# Patient Record
Sex: FEMALE | Race: WHITE | NOT HISPANIC OR LATINO | Employment: OTHER | URBAN - METROPOLITAN AREA
[De-identification: names, ages, dates, MRNs, and addresses within clinical notes are randomized per-mention and may not be internally consistent; named-entity substitution may affect disease eponyms.]

---

## 2019-01-01 ENCOUNTER — OFFICE VISIT (OUTPATIENT)
Dept: SURGERY | Facility: CLINIC | Age: 69
End: 2019-01-01
Payer: MEDICARE

## 2019-01-01 ENCOUNTER — HOSPITAL ENCOUNTER (INPATIENT)
Facility: HOSPITAL | Age: 69
LOS: 2 days | Discharge: NURSING, REHAB OR LONG-TERM CARE | DRG: 392 | End: 2019-12-21
Attending: EMERGENCY MEDICINE | Admitting: INTERNAL MEDICINE
Payer: MEDICARE

## 2019-01-01 ENCOUNTER — PATIENT MESSAGE (OUTPATIENT)
Dept: SURGERY | Facility: CLINIC | Age: 69
End: 2019-01-01

## 2019-01-01 VITALS
HEIGHT: 66 IN | DIASTOLIC BLOOD PRESSURE: 64 MMHG | BODY MASS INDEX: 24.94 KG/M2 | HEART RATE: 94 BPM | SYSTOLIC BLOOD PRESSURE: 94 MMHG

## 2019-01-01 VITALS
HEIGHT: 66 IN | TEMPERATURE: 98 F | OXYGEN SATURATION: 90 % | RESPIRATION RATE: 16 BRPM | DIASTOLIC BLOOD PRESSURE: 55 MMHG | HEART RATE: 81 BPM | SYSTOLIC BLOOD PRESSURE: 107 MMHG | WEIGHT: 154.56 LBS | BODY MASS INDEX: 24.84 KG/M2

## 2019-01-01 DIAGNOSIS — I50.32 CHRONIC DIASTOLIC HEART FAILURE: ICD-10-CM

## 2019-01-01 DIAGNOSIS — E11.9 TYPE 2 DIABETES MELLITUS WITHOUT COMPLICATION, WITHOUT LONG-TERM CURRENT USE OF INSULIN: ICD-10-CM

## 2019-01-01 DIAGNOSIS — R94.31 QT PROLONGATION: ICD-10-CM

## 2019-01-01 DIAGNOSIS — K57.92 DIVERTICULITIS: Primary | ICD-10-CM

## 2019-01-01 DIAGNOSIS — R79.89 ELEVATED TROPONIN: ICD-10-CM

## 2019-01-01 DIAGNOSIS — K57.32 DIVERTICULITIS OF SIGMOID COLON: ICD-10-CM

## 2019-01-01 DIAGNOSIS — E46 MALNUTRITION, UNSPECIFIED TYPE: ICD-10-CM

## 2019-01-01 DIAGNOSIS — K57.30 DIVERTICULOSIS OF SIGMOID COLON: ICD-10-CM

## 2019-01-01 DIAGNOSIS — J44.9 CHRONIC OBSTRUCTIVE PULMONARY DISEASE, UNSPECIFIED COPD TYPE: ICD-10-CM

## 2019-01-01 DIAGNOSIS — I48.0 PAROXYSMAL ATRIAL FIBRILLATION: ICD-10-CM

## 2019-01-01 DIAGNOSIS — R00.0 SINUS TACHYCARDIA: ICD-10-CM

## 2019-01-01 DIAGNOSIS — I49.9 ABNORMAL HEART RHYTHM: ICD-10-CM

## 2019-01-01 DIAGNOSIS — R00.0 TACHYCARDIA: ICD-10-CM

## 2019-01-01 LAB
ALBUMIN SERPL BCP-MCNC: 2.6 G/DL (ref 3.5–5.2)
ALBUMIN SERPL BCP-MCNC: 2.8 G/DL (ref 3.5–5.2)
ALBUMIN SERPL BCP-MCNC: 3.3 G/DL (ref 3.5–5.2)
ALP SERPL-CCNC: 103 U/L (ref 55–135)
ALP SERPL-CCNC: 124 U/L (ref 55–135)
ALP SERPL-CCNC: 96 U/L (ref 55–135)
ALT SERPL W/O P-5'-P-CCNC: 11 U/L (ref 10–44)
ALT SERPL W/O P-5'-P-CCNC: 12 U/L (ref 10–44)
ALT SERPL W/O P-5'-P-CCNC: 9 U/L (ref 10–44)
ANION GAP SERPL CALC-SCNC: 12 MMOL/L (ref 8–16)
ANION GAP SERPL CALC-SCNC: 9 MMOL/L (ref 8–16)
ANION GAP SERPL CALC-SCNC: 9 MMOL/L (ref 8–16)
AST SERPL-CCNC: 19 U/L (ref 10–40)
AST SERPL-CCNC: 20 U/L (ref 10–40)
AST SERPL-CCNC: 28 U/L (ref 10–40)
BACTERIA BLD CULT: NORMAL
BACTERIA BLD CULT: NORMAL
BASOPHILS # BLD AUTO: 0.02 K/UL (ref 0–0.2)
BASOPHILS # BLD AUTO: 0.03 K/UL (ref 0–0.2)
BASOPHILS # BLD AUTO: 0.03 K/UL (ref 0–0.2)
BASOPHILS NFR BLD: 0.4 % (ref 0–1.9)
BASOPHILS NFR BLD: 0.4 % (ref 0–1.9)
BASOPHILS NFR BLD: 0.5 % (ref 0–1.9)
BILIRUB SERPL-MCNC: 1.2 MG/DL (ref 0.1–1)
BILIRUB SERPL-MCNC: 1.4 MG/DL (ref 0.1–1)
BILIRUB SERPL-MCNC: 1.4 MG/DL (ref 0.1–1)
BUN SERPL-MCNC: 15 MG/DL (ref 8–23)
BUN SERPL-MCNC: 16 MG/DL (ref 8–23)
BUN SERPL-MCNC: 17 MG/DL (ref 8–23)
CALCIUM SERPL-MCNC: 7.2 MG/DL (ref 8.7–10.5)
CALCIUM SERPL-MCNC: 7.3 MG/DL (ref 8.7–10.5)
CALCIUM SERPL-MCNC: 7.4 MG/DL (ref 8.7–10.5)
CHLORIDE SERPL-SCNC: 96 MMOL/L (ref 95–110)
CHLORIDE SERPL-SCNC: 98 MMOL/L (ref 95–110)
CHLORIDE SERPL-SCNC: 99 MMOL/L (ref 95–110)
CO2 SERPL-SCNC: 32 MMOL/L (ref 23–29)
CO2 SERPL-SCNC: 33 MMOL/L (ref 23–29)
CO2 SERPL-SCNC: 35 MMOL/L (ref 23–29)
CREAT SERPL-MCNC: 0.7 MG/DL (ref 0.5–1.4)
CREAT SERPL-MCNC: 0.8 MG/DL (ref 0.5–1.4)
CREAT SERPL-MCNC: 0.8 MG/DL (ref 0.5–1.4)
DIFFERENTIAL METHOD: ABNORMAL
EOSINOPHIL # BLD AUTO: 0.1 K/UL (ref 0–0.5)
EOSINOPHIL # BLD AUTO: 0.2 K/UL (ref 0–0.5)
EOSINOPHIL # BLD AUTO: 0.2 K/UL (ref 0–0.5)
EOSINOPHIL NFR BLD: 1.6 % (ref 0–8)
EOSINOPHIL NFR BLD: 3.2 % (ref 0–8)
EOSINOPHIL NFR BLD: 3.9 % (ref 0–8)
ERYTHROCYTE [DISTWIDTH] IN BLOOD BY AUTOMATED COUNT: 17.4 % (ref 11.5–14.5)
ERYTHROCYTE [DISTWIDTH] IN BLOOD BY AUTOMATED COUNT: 17.5 % (ref 11.5–14.5)
ERYTHROCYTE [DISTWIDTH] IN BLOOD BY AUTOMATED COUNT: 17.7 % (ref 11.5–14.5)
EST. GFR  (AFRICAN AMERICAN): >60 ML/MIN/1.73 M^2
EST. GFR  (NON AFRICAN AMERICAN): >60 ML/MIN/1.73 M^2
ESTIMATED AVG GLUCOSE: 137 MG/DL (ref 68–131)
GLUCOSE SERPL-MCNC: 104 MG/DL (ref 70–110)
GLUCOSE SERPL-MCNC: 138 MG/DL (ref 70–110)
GLUCOSE SERPL-MCNC: 85 MG/DL (ref 70–110)
HBA1C MFR BLD HPLC: 6.4 % (ref 4–5.6)
HCT VFR BLD AUTO: 39.5 % (ref 37–48.5)
HCT VFR BLD AUTO: 42.3 % (ref 37–48.5)
HCT VFR BLD AUTO: 46.7 % (ref 37–48.5)
HGB BLD-MCNC: 12.3 G/DL (ref 12–16)
HGB BLD-MCNC: 13.4 G/DL (ref 12–16)
HGB BLD-MCNC: 14.8 G/DL (ref 12–16)
IMM GRANULOCYTES # BLD AUTO: 0.04 K/UL (ref 0–0.04)
IMM GRANULOCYTES # BLD AUTO: 0.05 K/UL (ref 0–0.04)
IMM GRANULOCYTES # BLD AUTO: 0.05 K/UL (ref 0–0.04)
IMM GRANULOCYTES NFR BLD AUTO: 0.7 % (ref 0–0.5)
IMM GRANULOCYTES NFR BLD AUTO: 0.7 % (ref 0–0.5)
IMM GRANULOCYTES NFR BLD AUTO: 0.8 % (ref 0–0.5)
INFLUENZA A, MOLECULAR: NEGATIVE
INFLUENZA B, MOLECULAR: NEGATIVE
INR PPP: 2.7 (ref 0.8–1.2)
INR PPP: 2.8 (ref 0.8–1.2)
INR PPP: 3.1 (ref 0.8–1.2)
LACTATE SERPL-SCNC: 1.9 MMOL/L (ref 0.5–2.2)
LYMPHOCYTES # BLD AUTO: 1.2 K/UL (ref 1–4.8)
LYMPHOCYTES # BLD AUTO: 1.4 K/UL (ref 1–4.8)
LYMPHOCYTES # BLD AUTO: 1.4 K/UL (ref 1–4.8)
LYMPHOCYTES NFR BLD: 20.1 % (ref 18–48)
LYMPHOCYTES NFR BLD: 22.2 % (ref 18–48)
LYMPHOCYTES NFR BLD: 22.9 % (ref 18–48)
MAGNESIUM SERPL-MCNC: 0.8 MG/DL (ref 1.6–2.6)
MAGNESIUM SERPL-MCNC: 0.8 MG/DL (ref 1.6–2.6)
MAGNESIUM SERPL-MCNC: 1.9 MG/DL (ref 1.6–2.6)
MAGNESIUM SERPL-MCNC: 2.4 MG/DL (ref 1.6–2.6)
MCH RBC QN AUTO: 28.4 PG (ref 27–31)
MCH RBC QN AUTO: 28.8 PG (ref 27–31)
MCH RBC QN AUTO: 28.8 PG (ref 27–31)
MCHC RBC AUTO-ENTMCNC: 31.1 G/DL (ref 32–36)
MCHC RBC AUTO-ENTMCNC: 31.7 G/DL (ref 32–36)
MCHC RBC AUTO-ENTMCNC: 31.7 G/DL (ref 32–36)
MCV RBC AUTO: 91 FL (ref 82–98)
MONOCYTES # BLD AUTO: 0.4 K/UL (ref 0.3–1)
MONOCYTES # BLD AUTO: 0.5 K/UL (ref 0.3–1)
MONOCYTES # BLD AUTO: 0.5 K/UL (ref 0.3–1)
MONOCYTES NFR BLD: 6.8 % (ref 4–15)
MONOCYTES NFR BLD: 7.8 % (ref 4–15)
MONOCYTES NFR BLD: 8.5 % (ref 4–15)
NEUTROPHILS # BLD AUTO: 3.5 K/UL (ref 1.8–7.7)
NEUTROPHILS # BLD AUTO: 4 K/UL (ref 1.8–7.7)
NEUTROPHILS # BLD AUTO: 4.7 K/UL (ref 1.8–7.7)
NEUTROPHILS NFR BLD: 64.3 % (ref 38–73)
NEUTROPHILS NFR BLD: 64.8 % (ref 38–73)
NEUTROPHILS NFR BLD: 70.4 % (ref 38–73)
NRBC BLD-RTO: 0 /100 WBC
PHOSPHATE SERPL-MCNC: 2.5 MG/DL (ref 2.7–4.5)
PHOSPHATE SERPL-MCNC: 3 MG/DL (ref 2.7–4.5)
PLATELET # BLD AUTO: 252 K/UL (ref 150–350)
PLATELET # BLD AUTO: 294 K/UL (ref 150–350)
PLATELET # BLD AUTO: 304 K/UL (ref 150–350)
PMV BLD AUTO: 9 FL (ref 9.2–12.9)
PMV BLD AUTO: 9 FL (ref 9.2–12.9)
PMV BLD AUTO: 9.1 FL (ref 9.2–12.9)
POCT GLUCOSE: 100 MG/DL (ref 70–110)
POCT GLUCOSE: 139 MG/DL (ref 70–110)
POCT GLUCOSE: 165 MG/DL (ref 70–110)
POTASSIUM SERPL-SCNC: 3.1 MMOL/L (ref 3.5–5.1)
POTASSIUM SERPL-SCNC: 4.2 MMOL/L (ref 3.5–5.1)
POTASSIUM SERPL-SCNC: 4.9 MMOL/L (ref 3.5–5.1)
PROT SERPL-MCNC: 5.3 G/DL (ref 6–8.4)
PROT SERPL-MCNC: 5.8 G/DL (ref 6–8.4)
PROT SERPL-MCNC: 7.1 G/DL (ref 6–8.4)
PROTHROMBIN TIME: 25.7 SEC (ref 9–12.5)
PROTHROMBIN TIME: 26.9 SEC (ref 9–12.5)
PROTHROMBIN TIME: 29.5 SEC (ref 9–12.5)
RBC # BLD AUTO: 4.33 M/UL (ref 4–5.4)
RBC # BLD AUTO: 4.65 M/UL (ref 4–5.4)
RBC # BLD AUTO: 5.13 M/UL (ref 4–5.4)
SODIUM SERPL-SCNC: 140 MMOL/L (ref 136–145)
SODIUM SERPL-SCNC: 141 MMOL/L (ref 136–145)
SODIUM SERPL-SCNC: 142 MMOL/L (ref 136–145)
SPECIMEN SOURCE: NORMAL
TROPONIN I SERPL DL<=0.01 NG/ML-MCNC: 0.05 NG/ML (ref 0–0.03)
TROPONIN I SERPL DL<=0.01 NG/ML-MCNC: 0.06 NG/ML (ref 0–0.03)
WBC # BLD AUTO: 5.37 K/UL (ref 3.9–12.7)
WBC # BLD AUTO: 6.21 K/UL (ref 3.9–12.7)
WBC # BLD AUTO: 6.73 K/UL (ref 3.9–12.7)

## 2019-01-01 PROCEDURE — 93005 ELECTROCARDIOGRAM TRACING: CPT

## 2019-01-01 PROCEDURE — 99238 HOSP IP/OBS DSCHRG MGMT 30/<: CPT | Mod: ,,, | Performed by: INTERNAL MEDICINE

## 2019-01-01 PROCEDURE — 25000242 PHARM REV CODE 250 ALT 637 W/ HCPCS: Performed by: STUDENT IN AN ORGANIZED HEALTH CARE EDUCATION/TRAINING PROGRAM

## 2019-01-01 PROCEDURE — 63600175 PHARM REV CODE 636 W HCPCS: Performed by: STUDENT IN AN ORGANIZED HEALTH CARE EDUCATION/TRAINING PROGRAM

## 2019-01-01 PROCEDURE — 93010 EKG 12-LEAD: ICD-10-PCS | Mod: ,,, | Performed by: INTERNAL MEDICINE

## 2019-01-01 PROCEDURE — 83735 ASSAY OF MAGNESIUM: CPT

## 2019-01-01 PROCEDURE — 99223 PR INITIAL HOSPITAL CARE,LEVL III: ICD-10-PCS | Mod: ,,, | Performed by: INTERNAL MEDICINE

## 2019-01-01 PROCEDURE — 83735 ASSAY OF MAGNESIUM: CPT | Mod: 91

## 2019-01-01 PROCEDURE — 94640 AIRWAY INHALATION TREATMENT: CPT

## 2019-01-01 PROCEDURE — 3288F FALL RISK ASSESSMENT DOCD: CPT | Mod: CPTII,S$GLB,, | Performed by: COLON & RECTAL SURGERY

## 2019-01-01 PROCEDURE — 87040 BLOOD CULTURE FOR BACTERIA: CPT

## 2019-01-01 PROCEDURE — 25000003 PHARM REV CODE 250: Performed by: STUDENT IN AN ORGANIZED HEALTH CARE EDUCATION/TRAINING PROGRAM

## 2019-01-01 PROCEDURE — 85025 COMPLETE CBC W/AUTO DIFF WBC: CPT

## 2019-01-01 PROCEDURE — 27000221 HC OXYGEN, UP TO 24 HOURS

## 2019-01-01 PROCEDURE — 99232 SBSQ HOSP IP/OBS MODERATE 35: CPT | Mod: ,,, | Performed by: INTERNAL MEDICINE

## 2019-01-01 PROCEDURE — 80053 COMPREHEN METABOLIC PANEL: CPT

## 2019-01-01 PROCEDURE — 93010 ELECTROCARDIOGRAM REPORT: CPT | Mod: ,,, | Performed by: INTERNAL MEDICINE

## 2019-01-01 PROCEDURE — 99900035 HC TECH TIME PER 15 MIN (STAT)

## 2019-01-01 PROCEDURE — 1125F PR PAIN SEVERITY QUANTIFIED, PAIN PRESENT: ICD-10-PCS | Mod: S$GLB,,, | Performed by: COLON & RECTAL SURGERY

## 2019-01-01 PROCEDURE — 36415 COLL VENOUS BLD VENIPUNCTURE: CPT

## 2019-01-01 PROCEDURE — 3288F PR FALLS RISK ASSESSMENT DOCUMENTED: ICD-10-PCS | Mod: CPTII,S$GLB,, | Performed by: COLON & RECTAL SURGERY

## 2019-01-01 PROCEDURE — 85610 PROTHROMBIN TIME: CPT

## 2019-01-01 PROCEDURE — 99285 EMERGENCY DEPT VISIT HI MDM: CPT | Mod: ,,, | Performed by: EMERGENCY MEDICINE

## 2019-01-01 PROCEDURE — 99238 PR HOSPITAL DISCHARGE DAY,<30 MIN: ICD-10-PCS | Mod: ,,, | Performed by: INTERNAL MEDICINE

## 2019-01-01 PROCEDURE — 84484 ASSAY OF TROPONIN QUANT: CPT

## 2019-01-01 PROCEDURE — 96365 THER/PROPH/DIAG IV INF INIT: CPT

## 2019-01-01 PROCEDURE — 97165 OT EVAL LOW COMPLEX 30 MIN: CPT

## 2019-01-01 PROCEDURE — 20600001 HC STEP DOWN PRIVATE ROOM

## 2019-01-01 PROCEDURE — 99285 PR EMERGENCY DEPT VISIT,LEVEL V: ICD-10-PCS | Mod: ,,, | Performed by: EMERGENCY MEDICINE

## 2019-01-01 PROCEDURE — 99232 PR SUBSEQUENT HOSPITAL CARE,LEVL II: ICD-10-PCS | Mod: ,,, | Performed by: INTERNAL MEDICINE

## 2019-01-01 PROCEDURE — 25500020 PHARM REV CODE 255: Performed by: EMERGENCY MEDICINE

## 2019-01-01 PROCEDURE — 84100 ASSAY OF PHOSPHORUS: CPT

## 2019-01-01 PROCEDURE — 25000003 PHARM REV CODE 250: Performed by: INTERNAL MEDICINE

## 2019-01-01 PROCEDURE — 83036 HEMOGLOBIN GLYCOSYLATED A1C: CPT

## 2019-01-01 PROCEDURE — 99999 PR PBB SHADOW E&M-EST. PATIENT-LVL III: CPT | Mod: PBBFAC,,, | Performed by: COLON & RECTAL SURGERY

## 2019-01-01 PROCEDURE — 97161 PT EVAL LOW COMPLEX 20 MIN: CPT

## 2019-01-01 PROCEDURE — 1100F PTFALLS ASSESS-DOCD GE2>/YR: CPT | Mod: CPTII,S$GLB,, | Performed by: COLON & RECTAL SURGERY

## 2019-01-01 PROCEDURE — 1159F PR MEDICATION LIST DOCUMENTED IN MEDICAL RECORD: ICD-10-PCS | Mod: S$GLB,,, | Performed by: COLON & RECTAL SURGERY

## 2019-01-01 PROCEDURE — 87502 INFLUENZA DNA AMP PROBE: CPT

## 2019-01-01 PROCEDURE — 99999 PR PBB SHADOW E&M-EST. PATIENT-LVL III: ICD-10-PCS | Mod: PBBFAC,,, | Performed by: COLON & RECTAL SURGERY

## 2019-01-01 PROCEDURE — 1100F PR PT FALLS ASSESS DOC 2+ FALLS/FALL W/INJURY/YR: ICD-10-PCS | Mod: CPTII,S$GLB,, | Performed by: COLON & RECTAL SURGERY

## 2019-01-01 PROCEDURE — 1125F AMNT PAIN NOTED PAIN PRSNT: CPT | Mod: S$GLB,,, | Performed by: COLON & RECTAL SURGERY

## 2019-01-01 PROCEDURE — 99204 PR OFFICE/OUTPT VISIT, NEW, LEVL IV, 45-59 MIN: ICD-10-PCS | Mod: S$GLB,,, | Performed by: COLON & RECTAL SURGERY

## 2019-01-01 PROCEDURE — 83605 ASSAY OF LACTIC ACID: CPT

## 2019-01-01 PROCEDURE — 94761 N-INVAS EAR/PLS OXIMETRY MLT: CPT

## 2019-01-01 PROCEDURE — 96361 HYDRATE IV INFUSION ADD-ON: CPT

## 2019-01-01 PROCEDURE — 99204 OFFICE O/P NEW MOD 45 MIN: CPT | Mod: S$GLB,,, | Performed by: COLON & RECTAL SURGERY

## 2019-01-01 PROCEDURE — 99223 1ST HOSP IP/OBS HIGH 75: CPT | Mod: ,,, | Performed by: INTERNAL MEDICINE

## 2019-01-01 PROCEDURE — 99285 EMERGENCY DEPT VISIT HI MDM: CPT | Mod: 25

## 2019-01-01 PROCEDURE — 84484 ASSAY OF TROPONIN QUANT: CPT | Mod: 91

## 2019-01-01 PROCEDURE — 1159F MED LIST DOCD IN RCRD: CPT | Mod: S$GLB,,, | Performed by: COLON & RECTAL SURGERY

## 2019-01-01 RX ORDER — PROMETHAZINE HYDROCHLORIDE 12.5 MG/1
12.5 TABLET ORAL EVERY 6 HOURS PRN
Status: DISCONTINUED | OUTPATIENT
Start: 2019-01-01 | End: 2019-01-01 | Stop reason: HOSPADM

## 2019-01-01 RX ORDER — POTASSIUM CHLORIDE 750 MG/1
30 CAPSULE, EXTENDED RELEASE ORAL ONCE
Status: DISCONTINUED | OUTPATIENT
Start: 2019-01-01 | End: 2019-01-01 | Stop reason: HOSPADM

## 2019-01-01 RX ORDER — DULOXETIN HYDROCHLORIDE 30 MG/1
30 CAPSULE, DELAYED RELEASE ORAL DAILY
Status: ON HOLD | COMMUNITY
End: 2020-01-01 | Stop reason: HOSPADM

## 2019-01-01 RX ORDER — DILTIAZEM HYDROCHLORIDE 60 MG/1
60 CAPSULE, EXTENDED RELEASE ORAL EVERY 12 HOURS
Status: ON HOLD | COMMUNITY
End: 2020-01-01 | Stop reason: HOSPADM

## 2019-01-01 RX ORDER — ROSUVASTATIN CALCIUM 20 MG/1
20 TABLET, COATED ORAL DAILY
Status: ON HOLD | COMMUNITY
End: 2019-01-01

## 2019-01-01 RX ORDER — DILTIAZEM HYDROCHLORIDE 60 MG/1
60 TABLET, FILM COATED ORAL EVERY 12 HOURS
Status: DISCONTINUED | OUTPATIENT
Start: 2019-01-01 | End: 2019-01-01 | Stop reason: HOSPADM

## 2019-01-01 RX ORDER — WARFARIN 1 MG/1
1 TABLET ORAL
Status: DISCONTINUED | OUTPATIENT
Start: 2019-01-01 | End: 2019-01-01

## 2019-01-01 RX ORDER — INSULIN ASPART 100 [IU]/ML
0-5 INJECTION, SOLUTION INTRAVENOUS; SUBCUTANEOUS
Status: DISCONTINUED | OUTPATIENT
Start: 2019-01-01 | End: 2019-01-01 | Stop reason: HOSPADM

## 2019-01-01 RX ORDER — IBUPROFEN 200 MG
16 TABLET ORAL
Status: DISCONTINUED | OUTPATIENT
Start: 2019-01-01 | End: 2019-01-01 | Stop reason: HOSPADM

## 2019-01-01 RX ORDER — MAGNESIUM SULFATE HEPTAHYDRATE 40 MG/ML
2 INJECTION, SOLUTION INTRAVENOUS
Status: DISCONTINUED | OUTPATIENT
Start: 2019-01-01 | End: 2019-01-01

## 2019-01-01 RX ORDER — FUROSEMIDE 20 MG/1
20 TABLET ORAL DAILY
Status: ON HOLD | COMMUNITY
End: 2020-01-01 | Stop reason: SDUPTHER

## 2019-01-01 RX ORDER — AMOXICILLIN AND CLAVULANATE POTASSIUM 875; 125 MG/1; MG/1
1 TABLET, FILM COATED ORAL EVERY 12 HOURS
Status: DISCONTINUED | OUTPATIENT
Start: 2019-01-01 | End: 2019-01-01 | Stop reason: HOSPADM

## 2019-01-01 RX ORDER — NYSTATIN 100000 [USP'U]/G
POWDER TOPICAL
COMMUNITY

## 2019-01-01 RX ORDER — MAGNESIUM SULFATE HEPTAHYDRATE 40 MG/ML
2 INJECTION, SOLUTION INTRAVENOUS
Status: COMPLETED | OUTPATIENT
Start: 2019-01-01 | End: 2019-01-01

## 2019-01-01 RX ORDER — METOPROLOL TARTRATE 50 MG/1
50 TABLET ORAL 2 TIMES DAILY
Status: ON HOLD | COMMUNITY
End: 2020-01-01

## 2019-01-01 RX ORDER — NAPROXEN SODIUM 220 MG/1
81 TABLET, FILM COATED ORAL DAILY
Refills: 0 | Status: ON HOLD
Start: 2019-01-01 | End: 2020-01-01 | Stop reason: HOSPADM

## 2019-01-01 RX ORDER — IBUPROFEN 200 MG
24 TABLET ORAL
Status: DISCONTINUED | OUTPATIENT
Start: 2019-01-01 | End: 2019-01-01 | Stop reason: HOSPADM

## 2019-01-01 RX ORDER — METOPROLOL TARTRATE 25 MG/1
50 TABLET, FILM COATED ORAL 2 TIMES DAILY
Status: DISCONTINUED | OUTPATIENT
Start: 2019-01-01 | End: 2019-01-01 | Stop reason: HOSPADM

## 2019-01-01 RX ORDER — BUDESONIDE 0.5 MG/2ML
0.5 INHALANT ORAL 2 TIMES DAILY
Status: DISCONTINUED | OUTPATIENT
Start: 2019-01-01 | End: 2019-01-01 | Stop reason: HOSPADM

## 2019-01-01 RX ORDER — IPRATROPIUM BROMIDE AND ALBUTEROL SULFATE 2.5; .5 MG/3ML; MG/3ML
3 SOLUTION RESPIRATORY (INHALATION) EVERY 6 HOURS PRN
Status: DISCONTINUED | OUTPATIENT
Start: 2019-01-01 | End: 2019-01-01 | Stop reason: HOSPADM

## 2019-01-01 RX ORDER — WARFARIN 1 MG/1
1 TABLET ORAL
Status: DISCONTINUED | OUTPATIENT
Start: 2019-01-01 | End: 2019-01-01 | Stop reason: HOSPADM

## 2019-01-01 RX ORDER — CHOLECALCIFEROL (VITAMIN D3) 25 MCG
1000 TABLET ORAL DAILY
COMMUNITY

## 2019-01-01 RX ORDER — ONDANSETRON 8 MG/1
8 TABLET, ORALLY DISINTEGRATING ORAL EVERY 8 HOURS PRN
Status: DISCONTINUED | OUTPATIENT
Start: 2019-01-01 | End: 2019-01-01

## 2019-01-01 RX ORDER — SODIUM CHLORIDE 0.9 % (FLUSH) 0.9 %
10 SYRINGE (ML) INJECTION
Status: DISCONTINUED | OUTPATIENT
Start: 2019-01-01 | End: 2019-01-01 | Stop reason: HOSPADM

## 2019-01-01 RX ORDER — FUROSEMIDE 20 MG/1
20 TABLET ORAL DAILY
Status: DISCONTINUED | OUTPATIENT
Start: 2019-01-01 | End: 2019-01-01 | Stop reason: HOSPADM

## 2019-01-01 RX ORDER — ROSUVASTATIN CALCIUM 20 MG/1
20 TABLET, COATED ORAL DAILY
Status: DISCONTINUED | OUTPATIENT
Start: 2019-01-01 | End: 2019-01-01 | Stop reason: HOSPADM

## 2019-01-01 RX ORDER — POTASSIUM CHLORIDE 20 MEQ/1
40 TABLET, EXTENDED RELEASE ORAL ONCE
Status: COMPLETED | OUTPATIENT
Start: 2019-01-01 | End: 2019-01-01

## 2019-01-01 RX ORDER — ONDANSETRON 8 MG/1
8 TABLET, ORALLY DISINTEGRATING ORAL
Status: COMPLETED | OUTPATIENT
Start: 2019-01-01 | End: 2019-01-01

## 2019-01-01 RX ORDER — DILTIAZEM HYDROCHLORIDE 60 MG/1
60 TABLET, FILM COATED ORAL EVERY 12 HOURS
Status: DISCONTINUED | OUTPATIENT
Start: 2019-01-01 | End: 2019-01-01

## 2019-01-01 RX ORDER — LANOLIN ALCOHOL/MO/W.PET/CERES
800 CREAM (GRAM) TOPICAL 2 TIMES DAILY
Status: DISCONTINUED | OUTPATIENT
Start: 2019-01-01 | End: 2019-01-01

## 2019-01-01 RX ORDER — METOPROLOL TARTRATE 1 MG/ML
5 INJECTION, SOLUTION INTRAVENOUS ONCE
Status: COMPLETED | OUTPATIENT
Start: 2019-01-01 | End: 2019-01-01

## 2019-01-01 RX ORDER — METOPROLOL TARTRATE 25 MG/1
50 TABLET, FILM COATED ORAL 2 TIMES DAILY
Status: DISCONTINUED | OUTPATIENT
Start: 2019-01-01 | End: 2019-01-01

## 2019-01-01 RX ORDER — ALBUTEROL SULFATE 0.83 MG/ML
0.5 SOLUTION RESPIRATORY (INHALATION) EVERY 6 HOURS PRN
COMMUNITY

## 2019-01-01 RX ORDER — NITROFURANTOIN MACROCRYSTALS 50 MG/1
50 CAPSULE ORAL EVERY 6 HOURS
Status: ON HOLD | COMMUNITY
End: 2019-01-01 | Stop reason: HOSPADM

## 2019-01-01 RX ORDER — GLUCAGON 1 MG
1 KIT INJECTION
Status: DISCONTINUED | OUTPATIENT
Start: 2019-01-01 | End: 2019-01-01 | Stop reason: HOSPADM

## 2019-01-01 RX ORDER — METFORMIN HYDROCHLORIDE 1000 MG/1
1000 TABLET ORAL 2 TIMES DAILY WITH MEALS
COMMUNITY

## 2019-01-01 RX ORDER — LANOLIN ALCOHOL/MO/W.PET/CERES
400 CREAM (GRAM) TOPICAL DAILY
Refills: 0
Start: 2019-01-01

## 2019-01-01 RX ORDER — AMOXICILLIN AND CLAVULANATE POTASSIUM 875; 125 MG/1; MG/1
1 TABLET, FILM COATED ORAL EVERY 12 HOURS
Status: DISCONTINUED | OUTPATIENT
Start: 2019-01-01 | End: 2019-01-01

## 2019-01-01 RX ORDER — AMOXICILLIN AND CLAVULANATE POTASSIUM 875; 125 MG/1; MG/1
1 TABLET, FILM COATED ORAL EVERY 12 HOURS
Qty: 24 TABLET | Refills: 0 | Status: SHIPPED | OUTPATIENT
Start: 2019-01-01 | End: 2020-01-01

## 2019-01-01 RX ORDER — NAPROXEN SODIUM 220 MG/1
81 TABLET, FILM COATED ORAL DAILY
Status: DISCONTINUED | OUTPATIENT
Start: 2019-01-01 | End: 2019-01-01 | Stop reason: HOSPADM

## 2019-01-01 RX ORDER — WARFARIN 1 MG/1
1 TABLET ORAL
Status: ON HOLD | COMMUNITY
End: 2020-01-01 | Stop reason: SDUPTHER

## 2019-01-01 RX ORDER — POTASSIUM CHLORIDE 750 MG/1
10 CAPSULE, EXTENDED RELEASE ORAL DAILY
Status: ON HOLD | COMMUNITY
End: 2020-01-01 | Stop reason: SDUPTHER

## 2019-01-01 RX ORDER — IPRATROPIUM BROMIDE AND ALBUTEROL SULFATE 2.5; .5 MG/3ML; MG/3ML
3 SOLUTION RESPIRATORY (INHALATION)
Status: DISCONTINUED | OUTPATIENT
Start: 2019-01-01 | End: 2019-01-01

## 2019-01-01 RX ORDER — ACETAMINOPHEN 325 MG/1
650 TABLET ORAL EVERY 4 HOURS PRN
Status: DISCONTINUED | OUTPATIENT
Start: 2019-01-01 | End: 2019-01-01 | Stop reason: HOSPADM

## 2019-01-01 RX ORDER — BUDESONIDE 0.25 MG/2ML
INHALANT ORAL
COMMUNITY

## 2019-01-01 RX ORDER — LANOLIN ALCOHOL/MO/W.PET/CERES
400 CREAM (GRAM) TOPICAL DAILY
Status: DISCONTINUED | OUTPATIENT
Start: 2019-01-01 | End: 2019-01-01 | Stop reason: HOSPADM

## 2019-01-01 RX ADMIN — PIPERACILLIN AND TAZOBACTAM 4.5 G: 4; .5 INJECTION, POWDER, FOR SOLUTION INTRAVENOUS at 11:12

## 2019-01-01 RX ADMIN — BUDESONIDE 0.5 MG: 0.5 INHALANT RESPIRATORY (INHALATION) at 09:12

## 2019-01-01 RX ADMIN — DILTIAZEM HYDROCHLORIDE 60 MG: 60 TABLET, FILM COATED ORAL at 08:12

## 2019-01-01 RX ADMIN — MAGNESIUM SULFATE IN WATER 2 G: 40 INJECTION, SOLUTION INTRAVENOUS at 08:12

## 2019-01-01 RX ADMIN — MAGNESIUM SULFATE IN WATER 2 G: 40 INJECTION, SOLUTION INTRAVENOUS at 12:12

## 2019-01-01 RX ADMIN — FUROSEMIDE 20 MG: 20 TABLET ORAL at 08:12

## 2019-01-01 RX ADMIN — PIPERACILLIN AND TAZOBACTAM 4.5 G: 4; .5 INJECTION, POWDER, FOR SOLUTION INTRAVENOUS at 04:12

## 2019-01-01 RX ADMIN — ONDANSETRON 8 MG: 8 TABLET, ORALLY DISINTEGRATING ORAL at 03:12

## 2019-01-01 RX ADMIN — ROSUVASTATIN CALCIUM 20 MG: 20 TABLET, FILM COATED ORAL at 08:12

## 2019-01-01 RX ADMIN — ASPIRIN 81 MG CHEWABLE TABLET 81 MG: 81 TABLET CHEWABLE at 09:12

## 2019-01-01 RX ADMIN — SODIUM CHLORIDE, POTASSIUM CHLORIDE, SODIUM LACTATE AND CALCIUM CHLORIDE 1000 ML: 600; 310; 30; 20 INJECTION, SOLUTION INTRAVENOUS at 06:12

## 2019-01-01 RX ADMIN — WARFARIN SODIUM 1 MG: 1 TABLET ORAL at 04:12

## 2019-01-01 RX ADMIN — POTASSIUM CHLORIDE 40 MEQ: 1500 TABLET, EXTENDED RELEASE ORAL at 09:12

## 2019-01-01 RX ADMIN — Medication 400 MG: at 09:12

## 2019-01-01 RX ADMIN — IOHEXOL 100 ML: 350 INJECTION, SOLUTION INTRAVENOUS at 06:12

## 2019-01-01 RX ADMIN — SODIUM CHLORIDE, POTASSIUM CHLORIDE, SODIUM LACTATE AND CALCIUM CHLORIDE 1000 ML: 600; 310; 30; 20 INJECTION, SOLUTION INTRAVENOUS at 03:12

## 2019-01-01 RX ADMIN — ROSUVASTATIN CALCIUM 20 MG: 20 TABLET, FILM COATED ORAL at 09:12

## 2019-01-01 RX ADMIN — Medication 800 MG: at 08:12

## 2019-01-01 RX ADMIN — METOPROLOL TARTRATE 50 MG: 25 TABLET ORAL at 09:12

## 2019-01-01 RX ADMIN — METOPROLOL TARTRATE 50 MG: 25 TABLET ORAL at 08:12

## 2019-01-01 RX ADMIN — FUROSEMIDE 20 MG: 20 TABLET ORAL at 09:12

## 2019-01-01 RX ADMIN — PIPERACILLIN AND TAZOBACTAM 4.5 G: 4; .5 INJECTION, POWDER, FOR SOLUTION INTRAVENOUS at 06:12

## 2019-01-01 RX ADMIN — METOPROLOL TARTRATE 5 MG: 5 INJECTION INTRAVENOUS at 11:12

## 2019-01-01 RX ADMIN — ASPIRIN 81 MG CHEWABLE TABLET 81 MG: 81 TABLET CHEWABLE at 08:12

## 2019-01-01 RX ADMIN — METOPROLOL TARTRATE 50 MG: 25 TABLET ORAL at 11:12

## 2019-01-01 RX ADMIN — DILTIAZEM HYDROCHLORIDE 60 MG: 60 TABLET, FILM COATED ORAL at 11:12

## 2019-01-01 RX ADMIN — PROMETHAZINE HYDROCHLORIDE 12.5 MG: 12.5 TABLET ORAL at 03:12

## 2019-01-01 RX ADMIN — BUDESONIDE 0.5 MG: 0.5 INHALANT RESPIRATORY (INHALATION) at 07:12

## 2019-01-01 RX ADMIN — AMOXICILLIN AND CLAVULANATE POTASSIUM 1 TABLET: 875; 125 TABLET, FILM COATED ORAL at 10:12

## 2019-12-19 PROBLEM — K57.30 DIVERTICULOSIS OF SIGMOID COLON: Status: ACTIVE | Noted: 2019-01-01

## 2019-12-19 PROBLEM — E11.9 T2DM (TYPE 2 DIABETES MELLITUS): Status: ACTIVE | Noted: 2019-01-01

## 2019-12-19 PROBLEM — J44.9 COPD (CHRONIC OBSTRUCTIVE PULMONARY DISEASE): Status: ACTIVE | Noted: 2019-01-01

## 2019-12-19 PROBLEM — R79.89 ELEVATED TROPONIN: Status: ACTIVE | Noted: 2019-01-01

## 2019-12-19 PROBLEM — I48.91 ATRIAL FIBRILLATION: Status: ACTIVE | Noted: 2019-01-01

## 2019-12-19 PROBLEM — I50.32 CHRONIC DIASTOLIC HEART FAILURE: Status: ACTIVE | Noted: 2019-01-01

## 2019-12-19 PROBLEM — R00.0 TACHYCARDIA: Status: ACTIVE | Noted: 2019-01-01

## 2019-12-19 NOTE — SUBJECTIVE & OBJECTIVE
Past Medical History:   Diagnosis Date    A-fib     GAVIN (acute kidney injury)     CHF (congestive heart failure)     COPD (chronic obstructive pulmonary disease)     Diabetes mellitus     Hypertension        Past Surgical History:   Procedure Laterality Date    CARDIAC SURGERY         Review of patient's allergies indicates:  No Known Allergies    No current facility-administered medications on file prior to encounter.      No current outpatient medications on file prior to encounter.     Family History     None        Tobacco Use    Smoking status: Former Smoker     Packs/day: 1.00     Years: 54.00     Pack years: 54.00     Types: Cigarettes     Last attempt to quit: 2019     Years since quittin.1    Smokeless tobacco: Never Used   Substance and Sexual Activity    Alcohol use: Not Currently    Drug use: Not Currently    Sexual activity: Not Currently     Partners: Male     Review of Systems   Constitution: Positive for chills.   Cardiovascular: Negative for chest pain, dyspnea on exertion, irregular heartbeat, leg swelling, palpitations and syncope.   Respiratory: Negative for shortness of breath.    Musculoskeletal: Negative for back pain.   Gastrointestinal: Positive for abdominal pain, nausea and vomiting.   Neurological: Negative for focal weakness and numbness.     Objective:     Vital Signs (Most Recent):  Temp: 98 °F (36.7 °C) (19 1451)  Pulse: (!) 112 (19 170)  Resp: (!) 21 (19 170)  BP: 139/68 (19)  SpO2: 95 % (19) Vital Signs (24h Range):  Temp:  [98 °F (36.7 °C)] 98 °F (36.7 °C)  Pulse:  [106-112] 112  Resp:  [17-22] 21  SpO2:  [95 %-98 %] 95 %  BP: (132-164)/(63-86) 139/68        There is no height or weight on file to calculate BMI.    SpO2: 95 %         Intake/Output Summary (Last 24 hours) at 2019 1710  Last data filed at 2019 1648  Gross per 24 hour   Intake 1000 ml   Output --   Net 1000 ml       Lines/Drains/Airways      Peripheral Intravenous Line                 Peripheral IV - Single Lumen 12/19/19 1520 20 G Left Antecubital less than 1 day                Physical Exam   Constitutional: She is oriented to person, place, and time. She appears well-developed and well-nourished.   HENT:   Head: Normocephalic and atraumatic.   Eyes: Pupils are equal, round, and reactive to light. EOM are normal.   Neck: Normal range of motion. Neck supple. No JVD present.   Cardiovascular: Normal rate, regular rhythm, normal heart sounds and intact distal pulses.   Pulmonary/Chest: Effort normal and breath sounds normal.   Abdominal: Soft. Bowel sounds are normal. There is tenderness (LLQ).   Musculoskeletal: Normal range of motion. She exhibits no edema.   Redness noted on her right lower extremity   Neurological: She is alert and oriented to person, place, and time.       Significant Labs:   CMP   Recent Labs   Lab 12/19/19  1524      K 4.9   CL 96   CO2 33*   *   BUN 16   CREATININE 0.8   CALCIUM 7.4*   PROT 7.1   ALBUMIN 3.3*   BILITOT 1.4*   ALKPHOS 124   AST 28   ALT 12   ANIONGAP 12   ESTGFRAFRICA >60.0   EGFRNONAA >60.0   , CBC   Recent Labs   Lab 12/19/19  1524   WBC 6.73   HGB 14.8   HCT 46.7      , Lipid Panel No results for input(s): CHOL, HDL, LDLCALC, TRIG, CHOLHDL in the last 48 hours. and All pertinent lab results from the last 24 hours have been reviewed.    Significant Imaging: CT scan: CT ABDOMEN PELVIS WITH CONTRAST: No results found for this visit on 12/19/19., Echocardiogram: Transthoracic echo (TTE) complete (Cupid Only): No results found for this or any previous visit. and X-Ray: KUB: X-Ray Abdomen AP 1 View (KUB): No results found for this visit on 12/19/19.

## 2019-12-19 NOTE — HPI
Patient is a 69 female here with past medical hx of afib (on coumadin), COPD, cellulitis of LLE, previous admit to [facility] on 11/11/2019 for cellultis with acute hypoxemic respiratory failure here with nausea, vomitting and diarrhea. On this stay at , her acute hypoxemic respiratory failure was thought to be due to at first an exacerbation of ADHF. However, she developed GAVIN after being overdiuresed. Later it was thought that she had a COPD exacerbation and started on prednisone and IV abx. She was discharged on a tapered dose of steroids. Cardiology has been consulted in the setting of a mild troponemia of 0.06. EKG shows a bi-fasicular block. She is currently complaining of Left lower quadrant pain and states that her N/V/D has been going on for a week and that she has been around sick contacts. Denies any overt fevers but reports chills.

## 2019-12-19 NOTE — ED TRIAGE NOTES
Arrived via EMS from [facility] c/o N/V/D, decreased appetite x 1 week, pt also reports weakness and dizziness, denies recent illness, denies SOB, fever, cold or flu like symptoms or recent illness.      LOC: The patient is awake, alert, and oriented to place, time, situation. Affect is appropriate.  Speech is appropriate and clear.     APPEARANCE: Patient resting comfortably in no acute distress.  Patient is clean and well groomed.    SKIN: The skin is warm and dry; color consistent with ethnicity.  Patient has normal skin turgor and moist mucus membranes.  Skin intact; noted to bilateral arms bruising noted, rash noted under left breast fold, BLE cellulitis with dry and sloughing skin.    MUSCULOSKELETAL: Patient moving upper and lower extremities without difficulty. Generalized weakness.     RESPIRATORY: Airway is open and patent. Respirations spontaneous, even, easy, and non-labored.  Patient has a normal effort and rate.  No accessory muscle use noted. Denies cough.     CARDIAC:  Tachycardia and heart block rhythm noted.  No peripheral edema noted. No complaints of chest pain.      ABDOMEN: Soft and non tender to palpation.  No distention noted.     NEUROLOGIC: Eyes open spontaneously.  Behavior appropriate to situation.  Follows commands; facial expression symmetrical.  Purposeful motor response noted; normal sensation in all extremities.

## 2019-12-19 NOTE — CONSULTS
[facility]  Cardiology  Consult Note    Patient Name: Elizabeth Cano  MRN: [id number]  Admission Date: 12/19/2019  Hospital Length of Stay: 0 days  Code Status: No Order   Attending Provider: Eleonora Wall MD   Consulting Provider: Jeff Pennington MD  Primary Care Physician: No primary care provider on file.  Principal Problem:<principal problem not specified>    Patient information was obtained from patient, relative(s), past medical records and ER records.     Inpatient consult to Cardiology  Consult performed by: Jeff Pennington MD  Consult ordered by: Yazan Casas MD  Reason for consult: elevated troponin        Subjective:     Chief Complaint:  Left lower abdominal pain.     HPI:   Patient is a 69 female here with past medical hx of afib (on coumadin), COPD, cellulitis of LLE, previous admit to [facility] on 11/11/2019 for cellultis with acute hypoxemic respiratory failure here with nausea, vomitting and diarrhea. On this stay at , her acute hypoxemic respiratory failure was thought to be due to at first an exacerbation of ADHF. However, she developed GAVIN after being overdiuresed. Later it was thought that she had a COPD exacerbation and started on prednisone and IV abx. She was discharged on a tapered dose of steroids. Cardiology has been consulted in the setting of a mild troponemia of 0.06. EKG shows a bi-fasicular block. She is currently complaining of Left lower quadrant pain and states that her N/V/D has been going on for a week and that she has been around sick contacts. Denies any overt fevers but reports chills.    Past Medical History:   Diagnosis Date    A-fib     GAVIN (acute kidney injury)     CHF (congestive heart failure)     COPD (chronic obstructive pulmonary disease)     Diabetes mellitus     Hypertension        Past Surgical History:   Procedure Laterality Date    CARDIAC SURGERY         Review of patient's allergies indicates:  No Known  Allergies    No current facility-administered medications on file prior to encounter.      No current outpatient medications on file prior to encounter.     Family History     None        Tobacco Use    Smoking status: Former Smoker     Packs/day: 1.00     Years: 54.00     Pack years: 54.00     Types: Cigarettes     Last attempt to quit: 2019     Years since quittin.1    Smokeless tobacco: Never Used   Substance and Sexual Activity    Alcohol use: Not Currently    Drug use: Not Currently    Sexual activity: Not Currently     Partners: Male     Review of Systems   Constitution: Positive for chills.   Cardiovascular: Negative for chest pain, dyspnea on exertion, irregular heartbeat, leg swelling, palpitations and syncope.   Respiratory: Negative for shortness of breath.    Musculoskeletal: Negative for back pain.   Gastrointestinal: Positive for abdominal pain, nausea and vomiting.   Neurological: Negative for focal weakness and numbness.     Objective:     Vital Signs (Most Recent):  Temp: 98 °F (36.7 °C) (19 1451)  Pulse: (!) 112 (19 170)  Resp: (!) 21 (19 170)  BP: 139/68 (19 170)  SpO2: 95 % (19 170) Vital Signs (24h Range):  Temp:  [98 °F (36.7 °C)] 98 °F (36.7 °C)  Pulse:  [106-112] 112  Resp:  [17-22] 21  SpO2:  [95 %-98 %] 95 %  BP: (132-164)/(63-86) 139/68        There is no height or weight on file to calculate BMI.    SpO2: 95 %         Intake/Output Summary (Last 24 hours) at 2019 1710  Last data filed at 2019 1648  Gross per 24 hour   Intake 1000 ml   Output --   Net 1000 ml       Lines/Drains/Airways     Peripheral Intravenous Line                 Peripheral IV - Single Lumen 19 1520 20 G Left Antecubital less than 1 day                Physical Exam   Constitutional: She is oriented to person, place, and time. She appears well-developed and well-nourished.   HENT:   Head: Normocephalic and atraumatic.   Eyes: Pupils are equal, round, and  reactive to light. EOM are normal.   Neck: Normal range of motion. Neck supple. No JVD present.   Cardiovascular: Normal rate, regular rhythm, normal heart sounds and intact distal pulses.   Pulmonary/Chest: Effort normal and breath sounds normal.   Abdominal: Soft. Bowel sounds are normal. There is tenderness (LLQ).   Musculoskeletal: Normal range of motion. She exhibits no edema.   Redness noted on her right lower extremity   Neurological: She is alert and oriented to person, place, and time.       Significant Labs:   CMP   Recent Labs   Lab 12/19/19  1524      K 4.9   CL 96   CO2 33*   *   BUN 16   CREATININE 0.8   CALCIUM 7.4*   PROT 7.1   ALBUMIN 3.3*   BILITOT 1.4*   ALKPHOS 124   AST 28   ALT 12   ANIONGAP 12   ESTGFRAFRICA >60.0   EGFRNONAA >60.0   , CBC   Recent Labs   Lab 12/19/19  1524   WBC 6.73   HGB 14.8   HCT 46.7      , Lipid Panel No results for input(s): CHOL, HDL, LDLCALC, TRIG, CHOLHDL in the last 48 hours. and All pertinent lab results from the last 24 hours have been reviewed.    Significant Imaging: CT scan: CT ABDOMEN PELVIS WITH CONTRAST: No results found for this visit on 12/19/19., Echocardiogram: Transthoracic echo (TTE) complete (Cupid Only): No results found for this or any previous visit. and X-Ray: KUB: X-Ray Abdomen AP 1 View (KUB): No results found for this visit on 12/19/19.    Assessment and Plan:     Elevated troponin  Patient is a 70 yo female here with past medical hx of afib (on coumadin), DM, COPD, cellulitis of LLE, previous admit to [facility] on 11/11/2019 for cellultis with acute hypoxemic respiratory failure here with most likely viral GE or diarrhea or diarrhea secondary to prolonged ABX usage. In NSR here and tachycardic likely from loss of fluids and is hypertensive as well. Elevated troponin is likely secondary to Demand. EKG without signs of ischemia and patient is not having symptoms of chest discomfort.     Would treat underlying etiology for her  diarrhea and LLQ pain  Would trend troponin till negative  Let us know if troponin's trending up and ischemic changes on EKG occur  Please obtain 2D echo on admit here if admitted.  Would arrange for outpatient f/u with cardiology and stress test         VTE Risk Mitigation (From admission, onward)    None          Thank you for your consult. I will sign off. Please contact us if you have any additional questions.    Jeff Pennington MD  Cardiology   [facility]

## 2019-12-19 NOTE — ED PROVIDER NOTES
Encounter Date: 2019       History     Chief Complaint   Patient presents with    Dehydration     arrived via [facility] with c/o N/V/D x1 week, sent to ED for dehydration     HPI     Elizabeth Cano is a 70 yo F with PMH of aflutter on coumadin, COPD, HTN, DM, presening with one week of chills, generalized fatigue, nausea and diarrhea. The patient reports she was treated for UTI one week ago. The patient denies hx of abdominal surgeries, black or bloody bowel movement or vomiting. The patient reports that she is passing flatus without difficulty. Patient reports that she is on 2L home O2.    Review of patient's allergies indicates:  No Known Allergies  Past Medical History:   Diagnosis Date    A-fib     GAVIN (acute kidney injury)     CHF (congestive heart failure)     COPD (chronic obstructive pulmonary disease)     Diabetes mellitus     Hypertension      Past Surgical History:   Procedure Laterality Date    CARDIAC SURGERY       History reviewed. No pertinent family history.  Social History     Tobacco Use    Smoking status: Former Smoker     Packs/day: 1.00     Years: 54.00     Pack years: 54.00     Types: Cigarettes     Last attempt to quit: 2019     Years since quittin.1    Smokeless tobacco: Never Used   Substance Use Topics    Alcohol use: Not Currently    Drug use: Not Currently     Review of Systems   Constitutional: Positive for appetite change, chills and fatigue. Negative for fever.   HENT: Negative for congestion, sore throat and trouble swallowing.    Eyes: Negative for discharge, redness and visual disturbance.   Respiratory: Negative for cough and shortness of breath.    Cardiovascular: Negative for chest pain and leg swelling.   Gastrointestinal: Positive for diarrhea and nausea. Negative for abdominal pain and vomiting.   Genitourinary: Negative for dysuria and hematuria.   Musculoskeletal: Negative for back pain and neck pain.   Skin: Negative for rash and wound.   Neurological:  Negative for dizziness and headaches.   Psychiatric/Behavioral: Negative for agitation and confusion.   All other systems reviewed and are negative.      Physical Exam     Initial Vitals [12/19/19 1451]   BP Pulse Resp Temp SpO2   (!) 164/86 107 18 98 °F (36.7 °C) 96 %      MAP       --         Physical Exam    Nursing note and vitals reviewed.  Constitutional:   Patient lying in bed supine, AOx3, pleasant, conversant, breathing heavily, speaking in complete sentences   HENT:   Head: Normocephalic and atraumatic.   Eyes: EOM are normal. Pupils are equal, round, and reactive to light.   Neck: Normal range of motion. Neck supple. No JVD present.   Cardiovascular: Regular rhythm, normal heart sounds and intact distal pulses. Exam reveals no gallop and no friction rub.    No murmur heard.  Tachycardic to 107   Pulmonary/Chest: Breath sounds normal. No respiratory distress. She has no wheezes. She has no rhonchi. She has no rales. She exhibits no tenderness.   Abdominal:   LLQ TTP, soft, no rebound, guarding or rigidity, hyperactive bowel sounds   Musculoskeletal: Normal range of motion. She exhibits no edema.   Lymphadenopathy:     She has no cervical adenopathy.   Neurological: She is alert and oriented to person, place, and time. No cranial nerve deficit.   Skin: Skin is warm and dry. Capillary refill takes less than 2 seconds.         ED Course   Procedures  Labs Reviewed   CBC W/ AUTO DIFFERENTIAL - Abnormal; Notable for the following components:       Result Value    Mean Corpuscular Hemoglobin Conc 31.7 (*)     RDW 17.4 (*)     MPV 9.0 (*)     Immature Granulocytes 0.7 (*)     Immature Grans (Abs) 0.05 (*)     All other components within normal limits   COMPREHENSIVE METABOLIC PANEL - Abnormal; Notable for the following components:    CO2 33 (*)     Glucose 138 (*)     Calcium 7.4 (*)     Albumin 3.3 (*)     Total Bilirubin 1.4 (*)     All other components within normal limits   TROPONIN I - Abnormal; Notable for  the following components:    Troponin I 0.060 (*)     All other components within normal limits   PROTIME-INR - Abnormal; Notable for the following components:    Prothrombin Time 29.5 (*)     INR 3.1 (*)     All other components within normal limits   INFLUENZA A & B BY MOLECULAR   LACTIC ACID, PLASMA     EKG Readings: (Independently Interpreted)   Initial Reading: No STEMI.   Bifascicular block     ECG Results          EKG 12-lead (Final result)  Result time 12/20/19 23:34:22    Final result by Interface, Lab In [facility] (12/20/19 23:34:22)                 Narrative:    Test Reason : R79.89,    Vent. Rate : 105 BPM     Atrial Rate : 105 BPM     P-R Int : 128 ms          QRS Dur : 148 ms      QT Int : 418 ms       P-R-T Axes : 087 -80 078 degrees     QTc Int : 552 ms    Sinus tachycardia with Premature supraventricular complexes  Right bundle branch block  Left anterior fascicular block   Bifascicular block   Abnormal ECG  When compared with ECG of 19-DEC-2019 15:25,  No significant change was found  Confirmed by Parth Bell MD ([id number]) on 12/20/2019 11:34:11 PM    Referred By: AAAREFARELY   SELF           Confirmed By:Parth Bell MD                             EKG 12-lead (Final result)  Result time 12/20/19 23:33:10    Final result by Interface, Lab In [facility] (12/20/19 23:33:10)                 Narrative:    Test Reason : R00.0,    Vent. Rate : 114 BPM     Atrial Rate : 114 BPM     P-R Int : 202 ms          QRS Dur : 148 ms      QT Int : 346 ms       P-R-T Axes : 000 -80 087 degrees     QTc Int : 476 ms    Sinus tachycardia with Premature supraventricular complexes  Right bundle branch block  Left anterior fascicular block   Bifascicular block   Abnormal ECG  No previous ECGs available  Confirmed by Parth Bell MD ([id number]) on 12/20/2019 11:32:59 PM    Referred By: AAAREFARELY   SELF           Confirmed By:Parth Bell MD                            Imaging Results           CT Abdomen Pelvis With Contrast (Final result)   Result time 12/19/19 18:33:50    Final result by Corbin Kaur MD (12/19/19 18:33:50)                 Impression:      Left lower quadrant/superior pelvic proximal sigmoid colonic acute, uncomplicated diverticulitis.  Follow-up with imaging or endoscopy after therapy is recommended to exclude underlying neoplasm.    Hepatic diffuse parenchymal hypoattenuation suggesting fatty infiltration, and subtle hepatic nodular contour which could reflect underlying cirrhosis.  Correlate with LFTs.    Cholelithiasis without acute cholecystitis.    Cardiomegaly.    Atherosclerosis.    Few additional findings as above.    This report was flagged in Epic as abnormal.  This report was flagged in Epic as containing an incidental finding.      Electronically signed by: Corbin Kaur MD  Date:    12/19/2019  Time:    18:33             Narrative:    EXAMINATION:  CT ABDOMEN PELVIS WITH CONTRAST    CLINICAL HISTORY:  LLQ pain, suspect diverticulitis;    TECHNIQUE:  Low dose axial images, sagittal and coronal reformations were obtained from the lung bases to the pubic symphysis following the IV administration of 100 mL of Omnipaque 350 .  Oral contrast was not given.    COMPARISON:  Chest radiograph same day    FINDINGS:  Imaged lung bases show minimal dependent atelectasis and scattered bandlike opacities consistent with platelike scarring versus atelectasis.  The heart is moderately enlarged without significant pericardial fluid noting coronary arterial calcifications.    Liver is normal in size with diffuse parenchymal hypoattenuation suggesting fatty infiltration.  There is also subtle nodular contour which could reflect underlying cirrhosis.  Gallbladder is normally distended containing several dependent stones.  No evidence of acute cholecystitis.  Pancreas is mildly atrophic.  Spleen, stomach, duodenum and bilateral adrenal glands are within normal limits.  No biliary ductal dilatation.    Bilateral kidneys are normal in size and  location noting mild deformity secondary to chronic appearing mass effect upon the posterior and lateral aspect of the left kidney related to the adjacent spleen.  Both kidneys concentrate and excrete contrast appropriately.  No hydronephrosis or significant perinephric stranding.  A few scattered subcentimeter hypoattenuating cortical foci at each kidney which are too small to characterize.  Ureters are nondilated.  Urinary bladder is well distended without wall thickening.  Uterus and bilateral adnexal regions are within normal limits.  Pelvic phleboliths noted.    Multiple scattered colonic diverticula.  There is abnormal circumferential wall thickening involving the proximal sigmoid colon with slight stranding of the adjacent mesocolon as well as trace likely reactive free fluid tracking along the distal left pericolic gutter to the mesenteric root concerning for acute diverticulitis.  No evidence of bowel perforation or abscess.  No discrete bowel wall enhancing mass seen.  No evidence of bowel obstruction.  Small bowel loops are within normal limits.  Appendix is not identified; however, no pericecal inflammatory change.  Terminal ileum is within normal limits.  No pneumatosis or portal venous gas.    Small fat containing left periumbilical hernia.  Small fat containing bilateral inguinal hernias, right greater than left.    No large volume abdominal ascites, free air or lymphadenopathy.  Abdominal aorta is moderate to markedly atherosclerotic and mildly ectatic without aneurysm or dissection.    Osseous structures show mild degenerative change without acute or destructive process seen.                               X-Ray Chest PA And Lateral (Final result)  Result time 12/19/19 16:01:24    Final result by Damir Costa MD (12/19/19 16:01:24)                 Impression:      See above      Electronically signed by: Damir Costa MD  Date:    12/19/2019  Time:    16:01             Narrative:    EXAMINATION:  XR  CHEST PA AND LATERAL    CLINICAL HISTORY:  Tachycardia, unspecified    TECHNIQUE:  PA and lateral views of the chest were performed.    COMPARISON:  None    FINDINGS:  Cardiomegaly.  Subsegmental atelectatic changes noted at the lung bases.  No significant airspace consolidation or pleural effusion identified                                 Medical Decision Making:   History:   Old Medical Records: I decided to obtain old medical records.  Independently Interpreted Test(s):   I have ordered and independently interpreted EKG Reading(s) - see prior notes  Clinical Tests:   Lab Tests: Ordered and Reviewed  Radiological Study: Ordered and Reviewed  Medical Tests: Ordered and Reviewed  Sepsis Perfusion Assessment: I attest, a sepsis perfusion exam was performed within 6 hours of Septic Shock presentation, following fluid resuscitation.              Attending Attestation:   Physician Attestation Statement for Resident:  As the supervising MD   Physician Attestation Statement: I have personally seen and examined this patient.   I agree with the above history. -:   As the supervising MD I agree with the above PE.   -: NAD, mildly hypotensive, mild tachypnea, abd soft but mod TTP in LLQ without rebound   As the supervising MD I agree with the above treatment, course, plan, and disposition.  I have reviewed and agree with the residents interpretation of the following: lab data and CT scans.  I have reviewed the following: old records at this facility.                            HO-IV MDM    Elizabeth Cano is a 70 yo F with PMH of aflutter on coumadin, COPD, HTN, DM, presening with one week of chills, generalized fatigue, nausea, diarrhea, tachycardia and LLQ TTP. CT abdomen pelvis ordered to evaluate for diverticulitis. IVF, abx given for tachycardia with suspicion of sepsis, BCx, UA, CXR ordered as well. Trop, EKG ordered to evaluate for ACS. EKG demonstrates bifasicular block, we have no prior EKG. Zofran for nausea. Given  patient EKG with bifascicular block and unknown CHF history, will defer 30 cc/kg fluid resuscitation at this time and reassess after first fluid bolus. Dispo pending work up.     Yazan Casas MD  Emergency Medicine, -IV  12/23/2019  3:32 PM    Diverticulitis on CT, cholelithiasis without cholecystitis. Patient has been admitted to Dr Arteaga with hospital medicine. Dispo to the hospital medicine floor.     Yazan Casas MD  Emergency Medicine, -IV  12/23/2019  6:47 PM            Clinical Impression:       ICD-10-CM ICD-9-CM   1. QT prolongation R94.31 794.31   2. Tachycardia R00.0 785.0   3. Elevated troponin R79.89 790.6   4. Diverticulosis of sigmoid colon K57.30 562.10   5. Chronic diastolic heart failure I50.32 428.32   6. Chronic obstructive pulmonary disease, unspecified COPD type J44.9 496   7. Paroxysmal atrial fibrillation I48.0 427.31   8. Sinus tachycardia R00.0 427.89   9. Type 2 diabetes mellitus without complication, without long-term current use of insulin E11.9 250.00   10. Abnormal heart rhythm I49.9 427.9   11. Diverticulitis of sigmoid colon K57.32 562.11                             Yazan Casas MD  Resident  12/19/19 9920       Eleonora Wall MD  12/23/19 [id number]

## 2019-12-19 NOTE — ASSESSMENT & PLAN NOTE
Patient is a 68 yo female here with past medical hx of afib (on coumadin), DM, COPD, cellulitis of LLE, previous admit to [facility] on 11/11/2019 for cellultis with acute hypoxemic respiratory failure here with most likely viral GE or diarrhea or diarrhea secondary to prolonged ABX usage. In NSR here and tachycardic likely from loss of fluids and is hypertensive as well. Elevated troponin is likely secondary to Demand. EKG without signs of ischemia and patient is not having symptoms of chest discomfort.     Would treat underlying etiology for her diarrhea and LLQ pain  Would trend troponin till negative  Let us know if troponin's trending up and ischemic changes on EKG occur  Please obtain 2D echo on admit here if admitted.

## 2019-12-20 PROBLEM — K57.32 DIVERTICULITIS OF SIGMOID COLON: Status: ACTIVE | Noted: 2019-01-01

## 2019-12-20 PROBLEM — E83.42 HYPOMAGNESEMIA: Status: ACTIVE | Noted: 2019-01-01

## 2019-12-20 PROBLEM — I48.0 PAROXYSMAL ATRIAL FIBRILLATION: Status: ACTIVE | Noted: 2019-01-01

## 2019-12-20 NOTE — ASSESSMENT & PLAN NOTE
Denies CP and SOB. EKG without ST elevation. Cardiology following and suspect demand ischemia. Not likely to be ACS based on presentation and underlying acute disease.     PLAN  -- Repeat Troponin   -- Will call cardiology if trending up   -- ASA 81

## 2019-12-20 NOTE — PLAN OF CARE
Goals and POC established this date   Problem: Occupational Therapy Goal  Goal: Occupational Therapy Goal  Description  Goals to be met by: 7 days 12/27/19     Patient will increase functional independence with ADLs by performing:    Pt to tolerated OOB to chair x 3 hours daily to increase endurance for functional activity  Pt to complete t/f to BSC with SBA  Pt to complete standing g/h skills with SBA  Pt to complete UE dressing with set-up  Pt to complete LE dressing with SBA    Outcome: Ongoing, Progressing

## 2019-12-20 NOTE — ASSESSMENT & PLAN NOTE
Confirmed on CT abdomen. Uncomplicated in nature. No rebound or guarding and lactate wnl and do not suspect perforation. No bloody stool reported. Afebrile on presentation. Lack of appetite and suspect volume depleted causing AF with RVR. No leukocytosis.     PLAN  -- Admit to inpatient   -- CBC and CMP daily   -- Received 2L NS in ED   -- Zosyn IV   -- NPO with ice chips overnight and then advance diet as tolerated   -- PT and OT  -- Holding Zofran with long QTc  -- Phenergan oral (1st) and IV available PRN

## 2019-12-20 NOTE — PHARMACY MED REC
"Admission Medication Reconciliation - Pharmacy Consult Note    The home medication history was taken by Jenny Bowens, Pharmacy Technician.  Based on information gathered and subsequent review by the clinical pharmacist, the items below may need attention.     You may go to "Admission" then "Reconcile Home Medications" tabs to review and/or act upon these items.     Potentially problematic discrepancies with current MAR  o Patient IS taking the following which was not ordered upon admit  o Duloxetine 30 mg QD   o Potassium chloride 10 mEq QD  o Patient IS NOT taking the following which was ordered upon admit  o Rosuvastatin 20 mg QD (not getting at nursing home)     Please address this information as you see fit.  Feel free to contact us if you have any questions or require assistance.    Mary Murphy  EXT [phone]     .    .            "

## 2019-12-20 NOTE — ASSESSMENT & PLAN NOTE
Patient presented to floor in AF. She is asymptomatic and denies palpitations. MAP > 90. No SOB or CP. Tachycardic up to 130. Concerning as troponin mildly elevated to 0.06 in the ED secondary to suspected demand ischemia. Cardiology evaluated.     PLAN  -- Continue home lopressor and diltiazem (gave doses when evaluated on floor)  -- Lopressor 5mg IV once  -- Making DuoNebs PRN rather than scheduled (not showing signs of COPD exacerbation)  -- 2L bolus NS in ED, holding further fluids with diastolic HF  -- Holding Warfarin the setting of supratherapeutic INR at 3.1

## 2019-12-20 NOTE — SUBJECTIVE & OBJECTIVE
Interval History: NAEON.    Review of Systems   Constitution: Positive for chills. Negative for diaphoresis and fever.   HENT: Negative for congestion and sore throat.    Cardiovascular: Negative for chest pain, dyspnea on exertion, irregular heartbeat, leg swelling, palpitations and syncope.   Respiratory: Negative for cough and shortness of breath.    Musculoskeletal: Negative for back pain and joint pain.   Gastrointestinal: Positive for abdominal pain, diarrhea, nausea and vomiting. Negative for constipation.   Genitourinary: Negative for dysuria and flank pain.   Neurological: Negative for focal weakness, headaches, numbness and weakness.     Objective:     Vital Signs (Most Recent):  Temp: 98.1 °F (36.7 °C) (12/20/19 1158)  Pulse: 77 (12/20/19 1158)  Resp: 17 (12/20/19 1158)  BP: (!) 99/56 (12/20/19 1158)  SpO2: (!) 91 % (12/20/19 1158) Vital Signs (24h Range):  Temp:  [98 °F (36.7 °C)-98.6 °F (37 °C)] 98.1 °F (36.7 °C)  Pulse:  [] 77  Resp:  [16-24] 17  SpO2:  [82 %-98 %] 91 %  BP: ()/(56-90) 99/56     Weight: 70.1 kg (154 lb 8.7 oz)  Body mass index is 24.94 kg/m².     SpO2: (!) 91 %  O2 Device (Oxygen Therapy): nasal cannula      Intake/Output Summary (Last 24 hours) at 12/20/2019 1203  Last data filed at 12/20/2019 0900  Gross per 24 hour   Intake 1500 ml   Output 250 ml   Net 1250 ml       Lines/Drains/Airways     Peripheral Intravenous Line                 Peripheral IV - Single Lumen 22 G Anterior;Distal;Left Forearm -- days                Physical Exam   Constitutional: She is oriented to person, place, and time. She appears well-developed and well-nourished.   HENT:   Head: Normocephalic and atraumatic.   Eyes: Pupils are equal, round, and reactive to light. EOM are normal.   Neck: Normal range of motion. Neck supple. No JVD present.   Cardiovascular: Normal rate, regular rhythm, normal heart sounds and intact distal pulses.   No murmur heard.  Pulmonary/Chest: Effort normal. No  respiratory distress.   2 liters nasal cannula. Speaking full sentences   Abdominal: Soft. Bowel sounds are normal. There is tenderness (LLQ).   Musculoskeletal: Normal range of motion. She exhibits no edema or tenderness.   Redness noted on her right lower extremity   Neurological: She is alert and oriented to person, place, and time. No cranial nerve deficit.       Significant Labs:   CMP   Recent Labs   Lab 12/19/19  1524 12/20/19  0435    142   K 4.9 4.2   CL 96 98   CO2 33* 35*   * 85   BUN 16 15   CREATININE 0.8 0.8   CALCIUM 7.4* 7.2*   PROT 7.1 5.8*   ALBUMIN 3.3* 2.8*   BILITOT 1.4* 1.4*   ALKPHOS 124 103   AST 28 20   ALT 12 11   ANIONGAP 12 9   ESTGFRAFRICA >60.0 >60.0   EGFRNONAA >60.0 >60.0    and CBC   Recent Labs   Lab 12/19/19  1524 12/20/19  0435   WBC 6.73 6.21   HGB 14.8 13.4   HCT 46.7 42.3    304       Significant Imaging:  Imaging Results           CT Abdomen Pelvis With Contrast (Final result)  Result time 12/19/19 18:33:50    Final result by Corbin Kaur MD (12/19/19 18:33:50)                 Impression:      Left lower quadrant/superior pelvic proximal sigmoid colonic acute, uncomplicated diverticulitis.  Follow-up with imaging or endoscopy after therapy is recommended to exclude underlying neoplasm.    Hepatic diffuse parenchymal hypoattenuation suggesting fatty infiltration, and subtle hepatic nodular contour which could reflect underlying cirrhosis.  Correlate with LFTs.    Cholelithiasis without acute cholecystitis.    Cardiomegaly.    Atherosclerosis.    Few additional findings as above.    This report was flagged in Epic as abnormal.  This report was flagged in Epic as containing an incidental finding.      Electronically signed by: Corbin Kaur MD  Date:    12/19/2019  Time:    18:33             Narrative:    EXAMINATION:  CT ABDOMEN PELVIS WITH CONTRAST    CLINICAL HISTORY:  LLQ pain, suspect diverticulitis;    TECHNIQUE:  Low dose axial images, sagittal and  coronal reformations were obtained from the lung bases to the pubic symphysis following the IV administration of 100 mL of Omnipaque 350 .  Oral contrast was not given.    COMPARISON:  Chest radiograph same day    FINDINGS:  Imaged lung bases show minimal dependent atelectasis and scattered bandlike opacities consistent with platelike scarring versus atelectasis.  The heart is moderately enlarged without significant pericardial fluid noting coronary arterial calcifications.    Liver is normal in size with diffuse parenchymal hypoattenuation suggesting fatty infiltration.  There is also subtle nodular contour which could reflect underlying cirrhosis.  Gallbladder is normally distended containing several dependent stones.  No evidence of acute cholecystitis.  Pancreas is mildly atrophic.  Spleen, stomach, duodenum and bilateral adrenal glands are within normal limits.  No biliary ductal dilatation.    Bilateral kidneys are normal in size and location noting mild deformity secondary to chronic appearing mass effect upon the posterior and lateral aspect of the left kidney related to the adjacent spleen.  Both kidneys concentrate and excrete contrast appropriately.  No hydronephrosis or significant perinephric stranding.  A few scattered subcentimeter hypoattenuating cortical foci at each kidney which are too small to characterize.  Ureters are nondilated.  Urinary bladder is well distended without wall thickening.  Uterus and bilateral adnexal regions are within normal limits.  Pelvic phleboliths noted.    Multiple scattered colonic diverticula.  There is abnormal circumferential wall thickening involving the proximal sigmoid colon with slight stranding of the adjacent mesocolon as well as trace likely reactive free fluid tracking along the distal left pericolic gutter to the mesenteric root concerning for acute diverticulitis.  No evidence of bowel perforation or abscess.  No discrete bowel wall enhancing mass seen.   No evidence of bowel obstruction.  Small bowel loops are within normal limits.  Appendix is not identified; however, no pericecal inflammatory change.  Terminal ileum is within normal limits.  No pneumatosis or portal venous gas.    Small fat containing left periumbilical hernia.  Small fat containing bilateral inguinal hernias, right greater than left.    No large volume abdominal ascites, free air or lymphadenopathy.  Abdominal aorta is moderate to markedly atherosclerotic and mildly ectatic without aneurysm or dissection.    Osseous structures show mild degenerative change without acute or destructive process seen.                               X-Ray Chest PA And Lateral (Final result)  Result time 12/19/19 16:01:24    Final result by Damir Costa MD (12/19/19 16:01:24)                 Impression:      See above      Electronically signed by: Damir Costa MD  Date:    12/19/2019  Time:    16:01             Narrative:    EXAMINATION:  XR CHEST PA AND LATERAL    CLINICAL HISTORY:  Tachycardia, unspecified    TECHNIQUE:  PA and lateral views of the chest were performed.    COMPARISON:  None    FINDINGS:  Cardiomegaly.  Subsegmental atelectatic changes noted at the lung bases.  No significant airspace consolidation or pleural effusion identified

## 2019-12-20 NOTE — PLAN OF CARE
Discharge Recommendation: return to NH with PT/OT.    Eval completed today. PT goals appropriate.    Leticia Norwood PT, DPT  2019  Pager: [phone]        Problem: Physical Therapy Goal  Goal: Physical Therapy Goal  Description  Goals to be met by: 2019     Patient will increase functional independence with mobility by performin. Supine to sit with Stand-by Assistance  2. Sit to supine with Stand-by Assistance  3. Sit to stand transfer with Contact Guard Assistance  4. Bed to chair transfer with Contact Guard Assistance using Rolling Walker  5. Gait  x 50 feet with Contact Guard Assistance using Rolling Walker.   6. Lower extremity exercise program x15 reps per handout, with independence     Outcome: Ongoing, Progressing

## 2019-12-20 NOTE — ASSESSMENT & PLAN NOTE
Not on home O2. Denies wheezing, SOB, CP. Uses inhaler at home.     PLAN   -- Duo nebs PRN   -- Not scheduling in the setting of RVR

## 2019-12-20 NOTE — ASSESSMENT & PLAN NOTE
Patient is a 68 yo female here with past medical hx of afib (on coumadin), DM, COPD, cellulitis of LLE, previous admit to [facility] on 11/11/2019 for cellultis with acute hypoxemic respiratory failure here with most likely viral GE or diarrhea or diarrhea secondary to prolonged ABX usage. In NSR here and tachycardic likely from loss of fluids and is hypertensive as well. Elevated troponin is likely secondary to Demand. EKG without signs of ischemia and patient is not having symptoms of chest discomfort.     -please follow up echo  -no evidence of ischemia at this time  -will sign off, please call for any questions

## 2019-12-20 NOTE — PROGRESS NOTES
"[facility] Hospital Medicine  Progress Note    Patient Name: Elizabeth Cano  MRN: [id number]  Patient Class: IP- Inpatient   Admission Date: 12/19/2019  Length of Stay: 1 days  Attending Physician: Vicki Eason MD  Primary Care Provider: Primary Doctor [facility] Medicine Team: [facility] HOSP MED 3 Rancho Arredondo MD    Subjective:     Principal Problem:Diverticulitis of sigmoid colon        HPI:  Elizabeth Cano is a 68 yo F admitted to medicine with N/D with a CT in the ED concerning for uncomplicated diverticulitis. Patient has afib (on coumadin), COPD, and Diastolic HF with a previous admit to [facility] on 11/11/2019 for cellultis with acute hypoxemic respiratory failure. Diarrhea started one week ago, NB. Going up to 3 times per day. Not waking her from sleep and she has not reported any  "accidents". Nausea (one week) with some "gagging" with expectoration of some mucus (NB) but no overt emesis. Denies cough. Denies SOB and CP. No fevers or chills at home. Abdominal pain is in the LLQ predominantly but will move to right side on occasion. Dull and achy. Comes and goes, not related to BMs. She has never had diverticulitis. Reports that this pain has been off and on for 3 weeks. Pain is random and nothing sets it off. Reports that everyone at [facility] has had a "stomach flu" and were experiencing N/V/D. On this stay at , her acute hypoxemic respiratory failure was thought to be due to at first an exacerbation of ADHF. However, she developed GAVIN after being overdiuresed. Later it was thought that she had a COPD exacerbation and started on prednisone and IV abx. She was discharged on a tapered dose of steroids.     In the ED She had a Trop 0.06, evaluated by cardiology in the ED and suspected to demand ischemia with an EKG without obvious ST elevation and suggest to treat underlying acute disorder. AF and denies heart palpitations but tacky to 130s on the floor. Not on home O2. " Denies Hx of MI or stent placement. No strokes. She reports having an echo in the distant past.     Overview/Hospital Course:  Patient admitted to Hospital Medicine Team 3 on 12/19/2019 for uncomplicated diverticulitis localized to the sigmoid colon.  Patient's blood magnesium levels on a.m. lab draw 0.8.  Magnesium she aggressively replaced and closely monitored.    Interval History:  No acute events overnight.  G patient remained afebrile and hemodynamically stable.  A.m. lab draw showed magnesium level of 0.8.  Magnesium aggressively replaced.  Liver ultrasound ordered to assess for liver cirrhosis given recent findings on CT.    Review of Systems   Constitutional: Negative for activity change, appetite change, chills, diaphoresis and fatigue.   HENT: Negative for congestion and sinus pain.    Eyes: Positive for visual disturbance (Started about 3 weeks ago, forgets to wear glasses ). Negative for photophobia.   Respiratory: Negative for apnea, cough, chest tightness, shortness of breath and wheezing.    Cardiovascular: Positive for leg swelling. Negative for chest pain and palpitations.   Gastrointestinal: Negative for abdominal distention, abdominal pain, blood in stool, constipation, diarrhea, nausea and vomiting.   Genitourinary: Negative for difficulty urinating and dysuria.   Musculoskeletal: Negative for arthralgias and back pain.   Neurological: Negative for dizziness and light-headedness.   Hematological: Negative for adenopathy. Bruises/bleeds easily.   Psychiatric/Behavioral: Negative for agitation and behavioral problems.     Objective:     Vital Signs (Most Recent):  Temp: 98.1 °F (36.7 °C) (12/20/19 1547)  Pulse: 95 (12/20/19 1547)  Resp: 18 (12/20/19 1547)  BP: 105/67 (12/20/19 1547)  SpO2: 96 % (12/20/19 1547) Vital Signs (24h Range):  Temp:  [98 °F (36.7 °C)-98.6 °F (37 °C)] 98.1 °F (36.7 °C)  Pulse:  [] 95  Resp:  [16-24] 18  SpO2:  [82 %-98 %] 96 %  BP: ()/(56-90) 105/67     Weight:  70.1 kg (154 lb 8.7 oz)  Body mass index is 24.94 kg/m².    Intake/Output Summary (Last 24 hours) at 12/20/2019 1549  Last data filed at 12/20/2019 0900  Gross per 24 hour   Intake 1500 ml   Output 250 ml   Net 1250 ml      Physical Exam   Constitutional: She is oriented to person, place, and time. She appears well-developed and well-nourished. No distress.   HENT:   Head: Normocephalic and atraumatic.   Eyes: Pupils are equal, round, and reactive to light. EOM are normal. Right eye exhibits no discharge. Left eye exhibits no discharge.   Neck: Normal range of motion. Neck supple. No JVD present.   Cardiovascular:   No murmur heard.  Tachycardic  Irregular irregular    Pulmonary/Chest: Effort normal and breath sounds normal. No respiratory distress. She has no wheezes.   Abdominal: She exhibits distension. There is tenderness (LLQ). There is no rebound and no guarding.   Musculoskeletal: Normal range of motion. She exhibits no edema or deformity.   Neurological: She is alert and oriented to person, place, and time.   Skin: Skin is warm and dry. Capillary refill takes less than 2 seconds. She is not diaphoretic. No erythema.   LE B/L with dry cracked skin, no erythema or drainage     Nursing note and vitals reviewed.    Significant Labs: All pertinent labs within the past 24 hours have been reviewed.     Recent Labs   Lab 12/19/19  1524 12/20/19  0435   WBC 6.73 6.21   HGB 14.8 13.4   HCT 46.7 42.3    304   MCV 91 91   RDW 17.4* 17.5*    142   K 4.9 4.2   CL 96 98   CO2 33* 35*   BUN 16 15   CREATININE 0.8 0.8   * 85   PROT 7.1 5.8*   ALBUMIN 3.3* 2.8*   BILITOT 1.4* 1.4*   AST 28 20   ALKPHOS 124 103   ALT 12 11       Significant Imaging: I have reviewed all pertinent imaging results/findings within the past 24 hours.         Assessment/Plan:      * Diverticulitis of sigmoid colon  Confirmed on CT abdomen. Uncomplicated in nature. No rebound or guarding and lactate wnl and do not suspect  perforation. No bloody stool reported. Afebrile on presentation. Lack of appetite and suspect volume depleted causing AF with RVR. No leukocytosis.     PLAN  -- Admit to inpatient   -- CBC and CMP daily   -- Received 2L NS in ED   -- Zosyn IV   -- NPO with ice chips overnight and then advance diet as tolerated   -- PT and OT  -- Holding Zofran with long QTc  -- Phenergan oral (1st) and IV available PRN       Hypomagnesemia  Magnesium reviewed-   Recent Labs   Lab 12/20/19  0435 12/20/19  0831   MG 0.8* 0.8*    Will replace electrolytes and continue to monitor closely.           T2DM (type 2 diabetes mellitus)  Hold home metformin, reports good control at home with glucose around 110.     PLAN  -- A1C, none on file   -- POCT glucose checks   -- LDSSI  -- PRN replacements       Paroxysmal atrial fibrillation  Patient presented to floor in AF. She is asymptomatic and denies palpitations. MAP > 90. No SOB or CP. Tachycardic up to 130. Concerning as troponin mildly elevated to 0.06 in the ED secondary to suspected demand ischemia. Cardiology evaluated.     PLAN  -- Continue home lopressor and diltiazem (gave doses when evaluated on floor)  -- Lopressor 5mg IV once  -- Making DuoNebs PRN rather than scheduled (not showing signs of COPD exacerbation)  -- 2L bolus NS in ED, holding further fluids with diastolic HF  -- Holding Warfarin the setting of supratherapeutic INR at 3.1      Chronic diastolic heart failure  No JVD, wheezing, or rales. No LE edema. No SOB. No concern for CHF exacerbation     PLAN  -- Continue home LASIX 20 PO    COPD (chronic obstructive pulmonary disease)  Not on home O2. Denies wheezing, SOB, CP. Uses inhaler at home.     PLAN   -- Duo nebs PRN   -- Not scheduling in the setting of RVR    Sinus tachycardia  See above      Elevated troponin  Denies CP and SOB. EKG without ST elevation. Cardiology following and suspect demand ischemia. Not likely to be ACS based on presentation and underlying acute  disease.     PLAN  -- Repeat Troponin trending down form 0.06 to 0.046; Will not repeat   -- Will call cardiology if trending up   -- ASA 81         VTE Risk Mitigation (From admission, onward)         Ordered     warfarin (COUMADIN) tablet 1 mg  Every Mon, Wed, Fri      12/20/19 0734     Place sequential compression device  Until discontinued      12/19/19 1849     IP VTE HIGH RISK PATIENT  Once      12/19/19 1849                      Rancho Arredondo MD  Department of Hospital Medicine   [facility]

## 2019-12-20 NOTE — HOSPITAL COURSE
69 year old female with afib, COPD presents to [facility] with lower left quadrant abdominal pain for the last week with associated nausea/vomiting and diarrhea. Cardiology consulted for elevated trop to 0.06 that eventually downtrended to 0.05 with ECG findings showing right bundle and left anterior block. Not supportive of ischemia at this time and troponemia is felt to be from demand.

## 2019-12-20 NOTE — ASSESSMENT & PLAN NOTE
Denies CP and SOB. EKG without ST elevation. Cardiology following and suspect demand ischemia. Not likely to be ACS based on presentation and underlying acute disease.     PLAN  -- Repeat Troponin trending down form 0.06 to 0.046; Will not repeat   -- Will call cardiology if trending up   -- ASA 81

## 2019-12-20 NOTE — ASSESSMENT & PLAN NOTE
Patient presented to floor in AF. She is asymptomatic and denies palpitations. MAP > 90. No SOB or CP. Tachycardic up to 130. Concerning as troponin mildly elevated to 0.06 in the ED secondary to suspected demand ischemia. Cardiology evaluated.     PLAN  -- Continue home lopressor and diltiazem (gave doses when evaluated on floor)  -- Lopressor 5mg IV once  -- Making DuoNebs PRN rather than scheduled (not showing signs of COPD exacerbation)  -- 2L bolus NS in ED, holding further fluids with diastolic HF

## 2019-12-20 NOTE — SUBJECTIVE & OBJECTIVE
Interval History:  No acute events overnight.  G patient remained afebrile and hemodynamically stable.  A.m. lab draw showed magnesium level of 0.8.  Magnesium aggressively replaced.  Liver ultrasound ordered to assess for liver cirrhosis given recent findings on CT.    Review of Systems   Constitutional: Negative for activity change, appetite change, chills, diaphoresis and fatigue.   HENT: Negative for congestion and sinus pain.    Eyes: Positive for visual disturbance (Started about 3 weeks ago, forgets to wear glasses ). Negative for photophobia.   Respiratory: Negative for apnea, cough, chest tightness, shortness of breath and wheezing.    Cardiovascular: Positive for leg swelling. Negative for chest pain and palpitations.   Gastrointestinal: Negative for abdominal distention, abdominal pain, blood in stool, constipation, diarrhea, nausea and vomiting.   Genitourinary: Negative for difficulty urinating and dysuria.   Musculoskeletal: Negative for arthralgias and back pain.   Neurological: Negative for dizziness and light-headedness.   Hematological: Negative for adenopathy. Bruises/bleeds easily.   Psychiatric/Behavioral: Negative for agitation and behavioral problems.     Objective:     Vital Signs (Most Recent):  Temp: 98.1 °F (36.7 °C) (12/20/19 1547)  Pulse: 95 (12/20/19 1547)  Resp: 18 (12/20/19 1547)  BP: 105/67 (12/20/19 1547)  SpO2: 96 % (12/20/19 1547) Vital Signs (24h Range):  Temp:  [98 °F (36.7 °C)-98.6 °F (37 °C)] 98.1 °F (36.7 °C)  Pulse:  [] 95  Resp:  [16-24] 18  SpO2:  [82 %-98 %] 96 %  BP: ()/(56-90) 105/67     Weight: 70.1 kg (154 lb 8.7 oz)  Body mass index is 24.94 kg/m².    Intake/Output Summary (Last 24 hours) at 12/20/2019 1549  Last data filed at 12/20/2019 0900  Gross per 24 hour   Intake 1500 ml   Output 250 ml   Net 1250 ml      Physical Exam   Constitutional: She is oriented to person, place, and time. She appears well-developed and well-nourished. No distress.   HENT:    Head: Normocephalic and atraumatic.   Eyes: Pupils are equal, round, and reactive to light. EOM are normal. Right eye exhibits no discharge. Left eye exhibits no discharge.   Neck: Normal range of motion. Neck supple. No JVD present.   Cardiovascular:   No murmur heard.  Tachycardic  Irregular irregular    Pulmonary/Chest: Effort normal and breath sounds normal. No respiratory distress. She has no wheezes.   Abdominal: She exhibits distension. There is tenderness (LLQ). There is no rebound and no guarding.   Musculoskeletal: Normal range of motion. She exhibits no edema or deformity.   Neurological: She is alert and oriented to person, place, and time.   Skin: Skin is warm and dry. Capillary refill takes less than 2 seconds. She is not diaphoretic. No erythema.   LE B/L with dry cracked skin, no erythema or drainage     Nursing note and vitals reviewed.    Significant Labs: All pertinent labs within the past 24 hours have been reviewed.     Recent Labs   Lab 12/19/19  1524 12/20/19  0435   WBC 6.73 6.21   HGB 14.8 13.4   HCT 46.7 42.3    304   MCV 91 91   RDW 17.4* 17.5*    142   K 4.9 4.2   CL 96 98   CO2 33* 35*   BUN 16 15   CREATININE 0.8 0.8   * 85   PROT 7.1 5.8*   ALBUMIN 3.3* 2.8*   BILITOT 1.4* 1.4*   AST 28 20   ALKPHOS 124 103   ALT 12 11       Significant Imaging: I have reviewed all pertinent imaging results/findings within the past 24 hours.

## 2019-12-20 NOTE — H&P
"[facility] Medicine  History & Physical    Patient Name: Elizabeth Cano  MRN: [id number]  Admission Date: 12/19/2019  Attending Physician: Vicki Eason MD   Primary Care Provider: No primary care provider on file.    [facility] Medicine Team: [facility]OSP MED 3 Harjeet Carrera MD     Patient information was obtained from patient and ER records.     Subjective:     Principal Problem:Diverticulosis of sigmoid colon    Chief Complaint:   Chief Complaint   Patient presents with    Dehydration     arrived via [facility] with c/o N/V/D x1 week, sent to ED for dehydration        HPI: Elizabeth Cano is a 68 yo F admitted to medicine with N/D with a CT in the ED concerning for uncomplicated diverticulitis. Patient has afib (on coumadin), COPD, and Diastolic HF with a previous admit to [facility] on 11/11/2019 for cellultis with acute hypoxemic respiratory failure. Diarrhea started one week ago, NB. Going up to 3 times per day. Not waking her from sleep and she has not reported any  "accidents". Nausea (one week) with some "gagging" with expectoration of some mucus (NB) but no overt emesis. Denies cough. Denies SOB and CP. No fevers or chills at home. Abdominal pain is in the LLQ predominantly but will move to right side on occasion. Dull and achy. Comes and goes, not related to BMs. She has never had diverticulitis. Reports that this pain has been off and on for 3 weeks. Pain is random and nothing sets it off. Reports that everyone at [facility] has had a "stomach flu" and were experiencing N/V/D. On this stay at , her acute hypoxemic respiratory failure was thought to be due to at first an exacerbation of ADHF. However, she developed GAVIN after being overdiuresed. Later it was thought that she had a COPD exacerbation and started on prednisone and IV abx. She was discharged on a tapered dose of steroids.     In the ED She had a Trop 0.06, evaluated by cardiology in the ED and suspected to " demand ischemia with an EKG without obvious ST elevation and suggest to treat underlying acute disorder. AF and denies heart palpitations but tacky to 130s on the floor. Not on home O2. Denies Hx of MI or stent placement. No strokes. She reports having an echo in the distant past.     Past Medical History:   Diagnosis Date    A-fib     GAVIN (acute kidney injury)     CHF (congestive heart failure)     COPD (chronic obstructive pulmonary disease)     Diabetes mellitus     Hypertension        Past Surgical History:   Procedure Laterality Date    CARDIAC SURGERY         Review of patient's allergies indicates:  No Known Allergies    No current facility-administered medications on file prior to encounter.      Current Outpatient Medications on File Prior to Encounter   Medication Sig    budesonide (PULMICORT) 0.5 mg/2 mL nebulizer solution Take 0.5 mg by nebulization 2 (two) times daily. Controller    diltiaZEM HCl 120 mg Cp12 Take 60 mg by mouth every 12 (twelve) hours.    DULoxetine (CYMBALTA) 30 MG capsule Take 30 mg by mouth once daily.    furosemide (LASIX) 20 MG tablet Take 20 mg by mouth once daily.    metFORMIN (GLUCOPHAGE) 1000 MG tablet Take 1,000 mg by mouth 2 (two) times daily with meals.    metoprolol tartrate (LOPRESSOR) 50 MG tablet Take 50 mg by mouth 2 (two) times daily.    potassium chloride (MICRO-K) 10 MEQ CpSR Take 10 mEq by mouth once daily.    rosuvastatin (CRESTOR) 20 MG tablet Take 20 mg by mouth once daily.    vitamin D (VITAMIN D3) 1000 units Tab Take 1,000 Units by mouth once daily.    warfarin (COUMADIN) 1 MG tablet Take 1 mg by mouth every Mon, Wed, Fri.    albuterol (ACCUNEB) 0.63 mg/3 mL Nebu Take 0.5 mg by nebulization every 6 (six) hours as needed. Rescue     Family History     None        Tobacco Use    Smoking status: Former Smoker     Packs/day: 1.00     Years: 54.00     Pack years: 54.00     Types: Cigarettes     Last attempt to quit: 11/11/2019     Years since  quittin.1    Smokeless tobacco: Never Used   Substance and Sexual Activity    Alcohol use: Not Currently    Drug use: Not Currently    Sexual activity: Not Currently     Partners: Male     Review of Systems   Constitutional: Positive for appetite change. Negative for activity change, chills, diaphoresis and fatigue.   HENT: Negative for congestion and sinus pain.    Eyes: Positive for visual disturbance (Started about 3 weeks ago, forgets to wear glasses ). Negative for photophobia.   Respiratory: Negative for apnea, cough, chest tightness, shortness of breath and wheezing.    Cardiovascular: Positive for leg swelling. Negative for chest pain and palpitations.   Gastrointestinal: Positive for abdominal pain, diarrhea and nausea. Negative for abdominal distention, blood in stool, constipation and vomiting.   Genitourinary: Negative for difficulty urinating and dysuria.   Musculoskeletal: Negative for arthralgias and back pain.   Neurological: Positive for dizziness and light-headedness.   Hematological: Negative for adenopathy. Bruises/bleeds easily.   Psychiatric/Behavioral: Negative for agitation and behavioral problems.     Objective:     Vital Signs (Most Recent):  Temp: 98.6 °F (37 °C) (19)  Pulse: (!) 130 (19)  Resp: (!) 22 (19)  BP: 120/87 (19)  SpO2: 96 % (19) Vital Signs (24h Range):  Temp:  [98 °F (36.7 °C)-98.6 °F (37 °C)] 98.6 °F (37 °C)  Pulse:  [106-145] 130  Resp:  [16-24] 22  SpO2:  [95 %-98 %] 96 %  BP: (120-164)/(63-90) 120/87     Weight: 70.1 kg (154 lb 8.7 oz)  Body mass index is 24.94 kg/m².    Physical Exam   Constitutional: She is oriented to person, place, and time. She appears well-developed and well-nourished. No distress.   HENT:   Head: Normocephalic and atraumatic.   Eyes: Pupils are equal, round, and reactive to light. EOM are normal. Right eye exhibits no discharge. Left eye exhibits no discharge.   Neck: Normal range of  motion. Neck supple. No JVD present.   Cardiovascular:   No murmur heard.  Tachycardic  Irregular irregular    Pulmonary/Chest: Effort normal and breath sounds normal. No respiratory distress. She has no wheezes.   Abdominal: She exhibits distension. There is tenderness (LLQ). There is no rebound and no guarding.   Musculoskeletal: Normal range of motion. She exhibits no edema or deformity.   Neurological: She is alert and oriented to person, place, and time.   Skin: Skin is warm and dry. Capillary refill takes less than 2 seconds. She is not diaphoretic. No erythema.   LE B/L with dry cracked skin, no erythema or drainage     Nursing note and vitals reviewed.        CRANIAL NERVES     CN III, IV, VI   Pupils are equal, round, and reactive to light.  Extraocular motions are normal.        Significant Labs:   CBC:   Recent Labs   Lab 12/19/19  1524   WBC 6.73   HGB 14.8   HCT 46.7        CMP:   Recent Labs   Lab 12/19/19  1524      K 4.9   CL 96   CO2 33*   *   BUN 16   CREATININE 0.8   CALCIUM 7.4*   PROT 7.1   ALBUMIN 3.3*   BILITOT 1.4*   ALKPHOS 124   AST 28   ALT 12   ANIONGAP 12   EGFRNONAA >60.0     Coagulation:   Recent Labs   Lab 12/19/19  1524   INR 3.1*     Lactic Acid:   Recent Labs   Lab 12/19/19  1524   LACTATE 1.9     Troponin:   Recent Labs   Lab 12/19/19  1524   TROPONINI 0.060*       Significant Imaging: CT: I have reviewed all pertinent results/findings within the past 24 hours and my personal findings are:  Uncomplicated Diverticulitis     Assessment/Plan:     * Diverticulosis of sigmoid colon  Confirmed on CT abdomen. Uncomplicated in nature. No rebound or guarding and lactate wnl and do not suspect perforation. No bloody stool reported. Afebrile on presentation. Lack of appetite and suspect volume depleted causing AF with RVR. No leukocytosis.     PLAN  -- Admit to inpatient   -- CBC and CMP daily   -- Received 2L NS in ED   -- Zosyn IV   -- NPO with ice chips overnight and  then advance diet as tolerated   -- PT and OT  -- Holding Zofran with long QTc  -- Phenergan oral (1st) and IV available PRN       Atrial fibrillation with RVR  Patient presented to floor in AF. She is asymptomatic and denies palpitations. MAP > 90. No SOB or CP. Tachycardic up to 130. Concerning as troponin mildly elevated to 0.06 in the ED secondary to suspected demand ischemia. Cardiology evaluated.     PLAN  -- Continue home lopressor and diltiazem (gave doses when evaluated on floor)  -- Lopressor 5mg IV once  -- Making DuoNebs PRN rather than scheduled (not showing signs of COPD exacerbation)  -- 2L bolus NS in ED, holding further fluids with diastolic HF  -- Holding Warfarin the setting of supratherapeutic INR at 3.1      T2DM (type 2 diabetes mellitus)  Hold home metformin, reports good control at home with glucose around 110.     PLAN  -- A1C, none on file   -- POCT glucose checks   -- LDSSI  -- PRN replacements       Chronic diastolic heart failure  No JVD, wheezing, or rales. No LE edema. No SOB. No concern for CHF exacerbation     PLAN  -- Continue home LASIX 20 PO    COPD (chronic obstructive pulmonary disease)  Not on home O2. Denies wheezing, SOB, CP. Uses inhaler at home.     PLAN   -- Duo nebs PRN   -- Not scheduling in the setting of RVR    Tachycardia  See above      Elevated troponin  Denies CP and SOB. EKG without ST elevation. Cardiology following and suspect demand ischemia. Not likely to be ACS based on presentation and underlying acute disease.     PLAN  -- Repeat Troponin trending down form 0.06 to 0.046; Will not repeat   -- Will call cardiology if trending up   -- ASA 81       VTE Risk Mitigation (From admission, onward)         Ordered     Place sequential compression device  Until discontinued      12/19/19 1849     IP VTE HIGH RISK PATIENT  Once      12/19/19 1849                   Harjeet Carrera MD  Department of Hospital Medicine   [facility]

## 2019-12-20 NOTE — PLAN OF CARE
12/20/19 1625   Post-Acute Status   Post-Acute Authorization Placement   Post-Acute Placement Status Referrals Sent     Patient expected to discharge back to [facility]. TAMIKO sent the referral via [facility] and will follow up.TAMIKO spoke to Yoselin in admissions and she reports that patient can be admitted back over the weekend when medically ready.    Kianna Reed, BRYCE  [facility]   [phone]

## 2019-12-20 NOTE — PLAN OF CARE
Met with pt. and granddaughter @ bedside and completed assessment. Pt. came from [place], [place] with c/o sigmoid diverticulitis.  Recently  d/c from  SNF to correction care. Pt. will return to NH upon d/c.     Payor: MEDICAID / Plan: MEDICAID OF [place] / Product Type: Government /      Primary Doctor No     No Pharmacies Listed        12/20/19 1325   Discharge Assessment   Assessment Type Discharge Planning Assessment   Confirmed/corrected address and phone number on facesheet? Yes   Assessment information obtained from? Patient   Expected Length of Stay (days) 3   Communicated expected length of stay with patient/caregiver yes   Prior to hospitilization cognitive status: Alert/Oriented   Prior to hospitalization functional status: Assistive Equipment;Needs Assistance   Current cognitive status: Alert/Oriented   Current Functional Status: Needs Assistance;Assistive Equipment   Lives With facility resident  (came from [facility])   Able to Return to Prior Arrangements yes   Is patient able to care for self after discharge? No  (needs assistance)   Who are your caregiver(s) and their phone number(s)? [facility]   Patient's perception of discharge disposition nursing home   Readmission Within the Last 30 Days no previous admission in last 30 days   Patient currently being followed by outpatient case management? No   Patient currently receives any other outside agency services? No   Equipment Currently Used at Home wheelchair   Do you have any problems affording any of your prescribed medications? No   Is the patient taking medications as prescribed? yes   Does the patient have transportation home? Yes   Transportation Anticipated other (see comments)   Does the patient receive services at the [facility]? Yes   Discharge Plan A Return to nursing home   Discharge Plan B Return to Nursing Home   DME Needed Upon Discharge  none

## 2019-12-20 NOTE — PROGRESS NOTES
"Consult received per RN for healing cellulitis.    Pt was admitted on 12/19/19 from [facility] with dehydration and uncomplicated diverticulitis. Pt has a PMHx of afib (on coumadin), COPD, and Diastolic HF with a previous admit to [facility] on 11/11/2019 for cellultis with acute hypoxemic respiratory failure.     Received pt lying in bed awake and alert watching tv. Waffle overlay and heel foams not in use at this time. Pt reports she can walk, but "not too good."     Upon assessment of bilateral lower legs (present upon admission): dry scaly, hairless legs without any open areas or skin breakdown noted at this time. It appears that pt has some healed areas that may have been partial thickness wounds to her left anterior lower leg with intact pink scar tissue noted.     (see assessment and photos below)    Sween moisturizing cream applied to bilateral lower legs per wound care nurse with heel foams to protect heels from shearing while pt is lying in bed. No other iman of skin breakdown noted to bony prominences at this time. Wound care nurse will order more heel foams and a wheel chair cushion for pt to use while sitting up in chair to help alleviate pressure.    Recommendations:  -Nursing to apply sween (PINK TOP) moisturizing cream to pt's bilateral lower legs once daily and PRN. Sween daily moisturizing cream contains 6% Dimethicone and helps prevent and temporarily protects chafed, chapped or cracked skin and lips.  -Nursing to continue pressure prevention interventions as directed. Please assist pt with turning or repositioning every 2 hours, apply foams as needed to protect bony prominences and apply moisture barrier cream to protect skin against irritation caused by urine, loose stool or moisture.     Wound care to sign off at this time. Please call wound care at [phone] if further assistance is needed.           12/20/19 1116        Wound 12/19/19 2300 Other (comment) lower Leg   Date First " Assessed/Time First Assessed: 12/19/19 2300   Pre-existing: Yes  Primary Wound Type: Other (comment)  Side: Left  Orientation: lower  Location: Leg   Wound Image    Wound WDL WDL   Dressing Appearance Open to air;No dressing   Drainage Amount None   Drainage Characteristics/Odor No odor   Appearance Pink;Dry;Epithelialization   Tissue loss description Not applicable   Periwound Area Intact;Dry;Pink;Scar tissue   Wound Edges Other (see comments)  (no open areas noted)   Wound Length (cm) 0 cm   Wound Width (cm) 0 cm   Wound Depth (cm) 0 cm   Wound Volume (cm^3) 0 cm^3   Wound Surface Area (cm^2) 0 cm^2   Care Applied:;Moisturizing agent   Periwound Care Moisturizer applied   Off Loading Other (see comments)  (heel foams applied to protect)   Dressing Change Due 12/21/19        Wound 12/19/19 2328 lower Leg   Date First Assessed/Time First Assessed: 12/19/19 2328   Pre-existing: Yes  Side: Right  Orientation: lower  Location: Leg   Wound Image    Wound WDL WDL   Dressing Appearance Open to air;No dressing   Drainage Amount None   Drainage Characteristics/Odor No odor   Appearance Pink;Dry;Epithelialization   Tissue loss description Not applicable   Periwound Area Intact;Dry   Wound Edges Other (see comments)  (no open areas noted)   Wound Length (cm) 0 cm   Wound Width (cm) 0 cm   Wound Depth (cm) 0 cm   Wound Volume (cm^3) 0 cm^3   Wound Surface Area (cm^2) 0 cm^2   Care Applied:;Moisturizing agent   Periwound Care Moisturizer applied   Dressing Change Due 12/21/19

## 2019-12-20 NOTE — CARE UPDATE
Rapid Response Nurse Chart Check     Chart check completed, abnormal VS noted. Charge RN Leticia contacted, no concerns verbalized at this time, instructed to call [phone] for further concerns or assistance.

## 2019-12-20 NOTE — ASSESSMENT & PLAN NOTE
Magnesium reviewed-   Recent Labs   Lab 12/20/19  0435 12/20/19  0831   MG 0.8* 0.8*    Will replace electrolytes and continue to monitor closely.

## 2019-12-20 NOTE — HOSPITAL COURSE
Patient admitted to Hospital Medicine Team 3 on 12/19/2019 for uncomplicated diverticulitis localized to the sigmoid colon.  Patient's blood magnesium levels on a.m. lab draw 0.8.  Magnesium she aggressively replaced and closely monitored.

## 2019-12-20 NOTE — PT/OT/SLP EVAL
Occupational Therapy   Evaluation    Name: Elizabeth Cano  MRN: [id number]  Admitting Diagnosis:  Diverticulitis of sigmoid colon    Pt with N/V/D. Pt was admitted from NH with these symptoms   Pt with recent admit to [facility] with cellulitis, ARF  Recommendations:     Discharge Recommendations:  Return to NH; with therapy     Assessment:     Elizabeth Cano is a 69 y.o. female with a medical diagnosis of Diverticulitis of sigmoid colon. Performance deficits affecting function: weakness, impaired self care skills, impaired balance, impaired functional mobilty, impaired endurance, gait instability.  Pt tolerated session fairly well. Pt needing constant cues for encouragement. Pt appears appropriate for return to NH with therapy services to increase functional mobility/ADL skills.     Rehab Prognosis: Good; patient would benefit from acute skilled OT services to address these deficits and reach maximum level of function.       Plan:     Patient to be seen 3 x/week to address the above listed problems via self-care/home management, therapeutic activities, therapeutic exercises  · Plan of Care Expires:    · Plan of Care Reviewed with:      Subjective     Pt agrees she is scared in standing.     Occupational Profile:  Pt has been a resident of [facility] since recent d/c from [facility]. Pt reports her children decided to have she and her spouse moved to NH due to their inability to care for themselves.  Pt reports over the last 2 weeks she has staying in bed due to not feeling well. Pt reports typically she can walk with RW with minimal assistance and can complete feeding, g/h and dressing with set-up      Pain/Comfort:  · Pain Rating 1: 0/10    Patients cultural, spiritual, Congregation conflicts given the current situation: no    Objective:     Communicated with: nsg prior to session.  Pt found supine in bed with 2 LPM oxygen in place.     General Precautions: Standard, fall       Occupational Performance:    Bed Mobility:     Supine>sit with MIN A     Functional Mobility/Transfers:  · Pt completed sit>stand with MIN A x 2 trials.  · Pt needing constant cues for safety and hand placement leading to increased fall risk.    Activities of Daily Living:  · Feeding: set-up  · G/H: seated with set-up  · LE dressing: SBA manage footwear    Cognitive/Visual Perceptual  Pt is awake, alert and following commands.     Physical Exam:  Pt is right hand dominant and demo WFL UE strength/ROM, coordination and sensation.     AMPAC 6 Click ADL:  AMPAC Total Score: 19    Treatment & Education:  Pt completed functional mobility in room with MIN A x 1 with additional person for safety. Pt is very anxious in standing needing constant cues for redirection and encouragement.  Education provided re: importance of OOB activity with assistance and safety with functional mobility/ADL skills.   Education:    Patient left up in chair with all lines intact, call button in reach and nsg notified    GOALS:   Multidisciplinary Problems     Occupational Therapy Goals        Problem: Occupational Therapy Goal    Goal Priority Disciplines Outcome Interventions   Occupational Therapy Goal     OT, PT/OT Ongoing, Progressing    Description:  Goals to be met by: 7 days 12/27/19     Patient will increase functional independence with ADLs by performing:    Pt to tolerated OOB to chair x 3 hours daily to increase endurance for functional activity  Pt to complete t/f to BSC with SBA  Pt to complete standing g/h skills with SBA  Pt to complete UE dressing with set-up  Pt to complete LE dressing with SBA                     History:     Past Medical History:   Diagnosis Date    A-fib     GAVIN (acute kidney injury)     CHF (congestive heart failure)     COPD (chronic obstructive pulmonary disease)     Diabetes mellitus     Hypertension        Past Surgical History:   Procedure Laterality Date    CARDIAC SURGERY         Time Tracking:     OT Date of Treatment: 12/20/19  OT  Start Time: 1335  OT Stop Time: 1355  OT Total Time (min): 20 min    Billable Minutes:Evaluation 20    VIKY Garcia  12/20/2019

## 2019-12-20 NOTE — SUBJECTIVE & OBJECTIVE
Past Medical History:   Diagnosis Date    A-fib     GAVIN (acute kidney injury)     CHF (congestive heart failure)     COPD (chronic obstructive pulmonary disease)     Diabetes mellitus     Hypertension        Past Surgical History:   Procedure Laterality Date    CARDIAC SURGERY         Review of patient's allergies indicates:  No Known Allergies    No current facility-administered medications on file prior to encounter.      Current Outpatient Medications on File Prior to Encounter   Medication Sig    budesonide (PULMICORT) 0.5 mg/2 mL nebulizer solution Take 0.5 mg by nebulization 2 (two) times daily. Controller    diltiaZEM HCl 120 mg Cp12 Take 60 mg by mouth every 12 (twelve) hours.    DULoxetine (CYMBALTA) 30 MG capsule Take 30 mg by mouth once daily.    furosemide (LASIX) 20 MG tablet Take 20 mg by mouth once daily.    metFORMIN (GLUCOPHAGE) 1000 MG tablet Take 1,000 mg by mouth 2 (two) times daily with meals.    metoprolol tartrate (LOPRESSOR) 50 MG tablet Take 50 mg by mouth 2 (two) times daily.    potassium chloride (MICRO-K) 10 MEQ CpSR Take 10 mEq by mouth once daily.    rosuvastatin (CRESTOR) 20 MG tablet Take 20 mg by mouth once daily.    vitamin D (VITAMIN D3) 1000 units Tab Take 1,000 Units by mouth once daily.    warfarin (COUMADIN) 1 MG tablet Take 1 mg by mouth every Mon, Wed, Fri.    albuterol (ACCUNEB) 0.63 mg/3 mL Nebu Take 0.5 mg by nebulization every 6 (six) hours as needed. Rescue     Family History     None        Tobacco Use    Smoking status: Former Smoker     Packs/day: 1.00     Years: 54.00     Pack years: 54.00     Types: Cigarettes     Last attempt to quit: 2019     Years since quittin.1    Smokeless tobacco: Never Used   Substance and Sexual Activity    Alcohol use: Not Currently    Drug use: Not Currently    Sexual activity: Not Currently     Partners: Male     Review of Systems   Constitutional: Positive for appetite change. Negative for activity  change, chills, diaphoresis and fatigue.   HENT: Negative for congestion and sinus pain.    Eyes: Positive for visual disturbance (Started about 3 weeks ago, forgets to wear glasses ). Negative for photophobia.   Respiratory: Negative for apnea, cough, chest tightness, shortness of breath and wheezing.    Cardiovascular: Positive for leg swelling. Negative for chest pain and palpitations.   Gastrointestinal: Positive for abdominal pain, diarrhea and nausea. Negative for abdominal distention, blood in stool, constipation and vomiting.   Genitourinary: Negative for difficulty urinating and dysuria.   Musculoskeletal: Negative for arthralgias and back pain.   Neurological: Positive for dizziness and light-headedness.   Hematological: Negative for adenopathy. Bruises/bleeds easily.   Psychiatric/Behavioral: Negative for agitation and behavioral problems.     Objective:     Vital Signs (Most Recent):  Temp: 98.6 °F (37 °C) (12/19/19 2108)  Pulse: (!) 130 (12/19/19 2117)  Resp: (!) 22 (12/19/19 2108)  BP: 120/87 (12/19/19 2117)  SpO2: 96 % (12/19/19 2108) Vital Signs (24h Range):  Temp:  [98 °F (36.7 °C)-98.6 °F (37 °C)] 98.6 °F (37 °C)  Pulse:  [106-145] 130  Resp:  [16-24] 22  SpO2:  [95 %-98 %] 96 %  BP: (120-164)/(63-90) 120/87     Weight: 70.1 kg (154 lb 8.7 oz)  Body mass index is 24.94 kg/m².    Physical Exam   Constitutional: She is oriented to person, place, and time. She appears well-developed and well-nourished. No distress.   HENT:   Head: Normocephalic and atraumatic.   Eyes: Pupils are equal, round, and reactive to light. EOM are normal. Right eye exhibits no discharge. Left eye exhibits no discharge.   Neck: Normal range of motion. Neck supple. No JVD present.   Cardiovascular:   No murmur heard.  Tachycardic  Irregular irregular    Pulmonary/Chest: Effort normal and breath sounds normal. No respiratory distress. She has no wheezes.   Abdominal: She exhibits distension. There is tenderness (LLQ). There is  no rebound and no guarding.   Musculoskeletal: Normal range of motion. She exhibits no edema or deformity.   Neurological: She is alert and oriented to person, place, and time.   Skin: Skin is warm and dry. Capillary refill takes less than 2 seconds. She is not diaphoretic. No erythema.   LE B/L with dry cracked skin, no erythema or drainage     Nursing note and vitals reviewed.        CRANIAL NERVES     CN III, IV, VI   Pupils are equal, round, and reactive to light.  Extraocular motions are normal.        Significant Labs:   CBC:   Recent Labs   Lab 12/19/19  1524   WBC 6.73   HGB 14.8   HCT 46.7        CMP:   Recent Labs   Lab 12/19/19  1524      K 4.9   CL 96   CO2 33*   *   BUN 16   CREATININE 0.8   CALCIUM 7.4*   PROT 7.1   ALBUMIN 3.3*   BILITOT 1.4*   ALKPHOS 124   AST 28   ALT 12   ANIONGAP 12   EGFRNONAA >60.0     Coagulation:   Recent Labs   Lab 12/19/19  1524   INR 3.1*     Lactic Acid:   Recent Labs   Lab 12/19/19  1524   LACTATE 1.9     Troponin:   Recent Labs   Lab 12/19/19  1524   TROPONINI 0.060*       Significant Imaging: CT: I have reviewed all pertinent results/findings within the past 24 hours and my personal findings are:  Uncomplicated Diverticulitis

## 2019-12-20 NOTE — ASSESSMENT & PLAN NOTE
Hold home metformin, reports good control at home with glucose around 110.     PLAN  -- A1C, none on file   -- POCT glucose checks   -- LDSSI  -- PRN replacements

## 2019-12-20 NOTE — PT/OT/SLP EVAL
"Physical Therapy Evaluation    Patient Name:  Elizabeth Cano   MRN:  [id number]  Admit Date: 12/19/2019  Admitting Diagnosis:  Diverticulitis of sigmoid colon   Length of Stay: 1 days  Recent Surgery: * No surgery found *      Recommendations:     Discharge Recommendations:  other (see comments)(PT/OT in nursing home)   Discharge Equipment Recommendations: none   Barriers to discharge: None    Assessment:     Elizabeth Cano is a 69 y.o. female admitted with a medical diagnosis of Diverticulitis of sigmoid colon.  She presents with the following impairments/functional limitations: weakness, impaired endurance, impaired functional mobilty, gait instability, impaired self care skills, decreased safety awareness, impaired cardiopulmonary response to activity. Pt tolerated evaluation well today. Pt is extremely anxious and very fearful of falling. Due to this pt was scared to ambulate or mobilize further than getting up to the chair on this date. Pt has experienced a decline in function and would benefit from PT/OT after returning to the nursing home so that she can improve her quality of life as well as regain function.    Rehab Prognosis: Fair; patient would benefit from acute skilled PT services to address these deficits and reach maximum level of function.      Plan:     During this hospitalization, patient to be seen 3 x/week to address the identified rehab impairments via gait training, therapeutic activities, therapeutic exercises and progress towards the established goals.    · Plan of Care Expires:  01/20/20    Subjective     RN notified prior to session. No family/friends present upon PT entrance into room.    Chief Complaint: "I can't, I can't, I'm going to fall" - pt while standing at EOB  Patient/Family Comments/goals: get better  Pain/Comfort:  · Pain Rating 1: 0/10  · Pain Rating Post-Intervention 1: 0/10    Living Environment:  Patient and  recently moved into [facility] " approximately 2 weeks ago.   Prior Level of Function: Patient reports being mod I with mobility & with some ADLs. Pt reports she was receiving assist with bathing. Patient uses DME as follows: wheelchair, bedside commode, walker, rolling. DME owned (not currently used): none.    Patient reports they will have assistance from nursing home staff upon discharge.    Objective:     Additional staff present: OT for co-evaluation    Patient found HOB elevated with: telemetry, oxygen, peripheral IV     General Precautions: Standard, Cardiac fall   Orthopedic Precautions:N/A   Braces: N/A   Body mass index is 24.94 kg/m².  Oxygen Device: Nasal Cannula 3L    Exams:  · Mental Status: Patient is AxOx4 and follows all multi-step verbal commands. Pt is Alert and Cooperative during session.  · Skin Integrity: Visible skin intact  · Edema: None noted   · Sensation: Impaired: bilateral feet  · Hearing: Intact  · Vision:  Intact  · Postural Assessment: slouched posture, rounded shoulders and forward head  · Range of Motion:  · RUE: WFL  · LUE: WFL  · RLE: WFL  · LLE: WFL  · Strength Exam:  · Lower Extremity Strength: grossly 4/5    Outcome Measures:  AM-PAC 6 CLICK MOBILITY  Turning over in bed (including adjusting bedclothes, sheets and blankets)?: 4  Sitting down on and standing up from a chair with arms (e.g., wheelchair, bedside commode, etc.): 3  Moving from lying on back to sitting on the side of the bed?: 3  Moving to and from a bed to a chair (including a wheelchair)?: 3  Need to walk in hospital room?: 3  Climbing 3-5 steps with a railing?: 2  Basic Mobility Total Score: 18     Functional Mobility:    Bed Mobility:   · Supine to Sit: minimum assistance; from Lt side of bed  · Scooting anteriorly to EOB to have both feet planted on floor: contact guard assistance     Sitting Balance at Edge of Bed:   Assistance Level Required: Stand-by Assistance    Transfers:   · Sit <> Stand Transfer: minimum assistance with hand-held  assist   · Stand <> Sit Transfer: minimum assistance with hand-held assist   · [id number] from EOB  · Bed <> Chair Transfer: Stand Pivot technique with minimum assistance with hand-held assist  · Chair on patient's Lt    Standing Balance:   Assistance Level Required: Contact Guard Assistance   Patient used: hand-held assist    Comments: Pt with extreme anxiety with static standing. CGA for balance but pt with increased anxiety just standing requiring touching assistance at all times. Pt with increased postural sway with static stance      Gait:   · Patient ambulated: 2 steps fwd/bwd   · Patient required: minimal assist  · Patient used:  hand-held assist   · Comments: Pt refusing further ambulation due to anxiety. HHA x 2 with Min A to assist pt with weight shifting to take step. Pt perseverating on not being able to walk and falling. However pt able to take steps backwards to bed with CGA when told she could sit down/    Stairs:   Deferred due to pt's performance with above listed functional mobility    Education:   Time provided for education, counseling and discussion of health disposition in regards to patient's current status   All questions answered within PT scope of practice and to patient's satisfaction   PT role in POC to address current functional deficits   Pt educated on proper body mechanics, safety techniques, and energy conservation with PT facilitation and cueing throughout session   Call nursing/pct to transfer to chair/use bathroom. Pt stated understanding.   RW ordered for in room use with nursing staff   Whiteboard updated with pt's current mobility status documented above   Safe to perform stand pivot transfer to/from chair/bedside commode Min A and HHA w/ nursing/PCT present. Suggest 2 people to be present for transfers due to pt's high anxiety.   Importance of OOB tolerance prn hrs/day to improve lung ventilation and expansion as well as strengthen postural musculature    Patient  left up in chair with all lines intact, call button in reach and RN notified.    GOALS:   Multidisciplinary Problems     Physical Therapy Goals        Problem: Physical Therapy Goal    Goal Priority Disciplines Outcome Goal Variances Interventions   Physical Therapy Goal     PT, PT/OT Ongoing, Progressing     Description:  Goals to be met by: 2019     Patient will increase functional independence with mobility by performin. Supine to sit with Stand-by Assistance  2. Sit to supine with Stand-by Assistance  3. Sit to stand transfer with Contact Guard Assistance  4. Bed to chair transfer with Contact Guard Assistance using Rolling Walker  5. Gait  x 50 feet with Contact Guard Assistance using Rolling Walker.   6. Lower extremity exercise program x15 reps per handout, with independence                      History:     Past Medical History:   Diagnosis Date    A-fib     GAVIN (acute kidney injury)     CHF (congestive heart failure)     COPD (chronic obstructive pulmonary disease)     Diabetes mellitus     Hypertension        Past Surgical History:   Procedure Laterality Date    CARDIAC SURGERY         Time Tracking:     PT Received On: 19  PT Start Time: 1336     PT Stop Time: 1350  PT Total Time (min): 14 min     Billable Minutes: Evaluation 14    Leticia Norwood PT, DPT  2019  Pager: [phone]

## 2019-12-20 NOTE — ASSESSMENT & PLAN NOTE
No JVD, wheezing, or rales. No LE edema. No SOB. No concern for CHF exacerbation     PLAN  -- Continue home LASIX 20 PO

## 2019-12-20 NOTE — HPI
"Elizabeth Cano is a 68 yo F admitted to medicine with N/D with a CT in the ED concerning for uncomplicated diverticulitis. Patient has afib (on coumadin), COPD, and Diastolic HF with a previous admit to [facility] on 11/11/2019 for cellultis with acute hypoxemic respiratory failure. Diarrhea started one week ago, NB. Going up to 3 times per day. Not waking her from sleep and she has not reported any  "accidents". Nausea (one week) with some "gagging" with expectoration of some mucus (NB) but no overt emesis. Denies cough. Denies SOB and CP. No fevers or chills at home. Abdominal pain is in the LLQ predominantly but will move to right side on occasion. Dull and achy. Comes and goes, not related to BMs. She has never had diverticulitis. Reports that this pain has been off and on for 3 weeks. Pain is random and nothing sets it off. Reports that everyone at [facility] has had a "stomach flu" and were experiencing N/V/D. On this stay at , her acute hypoxemic respiratory failure was thought to be due to at first an exacerbation of ADHF. However, she developed GAVIN after being overdiuresed. Later it was thought that she had a COPD exacerbation and started on prednisone and IV abx. She was discharged on a tapered dose of steroids.     In the ED She had a Trop 0.06, evaluated by cardiology in the ED and suspected to demand ischemia with an EKG without obvious ST elevation and suggest to treat underlying acute disorder. AF and denies heart palpitations but tacky to 130s on the floor. Not on home O2. Denies Hx of MI or stent placement. No strokes. She reports having an echo in the distant past.   "

## 2019-12-21 NOTE — PLAN OF CARE
Patient remains free from falls and injuries through out shift. Patient AAOx4 and VSS. Patient is on 2l NC with O2 sats greater than 90%. On tele monitor pt has been rate controlled a-flutter/a-fib rhythm. Patient denies chest pain and SOB. Patient is blood glucose checks ACHS and at bedtime, last . No insulin coverage needed. CT with contrast of  abdomen and pelvis completed 10/20. Zoysn schedule q8hr given on shift. Pt is to be transition over to PO antibiotic therapy. Last INR 2.8 and patient received coumadin 1mg PO yesterday during day shift. Will f/u with labs this morning. Electrolytes replenished and will also f/u. Pt has been NPO since MN for US if abdomen this morning and patient verbalize understanding.Wound  care completed per orders for BLE cellulitis. POC is for patient to possibly/c back to NH today if medically stable. Plan of care reviewed with patient. Patient verbalizes understanding of plan.  Will continue to monitor.

## 2019-12-21 NOTE — PROGRESS NOTES
Patient is ready for discharge. Patient stable alert and oriented. IVs removed. Telemetry removed. No complaints of pain. Discussed discharge plan. Reviewed medications and side effects, appointments, and answered questions with patient and family. AVS given to patient...   Report given to Arabella at [facility].

## 2019-12-21 NOTE — PLAN OF CARE
Pt free of falls, injury this shift. POC reviewed with pt at bedside, verbalized understanding. Pt transferred here for tachycardia overnight. SR noted on telemetry throughout the day, though QTC noted to be elevated this AM at 552. MD Jenaro aware. EKGs to be obtained daily. Mg 0.8 on admit; replaced with 800mg PO, 4G IV. Zosyn continued Q8H. VSS, NAD noted. Possible D/C tomorrow. Will continue to monitor.

## 2019-12-21 NOTE — PLAN OF CARE
Pt is to discharge back to [facility].  CM put in call to nursing home, spoke with patient's nurse, Arabella, and she stated to fax orders, they would review and let us know. CM notified team to put orders in.     0930 am  Cm faxed and sent thru [facility] orders.  Awaiting call back.     11:10 am  [facility] called back and have accepted the patient.  Nurse to call report to [phone].  CM setting up transportation thru [facility] for wheelchair van with oxygen for 12:30 pm.  Pt ready to discharge when ride arrives.

## 2019-12-21 NOTE — NURSING TRANSFER
Nursing Transfer Note      12/21/2019     Transfer Ultrasound    Transfer via stretcher    Transfer with telemetry    Transported by transporter    Medicines sent: Zosyn    Chart send with patient: Yes

## 2019-12-21 NOTE — PLAN OF CARE
[facility]     Department of Hospital Medicine     [address] [place] [address]     [phone] after hours  [phone] fax       NURSING HOME ORDERS    12/21/2019    Admit to Nursing Home:  Skilled Bed                                                  Diagnoses:  Active Hospital Problems    Diagnosis  POA    *Diverticulitis of sigmoid colon [K57.32]  Yes    Hypomagnesemia [E83.42]  Yes    Elevated troponin [R79.89]  Yes    Sinus tachycardia [R00.0]  Yes    COPD (chronic obstructive pulmonary disease) [J44.9]  Yes    Chronic diastolic heart failure [I50.32]  Yes    Paroxysmal atrial fibrillation [I48.0]  Yes    T2DM (type 2 diabetes mellitus) [E11.9]  Yes      Resolved Hospital Problems   No resolved problems to display.       Patient is homebound due to:  Diverticulitis of sigmoid colon    Allergies:Review of patient's allergies indicates:  No Known Allergies    Vitals:       Every shift (Skilled Nursing patients)    Diet: Regular diet    Acitivities:      - Up in a chair each morning as tolerated   - Ambulate with assistance to bathroom   - Scheduled walks once each shift (every 8 hours)   - May ambulate independently   - May use walker, cane, or self-propelled wheelchair   - Weight bearing: as tolerated     LABS:  Per facility protocol   CMP, CBC each month for 3 months   PT-INR each week for 1 month then monthly   Pre-albumin each month for 3 months   TSH every year    Nursing Precautions:     - Aspiration precautions:             - Total assistance with meals            -  Upright 90 degrees befor during and after meals             -  Suction at bedside          - Fall precautions per nursing home protocol   - Seizure precaution per snf protocol   - Decubitus precautions:        -  for positioning   - Pressure reducing foam mattress   - Turn patient every two hours. Use wedge pillows to anchor patient    CONSULTS:      Physical  Therapy to evaluate and treat     Occupational Therapy to evaluate and treat      MISCELLANEOUS CARE:               Colostomy Care:  Empty bag every shift and prn                                             Change and clean site every 48 hours     PEG Care:  Clean site every 24 hours     Carpio Care: Empty Carpio bag every shift.  Change Carpio every month     Routine Skin for Bedridden Patients:  Apply moisture barrier cream to all    skin folds and wet areas in perineal area daily and after baths and                           all bowel movements.      Medications: Discontinue all previous medication orders, if any. See new list below.     Elizabeth Cano   Home Medication Instructions BRIAN:[id number]    Printed on:12/21/19 8840   Medication Information                      albuterol (ACCUNEB) 0.63 mg/3 mL Nebu  Take 0.5 mg by nebulization every 6 (six) hours as needed. Rescue             amoxicillin-clavulanate 875-125mg (AUGMENTIN) 875-125 mg per tablet  Take 1 tablet by mouth every 12 (twelve) hours. for 12 days             aspirin 81 MG Chew  Take 1 tablet (81 mg total) by mouth once daily.             budesonide (PULMICORT) 0.5 mg/2 mL nebulizer solution  Take 0.5 mg by nebulization 2 (two) times daily. Controller             diltiaZEM (CARDIZEM SR) 60 MG Cp12  Take 60 mg by mouth every 12 (twelve) hours.              DULoxetine (CYMBALTA) 30 MG capsule  Take 30 mg by mouth once daily.             furosemide (LASIX) 20 MG tablet  Take 20 mg by mouth once daily.             magnesium oxide (MAG-OX) 400 mg (241.3 mg magnesium) tablet  Take 1 tablet (400 mg total) by mouth once daily.             metFORMIN (GLUCOPHAGE) 1000 MG tablet  Take 1,000 mg by mouth 2 (two) times daily with meals.             metoprolol tartrate (LOPRESSOR) 50 MG tablet  Take 50 mg by mouth 2 (two) times daily.             nystatin (MYCOSTATIN) powder  Apply twice daily under breast folds for redness and irritation until resolved              potassium chloride (MICRO-K) 10 MEQ CpSR  Take 10 mEq by mouth once daily.             vitamin D (VITAMIN D3) 1000 units Tab  Take 1,000 Units by mouth once daily.             warfarin (COUMADIN) 1 MG tablet  Take 1 mg by mouth every Mon, Wed, Fri. At 5:00pm                   _________________________________  Behram Khan, MD  12/21/2019

## 2019-12-29 NOTE — PROGRESS NOTES
[facility]  Cardiology  Progress Note    Patient Name: Elizabeth Cano  MRN: [id number]  Admission Date: 12/19/2019  Hospital Length of Stay: 1 days  Code Status: Full Code   Attending Physician: Vicki Eason MD   Primary Care Physician: Primary Doctor No  Expected Discharge Date:   Principal Problem:Diverticulitis of sigmoid colon    Subjective:     Hospital Course:   69 year old female with afib, COPD presents to [facility] with lower left quadrant abdominal pain for the last week with associated nausea/vomiting and diarrhea. Cardiology consulted for elevated trop to 0.06 that eventually downtrended to 0.05 with ECG findings showing right bundle and left anterior block. Not supportive of ischemia at this time and troponemia is felt to be from demand.    Interval History: NAEON.    Review of Systems   Constitution: Positive for chills. Negative for diaphoresis and fever.   HENT: Negative for congestion and sore throat.    Cardiovascular: Negative for chest pain, dyspnea on exertion, irregular heartbeat, leg swelling, palpitations and syncope.   Respiratory: Negative for cough and shortness of breath.    Musculoskeletal: Negative for back pain and joint pain.   Gastrointestinal: Positive for abdominal pain, diarrhea, nausea and vomiting. Negative for constipation.   Genitourinary: Negative for dysuria and flank pain.   Neurological: Negative for focal weakness, headaches, numbness and weakness.     Objective:     Vital Signs (Most Recent):  Temp: 98.1 °F (36.7 °C) (12/20/19 1158)  Pulse: 77 (12/20/19 1158)  Resp: 17 (12/20/19 1158)  BP: (!) 99/56 (12/20/19 1158)  SpO2: (!) 91 % (12/20/19 1158) Vital Signs (24h Range):  Temp:  [98 °F (36.7 °C)-98.6 °F (37 °C)] 98.1 °F (36.7 °C)  Pulse:  [] 77  Resp:  [16-24] 17  SpO2:  [82 %-98 %] 91 %  BP: ()/(56-90) 99/56     Weight: 70.1 kg (154 lb 8.7 oz)  Body mass index is 24.94 kg/m².     SpO2: (!) 91 %  O2 Device (Oxygen Therapy): nasal  cannula      Intake/Output Summary (Last 24 hours) at 12/20/2019 1203  Last data filed at 12/20/2019 0900  Gross per 24 hour   Intake 1500 ml   Output 250 ml   Net 1250 ml       Lines/Drains/Airways     Peripheral Intravenous Line                 Peripheral IV - Single Lumen 22 G Anterior;Distal;Left Forearm -- days                Physical Exam   Constitutional: She is oriented to person, place, and time. She appears well-developed and well-nourished.   HENT:   Head: Normocephalic and atraumatic.   Eyes: Pupils are equal, round, and reactive to light. EOM are normal.   Neck: Normal range of motion. Neck supple. No JVD present.   Cardiovascular: Normal rate, regular rhythm, normal heart sounds and intact distal pulses.   No murmur heard.  Pulmonary/Chest: Effort normal. No respiratory distress.   2 liters nasal cannula. Speaking full sentences   Abdominal: Soft. Bowel sounds are normal. There is tenderness (LLQ).   Musculoskeletal: Normal range of motion. She exhibits no edema or tenderness.   Redness noted on her right lower extremity   Neurological: She is alert and oriented to person, place, and time. No cranial nerve deficit.       Significant Labs:   CMP   Recent Labs   Lab 12/19/19  1524 12/20/19  0435    142   K 4.9 4.2   CL 96 98   CO2 33* 35*   * 85   BUN 16 15   CREATININE 0.8 0.8   CALCIUM 7.4* 7.2*   PROT 7.1 5.8*   ALBUMIN 3.3* 2.8*   BILITOT 1.4* 1.4*   ALKPHOS 124 103   AST 28 20   ALT 12 11   ANIONGAP 12 9   ESTGFRAFRICA >60.0 >60.0   EGFRNONAA >60.0 >60.0    and CBC   Recent Labs   Lab 12/19/19  1524 12/20/19  0435   WBC 6.73 6.21   HGB 14.8 13.4   HCT 46.7 42.3    304       Significant Imaging:  Imaging Results           CT Abdomen Pelvis With Contrast (Final result)  Result time 12/19/19 18:33:50    Final result by Corbin Kaur MD (12/19/19 18:33:50)                 Impression:      Left lower quadrant/superior pelvic proximal sigmoid colonic acute, uncomplicated  diverticulitis.  Follow-up with imaging or endoscopy after therapy is recommended to exclude underlying neoplasm.    Hepatic diffuse parenchymal hypoattenuation suggesting fatty infiltration, and subtle hepatic nodular contour which could reflect underlying cirrhosis.  Correlate with LFTs.    Cholelithiasis without acute cholecystitis.    Cardiomegaly.    Atherosclerosis.    Few additional findings as above.    This report was flagged in Epic as abnormal.  This report was flagged in Epic as containing an incidental finding.      Electronically signed by: Corbin Kaur MD  Date:    12/19/2019  Time:    18:33             Narrative:    EXAMINATION:  CT ABDOMEN PELVIS WITH CONTRAST    CLINICAL HISTORY:  LLQ pain, suspect diverticulitis;    TECHNIQUE:  Low dose axial images, sagittal and coronal reformations were obtained from the lung bases to the pubic symphysis following the IV administration of 100 mL of Omnipaque 350 .  Oral contrast was not given.    COMPARISON:  Chest radiograph same day    FINDINGS:  Imaged lung bases show minimal dependent atelectasis and scattered bandlike opacities consistent with platelike scarring versus atelectasis.  The heart is moderately enlarged without significant pericardial fluid noting coronary arterial calcifications.    Liver is normal in size with diffuse parenchymal hypoattenuation suggesting fatty infiltration.  There is also subtle nodular contour which could reflect underlying cirrhosis.  Gallbladder is normally distended containing several dependent stones.  No evidence of acute cholecystitis.  Pancreas is mildly atrophic.  Spleen, stomach, duodenum and bilateral adrenal glands are within normal limits.  No biliary ductal dilatation.    Bilateral kidneys are normal in size and location noting mild deformity secondary to chronic appearing mass effect upon the posterior and lateral aspect of the left kidney related to the adjacent spleen.  Both kidneys concentrate and excrete  contrast appropriately.  No hydronephrosis or significant perinephric stranding.  A few scattered subcentimeter hypoattenuating cortical foci at each kidney which are too small to characterize.  Ureters are nondilated.  Urinary bladder is well distended without wall thickening.  Uterus and bilateral adnexal regions are within normal limits.  Pelvic phleboliths noted.    Multiple scattered colonic diverticula.  There is abnormal circumferential wall thickening involving the proximal sigmoid colon with slight stranding of the adjacent mesocolon as well as trace likely reactive free fluid tracking along the distal left pericolic gutter to the mesenteric root concerning for acute diverticulitis.  No evidence of bowel perforation or abscess.  No discrete bowel wall enhancing mass seen.  No evidence of bowel obstruction.  Small bowel loops are within normal limits.  Appendix is not identified; however, no pericecal inflammatory change.  Terminal ileum is within normal limits.  No pneumatosis or portal venous gas.    Small fat containing left periumbilical hernia.  Small fat containing bilateral inguinal hernias, right greater than left.    No large volume abdominal ascites, free air or lymphadenopathy.  Abdominal aorta is moderate to markedly atherosclerotic and mildly ectatic without aneurysm or dissection.    Osseous structures show mild degenerative change without acute or destructive process seen.                               X-Ray Chest PA And Lateral (Final result)  Result time 12/19/19 16:01:24    Final result by Damir Costa MD (12/19/19 16:01:24)                 Impression:      See above      Electronically signed by: Damir Costa MD  Date:    12/19/2019  Time:    16:01             Narrative:    EXAMINATION:  XR CHEST PA AND LATERAL    CLINICAL HISTORY:  Tachycardia, unspecified    TECHNIQUE:  PA and lateral views of the chest were performed.    COMPARISON:  None    FINDINGS:  Cardiomegaly.  Subsegmental  atelectatic changes noted at the lung bases.  No significant airspace consolidation or pleural effusion identified                                  Assessment and Plan:         Elevated troponin  Patient is a 70 yo female here with past medical hx of afib (on coumadin), DM, COPD, cellulitis of LLE, previous admit to [facility] on 11/11/2019 for cellultis with acute hypoxemic respiratory failure here with most likely viral GE or diarrhea or diarrhea secondary to prolonged ABX usage. In NSR here and tachycardic likely from loss of fluids and is hypertensive as well. Elevated troponin is likely secondary to Demand. EKG without signs of ischemia and patient is not having symptoms of chest discomfort.     -please follow up echo  -no evidence of ischemia at this time  -will sign off, please call for any questions        VTE Risk Mitigation (From admission, onward)         Ordered     warfarin (COUMADIN) tablet 1 mg  Every Mon, Wed, Fri      12/20/19 0734     Place sequential compression device  Until discontinued      12/19/19 1849     IP VTE HIGH RISK PATIENT  Once      12/19/19 1849                Elkin Fernandez MD  Cardiology  [facility]   no fever and no chills.

## 2019-12-30 NOTE — HOSPITAL COURSE
Patient admitted to Hospital Medicine Team 3 on 12/19/2019 for uncomplicated diverticulitis localized to the sigmoid colon. On admission, she was initiated on zosyn. Patient's blood magnesium levels on a.m. lab draw 0.8.  Magnesium she aggressively replaced and closely monitored over the next 48 hours. On 12/21, patient remained afebrile, hemodynamically stable, and subjectively was feeling much better; zosyn was transitioned to PO augmentin. She was subsequrntly discharged to SNF with PT/OT consultation, PO magnesium supplementation, and 12 day course of augmentin.     On 12/21/2019, Patient medically stable for discharge. Discharge recommendations and any changes to patient's medication regimen discussed with the patient prior to discharge and included in the patient's discharge packet.      Physical Exam on Day of Discharge:  Vital Signs Reviewed  Gen: NAD  Pulm: CTA-B  Cardiac: RRR, no murmurs  MSK: no edema present  Neuro: AAOx3  Psych: normal behavior

## 2019-12-30 NOTE — DISCHARGE SUMMARY
"[facility]  Critical Care Medicine  Discharge Summary      Patient Name: Elizabeth Cano  MRN: [id number]  Admission Date: 12/19/2019  Hospital Length of Stay: 2 days  Discharge Date and Time:  12/21/2019 12:16 PM  Attending Physician: No att. providers found   Discharging Provider: Rancho Arredondo MD  Primary Care Provider: Primary Doctor No  Reason for Admission:  Uncomplicated diverticulitis localized to the sigmoid colon    HPI:   Elizabeth Cano is a 70 yo F admitted to medicine with N/D with a CT in the ED concerning for uncomplicated diverticulitis. Patient has afib (on coumadin), COPD, and Diastolic HF with a previous admit to [facility] on 11/11/2019 for cellultis with acute hypoxemic respiratory failure. Diarrhea started one week ago, NB. Going up to 3 times per day. Not waking her from sleep and she has not reported any  "accidents". Nausea (one week) with some "gagging" with expectoration of some mucus (NB) but no overt emesis. Denies cough. Denies SOB and CP. No fevers or chills at home. Abdominal pain is in the LLQ predominantly but will move to right side on occasion. Dull and achy. Comes and goes, not related to BMs. She has never had diverticulitis. Reports that this pain has been off and on for 3 weeks. Pain is random and nothing sets it off. Reports that everyone at [facility] has had a "stomach flu" and were experiencing N/V/D. On this stay at , her acute hypoxemic respiratory failure was thought to be due to at first an exacerbation of ADHF. However, she developed GAVIN after being overdiuresed. Later it was thought that she had a COPD exacerbation and started on prednisone and IV abx. She was discharged on a tapered dose of steroids.      In the ED She had a Trop 0.06, evaluated by cardiology in the ED and suspected to demand ischemia with an EKG without obvious ST elevation and suggest to treat underlying acute disorder. AF and denies heart palpitations but tacky to " 130s on the floor. Not on home O2. Denies Hx of MI or stent placement. No strokes. She reports having an echo in the distant past.      * No surgery found *    Indwelling Lines/Drains at Time of Discharge:   Lines/Drains/Airways     None               Hospital Course:   Patient admitted to Hospital Medicine Team 3 on 12/19/2019 for uncomplicated diverticulitis localized to the sigmoid colon. On admission, she was initiated on zosyn. Patient's blood magnesium levels on a.m. lab draw 0.8.  Magnesium she aggressively replaced and closely monitored over the next 48 hours. On 12/21, patient remained afebrile, hemodynamically stable, and subjectively was feeling much better; zosyn was transitioned to PO augmentin. She was subsequrntly discharged to SNF with PT/OT consultation, PO magnesium supplementation, and 12 day course of augmentin.     On 12/21/2019, Patient medically stable for discharge. Discharge recommendations and any changes to patient's medication regimen discussed with the patient prior to discharge and included in the patient's discharge packet.      Physical Exam on Day of Discharge:  Vital Signs Reviewed  Gen: NAD  Pulm: CTA-B  Cardiac: RRR, no murmurs  MSK: no edema present  Neuro: AAOx3  Psych: normal behavior      Consults (From admission, onward)        Status Ordering Provider     Inpatient consult to Cardiology  Once     Provider:  (Not yet assigned)    Completed CARLENE HAMMER        Significant Labs:  All pertinent labs within the past 24 hours have been reviewed.    Significant Imaging:  X-ray Chest Pa And Lateral    Result Date: 12/19/2019  EXAMINATION: XR CHEST PA AND LATERAL CLINICAL HISTORY: Tachycardia, unspecified TECHNIQUE: PA and lateral views of the chest were performed. COMPARISON: None FINDINGS: Cardiomegaly.  Subsegmental atelectatic changes noted at the lung bases.  No significant airspace consolidation or pleural effusion identified     See above Electronically signed  by: Damir Costa MD Date:    12/19/2019 Time:    16:01    Us Abdomen Complete    Result Date: 12/21/2019  EXAMINATION: US ABDOMEN COMPLETE CLINICAL HISTORY: suspected cirrhosis; TECHNIQUE: Complete abdominal ultrasound (including pancreas, aorta, liver, gallbladder, common bile duct, IVC, kidneys, and spleen) was performed. COMPARISON: CT 12/19/2019 FINDINGS: Pancreas: The visualized portions of pancreas appear normal. Aorta: No aneurysm. Liver: 14.0 cm, normal in size. Nodular contour suggesting cirrhosis. No focal lesions. Gallbladder: Distended.  Intraluminal sludge and multiple stones measuring up to 0.4 cm.  No wall thickening or pericholecystic fluid.  Negative sonographic De Paz's sign. Biliary system: 6 mm common bile duct.  No intrahepatic ductal dilatation. Inferior vena cava: Normal in appearance. Right kidney: 9.8 cm. No hydronephrosis. Left kidney: 9.7 cm. No hydronephrosis. Spleen: 12.6 x 3.3 cm.  Normal in size with homogeneous echotexture. Miscellaneous: No ascites.     1. Nodular hepatic contour suggesting cirrhosis. 2. Cholelithiasis and gallbladder sludge.  No sonographic evidence of acute cholecystitis. Electronically signed by resident: Sharif Serrano Date:    12/21/2019 Time:    08:53 Electronically signed by: Bob Denis MD Date:    12/21/2019 Time:    09:51    Ct Abdomen Pelvis With Contrast    Result Date: 12/19/2019  EXAMINATION: CT ABDOMEN PELVIS WITH CONTRAST CLINICAL HISTORY: LLQ pain, suspect diverticulitis; TECHNIQUE: Low dose axial images, sagittal and coronal reformations were obtained from the lung bases to the pubic symphysis following the IV administration of 100 mL of Omnipaque 350 .  Oral contrast was not given. COMPARISON: Chest radiograph same day FINDINGS: Imaged lung bases show minimal dependent atelectasis and scattered bandlike opacities consistent with platelike scarring versus atelectasis.  The heart is moderately enlarged without significant pericardial fluid noting  coronary arterial calcifications. Liver is normal in size with diffuse parenchymal hypoattenuation suggesting fatty infiltration.  There is also subtle nodular contour which could reflect underlying cirrhosis.  Gallbladder is normally distended containing several dependent stones.  No evidence of acute cholecystitis.  Pancreas is mildly atrophic.  Spleen, stomach, duodenum and bilateral adrenal glands are within normal limits.  No biliary ductal dilatation. Bilateral kidneys are normal in size and location noting mild deformity secondary to chronic appearing mass effect upon the posterior and lateral aspect of the left kidney related to the adjacent spleen.  Both kidneys concentrate and excrete contrast appropriately.  No hydronephrosis or significant perinephric stranding.  A few scattered subcentimeter hypoattenuating cortical foci at each kidney which are too small to characterize.  Ureters are nondilated.  Urinary bladder is well distended without wall thickening.  Uterus and bilateral adnexal regions are within normal limits.  Pelvic phleboliths noted. Multiple scattered colonic diverticula.  There is abnormal circumferential wall thickening involving the proximal sigmoid colon with slight stranding of the adjacent mesocolon as well as trace likely reactive free fluid tracking along the distal left pericolic gutter to the mesenteric root concerning for acute diverticulitis.  No evidence of bowel perforation or abscess.  No discrete bowel wall enhancing mass seen.  No evidence of bowel obstruction.  Small bowel loops are within normal limits.  Appendix is not identified; however, no pericecal inflammatory change.  Terminal ileum is within normal limits.  No pneumatosis or portal venous gas. Small fat containing left periumbilical hernia.  Small fat containing bilateral inguinal hernias, right greater than left. No large volume abdominal ascites, free air or lymphadenopathy.  Abdominal aorta is moderate to  markedly atherosclerotic and mildly ectatic without aneurysm or dissection. Osseous structures show mild degenerative change without acute or destructive process seen.     Left lower quadrant/superior pelvic proximal sigmoid colonic acute, uncomplicated diverticulitis.  Follow-up with imaging or endoscopy after therapy is recommended to exclude underlying neoplasm. Hepatic diffuse parenchymal hypoattenuation suggesting fatty infiltration, and subtle hepatic nodular contour which could reflect underlying cirrhosis.  Correlate with LFTs. Cholelithiasis without acute cholecystitis. Cardiomegaly. Atherosclerosis. Few additional findings as above. This report was flagged in Epic as abnormal.  This report was flagged in Epic as containing an incidental finding. Electronically signed by: Corbin Kaur MD Date:    12/19/2019 Time:    18:33    Pending Diagnostic Studies:     None        Final Active Diagnoses:    Diagnosis Date Noted POA    PRINCIPAL PROBLEM:  Diverticulitis of sigmoid colon [K57.32] 12/19/2019 Yes    Hypomagnesemia [E83.42] 12/20/2019 Yes    Elevated troponin [R79.89] 12/19/2019 Yes    Sinus tachycardia [R00.0] 12/19/2019 Yes    COPD (chronic obstructive pulmonary disease) [J44.9] 12/19/2019 Yes    Chronic diastolic heart failure [I50.32] 12/19/2019 Yes    Paroxysmal atrial fibrillation [I48.0] 12/19/2019 Yes    T2DM (type 2 diabetes mellitus) [E11.9] 12/19/2019 Yes      Problems Resolved During this Admission:     No new Assessment & Plan notes have been filed under this hospital service since the last note was generated.  Service: Critical Care Medicine    Discharged Condition: stable    Disposition: Long Term Care    Follow-up Information     [facility] - Cardiology.    Specialty:  Cardiology  Why:  For evaluation of long QT. They will call for appointment.   Contact information:  [id number]  [facility] [address] [phone]  Additional information:  3rd floor                Patient Instructions:      Ambulatory Referral to Cardiology   Referral Priority: Routine Referral Type: Consultation   Referral Reason: Specialty Services Required   Requested Specialty: Cardiology   Number of Visits Requested: 1     Diet Adult Regular     Vital signs per facility protocol     Intake and output per facility protocol     Skin assessment every shift      Notify Physician     Order Specific Question Answer Comments   Temperature (F) greater than 101    Systolic Blood Pressure SBP greater than or equal to 160    Systolic Blood Pressure SBP less than or equal to 90    Diastolic Blood Pressure DBP greater than or equal to 110    Diastolic Blood Pressure DBP less than or equal to 60    Pulse greater than or equal to 120    Pulse less than or equal to 50    Respirations Rate RR greater than or equal to 30    Respirations Rate RR less than or equal to 6    Urine output less than 60 over 2 hours   SPO2% less than 90      Full code     Pulse Oximetry     Medications:  Reconciled Home Medications:      Medication List      START taking these medications    amoxicillin-clavulanate 875-125mg 875-125 mg per tablet  Commonly known as:  AUGMENTIN  Take 1 tablet by mouth every 12 (twelve) hours. for 12 days     aspirin 81 MG Chew  Take 1 tablet (81 mg total) by mouth once daily.     magnesium oxide 400 mg (241.3 mg magnesium) tablet  Commonly known as:  MAG-OX  Take 1 tablet (400 mg total) by mouth once daily.        CONTINUE taking these medications    albuterol 0.63 mg/3 mL Nebu  Commonly known as:  ACCUNEB  Take 0.5 mg by nebulization every 6 (six) hours as needed. Rescue     budesonide 0.5 mg/2 mL nebulizer solution  Commonly known as:  PULMICORT  Take 0.5 mg by nebulization 2 (two) times daily. Controller     diltiaZEM 60 MG Cp12  Commonly known as:  CARDIZEM SR  Take 60 mg by mouth every 12 (twelve) hours.     DULoxetine 30 MG capsule  Commonly known as:  CYMBALTA  Take 30 mg by mouth once daily.      furosemide 20 MG tablet  Commonly known as:  LASIX  Take 20 mg by mouth once daily.     metFORMIN 1000 MG tablet  Commonly known as:  GLUCOPHAGE  Take 1,000 mg by mouth 2 (two) times daily with meals.     metoprolol tartrate 50 MG tablet  Commonly known as:  LOPRESSOR  Take 50 mg by mouth 2 (two) times daily.     nystatin powder  Commonly known as:  MYCOSTATIN  Apply twice daily under breast folds for redness and irritation until resolved     potassium chloride 10 MEQ Cpsr  Commonly known as:  MICRO-K  Take 10 mEq by mouth once daily.     vitamin D 1000 units Tab  Commonly known as:  VITAMIN D3  Take 1,000 Units by mouth once daily.     warfarin 1 MG tablet  Commonly known as:  COUMADIN  Take 1 mg by mouth every Mon, Wed, Fri. At 5:00pm        STOP taking these medications    nitrofurantoin 50 MG capsule  Commonly known as:  MACRODANTIN             Rancho Arredondo MD  Critical Care Medicine  [facility]

## 2019-12-31 NOTE — PROGRESS NOTES
CRS Office Visit History and Physical    Referring Md:   Aaareferral Self  No address on file    SUBJECTIVE:     Chief Complaint:  Diverticulitis    History of Present Illness:  The patient is new patient to this practice.   Course is as follows:  Patient is a 69 y.o. female presents with diverticulitis. This was diagnosed after recent admission here on 12/20/19. At that time she c/o LLQ pain with associated nausea and vomiting. This subsequently lead to dehydration as well. CT during her admission demonstrated left lower quadrant/superior pelvic proximal sigmoid colonic acute, uncomplicated diverticulitis. She is currently on a 12 day course of Augmentin. She continues to have mild nausea and occasional epigastric and LLQ pain. She believes that years ago she may have had another episode of diverticulitis, but did not receive a specific diagnosis.   Prior to the admission she has been a resident of an assisted living facility. She has limited functional ability and ambulation currently. She was also admitted for about 4 weeks in November at [facility] for management of her CHF. Over this timeframe she has lost about 19 lbs.   Since her admission to [facility] November, she has been functionally limited to her wheelchair.  She has significant right lower extremity pain which limits her mobility.  She is accompanied by her daughters today.    Last Colonoscopy: never    Review of patient's allergies indicates:  No Known Allergies    Past Medical History:   Diagnosis Date    A-fib     GAVIN (acute kidney injury)     CHF (congestive heart failure)     COPD (chronic obstructive pulmonary disease)     Diabetes mellitus     Hypertension      Past Surgical History:   Procedure Laterality Date    CARDIAC SURGERY       History reviewed. No pertinent family history.  Social History     Tobacco Use    Smoking status: Former Smoker     Packs/day: 1.00     Years: 54.00     Pack years: 54.00     Types: Cigarettes      "Last attempt to quit: 2019     Years since quittin.1    Smokeless tobacco: Never Used   Substance Use Topics    Alcohol use: Not Currently    Drug use: Not Currently        Review of Systems:  Review of Systems   Constitutional: Positive for malaise/fatigue and weight loss. Negative for chills, diaphoresis and fever.   HENT: Negative for congestion.    Respiratory: Negative for shortness of breath.    Cardiovascular: Negative for chest pain and leg swelling.   Gastrointestinal: Positive for abdominal pain and nausea. Negative for blood in stool, constipation and vomiting.   Genitourinary: Negative for dysuria.   Musculoskeletal: Negative for back pain and myalgias.   Skin: Negative for rash.   Neurological: Negative for dizziness and weakness.   Endo/Heme/Allergies: Does not bruise/bleed easily.   Psychiatric/Behavioral: Negative for depression.       OBJECTIVE:     Vital Signs (Most Recent)  BP 94/64 (BP Location: Right arm, Patient Position: Sitting, BP Method: Large (Automatic))   Pulse 94   Ht 5' 6" (1.676 m)   BMI 24.94 kg/m²     Physical Exam:  General: White female in no distress   Neuro: alert and oriented x 4.  Moves all extremities.     HEENT: no icterus.  Trachea midline  Respiratory: respirations are even and unlabored  Cardiac: regular rate  Abdomen: soft, non-distended, non-tender  Extremities: Warm dry and intact  Skin: no rashes  Anorectal: deferred    Labs: reviewed    Imaging:  CT of the abdomen pelvis from 2019 personally reviewed and demonstrates cardiomegaly as well as inflammation in the mid sigmoid colon with no obvious abscess or free perforation.      ASSESSMENT/PLAN:     Elizabeth was seen today for diverticulitis.    Diagnoses and all orders for this visit:    Diverticulitis  -     C-REACTIVE PROTEIN; Future  -     CBC auto differential; Future  -     Comprehensive metabolic panel; Future  -     Prealbumin; Future  -     Case request GI: COLONOSCOPY with CURTIS in 6 " weeks    Malnutrition, unspecified type  -     Prealbumin; Future        68 yo female with one recent episode of uncomplicated diverticulitis. She is completing a 12 day course of Augmentin. We discussed the location of her diverticulitis within the sigmoid colon as well as the etiology of this disease process, including the rare occurrence of malignancy as the cause for her presentation.   She has never had a colonoscopy. Will schedule one in coming weeks.  Will obtain CBC and BMP today  Encouraged the use of nutrition supplementation (Boost, protein shakes, smoothies).   If her inflammatory markers are normal, we will plan to stop her antibiotics as that may be contributing to her nausea.    SCOTTIE Laguna MD, FACS  Staff Surgeon  Colon & Rectal Surgery

## 2020-01-01 ENCOUNTER — PATIENT MESSAGE (OUTPATIENT)
Dept: SURGERY | Facility: CLINIC | Age: 70
End: 2020-01-01

## 2020-01-01 ENCOUNTER — DOCUMENTATION ONLY (OUTPATIENT)
Dept: CARDIOLOGY | Facility: CLINIC | Age: 70
End: 2020-01-01

## 2020-01-01 ENCOUNTER — OFFICE VISIT (OUTPATIENT)
Dept: CARDIOLOGY | Facility: CLINIC | Age: 70
End: 2020-01-01
Payer: MEDICARE

## 2020-01-01 ENCOUNTER — PATIENT MESSAGE (OUTPATIENT)
Dept: NEUROLOGY | Facility: CLINIC | Age: 70
End: 2020-01-01

## 2020-01-01 ENCOUNTER — HOSPITAL ENCOUNTER (INPATIENT)
Facility: HOSPITAL | Age: 70
LOS: 33 days | Discharge: HOME | DRG: 871 | End: 2020-03-02
Attending: EMERGENCY MEDICINE | Admitting: EMERGENCY MEDICINE
Payer: MEDICARE

## 2020-01-01 ENCOUNTER — ANESTHESIA (OUTPATIENT)
Dept: ENDOSCOPY | Facility: HOSPITAL | Age: 70
DRG: 871 | End: 2020-01-01
Payer: MEDICARE

## 2020-01-01 ENCOUNTER — CLINICAL SUPPORT (OUTPATIENT)
Dept: CARDIOLOGY | Facility: HOSPITAL | Age: 70
End: 2020-01-01
Attending: INTERNAL MEDICINE
Payer: MEDICARE

## 2020-01-01 ENCOUNTER — TELEPHONE (OUTPATIENT)
Dept: ENDOSCOPY | Facility: HOSPITAL | Age: 70
End: 2020-01-01

## 2020-01-01 ENCOUNTER — PATIENT MESSAGE (OUTPATIENT)
Dept: CARDIOLOGY | Facility: CLINIC | Age: 70
End: 2020-01-01

## 2020-01-01 ENCOUNTER — PATIENT MESSAGE (OUTPATIENT)
Dept: GASTROENTEROLOGY | Facility: CLINIC | Age: 70
End: 2020-01-01

## 2020-01-01 ENCOUNTER — TELEPHONE (OUTPATIENT)
Dept: GASTROENTEROLOGY | Facility: CLINIC | Age: 70
End: 2020-01-01

## 2020-01-01 ENCOUNTER — HOSPITAL ENCOUNTER (INPATIENT)
Facility: HOSPITAL | Age: 70
LOS: 7 days | Discharge: HOME | DRG: 392 | End: 2020-03-18
Attending: EMERGENCY MEDICINE | Admitting: INTERNAL MEDICINE
Payer: MEDICARE

## 2020-01-01 ENCOUNTER — TELEPHONE (OUTPATIENT)
Dept: CARDIOLOGY | Facility: HOSPITAL | Age: 70
End: 2020-01-01

## 2020-01-01 ENCOUNTER — ANESTHESIA EVENT (OUTPATIENT)
Dept: ENDOSCOPY | Facility: HOSPITAL | Age: 70
DRG: 871 | End: 2020-01-01
Payer: MEDICARE

## 2020-01-01 ENCOUNTER — TELEPHONE (OUTPATIENT)
Dept: CARDIOLOGY | Facility: CLINIC | Age: 70
End: 2020-01-01

## 2020-01-01 ENCOUNTER — HOSPITAL ENCOUNTER (INPATIENT)
Facility: HOSPITAL | Age: 70
LOS: 6 days | DRG: 871 | End: 2020-04-14
Attending: EMERGENCY MEDICINE | Admitting: INTERNAL MEDICINE
Payer: MEDICARE

## 2020-01-01 ENCOUNTER — HOSPITAL ENCOUNTER (OUTPATIENT)
Dept: CARDIOLOGY | Facility: CLINIC | Age: 70
Discharge: HOME | End: 2020-01-29
Attending: INTERNAL MEDICINE
Payer: MEDICARE

## 2020-01-01 VITALS
BODY MASS INDEX: 24.94 KG/M2 | WEIGHT: 154 LBS | HEIGHT: 66 IN | HEART RATE: 95 BPM | DIASTOLIC BLOOD PRESSURE: 60 MMHG | SYSTOLIC BLOOD PRESSURE: 88 MMHG | HEART RATE: 96 BPM | SYSTOLIC BLOOD PRESSURE: 102 MMHG | HEIGHT: 66 IN | BODY MASS INDEX: 24.75 KG/M2 | OXYGEN SATURATION: 87 % | DIASTOLIC BLOOD PRESSURE: 68 MMHG

## 2020-01-01 VITALS
DIASTOLIC BLOOD PRESSURE: 52 MMHG | BODY MASS INDEX: 17.89 KG/M2 | SYSTOLIC BLOOD PRESSURE: 105 MMHG | WEIGHT: 111.31 LBS | HEART RATE: 79 BPM | TEMPERATURE: 99 F | HEIGHT: 66 IN | OXYGEN SATURATION: 93 % | RESPIRATION RATE: 18 BRPM

## 2020-01-01 VITALS
TEMPERATURE: 98 F | RESPIRATION RATE: 7 BRPM | BODY MASS INDEX: 21.44 KG/M2 | HEIGHT: 66 IN | SYSTOLIC BLOOD PRESSURE: 100 MMHG | OXYGEN SATURATION: 94 % | DIASTOLIC BLOOD PRESSURE: 50 MMHG | WEIGHT: 133.38 LBS | HEART RATE: 63 BPM

## 2020-01-01 VITALS
SYSTOLIC BLOOD PRESSURE: 112 MMHG | HEIGHT: 66 IN | RESPIRATION RATE: 16 BRPM | TEMPERATURE: 98 F | HEART RATE: 98 BPM | WEIGHT: 133.38 LBS | BODY MASS INDEX: 21.44 KG/M2 | DIASTOLIC BLOOD PRESSURE: 59 MMHG | OXYGEN SATURATION: 96 %

## 2020-01-01 DIAGNOSIS — N39.0 URINARY TRACT INFECTION WITHOUT HEMATURIA, SITE UNSPECIFIED: ICD-10-CM

## 2020-01-01 DIAGNOSIS — K57.32 DIVERTICULITIS OF SIGMOID COLON: Primary | ICD-10-CM

## 2020-01-01 DIAGNOSIS — I50.32 CHRONIC HEART FAILURE WITH PRESERVED EJECTION FRACTION: Primary | ICD-10-CM

## 2020-01-01 DIAGNOSIS — I48.0 PAROXYSMAL ATRIAL FIBRILLATION: ICD-10-CM

## 2020-01-01 DIAGNOSIS — J18.9 MULTIFOCAL PNEUMONIA: ICD-10-CM

## 2020-01-01 DIAGNOSIS — I48.91 ATRIAL FIBRILLATION WITH RVR: Primary | ICD-10-CM

## 2020-01-01 DIAGNOSIS — I45.2 BIFASCICULAR BLOCK: ICD-10-CM

## 2020-01-01 DIAGNOSIS — I24.89 DEMAND ISCHEMIA OF MYOCARDIUM: ICD-10-CM

## 2020-01-01 DIAGNOSIS — I50.32 CHRONIC HEART FAILURE WITH PRESERVED EJECTION FRACTION: ICD-10-CM

## 2020-01-01 DIAGNOSIS — K57.92 DIVERTICULITIS: ICD-10-CM

## 2020-01-01 DIAGNOSIS — K57.32 DIVERTICULITIS OF SIGMOID COLON: ICD-10-CM

## 2020-01-01 DIAGNOSIS — J44.9 CHRONIC OBSTRUCTIVE PULMONARY DISEASE, UNSPECIFIED COPD TYPE: ICD-10-CM

## 2020-01-01 DIAGNOSIS — Z71.89 ADVANCE CARE PLANNING: ICD-10-CM

## 2020-01-01 DIAGNOSIS — I48.91 ATRIAL FIBRILLATION: ICD-10-CM

## 2020-01-01 DIAGNOSIS — R65.21 SEPTIC SHOCK: ICD-10-CM

## 2020-01-01 DIAGNOSIS — Q20.3 TRANSPOSITION OF THE GREAT ARTERIES: ICD-10-CM

## 2020-01-01 DIAGNOSIS — R00.2 PALPITATIONS: Primary | ICD-10-CM

## 2020-01-01 DIAGNOSIS — R09.02 HYPOXIA: ICD-10-CM

## 2020-01-01 DIAGNOSIS — R11.14 BILIOUS VOMITING WITH NAUSEA: ICD-10-CM

## 2020-01-01 DIAGNOSIS — R00.2 PALPITATIONS: ICD-10-CM

## 2020-01-01 DIAGNOSIS — I48.91 A-FIB: ICD-10-CM

## 2020-01-01 DIAGNOSIS — R41.0 DELIRIUM: ICD-10-CM

## 2020-01-01 DIAGNOSIS — Z91.89 AT RISK FOR PROLONGED QT INTERVAL SYNDROME: ICD-10-CM

## 2020-01-01 DIAGNOSIS — R00.0 TACHYCARDIA: ICD-10-CM

## 2020-01-01 DIAGNOSIS — H61.20 IMPACTED CERUMEN, UNSPECIFIED LATERALITY: ICD-10-CM

## 2020-01-01 DIAGNOSIS — I95.9 HYPOTENSION: ICD-10-CM

## 2020-01-01 DIAGNOSIS — Z51.5 PALLIATIVE CARE ENCOUNTER: ICD-10-CM

## 2020-01-01 DIAGNOSIS — E11.9 TYPE 2 DIABETES MELLITUS WITHOUT COMPLICATION, WITHOUT LONG-TERM CURRENT USE OF INSULIN: ICD-10-CM

## 2020-01-01 DIAGNOSIS — E86.1 HYPOVOLEMIA: Primary | ICD-10-CM

## 2020-01-01 DIAGNOSIS — Z71.89 GOALS OF CARE, COUNSELING/DISCUSSION: ICD-10-CM

## 2020-01-01 DIAGNOSIS — R06.00 DYSPNEA, UNSPECIFIED TYPE: ICD-10-CM

## 2020-01-01 DIAGNOSIS — A41.9 SEPTIC SHOCK: ICD-10-CM

## 2020-01-01 DIAGNOSIS — E87.20 LACTIC ACIDOSIS: ICD-10-CM

## 2020-01-01 DIAGNOSIS — F32.A DEPRESSION, UNSPECIFIED DEPRESSION TYPE: ICD-10-CM

## 2020-01-01 DIAGNOSIS — I10 HYPERTENSION, UNSPECIFIED TYPE: ICD-10-CM

## 2020-01-01 DIAGNOSIS — Q24.9 HEART ABNORMALITY: ICD-10-CM

## 2020-01-01 DIAGNOSIS — R10.33 PERIUMBILICAL ABDOMINAL PAIN: ICD-10-CM

## 2020-01-01 DIAGNOSIS — E83.42 HYPOMAGNESEMIA: ICD-10-CM

## 2020-01-01 DIAGNOSIS — I48.0 PAROXYSMAL A-FIB: ICD-10-CM

## 2020-01-01 DIAGNOSIS — K43.9 VENTRAL HERNIA WITHOUT OBSTRUCTION OR GANGRENE: ICD-10-CM

## 2020-01-01 DIAGNOSIS — U07.1 COVID-19 VIRUS INFECTION: ICD-10-CM

## 2020-01-01 DIAGNOSIS — E11.9 TYPE 2 DIABETES MELLITUS WITHOUT COMPLICATION, WITHOUT LONG-TERM CURRENT USE OF INSULIN: Primary | ICD-10-CM

## 2020-01-01 DIAGNOSIS — R63.0 ANOREXIA: ICD-10-CM

## 2020-01-01 LAB
25(OH)D3+25(OH)D2 SERPL-MCNC: 14 NG/ML (ref 30–96)
ABO + RH BLD: NORMAL
ABO + RH BLD: NORMAL
ALBUMIN SERPL BCP-MCNC: 1.2 G/DL (ref 3.5–5.2)
ALBUMIN SERPL BCP-MCNC: 1.2 G/DL (ref 3.5–5.2)
ALBUMIN SERPL BCP-MCNC: 1.3 G/DL (ref 3.5–5.2)
ALBUMIN SERPL BCP-MCNC: 1.4 G/DL (ref 3.5–5.2)
ALBUMIN SERPL BCP-MCNC: 1.5 G/DL (ref 3.5–5.2)
ALBUMIN SERPL BCP-MCNC: 1.6 G/DL (ref 3.5–5.2)
ALBUMIN SERPL BCP-MCNC: 1.7 G/DL (ref 3.5–5.2)
ALBUMIN SERPL BCP-MCNC: 1.8 G/DL (ref 3.5–5.2)
ALBUMIN SERPL BCP-MCNC: 2 G/DL (ref 3.5–5.2)
ALBUMIN SERPL BCP-MCNC: 2.4 G/DL (ref 3.5–5.2)
ALBUMIN SERPL BCP-MCNC: NORMAL G/DL (ref 3.5–5.2)
ALBUMIN SERPL ELPH-MCNC: 1.62 G/DL (ref 3.35–5.55)
ALLENS TEST: ABNORMAL
ALP SERPL-CCNC: 136 U/L (ref 55–135)
ALP SERPL-CCNC: 148 U/L (ref 55–135)
ALP SERPL-CCNC: 149 U/L (ref 55–135)
ALP SERPL-CCNC: 157 U/L (ref 55–135)
ALP SERPL-CCNC: 177 U/L (ref 55–135)
ALP SERPL-CCNC: 179 U/L (ref 55–135)
ALP SERPL-CCNC: 192 U/L (ref 55–135)
ALP SERPL-CCNC: 199 U/L (ref 55–135)
ALP SERPL-CCNC: 212 U/L (ref 55–135)
ALP SERPL-CCNC: 216 U/L (ref 55–135)
ALP SERPL-CCNC: 220 U/L (ref 55–135)
ALP SERPL-CCNC: 226 U/L (ref 55–135)
ALP SERPL-CCNC: 226 U/L (ref 55–135)
ALP SERPL-CCNC: 230 U/L (ref 55–135)
ALP SERPL-CCNC: 231 U/L (ref 55–135)
ALP SERPL-CCNC: 231 U/L (ref 55–135)
ALP SERPL-CCNC: 233 U/L (ref 55–135)
ALP SERPL-CCNC: 235 U/L (ref 55–135)
ALP SERPL-CCNC: 239 U/L (ref 55–135)
ALP SERPL-CCNC: 240 U/L (ref 55–135)
ALP SERPL-CCNC: 241 U/L (ref 55–135)
ALP SERPL-CCNC: 245 U/L (ref 55–135)
ALP SERPL-CCNC: 246 U/L (ref 55–135)
ALP SERPL-CCNC: 251 U/L (ref 55–135)
ALP SERPL-CCNC: 254 U/L (ref 55–135)
ALP SERPL-CCNC: 257 U/L (ref 55–135)
ALP SERPL-CCNC: 257 U/L (ref 55–135)
ALP SERPL-CCNC: 258 U/L (ref 55–135)
ALP SERPL-CCNC: 259 U/L (ref 55–135)
ALP SERPL-CCNC: 260 U/L (ref 55–135)
ALP SERPL-CCNC: 260 U/L (ref 55–135)
ALP SERPL-CCNC: 280 U/L (ref 55–135)
ALP SERPL-CCNC: 314 U/L (ref 55–135)
ALP SERPL-CCNC: 463 U/L (ref 55–135)
ALP SERPL-CCNC: NORMAL U/L (ref 55–135)
ALPHA1 GLOB SERPL ELPH-MCNC: 0.42 G/DL (ref 0.17–0.41)
ALPHA2 GLOB SERPL ELPH-MCNC: 0.54 G/DL (ref 0.43–0.99)
ALT SERPL W/O P-5'-P-CCNC: 14 U/L (ref 10–44)
ALT SERPL W/O P-5'-P-CCNC: 15 U/L (ref 10–44)
ALT SERPL W/O P-5'-P-CCNC: 15 U/L (ref 10–44)
ALT SERPL W/O P-5'-P-CCNC: 16 U/L (ref 10–44)
ALT SERPL W/O P-5'-P-CCNC: 17 U/L (ref 10–44)
ALT SERPL W/O P-5'-P-CCNC: 18 U/L (ref 10–44)
ALT SERPL W/O P-5'-P-CCNC: 19 U/L (ref 10–44)
ALT SERPL W/O P-5'-P-CCNC: 20 U/L (ref 10–44)
ALT SERPL W/O P-5'-P-CCNC: 21 U/L (ref 10–44)
ALT SERPL W/O P-5'-P-CCNC: 25 U/L (ref 10–44)
ALT SERPL W/O P-5'-P-CCNC: 27 U/L (ref 10–44)
ALT SERPL W/O P-5'-P-CCNC: 27 U/L (ref 10–44)
ALT SERPL W/O P-5'-P-CCNC: 29 U/L (ref 10–44)
ALT SERPL W/O P-5'-P-CCNC: 57 U/L (ref 10–44)
ALT SERPL W/O P-5'-P-CCNC: NORMAL U/L (ref 10–44)
AMYLASE SERPL-CCNC: 47 U/L (ref 20–110)
ANION GAP SERPL CALC-SCNC: 10 MMOL/L (ref 8–16)
ANION GAP SERPL CALC-SCNC: 11 MMOL/L (ref 8–16)
ANION GAP SERPL CALC-SCNC: 13 MMOL/L (ref 8–16)
ANION GAP SERPL CALC-SCNC: 14 MMOL/L (ref 8–16)
ANION GAP SERPL CALC-SCNC: 14 MMOL/L (ref 8–16)
ANION GAP SERPL CALC-SCNC: 15 MMOL/L (ref 8–16)
ANION GAP SERPL CALC-SCNC: 15 MMOL/L (ref 8–16)
ANION GAP SERPL CALC-SCNC: 4 MMOL/L (ref 8–16)
ANION GAP SERPL CALC-SCNC: 5 MMOL/L (ref 8–16)
ANION GAP SERPL CALC-SCNC: 6 MMOL/L (ref 8–16)
ANION GAP SERPL CALC-SCNC: 7 MMOL/L (ref 8–16)
ANION GAP SERPL CALC-SCNC: 8 MMOL/L (ref 8–16)
ANION GAP SERPL CALC-SCNC: 9 MMOL/L (ref 8–16)
ANION GAP SERPL CALC-SCNC: NORMAL MMOL/L (ref 8–16)
APTT BLDCRRT: 24.9 SEC (ref 21–32)
APTT BLDCRRT: 25.8 SEC (ref 21–32)
APTT BLDCRRT: 27 SEC (ref 21–32)
APTT BLDCRRT: 27.1 SEC (ref 21–32)
APTT BLDCRRT: 27.3 SEC (ref 21–32)
APTT BLDCRRT: 28.6 SEC (ref 21–32)
APTT BLDCRRT: 37.4 SEC (ref 21–32)
APTT BLDCRRT: 39.1 SEC (ref 21–32)
APTT BLDCRRT: 49.3 SEC (ref 21–32)
APTT BLDCRRT: 50.6 SEC (ref 21–32)
APTT BLDCRRT: 54 SEC (ref 21–32)
APTT BLDCRRT: 55.8 SEC (ref 21–32)
APTT BLDCRRT: 61.3 SEC (ref 21–32)
APTT BLDCRRT: 63.8 SEC (ref 21–32)
APTT BLDCRRT: 65.5 SEC (ref 21–32)
APTT BLDCRRT: 66.8 SEC (ref 21–32)
APTT BLDCRRT: 67.8 SEC (ref 21–32)
APTT BLDCRRT: 67.8 SEC (ref 21–32)
APTT BLDCRRT: >150 SEC (ref 21–32)
ASCENDING AORTA: 2.86 CM
AST SERPL-CCNC: 195 U/L (ref 10–40)
AST SERPL-CCNC: 23 U/L (ref 10–40)
AST SERPL-CCNC: 27 U/L (ref 10–40)
AST SERPL-CCNC: 28 U/L (ref 10–40)
AST SERPL-CCNC: 29 U/L (ref 10–40)
AST SERPL-CCNC: 30 U/L (ref 10–40)
AST SERPL-CCNC: 32 U/L (ref 10–40)
AST SERPL-CCNC: 37 U/L (ref 10–40)
AST SERPL-CCNC: 38 U/L (ref 10–40)
AST SERPL-CCNC: 39 U/L (ref 10–40)
AST SERPL-CCNC: 45 U/L (ref 10–40)
AST SERPL-CCNC: 46 U/L (ref 10–40)
AST SERPL-CCNC: 47 U/L (ref 10–40)
AST SERPL-CCNC: 48 U/L (ref 10–40)
AST SERPL-CCNC: 49 U/L (ref 10–40)
AST SERPL-CCNC: 51 U/L (ref 10–40)
AST SERPL-CCNC: 53 U/L (ref 10–40)
AST SERPL-CCNC: 54 U/L (ref 10–40)
AST SERPL-CCNC: 58 U/L (ref 10–40)
AST SERPL-CCNC: 74 U/L (ref 10–40)
AST SERPL-CCNC: 87 U/L (ref 10–40)
AST SERPL-CCNC: 91 U/L (ref 10–40)
AST SERPL-CCNC: NORMAL U/L (ref 10–40)
AV INDEX (PROSTH): 0.8
AV MEAN GRADIENT: 2 MMHG
AV PEAK GRADIENT: 3 MMHG
AV VALVE AREA: 2.58 CM2
AV VELOCITY RATIO: 0.75
B-GLOBULIN SERPL ELPH-MCNC: 0.38 G/DL (ref 0.5–1.1)
BACTERIA #/AREA URNS AUTO: ABNORMAL /HPF
BACTERIA BLD CULT: ABNORMAL
BACTERIA BLD CULT: NORMAL
BACTERIA UR CULT: ABNORMAL
BACTERIA UR CULT: NO GROWTH
BASOPHILS # BLD AUTO: 0 K/UL (ref 0–0.2)
BASOPHILS # BLD AUTO: 0 K/UL (ref 0–0.2)
BASOPHILS # BLD AUTO: 0.01 K/UL (ref 0–0.2)
BASOPHILS # BLD AUTO: 0.02 K/UL (ref 0–0.2)
BASOPHILS # BLD AUTO: 0.03 K/UL (ref 0–0.2)
BASOPHILS # BLD AUTO: 0.04 K/UL (ref 0–0.2)
BASOPHILS # BLD AUTO: 0.05 K/UL (ref 0–0.2)
BASOPHILS # BLD AUTO: 0.09 K/UL (ref 0–0.2)
BASOPHILS NFR BLD: 0 % (ref 0–1.9)
BASOPHILS NFR BLD: 0 % (ref 0–1.9)
BASOPHILS NFR BLD: 0.1 % (ref 0–1.9)
BASOPHILS NFR BLD: 0.2 % (ref 0–1.9)
BASOPHILS NFR BLD: 0.3 % (ref 0–1.9)
BASOPHILS NFR BLD: 0.4 % (ref 0–1.9)
BASOPHILS NFR BLD: 0.5 % (ref 0–1.9)
BASOPHILS NFR BLD: 0.6 % (ref 0–1.9)
BASOPHILS NFR BLD: 0.7 % (ref 0–1.9)
BASOPHILS NFR BLD: 0.8 % (ref 0–1.9)
BASOPHILS NFR BLD: 0.8 % (ref 0–1.9)
BASOPHILS NFR BLD: 0.9 % (ref 0–1.9)
BASOPHILS NFR BLD: 0.9 % (ref 0–1.9)
BASOPHILS NFR BLD: 1 % (ref 0–1.9)
BASOPHILS NFR BLD: 1.1 % (ref 0–1.9)
BASOPHILS NFR BLD: 1.2 % (ref 0–1.9)
BILIRUB DIRECT SERPL-MCNC: 0.4 MG/DL (ref 0.1–0.3)
BILIRUB SERPL-MCNC: 0.3 MG/DL (ref 0.1–1)
BILIRUB SERPL-MCNC: 0.4 MG/DL (ref 0.1–1)
BILIRUB SERPL-MCNC: 0.5 MG/DL (ref 0.1–1)
BILIRUB SERPL-MCNC: 0.6 MG/DL (ref 0.1–1)
BILIRUB SERPL-MCNC: 0.7 MG/DL (ref 0.1–1)
BILIRUB SERPL-MCNC: 0.8 MG/DL (ref 0.1–1)
BILIRUB SERPL-MCNC: 0.9 MG/DL (ref 0.1–1)
BILIRUB SERPL-MCNC: 1 MG/DL (ref 0.1–1)
BILIRUB SERPL-MCNC: 1.3 MG/DL (ref 0.1–1)
BILIRUB SERPL-MCNC: 1.3 MG/DL (ref 0.1–1)
BILIRUB SERPL-MCNC: 1.7 MG/DL (ref 0.1–1)
BILIRUB SERPL-MCNC: NORMAL MG/DL (ref 0.1–1)
BILIRUB UR QL STRIP: NEGATIVE
BLD GP AB SCN CELLS X3 SERPL QL: NORMAL
BLD GP AB SCN CELLS X3 SERPL QL: NORMAL
BLOOD GROUP ANTIBODIES SERPL: NORMAL
BNP SERPL-MCNC: 1618 PG/ML (ref 0–99)
BNP SERPL-MCNC: 396 PG/ML (ref 0–99)
BNP SERPL-MCNC: 472 PG/ML (ref 0–99)
BSA FOR ECHO PROCEDURE: 1.8 M2
BUN SERPL-MCNC: 10 MG/DL (ref 8–23)
BUN SERPL-MCNC: 11 MG/DL (ref 8–23)
BUN SERPL-MCNC: 12 MG/DL (ref 8–23)
BUN SERPL-MCNC: 13 MG/DL (ref 8–23)
BUN SERPL-MCNC: 14 MG/DL (ref 8–23)
BUN SERPL-MCNC: 18 MG/DL (ref 8–23)
BUN SERPL-MCNC: 21 MG/DL (ref 8–23)
BUN SERPL-MCNC: 6 MG/DL (ref 8–23)
BUN SERPL-MCNC: 7 MG/DL (ref 8–23)
BUN SERPL-MCNC: 8 MG/DL (ref 8–23)
BUN SERPL-MCNC: 9 MG/DL (ref 8–23)
BUN SERPL-MCNC: NORMAL MG/DL (ref 8–23)
C DIFF GDH STL QL: NEGATIVE
C DIFF TOX A+B STL QL IA: NEGATIVE
CA-I BLDV-SCNC: 0.64 MMOL/L (ref 1.06–1.42)
CA-I BLDV-SCNC: 0.81 MMOL/L (ref 1.06–1.42)
CA-I BLDV-SCNC: 0.82 MMOL/L (ref 1.06–1.42)
CA-I BLDV-SCNC: 0.86 MMOL/L (ref 1.06–1.42)
CA-I BLDV-SCNC: 0.87 MMOL/L (ref 1.06–1.42)
CA-I BLDV-SCNC: 0.97 MMOL/L (ref 1.06–1.42)
CALCIUM SERPL-MCNC: 5.5 MG/DL (ref 8.7–10.5)
CALCIUM SERPL-MCNC: 6 MG/DL (ref 8.7–10.5)
CALCIUM SERPL-MCNC: 6.1 MG/DL (ref 8.7–10.5)
CALCIUM SERPL-MCNC: 6.3 MG/DL (ref 8.7–10.5)
CALCIUM SERPL-MCNC: 6.3 MG/DL (ref 8.7–10.5)
CALCIUM SERPL-MCNC: 6.4 MG/DL (ref 8.7–10.5)
CALCIUM SERPL-MCNC: 6.4 MG/DL (ref 8.7–10.5)
CALCIUM SERPL-MCNC: 6.5 MG/DL (ref 8.7–10.5)
CALCIUM SERPL-MCNC: 6.5 MG/DL (ref 8.7–10.5)
CALCIUM SERPL-MCNC: 6.7 MG/DL (ref 8.7–10.5)
CALCIUM SERPL-MCNC: 6.7 MG/DL (ref 8.7–10.5)
CALCIUM SERPL-MCNC: 6.8 MG/DL (ref 8.7–10.5)
CALCIUM SERPL-MCNC: 6.9 MG/DL (ref 8.7–10.5)
CALCIUM SERPL-MCNC: 7 MG/DL (ref 8.7–10.5)
CALCIUM SERPL-MCNC: 7.1 MG/DL (ref 8.7–10.5)
CALCIUM SERPL-MCNC: 7.1 MG/DL (ref 8.7–10.5)
CALCIUM SERPL-MCNC: 7.3 MG/DL (ref 8.7–10.5)
CALCIUM SERPL-MCNC: 7.4 MG/DL (ref 8.7–10.5)
CALCIUM SERPL-MCNC: 7.4 MG/DL (ref 8.7–10.5)
CALCIUM SERPL-MCNC: 7.5 MG/DL (ref 8.7–10.5)
CALCIUM SERPL-MCNC: 7.6 MG/DL (ref 8.7–10.5)
CALCIUM SERPL-MCNC: 7.7 MG/DL (ref 8.7–10.5)
CALCIUM SERPL-MCNC: 7.8 MG/DL (ref 8.7–10.5)
CALCIUM SERPL-MCNC: 7.9 MG/DL (ref 8.7–10.5)
CALCIUM SERPL-MCNC: 8 MG/DL (ref 8.7–10.5)
CALCIUM SERPL-MCNC: 8.1 MG/DL (ref 8.7–10.5)
CALCIUM SERPL-MCNC: 8.2 MG/DL (ref 8.7–10.5)
CALCIUM SERPL-MCNC: NORMAL MG/DL (ref 8.7–10.5)
CHLORIDE SERPL-SCNC: 100 MMOL/L (ref 95–110)
CHLORIDE SERPL-SCNC: 102 MMOL/L (ref 95–110)
CHLORIDE SERPL-SCNC: 102 MMOL/L (ref 95–110)
CHLORIDE SERPL-SCNC: 103 MMOL/L (ref 95–110)
CHLORIDE SERPL-SCNC: 103 MMOL/L (ref 95–110)
CHLORIDE SERPL-SCNC: 105 MMOL/L (ref 95–110)
CHLORIDE SERPL-SCNC: 105 MMOL/L (ref 95–110)
CHLORIDE SERPL-SCNC: 106 MMOL/L (ref 95–110)
CHLORIDE SERPL-SCNC: 107 MMOL/L (ref 95–110)
CHLORIDE SERPL-SCNC: 108 MMOL/L (ref 95–110)
CHLORIDE SERPL-SCNC: 110 MMOL/L (ref 95–110)
CHLORIDE SERPL-SCNC: 110 MMOL/L (ref 95–110)
CHLORIDE SERPL-SCNC: 111 MMOL/L (ref 95–110)
CHLORIDE SERPL-SCNC: 113 MMOL/L (ref 95–110)
CHLORIDE SERPL-SCNC: 87 MMOL/L (ref 95–110)
CHLORIDE SERPL-SCNC: 91 MMOL/L (ref 95–110)
CHLORIDE SERPL-SCNC: 92 MMOL/L (ref 95–110)
CHLORIDE SERPL-SCNC: 92 MMOL/L (ref 95–110)
CHLORIDE SERPL-SCNC: 93 MMOL/L (ref 95–110)
CHLORIDE SERPL-SCNC: 94 MMOL/L (ref 95–110)
CHLORIDE SERPL-SCNC: 95 MMOL/L (ref 95–110)
CHLORIDE SERPL-SCNC: 95 MMOL/L (ref 95–110)
CHLORIDE SERPL-SCNC: 96 MMOL/L (ref 95–110)
CHLORIDE SERPL-SCNC: 97 MMOL/L (ref 95–110)
CHLORIDE SERPL-SCNC: 98 MMOL/L (ref 95–110)
CHLORIDE SERPL-SCNC: 99 MMOL/L (ref 95–110)
CHLORIDE SERPL-SCNC: NORMAL MMOL/L (ref 95–110)
CK MB SERPL-MCNC: 2.1 NG/ML (ref 0.1–6.5)
CK MB SERPL-RTO: 4.8 % (ref 0–5)
CK SERPL-CCNC: 26 U/L (ref 20–180)
CK SERPL-CCNC: 44 U/L (ref 20–180)
CLARITY UR REFRACT.AUTO: ABNORMAL
CLARITY UR REFRACT.AUTO: CLEAR
CO2 SERPL-SCNC: 16 MMOL/L (ref 23–29)
CO2 SERPL-SCNC: 17 MMOL/L (ref 23–29)
CO2 SERPL-SCNC: 21 MMOL/L (ref 23–29)
CO2 SERPL-SCNC: 22 MMOL/L (ref 23–29)
CO2 SERPL-SCNC: 23 MMOL/L (ref 23–29)
CO2 SERPL-SCNC: 24 MMOL/L (ref 23–29)
CO2 SERPL-SCNC: 25 MMOL/L (ref 23–29)
CO2 SERPL-SCNC: 26 MMOL/L (ref 23–29)
CO2 SERPL-SCNC: 26 MMOL/L (ref 23–29)
CO2 SERPL-SCNC: 27 MMOL/L (ref 23–29)
CO2 SERPL-SCNC: 28 MMOL/L (ref 23–29)
CO2 SERPL-SCNC: 29 MMOL/L (ref 23–29)
CO2 SERPL-SCNC: 30 MMOL/L (ref 23–29)
CO2 SERPL-SCNC: 30 MMOL/L (ref 23–29)
CO2 SERPL-SCNC: 31 MMOL/L (ref 23–29)
CO2 SERPL-SCNC: 32 MMOL/L (ref 23–29)
CO2 SERPL-SCNC: 32 MMOL/L (ref 23–29)
CO2 SERPL-SCNC: 33 MMOL/L (ref 23–29)
CO2 SERPL-SCNC: 34 MMOL/L (ref 23–29)
CO2 SERPL-SCNC: 35 MMOL/L (ref 23–29)
CO2 SERPL-SCNC: 38 MMOL/L (ref 23–29)
CO2 SERPL-SCNC: 39 MMOL/L (ref 23–29)
CO2 SERPL-SCNC: 40 MMOL/L (ref 23–29)
CO2 SERPL-SCNC: 42 MMOL/L (ref 23–29)
CO2 SERPL-SCNC: 43 MMOL/L (ref 23–29)
CO2 SERPL-SCNC: NORMAL MMOL/L (ref 23–29)
COLOR UR AUTO: ABNORMAL
COLOR UR AUTO: ABNORMAL
COLOR UR AUTO: YELLOW
COLOR UR AUTO: YELLOW
CORTIS SERPL-MCNC: 12.9 UG/DL
CORTIS SERPL-MCNC: 14.1 UG/DL
CORTIS SERPL-MCNC: 22.5 UG/DL
CORTIS SERPL-MCNC: 6.4 UG/DL
CORTIS SERPL-MCNC: 9.3 UG/DL
CREAT SERPL-MCNC: 0.6 MG/DL (ref 0.5–1.4)
CREAT SERPL-MCNC: 0.7 MG/DL (ref 0.5–1.4)
CREAT SERPL-MCNC: 0.8 MG/DL (ref 0.5–1.4)
CREAT SERPL-MCNC: 0.9 MG/DL (ref 0.5–1.4)
CREAT SERPL-MCNC: 1 MG/DL (ref 0.5–1.4)
CREAT SERPL-MCNC: 1.1 MG/DL (ref 0.5–1.4)
CREAT SERPL-MCNC: 1.3 MG/DL (ref 0.5–1.4)
CREAT SERPL-MCNC: NORMAL MG/DL (ref 0.5–1.4)
CRP SERPL-MCNC: 13.4 MG/L (ref 0–8.2)
CRP SERPL-MCNC: 15.3 MG/L (ref 0–8.2)
CRP SERPL-MCNC: 15.6 MG/L (ref 0–8.2)
CRP SERPL-MCNC: 16.8 MG/L (ref 0–8.2)
CRP SERPL-MCNC: 17.4 MG/L (ref 0–8.2)
CRP SERPL-MCNC: 46.8 MG/L (ref 0–8.2)
CRP SERPL-MCNC: 54.2 MG/L (ref 0–8.2)
CRP SERPL-MCNC: 96 MG/L (ref 0–8.2)
CV ECHO LV RWT: 0.4 CM
D DIMER PPP IA.FEU-MCNC: 0.9 MG/L FEU
DELSYS: ABNORMAL
DIFFERENTIAL METHOD: ABNORMAL
DIGOXIN SERPL-MCNC: <0.1 NG/ML (ref 0.8–2)
DOP CALC AO PEAK VEL: 0.92 M/S
DOP CALC AO VTI: 13.27 CM
DOP CALC LVOT AREA: 3.2 CM2
DOP CALC LVOT DIAMETER: 2.03 CM
DOP CALC LVOT PEAK VEL: 0.69 M/S
DOP CALC LVOT STROKE VOLUME: 34.19 CM3
DOP CALCLVOT PEAK VEL VTI: 10.57 CM
E/E' RATIO: 8.88 M/S
ECHO LV POSTERIOR WALL: 0.8 CM (ref 0.6–1.1)
EOSINOPHIL # BLD AUTO: 0 K/UL (ref 0–0.5)
EOSINOPHIL # BLD AUTO: 0.1 K/UL (ref 0–0.5)
EOSINOPHIL # BLD AUTO: 0.2 K/UL (ref 0–0.5)
EOSINOPHIL # BLD AUTO: 0.3 K/UL (ref 0–0.5)
EOSINOPHIL # BLD AUTO: 0.4 K/UL (ref 0–0.5)
EOSINOPHIL # BLD AUTO: 0.5 K/UL (ref 0–0.5)
EOSINOPHIL # BLD AUTO: 0.7 K/UL (ref 0–0.5)
EOSINOPHIL NFR BLD: 0 % (ref 0–8)
EOSINOPHIL NFR BLD: 0.1 % (ref 0–8)
EOSINOPHIL NFR BLD: 0.2 % (ref 0–8)
EOSINOPHIL NFR BLD: 0.2 % (ref 0–8)
EOSINOPHIL NFR BLD: 0.4 % (ref 0–8)
EOSINOPHIL NFR BLD: 0.5 % (ref 0–8)
EOSINOPHIL NFR BLD: 0.9 % (ref 0–8)
EOSINOPHIL NFR BLD: 1 % (ref 0–8)
EOSINOPHIL NFR BLD: 1.2 % (ref 0–8)
EOSINOPHIL NFR BLD: 1.3 % (ref 0–8)
EOSINOPHIL NFR BLD: 1.4 % (ref 0–8)
EOSINOPHIL NFR BLD: 1.4 % (ref 0–8)
EOSINOPHIL NFR BLD: 1.7 % (ref 0–8)
EOSINOPHIL NFR BLD: 1.9 % (ref 0–8)
EOSINOPHIL NFR BLD: 12.6 % (ref 0–8)
EOSINOPHIL NFR BLD: 13.7 % (ref 0–8)
EOSINOPHIL NFR BLD: 2.4 % (ref 0–8)
EOSINOPHIL NFR BLD: 2.5 % (ref 0–8)
EOSINOPHIL NFR BLD: 2.6 % (ref 0–8)
EOSINOPHIL NFR BLD: 2.6 % (ref 0–8)
EOSINOPHIL NFR BLD: 2.8 % (ref 0–8)
EOSINOPHIL NFR BLD: 3.3 % (ref 0–8)
EOSINOPHIL NFR BLD: 4.1 % (ref 0–8)
EOSINOPHIL NFR BLD: 4.3 % (ref 0–8)
EOSINOPHIL NFR BLD: 4.4 % (ref 0–8)
EOSINOPHIL NFR BLD: 4.5 % (ref 0–8)
EOSINOPHIL NFR BLD: 4.9 % (ref 0–8)
EOSINOPHIL NFR BLD: 5.2 % (ref 0–8)
EOSINOPHIL NFR BLD: 5.5 % (ref 0–8)
EOSINOPHIL NFR BLD: 6 % (ref 0–8)
EOSINOPHIL NFR BLD: 6.4 % (ref 0–8)
EOSINOPHIL NFR BLD: 6.5 % (ref 0–8)
EOSINOPHIL NFR BLD: 6.8 % (ref 0–8)
EOSINOPHIL NFR BLD: 7 % (ref 0–8)
EOSINOPHIL NFR BLD: 7.1 % (ref 0–8)
EOSINOPHIL NFR BLD: 7.6 % (ref 0–8)
EOSINOPHIL NFR BLD: 7.8 % (ref 0–8)
EOSINOPHIL NFR BLD: 7.9 % (ref 0–8)
EOSINOPHIL NFR BLD: 8 % (ref 0–8)
EOSINOPHIL NFR BLD: 8 % (ref 0–8)
EOSINOPHIL NFR BLD: 8.1 % (ref 0–8)
EOSINOPHIL NFR BLD: 8.9 % (ref 0–8)
EOSINOPHIL NFR BLD: 9 % (ref 0–8)
ERYTHROCYTE [DISTWIDTH] IN BLOOD BY AUTOMATED COUNT: 13.4 % (ref 11.5–14.5)
ERYTHROCYTE [DISTWIDTH] IN BLOOD BY AUTOMATED COUNT: 13.6 % (ref 11.5–14.5)
ERYTHROCYTE [DISTWIDTH] IN BLOOD BY AUTOMATED COUNT: 13.7 % (ref 11.5–14.5)
ERYTHROCYTE [DISTWIDTH] IN BLOOD BY AUTOMATED COUNT: 13.7 % (ref 11.5–14.5)
ERYTHROCYTE [DISTWIDTH] IN BLOOD BY AUTOMATED COUNT: 14 % (ref 11.5–14.5)
ERYTHROCYTE [DISTWIDTH] IN BLOOD BY AUTOMATED COUNT: 14.2 % (ref 11.5–14.5)
ERYTHROCYTE [DISTWIDTH] IN BLOOD BY AUTOMATED COUNT: 14.3 % (ref 11.5–14.5)
ERYTHROCYTE [DISTWIDTH] IN BLOOD BY AUTOMATED COUNT: 14.4 % (ref 11.5–14.5)
ERYTHROCYTE [DISTWIDTH] IN BLOOD BY AUTOMATED COUNT: 14.6 % (ref 11.5–14.5)
ERYTHROCYTE [DISTWIDTH] IN BLOOD BY AUTOMATED COUNT: 14.9 % (ref 11.5–14.5)
ERYTHROCYTE [DISTWIDTH] IN BLOOD BY AUTOMATED COUNT: 15.4 % (ref 11.5–14.5)
ERYTHROCYTE [DISTWIDTH] IN BLOOD BY AUTOMATED COUNT: 15.5 % (ref 11.5–14.5)
ERYTHROCYTE [DISTWIDTH] IN BLOOD BY AUTOMATED COUNT: 15.6 % (ref 11.5–14.5)
ERYTHROCYTE [DISTWIDTH] IN BLOOD BY AUTOMATED COUNT: 15.7 % (ref 11.5–14.5)
ERYTHROCYTE [DISTWIDTH] IN BLOOD BY AUTOMATED COUNT: 15.8 % (ref 11.5–14.5)
ERYTHROCYTE [DISTWIDTH] IN BLOOD BY AUTOMATED COUNT: 15.9 % (ref 11.5–14.5)
ERYTHROCYTE [DISTWIDTH] IN BLOOD BY AUTOMATED COUNT: 16 % (ref 11.5–14.5)
ERYTHROCYTE [DISTWIDTH] IN BLOOD BY AUTOMATED COUNT: 16.2 % (ref 11.5–14.5)
ERYTHROCYTE [DISTWIDTH] IN BLOOD BY AUTOMATED COUNT: 16.4 % (ref 11.5–14.5)
ERYTHROCYTE [DISTWIDTH] IN BLOOD BY AUTOMATED COUNT: 16.6 % (ref 11.5–14.5)
EST. GFR  (AFRICAN AMERICAN): 48 ML/MIN/1.73 M^2
EST. GFR  (AFRICAN AMERICAN): 58.8 ML/MIN/1.73 M^2
EST. GFR  (AFRICAN AMERICAN): >60 ML/MIN/1.73 M^2
EST. GFR  (AFRICAN AMERICAN): NORMAL ML/MIN/1.73 M^2
EST. GFR  (NON AFRICAN AMERICAN): 41.7 ML/MIN/1.73 M^2
EST. GFR  (NON AFRICAN AMERICAN): 51 ML/MIN/1.73 M^2
EST. GFR  (NON AFRICAN AMERICAN): 57.2 ML/MIN/1.73 M^2
EST. GFR  (NON AFRICAN AMERICAN): >60 ML/MIN/1.73 M^2
EST. GFR  (NON AFRICAN AMERICAN): NORMAL ML/MIN/1.73 M^2
ESTIMATED AVG GLUCOSE: 108 MG/DL (ref 68–131)
ESTIMATED AVG GLUCOSE: 91 MG/DL (ref 68–131)
FERRITIN SERPL-MCNC: 1052 NG/ML (ref 20–300)
FERRITIN SERPL-MCNC: 892 NG/ML (ref 20–300)
FERRITIN SERPL-MCNC: 998 NG/ML (ref 20–300)
FINAL PATHOLOGIC DIAGNOSIS: NORMAL
FIO2: 100
FLOW: 30
FOLATE SERPL-MCNC: 3.6 NG/ML (ref 4–24)
FRACTIONAL SHORTENING: 31 % (ref 28–44)
GAMMA GLOB SERPL ELPH-MCNC: 1.04 G/DL (ref 0.67–1.58)
GLUCOSE SERPL-MCNC: 100 MG/DL (ref 70–110)
GLUCOSE SERPL-MCNC: 102 MG/DL (ref 70–110)
GLUCOSE SERPL-MCNC: 102 MG/DL (ref 70–110)
GLUCOSE SERPL-MCNC: 104 MG/DL (ref 70–110)
GLUCOSE SERPL-MCNC: 104 MG/DL (ref 70–110)
GLUCOSE SERPL-MCNC: 106 MG/DL (ref 70–110)
GLUCOSE SERPL-MCNC: 112 MG/DL (ref 70–110)
GLUCOSE SERPL-MCNC: 114 MG/DL (ref 70–110)
GLUCOSE SERPL-MCNC: 115 MG/DL (ref 70–110)
GLUCOSE SERPL-MCNC: 115 MG/DL (ref 70–110)
GLUCOSE SERPL-MCNC: 116 MG/DL (ref 70–110)
GLUCOSE SERPL-MCNC: 130 MG/DL (ref 70–110)
GLUCOSE SERPL-MCNC: 135 MG/DL (ref 70–110)
GLUCOSE SERPL-MCNC: 147 MG/DL (ref 70–110)
GLUCOSE SERPL-MCNC: 161 MG/DL (ref 70–110)
GLUCOSE SERPL-MCNC: 162 MG/DL (ref 70–110)
GLUCOSE SERPL-MCNC: 187 MG/DL (ref 70–110)
GLUCOSE SERPL-MCNC: 242 MG/DL (ref 70–110)
GLUCOSE SERPL-MCNC: 64 MG/DL (ref 70–110)
GLUCOSE SERPL-MCNC: 64 MG/DL (ref 70–110)
GLUCOSE SERPL-MCNC: 68 MG/DL (ref 70–110)
GLUCOSE SERPL-MCNC: 69 MG/DL (ref 70–110)
GLUCOSE SERPL-MCNC: 70 MG/DL (ref 70–110)
GLUCOSE SERPL-MCNC: 71 MG/DL (ref 70–110)
GLUCOSE SERPL-MCNC: 72 MG/DL (ref 70–110)
GLUCOSE SERPL-MCNC: 72 MG/DL (ref 70–110)
GLUCOSE SERPL-MCNC: 73 MG/DL (ref 70–110)
GLUCOSE SERPL-MCNC: 74 MG/DL (ref 70–110)
GLUCOSE SERPL-MCNC: 74 MG/DL (ref 70–110)
GLUCOSE SERPL-MCNC: 75 MG/DL (ref 70–110)
GLUCOSE SERPL-MCNC: 75 MG/DL (ref 70–110)
GLUCOSE SERPL-MCNC: 79 MG/DL (ref 70–110)
GLUCOSE SERPL-MCNC: 80 MG/DL (ref 70–110)
GLUCOSE SERPL-MCNC: 81 MG/DL (ref 70–110)
GLUCOSE SERPL-MCNC: 82 MG/DL (ref 70–110)
GLUCOSE SERPL-MCNC: 84 MG/DL (ref 70–110)
GLUCOSE SERPL-MCNC: 87 MG/DL (ref 70–110)
GLUCOSE SERPL-MCNC: 89 MG/DL (ref 70–110)
GLUCOSE SERPL-MCNC: 91 MG/DL (ref 70–110)
GLUCOSE SERPL-MCNC: 94 MG/DL (ref 70–110)
GLUCOSE SERPL-MCNC: 95 MG/DL (ref 70–110)
GLUCOSE SERPL-MCNC: 96 MG/DL (ref 70–110)
GLUCOSE SERPL-MCNC: 96 MG/DL (ref 70–110)
GLUCOSE SERPL-MCNC: 97 MG/DL (ref 70–110)
GLUCOSE SERPL-MCNC: 98 MG/DL (ref 70–110)
GLUCOSE SERPL-MCNC: NORMAL MG/DL (ref 70–110)
GLUCOSE UR QL STRIP: NEGATIVE
GLUCOSE-6-PHOSPHATE DEHYDROGENASE QUAL: NORMAL
GROSS: NORMAL
HAPTOGLOB SERPL-MCNC: 168 MG/DL (ref 30–250)
HBA1C MFR BLD HPLC: 4.8 % (ref 4–5.6)
HBA1C MFR BLD HPLC: 5.4 % (ref 4–5.6)
HCO3 UR-SCNC: 25.5 MMOL/L (ref 24–28)
HCO3 UR-SCNC: 25.9 MMOL/L (ref 24–28)
HCO3 UR-SCNC: 26.9 MMOL/L (ref 24–28)
HCO3 UR-SCNC: 29.7 MMOL/L (ref 24–28)
HCT VFR BLD AUTO: 26 % (ref 37–48.5)
HCT VFR BLD AUTO: 26.7 % (ref 37–48.5)
HCT VFR BLD AUTO: 27.2 % (ref 37–48.5)
HCT VFR BLD AUTO: 27.4 % (ref 37–48.5)
HCT VFR BLD AUTO: 28 % (ref 37–48.5)
HCT VFR BLD AUTO: 29.6 % (ref 37–48.5)
HCT VFR BLD AUTO: 29.7 % (ref 37–48.5)
HCT VFR BLD AUTO: 30 % (ref 37–48.5)
HCT VFR BLD AUTO: 30.4 % (ref 37–48.5)
HCT VFR BLD AUTO: 30.5 % (ref 37–48.5)
HCT VFR BLD AUTO: 30.7 % (ref 37–48.5)
HCT VFR BLD AUTO: 30.9 % (ref 37–48.5)
HCT VFR BLD AUTO: 31.3 % (ref 37–48.5)
HCT VFR BLD AUTO: 31.6 % (ref 37–48.5)
HCT VFR BLD AUTO: 31.6 % (ref 37–48.5)
HCT VFR BLD AUTO: 31.9 % (ref 37–48.5)
HCT VFR BLD AUTO: 31.9 % (ref 37–48.5)
HCT VFR BLD AUTO: 32 % (ref 37–48.5)
HCT VFR BLD AUTO: 32.1 % (ref 37–48.5)
HCT VFR BLD AUTO: 32.2 % (ref 37–48.5)
HCT VFR BLD AUTO: 32.3 % (ref 37–48.5)
HCT VFR BLD AUTO: 32.5 % (ref 37–48.5)
HCT VFR BLD AUTO: 32.5 % (ref 37–48.5)
HCT VFR BLD AUTO: 32.7 % (ref 37–48.5)
HCT VFR BLD AUTO: 32.8 % (ref 37–48.5)
HCT VFR BLD AUTO: 32.9 % (ref 37–48.5)
HCT VFR BLD AUTO: 33.6 % (ref 37–48.5)
HCT VFR BLD AUTO: 33.8 % (ref 37–48.5)
HCT VFR BLD AUTO: 33.8 % (ref 37–48.5)
HCT VFR BLD AUTO: 34 % (ref 37–48.5)
HCT VFR BLD AUTO: 34 % (ref 37–48.5)
HCT VFR BLD AUTO: 34.7 % (ref 37–48.5)
HCT VFR BLD AUTO: 34.8 % (ref 37–48.5)
HCT VFR BLD AUTO: 34.8 % (ref 37–48.5)
HCT VFR BLD AUTO: 35 % (ref 37–48.5)
HCT VFR BLD AUTO: 35.3 % (ref 37–48.5)
HCT VFR BLD AUTO: 35.4 % (ref 37–48.5)
HCT VFR BLD AUTO: 35.6 % (ref 37–48.5)
HCT VFR BLD AUTO: 35.7 % (ref 37–48.5)
HCT VFR BLD AUTO: 36.3 % (ref 37–48.5)
HCT VFR BLD AUTO: 36.8 % (ref 37–48.5)
HCT VFR BLD AUTO: 36.9 % (ref 37–48.5)
HCT VFR BLD AUTO: 37.1 % (ref 37–48.5)
HCT VFR BLD AUTO: 38.5 % (ref 37–48.5)
HCT VFR BLD AUTO: 39 % (ref 37–48.5)
HCT VFR BLD AUTO: 47.2 % (ref 37–48.5)
HCYS SERPL-SCNC: 7.9 UMOL/L (ref 4–15.5)
HEPARIN INDUCED THROMBOCYTOPENIA: 0.22 OD (ref 0–0.4)
HGB BLD-MCNC: 10 G/DL (ref 12–16)
HGB BLD-MCNC: 10.1 G/DL (ref 12–16)
HGB BLD-MCNC: 10.2 G/DL (ref 12–16)
HGB BLD-MCNC: 10.2 G/DL (ref 12–16)
HGB BLD-MCNC: 10.3 G/DL (ref 12–16)
HGB BLD-MCNC: 10.3 G/DL (ref 12–16)
HGB BLD-MCNC: 10.4 G/DL (ref 12–16)
HGB BLD-MCNC: 10.5 G/DL (ref 12–16)
HGB BLD-MCNC: 10.5 G/DL (ref 12–16)
HGB BLD-MCNC: 10.6 G/DL (ref 12–16)
HGB BLD-MCNC: 10.7 G/DL (ref 12–16)
HGB BLD-MCNC: 10.7 G/DL (ref 12–16)
HGB BLD-MCNC: 10.9 G/DL (ref 12–16)
HGB BLD-MCNC: 11 G/DL (ref 12–16)
HGB BLD-MCNC: 11.1 G/DL (ref 12–16)
HGB BLD-MCNC: 11.2 G/DL (ref 12–16)
HGB BLD-MCNC: 11.2 G/DL (ref 12–16)
HGB BLD-MCNC: 11.4 G/DL (ref 12–16)
HGB BLD-MCNC: 11.5 G/DL (ref 12–16)
HGB BLD-MCNC: 13 G/DL (ref 12–16)
HGB BLD-MCNC: 14.7 G/DL (ref 12–16)
HGB BLD-MCNC: 8.2 G/DL (ref 12–16)
HGB BLD-MCNC: 8.3 G/DL (ref 12–16)
HGB BLD-MCNC: 8.4 G/DL (ref 12–16)
HGB BLD-MCNC: 8.8 G/DL (ref 12–16)
HGB BLD-MCNC: 8.8 G/DL (ref 12–16)
HGB BLD-MCNC: 9 G/DL (ref 12–16)
HGB BLD-MCNC: 9.1 G/DL (ref 12–16)
HGB BLD-MCNC: 9.2 G/DL (ref 12–16)
HGB BLD-MCNC: 9.4 G/DL (ref 12–16)
HGB BLD-MCNC: 9.5 G/DL (ref 12–16)
HGB BLD-MCNC: 9.5 G/DL (ref 12–16)
HGB BLD-MCNC: 9.6 G/DL (ref 12–16)
HGB BLD-MCNC: 9.7 G/DL (ref 12–16)
HGB BLD-MCNC: 9.8 G/DL (ref 12–16)
HGB BLD-MCNC: 9.9 G/DL (ref 12–16)
HGB BLD-MCNC: 9.9 G/DL (ref 12–16)
HGB UR QL STRIP: ABNORMAL
HGB UR QL STRIP: ABNORMAL
HGB UR QL STRIP: NEGATIVE
HGB UR QL STRIP: NEGATIVE
HYALINE CASTS UR QL AUTO: 7 /LPF
IMM GRANULOCYTES # BLD AUTO: 0.01 K/UL (ref 0–0.04)
IMM GRANULOCYTES # BLD AUTO: 0.02 K/UL (ref 0–0.04)
IMM GRANULOCYTES # BLD AUTO: 0.03 K/UL (ref 0–0.04)
IMM GRANULOCYTES # BLD AUTO: 0.04 K/UL (ref 0–0.04)
IMM GRANULOCYTES # BLD AUTO: 0.05 K/UL (ref 0–0.04)
IMM GRANULOCYTES # BLD AUTO: 0.05 K/UL (ref 0–0.04)
IMM GRANULOCYTES # BLD AUTO: 0.07 K/UL (ref 0–0.04)
IMM GRANULOCYTES # BLD AUTO: 0.08 K/UL (ref 0–0.04)
IMM GRANULOCYTES # BLD AUTO: 0.14 K/UL (ref 0–0.04)
IMM GRANULOCYTES # BLD AUTO: 0.18 K/UL (ref 0–0.04)
IMM GRANULOCYTES NFR BLD AUTO: 0.2 % (ref 0–0.5)
IMM GRANULOCYTES NFR BLD AUTO: 0.3 % (ref 0–0.5)
IMM GRANULOCYTES NFR BLD AUTO: 0.4 % (ref 0–0.5)
IMM GRANULOCYTES NFR BLD AUTO: 0.5 % (ref 0–0.5)
IMM GRANULOCYTES NFR BLD AUTO: 0.6 % (ref 0–0.5)
IMM GRANULOCYTES NFR BLD AUTO: 0.7 % (ref 0–0.5)
IMM GRANULOCYTES NFR BLD AUTO: 0.8 % (ref 0–0.5)
IMM GRANULOCYTES NFR BLD AUTO: 0.9 % (ref 0–0.5)
IMM GRANULOCYTES NFR BLD AUTO: 1 % (ref 0–0.5)
IMM GRANULOCYTES NFR BLD AUTO: 1.2 % (ref 0–0.5)
IMM GRANULOCYTES NFR BLD AUTO: 1.2 % (ref 0–0.5)
IMM GRANULOCYTES NFR BLD AUTO: 1.3 % (ref 0–0.5)
IMM GRANULOCYTES NFR BLD AUTO: 1.8 % (ref 0–0.5)
INFLUENZA A, MOLECULAR: NEGATIVE
INFLUENZA B, MOLECULAR: NEGATIVE
INR PPP: 1 (ref 0.8–1.2)
INR PPP: 1.1 (ref 0.8–1.2)
INR PPP: 1.2 (ref 0.8–1.2)
INR PPP: 1.3 (ref 0.8–1.2)
INR PPP: 1.4 (ref 0.8–1.2)
INR PPP: 1.4 (ref 0.8–1.2)
INR PPP: 1.5 (ref 0.8–1.2)
INR PPP: 1.5 (ref 0.8–1.2)
INR PPP: 1.6 (ref 0.8–1.2)
INR PPP: 1.7 (ref 0.8–1.2)
INR PPP: 1.8 (ref 0.8–1.2)
INR PPP: 1.9 (ref 0.8–1.2)
INR PPP: 2 (ref 0.8–1.2)
INR PPP: 2 (ref 0.8–1.2)
INR PPP: 2.1 (ref 0.8–1.2)
INR PPP: 2.2 (ref 0.8–1.2)
INR PPP: 2.3 (ref 0.8–1.2)
INR PPP: 2.7 (ref 0.8–1.2)
INR PPP: 3.3 (ref 0.8–1.2)
INR PPP: 3.7 (ref 0.8–1.2)
INR PPP: 3.8 (ref 0.8–1.2)
INR PPP: 4 (ref 0.8–1.2)
INR PPP: NORMAL (ref 0.8–1.2)
INTERVENTRICULAR SEPTUM: 0.75 CM (ref 0.6–1.1)
IRON SERPL-MCNC: 54 UG/DL (ref 30–160)
IVRT: 0.11 MSEC
KETONES UR QL STRIP: ABNORMAL
KETONES UR QL STRIP: NEGATIVE
L PNEUMO AG UR QL IA: NOT DETECTED
LA MAJOR: 6.33 CM
LA MINOR: 6.42 CM
LA WIDTH: 4.27 CM
LACTATE SERPL-SCNC: 1 MMOL/L (ref 0.5–2.2)
LACTATE SERPL-SCNC: 1.1 MMOL/L (ref 0.5–2.2)
LACTATE SERPL-SCNC: 1.1 MMOL/L (ref 0.5–2.2)
LACTATE SERPL-SCNC: 1.8 MMOL/L (ref 0.5–2.2)
LACTATE SERPL-SCNC: 2.3 MMOL/L (ref 0.5–2.2)
LACTATE SERPL-SCNC: 2.4 MMOL/L (ref 0.5–2.2)
LACTATE SERPL-SCNC: 2.4 MMOL/L (ref 0.5–2.2)
LACTATE SERPL-SCNC: 2.5 MMOL/L (ref 0.5–2.2)
LACTATE SERPL-SCNC: 2.7 MMOL/L (ref 0.5–2.2)
LACTATE SERPL-SCNC: 2.7 MMOL/L (ref 0.5–2.2)
LACTATE SERPL-SCNC: 2.8 MMOL/L (ref 0.5–2.2)
LACTATE SERPL-SCNC: 3.3 MMOL/L (ref 0.5–2.2)
LACTATE SERPL-SCNC: 3.3 MMOL/L (ref 0.5–2.2)
LACTATE SERPL-SCNC: 4.2 MMOL/L (ref 0.5–2.2)
LACTATE SERPL-SCNC: 4.5 MMOL/L (ref 0.5–2.2)
LACTATE SERPL-SCNC: 4.8 MMOL/L (ref 0.5–2.2)
LACTATE SERPL-SCNC: 4.9 MMOL/L (ref 0.5–2.2)
LACTATE SERPL-SCNC: 5.7 MMOL/L (ref 0.5–2.2)
LDH SERPL L TO P-CCNC: 2.47 MMOL/L (ref 0.5–2.2)
LDH SERPL L TO P-CCNC: 326 U/L (ref 110–260)
LDH SERPL L TO P-CCNC: 574 U/L (ref 110–260)
LEFT ATRIUM SIZE: 3.96 CM
LEFT ATRIUM VOLUME INDEX: 51.2 ML/M2
LEFT ATRIUM VOLUME: 91.62 CM3
LEFT INTERNAL DIMENSION IN SYSTOLE: 2.78 CM (ref 2.1–4)
LEFT VENTRICLE DIASTOLIC VOLUME INDEX: 39.07 ML/M2
LEFT VENTRICLE DIASTOLIC VOLUME: 69.93 ML
LEFT VENTRICLE MASS INDEX: 50 G/M2
LEFT VENTRICLE SYSTOLIC VOLUME INDEX: 16.2 ML/M2
LEFT VENTRICLE SYSTOLIC VOLUME: 29.01 ML
LEFT VENTRICULAR INTERNAL DIMENSION IN DIASTOLE: 4 CM (ref 3.5–6)
LEFT VENTRICULAR MASS: 89.59 G
LEUKOCYTE ESTERASE UR QL STRIP: ABNORMAL
LIPASE SERPL-CCNC: 14 U/L (ref 4–60)
LIPASE SERPL-CCNC: 17 U/L (ref 4–60)
LIPASE SERPL-CCNC: 8 U/L (ref 4–60)
LV LATERAL E/E' RATIO: 7.89 M/S
LV SEPTAL E/E' RATIO: 10.14 M/S
LYMPHOCYTES # BLD AUTO: 0.5 K/UL (ref 1–4.8)
LYMPHOCYTES # BLD AUTO: 0.6 K/UL (ref 1–4.8)
LYMPHOCYTES # BLD AUTO: 0.7 K/UL (ref 1–4.8)
LYMPHOCYTES # BLD AUTO: 0.8 K/UL (ref 1–4.8)
LYMPHOCYTES # BLD AUTO: 0.9 K/UL (ref 1–4.8)
LYMPHOCYTES # BLD AUTO: 1 K/UL (ref 1–4.8)
LYMPHOCYTES # BLD AUTO: 1.1 K/UL (ref 1–4.8)
LYMPHOCYTES # BLD AUTO: 1.2 K/UL (ref 1–4.8)
LYMPHOCYTES # BLD AUTO: 1.3 K/UL (ref 1–4.8)
LYMPHOCYTES # BLD AUTO: 1.4 K/UL (ref 1–4.8)
LYMPHOCYTES # BLD AUTO: 1.5 K/UL (ref 1–4.8)
LYMPHOCYTES # BLD AUTO: 1.6 K/UL (ref 1–4.8)
LYMPHOCYTES # BLD AUTO: 1.6 K/UL (ref 1–4.8)
LYMPHOCYTES # BLD AUTO: 1.7 K/UL (ref 1–4.8)
LYMPHOCYTES # BLD AUTO: 1.9 K/UL (ref 1–4.8)
LYMPHOCYTES # BLD AUTO: 2 K/UL (ref 1–4.8)
LYMPHOCYTES # BLD AUTO: 2 K/UL (ref 1–4.8)
LYMPHOCYTES # BLD AUTO: 2.2 K/UL (ref 1–4.8)
LYMPHOCYTES # BLD AUTO: 2.3 K/UL (ref 1–4.8)
LYMPHOCYTES NFR BLD: 12.3 % (ref 18–48)
LYMPHOCYTES NFR BLD: 12.5 % (ref 18–48)
LYMPHOCYTES NFR BLD: 13.6 % (ref 18–48)
LYMPHOCYTES NFR BLD: 13.7 % (ref 18–48)
LYMPHOCYTES NFR BLD: 15 % (ref 18–48)
LYMPHOCYTES NFR BLD: 15.4 % (ref 18–48)
LYMPHOCYTES NFR BLD: 15.5 % (ref 18–48)
LYMPHOCYTES NFR BLD: 15.5 % (ref 18–48)
LYMPHOCYTES NFR BLD: 15.7 % (ref 18–48)
LYMPHOCYTES NFR BLD: 15.8 % (ref 18–48)
LYMPHOCYTES NFR BLD: 16.2 % (ref 18–48)
LYMPHOCYTES NFR BLD: 17.2 % (ref 18–48)
LYMPHOCYTES NFR BLD: 17.4 % (ref 18–48)
LYMPHOCYTES NFR BLD: 17.5 % (ref 18–48)
LYMPHOCYTES NFR BLD: 17.6 % (ref 18–48)
LYMPHOCYTES NFR BLD: 17.9 % (ref 18–48)
LYMPHOCYTES NFR BLD: 18.5 % (ref 18–48)
LYMPHOCYTES NFR BLD: 18.9 % (ref 18–48)
LYMPHOCYTES NFR BLD: 19.3 % (ref 18–48)
LYMPHOCYTES NFR BLD: 19.5 % (ref 18–48)
LYMPHOCYTES NFR BLD: 19.6 % (ref 18–48)
LYMPHOCYTES NFR BLD: 19.8 % (ref 18–48)
LYMPHOCYTES NFR BLD: 20.4 % (ref 18–48)
LYMPHOCYTES NFR BLD: 20.4 % (ref 18–48)
LYMPHOCYTES NFR BLD: 21.4 % (ref 18–48)
LYMPHOCYTES NFR BLD: 21.5 % (ref 18–48)
LYMPHOCYTES NFR BLD: 21.7 % (ref 18–48)
LYMPHOCYTES NFR BLD: 22.8 % (ref 18–48)
LYMPHOCYTES NFR BLD: 24.2 % (ref 18–48)
LYMPHOCYTES NFR BLD: 24.2 % (ref 18–48)
LYMPHOCYTES NFR BLD: 24.6 % (ref 18–48)
LYMPHOCYTES NFR BLD: 24.6 % (ref 18–48)
LYMPHOCYTES NFR BLD: 26 % (ref 18–48)
LYMPHOCYTES NFR BLD: 26.2 % (ref 18–48)
LYMPHOCYTES NFR BLD: 27.2 % (ref 18–48)
LYMPHOCYTES NFR BLD: 27.4 % (ref 18–48)
LYMPHOCYTES NFR BLD: 29.5 % (ref 18–48)
LYMPHOCYTES NFR BLD: 30.4 % (ref 18–48)
LYMPHOCYTES NFR BLD: 30.5 % (ref 18–48)
LYMPHOCYTES NFR BLD: 32.8 % (ref 18–48)
LYMPHOCYTES NFR BLD: 34.1 % (ref 18–48)
LYMPHOCYTES NFR BLD: 34.1 % (ref 18–48)
LYMPHOCYTES NFR BLD: 35.1 % (ref 18–48)
LYMPHOCYTES NFR BLD: 35.3 % (ref 18–48)
LYMPHOCYTES NFR BLD: 5.2 % (ref 18–48)
LYMPHOCYTES NFR BLD: 7.2 % (ref 18–48)
LYMPHOCYTES NFR BLD: 7.4 % (ref 18–48)
MAGNESIUM SERPL-MCNC: 0.8 MG/DL (ref 1.6–2.6)
MAGNESIUM SERPL-MCNC: 0.9 MG/DL (ref 1.6–2.6)
MAGNESIUM SERPL-MCNC: 1 MG/DL (ref 1.6–2.6)
MAGNESIUM SERPL-MCNC: 1.1 MG/DL (ref 1.6–2.6)
MAGNESIUM SERPL-MCNC: 1.2 MG/DL (ref 1.6–2.6)
MAGNESIUM SERPL-MCNC: 1.3 MG/DL (ref 1.6–2.6)
MAGNESIUM SERPL-MCNC: 1.4 MG/DL (ref 1.6–2.6)
MAGNESIUM SERPL-MCNC: 1.4 MG/DL (ref 1.6–2.6)
MAGNESIUM SERPL-MCNC: 1.5 MG/DL (ref 1.6–2.6)
MAGNESIUM SERPL-MCNC: 1.6 MG/DL (ref 1.6–2.6)
MAGNESIUM SERPL-MCNC: 1.7 MG/DL (ref 1.6–2.6)
MAGNESIUM SERPL-MCNC: 1.8 MG/DL (ref 1.6–2.6)
MAGNESIUM SERPL-MCNC: 1.9 MG/DL (ref 1.6–2.6)
MAGNESIUM SERPL-MCNC: 2 MG/DL (ref 1.6–2.6)
MAGNESIUM SERPL-MCNC: 2.1 MG/DL (ref 1.6–2.6)
MAGNESIUM SERPL-MCNC: 2.3 MG/DL (ref 1.6–2.6)
MAGNESIUM SERPL-MCNC: 2.3 MG/DL (ref 1.6–2.6)
MCH RBC QN AUTO: 28.3 PG (ref 27–31)
MCH RBC QN AUTO: 28.4 PG (ref 27–31)
MCH RBC QN AUTO: 28.5 PG (ref 27–31)
MCH RBC QN AUTO: 28.6 PG (ref 27–31)
MCH RBC QN AUTO: 28.7 PG (ref 27–31)
MCH RBC QN AUTO: 28.8 PG (ref 27–31)
MCH RBC QN AUTO: 28.8 PG (ref 27–31)
MCH RBC QN AUTO: 28.9 PG (ref 27–31)
MCH RBC QN AUTO: 28.9 PG (ref 27–31)
MCH RBC QN AUTO: 29.1 PG (ref 27–31)
MCH RBC QN AUTO: 29.2 PG (ref 27–31)
MCH RBC QN AUTO: 29.3 PG (ref 27–31)
MCH RBC QN AUTO: 29.4 PG (ref 27–31)
MCH RBC QN AUTO: 29.4 PG (ref 27–31)
MCH RBC QN AUTO: 29.5 PG (ref 27–31)
MCH RBC QN AUTO: 29.6 PG (ref 27–31)
MCH RBC QN AUTO: 29.6 PG (ref 27–31)
MCH RBC QN AUTO: 29.7 PG (ref 27–31)
MCH RBC QN AUTO: 29.8 PG (ref 27–31)
MCH RBC QN AUTO: 29.8 PG (ref 27–31)
MCH RBC QN AUTO: 29.9 PG (ref 27–31)
MCH RBC QN AUTO: 30.1 PG (ref 27–31)
MCH RBC QN AUTO: 30.1 PG (ref 27–31)
MCH RBC QN AUTO: 30.2 PG (ref 27–31)
MCH RBC QN AUTO: 30.3 PG (ref 27–31)
MCH RBC QN AUTO: 30.4 PG (ref 27–31)
MCH RBC QN AUTO: 30.5 PG (ref 27–31)
MCH RBC QN AUTO: 30.5 PG (ref 27–31)
MCH RBC QN AUTO: 30.6 PG (ref 27–31)
MCHC RBC AUTO-ENTMCNC: 28.5 G/DL (ref 32–36)
MCHC RBC AUTO-ENTMCNC: 29.3 G/DL (ref 32–36)
MCHC RBC AUTO-ENTMCNC: 29.3 G/DL (ref 32–36)
MCHC RBC AUTO-ENTMCNC: 29.4 G/DL (ref 32–36)
MCHC RBC AUTO-ENTMCNC: 29.4 G/DL (ref 32–36)
MCHC RBC AUTO-ENTMCNC: 29.5 G/DL (ref 32–36)
MCHC RBC AUTO-ENTMCNC: 29.5 G/DL (ref 32–36)
MCHC RBC AUTO-ENTMCNC: 29.6 G/DL (ref 32–36)
MCHC RBC AUTO-ENTMCNC: 29.6 G/DL (ref 32–36)
MCHC RBC AUTO-ENTMCNC: 29.8 G/DL (ref 32–36)
MCHC RBC AUTO-ENTMCNC: 29.9 G/DL (ref 32–36)
MCHC RBC AUTO-ENTMCNC: 30 G/DL (ref 32–36)
MCHC RBC AUTO-ENTMCNC: 30 G/DL (ref 32–36)
MCHC RBC AUTO-ENTMCNC: 30.1 G/DL (ref 32–36)
MCHC RBC AUTO-ENTMCNC: 30.2 G/DL (ref 32–36)
MCHC RBC AUTO-ENTMCNC: 30.3 G/DL (ref 32–36)
MCHC RBC AUTO-ENTMCNC: 30.3 G/DL (ref 32–36)
MCHC RBC AUTO-ENTMCNC: 30.4 G/DL (ref 32–36)
MCHC RBC AUTO-ENTMCNC: 30.4 G/DL (ref 32–36)
MCHC RBC AUTO-ENTMCNC: 30.5 G/DL (ref 32–36)
MCHC RBC AUTO-ENTMCNC: 30.5 G/DL (ref 32–36)
MCHC RBC AUTO-ENTMCNC: 30.6 G/DL (ref 32–36)
MCHC RBC AUTO-ENTMCNC: 30.6 G/DL (ref 32–36)
MCHC RBC AUTO-ENTMCNC: 30.7 G/DL (ref 32–36)
MCHC RBC AUTO-ENTMCNC: 30.8 G/DL (ref 32–36)
MCHC RBC AUTO-ENTMCNC: 30.8 G/DL (ref 32–36)
MCHC RBC AUTO-ENTMCNC: 30.9 G/DL (ref 32–36)
MCHC RBC AUTO-ENTMCNC: 31 G/DL (ref 32–36)
MCHC RBC AUTO-ENTMCNC: 31.1 G/DL (ref 32–36)
MCHC RBC AUTO-ENTMCNC: 31.3 G/DL (ref 32–36)
MCHC RBC AUTO-ENTMCNC: 31.3 G/DL (ref 32–36)
MCHC RBC AUTO-ENTMCNC: 31.4 G/DL (ref 32–36)
MCHC RBC AUTO-ENTMCNC: 31.5 G/DL (ref 32–36)
MCHC RBC AUTO-ENTMCNC: 31.5 G/DL (ref 32–36)
MCHC RBC AUTO-ENTMCNC: 31.6 G/DL (ref 32–36)
MCHC RBC AUTO-ENTMCNC: 31.7 G/DL (ref 32–36)
MCHC RBC AUTO-ENTMCNC: 32.1 G/DL (ref 32–36)
MCHC RBC AUTO-ENTMCNC: 32.2 G/DL (ref 32–36)
MCHC RBC AUTO-ENTMCNC: 32.5 G/DL (ref 32–36)
MCHC RBC AUTO-ENTMCNC: 32.8 G/DL (ref 32–36)
MCHC RBC AUTO-ENTMCNC: 33.1 G/DL (ref 32–36)
MCHC RBC AUTO-ENTMCNC: 33.2 G/DL (ref 32–36)
MCHC RBC AUTO-ENTMCNC: 33.2 G/DL (ref 32–36)
MCHC RBC AUTO-ENTMCNC: 33.3 G/DL (ref 32–36)
MCV RBC AUTO: 100 FL (ref 82–98)
MCV RBC AUTO: 100 FL (ref 82–98)
MCV RBC AUTO: 101 FL (ref 82–98)
MCV RBC AUTO: 101 FL (ref 82–98)
MCV RBC AUTO: 102 FL (ref 82–98)
MCV RBC AUTO: 85 FL (ref 82–98)
MCV RBC AUTO: 87 FL (ref 82–98)
MCV RBC AUTO: 88 FL (ref 82–98)
MCV RBC AUTO: 89 FL (ref 82–98)
MCV RBC AUTO: 89 FL (ref 82–98)
MCV RBC AUTO: 90 FL (ref 82–98)
MCV RBC AUTO: 92 FL (ref 82–98)
MCV RBC AUTO: 93 FL (ref 82–98)
MCV RBC AUTO: 94 FL (ref 82–98)
MCV RBC AUTO: 95 FL (ref 82–98)
MCV RBC AUTO: 95 FL (ref 82–98)
MCV RBC AUTO: 96 FL (ref 82–98)
MCV RBC AUTO: 97 FL (ref 82–98)
MCV RBC AUTO: 98 FL (ref 82–98)
MCV RBC AUTO: 99 FL (ref 82–98)
MICROSCOPIC COMMENT: ABNORMAL
MODE: ABNORMAL
MONOCYTES # BLD AUTO: 0.2 K/UL (ref 0.3–1)
MONOCYTES # BLD AUTO: 0.3 K/UL (ref 0.3–1)
MONOCYTES # BLD AUTO: 0.4 K/UL (ref 0.3–1)
MONOCYTES # BLD AUTO: 0.5 K/UL (ref 0.3–1)
MONOCYTES # BLD AUTO: 0.6 K/UL (ref 0.3–1)
MONOCYTES # BLD AUTO: 0.7 K/UL (ref 0.3–1)
MONOCYTES NFR BLD: 1.4 % (ref 4–15)
MONOCYTES NFR BLD: 10 % (ref 4–15)
MONOCYTES NFR BLD: 10.2 % (ref 4–15)
MONOCYTES NFR BLD: 10.9 % (ref 4–15)
MONOCYTES NFR BLD: 10.9 % (ref 4–15)
MONOCYTES NFR BLD: 2.3 % (ref 4–15)
MONOCYTES NFR BLD: 2.4 % (ref 4–15)
MONOCYTES NFR BLD: 5.2 % (ref 4–15)
MONOCYTES NFR BLD: 5.6 % (ref 4–15)
MONOCYTES NFR BLD: 5.7 % (ref 4–15)
MONOCYTES NFR BLD: 6.1 % (ref 4–15)
MONOCYTES NFR BLD: 6.2 % (ref 4–15)
MONOCYTES NFR BLD: 6.3 % (ref 4–15)
MONOCYTES NFR BLD: 6.3 % (ref 4–15)
MONOCYTES NFR BLD: 6.4 % (ref 4–15)
MONOCYTES NFR BLD: 6.5 % (ref 4–15)
MONOCYTES NFR BLD: 6.5 % (ref 4–15)
MONOCYTES NFR BLD: 6.7 % (ref 4–15)
MONOCYTES NFR BLD: 6.8 % (ref 4–15)
MONOCYTES NFR BLD: 6.9 % (ref 4–15)
MONOCYTES NFR BLD: 7.1 % (ref 4–15)
MONOCYTES NFR BLD: 7.4 % (ref 4–15)
MONOCYTES NFR BLD: 7.5 % (ref 4–15)
MONOCYTES NFR BLD: 7.7 % (ref 4–15)
MONOCYTES NFR BLD: 7.9 % (ref 4–15)
MONOCYTES NFR BLD: 8 % (ref 4–15)
MONOCYTES NFR BLD: 8.1 % (ref 4–15)
MONOCYTES NFR BLD: 8.1 % (ref 4–15)
MONOCYTES NFR BLD: 8.3 % (ref 4–15)
MONOCYTES NFR BLD: 8.4 % (ref 4–15)
MONOCYTES NFR BLD: 8.5 % (ref 4–15)
MONOCYTES NFR BLD: 8.6 % (ref 4–15)
MONOCYTES NFR BLD: 8.7 % (ref 4–15)
MONOCYTES NFR BLD: 9 % (ref 4–15)
MONOCYTES NFR BLD: 9.1 % (ref 4–15)
MONOCYTES NFR BLD: 9.1 % (ref 4–15)
MONOCYTES NFR BLD: 9.4 % (ref 4–15)
MONOCYTES NFR BLD: 9.7 % (ref 4–15)
MONOCYTES NFR BLD: 9.8 % (ref 4–15)
MV PEAK E VEL: 0.71 M/S
NEUTROPHILS # BLD AUTO: 1.8 K/UL (ref 1.8–7.7)
NEUTROPHILS # BLD AUTO: 14 K/UL (ref 1.8–7.7)
NEUTROPHILS # BLD AUTO: 2.2 K/UL (ref 1.8–7.7)
NEUTROPHILS # BLD AUTO: 2.4 K/UL (ref 1.8–7.7)
NEUTROPHILS # BLD AUTO: 2.5 K/UL (ref 1.8–7.7)
NEUTROPHILS # BLD AUTO: 2.6 K/UL (ref 1.8–7.7)
NEUTROPHILS # BLD AUTO: 2.6 K/UL (ref 1.8–7.7)
NEUTROPHILS # BLD AUTO: 2.7 K/UL (ref 1.8–7.7)
NEUTROPHILS # BLD AUTO: 2.9 K/UL (ref 1.8–7.7)
NEUTROPHILS # BLD AUTO: 2.9 K/UL (ref 1.8–7.7)
NEUTROPHILS # BLD AUTO: 3 K/UL (ref 1.8–7.7)
NEUTROPHILS # BLD AUTO: 3.1 K/UL (ref 1.8–7.7)
NEUTROPHILS # BLD AUTO: 3.2 K/UL (ref 1.8–7.7)
NEUTROPHILS # BLD AUTO: 3.2 K/UL (ref 1.8–7.7)
NEUTROPHILS # BLD AUTO: 3.3 K/UL (ref 1.8–7.7)
NEUTROPHILS # BLD AUTO: 3.4 K/UL (ref 1.8–7.7)
NEUTROPHILS # BLD AUTO: 3.5 K/UL (ref 1.8–7.7)
NEUTROPHILS # BLD AUTO: 3.6 K/UL (ref 1.8–7.7)
NEUTROPHILS # BLD AUTO: 3.8 K/UL (ref 1.8–7.7)
NEUTROPHILS # BLD AUTO: 3.8 K/UL (ref 1.8–7.7)
NEUTROPHILS # BLD AUTO: 4.2 K/UL (ref 1.8–7.7)
NEUTROPHILS # BLD AUTO: 4.5 K/UL (ref 1.8–7.7)
NEUTROPHILS # BLD AUTO: 4.5 K/UL (ref 1.8–7.7)
NEUTROPHILS # BLD AUTO: 4.7 K/UL (ref 1.8–7.7)
NEUTROPHILS # BLD AUTO: 4.7 K/UL (ref 1.8–7.7)
NEUTROPHILS # BLD AUTO: 4.8 K/UL (ref 1.8–7.7)
NEUTROPHILS # BLD AUTO: 4.9 K/UL (ref 1.8–7.7)
NEUTROPHILS # BLD AUTO: 5.1 K/UL (ref 1.8–7.7)
NEUTROPHILS # BLD AUTO: 5.1 K/UL (ref 1.8–7.7)
NEUTROPHILS # BLD AUTO: 5.7 K/UL (ref 1.8–7.7)
NEUTROPHILS # BLD AUTO: 5.8 K/UL (ref 1.8–7.7)
NEUTROPHILS # BLD AUTO: 5.9 K/UL (ref 1.8–7.7)
NEUTROPHILS # BLD AUTO: 6 K/UL (ref 1.8–7.7)
NEUTROPHILS # BLD AUTO: 6.2 K/UL (ref 1.8–7.7)
NEUTROPHILS # BLD AUTO: 7.1 K/UL (ref 1.8–7.7)
NEUTROPHILS # BLD AUTO: 9.6 K/UL (ref 1.8–7.7)
NEUTROPHILS NFR BLD: 47.2 % (ref 38–73)
NEUTROPHILS NFR BLD: 48.4 % (ref 38–73)
NEUTROPHILS NFR BLD: 50.2 % (ref 38–73)
NEUTROPHILS NFR BLD: 50.3 % (ref 38–73)
NEUTROPHILS NFR BLD: 51.3 % (ref 38–73)
NEUTROPHILS NFR BLD: 52.1 % (ref 38–73)
NEUTROPHILS NFR BLD: 52.6 % (ref 38–73)
NEUTROPHILS NFR BLD: 52.7 % (ref 38–73)
NEUTROPHILS NFR BLD: 55 % (ref 38–73)
NEUTROPHILS NFR BLD: 55.4 % (ref 38–73)
NEUTROPHILS NFR BLD: 55.6 % (ref 38–73)
NEUTROPHILS NFR BLD: 59.7 % (ref 38–73)
NEUTROPHILS NFR BLD: 60.9 % (ref 38–73)
NEUTROPHILS NFR BLD: 61 % (ref 38–73)
NEUTROPHILS NFR BLD: 61.8 % (ref 38–73)
NEUTROPHILS NFR BLD: 62.7 % (ref 38–73)
NEUTROPHILS NFR BLD: 63.2 % (ref 38–73)
NEUTROPHILS NFR BLD: 63.4 % (ref 38–73)
NEUTROPHILS NFR BLD: 64.3 % (ref 38–73)
NEUTROPHILS NFR BLD: 64.7 % (ref 38–73)
NEUTROPHILS NFR BLD: 65.2 % (ref 38–73)
NEUTROPHILS NFR BLD: 65.6 % (ref 38–73)
NEUTROPHILS NFR BLD: 66.9 % (ref 38–73)
NEUTROPHILS NFR BLD: 67.1 % (ref 38–73)
NEUTROPHILS NFR BLD: 67.2 % (ref 38–73)
NEUTROPHILS NFR BLD: 68.9 % (ref 38–73)
NEUTROPHILS NFR BLD: 68.9 % (ref 38–73)
NEUTROPHILS NFR BLD: 69.9 % (ref 38–73)
NEUTROPHILS NFR BLD: 70.2 % (ref 38–73)
NEUTROPHILS NFR BLD: 70.6 % (ref 38–73)
NEUTROPHILS NFR BLD: 70.7 % (ref 38–73)
NEUTROPHILS NFR BLD: 71 % (ref 38–73)
NEUTROPHILS NFR BLD: 71.4 % (ref 38–73)
NEUTROPHILS NFR BLD: 72.1 % (ref 38–73)
NEUTROPHILS NFR BLD: 72.2 % (ref 38–73)
NEUTROPHILS NFR BLD: 73 % (ref 38–73)
NEUTROPHILS NFR BLD: 73.4 % (ref 38–73)
NEUTROPHILS NFR BLD: 74.1 % (ref 38–73)
NEUTROPHILS NFR BLD: 75.1 % (ref 38–73)
NEUTROPHILS NFR BLD: 75.5 % (ref 38–73)
NEUTROPHILS NFR BLD: 75.7 % (ref 38–73)
NEUTROPHILS NFR BLD: 77.6 % (ref 38–73)
NEUTROPHILS NFR BLD: 78.2 % (ref 38–73)
NEUTROPHILS NFR BLD: 82 % (ref 38–73)
NEUTROPHILS NFR BLD: 89.4 % (ref 38–73)
NEUTROPHILS NFR BLD: 89.5 % (ref 38–73)
NEUTROPHILS NFR BLD: 92.1 % (ref 38–73)
NITRITE UR QL STRIP: NEGATIVE
NON-SQ EPI CELLS #/AREA URNS AUTO: 2 /HPF
NRBC BLD-RTO: 0 /100 WBC
NRBC BLD-RTO: 1 /100 WBC
NRBC BLD-RTO: 1 /100 WBC
OSMOLALITY SERPL: 268 MOSM/KG (ref 275–295)
OSMOLALITY UR: 305 MOSM/KG (ref 50–1200)
OSMOLALITY UR: 474 MOSM/KG (ref 50–1200)
PATHOLOGIST INTERPRETATION SPE: NORMAL
PCO2 BLDA: 38.4 MMHG (ref 35–45)
PCO2 BLDA: 45.8 MMHG (ref 35–45)
PCO2 BLDA: 47.5 MMHG (ref 35–45)
PCO2 BLDA: 57 MMHG (ref 35–45)
PH SMN: 7.28 [PH] (ref 7.35–7.45)
PH SMN: 7.34 [PH] (ref 7.35–7.45)
PH SMN: 7.35 [PH] (ref 7.35–7.45)
PH SMN: 7.5 [PH] (ref 7.35–7.45)
PH UR STRIP: 5 [PH] (ref 5–8)
PHOSPHATE SERPL-MCNC: 1.2 MG/DL (ref 2.7–4.5)
PHOSPHATE SERPL-MCNC: 1.3 MG/DL (ref 2.7–4.5)
PHOSPHATE SERPL-MCNC: 1.3 MG/DL (ref 2.7–4.5)
PHOSPHATE SERPL-MCNC: 1.4 MG/DL (ref 2.7–4.5)
PHOSPHATE SERPL-MCNC: 1.5 MG/DL (ref 2.7–4.5)
PHOSPHATE SERPL-MCNC: 1.6 MG/DL (ref 2.7–4.5)
PHOSPHATE SERPL-MCNC: 1.7 MG/DL (ref 2.7–4.5)
PHOSPHATE SERPL-MCNC: 1.8 MG/DL (ref 2.7–4.5)
PHOSPHATE SERPL-MCNC: 1.9 MG/DL (ref 2.7–4.5)
PHOSPHATE SERPL-MCNC: 2 MG/DL (ref 2.7–4.5)
PHOSPHATE SERPL-MCNC: 2.1 MG/DL (ref 2.7–4.5)
PHOSPHATE SERPL-MCNC: 2.2 MG/DL (ref 2.7–4.5)
PHOSPHATE SERPL-MCNC: 2.2 MG/DL (ref 2.7–4.5)
PHOSPHATE SERPL-MCNC: 2.3 MG/DL (ref 2.7–4.5)
PHOSPHATE SERPL-MCNC: 2.4 MG/DL (ref 2.7–4.5)
PHOSPHATE SERPL-MCNC: 2.5 MG/DL (ref 2.7–4.5)
PHOSPHATE SERPL-MCNC: 2.5 MG/DL (ref 2.7–4.5)
PHOSPHATE SERPL-MCNC: 2.6 MG/DL (ref 2.7–4.5)
PHOSPHATE SERPL-MCNC: 2.7 MG/DL (ref 2.7–4.5)
PHOSPHATE SERPL-MCNC: 2.7 MG/DL (ref 2.7–4.5)
PHOSPHATE SERPL-MCNC: 2.8 MG/DL (ref 2.7–4.5)
PHOSPHATE SERPL-MCNC: 2.9 MG/DL (ref 2.7–4.5)
PHOSPHATE SERPL-MCNC: 3 MG/DL (ref 2.7–4.5)
PHOSPHATE SERPL-MCNC: 3.7 MG/DL (ref 2.7–4.5)
PHOSPHATE SERPL-MCNC: 3.7 MG/DL (ref 2.7–4.5)
PHOSPHATE SERPL-MCNC: 3.9 MG/DL (ref 2.7–4.5)
PHOSPHATE SERPL-MCNC: 4 MG/DL (ref 2.7–4.5)
PISA TR MAX VEL: 2.75 M/S
PLATELET # BLD AUTO: 141 K/UL (ref 150–350)
PLATELET # BLD AUTO: 141 K/UL (ref 150–350)
PLATELET # BLD AUTO: 148 K/UL (ref 150–350)
PLATELET # BLD AUTO: 155 K/UL (ref 150–350)
PLATELET # BLD AUTO: 159 K/UL (ref 150–350)
PLATELET # BLD AUTO: 160 K/UL (ref 150–350)
PLATELET # BLD AUTO: 160 K/UL (ref 150–350)
PLATELET # BLD AUTO: 162 K/UL (ref 150–350)
PLATELET # BLD AUTO: 166 K/UL (ref 150–350)
PLATELET # BLD AUTO: 168 K/UL (ref 150–350)
PLATELET # BLD AUTO: 174 K/UL (ref 150–350)
PLATELET # BLD AUTO: 177 K/UL (ref 150–350)
PLATELET # BLD AUTO: 180 K/UL (ref 150–350)
PLATELET # BLD AUTO: 181 K/UL (ref 150–350)
PLATELET # BLD AUTO: 188 K/UL (ref 150–350)
PLATELET # BLD AUTO: 188 K/UL (ref 150–350)
PLATELET # BLD AUTO: 189 K/UL (ref 150–350)
PLATELET # BLD AUTO: 194 K/UL (ref 150–350)
PLATELET # BLD AUTO: 194 K/UL (ref 150–350)
PLATELET # BLD AUTO: 195 K/UL (ref 150–350)
PLATELET # BLD AUTO: 196 K/UL (ref 150–350)
PLATELET # BLD AUTO: 197 K/UL (ref 150–350)
PLATELET # BLD AUTO: 199 K/UL (ref 150–350)
PLATELET # BLD AUTO: 201 K/UL (ref 150–350)
PLATELET # BLD AUTO: 203 K/UL (ref 150–350)
PLATELET # BLD AUTO: 203 K/UL (ref 150–350)
PLATELET # BLD AUTO: 205 K/UL (ref 150–350)
PLATELET # BLD AUTO: 212 K/UL (ref 150–350)
PLATELET # BLD AUTO: 220 K/UL (ref 150–350)
PLATELET # BLD AUTO: 222 K/UL (ref 150–350)
PLATELET # BLD AUTO: 227 K/UL (ref 150–350)
PLATELET # BLD AUTO: 246 K/UL (ref 150–350)
PLATELET # BLD AUTO: 246 K/UL (ref 150–350)
PLATELET # BLD AUTO: 258 K/UL (ref 150–350)
PLATELET # BLD AUTO: 261 K/UL (ref 150–350)
PLATELET # BLD AUTO: 265 K/UL (ref 150–350)
PLATELET # BLD AUTO: 269 K/UL (ref 150–350)
PLATELET # BLD AUTO: 270 K/UL (ref 150–350)
PLATELET # BLD AUTO: 270 K/UL (ref 150–350)
PLATELET # BLD AUTO: 273 K/UL (ref 150–350)
PLATELET # BLD AUTO: 273 K/UL (ref 150–350)
PLATELET # BLD AUTO: 289 K/UL (ref 150–350)
PLATELET # BLD AUTO: 300 K/UL (ref 150–350)
PLATELET # BLD AUTO: 336 K/UL (ref 150–350)
PLATELET # BLD AUTO: 340 K/UL (ref 150–350)
PLATELET # BLD AUTO: 400 K/UL (ref 150–350)
PLATELET BLD QL SMEAR: ABNORMAL
PMV BLD AUTO: 10 FL (ref 9.2–12.9)
PMV BLD AUTO: 10 FL (ref 9.2–12.9)
PMV BLD AUTO: 10.1 FL (ref 9.2–12.9)
PMV BLD AUTO: 10.1 FL (ref 9.2–12.9)
PMV BLD AUTO: 10.8 FL (ref 9.2–12.9)
PMV BLD AUTO: 11.1 FL (ref 9.2–12.9)
PMV BLD AUTO: 11.4 FL (ref 9.2–12.9)
PMV BLD AUTO: 8.9 FL (ref 9.2–12.9)
PMV BLD AUTO: 9.1 FL (ref 9.2–12.9)
PMV BLD AUTO: 9.2 FL (ref 9.2–12.9)
PMV BLD AUTO: 9.3 FL (ref 9.2–12.9)
PMV BLD AUTO: 9.4 FL (ref 9.2–12.9)
PMV BLD AUTO: 9.5 FL (ref 9.2–12.9)
PMV BLD AUTO: 9.6 FL (ref 9.2–12.9)
PMV BLD AUTO: 9.7 FL (ref 9.2–12.9)
PMV BLD AUTO: 9.8 FL (ref 9.2–12.9)
PMV BLD AUTO: 9.9 FL (ref 9.2–12.9)
PO2 BLDA: 20 MMHG (ref 40–60)
PO2 BLDA: 25 MMHG (ref 40–60)
PO2 BLDA: 41 MMHG (ref 40–60)
PO2 BLDA: 72 MMHG (ref 80–100)
POC BE: 0 MMOL/L
POC BE: 6 MMOL/L
POC SATURATED O2: 29 % (ref 95–100)
POC SATURATED O2: 41 % (ref 95–100)
POC SATURATED O2: 68 % (ref 95–100)
POC SATURATED O2: 96 % (ref 95–100)
POC TCO2: 27 MMOL/L (ref 24–29)
POC TCO2: 27 MMOL/L (ref 24–29)
POC TCO2: 29 MMOL/L (ref 24–29)
POC TCO2: 31 MMOL/L (ref 23–27)
POCT GLUCOSE: 100 MG/DL (ref 70–110)
POCT GLUCOSE: 100 MG/DL (ref 70–110)
POCT GLUCOSE: 101 MG/DL (ref 70–110)
POCT GLUCOSE: 102 MG/DL (ref 70–110)
POCT GLUCOSE: 103 MG/DL (ref 70–110)
POCT GLUCOSE: 104 MG/DL (ref 70–110)
POCT GLUCOSE: 105 MG/DL (ref 70–110)
POCT GLUCOSE: 106 MG/DL (ref 70–110)
POCT GLUCOSE: 106 MG/DL (ref 70–110)
POCT GLUCOSE: 107 MG/DL (ref 70–110)
POCT GLUCOSE: 107 MG/DL (ref 70–110)
POCT GLUCOSE: 108 MG/DL (ref 70–110)
POCT GLUCOSE: 108 MG/DL (ref 70–110)
POCT GLUCOSE: 109 MG/DL (ref 70–110)
POCT GLUCOSE: 111 MG/DL (ref 70–110)
POCT GLUCOSE: 111 MG/DL (ref 70–110)
POCT GLUCOSE: 112 MG/DL (ref 70–110)
POCT GLUCOSE: 114 MG/DL (ref 70–110)
POCT GLUCOSE: 114 MG/DL (ref 70–110)
POCT GLUCOSE: 115 MG/DL (ref 70–110)
POCT GLUCOSE: 115 MG/DL (ref 70–110)
POCT GLUCOSE: 117 MG/DL (ref 70–110)
POCT GLUCOSE: 118 MG/DL (ref 70–110)
POCT GLUCOSE: 118 MG/DL (ref 70–110)
POCT GLUCOSE: 119 MG/DL (ref 70–110)
POCT GLUCOSE: 120 MG/DL (ref 70–110)
POCT GLUCOSE: 122 MG/DL (ref 70–110)
POCT GLUCOSE: 127 MG/DL (ref 70–110)
POCT GLUCOSE: 127 MG/DL (ref 70–110)
POCT GLUCOSE: 128 MG/DL (ref 70–110)
POCT GLUCOSE: 133 MG/DL (ref 70–110)
POCT GLUCOSE: 135 MG/DL (ref 70–110)
POCT GLUCOSE: 137 MG/DL (ref 70–110)
POCT GLUCOSE: 138 MG/DL (ref 70–110)
POCT GLUCOSE: 138 MG/DL (ref 70–110)
POCT GLUCOSE: 139 MG/DL (ref 70–110)
POCT GLUCOSE: 141 MG/DL (ref 70–110)
POCT GLUCOSE: 143 MG/DL (ref 70–110)
POCT GLUCOSE: 148 MG/DL (ref 70–110)
POCT GLUCOSE: 149 MG/DL (ref 70–110)
POCT GLUCOSE: 151 MG/DL (ref 70–110)
POCT GLUCOSE: 155 MG/DL (ref 70–110)
POCT GLUCOSE: 157 MG/DL (ref 70–110)
POCT GLUCOSE: 159 MG/DL (ref 70–110)
POCT GLUCOSE: 160 MG/DL (ref 70–110)
POCT GLUCOSE: 160 MG/DL (ref 70–110)
POCT GLUCOSE: 165 MG/DL (ref 70–110)
POCT GLUCOSE: 170 MG/DL (ref 70–110)
POCT GLUCOSE: 171 MG/DL (ref 70–110)
POCT GLUCOSE: 173 MG/DL (ref 70–110)
POCT GLUCOSE: 178 MG/DL (ref 70–110)
POCT GLUCOSE: 180 MG/DL (ref 70–110)
POCT GLUCOSE: 184 MG/DL (ref 70–110)
POCT GLUCOSE: 187 MG/DL (ref 70–110)
POCT GLUCOSE: 189 MG/DL (ref 70–110)
POCT GLUCOSE: 193 MG/DL (ref 70–110)
POCT GLUCOSE: 198 MG/DL (ref 70–110)
POCT GLUCOSE: 199 MG/DL (ref 70–110)
POCT GLUCOSE: 203 MG/DL (ref 70–110)
POCT GLUCOSE: 207 MG/DL (ref 70–110)
POCT GLUCOSE: 208 MG/DL (ref 70–110)
POCT GLUCOSE: 216 MG/DL (ref 70–110)
POCT GLUCOSE: 217 MG/DL (ref 70–110)
POCT GLUCOSE: 226 MG/DL (ref 70–110)
POCT GLUCOSE: 235 MG/DL (ref 70–110)
POCT GLUCOSE: 235 MG/DL (ref 70–110)
POCT GLUCOSE: 289 MG/DL (ref 70–110)
POCT GLUCOSE: 318 MG/DL (ref 70–110)
POCT GLUCOSE: 323 MG/DL (ref 70–110)
POCT GLUCOSE: 444 MG/DL (ref 70–110)
POCT GLUCOSE: 55 MG/DL (ref 70–110)
POCT GLUCOSE: 55 MG/DL (ref 70–110)
POCT GLUCOSE: 56 MG/DL (ref 70–110)
POCT GLUCOSE: 59 MG/DL (ref 70–110)
POCT GLUCOSE: 62 MG/DL (ref 70–110)
POCT GLUCOSE: 62 MG/DL (ref 70–110)
POCT GLUCOSE: 64 MG/DL (ref 70–110)
POCT GLUCOSE: 65 MG/DL (ref 70–110)
POCT GLUCOSE: 66 MG/DL (ref 70–110)
POCT GLUCOSE: 66 MG/DL (ref 70–110)
POCT GLUCOSE: 67 MG/DL (ref 70–110)
POCT GLUCOSE: 69 MG/DL (ref 70–110)
POCT GLUCOSE: 70 MG/DL (ref 70–110)
POCT GLUCOSE: 70 MG/DL (ref 70–110)
POCT GLUCOSE: 71 MG/DL (ref 70–110)
POCT GLUCOSE: 72 MG/DL (ref 70–110)
POCT GLUCOSE: 73 MG/DL (ref 70–110)
POCT GLUCOSE: 74 MG/DL (ref 70–110)
POCT GLUCOSE: 74 MG/DL (ref 70–110)
POCT GLUCOSE: 75 MG/DL (ref 70–110)
POCT GLUCOSE: 76 MG/DL (ref 70–110)
POCT GLUCOSE: 77 MG/DL (ref 70–110)
POCT GLUCOSE: 77 MG/DL (ref 70–110)
POCT GLUCOSE: 78 MG/DL (ref 70–110)
POCT GLUCOSE: 79 MG/DL (ref 70–110)
POCT GLUCOSE: 80 MG/DL (ref 70–110)
POCT GLUCOSE: 81 MG/DL (ref 70–110)
POCT GLUCOSE: 82 MG/DL (ref 70–110)
POCT GLUCOSE: 83 MG/DL (ref 70–110)
POCT GLUCOSE: 84 MG/DL (ref 70–110)
POCT GLUCOSE: 86 MG/DL (ref 70–110)
POCT GLUCOSE: 87 MG/DL (ref 70–110)
POCT GLUCOSE: 88 MG/DL (ref 70–110)
POCT GLUCOSE: 89 MG/DL (ref 70–110)
POCT GLUCOSE: 91 MG/DL (ref 70–110)
POCT GLUCOSE: 93 MG/DL (ref 70–110)
POCT GLUCOSE: 94 MG/DL (ref 70–110)
POCT GLUCOSE: 94 MG/DL (ref 70–110)
POCT GLUCOSE: 95 MG/DL (ref 70–110)
POCT GLUCOSE: 96 MG/DL (ref 70–110)
POCT GLUCOSE: 96 MG/DL (ref 70–110)
POCT GLUCOSE: 97 MG/DL (ref 70–110)
POCT GLUCOSE: 98 MG/DL (ref 70–110)
POCT GLUCOSE: 98 MG/DL (ref 70–110)
POCT GLUCOSE: 99 MG/DL (ref 70–110)
POTASSIUM SERPL-SCNC: 2.5 MMOL/L (ref 3.5–5.1)
POTASSIUM SERPL-SCNC: 2.6 MMOL/L (ref 3.5–5.1)
POTASSIUM SERPL-SCNC: 2.8 MMOL/L (ref 3.5–5.1)
POTASSIUM SERPL-SCNC: 2.9 MMOL/L (ref 3.5–5.1)
POTASSIUM SERPL-SCNC: 3 MMOL/L (ref 3.5–5.1)
POTASSIUM SERPL-SCNC: 3 MMOL/L (ref 3.5–5.1)
POTASSIUM SERPL-SCNC: 3.1 MMOL/L (ref 3.5–5.1)
POTASSIUM SERPL-SCNC: 3.2 MMOL/L (ref 3.5–5.1)
POTASSIUM SERPL-SCNC: 3.4 MMOL/L (ref 3.5–5.1)
POTASSIUM SERPL-SCNC: 3.5 MMOL/L (ref 3.5–5.1)
POTASSIUM SERPL-SCNC: 3.6 MMOL/L (ref 3.5–5.1)
POTASSIUM SERPL-SCNC: 3.6 MMOL/L (ref 3.5–5.1)
POTASSIUM SERPL-SCNC: 3.7 MMOL/L (ref 3.5–5.1)
POTASSIUM SERPL-SCNC: 3.8 MMOL/L (ref 3.5–5.1)
POTASSIUM SERPL-SCNC: 3.9 MMOL/L (ref 3.5–5.1)
POTASSIUM SERPL-SCNC: 4 MMOL/L (ref 3.5–5.1)
POTASSIUM SERPL-SCNC: 4.1 MMOL/L (ref 3.5–5.1)
POTASSIUM SERPL-SCNC: 4.2 MMOL/L (ref 3.5–5.1)
POTASSIUM SERPL-SCNC: 4.3 MMOL/L (ref 3.5–5.1)
POTASSIUM SERPL-SCNC: 4.4 MMOL/L (ref 3.5–5.1)
POTASSIUM SERPL-SCNC: 4.6 MMOL/L (ref 3.5–5.1)
POTASSIUM SERPL-SCNC: 4.7 MMOL/L (ref 3.5–5.1)
POTASSIUM SERPL-SCNC: 4.8 MMOL/L (ref 3.5–5.1)
POTASSIUM SERPL-SCNC: 5 MMOL/L (ref 3.5–5.1)
POTASSIUM SERPL-SCNC: 5.1 MMOL/L (ref 3.5–5.1)
POTASSIUM SERPL-SCNC: NORMAL MMOL/L (ref 3.5–5.1)
PROCALCITONIN SERPL IA-MCNC: 0.08 NG/ML
PROT SERPL-MCNC: 3.9 G/DL (ref 6–8.4)
PROT SERPL-MCNC: 4 G/DL (ref 6–8.4)
PROT SERPL-MCNC: 4 G/DL (ref 6–8.4)
PROT SERPL-MCNC: 4.1 G/DL (ref 6–8.4)
PROT SERPL-MCNC: 4.1 G/DL (ref 6–8.4)
PROT SERPL-MCNC: 4.3 G/DL (ref 6–8.4)
PROT SERPL-MCNC: 4.3 G/DL (ref 6–8.4)
PROT SERPL-MCNC: 4.4 G/DL (ref 6–8.4)
PROT SERPL-MCNC: 4.4 G/DL (ref 6–8.4)
PROT SERPL-MCNC: 4.5 G/DL (ref 6–8.4)
PROT SERPL-MCNC: 4.6 G/DL (ref 6–8.4)
PROT SERPL-MCNC: 4.6 G/DL (ref 6–8.4)
PROT SERPL-MCNC: 4.7 G/DL (ref 6–8.4)
PROT SERPL-MCNC: 4.8 G/DL (ref 6–8.4)
PROT SERPL-MCNC: 4.8 G/DL (ref 6–8.4)
PROT SERPL-MCNC: 4.9 G/DL (ref 6–8.4)
PROT SERPL-MCNC: 4.9 G/DL (ref 6–8.4)
PROT SERPL-MCNC: 5 G/DL (ref 6–8.4)
PROT SERPL-MCNC: 5.1 G/DL (ref 6–8.4)
PROT SERPL-MCNC: 5.3 G/DL (ref 6–8.4)
PROT SERPL-MCNC: 5.5 G/DL (ref 6–8.4)
PROT SERPL-MCNC: 5.5 G/DL (ref 6–8.4)
PROT SERPL-MCNC: 6.2 G/DL (ref 6–8.4)
PROT SERPL-MCNC: 6.4 G/DL (ref 6–8.4)
PROT SERPL-MCNC: NORMAL G/DL (ref 6–8.4)
PROT UR QL STRIP: NEGATIVE
PROTHROMBIN TIME: 10.4 SEC (ref 9–12.5)
PROTHROMBIN TIME: 11.4 SEC (ref 9–12.5)
PROTHROMBIN TIME: 11.5 SEC (ref 9–12.5)
PROTHROMBIN TIME: 11.6 SEC (ref 9–12.5)
PROTHROMBIN TIME: 11.6 SEC (ref 9–12.5)
PROTHROMBIN TIME: 11.9 SEC (ref 9–12.5)
PROTHROMBIN TIME: 12 SEC (ref 9–12.5)
PROTHROMBIN TIME: 12 SEC (ref 9–12.5)
PROTHROMBIN TIME: 12.2 SEC (ref 9–12.5)
PROTHROMBIN TIME: 12.3 SEC (ref 9–12.5)
PROTHROMBIN TIME: 12.4 SEC (ref 9–12.5)
PROTHROMBIN TIME: 12.6 SEC (ref 9–12.5)
PROTHROMBIN TIME: 12.6 SEC (ref 9–12.5)
PROTHROMBIN TIME: 12.8 SEC (ref 9–12.5)
PROTHROMBIN TIME: 12.8 SEC (ref 9–12.5)
PROTHROMBIN TIME: 13.1 SEC (ref 9–12.5)
PROTHROMBIN TIME: 13.2 SEC (ref 9–12.5)
PROTHROMBIN TIME: 13.5 SEC (ref 9–12.5)
PROTHROMBIN TIME: 13.7 SEC (ref 9–12.5)
PROTHROMBIN TIME: 14.4 SEC (ref 9–12.5)
PROTHROMBIN TIME: 15.1 SEC (ref 9–12.5)
PROTHROMBIN TIME: 15.2 SEC (ref 9–12.5)
PROTHROMBIN TIME: 15.5 SEC (ref 9–12.5)
PROTHROMBIN TIME: 15.7 SEC (ref 9–12.5)
PROTHROMBIN TIME: 16.2 SEC (ref 9–12.5)
PROTHROMBIN TIME: 16.6 SEC (ref 9–12.5)
PROTHROMBIN TIME: 16.7 SEC (ref 9–12.5)
PROTHROMBIN TIME: 17.4 SEC (ref 9–12.5)
PROTHROMBIN TIME: 18.2 SEC (ref 9–12.5)
PROTHROMBIN TIME: 18.2 SEC (ref 9–12.5)
PROTHROMBIN TIME: 18.4 SEC (ref 9–12.5)
PROTHROMBIN TIME: 18.5 SEC (ref 9–12.5)
PROTHROMBIN TIME: 18.5 SEC (ref 9–12.5)
PROTHROMBIN TIME: 18.8 SEC (ref 9–12.5)
PROTHROMBIN TIME: 19 SEC (ref 9–12.5)
PROTHROMBIN TIME: 19.8 SEC (ref 9–12.5)
PROTHROMBIN TIME: 20.8 SEC (ref 9–12.5)
PROTHROMBIN TIME: 22.1 SEC (ref 9–12.5)
PROTHROMBIN TIME: 25.8 SEC (ref 9–12.5)
PROTHROMBIN TIME: 31 SEC (ref 9–12.5)
PROTHROMBIN TIME: 34.9 SEC (ref 9–12.5)
PROTHROMBIN TIME: 35.6 SEC (ref 9–12.5)
PROTHROMBIN TIME: 38.1 SEC (ref 9–12.5)
PROTHROMBIN TIME: NORMAL SEC (ref 9–12.5)
PTH-INTACT SERPL-MCNC: 178 PG/ML (ref 9–77)
PULM VEIN S/D RATIO: 0.66
PV PEAK D VEL: 0.41 M/S
PV PEAK S VEL: 0.27 M/S
RA MAJOR: 6.69 CM
RA PRESSURE: 3 MMHG
RA WIDTH: 5.35 CM
RBC # BLD AUTO: 2.7 M/UL (ref 4–5.4)
RBC # BLD AUTO: 2.78 M/UL (ref 4–5.4)
RBC # BLD AUTO: 2.85 M/UL (ref 4–5.4)
RBC # BLD AUTO: 3 M/UL (ref 4–5.4)
RBC # BLD AUTO: 3.05 M/UL (ref 4–5.4)
RBC # BLD AUTO: 3.07 M/UL (ref 4–5.4)
RBC # BLD AUTO: 3.09 M/UL (ref 4–5.4)
RBC # BLD AUTO: 3.09 M/UL (ref 4–5.4)
RBC # BLD AUTO: 3.1 M/UL (ref 4–5.4)
RBC # BLD AUTO: 3.2 M/UL (ref 4–5.4)
RBC # BLD AUTO: 3.21 M/UL (ref 4–5.4)
RBC # BLD AUTO: 3.22 M/UL (ref 4–5.4)
RBC # BLD AUTO: 3.22 M/UL (ref 4–5.4)
RBC # BLD AUTO: 3.23 M/UL (ref 4–5.4)
RBC # BLD AUTO: 3.24 M/UL (ref 4–5.4)
RBC # BLD AUTO: 3.28 M/UL (ref 4–5.4)
RBC # BLD AUTO: 3.28 M/UL (ref 4–5.4)
RBC # BLD AUTO: 3.3 M/UL (ref 4–5.4)
RBC # BLD AUTO: 3.32 M/UL (ref 4–5.4)
RBC # BLD AUTO: 3.34 M/UL (ref 4–5.4)
RBC # BLD AUTO: 3.36 M/UL (ref 4–5.4)
RBC # BLD AUTO: 3.37 M/UL (ref 4–5.4)
RBC # BLD AUTO: 3.42 M/UL (ref 4–5.4)
RBC # BLD AUTO: 3.43 M/UL (ref 4–5.4)
RBC # BLD AUTO: 3.46 M/UL (ref 4–5.4)
RBC # BLD AUTO: 3.47 M/UL (ref 4–5.4)
RBC # BLD AUTO: 3.47 M/UL (ref 4–5.4)
RBC # BLD AUTO: 3.53 M/UL (ref 4–5.4)
RBC # BLD AUTO: 3.55 M/UL (ref 4–5.4)
RBC # BLD AUTO: 3.56 M/UL (ref 4–5.4)
RBC # BLD AUTO: 3.57 M/UL (ref 4–5.4)
RBC # BLD AUTO: 3.59 M/UL (ref 4–5.4)
RBC # BLD AUTO: 3.59 M/UL (ref 4–5.4)
RBC # BLD AUTO: 3.61 M/UL (ref 4–5.4)
RBC # BLD AUTO: 3.63 M/UL (ref 4–5.4)
RBC # BLD AUTO: 3.63 M/UL (ref 4–5.4)
RBC # BLD AUTO: 3.7 M/UL (ref 4–5.4)
RBC # BLD AUTO: 3.7 M/UL (ref 4–5.4)
RBC # BLD AUTO: 3.72 M/UL (ref 4–5.4)
RBC # BLD AUTO: 3.73 M/UL (ref 4–5.4)
RBC # BLD AUTO: 3.79 M/UL (ref 4–5.4)
RBC # BLD AUTO: 3.79 M/UL (ref 4–5.4)
RBC # BLD AUTO: 3.8 M/UL (ref 4–5.4)
RBC # BLD AUTO: 3.83 M/UL (ref 4–5.4)
RBC # BLD AUTO: 3.84 M/UL (ref 4–5.4)
RBC # BLD AUTO: 3.89 M/UL (ref 4–5.4)
RBC # BLD AUTO: 4.46 M/UL (ref 4–5.4)
RBC # BLD AUTO: 4.91 M/UL (ref 4–5.4)
RBC #/AREA URNS AUTO: 10 /HPF (ref 0–4)
RBC #/AREA URNS AUTO: 3 /HPF (ref 0–4)
RBC #/AREA URNS AUTO: 3 /HPF (ref 0–4)
RETICS/RBC NFR AUTO: 1.5 % (ref 0.5–2.5)
RIGHT VENTRICULAR END-DIASTOLIC DIMENSION: 3 CM
RPR SER QL: NORMAL
RV TISSUE DOPPLER FREE WALL SYSTOLIC VELOCITY 1 (APICAL 4 CHAMBER VIEW): 6.07 CM/S
SAMPLE: ABNORMAL
SARS-COV-2 RDRP RESP QL NAA+PROBE: POSITIVE
SATURATED IRON: 126 % (ref 20–50)
SINUS: 3.56 CM
SITE: ABNORMAL
SODIUM SERPL-SCNC: 126 MMOL/L (ref 136–145)
SODIUM SERPL-SCNC: 126 MMOL/L (ref 136–145)
SODIUM SERPL-SCNC: 127 MMOL/L (ref 136–145)
SODIUM SERPL-SCNC: 128 MMOL/L (ref 136–145)
SODIUM SERPL-SCNC: 129 MMOL/L (ref 136–145)
SODIUM SERPL-SCNC: 130 MMOL/L (ref 136–145)
SODIUM SERPL-SCNC: 131 MMOL/L (ref 136–145)
SODIUM SERPL-SCNC: 131 MMOL/L (ref 136–145)
SODIUM SERPL-SCNC: 132 MMOL/L (ref 136–145)
SODIUM SERPL-SCNC: 132 MMOL/L (ref 136–145)
SODIUM SERPL-SCNC: 134 MMOL/L (ref 136–145)
SODIUM SERPL-SCNC: 135 MMOL/L (ref 136–145)
SODIUM SERPL-SCNC: 137 MMOL/L (ref 136–145)
SODIUM SERPL-SCNC: 138 MMOL/L (ref 136–145)
SODIUM SERPL-SCNC: 139 MMOL/L (ref 136–145)
SODIUM SERPL-SCNC: 139 MMOL/L (ref 136–145)
SODIUM SERPL-SCNC: 140 MMOL/L (ref 136–145)
SODIUM SERPL-SCNC: 141 MMOL/L (ref 136–145)
SODIUM SERPL-SCNC: 142 MMOL/L (ref 136–145)
SODIUM SERPL-SCNC: 143 MMOL/L (ref 136–145)
SODIUM SERPL-SCNC: 144 MMOL/L (ref 136–145)
SODIUM SERPL-SCNC: 144 MMOL/L (ref 136–145)
SODIUM SERPL-SCNC: 145 MMOL/L (ref 136–145)
SODIUM SERPL-SCNC: NORMAL MMOL/L (ref 136–145)
SODIUM UR-SCNC: 70 MMOL/L (ref 20–250)
SODIUM UR-SCNC: <20 MMOL/L (ref 20–250)
SP GR UR STRIP: 1.01 (ref 1–1.03)
SP GR UR STRIP: 1.01 (ref 1–1.03)
SP GR UR STRIP: 1.02 (ref 1–1.03)
SP GR UR STRIP: >=1.03 (ref 1–1.03)
SPECIMEN SOURCE: NORMAL
SQUAMOUS #/AREA URNS AUTO: 1 /HPF
SQUAMOUS #/AREA URNS AUTO: 2 /HPF
SQUAMOUS #/AREA URNS AUTO: 2 /HPF
STJ: 2.52 CM
TDI LATERAL: 0.09 M/S
TDI SEPTAL: 0.07 M/S
TDI: 0.08 M/S
TOTAL IRON BINDING CAPACITY: 43 UG/DL (ref 250–450)
TR MAX PG: 30 MMHG
TRANSFERRIN SERPL-MCNC: 29 MG/DL (ref 200–375)
TRANSFERRIN SERPL-MCNC: 30 MG/DL (ref 200–375)
TRICUSPID ANNULAR PLANE SYSTOLIC EXCURSION: 0.78 CM
TROPONIN I SERPL DL<=0.01 NG/ML-MCNC: 0.02 NG/ML (ref 0–0.03)
TROPONIN I SERPL DL<=0.01 NG/ML-MCNC: 0.03 NG/ML (ref 0–0.03)
TROPONIN I SERPL DL<=0.01 NG/ML-MCNC: 0.04 NG/ML (ref 0–0.03)
TROPONIN I SERPL DL<=0.01 NG/ML-MCNC: 0.04 NG/ML (ref 0–0.03)
TROPONIN I SERPL DL<=0.01 NG/ML-MCNC: 0.18 NG/ML (ref 0–0.03)
TSH SERPL DL<=0.005 MIU/L-ACNC: 1.97 UIU/ML (ref 0.4–4)
TV REST PULMONARY ARTERY PRESSURE: 33 MMHG
URN SPEC COLLECT METH UR: ABNORMAL
VIT B1 BLD-MCNC: 28 UG/L (ref 38–122)
VIT B12 SERPL-MCNC: 1065 PG/ML (ref 210–950)
VIT B12 SERPL-MCNC: 1418 PG/ML (ref 210–950)
WBC # BLD AUTO: 10.74 K/UL (ref 3.9–12.7)
WBC # BLD AUTO: 15.24 K/UL (ref 3.9–12.7)
WBC # BLD AUTO: 3.45 K/UL (ref 3.9–12.7)
WBC # BLD AUTO: 3.66 K/UL (ref 3.9–12.7)
WBC # BLD AUTO: 4.05 K/UL (ref 3.9–12.7)
WBC # BLD AUTO: 4.22 K/UL (ref 3.9–12.7)
WBC # BLD AUTO: 4.25 K/UL (ref 3.9–12.7)
WBC # BLD AUTO: 4.29 K/UL (ref 3.9–12.7)
WBC # BLD AUTO: 4.56 K/UL (ref 3.9–12.7)
WBC # BLD AUTO: 4.64 K/UL (ref 3.9–12.7)
WBC # BLD AUTO: 4.7 K/UL (ref 3.9–12.7)
WBC # BLD AUTO: 4.82 K/UL (ref 3.9–12.7)
WBC # BLD AUTO: 4.83 K/UL (ref 3.9–12.7)
WBC # BLD AUTO: 4.88 K/UL (ref 3.9–12.7)
WBC # BLD AUTO: 4.92 K/UL (ref 3.9–12.7)
WBC # BLD AUTO: 4.93 K/UL (ref 3.9–12.7)
WBC # BLD AUTO: 4.93 K/UL (ref 3.9–12.7)
WBC # BLD AUTO: 5.05 K/UL (ref 3.9–12.7)
WBC # BLD AUTO: 5.09 K/UL (ref 3.9–12.7)
WBC # BLD AUTO: 5.13 K/UL (ref 3.9–12.7)
WBC # BLD AUTO: 5.31 K/UL (ref 3.9–12.7)
WBC # BLD AUTO: 5.34 K/UL (ref 3.9–12.7)
WBC # BLD AUTO: 5.36 K/UL (ref 3.9–12.7)
WBC # BLD AUTO: 5.42 K/UL (ref 3.9–12.7)
WBC # BLD AUTO: 5.43 K/UL (ref 3.9–12.7)
WBC # BLD AUTO: 5.49 K/UL (ref 3.9–12.7)
WBC # BLD AUTO: 5.65 K/UL (ref 3.9–12.7)
WBC # BLD AUTO: 5.66 K/UL (ref 3.9–12.7)
WBC # BLD AUTO: 5.75 K/UL (ref 3.9–12.7)
WBC # BLD AUTO: 6.08 K/UL (ref 3.9–12.7)
WBC # BLD AUTO: 6.17 K/UL (ref 3.9–12.7)
WBC # BLD AUTO: 6.22 K/UL (ref 3.9–12.7)
WBC # BLD AUTO: 6.26 K/UL (ref 3.9–12.7)
WBC # BLD AUTO: 6.31 K/UL (ref 3.9–12.7)
WBC # BLD AUTO: 6.4 K/UL (ref 3.9–12.7)
WBC # BLD AUTO: 6.41 K/UL (ref 3.9–12.7)
WBC # BLD AUTO: 6.47 K/UL (ref 3.9–12.7)
WBC # BLD AUTO: 6.48 K/UL (ref 3.9–12.7)
WBC # BLD AUTO: 6.68 K/UL (ref 3.9–12.7)
WBC # BLD AUTO: 6.98 K/UL (ref 3.9–12.7)
WBC # BLD AUTO: 7.03 K/UL (ref 3.9–12.7)
WBC # BLD AUTO: 7.09 K/UL (ref 3.9–12.7)
WBC # BLD AUTO: 7.57 K/UL (ref 3.9–12.7)
WBC # BLD AUTO: 7.59 K/UL (ref 3.9–12.7)
WBC # BLD AUTO: 7.67 K/UL (ref 3.9–12.7)
WBC # BLD AUTO: 7.95 K/UL (ref 3.9–12.7)
WBC # BLD AUTO: 8.43 K/UL (ref 3.9–12.7)
WBC # BLD AUTO: 8.5 K/UL (ref 3.9–12.7)
WBC #/AREA URNS AUTO: 17 /HPF (ref 0–5)
WBC #/AREA URNS AUTO: 27 /HPF (ref 0–5)
WBC #/AREA URNS AUTO: 54 /HPF (ref 0–5)
WBC #/AREA URNS AUTO: 95 /HPF (ref 0–5)
YEAST UR QL AUTO: ABNORMAL

## 2020-01-01 PROCEDURE — 25000003 PHARM REV CODE 250: Performed by: INTERNAL MEDICINE

## 2020-01-01 PROCEDURE — 99900035 HC TECH TIME PER 15 MIN (STAT)

## 2020-01-01 PROCEDURE — 94761 N-INVAS EAR/PLS OXIMETRY MLT: CPT

## 2020-01-01 PROCEDURE — 25000003 PHARM REV CODE 250: Performed by: PHYSICIAN ASSISTANT

## 2020-01-01 PROCEDURE — 84100 ASSAY OF PHOSPHORUS: CPT

## 2020-01-01 PROCEDURE — 36415 COLL VENOUS BLD VENIPUNCTURE: CPT

## 2020-01-01 PROCEDURE — 25000242 PHARM REV CODE 250 ALT 637 W/ HCPCS: Performed by: STUDENT IN AN ORGANIZED HEALTH CARE EDUCATION/TRAINING PROGRAM

## 2020-01-01 PROCEDURE — 99232 SBSQ HOSP IP/OBS MODERATE 35: CPT | Mod: GC,,, | Performed by: INTERNAL MEDICINE

## 2020-01-01 PROCEDURE — 87186 SC STD MICRODIL/AGAR DIL: CPT

## 2020-01-01 PROCEDURE — 84165 PROTEIN E-PHORESIS SERUM: CPT | Mod: 26,,, | Performed by: PATHOLOGY

## 2020-01-01 PROCEDURE — 25000242 PHARM REV CODE 250 ALT 637 W/ HCPCS: Performed by: PHYSICIAN ASSISTANT

## 2020-01-01 PROCEDURE — 99232 PR SUBSEQUENT HOSPITAL CARE,LEVL II: ICD-10-PCS | Mod: ,,, | Performed by: CLINICAL NURSE SPECIALIST

## 2020-01-01 PROCEDURE — 83605 ASSAY OF LACTIC ACID: CPT

## 2020-01-01 PROCEDURE — 93005 ELECTROCARDIOGRAM TRACING: CPT

## 2020-01-01 PROCEDURE — 93010 ELECTROCARDIOGRAM REPORT: CPT | Mod: ,,, | Performed by: INTERNAL MEDICINE

## 2020-01-01 PROCEDURE — 80048 BASIC METABOLIC PNL TOTAL CA: CPT

## 2020-01-01 PROCEDURE — 20000000 HC ICU ROOM

## 2020-01-01 PROCEDURE — 94660 CPAP INITIATION&MGMT: CPT

## 2020-01-01 PROCEDURE — 80053 COMPREHEN METABOLIC PANEL: CPT

## 2020-01-01 PROCEDURE — 3044F PR MOST RECENT HEMOGLOBIN A1C LEVEL <7.0%: ICD-10-PCS | Mod: CPTII,S$GLB,, | Performed by: INTERNAL MEDICINE

## 2020-01-01 PROCEDURE — 27000221 HC OXYGEN, UP TO 24 HOURS

## 2020-01-01 PROCEDURE — 12000002 HC ACUTE/MED SURGE SEMI-PRIVATE ROOM

## 2020-01-01 PROCEDURE — 99232 SBSQ HOSP IP/OBS MODERATE 35: CPT | Mod: ,,, | Performed by: INTERNAL MEDICINE

## 2020-01-01 PROCEDURE — 84100 ASSAY OF PHOSPHORUS: CPT | Mod: 91

## 2020-01-01 PROCEDURE — 25500020 PHARM REV CODE 255: Performed by: INTERNAL MEDICINE

## 2020-01-01 PROCEDURE — 99232 SBSQ HOSP IP/OBS MODERATE 35: CPT | Mod: ,,, | Performed by: NURSE PRACTITIONER

## 2020-01-01 PROCEDURE — 85025 COMPLETE CBC W/AUTO DIFF WBC: CPT

## 2020-01-01 PROCEDURE — 85610 PROTHROMBIN TIME: CPT

## 2020-01-01 PROCEDURE — 82330 ASSAY OF CALCIUM: CPT

## 2020-01-01 PROCEDURE — 97530 THERAPEUTIC ACTIVITIES: CPT

## 2020-01-01 PROCEDURE — 25000003 PHARM REV CODE 250: Performed by: NURSE PRACTITIONER

## 2020-01-01 PROCEDURE — 25000003 PHARM REV CODE 250: Performed by: STUDENT IN AN ORGANIZED HEALTH CARE EDUCATION/TRAINING PROGRAM

## 2020-01-01 PROCEDURE — 86140 C-REACTIVE PROTEIN: CPT

## 2020-01-01 PROCEDURE — 85025 COMPLETE CBC W/AUTO DIFF WBC: CPT | Mod: 91

## 2020-01-01 PROCEDURE — 99223 1ST HOSP IP/OBS HIGH 75: CPT | Mod: ,,, | Performed by: CLINICAL NURSE SPECIALIST

## 2020-01-01 PROCEDURE — 99232 PR SUBSEQUENT HOSPITAL CARE,LEVL II: ICD-10-PCS | Mod: ,,, | Performed by: INTERNAL MEDICINE

## 2020-01-01 PROCEDURE — 94640 AIRWAY INHALATION TREATMENT: CPT

## 2020-01-01 PROCEDURE — 96374 THER/PROPH/DIAG INJ IV PUSH: CPT

## 2020-01-01 PROCEDURE — 63600175 PHARM REV CODE 636 W HCPCS: Performed by: PHYSICIAN ASSISTANT

## 2020-01-01 PROCEDURE — 82533 TOTAL CORTISOL: CPT

## 2020-01-01 PROCEDURE — 80048 BASIC METABOLIC PNL TOTAL CA: CPT | Mod: 91

## 2020-01-01 PROCEDURE — 82550 ASSAY OF CK (CPK): CPT

## 2020-01-01 PROCEDURE — D9220A PRA ANESTHESIA: Mod: CRNA,,, | Performed by: NURSE ANESTHETIST, CERTIFIED REGISTERED

## 2020-01-01 PROCEDURE — 87106 FUNGI IDENTIFICATION YEAST: CPT

## 2020-01-01 PROCEDURE — 87449 NOS EACH ORGANISM AG IA: CPT

## 2020-01-01 PROCEDURE — 83540 ASSAY OF IRON: CPT

## 2020-01-01 PROCEDURE — 25000242 PHARM REV CODE 250 ALT 637 W/ HCPCS: Performed by: INTERNAL MEDICINE

## 2020-01-01 PROCEDURE — C1751 CATH, INF, PER/CENT/MIDLINE: HCPCS

## 2020-01-01 PROCEDURE — 63600175 PHARM REV CODE 636 W HCPCS: Performed by: STUDENT IN AN ORGANIZED HEALTH CARE EDUCATION/TRAINING PROGRAM

## 2020-01-01 PROCEDURE — 37000009 HC ANESTHESIA EA ADD 15 MINS: Performed by: INTERNAL MEDICINE

## 2020-01-01 PROCEDURE — 84165 PROTEIN E-PHORESIS SERUM: CPT

## 2020-01-01 PROCEDURE — 11000001 HC ACUTE MED/SURG PRIVATE ROOM

## 2020-01-01 PROCEDURE — 84466 ASSAY OF TRANSFERRIN: CPT

## 2020-01-01 PROCEDURE — 63600175 PHARM REV CODE 636 W HCPCS: Performed by: INTERNAL MEDICINE

## 2020-01-01 PROCEDURE — 83735 ASSAY OF MAGNESIUM: CPT

## 2020-01-01 PROCEDURE — 83605 ASSAY OF LACTIC ACID: CPT | Mod: 91

## 2020-01-01 PROCEDURE — 85730 THROMBOPLASTIN TIME PARTIAL: CPT | Mod: 91

## 2020-01-01 PROCEDURE — 97168 OT RE-EVAL EST PLAN CARE: CPT

## 2020-01-01 PROCEDURE — 36410 VNPNXR 3YR/> PHY/QHP DX/THER: CPT

## 2020-01-01 PROCEDURE — 99233 SBSQ HOSP IP/OBS HIGH 50: CPT | Mod: ,,, | Performed by: INTERNAL MEDICINE

## 2020-01-01 PROCEDURE — 85730 THROMBOPLASTIN TIME PARTIAL: CPT

## 2020-01-01 PROCEDURE — 82960 TEST FOR G6PD ENZYME: CPT

## 2020-01-01 PROCEDURE — 99233 PR SUBSEQUENT HOSPITAL CARE,LEVL III: ICD-10-PCS | Mod: GC,,, | Performed by: INTERNAL MEDICINE

## 2020-01-01 PROCEDURE — 37000009 HC ANESTHESIA EA ADD 15 MINS: Performed by: COLON & RECTAL SURGERY

## 2020-01-01 PROCEDURE — 99233 SBSQ HOSP IP/OBS HIGH 50: CPT | Mod: GC,,, | Performed by: INTERNAL MEDICINE

## 2020-01-01 PROCEDURE — 99223 PR INITIAL HOSPITAL CARE,LEVL III: ICD-10-PCS | Mod: ,,, | Performed by: INTERNAL MEDICINE

## 2020-01-01 PROCEDURE — 82800 BLOOD PH: CPT

## 2020-01-01 PROCEDURE — 84484 ASSAY OF TROPONIN QUANT: CPT

## 2020-01-01 PROCEDURE — 81003 URINALYSIS AUTO W/O SCOPE: CPT

## 2020-01-01 PROCEDURE — 25000242 PHARM REV CODE 250 ALT 637 W/ HCPCS: Performed by: NURSE PRACTITIONER

## 2020-01-01 PROCEDURE — 99232 SBSQ HOSP IP/OBS MODERATE 35: CPT | Mod: ,,, | Performed by: CLINICAL NURSE SPECIALIST

## 2020-01-01 PROCEDURE — 45378 PR COLONOSCOPY,DIAGNOSTIC: ICD-10-PCS | Mod: ,,, | Performed by: COLON & RECTAL SURGERY

## 2020-01-01 PROCEDURE — 81001 URINALYSIS AUTO W/SCOPE: CPT

## 2020-01-01 PROCEDURE — 99214 PR OFFICE/OUTPT VISIT, EST, LEVL IV, 30-39 MIN: ICD-10-PCS | Mod: 25,S$GLB,, | Performed by: INTERNAL MEDICINE

## 2020-01-01 PROCEDURE — 82306 VITAMIN D 25 HYDROXY: CPT

## 2020-01-01 PROCEDURE — 93010 ELECTROCARDIOGRAM REPORT: CPT | Mod: 76,,, | Performed by: INTERNAL MEDICINE

## 2020-01-01 PROCEDURE — 99223 PR INITIAL HOSPITAL CARE,LEVL III: ICD-10-PCS | Mod: ,,, | Performed by: PHYSICIAN ASSISTANT

## 2020-01-01 PROCEDURE — 87088 URINE BACTERIA CULTURE: CPT

## 2020-01-01 PROCEDURE — 20600001 HC STEP DOWN PRIVATE ROOM

## 2020-01-01 PROCEDURE — 63600175 PHARM REV CODE 636 W HCPCS: Performed by: NURSE PRACTITIONER

## 2020-01-01 PROCEDURE — D9220A PRA ANESTHESIA: ICD-10-PCS | Mod: CRNA,,, | Performed by: NURSE ANESTHETIST, CERTIFIED REGISTERED

## 2020-01-01 PROCEDURE — 99291 PR CRITICAL CARE, E/M 30-74 MINUTES: ICD-10-PCS | Mod: GC,,, | Performed by: INTERNAL MEDICINE

## 2020-01-01 PROCEDURE — 97803 MED NUTRITION INDIV SUBSEQ: CPT

## 2020-01-01 PROCEDURE — 83690 ASSAY OF LIPASE: CPT

## 2020-01-01 PROCEDURE — 90792 PR PSYCHIATRIC DIAGNOSTIC EVALUATION W/MEDICAL SERVICES: ICD-10-PCS | Mod: GC,,, | Performed by: PSYCHIATRY & NEUROLOGY

## 2020-01-01 PROCEDURE — 97164 PT RE-EVAL EST PLAN CARE: CPT

## 2020-01-01 PROCEDURE — 86905 BLOOD TYPING RBC ANTIGENS: CPT

## 2020-01-01 PROCEDURE — 96365 THER/PROPH/DIAG IV INF INIT: CPT

## 2020-01-01 PROCEDURE — 97535 SELF CARE MNGMENT TRAINING: CPT

## 2020-01-01 PROCEDURE — 82803 BLOOD GASES ANY COMBINATION: CPT

## 2020-01-01 PROCEDURE — 93010 EKG 12-LEAD: ICD-10-PCS | Mod: ,,, | Performed by: INTERNAL MEDICINE

## 2020-01-01 PROCEDURE — 96376 TX/PRO/DX INJ SAME DRUG ADON: CPT

## 2020-01-01 PROCEDURE — 82962 GLUCOSE BLOOD TEST: CPT | Performed by: INTERNAL MEDICINE

## 2020-01-01 PROCEDURE — 96375 TX/PRO/DX INJ NEW DRUG ADDON: CPT

## 2020-01-01 PROCEDURE — 99232 PR SUBSEQUENT HOSPITAL CARE,LEVL II: ICD-10-PCS | Mod: GC,,, | Performed by: INTERNAL MEDICINE

## 2020-01-01 PROCEDURE — 97161 PT EVAL LOW COMPLEX 20 MIN: CPT

## 2020-01-01 PROCEDURE — 86592 SYPHILIS TEST NON-TREP QUAL: CPT

## 2020-01-01 PROCEDURE — 82553 CREATINE MB FRACTION: CPT

## 2020-01-01 PROCEDURE — 43239 EGD BIOPSY SINGLE/MULTIPLE: CPT | Performed by: INTERNAL MEDICINE

## 2020-01-01 PROCEDURE — 93306 ECHO (CUPID ONLY): ICD-10-PCS | Mod: S$GLB,,, | Performed by: INTERNAL MEDICINE

## 2020-01-01 PROCEDURE — 83615 LACTATE (LD) (LDH) ENZYME: CPT

## 2020-01-01 PROCEDURE — 76937 US GUIDE VASCULAR ACCESS: CPT

## 2020-01-01 PROCEDURE — 99233 PR SUBSEQUENT HOSPITAL CARE,LEVL III: ICD-10-PCS | Mod: ,,, | Performed by: CLINICAL NURSE SPECIALIST

## 2020-01-01 PROCEDURE — 1159F PR MEDICATION LIST DOCUMENTED IN MEDICAL RECORD: ICD-10-PCS | Mod: S$GLB,,, | Performed by: INTERNAL MEDICINE

## 2020-01-01 PROCEDURE — 63600175 PHARM REV CODE 636 W HCPCS

## 2020-01-01 PROCEDURE — 99497 PR ADVNCD CARE PLAN 30 MIN: ICD-10-PCS | Mod: 25,,, | Performed by: CLINICAL NURSE SPECIALIST

## 2020-01-01 PROCEDURE — 87086 URINE CULTURE/COLONY COUNT: CPT

## 2020-01-01 PROCEDURE — 86022 PLATELET ANTIBODIES: CPT

## 2020-01-01 PROCEDURE — 84165 PATHOLOGIST INTERPRETATION SPE: ICD-10-PCS | Mod: 26,,, | Performed by: PATHOLOGY

## 2020-01-01 PROCEDURE — 93000 EKG 12-LEAD: ICD-10-PCS | Mod: S$GLB,,, | Performed by: INTERNAL MEDICINE

## 2020-01-01 PROCEDURE — 94760 N-INVAS EAR/PLS OXIMETRY 1: CPT

## 2020-01-01 PROCEDURE — 82728 ASSAY OF FERRITIN: CPT

## 2020-01-01 PROCEDURE — 37000008 HC ANESTHESIA 1ST 15 MINUTES: Performed by: COLON & RECTAL SURGERY

## 2020-01-01 PROCEDURE — 99233 SBSQ HOSP IP/OBS HIGH 50: CPT | Mod: GC,,, | Performed by: HOSPITALIST

## 2020-01-01 PROCEDURE — 36600 WITHDRAWAL OF ARTERIAL BLOOD: CPT

## 2020-01-01 PROCEDURE — 97165 OT EVAL LOW COMPLEX 30 MIN: CPT

## 2020-01-01 PROCEDURE — 90792 PSYCH DIAG EVAL W/MED SRVCS: CPT | Mod: GC,,, | Performed by: PSYCHIATRY & NEUROLOGY

## 2020-01-01 PROCEDURE — 87502 INFLUENZA DNA AMP PROBE: CPT

## 2020-01-01 PROCEDURE — 83735 ASSAY OF MAGNESIUM: CPT | Mod: 91

## 2020-01-01 PROCEDURE — 85045 AUTOMATED RETICULOCYTE COUNT: CPT

## 2020-01-01 PROCEDURE — 86901 BLOOD TYPING SEROLOGIC RH(D): CPT

## 2020-01-01 PROCEDURE — 82962 GLUCOSE BLOOD TEST: CPT | Performed by: COLON & RECTAL SURGERY

## 2020-01-01 PROCEDURE — 97116 GAIT TRAINING THERAPY: CPT

## 2020-01-01 PROCEDURE — 99291 PR CRITICAL CARE, E/M 30-74 MINUTES: ICD-10-PCS | Mod: ,,, | Performed by: EMERGENCY MEDICINE

## 2020-01-01 PROCEDURE — 99232 PR SUBSEQUENT HOSPITAL CARE,LEVL II: ICD-10-PCS | Mod: ,,, | Performed by: NURSE PRACTITIONER

## 2020-01-01 PROCEDURE — 99231 PR SUBSEQUENT HOSPITAL CARE,LEVL I: ICD-10-PCS | Mod: ,,, | Performed by: CLINICAL NURSE SPECIALIST

## 2020-01-01 PROCEDURE — 99233 PR SUBSEQUENT HOSPITAL CARE,LEVL III: ICD-10-PCS | Mod: ,,, | Performed by: INTERNAL MEDICINE

## 2020-01-01 PROCEDURE — 84425 ASSAY OF VITAMIN B-1: CPT

## 2020-01-01 PROCEDURE — 25000003 PHARM REV CODE 250: Performed by: FAMILY MEDICINE

## 2020-01-01 PROCEDURE — 99223 1ST HOSP IP/OBS HIGH 75: CPT | Mod: ,,, | Performed by: PHYSICIAN ASSISTANT

## 2020-01-01 PROCEDURE — 99233 SBSQ HOSP IP/OBS HIGH 50: CPT | Mod: ,,, | Performed by: CLINICAL NURSE SPECIALIST

## 2020-01-01 PROCEDURE — 99214 OFFICE O/P EST MOD 30 MIN: CPT | Mod: 25,S$GLB,, | Performed by: INTERNAL MEDICINE

## 2020-01-01 PROCEDURE — 27100092 HC HIGH FLOW DELIVERY CANNULA

## 2020-01-01 PROCEDURE — 99223 1ST HOSP IP/OBS HIGH 75: CPT | Mod: ,,, | Performed by: INTERNAL MEDICINE

## 2020-01-01 PROCEDURE — 83935 ASSAY OF URINE OSMOLALITY: CPT

## 2020-01-01 PROCEDURE — 25000003 PHARM REV CODE 250

## 2020-01-01 PROCEDURE — 96361 HYDRATE IV INFUSION ADD-ON: CPT

## 2020-01-01 PROCEDURE — 99223 PR INITIAL HOSPITAL CARE,LEVL III: ICD-10-PCS | Mod: ,,, | Performed by: CLINICAL NURSE SPECIALIST

## 2020-01-01 PROCEDURE — 97112 NEUROMUSCULAR REEDUCATION: CPT

## 2020-01-01 PROCEDURE — 82533 TOTAL CORTISOL: CPT | Mod: 91

## 2020-01-01 PROCEDURE — 99239 HOSP IP/OBS DSCHRG MGMT >30: CPT | Mod: ,,, | Performed by: INTERNAL MEDICINE

## 2020-01-01 PROCEDURE — 83036 HEMOGLOBIN GLYCOSYLATED A1C: CPT

## 2020-01-01 PROCEDURE — 1159F MED LIST DOCD IN RCRD: CPT | Mod: S$GLB,,, | Performed by: INTERNAL MEDICINE

## 2020-01-01 PROCEDURE — 63600175 PHARM REV CODE 636 W HCPCS: Performed by: NURSE ANESTHETIST, CERTIFIED REGISTERED

## 2020-01-01 PROCEDURE — 27201012 HC FORCEPS, HOT/COLD, DISP: Performed by: INTERNAL MEDICINE

## 2020-01-01 PROCEDURE — 97110 THERAPEUTIC EXERCISES: CPT

## 2020-01-01 PROCEDURE — D9220A PRA ANESTHESIA: ICD-10-PCS | Mod: ANES,,, | Performed by: ANESTHESIOLOGY

## 2020-01-01 PROCEDURE — 99285 EMERGENCY DEPT VISIT HI MDM: CPT | Mod: 25

## 2020-01-01 PROCEDURE — 83970 ASSAY OF PARATHORMONE: CPT

## 2020-01-01 PROCEDURE — 93000 ELECTROCARDIOGRAM COMPLETE: CPT | Mod: S$GLB,,, | Performed by: INTERNAL MEDICINE

## 2020-01-01 PROCEDURE — 27100171 HC OXYGEN HIGH FLOW UP TO 24 HOURS

## 2020-01-01 PROCEDURE — 3044F HG A1C LEVEL LT 7.0%: CPT | Mod: CPTII,S$GLB,, | Performed by: INTERNAL MEDICINE

## 2020-01-01 PROCEDURE — 82607 VITAMIN B-12: CPT

## 2020-01-01 PROCEDURE — 99231 SBSQ HOSP IP/OBS SF/LOW 25: CPT | Mod: ,,, | Performed by: INTERNAL MEDICINE

## 2020-01-01 PROCEDURE — 84300 ASSAY OF URINE SODIUM: CPT

## 2020-01-01 PROCEDURE — 99223 PR INITIAL HOSPITAL CARE,LEVL III: ICD-10-PCS | Mod: ,,, | Performed by: SURGERY

## 2020-01-01 PROCEDURE — 99497 ADVNCD CARE PLAN 30 MIN: CPT | Mod: 25,,, | Performed by: CLINICAL NURSE SPECIALIST

## 2020-01-01 PROCEDURE — S0166 INJ OLANZAPINE 2.5MG: HCPCS | Performed by: PHYSICIAN ASSISTANT

## 2020-01-01 PROCEDURE — 99223 1ST HOSP IP/OBS HIGH 75: CPT | Mod: ,,, | Performed by: SURGERY

## 2020-01-01 PROCEDURE — 99291 CRITICAL CARE FIRST HOUR: CPT | Mod: GC,,, | Performed by: INTERNAL MEDICINE

## 2020-01-01 PROCEDURE — 92610 EVALUATE SWALLOWING FUNCTION: CPT

## 2020-01-01 PROCEDURE — 63700000 PHARM REV CODE 250 ALT 637 W/O HCPCS: Performed by: NURSE PRACTITIONER

## 2020-01-01 PROCEDURE — 45378 DIAGNOSTIC COLONOSCOPY: CPT | Mod: ,,, | Performed by: COLON & RECTAL SURGERY

## 2020-01-01 PROCEDURE — 87040 BLOOD CULTURE FOR BACTERIA: CPT | Mod: 59

## 2020-01-01 PROCEDURE — 27000190 HC CPAP FULL FACE MASK W/VALVE

## 2020-01-01 PROCEDURE — 99233 PR SUBSEQUENT HOSPITAL CARE,LEVL III: ICD-10-PCS | Mod: GC,,, | Performed by: HOSPITALIST

## 2020-01-01 PROCEDURE — 63600175 PHARM REV CODE 636 W HCPCS: Performed by: FAMILY MEDICINE

## 2020-01-01 PROCEDURE — 1100F PR PT FALLS ASSESS DOC 2+ FALLS/FALL W/INJURY/YR: ICD-10-PCS | Mod: CPTII,S$GLB,, | Performed by: INTERNAL MEDICINE

## 2020-01-01 PROCEDURE — 97802 MEDICAL NUTRITION INDIV IN: CPT

## 2020-01-01 PROCEDURE — 84484 ASSAY OF TROPONIN QUANT: CPT | Mod: 91

## 2020-01-01 PROCEDURE — D9220A PRA ANESTHESIA: Mod: ANES,,, | Performed by: ANESTHESIOLOGY

## 2020-01-01 PROCEDURE — 85379 FIBRIN DEGRADATION QUANT: CPT

## 2020-01-01 PROCEDURE — 99291 CRITICAL CARE FIRST HOUR: CPT

## 2020-01-01 PROCEDURE — 84145 PROCALCITONIN (PCT): CPT

## 2020-01-01 PROCEDURE — 99233 SBSQ HOSP IP/OBS HIGH 50: CPT | Mod: ,,, | Performed by: NURSE PRACTITIONER

## 2020-01-01 PROCEDURE — 86870 RBC ANTIBODY IDENTIFICATION: CPT

## 2020-01-01 PROCEDURE — 99239 PR HOSPITAL DISCHARGE DAY,>30 MIN: ICD-10-PCS | Mod: ,,, | Performed by: INTERNAL MEDICINE

## 2020-01-01 PROCEDURE — 99497 PR ADVNCD CARE PLAN 30 MIN: ICD-10-PCS | Mod: ,,, | Performed by: CLINICAL NURSE SPECIALIST

## 2020-01-01 PROCEDURE — 99284 EMERGENCY DEPT VISIT MOD MDM: CPT | Mod: ,,, | Performed by: EMERGENCY MEDICINE

## 2020-01-01 PROCEDURE — 85610 PROTHROMBIN TIME: CPT | Mod: 91

## 2020-01-01 PROCEDURE — 1126F PR PAIN SEVERITY QUANTIFIED, NO PAIN PRESENT: ICD-10-PCS | Mod: S$GLB,,, | Performed by: INTERNAL MEDICINE

## 2020-01-01 PROCEDURE — 80162 ASSAY OF DIGOXIN TOTAL: CPT

## 2020-01-01 PROCEDURE — 1126F AMNT PAIN NOTED NONE PRSNT: CPT | Mod: S$GLB,,, | Performed by: INTERNAL MEDICINE

## 2020-01-01 PROCEDURE — 86850 RBC ANTIBODY SCREEN: CPT

## 2020-01-01 PROCEDURE — 99497 ADVNCD CARE PLAN 30 MIN: CPT | Mod: ,,, | Performed by: CLINICAL NURSE SPECIALIST

## 2020-01-01 PROCEDURE — 3288F PR FALLS RISK ASSESSMENT DOCUMENTED: ICD-10-PCS | Mod: CPTII,S$GLB,, | Performed by: INTERNAL MEDICINE

## 2020-01-01 PROCEDURE — 87077 CULTURE AEROBIC IDENTIFY: CPT

## 2020-01-01 PROCEDURE — 93306 TTE W/DOPPLER COMPLETE: CPT | Mod: S$GLB,,, | Performed by: INTERNAL MEDICINE

## 2020-01-01 PROCEDURE — 99231 SBSQ HOSP IP/OBS SF/LOW 25: CPT | Mod: ,,, | Performed by: CLINICAL NURSE SPECIALIST

## 2020-01-01 PROCEDURE — 88305 TISSUE EXAM BY PATHOLOGIST: CPT | Mod: 26,,, | Performed by: PATHOLOGY

## 2020-01-01 PROCEDURE — 25000003 PHARM REV CODE 250: Performed by: EMERGENCY MEDICINE

## 2020-01-01 PROCEDURE — 99231 PR SUBSEQUENT HOSPITAL CARE,LEVL I: ICD-10-PCS | Mod: ,,, | Performed by: INTERNAL MEDICINE

## 2020-01-01 PROCEDURE — 83010 ASSAY OF HAPTOGLOBIN QUANT: CPT

## 2020-01-01 PROCEDURE — 99223 1ST HOSP IP/OBS HIGH 75: CPT | Mod: ,,, | Performed by: FAMILY MEDICINE

## 2020-01-01 PROCEDURE — 82746 ASSAY OF FOLIC ACID SERUM: CPT

## 2020-01-01 PROCEDURE — 25500020 PHARM REV CODE 255: Performed by: EMERGENCY MEDICINE

## 2020-01-01 PROCEDURE — 43239 PR EGD, FLEX, W/BIOPSY, SGL/MULTI: ICD-10-PCS | Mod: ,,, | Performed by: INTERNAL MEDICINE

## 2020-01-01 PROCEDURE — 85027 COMPLETE CBC AUTOMATED: CPT

## 2020-01-01 PROCEDURE — U0002 COVID-19 LAB TEST NON-CDC: HCPCS

## 2020-01-01 PROCEDURE — 99999 PR PBB SHADOW E&M-EST. PATIENT-LVL III: CPT | Mod: PBBFAC,,, | Performed by: INTERNAL MEDICINE

## 2020-01-01 PROCEDURE — 99291 CRITICAL CARE FIRST HOUR: CPT | Mod: ,,, | Performed by: EMERGENCY MEDICINE

## 2020-01-01 PROCEDURE — 83880 ASSAY OF NATRIURETIC PEPTIDE: CPT

## 2020-01-01 PROCEDURE — 99284 PR EMERGENCY DEPT VISIT,LEVEL IV: ICD-10-PCS | Mod: ,,, | Performed by: EMERGENCY MEDICINE

## 2020-01-01 PROCEDURE — 51798 US URINE CAPACITY MEASURE: CPT

## 2020-01-01 PROCEDURE — C9113 INJ PANTOPRAZOLE SODIUM, VIA: HCPCS | Performed by: STUDENT IN AN ORGANIZED HEALTH CARE EDUCATION/TRAINING PROGRAM

## 2020-01-01 PROCEDURE — 99999 PR PBB SHADOW E&M-EST. PATIENT-LVL III: ICD-10-PCS | Mod: PBBFAC,,, | Performed by: INTERNAL MEDICINE

## 2020-01-01 PROCEDURE — 99223 PR INITIAL HOSPITAL CARE,LEVL III: ICD-10-PCS | Mod: ,,, | Performed by: FAMILY MEDICINE

## 2020-01-01 PROCEDURE — 87324 CLOSTRIDIUM AG IA: CPT

## 2020-01-01 PROCEDURE — 83930 ASSAY OF BLOOD OSMOLALITY: CPT

## 2020-01-01 PROCEDURE — 63600175 PHARM REV CODE 636 W HCPCS: Performed by: EMERGENCY MEDICINE

## 2020-01-01 PROCEDURE — 1100F PTFALLS ASSESS-DOCD GE2>/YR: CPT | Mod: CPTII,S$GLB,, | Performed by: INTERNAL MEDICINE

## 2020-01-01 PROCEDURE — 25000003 PHARM REV CODE 250: Performed by: HOSPITALIST

## 2020-01-01 PROCEDURE — 87040 BLOOD CULTURE FOR BACTERIA: CPT

## 2020-01-01 PROCEDURE — 25500020 PHARM REV CODE 255: Performed by: STUDENT IN AN ORGANIZED HEALTH CARE EDUCATION/TRAINING PROGRAM

## 2020-01-01 PROCEDURE — 99233 PR SUBSEQUENT HOSPITAL CARE,LEVL III: ICD-10-PCS | Mod: ,,, | Performed by: NURSE PRACTITIONER

## 2020-01-01 PROCEDURE — 82150 ASSAY OF AMYLASE: CPT

## 2020-01-01 PROCEDURE — 63600175 PHARM REV CODE 636 W HCPCS: Performed by: HOSPITALIST

## 2020-01-01 PROCEDURE — 45378 DIAGNOSTIC COLONOSCOPY: CPT | Performed by: COLON & RECTAL SURGERY

## 2020-01-01 PROCEDURE — 43239 EGD BIOPSY SINGLE/MULTIPLE: CPT | Mod: ,,, | Performed by: INTERNAL MEDICINE

## 2020-01-01 PROCEDURE — 83090 ASSAY OF HOMOCYSTEINE: CPT

## 2020-01-01 PROCEDURE — 97166 OT EVAL MOD COMPLEX 45 MIN: CPT

## 2020-01-01 PROCEDURE — 88305 TISSUE EXAM BY PATHOLOGIST: CPT | Performed by: PATHOLOGY

## 2020-01-01 PROCEDURE — 88305 TISSUE EXAM BY PATHOLOGIST: ICD-10-PCS | Mod: 26,,, | Performed by: PATHOLOGY

## 2020-01-01 PROCEDURE — 80076 HEPATIC FUNCTION PANEL: CPT

## 2020-01-01 PROCEDURE — 37000008 HC ANESTHESIA 1ST 15 MINUTES: Performed by: INTERNAL MEDICINE

## 2020-01-01 PROCEDURE — 3288F FALL RISK ASSESSMENT DOCD: CPT | Mod: CPTII,S$GLB,, | Performed by: INTERNAL MEDICINE

## 2020-01-01 PROCEDURE — 84443 ASSAY THYROID STIM HORMONE: CPT

## 2020-01-01 RX ORDER — MIRTAZAPINE 15 MG/1
15 TABLET, FILM COATED ORAL NIGHTLY
Status: DISCONTINUED | OUTPATIENT
Start: 2020-01-01 | End: 2020-01-01

## 2020-01-01 RX ORDER — ONDANSETRON 4 MG/1
4 TABLET, ORALLY DISINTEGRATING ORAL EVERY 6 HOURS PRN
Status: DISCONTINUED | OUTPATIENT
Start: 2020-01-01 | End: 2020-01-01

## 2020-01-01 RX ORDER — INSULIN ASPART 100 [IU]/ML
0-5 INJECTION, SOLUTION INTRAVENOUS; SUBCUTANEOUS
Status: DISCONTINUED | OUTPATIENT
Start: 2020-01-01 | End: 2020-01-01

## 2020-01-01 RX ORDER — POTASSIUM CHLORIDE 20 MEQ/1
40 TABLET, EXTENDED RELEASE ORAL EVERY 4 HOURS
Status: COMPLETED | OUTPATIENT
Start: 2020-01-01 | End: 2020-01-01

## 2020-01-01 RX ORDER — MIDODRINE HYDROCHLORIDE 5 MG/1
5 TABLET ORAL 3 TIMES DAILY
Status: DISCONTINUED | OUTPATIENT
Start: 2020-01-01 | End: 2020-01-01

## 2020-01-01 RX ORDER — FUROSEMIDE 10 MG/ML
20 INJECTION INTRAMUSCULAR; INTRAVENOUS ONCE
Status: COMPLETED | OUTPATIENT
Start: 2020-01-01 | End: 2020-01-01

## 2020-01-01 RX ORDER — SODIUM,POTASSIUM PHOSPHATES 280-250MG
2 POWDER IN PACKET (EA) ORAL
Status: DISCONTINUED | OUTPATIENT
Start: 2020-01-01 | End: 2020-01-01

## 2020-01-01 RX ORDER — POLYETHYLENE GLYCOL 3350 17 G/17G
17 POWDER, FOR SOLUTION ORAL 2 TIMES DAILY PRN
Status: DISCONTINUED | OUTPATIENT
Start: 2020-01-01 | End: 2020-01-01 | Stop reason: HOSPADM

## 2020-01-01 RX ORDER — WARFARIN 1 MG/1
1 TABLET ORAL EVERY OTHER DAY
Status: DISCONTINUED | OUTPATIENT
Start: 2020-01-01 | End: 2020-01-01

## 2020-01-01 RX ORDER — BISACODYL 10 MG
10 SUPPOSITORY, RECTAL RECTAL DAILY
Status: DISCONTINUED | OUTPATIENT
Start: 2020-01-01 | End: 2020-01-01

## 2020-01-01 RX ORDER — DICYCLOMINE HYDROCHLORIDE 10 MG/1
10 CAPSULE ORAL 3 TIMES DAILY
Status: DISCONTINUED | OUTPATIENT
Start: 2020-01-01 | End: 2020-01-01

## 2020-01-01 RX ORDER — ENOXAPARIN SODIUM 100 MG/ML
1 INJECTION SUBCUTANEOUS EVERY 12 HOURS
Status: DISCONTINUED | OUTPATIENT
Start: 2020-01-01 | End: 2020-01-01

## 2020-01-01 RX ORDER — POLYETHYLENE GLYCOL 3350 17 G/17G
17 POWDER, FOR SOLUTION ORAL DAILY PRN
Status: DISCONTINUED | OUTPATIENT
Start: 2020-01-01 | End: 2020-01-01 | Stop reason: HOSPADM

## 2020-01-01 RX ORDER — POTASSIUM CHLORIDE 750 MG/1
30 CAPSULE, EXTENDED RELEASE ORAL DAILY
Status: DISCONTINUED | OUTPATIENT
Start: 2020-01-01 | End: 2020-01-01

## 2020-01-01 RX ORDER — DILTIAZEM HYDROCHLORIDE 120 MG/1
120 CAPSULE, COATED, EXTENDED RELEASE ORAL DAILY
Status: DISCONTINUED | OUTPATIENT
Start: 2020-01-01 | End: 2020-01-01

## 2020-01-01 RX ORDER — DRONABINOL 2.5 MG/1
2.5 CAPSULE ORAL 2 TIMES DAILY
Status: DISCONTINUED | OUTPATIENT
Start: 2020-01-01 | End: 2020-01-01

## 2020-01-01 RX ORDER — FUROSEMIDE 10 MG/ML
40 INJECTION INTRAMUSCULAR; INTRAVENOUS 2 TIMES DAILY
Status: DISCONTINUED | OUTPATIENT
Start: 2020-01-01 | End: 2020-01-01

## 2020-01-01 RX ORDER — METHOCARBAMOL 500 MG/1
500 TABLET, FILM COATED ORAL ONCE
Status: COMPLETED | OUTPATIENT
Start: 2020-01-01 | End: 2020-01-01

## 2020-01-01 RX ORDER — CEFEPIME HYDROCHLORIDE 2 G/1
2 INJECTION, POWDER, FOR SOLUTION INTRAVENOUS
Status: DISCONTINUED | OUTPATIENT
Start: 2020-01-01 | End: 2020-01-01

## 2020-01-01 RX ORDER — MIDODRINE HYDROCHLORIDE 5 MG/1
10 TABLET ORAL NIGHTLY
Status: DISCONTINUED | OUTPATIENT
Start: 2020-01-01 | End: 2020-01-01 | Stop reason: HOSPADM

## 2020-01-01 RX ORDER — NAPROXEN SODIUM 220 MG/1
81 TABLET, FILM COATED ORAL DAILY
Status: DISCONTINUED | OUTPATIENT
Start: 2020-01-01 | End: 2020-01-01

## 2020-01-01 RX ORDER — PANTOPRAZOLE SODIUM 40 MG/1
40 TABLET, DELAYED RELEASE ORAL DAILY
Status: DISCONTINUED | OUTPATIENT
Start: 2020-01-01 | End: 2020-01-01 | Stop reason: HOSPADM

## 2020-01-01 RX ORDER — SODIUM,POTASSIUM PHOSPHATES 280-250MG
1 POWDER IN PACKET (EA) ORAL
Status: DISPENSED | OUTPATIENT
Start: 2020-01-01 | End: 2020-01-01

## 2020-01-01 RX ORDER — INSULIN ASPART 100 [IU]/ML
0-5 INJECTION, SOLUTION INTRAVENOUS; SUBCUTANEOUS
Status: DISCONTINUED | OUTPATIENT
Start: 2020-01-01 | End: 2020-01-01 | Stop reason: HOSPADM

## 2020-01-01 RX ORDER — CALCIUM CARBONATE 200(500)MG
500 TABLET,CHEWABLE ORAL ONCE
Status: DISCONTINUED | OUTPATIENT
Start: 2020-01-01 | End: 2020-01-01 | Stop reason: HOSPADM

## 2020-01-01 RX ORDER — AMOXICILLIN AND CLAVULANATE POTASSIUM 875; 125 MG/1; MG/1
1 TABLET, FILM COATED ORAL EVERY 12 HOURS
Status: DISCONTINUED | OUTPATIENT
Start: 2020-01-01 | End: 2020-01-01

## 2020-01-01 RX ORDER — LIDOCAINE 50 MG/G
PATCH TOPICAL
COMMUNITY

## 2020-01-01 RX ORDER — POTASSIUM CHLORIDE 20 MEQ/1
40 TABLET, EXTENDED RELEASE ORAL
Status: COMPLETED | OUTPATIENT
Start: 2020-01-01 | End: 2020-01-01

## 2020-01-01 RX ORDER — ONDANSETRON 2 MG/ML
INJECTION INTRAMUSCULAR; INTRAVENOUS
Status: COMPLETED
Start: 2020-01-01 | End: 2020-01-01

## 2020-01-01 RX ORDER — POTASSIUM CHLORIDE 20 MEQ/1
40 TABLET, EXTENDED RELEASE ORAL 3 TIMES DAILY
Status: COMPLETED | OUTPATIENT
Start: 2020-01-01 | End: 2020-01-01

## 2020-01-01 RX ORDER — PHENYLEPHRINE HYDROCHLORIDE 10 MG/ML
INJECTION INTRAVENOUS
Status: DISCONTINUED | OUTPATIENT
Start: 2020-01-01 | End: 2020-01-01

## 2020-01-01 RX ORDER — QUETIAPINE FUMARATE 25 MG/1
25 TABLET, FILM COATED ORAL ONCE AS NEEDED
Status: COMPLETED | OUTPATIENT
Start: 2020-01-01 | End: 2020-01-01

## 2020-01-01 RX ORDER — NOREPINEPHRINE BITARTRATE/D5W 4MG/250ML
0.02 PLASTIC BAG, INJECTION (ML) INTRAVENOUS CONTINUOUS
Status: DISCONTINUED | OUTPATIENT
Start: 2020-01-01 | End: 2020-01-01

## 2020-01-01 RX ORDER — ONDANSETRON 2 MG/ML
4 INJECTION INTRAMUSCULAR; INTRAVENOUS ONCE
Status: COMPLETED | OUTPATIENT
Start: 2020-01-01 | End: 2020-01-01

## 2020-01-01 RX ORDER — DIGOXIN 125 MCG
0.12 TABLET ORAL DAILY
Status: DISCONTINUED | OUTPATIENT
Start: 2020-01-01 | End: 2020-01-01 | Stop reason: HOSPADM

## 2020-01-01 RX ORDER — FUROSEMIDE 40 MG/1
40 TABLET ORAL 2 TIMES DAILY
Status: DISCONTINUED | OUTPATIENT
Start: 2020-01-01 | End: 2020-01-01 | Stop reason: HOSPADM

## 2020-01-01 RX ORDER — IPRATROPIUM BROMIDE AND ALBUTEROL SULFATE 2.5; .5 MG/3ML; MG/3ML
3 SOLUTION RESPIRATORY (INHALATION) EVERY 4 HOURS PRN
Status: DISCONTINUED | OUTPATIENT
Start: 2020-01-01 | End: 2020-01-01

## 2020-01-01 RX ORDER — POLYETHYLENE GLYCOL 3350 17 G/17G
17 POWDER, FOR SOLUTION ORAL DAILY
Status: DISCONTINUED | OUTPATIENT
Start: 2020-01-01 | End: 2020-01-01

## 2020-01-01 RX ORDER — ATORVASTATIN CALCIUM 20 MG/1
40 TABLET, FILM COATED ORAL NIGHTLY
Status: DISCONTINUED | OUTPATIENT
Start: 2020-01-01 | End: 2020-01-01

## 2020-01-01 RX ORDER — IPRATROPIUM BROMIDE AND ALBUTEROL SULFATE 2.5; .5 MG/3ML; MG/3ML
3 SOLUTION RESPIRATORY (INHALATION) EVERY 4 HOURS PRN
Status: DISCONTINUED | OUTPATIENT
Start: 2020-01-01 | End: 2020-01-01 | Stop reason: HOSPADM

## 2020-01-01 RX ORDER — SODIUM CHLORIDE 9 MG/ML
INJECTION, SOLUTION INTRAVENOUS CONTINUOUS PRN
Status: DISCONTINUED | OUTPATIENT
Start: 2020-01-01 | End: 2020-01-01

## 2020-01-01 RX ORDER — FUROSEMIDE 40 MG/1
40 TABLET ORAL DAILY
Status: DISCONTINUED | OUTPATIENT
Start: 2020-01-01 | End: 2020-01-01

## 2020-01-01 RX ORDER — LINDANE 10 MG/ML
SHAMPOO, SUSPENSION TOPICAL ONCE
Status: COMPLETED | OUTPATIENT
Start: 2020-01-01 | End: 2020-01-01

## 2020-01-01 RX ORDER — AMIODARONE HYDROCHLORIDE 200 MG/1
200 TABLET ORAL DAILY
Status: DISCONTINUED | OUTPATIENT
Start: 2020-04-21 | End: 2020-01-01

## 2020-01-01 RX ORDER — AMIODARONE HYDROCHLORIDE 200 MG/1
400 TABLET ORAL 2 TIMES DAILY
Status: DISCONTINUED | OUTPATIENT
Start: 2020-01-01 | End: 2020-01-01

## 2020-01-01 RX ORDER — METOPROLOL SUCCINATE 25 MG/1
50 TABLET, EXTENDED RELEASE ORAL 2 TIMES DAILY
Status: DISCONTINUED | OUTPATIENT
Start: 2020-01-01 | End: 2020-01-01

## 2020-01-01 RX ORDER — BENZONATATE 100 MG/1
100 CAPSULE ORAL 3 TIMES DAILY PRN
Start: 2020-01-01

## 2020-01-01 RX ORDER — WARFARIN 1 MG/1
1 TABLET ORAL ONCE
Status: DISCONTINUED | OUTPATIENT
Start: 2020-01-01 | End: 2020-01-01

## 2020-01-01 RX ORDER — CEFTRIAXONE 1 G/1
1 INJECTION, POWDER, FOR SOLUTION INTRAMUSCULAR; INTRAVENOUS
Status: COMPLETED | OUTPATIENT
Start: 2020-01-01 | End: 2020-01-01

## 2020-01-01 RX ORDER — POTASSIUM CHLORIDE 750 MG/1
30 CAPSULE, EXTENDED RELEASE ORAL ONCE
Status: COMPLETED | OUTPATIENT
Start: 2020-01-01 | End: 2020-01-01

## 2020-01-01 RX ORDER — DIGOXIN 0.25 MG/ML
500 INJECTION INTRAMUSCULAR; INTRAVENOUS ONCE
Status: COMPLETED | OUTPATIENT
Start: 2020-01-01 | End: 2020-01-01

## 2020-01-01 RX ORDER — POTASSIUM CHLORIDE 20 MEQ/1
40 TABLET, EXTENDED RELEASE ORAL ONCE
Status: COMPLETED | OUTPATIENT
Start: 2020-01-01 | End: 2020-01-01

## 2020-01-01 RX ORDER — QUETIAPINE FUMARATE 25 MG/1
25 TABLET, FILM COATED ORAL NIGHTLY
Status: DISCONTINUED | OUTPATIENT
Start: 2020-01-01 | End: 2020-01-01

## 2020-01-01 RX ORDER — MORPHINE SULFATE ORAL SOLUTION 10 MG/5ML
2.5 SOLUTION ORAL EVERY 4 HOURS PRN
Status: DISCONTINUED | OUTPATIENT
Start: 2020-01-01 | End: 2020-01-01 | Stop reason: HOSPADM

## 2020-01-01 RX ORDER — DIGOXIN 125 MCG
0.12 TABLET ORAL DAILY
Qty: 30 TABLET | Refills: 11 | Status: ON HOLD
Start: 2020-01-01 | End: 2020-01-01 | Stop reason: HOSPADM

## 2020-01-01 RX ORDER — LORAZEPAM 2 MG/ML
1 CONCENTRATE ORAL EVERY 8 HOURS PRN
Status: DISCONTINUED | OUTPATIENT
Start: 2020-01-01 | End: 2020-01-01

## 2020-01-01 RX ORDER — PANTOPRAZOLE SODIUM 40 MG/10ML
80 INJECTION, POWDER, LYOPHILIZED, FOR SOLUTION INTRAVENOUS DAILY
Status: COMPLETED | OUTPATIENT
Start: 2020-01-01 | End: 2020-01-01

## 2020-01-01 RX ORDER — FUROSEMIDE 20 MG/1
40 TABLET ORAL 2 TIMES DAILY
Start: 2020-01-01

## 2020-01-01 RX ORDER — HYOSCYAMINE SULFATE 0.12 MG/ML
0.12 LIQUID ORAL EVERY 6 HOURS PRN
Status: DISCONTINUED | OUTPATIENT
Start: 2020-01-01 | End: 2020-01-01

## 2020-01-01 RX ORDER — AMIODARONE HYDROCHLORIDE 150 MG/3ML
150 INJECTION, SOLUTION INTRAVENOUS
Status: COMPLETED | OUTPATIENT
Start: 2020-01-01 | End: 2020-01-01

## 2020-01-01 RX ORDER — MIDODRINE HYDROCHLORIDE 5 MG/1
10 TABLET ORAL NIGHTLY
Status: DISCONTINUED | OUTPATIENT
Start: 2020-01-01 | End: 2020-01-01

## 2020-01-01 RX ORDER — WARFARIN 1 MG/1
1 TABLET ORAL ONCE
Status: COMPLETED | OUTPATIENT
Start: 2020-01-01 | End: 2020-01-01

## 2020-01-01 RX ORDER — SENNOSIDES 8.6 MG/1
8.6 TABLET ORAL DAILY PRN
Status: DISCONTINUED | OUTPATIENT
Start: 2020-01-01 | End: 2020-01-01 | Stop reason: HOSPADM

## 2020-01-01 RX ORDER — DRONABINOL 2.5 MG/1
2.5 CAPSULE ORAL 2 TIMES DAILY
Start: 2020-01-01

## 2020-01-01 RX ORDER — DIGOXIN 0.25 MG/ML
250 INJECTION INTRAMUSCULAR; INTRAVENOUS ONCE
Status: COMPLETED | OUTPATIENT
Start: 2020-01-01 | End: 2020-01-01

## 2020-01-01 RX ORDER — DOXYLAMINE SUCCINATE 25 MG
TABLET ORAL 2 TIMES DAILY
Status: DISCONTINUED | OUTPATIENT
Start: 2020-01-01 | End: 2020-01-01 | Stop reason: HOSPADM

## 2020-01-01 RX ORDER — GLUCAGON 1 MG
1 KIT INJECTION
Status: DISCONTINUED | OUTPATIENT
Start: 2020-01-01 | End: 2020-01-01 | Stop reason: HOSPADM

## 2020-01-01 RX ORDER — HYDROXYCHLOROQUINE SULFATE 200 MG/1
400 TABLET, FILM COATED ORAL 2 TIMES DAILY
Status: COMPLETED | OUTPATIENT
Start: 2020-01-01 | End: 2020-01-01

## 2020-01-01 RX ORDER — AMOXICILLIN 250 MG
2 CAPSULE ORAL 2 TIMES DAILY
Status: DISCONTINUED | OUTPATIENT
Start: 2020-01-01 | End: 2020-01-01 | Stop reason: HOSPADM

## 2020-01-01 RX ORDER — LANOLIN ALCOHOL/MO/W.PET/CERES
400 CREAM (GRAM) TOPICAL 2 TIMES DAILY
Status: DISCONTINUED | OUTPATIENT
Start: 2020-01-01 | End: 2020-01-01 | Stop reason: HOSPADM

## 2020-01-01 RX ORDER — AZITHROMYCIN 250 MG/1
500 TABLET, FILM COATED ORAL DAILY
Status: DISCONTINUED | OUTPATIENT
Start: 2020-01-01 | End: 2020-01-01

## 2020-01-01 RX ORDER — ENOXAPARIN SODIUM 100 MG/ML
40 INJECTION SUBCUTANEOUS EVERY 24 HOURS
Status: DISCONTINUED | OUTPATIENT
Start: 2020-01-01 | End: 2020-01-01

## 2020-01-01 RX ORDER — FERROUS SULFATE, DRIED 160(50) MG
1 TABLET, EXTENDED RELEASE ORAL 2 TIMES DAILY
Status: DISCONTINUED | OUTPATIENT
Start: 2020-01-01 | End: 2020-01-01 | Stop reason: HOSPADM

## 2020-01-01 RX ORDER — DIGOXIN 125 MCG
0.12 TABLET ORAL DAILY
Status: DISCONTINUED | OUTPATIENT
Start: 2020-01-01 | End: 2020-01-01

## 2020-01-01 RX ORDER — HYDROXYZINE HYDROCHLORIDE 25 MG/1
25 TABLET, FILM COATED ORAL ONCE
Status: COMPLETED | OUTPATIENT
Start: 2020-01-01 | End: 2020-01-01

## 2020-01-01 RX ORDER — POTASSIUM CHLORIDE 20 MEQ/1
60 TABLET, EXTENDED RELEASE ORAL ONCE
Status: COMPLETED | OUTPATIENT
Start: 2020-01-01 | End: 2020-01-01

## 2020-01-01 RX ORDER — SODIUM,POTASSIUM PHOSPHATES 280-250MG
1 POWDER IN PACKET (EA) ORAL
Status: DISCONTINUED | OUTPATIENT
Start: 2020-01-01 | End: 2020-01-01 | Stop reason: HOSPADM

## 2020-01-01 RX ORDER — OXYCODONE HYDROCHLORIDE 5 MG/1
5 TABLET ORAL EVERY 6 HOURS PRN
Status: DISCONTINUED | OUTPATIENT
Start: 2020-01-01 | End: 2020-01-01

## 2020-01-01 RX ORDER — POTASSIUM CHLORIDE 750 MG/1
30 CAPSULE, EXTENDED RELEASE ORAL DAILY
Start: 2020-01-01

## 2020-01-01 RX ORDER — WARFARIN 1 MG/1
1 TABLET ORAL DAILY
Status: DISCONTINUED | OUTPATIENT
Start: 2020-01-01 | End: 2020-01-01 | Stop reason: HOSPADM

## 2020-01-01 RX ORDER — ONDANSETRON 2 MG/ML
4 INJECTION INTRAMUSCULAR; INTRAVENOUS EVERY 8 HOURS PRN
Status: DISCONTINUED | OUTPATIENT
Start: 2020-01-01 | End: 2020-01-01

## 2020-01-01 RX ORDER — POTASSIUM CHLORIDE 20 MEQ/15ML
40 SOLUTION ORAL EVERY 4 HOURS
Status: DISCONTINUED | OUTPATIENT
Start: 2020-01-01 | End: 2020-01-01

## 2020-01-01 RX ORDER — PANTOPRAZOLE SODIUM 40 MG/1
40 TABLET, DELAYED RELEASE ORAL DAILY
Status: DISCONTINUED | OUTPATIENT
Start: 2020-01-01 | End: 2020-01-01

## 2020-01-01 RX ORDER — SYRING-NEEDL,DISP,INSUL,0.3 ML 29 G X1/2"
296 SYRINGE, EMPTY DISPOSABLE MISCELLANEOUS ONCE
Status: DISCONTINUED | OUTPATIENT
Start: 2020-01-01 | End: 2020-01-01

## 2020-01-01 RX ORDER — POLYETHYLENE GLYCOL 3350 17 G/17G
17 POWDER, FOR SOLUTION ORAL DAILY
Start: 2020-01-01

## 2020-01-01 RX ORDER — WARFARIN 1 MG/1
1 TABLET ORAL DAILY
Status: DISCONTINUED | OUTPATIENT
Start: 2020-01-01 | End: 2020-01-01

## 2020-01-01 RX ORDER — VANCOMYCIN HCL IN 5 % DEXTROSE 1G/250ML
1000 PLASTIC BAG, INJECTION (ML) INTRAVENOUS
Status: DISCONTINUED | OUTPATIENT
Start: 2020-01-01 | End: 2020-01-01

## 2020-01-01 RX ORDER — DICYCLOMINE HYDROCHLORIDE 10 MG/1
10 CAPSULE ORAL 3 TIMES DAILY
Start: 2020-01-01 | End: 2020-04-17

## 2020-01-01 RX ORDER — MIRTAZAPINE 15 MG/1
15 TABLET, FILM COATED ORAL NIGHTLY
Status: DISCONTINUED | OUTPATIENT
Start: 2020-01-01 | End: 2020-01-01 | Stop reason: HOSPADM

## 2020-01-01 RX ORDER — SODIUM CHLORIDE 9 MG/ML
INJECTION, SOLUTION INTRAVENOUS CONTINUOUS
Status: DISCONTINUED | OUTPATIENT
Start: 2020-01-01 | End: 2020-01-01

## 2020-01-01 RX ORDER — SENNOSIDES 8.6 MG/1
1 TABLET ORAL DAILY
COMMUNITY

## 2020-01-01 RX ORDER — SODIUM CHLORIDE 0.9 % (FLUSH) 0.9 %
10 SYRINGE (ML) INJECTION
Status: DISCONTINUED | OUTPATIENT
Start: 2020-01-01 | End: 2020-01-01 | Stop reason: HOSPADM

## 2020-01-01 RX ORDER — AMIODARONE HYDROCHLORIDE 200 MG/1
200 TABLET ORAL 2 TIMES DAILY
Status: DISCONTINUED | OUTPATIENT
Start: 2020-01-01 | End: 2020-01-01

## 2020-01-01 RX ORDER — HYDROCORTISONE ACETATE 25 MG/1
25 SUPPOSITORY RECTAL 2 TIMES DAILY
Status: DISCONTINUED | OUTPATIENT
Start: 2020-01-01 | End: 2020-01-01

## 2020-01-01 RX ORDER — AMIODARONE HYDROCHLORIDE 200 MG/1
200 TABLET ORAL DAILY
Status: DISCONTINUED | OUTPATIENT
Start: 2020-01-01 | End: 2020-01-01

## 2020-01-01 RX ORDER — HYDROMORPHONE HYDROCHLORIDE 1 MG/ML
0.2 INJECTION, SOLUTION INTRAMUSCULAR; INTRAVENOUS; SUBCUTANEOUS EVERY 5 MIN PRN
Status: DISCONTINUED | OUTPATIENT
Start: 2020-01-01 | End: 2020-01-01 | Stop reason: HOSPADM

## 2020-01-01 RX ORDER — FUROSEMIDE 40 MG/1
40 TABLET ORAL 2 TIMES DAILY
Status: DISCONTINUED | OUTPATIENT
Start: 2020-01-01 | End: 2020-01-01

## 2020-01-01 RX ORDER — METOPROLOL TARTRATE 25 MG/1
25 TABLET, FILM COATED ORAL 2 TIMES DAILY
Status: DISCONTINUED | OUTPATIENT
Start: 2020-01-01 | End: 2020-01-01

## 2020-01-01 RX ORDER — HEPARIN SODIUM,PORCINE/D5W 25000/250
12 INTRAVENOUS SOLUTION INTRAVENOUS CONTINUOUS
Status: DISCONTINUED | OUTPATIENT
Start: 2020-01-01 | End: 2020-01-01

## 2020-01-01 RX ORDER — MORPHINE SULFATE 2 MG/ML
INJECTION, SOLUTION INTRAMUSCULAR; INTRAVENOUS
Status: COMPLETED
Start: 2020-01-01 | End: 2020-01-01

## 2020-01-01 RX ORDER — POLYETHYLENE GLYCOL 3350, SODIUM SULFATE ANHYDROUS, SODIUM BICARBONATE, SODIUM CHLORIDE, POTASSIUM CHLORIDE 236; 22.74; 6.74; 5.86; 2.97 G/4L; G/4L; G/4L; G/4L; G/4L
4000 POWDER, FOR SOLUTION ORAL ONCE
Status: COMPLETED | OUTPATIENT
Start: 2020-01-01 | End: 2020-01-01

## 2020-01-01 RX ORDER — SODIUM CHLORIDE 450 MG/100ML
INJECTION, SOLUTION INTRAVENOUS CONTINUOUS
Status: DISCONTINUED | OUTPATIENT
Start: 2020-01-01 | End: 2020-01-01

## 2020-01-01 RX ORDER — COSYNTROPIN 0.25 MG/ML
0.25 INJECTION, POWDER, FOR SOLUTION INTRAMUSCULAR; INTRAVENOUS ONCE
Status: COMPLETED | OUTPATIENT
Start: 2020-01-01 | End: 2020-01-01

## 2020-01-01 RX ORDER — MAGNESIUM SULFATE HEPTAHYDRATE 40 MG/ML
2 INJECTION, SOLUTION INTRAVENOUS
Status: DISCONTINUED | OUTPATIENT
Start: 2020-01-01 | End: 2020-01-01

## 2020-01-01 RX ORDER — CALCIUM CHLORIDE INJECTION 100 MG/ML
0.5 INJECTION, SOLUTION INTRAVENOUS ONCE
Status: DISCONTINUED | OUTPATIENT
Start: 2020-01-01 | End: 2020-01-01

## 2020-01-01 RX ORDER — ERGOCALCIFEROL 1.25 MG/1
50000 CAPSULE ORAL
Status: DISCONTINUED | OUTPATIENT
Start: 2020-01-01 | End: 2020-01-01

## 2020-01-01 RX ORDER — HALOPERIDOL 5 MG/ML
1 INJECTION INTRAMUSCULAR EVERY 4 HOURS PRN
Status: DISCONTINUED | OUTPATIENT
Start: 2020-01-01 | End: 2020-01-01 | Stop reason: HOSPADM

## 2020-01-01 RX ORDER — LORAZEPAM 2 MG/ML
1 INJECTION INTRAMUSCULAR ONCE AS NEEDED
Status: DISCONTINUED | OUTPATIENT
Start: 2020-01-01 | End: 2020-01-01

## 2020-01-01 RX ORDER — LORAZEPAM 2 MG/ML
1 INJECTION INTRAMUSCULAR ONCE
Status: DISCONTINUED | OUTPATIENT
Start: 2020-01-01 | End: 2020-01-01

## 2020-01-01 RX ORDER — METHOCARBAMOL 500 MG/1
500 TABLET, FILM COATED ORAL ONCE AS NEEDED
Status: COMPLETED | OUTPATIENT
Start: 2020-01-01 | End: 2020-01-01

## 2020-01-01 RX ORDER — MIDODRINE HYDROCHLORIDE 5 MG/1
5 TABLET ORAL 2 TIMES DAILY
Status: DISCONTINUED | OUTPATIENT
Start: 2020-01-01 | End: 2020-01-01

## 2020-01-01 RX ORDER — METOPROLOL SUCCINATE 50 MG/1
50 TABLET, EXTENDED RELEASE ORAL 2 TIMES DAILY
Status: ON HOLD | COMMUNITY
End: 2020-01-01 | Stop reason: HOSPADM

## 2020-01-01 RX ORDER — MIDODRINE HYDROCHLORIDE 5 MG/1
5 TABLET ORAL ONCE
Status: COMPLETED | OUTPATIENT
Start: 2020-01-01 | End: 2020-01-01

## 2020-01-01 RX ORDER — PSEUDOEPHEDRINE/ACETAMINOPHEN 30MG-500MG
100 TABLET ORAL ONCE
Status: DISCONTINUED | OUTPATIENT
Start: 2020-01-01 | End: 2020-01-01

## 2020-01-01 RX ORDER — CEFTRIAXONE 1 G/1
1 INJECTION, POWDER, FOR SOLUTION INTRAMUSCULAR; INTRAVENOUS
Status: DISCONTINUED | OUTPATIENT
Start: 2020-01-01 | End: 2020-01-01

## 2020-01-01 RX ORDER — FUROSEMIDE 10 MG/ML
60 INJECTION INTRAMUSCULAR; INTRAVENOUS ONCE
Status: COMPLETED | OUTPATIENT
Start: 2020-01-01 | End: 2020-01-01

## 2020-01-01 RX ORDER — MAGNESIUM SULFATE HEPTAHYDRATE 40 MG/ML
2 INJECTION, SOLUTION INTRAVENOUS
Status: COMPLETED | OUTPATIENT
Start: 2020-01-01 | End: 2020-01-01

## 2020-01-01 RX ORDER — WARFARIN 2 MG/1
2 TABLET ORAL ONCE
Status: COMPLETED | OUTPATIENT
Start: 2020-01-01 | End: 2020-01-01

## 2020-01-01 RX ORDER — METOCLOPRAMIDE HYDROCHLORIDE 5 MG/ML
10 INJECTION INTRAMUSCULAR; INTRAVENOUS EVERY 6 HOURS PRN
Status: DISCONTINUED | OUTPATIENT
Start: 2020-01-01 | End: 2020-01-01

## 2020-01-01 RX ORDER — PANTOPRAZOLE SODIUM 40 MG/1
40 TABLET, DELAYED RELEASE ORAL DAILY
Qty: 30 TABLET | Refills: 11
Start: 2020-01-01 | End: 2021-03-03

## 2020-01-01 RX ORDER — BENZONATATE 100 MG/1
100 CAPSULE ORAL 3 TIMES DAILY PRN
Status: DISCONTINUED | OUTPATIENT
Start: 2020-01-01 | End: 2020-01-01 | Stop reason: HOSPADM

## 2020-01-01 RX ORDER — PROPOFOL 10 MG/ML
VIAL (ML) INTRAVENOUS
Status: DISCONTINUED | OUTPATIENT
Start: 2020-01-01 | End: 2020-01-01

## 2020-01-01 RX ORDER — MIRTAZAPINE 15 MG/1
15 TABLET, FILM COATED ORAL NIGHTLY
COMMUNITY

## 2020-01-01 RX ORDER — FUROSEMIDE 20 MG/1
20 TABLET ORAL 2 TIMES DAILY
Status: DISCONTINUED | OUTPATIENT
Start: 2020-01-01 | End: 2020-01-01

## 2020-01-01 RX ORDER — IBUPROFEN 200 MG
16 TABLET ORAL
Status: DISCONTINUED | OUTPATIENT
Start: 2020-01-01 | End: 2020-01-01 | Stop reason: HOSPADM

## 2020-01-01 RX ORDER — FUROSEMIDE 10 MG/ML
40 INJECTION INTRAMUSCULAR; INTRAVENOUS 3 TIMES DAILY
Status: DISCONTINUED | OUTPATIENT
Start: 2020-01-01 | End: 2020-01-01

## 2020-01-01 RX ORDER — KETOROLAC TROMETHAMINE 15 MG/ML
15 INJECTION, SOLUTION INTRAMUSCULAR; INTRAVENOUS ONCE AS NEEDED
Status: COMPLETED | OUTPATIENT
Start: 2020-01-01 | End: 2020-01-01

## 2020-01-01 RX ORDER — WARFARIN 1 MG/1
1 TABLET ORAL
Status: DISCONTINUED | OUTPATIENT
Start: 2020-01-01 | End: 2020-01-01

## 2020-01-01 RX ORDER — SIMETHICONE 80 MG
80 TABLET,CHEWABLE ORAL 3 TIMES DAILY PRN
Refills: 0
Start: 2020-01-01

## 2020-01-01 RX ORDER — FUROSEMIDE 10 MG/ML
40 INJECTION INTRAMUSCULAR; INTRAVENOUS ONCE
Status: COMPLETED | OUTPATIENT
Start: 2020-01-01 | End: 2020-01-01

## 2020-01-01 RX ORDER — FUROSEMIDE 20 MG/1
20 TABLET ORAL DAILY
Status: DISCONTINUED | OUTPATIENT
Start: 2020-01-01 | End: 2020-01-01

## 2020-01-01 RX ORDER — SODIUM CHLORIDE 0.9 % (FLUSH) 0.9 %
5 SYRINGE (ML) INJECTION
Status: DISCONTINUED | OUTPATIENT
Start: 2020-01-01 | End: 2020-01-01

## 2020-01-01 RX ORDER — FUROSEMIDE 10 MG/ML
40 INJECTION INTRAMUSCULAR; INTRAVENOUS DAILY
Status: DISCONTINUED | OUTPATIENT
Start: 2020-01-01 | End: 2020-01-01

## 2020-01-01 RX ORDER — METOPROLOL TARTRATE 1 MG/ML
5 INJECTION, SOLUTION INTRAVENOUS ONCE
Status: COMPLETED | OUTPATIENT
Start: 2020-01-01 | End: 2020-01-01

## 2020-01-01 RX ORDER — SODIUM CHLORIDE 9 MG/ML
INJECTION, SOLUTION INTRAVENOUS CONTINUOUS
Status: ACTIVE | OUTPATIENT
Start: 2020-01-01 | End: 2020-01-01

## 2020-01-01 RX ORDER — MEPERIDINE HYDROCHLORIDE 25 MG/ML
12.5 INJECTION INTRAMUSCULAR; INTRAVENOUS; SUBCUTANEOUS ONCE AS NEEDED
Status: DISCONTINUED | OUTPATIENT
Start: 2020-01-01 | End: 2020-01-01 | Stop reason: HOSPADM

## 2020-01-01 RX ORDER — FUROSEMIDE 10 MG/ML
40 INJECTION INTRAMUSCULAR; INTRAVENOUS ONCE
Status: DISCONTINUED | OUTPATIENT
Start: 2020-01-01 | End: 2020-01-01

## 2020-01-01 RX ORDER — METOPROLOL TARTRATE 50 MG/1
50 TABLET ORAL 2 TIMES DAILY
Status: DISCONTINUED | OUTPATIENT
Start: 2020-01-01 | End: 2020-01-01

## 2020-01-01 RX ORDER — METRONIDAZOLE 500 MG/1
500 TABLET ORAL EVERY 8 HOURS
Status: DISCONTINUED | OUTPATIENT
Start: 2020-01-01 | End: 2020-01-01 | Stop reason: HOSPADM

## 2020-01-01 RX ORDER — DOXYCYCLINE HYCLATE 100 MG
100 TABLET ORAL EVERY 12 HOURS
Status: COMPLETED | OUTPATIENT
Start: 2020-01-01 | End: 2020-01-01

## 2020-01-01 RX ORDER — ONDANSETRON 8 MG/1
8 TABLET, ORALLY DISINTEGRATING ORAL EVERY 8 HOURS PRN
Status: DISCONTINUED | OUTPATIENT
Start: 2020-01-01 | End: 2020-01-01

## 2020-01-01 RX ORDER — POTASSIUM CHLORIDE 20 MEQ/15ML
40 SOLUTION ORAL ONCE
Status: COMPLETED | OUTPATIENT
Start: 2020-01-01 | End: 2020-01-01

## 2020-01-01 RX ORDER — ONDANSETRON 4 MG/1
4 TABLET, FILM COATED ORAL EVERY 6 HOURS PRN
Status: DISCONTINUED | OUTPATIENT
Start: 2020-01-01 | End: 2020-01-01

## 2020-01-01 RX ORDER — POTASSIUM CHLORIDE 7.45 MG/ML
10 INJECTION INTRAVENOUS
Status: COMPLETED | OUTPATIENT
Start: 2020-01-01 | End: 2020-01-01

## 2020-01-01 RX ORDER — LIDOCAINE HYDROCHLORIDE 20 MG/ML
INJECTION INTRAVENOUS
Status: DISCONTINUED | OUTPATIENT
Start: 2020-01-01 | End: 2020-01-01

## 2020-01-01 RX ORDER — LANOLIN ALCOHOL/MO/W.PET/CERES
400 CREAM (GRAM) TOPICAL DAILY
Status: DISCONTINUED | OUTPATIENT
Start: 2020-01-01 | End: 2020-01-01

## 2020-01-01 RX ORDER — ENOXAPARIN SODIUM 100 MG/ML
1 INJECTION SUBCUTANEOUS 2 TIMES DAILY
Status: DISCONTINUED | OUTPATIENT
Start: 2020-01-01 | End: 2020-01-01

## 2020-01-01 RX ORDER — IBUPROFEN 200 MG
24 TABLET ORAL
Status: DISCONTINUED | OUTPATIENT
Start: 2020-01-01 | End: 2020-01-01

## 2020-01-01 RX ORDER — LOPERAMIDE HYDROCHLORIDE 2 MG/1
2 CAPSULE ORAL 4 TIMES DAILY PRN
Status: DISCONTINUED | OUTPATIENT
Start: 2020-01-01 | End: 2020-01-01

## 2020-01-01 RX ORDER — IPRATROPIUM BROMIDE AND ALBUTEROL SULFATE 2.5; .5 MG/3ML; MG/3ML
3 SOLUTION RESPIRATORY (INHALATION) EVERY 4 HOURS
Status: DISCONTINUED | OUTPATIENT
Start: 2020-01-01 | End: 2020-01-01

## 2020-01-01 RX ORDER — ACETAMINOPHEN 500 MG
1000 TABLET ORAL EVERY 8 HOURS PRN
Status: DISCONTINUED | OUTPATIENT
Start: 2020-01-01 | End: 2020-01-01 | Stop reason: HOSPADM

## 2020-01-01 RX ORDER — ALBUTEROL SULFATE 90 UG/1
2 AEROSOL, METERED RESPIRATORY (INHALATION) EVERY 6 HOURS PRN
Status: DISCONTINUED | OUTPATIENT
Start: 2020-01-01 | End: 2020-01-01

## 2020-01-01 RX ORDER — FUROSEMIDE 80 MG/1
80 TABLET ORAL 2 TIMES DAILY
Status: DISCONTINUED | OUTPATIENT
Start: 2020-01-01 | End: 2020-01-01

## 2020-01-01 RX ORDER — MIDODRINE HYDROCHLORIDE 10 MG/1
10 TABLET ORAL NIGHTLY
Qty: 30 TABLET | Refills: 11
Start: 2020-01-01 | End: 2021-03-02

## 2020-01-01 RX ORDER — POLYETHYLENE GLYCOL 3350 17 G/17G
34 POWDER, FOR SOLUTION ORAL DAILY
Status: DISCONTINUED | OUTPATIENT
Start: 2020-01-01 | End: 2020-01-01

## 2020-01-01 RX ORDER — FUROSEMIDE 10 MG/ML
40 INJECTION INTRAMUSCULAR; INTRAVENOUS
Status: DISCONTINUED | OUTPATIENT
Start: 2020-01-01 | End: 2020-01-01

## 2020-01-01 RX ORDER — IBUPROFEN 200 MG
24 TABLET ORAL
Status: DISCONTINUED | OUTPATIENT
Start: 2020-01-01 | End: 2020-01-01 | Stop reason: HOSPADM

## 2020-01-01 RX ORDER — ACETAMINOPHEN 325 MG/1
650 TABLET ORAL EVERY 4 HOURS PRN
Status: DISCONTINUED | OUTPATIENT
Start: 2020-01-01 | End: 2020-01-01

## 2020-01-01 RX ORDER — HYOSCYAMINE SULFATE 0.12 MG/ML
0.12 LIQUID ORAL EVERY 6 HOURS
Status: DISCONTINUED | OUTPATIENT
Start: 2020-01-01 | End: 2020-01-01

## 2020-01-01 RX ORDER — WARFARIN 1 MG/1
1 TABLET ORAL DAILY
Start: 2020-01-01

## 2020-01-01 RX ORDER — TALC
6 POWDER (GRAM) TOPICAL NIGHTLY PRN
Status: DISCONTINUED | OUTPATIENT
Start: 2020-01-01 | End: 2020-01-01

## 2020-01-01 RX ORDER — DIGOXIN 0.25 MG/ML
250 INJECTION INTRAMUSCULAR; INTRAVENOUS EVERY 6 HOURS
Status: COMPLETED | OUTPATIENT
Start: 2020-01-01 | End: 2020-01-01

## 2020-01-01 RX ORDER — IPRATROPIUM BROMIDE AND ALBUTEROL SULFATE 2.5; .5 MG/3ML; MG/3ML
3 SOLUTION RESPIRATORY (INHALATION)
Status: DISCONTINUED | OUTPATIENT
Start: 2020-01-01 | End: 2020-01-01

## 2020-01-01 RX ORDER — AMOXICILLIN 250 MG
1 CAPSULE ORAL 2 TIMES DAILY
Status: DISCONTINUED | OUTPATIENT
Start: 2020-01-01 | End: 2020-01-01

## 2020-01-01 RX ORDER — WARFARIN 2 MG/1
2 TABLET ORAL ONCE
Status: DISCONTINUED | OUTPATIENT
Start: 2020-01-01 | End: 2020-01-01 | Stop reason: HOSPADM

## 2020-01-01 RX ORDER — TALC
6 POWDER (GRAM) TOPICAL NIGHTLY PRN
Status: DISCONTINUED | OUTPATIENT
Start: 2020-01-01 | End: 2020-01-01 | Stop reason: HOSPADM

## 2020-01-01 RX ORDER — DEXMEDETOMIDINE HYDROCHLORIDE 4 UG/ML
INJECTION, SOLUTION INTRAVENOUS
Status: COMPLETED
Start: 2020-01-01 | End: 2020-01-01

## 2020-01-01 RX ORDER — DIGOXIN 0.25 MG/ML
250 INJECTION INTRAMUSCULAR; INTRAVENOUS EVERY 6 HOURS
Status: DISCONTINUED | OUTPATIENT
Start: 2020-01-01 | End: 2020-01-01

## 2020-01-01 RX ORDER — FERROUS SULFATE, DRIED 160(50) MG
1 TABLET, EXTENDED RELEASE ORAL 2 TIMES DAILY
Status: CANCELLED
Start: 2020-01-01 | End: 2021-03-02

## 2020-01-01 RX ORDER — DULOXETIN HYDROCHLORIDE 30 MG/1
30 CAPSULE, DELAYED RELEASE ORAL DAILY
Status: DISCONTINUED | OUTPATIENT
Start: 2020-01-01 | End: 2020-01-01

## 2020-01-01 RX ORDER — ACETAMINOPHEN 325 MG/1
650 TABLET ORAL EVERY 4 HOURS PRN
Status: DISCONTINUED | OUTPATIENT
Start: 2020-01-01 | End: 2020-01-01 | Stop reason: HOSPADM

## 2020-01-01 RX ORDER — WARFARIN 2.5 MG/1
2.5 TABLET ORAL DAILY
Status: COMPLETED | OUTPATIENT
Start: 2020-01-01 | End: 2020-01-01

## 2020-01-01 RX ORDER — HYDROXYCHLOROQUINE SULFATE 200 MG/1
400 TABLET, FILM COATED ORAL DAILY
Status: DISCONTINUED | OUTPATIENT
Start: 2020-01-01 | End: 2020-01-01

## 2020-01-01 RX ORDER — IBUPROFEN 200 MG
16 TABLET ORAL
Status: DISCONTINUED | OUTPATIENT
Start: 2020-01-01 | End: 2020-01-01

## 2020-01-01 RX ORDER — DRONABINOL 2.5 MG/1
2.5 CAPSULE ORAL 2 TIMES DAILY
Status: DISCONTINUED | OUTPATIENT
Start: 2020-01-01 | End: 2020-01-01 | Stop reason: HOSPADM

## 2020-01-01 RX ORDER — DIGOXIN 0.25 MG/ML
250 INJECTION INTRAMUSCULAR; INTRAVENOUS ONCE
Status: DISCONTINUED | OUTPATIENT
Start: 2020-01-01 | End: 2020-01-01

## 2020-01-01 RX ORDER — BUDESONIDE 0.5 MG/2ML
0.5 INHALANT ORAL 2 TIMES DAILY
Status: DISCONTINUED | OUTPATIENT
Start: 2020-01-01 | End: 2020-01-01

## 2020-01-01 RX ORDER — CEFTRIAXONE 1 G/1
1 INJECTION, POWDER, FOR SOLUTION INTRAMUSCULAR; INTRAVENOUS DAILY
Status: COMPLETED | OUTPATIENT
Start: 2020-01-01 | End: 2020-01-01

## 2020-01-01 RX ORDER — IPRATROPIUM BROMIDE AND ALBUTEROL SULFATE 2.5; .5 MG/3ML; MG/3ML
3 SOLUTION RESPIRATORY (INHALATION) EVERY 12 HOURS
Status: DISCONTINUED | OUTPATIENT
Start: 2020-01-01 | End: 2020-01-01

## 2020-01-01 RX ORDER — MAGNESIUM SULFATE HEPTAHYDRATE 40 MG/ML
2 INJECTION, SOLUTION INTRAVENOUS
Status: DISPENSED | OUTPATIENT
Start: 2020-01-01 | End: 2020-01-01

## 2020-01-01 RX ORDER — SENNOSIDES 8.6 MG/1
1 TABLET ORAL DAILY
Status: DISCONTINUED | OUTPATIENT
Start: 2020-01-01 | End: 2020-01-01

## 2020-01-01 RX ORDER — PROPOFOL 10 MG/ML
VIAL (ML) INTRAVENOUS CONTINUOUS PRN
Status: DISCONTINUED | OUTPATIENT
Start: 2020-01-01 | End: 2020-01-01

## 2020-01-01 RX ORDER — ONDANSETRON 2 MG/ML
4 INJECTION INTRAMUSCULAR; INTRAVENOUS EVERY 6 HOURS PRN
Status: DISCONTINUED | OUTPATIENT
Start: 2020-01-01 | End: 2020-01-01 | Stop reason: HOSPADM

## 2020-01-01 RX ORDER — POLYETHYLENE GLYCOL 3350 17 G/17G
17 POWDER, FOR SOLUTION ORAL 2 TIMES DAILY
Status: DISCONTINUED | OUTPATIENT
Start: 2020-01-01 | End: 2020-01-01

## 2020-01-01 RX ORDER — BISACODYL 10 MG
10 SUPPOSITORY, RECTAL RECTAL DAILY PRN
Status: DISCONTINUED | OUTPATIENT
Start: 2020-01-01 | End: 2020-01-01 | Stop reason: HOSPADM

## 2020-01-01 RX ORDER — LANOLIN ALCOHOL/MO/W.PET/CERES
400 CREAM (GRAM) TOPICAL ONCE
Status: COMPLETED | OUTPATIENT
Start: 2020-01-01 | End: 2020-01-01

## 2020-01-01 RX ORDER — ONDANSETRON 2 MG/ML
4 INJECTION INTRAMUSCULAR; INTRAVENOUS
Status: COMPLETED | OUTPATIENT
Start: 2020-01-01 | End: 2020-01-01

## 2020-01-01 RX ORDER — GLUCAGON 1 MG
1 KIT INJECTION
Status: DISCONTINUED | OUTPATIENT
Start: 2020-01-01 | End: 2020-01-01

## 2020-01-01 RX ORDER — LORAZEPAM 2 MG/ML
0.25 INJECTION INTRAMUSCULAR ONCE AS NEEDED
Status: DISCONTINUED | OUTPATIENT
Start: 2020-01-01 | End: 2020-01-01 | Stop reason: HOSPADM

## 2020-01-01 RX ORDER — KETOROLAC TROMETHAMINE 15 MG/ML
15 INJECTION, SOLUTION INTRAMUSCULAR; INTRAVENOUS EVERY 6 HOURS PRN
Status: DISPENSED | OUTPATIENT
Start: 2020-01-01 | End: 2020-01-01

## 2020-01-01 RX ORDER — POTASSIUM CHLORIDE 750 MG/1
30 CAPSULE, EXTENDED RELEASE ORAL 3 TIMES DAILY
Status: COMPLETED | OUTPATIENT
Start: 2020-01-01 | End: 2020-01-01

## 2020-01-01 RX ORDER — ENOXAPARIN SODIUM 100 MG/ML
40 INJECTION SUBCUTANEOUS DAILY
Status: DISCONTINUED | OUTPATIENT
Start: 2020-01-01 | End: 2020-01-01

## 2020-01-01 RX ORDER — VANCOMYCIN 2 GRAM/500 ML IN 0.9 % SODIUM CHLORIDE INTRAVENOUS
2000 ONCE
Status: COMPLETED | OUTPATIENT
Start: 2020-01-01 | End: 2020-01-01

## 2020-01-01 RX ORDER — CIPROFLOXACIN 500 MG/1
500 TABLET ORAL EVERY 12 HOURS
Status: DISCONTINUED | OUTPATIENT
Start: 2020-01-01 | End: 2020-01-01

## 2020-01-01 RX ORDER — SYRING-NEEDL,DISP,INSUL,0.3 ML 29 G X1/2"
296 SYRINGE, EMPTY DISPOSABLE MISCELLANEOUS
Status: DISCONTINUED | OUTPATIENT
Start: 2020-01-01 | End: 2020-01-01

## 2020-01-01 RX ORDER — DICYCLOMINE HYDROCHLORIDE 10 MG/1
10 CAPSULE ORAL 3 TIMES DAILY
Status: DISCONTINUED | OUTPATIENT
Start: 2020-01-01 | End: 2020-01-01 | Stop reason: HOSPADM

## 2020-01-01 RX ORDER — MORPHINE SULFATE/0.9% NACL/PF 1 MG/ML
0.5 PLASTIC BAG, INJECTION (ML) INTRAVENOUS CONTINUOUS
Status: DISCONTINUED | OUTPATIENT
Start: 2020-01-01 | End: 2020-01-01 | Stop reason: HOSPADM

## 2020-01-01 RX ORDER — METOPROLOL TARTRATE 1 MG/ML
5 INJECTION, SOLUTION INTRAVENOUS EVERY 5 MIN PRN
Status: DISCONTINUED | OUTPATIENT
Start: 2020-01-01 | End: 2020-01-01

## 2020-01-01 RX ORDER — OLANZAPINE 10 MG/2ML
2.5 INJECTION, POWDER, FOR SOLUTION INTRAMUSCULAR ONCE AS NEEDED
Status: COMPLETED | OUTPATIENT
Start: 2020-01-01 | End: 2020-01-01

## 2020-01-01 RX ORDER — CIPROFLOXACIN 500 MG/1
500 TABLET ORAL EVERY 12 HOURS
Status: DISCONTINUED | OUTPATIENT
Start: 2020-01-01 | End: 2020-01-01 | Stop reason: HOSPADM

## 2020-01-01 RX ORDER — POTASSIUM CHLORIDE 750 MG/1
30 CAPSULE, EXTENDED RELEASE ORAL DAILY
Status: DISCONTINUED | OUTPATIENT
Start: 2020-01-01 | End: 2020-01-01 | Stop reason: HOSPADM

## 2020-01-01 RX ORDER — NOREPINEPHRINE BITARTRATE/D5W 4MG/250ML
PLASTIC BAG, INJECTION (ML) INTRAVENOUS
Status: DISPENSED
Start: 2020-01-01 | End: 2020-01-01

## 2020-01-01 RX ORDER — LANOLIN ALCOHOL/MO/W.PET/CERES
800 CREAM (GRAM) TOPICAL 2 TIMES DAILY
Status: DISCONTINUED | OUTPATIENT
Start: 2020-01-01 | End: 2020-01-01

## 2020-01-01 RX ORDER — ASCORBIC ACID 250 MG
250 TABLET ORAL DAILY
Status: DISCONTINUED | OUTPATIENT
Start: 2020-01-01 | End: 2020-01-01

## 2020-01-01 RX ORDER — ONDANSETRON 2 MG/ML
4 INJECTION INTRAMUSCULAR; INTRAVENOUS ONCE
Status: DISCONTINUED | OUTPATIENT
Start: 2020-01-01 | End: 2020-01-01

## 2020-01-01 RX ORDER — VITAMIN A 3000 MCG
20000 CAPSULE ORAL DAILY
Status: DISCONTINUED | OUTPATIENT
Start: 2020-01-01 | End: 2020-01-01

## 2020-01-01 RX ORDER — PANTOPRAZOLE SODIUM 40 MG/10ML
80 INJECTION, POWDER, LYOPHILIZED, FOR SOLUTION INTRAVENOUS DAILY
Status: DISCONTINUED | OUTPATIENT
Start: 2020-01-01 | End: 2020-01-01

## 2020-01-01 RX ORDER — ACETAZOLAMIDE 250 MG/1
250 TABLET ORAL 2 TIMES DAILY
Status: COMPLETED | OUTPATIENT
Start: 2020-01-01 | End: 2020-01-01

## 2020-01-01 RX ORDER — SIMETHICONE 80 MG
1 TABLET,CHEWABLE ORAL 3 TIMES DAILY PRN
Status: DISCONTINUED | OUTPATIENT
Start: 2020-01-01 | End: 2020-01-01 | Stop reason: HOSPADM

## 2020-01-01 RX ORDER — DIGOXIN 125 MCG
0.25 TABLET ORAL DAILY
Status: DISCONTINUED | OUTPATIENT
Start: 2020-01-01 | End: 2020-01-01

## 2020-01-01 RX ORDER — MIDODRINE HYDROCHLORIDE 2.5 MG/1
2.5 TABLET ORAL 3 TIMES DAILY
Status: DISCONTINUED | OUTPATIENT
Start: 2020-01-01 | End: 2020-01-01

## 2020-01-01 RX ORDER — ONDANSETRON 4 MG/1
4 TABLET, FILM COATED ORAL EVERY 6 HOURS PRN
COMMUNITY

## 2020-01-01 RX ORDER — ONDANSETRON 2 MG/ML
8 INJECTION INTRAMUSCULAR; INTRAVENOUS EVERY 8 HOURS PRN
Status: DISCONTINUED | OUTPATIENT
Start: 2020-01-01 | End: 2020-01-01

## 2020-01-01 RX ORDER — HYDROXYZINE HYDROCHLORIDE 25 MG/1
25 TABLET, FILM COATED ORAL 3 TIMES DAILY PRN
Status: DISCONTINUED | OUTPATIENT
Start: 2020-01-01 | End: 2020-01-01

## 2020-01-01 RX ORDER — ONDANSETRON HYDROCHLORIDE 4 MG/5ML
4 SOLUTION ORAL EVERY 6 HOURS PRN
Status: DISCONTINUED | OUTPATIENT
Start: 2020-01-01 | End: 2020-01-01

## 2020-01-01 RX ORDER — MAGNESIUM SULFATE HEPTAHYDRATE 40 MG/ML
2 INJECTION, SOLUTION INTRAVENOUS ONCE
Status: COMPLETED | OUTPATIENT
Start: 2020-01-01 | End: 2020-01-01

## 2020-01-01 RX ORDER — PERMETHRIN 50 MG/G
CREAM TOPICAL ONCE
Status: DISCONTINUED | OUTPATIENT
Start: 2020-01-01 | End: 2020-01-01

## 2020-01-01 RX ORDER — LIDOCAINE HCL/PF 100 MG/5ML
SYRINGE (ML) INTRAVENOUS
Status: DISCONTINUED | OUTPATIENT
Start: 2020-01-01 | End: 2020-01-01

## 2020-01-01 RX ORDER — SODIUM CHLORIDE 0.9 % (FLUSH) 0.9 %
10 SYRINGE (ML) INJECTION
Status: DISCONTINUED | OUTPATIENT
Start: 2020-01-01 | End: 2020-01-01

## 2020-01-01 RX ADMIN — METRONIDAZOLE 500 MG: 500 TABLET ORAL at 08:03

## 2020-01-01 RX ADMIN — ACETAZOLAMIDE 250 MG: 250 TABLET ORAL at 09:02

## 2020-01-01 RX ADMIN — ONDANSETRON 4 MG: 2 INJECTION INTRAMUSCULAR; INTRAVENOUS at 10:03

## 2020-01-01 RX ADMIN — IPRATROPIUM BROMIDE AND ALBUTEROL SULFATE 3 ML: .5; 3 SOLUTION RESPIRATORY (INHALATION) at 08:02

## 2020-01-01 RX ADMIN — OYSTER SHELL CALCIUM WITH VITAMIN D 1 TABLET: 500; 200 TABLET, FILM COATED ORAL at 08:02

## 2020-01-01 RX ADMIN — PIPERACILLIN SODIUM,TAZOBACTAM SODIUM 4.5 G: 4; .5 INJECTION, POWDER, FOR SOLUTION INTRAVENOUS at 07:02

## 2020-01-01 RX ADMIN — DULOXETINE 30 MG: 30 CAPSULE, DELAYED RELEASE ORAL at 08:02

## 2020-01-01 RX ADMIN — HYOSCYAMINE SULFATE 0.12 MG: 0.12 SOLUTION/ DROPS ORAL at 05:02

## 2020-01-01 RX ADMIN — PIPERACILLIN SODIUM AND TAZOBACTAM SODIUM 4.5 G: 4; .5 INJECTION, POWDER, LYOPHILIZED, FOR SOLUTION INTRAVENOUS at 02:03

## 2020-01-01 RX ADMIN — Medication 400 MG: at 09:02

## 2020-01-01 RX ADMIN — PANTOPRAZOLE SODIUM 40 MG: 40 TABLET, DELAYED RELEASE ORAL at 10:02

## 2020-01-01 RX ADMIN — ENOXAPARIN SODIUM 40 MG: 100 INJECTION SUBCUTANEOUS at 04:01

## 2020-01-01 RX ADMIN — FUROSEMIDE 40 MG: 10 INJECTION, SOLUTION INTRAMUSCULAR; INTRAVENOUS at 11:02

## 2020-01-01 RX ADMIN — IPRATROPIUM BROMIDE AND ALBUTEROL SULFATE 3 ML: .5; 3 SOLUTION RESPIRATORY (INHALATION) at 10:02

## 2020-01-01 RX ADMIN — PANTOPRAZOLE SODIUM 40 MG: 40 TABLET, DELAYED RELEASE ORAL at 08:02

## 2020-01-01 RX ADMIN — IOHEXOL 75 ML: 350 INJECTION, SOLUTION INTRAVENOUS at 02:02

## 2020-01-01 RX ADMIN — VANCOMYCIN HYDROCHLORIDE 1000 MG: 1 INJECTION, POWDER, LYOPHILIZED, FOR SOLUTION INTRAVENOUS at 02:04

## 2020-01-01 RX ADMIN — MICONAZOLE NITRATE: 20 OINTMENT TOPICAL at 08:02

## 2020-01-01 RX ADMIN — BUDESONIDE 0.5 MG: 0.5 INHALANT RESPIRATORY (INHALATION) at 09:02

## 2020-01-01 RX ADMIN — WARFARIN SODIUM 1 MG: 1 TABLET ORAL at 10:04

## 2020-01-01 RX ADMIN — POLYETHYLENE GLYCOL 3350 17 G: 17 POWDER, FOR SOLUTION ORAL at 09:03

## 2020-01-01 RX ADMIN — POTASSIUM & SODIUM PHOSPHATES POWDER PACK 280-160-250 MG 1 PACKET: 280-160-250 PACK at 10:03

## 2020-01-01 RX ADMIN — DIGOXIN 0.12 MG: 125 TABLET ORAL at 09:02

## 2020-01-01 RX ADMIN — SODIUM CHLORIDE: 0.9 INJECTION, SOLUTION INTRAVENOUS at 12:04

## 2020-01-01 RX ADMIN — CEFTRIAXONE SODIUM 1 G: 1 INJECTION, POWDER, FOR SOLUTION INTRAMUSCULAR; INTRAVENOUS at 08:04

## 2020-01-01 RX ADMIN — PIPERACILLIN AND TAZOBACTAM 4.5 G: 4; .5 INJECTION, POWDER, LYOPHILIZED, FOR SOLUTION INTRAVENOUS; PARENTERAL at 06:02

## 2020-01-01 RX ADMIN — OYSTER SHELL CALCIUM WITH VITAMIN D 1 TABLET: 500; 200 TABLET, FILM COATED ORAL at 10:02

## 2020-01-01 RX ADMIN — FUROSEMIDE 40 MG: 40 TABLET ORAL at 09:04

## 2020-01-01 RX ADMIN — SODIUM CHLORIDE: 0.9 INJECTION, SOLUTION INTRAVENOUS at 02:04

## 2020-01-01 RX ADMIN — SODIUM CHLORIDE: 0.45 INJECTION, SOLUTION INTRAVENOUS at 09:01

## 2020-01-01 RX ADMIN — Medication 1 CAPSULE: at 09:03

## 2020-01-01 RX ADMIN — AMIODARONE HYDROCHLORIDE 0.5 MG/MIN: 1.8 INJECTION, SOLUTION INTRAVENOUS at 12:04

## 2020-01-01 RX ADMIN — MAGNESIUM SULFATE IN WATER 2 G: 40 INJECTION, SOLUTION INTRAVENOUS at 05:02

## 2020-01-01 RX ADMIN — Medication 1 CAPSULE: at 09:04

## 2020-01-01 RX ADMIN — BUDESONIDE 0.5 MG: 0.5 INHALANT RESPIRATORY (INHALATION) at 07:02

## 2020-01-01 RX ADMIN — PIPERACILLIN SODIUM AND TAZOBACTAM SODIUM 4.5 G: 4; .5 INJECTION, POWDER, LYOPHILIZED, FOR SOLUTION INTRAVENOUS at 01:02

## 2020-01-01 RX ADMIN — FUROSEMIDE 60 MG: 10 INJECTION, SOLUTION INTRAVENOUS at 11:04

## 2020-01-01 RX ADMIN — HEPARIN SODIUM AND DEXTROSE 12 UNITS/KG/HR: 10000; 5 INJECTION INTRAVENOUS at 06:02

## 2020-01-01 RX ADMIN — QUETIAPINE FUMARATE 25 MG: 25 TABLET ORAL at 09:02

## 2020-01-01 RX ADMIN — FUROSEMIDE 40 MG: 10 INJECTION, SOLUTION INTRAMUSCULAR; INTRAVENOUS at 10:02

## 2020-01-01 RX ADMIN — POTASSIUM & SODIUM PHOSPHATES POWDER PACK 280-160-250 MG 1 PACKET: 280-160-250 PACK at 09:03

## 2020-01-01 RX ADMIN — PANTOPRAZOLE SODIUM 40 MG: 40 TABLET, DELAYED RELEASE ORAL at 09:04

## 2020-01-01 RX ADMIN — SENNOSIDES 8.6 MG: 8.6 TABLET, FILM COATED ORAL at 09:02

## 2020-01-01 RX ADMIN — DRONABINOL 2.5 MG: 2.5 CAPSULE ORAL at 08:03

## 2020-01-01 RX ADMIN — WARFARIN SODIUM 1 MG: 1 TABLET ORAL at 04:02

## 2020-01-01 RX ADMIN — Medication 0.02 MCG/KG/MIN: at 02:02

## 2020-01-01 RX ADMIN — WARFARIN SODIUM 1 MG: 1 TABLET ORAL at 01:03

## 2020-01-01 RX ADMIN — PIPERACILLIN SODIUM,TAZOBACTAM SODIUM 4.5 G: 4; .5 INJECTION, POWDER, FOR SOLUTION INTRAVENOUS at 01:02

## 2020-01-01 RX ADMIN — DOCUSATE SODIUM - SENNOSIDES 2 TABLET: 50; 8.6 TABLET, FILM COATED ORAL at 09:03

## 2020-01-01 RX ADMIN — GUAIFENESIN AND DEXTROMETHORPHAN HYDROBROMIDE 1 TABLET: 600; 30 TABLET, EXTENDED RELEASE ORAL at 09:02

## 2020-01-01 RX ADMIN — PROMETHAZINE HYDROCHLORIDE 12.5 MG: 25 INJECTION INTRAMUSCULAR; INTRAVENOUS at 03:03

## 2020-01-01 RX ADMIN — PIPERACILLIN SODIUM AND TAZOBACTAM SODIUM 4.5 G: 4; .5 INJECTION, POWDER, LYOPHILIZED, FOR SOLUTION INTRAVENOUS at 06:03

## 2020-01-01 RX ADMIN — Medication 6 MG: at 12:02

## 2020-01-01 RX ADMIN — FUROSEMIDE 40 MG: 10 INJECTION, SOLUTION INTRAVENOUS at 06:02

## 2020-01-01 RX ADMIN — MIDODRINE HYDROCHLORIDE 10 MG: 5 TABLET ORAL at 09:03

## 2020-01-01 RX ADMIN — ACETAMINOPHEN 650 MG: 325 TABLET ORAL at 12:02

## 2020-01-01 RX ADMIN — MIDODRINE HYDROCHLORIDE 10 MG: 5 TABLET ORAL at 09:02

## 2020-01-01 RX ADMIN — POTASSIUM CHLORIDE 40 MEQ: 1500 TABLET, EXTENDED RELEASE ORAL at 10:03

## 2020-01-01 RX ADMIN — DICYCLOMINE HYDROCHLORIDE 10 MG: 10 CAPSULE ORAL at 09:04

## 2020-01-01 RX ADMIN — MAGNESIUM SULFATE HEPTAHYDRATE 3 G: 500 INJECTION, SOLUTION INTRAMUSCULAR; INTRAVENOUS at 03:04

## 2020-01-01 RX ADMIN — Medication 800 MG: at 08:04

## 2020-01-01 RX ADMIN — MIRTAZAPINE 15 MG: 15 TABLET, FILM COATED ORAL at 08:04

## 2020-01-01 RX ADMIN — METOPROLOL TARTRATE 25 MG: 25 TABLET ORAL at 08:02

## 2020-01-01 RX ADMIN — MICONAZOLE NITRATE: 20 OINTMENT TOPICAL at 09:02

## 2020-01-01 RX ADMIN — IPRATROPIUM BROMIDE AND ALBUTEROL SULFATE 3 ML: .5; 3 SOLUTION RESPIRATORY (INHALATION) at 12:02

## 2020-01-01 RX ADMIN — PIPERACILLIN SODIUM AND TAZOBACTAM SODIUM 4.5 G: 4; .5 INJECTION, POWDER, LYOPHILIZED, FOR SOLUTION INTRAVENOUS at 09:02

## 2020-01-01 RX ADMIN — OYSTER SHELL CALCIUM WITH VITAMIN D 1 TABLET: 500; 200 TABLET, FILM COATED ORAL at 09:02

## 2020-01-01 RX ADMIN — MIRTAZAPINE 15 MG: 15 TABLET, FILM COATED ORAL at 09:02

## 2020-01-01 RX ADMIN — BUDESONIDE 0.5 MG: 0.5 INHALANT RESPIRATORY (INHALATION) at 07:01

## 2020-01-01 RX ADMIN — Medication 400 MG: at 08:01

## 2020-01-01 RX ADMIN — MICONAZOLE NITRATE: 20 OINTMENT TOPICAL at 10:02

## 2020-01-01 RX ADMIN — BUDESONIDE 0.5 MG: 0.5 INHALANT RESPIRATORY (INHALATION) at 08:02

## 2020-01-01 RX ADMIN — AMIODARONE HYDROCHLORIDE 400 MG: 200 TABLET ORAL at 08:02

## 2020-01-01 RX ADMIN — MAGNESIUM SULFATE HEPTAHYDRATE 3 G: 500 INJECTION, SOLUTION INTRAMUSCULAR; INTRAVENOUS at 01:03

## 2020-01-01 RX ADMIN — PIPERACILLIN SODIUM AND TAZOBACTAM SODIUM 4.5 G: 4; .5 INJECTION, POWDER, LYOPHILIZED, FOR SOLUTION INTRAVENOUS at 06:02

## 2020-01-01 RX ADMIN — POTASSIUM & SODIUM PHOSPHATES POWDER PACK 280-160-250 MG 1 PACKET: 280-160-250 PACK at 06:03

## 2020-01-01 RX ADMIN — ACETAMINOPHEN 650 MG: 325 TABLET ORAL at 11:02

## 2020-01-01 RX ADMIN — FUROSEMIDE 40 MG: 40 TABLET ORAL at 09:03

## 2020-01-01 RX ADMIN — OYSTER SHELL CALCIUM WITH VITAMIN D 1 TABLET: 500; 200 TABLET, FILM COATED ORAL at 09:01

## 2020-01-01 RX ADMIN — DRONABINOL 2.5 MG: 2.5 CAPSULE ORAL at 10:03

## 2020-01-01 RX ADMIN — Medication 100 MG: at 12:02

## 2020-01-01 RX ADMIN — Medication 250 MG: at 09:04

## 2020-01-01 RX ADMIN — SODIUM CHLORIDE, SODIUM LACTATE, POTASSIUM CHLORIDE, AND CALCIUM CHLORIDE 1000 ML: .6; .31; .03; .02 INJECTION, SOLUTION INTRAVENOUS at 05:02

## 2020-01-01 RX ADMIN — PANTOPRAZOLE SODIUM 40 MG: 40 TABLET, DELAYED RELEASE ORAL at 09:02

## 2020-01-01 RX ADMIN — MIDODRINE HYDROCHLORIDE 5 MG: 5 TABLET ORAL at 09:02

## 2020-01-01 RX ADMIN — CARBAMIDE PEROXIDE 6.5% 5 DROP: 6.5 LIQUID AURICULAR (OTIC) at 10:02

## 2020-01-01 RX ADMIN — PIPERACILLIN SODIUM AND TAZOBACTAM SODIUM 4.5 G: 4; .5 INJECTION, POWDER, LYOPHILIZED, FOR SOLUTION INTRAVENOUS at 02:02

## 2020-01-01 RX ADMIN — DICYCLOMINE HYDROCHLORIDE 10 MG: 10 CAPSULE ORAL at 04:03

## 2020-01-01 RX ADMIN — IPRATROPIUM BROMIDE AND ALBUTEROL SULFATE 3 ML: .5; 3 SOLUTION RESPIRATORY (INHALATION) at 09:02

## 2020-01-01 RX ADMIN — GUAIFENESIN AND DEXTROMETHORPHAN HYDROBROMIDE 1 TABLET: 600; 30 TABLET, EXTENDED RELEASE ORAL at 09:03

## 2020-01-01 RX ADMIN — Medication 400 MG: at 10:02

## 2020-01-01 RX ADMIN — DICYCLOMINE HYDROCHLORIDE 10 MG: 10 CAPSULE ORAL at 09:03

## 2020-01-01 RX ADMIN — Medication 400 MG: at 08:02

## 2020-01-01 RX ADMIN — MIDODRINE HYDROCHLORIDE 10 MG: 5 TABLET ORAL at 09:04

## 2020-01-01 RX ADMIN — FUROSEMIDE 80 MG: 80 TABLET ORAL at 05:03

## 2020-01-01 RX ADMIN — Medication 1 CAPSULE: at 02:02

## 2020-01-01 RX ADMIN — METOPROLOL SUCCINATE 50 MG: 25 TABLET, EXTENDED RELEASE ORAL at 08:02

## 2020-01-01 RX ADMIN — ERGOCALCIFEROL 50000 UNITS: 1.25 CAPSULE ORAL at 09:04

## 2020-01-01 RX ADMIN — FUROSEMIDE 20 MG: 20 TABLET ORAL at 05:02

## 2020-01-01 RX ADMIN — AMIODARONE HYDROCHLORIDE 400 MG: 200 TABLET ORAL at 10:02

## 2020-01-01 RX ADMIN — QUETIAPINE FUMARATE 25 MG: 25 TABLET ORAL at 10:02

## 2020-01-01 RX ADMIN — CALCIUM GLUCONATE 1000 MG: 98 INJECTION, SOLUTION INTRAVENOUS at 03:01

## 2020-01-01 RX ADMIN — LOPERAMIDE HYDROCHLORIDE 2 MG: 2 CAPSULE ORAL at 01:04

## 2020-01-01 RX ADMIN — IPRATROPIUM BROMIDE AND ALBUTEROL SULFATE 3 ML: .5; 3 SOLUTION RESPIRATORY (INHALATION) at 11:02

## 2020-01-01 RX ADMIN — IPRATROPIUM BROMIDE AND ALBUTEROL SULFATE 3 ML: .5; 3 SOLUTION RESPIRATORY (INHALATION) at 07:02

## 2020-01-01 RX ADMIN — MIDODRINE HYDROCHLORIDE 10 MG: 5 TABLET ORAL at 10:02

## 2020-01-01 RX ADMIN — PIPERACILLIN SODIUM,TAZOBACTAM SODIUM 4.5 G: 4; .5 INJECTION, POWDER, FOR SOLUTION INTRAVENOUS at 10:02

## 2020-01-01 RX ADMIN — MAGNESIUM SULFATE IN WATER 2 G: 40 INJECTION, SOLUTION INTRAVENOUS at 09:02

## 2020-01-01 RX ADMIN — WARFARIN SODIUM 1 MG: 1 TABLET ORAL at 04:03

## 2020-01-01 RX ADMIN — Medication 6 MG: at 10:02

## 2020-01-01 RX ADMIN — POTASSIUM PHOSPHATE, MONOBASIC AND POTASSIUM PHOSPHATE, DIBASIC 20 MMOL: 224; 236 INJECTION, SOLUTION, CONCENTRATE INTRAVENOUS at 10:02

## 2020-01-01 RX ADMIN — CEFTRIAXONE SODIUM 1 G: 1 INJECTION, POWDER, FOR SOLUTION INTRAMUSCULAR; INTRAVENOUS at 11:04

## 2020-01-01 RX ADMIN — MICONAZOLE NITRATE: 20 OINTMENT TOPICAL at 09:03

## 2020-01-01 RX ADMIN — MIRTAZAPINE 15 MG: 15 TABLET, FILM COATED ORAL at 10:04

## 2020-01-01 RX ADMIN — CARBAMIDE PEROXIDE 6.5% 5 DROP: 6.5 LIQUID AURICULAR (OTIC) at 09:02

## 2020-01-01 RX ADMIN — DOCUSATE SODIUM - SENNOSIDES 1 TABLET: 50; 8.6 TABLET, FILM COATED ORAL at 09:02

## 2020-01-01 RX ADMIN — HYDROXYZINE HYDROCHLORIDE 25 MG: 25 TABLET, FILM COATED ORAL at 09:04

## 2020-01-01 RX ADMIN — POTASSIUM & SODIUM PHOSPHATES POWDER PACK 280-160-250 MG 2 PACKET: 280-160-250 PACK at 10:04

## 2020-01-01 RX ADMIN — MICONAZOLE NITRATE: 20 CREAM TOPICAL at 08:03

## 2020-01-01 RX ADMIN — PROPOFOL 125 MCG/KG/MIN: 10 INJECTION, EMULSION INTRAVENOUS at 12:02

## 2020-01-01 RX ADMIN — DEXTROSE 125 ML: 10 SOLUTION INTRAVENOUS at 11:02

## 2020-01-01 RX ADMIN — DOCUSATE SODIUM - SENNOSIDES 2 TABLET: 50; 8.6 TABLET, FILM COATED ORAL at 08:03

## 2020-01-01 RX ADMIN — AMIODARONE HYDROCHLORIDE 400 MG: 200 TABLET ORAL at 08:04

## 2020-01-01 RX ADMIN — MAGNESIUM SULFATE HEPTAHYDRATE 2 G: 40 INJECTION, SOLUTION INTRAVENOUS at 03:04

## 2020-01-01 RX ADMIN — DULOXETINE 30 MG: 30 CAPSULE, DELAYED RELEASE ORAL at 09:02

## 2020-01-01 RX ADMIN — PIPERACILLIN SODIUM AND TAZOBACTAM SODIUM 4.5 G: 4; .5 INJECTION, POWDER, LYOPHILIZED, FOR SOLUTION INTRAVENOUS at 10:03

## 2020-01-01 RX ADMIN — LINDANE: 10 SHAMPOO, SUSPENSION TOPICAL at 10:02

## 2020-01-01 RX ADMIN — DIGOXIN 500 MCG: 0.25 INJECTION INTRAMUSCULAR; INTRAVENOUS at 11:02

## 2020-01-01 RX ADMIN — QUETIAPINE FUMARATE 25 MG: 25 TABLET ORAL at 02:02

## 2020-01-01 RX ADMIN — ONDANSETRON 4 MG: 2 INJECTION INTRAMUSCULAR; INTRAVENOUS at 09:01

## 2020-01-01 RX ADMIN — SODIUM CHLORIDE, SODIUM LACTATE, POTASSIUM CHLORIDE, AND CALCIUM CHLORIDE 250 ML: .6; .31; .03; .02 INJECTION, SOLUTION INTRAVENOUS at 12:01

## 2020-01-01 RX ADMIN — DRONABINOL 2.5 MG: 2.5 CAPSULE ORAL at 09:03

## 2020-01-01 RX ADMIN — ENOXAPARIN SODIUM 60 MG: 60 INJECTION SUBCUTANEOUS at 10:04

## 2020-01-01 RX ADMIN — AMIODARONE HYDROCHLORIDE 200 MG: 200 TABLET ORAL at 12:04

## 2020-01-01 RX ADMIN — PANTOPRAZOLE SODIUM 40 MG: 40 TABLET, DELAYED RELEASE ORAL at 08:04

## 2020-01-01 RX ADMIN — ENOXAPARIN SODIUM 70 MG: 80 INJECTION SUBCUTANEOUS at 09:01

## 2020-01-01 RX ADMIN — DIGOXIN 250 MCG: 0.25 INJECTION INTRAMUSCULAR; INTRAVENOUS at 11:02

## 2020-01-01 RX ADMIN — METHOCARBAMOL TABLETS 500 MG: 500 TABLET, COATED ORAL at 11:02

## 2020-01-01 RX ADMIN — MAGNESIUM SULFATE IN WATER 2 G: 40 INJECTION, SOLUTION INTRAVENOUS at 08:02

## 2020-01-01 RX ADMIN — DIGOXIN 0.12 MG: 125 TABLET ORAL at 09:04

## 2020-01-01 RX ADMIN — PANTOPRAZOLE SODIUM 40 MG: 40 TABLET, DELAYED RELEASE ORAL at 08:03

## 2020-01-01 RX ADMIN — CIPROFLOXACIN HYDROCHLORIDE 500 MG: 500 TABLET, FILM COATED ORAL at 10:03

## 2020-01-01 RX ADMIN — AMIODARONE HYDROCHLORIDE 400 MG: 200 TABLET ORAL at 09:04

## 2020-01-01 RX ADMIN — POTASSIUM PHOSPHATE, MONOBASIC AND POTASSIUM PHOSPHATE, DIBASIC 20 MMOL: 224; 236 INJECTION, SOLUTION, CONCENTRATE INTRAVENOUS at 01:03

## 2020-01-01 RX ADMIN — HYDROXYZINE HYDROCHLORIDE 25 MG: 25 TABLET, FILM COATED ORAL at 01:04

## 2020-01-01 RX ADMIN — FUROSEMIDE 40 MG: 10 INJECTION, SOLUTION INTRAMUSCULAR; INTRAVENOUS at 09:02

## 2020-01-01 RX ADMIN — INSULIN ASPART 3 UNITS: 100 INJECTION, SOLUTION INTRAVENOUS; SUBCUTANEOUS at 06:03

## 2020-01-01 RX ADMIN — PIPERACILLIN SODIUM,TAZOBACTAM SODIUM 4.5 G: 4; .5 INJECTION, POWDER, FOR SOLUTION INTRAVENOUS at 05:02

## 2020-01-01 RX ADMIN — ASPIRIN 81 MG CHEWABLE TABLET 81 MG: 81 TABLET CHEWABLE at 09:01

## 2020-01-01 RX ADMIN — HYDROXYZINE HYDROCHLORIDE 25 MG: 25 TABLET, FILM COATED ORAL at 10:04

## 2020-01-01 RX ADMIN — POTASSIUM CHLORIDE 30 MEQ: 750 CAPSULE, EXTENDED RELEASE ORAL at 03:02

## 2020-01-01 RX ADMIN — GUAIFENESIN AND DEXTROMETHORPHAN HYDROBROMIDE 1 TABLET: 600; 30 TABLET, EXTENDED RELEASE ORAL at 08:03

## 2020-01-01 RX ADMIN — Medication 250 MG: at 08:04

## 2020-01-01 RX ADMIN — DOXYCYCLINE HYCLATE 100 MG: 100 TABLET, COATED ORAL at 09:04

## 2020-01-01 RX ADMIN — POLYETHYLENE GLYCOL 3350 17 G: 17 POWDER, FOR SOLUTION ORAL at 08:03

## 2020-01-01 RX ADMIN — FUROSEMIDE 40 MG: 10 INJECTION, SOLUTION INTRAVENOUS at 08:04

## 2020-01-01 RX ADMIN — GUAIFENESIN AND DEXTROMETHORPHAN HYDROBROMIDE 1 TABLET: 600; 30 TABLET, EXTENDED RELEASE ORAL at 08:02

## 2020-01-01 RX ADMIN — POTASSIUM & SODIUM PHOSPHATES POWDER PACK 280-160-250 MG 1 PACKET: 280-160-250 PACK at 08:03

## 2020-01-01 RX ADMIN — ACETAMINOPHEN 650 MG: 325 TABLET ORAL at 07:02

## 2020-01-01 RX ADMIN — METRONIDAZOLE 500 MG: 500 TABLET ORAL at 06:03

## 2020-01-01 RX ADMIN — MULTIPLE VITAMINS W/ MINERALS TAB 1 TABLET: TAB at 10:04

## 2020-01-01 RX ADMIN — GUAIFENESIN AND DEXTROMETHORPHAN HYDROBROMIDE 1 TABLET: 600; 30 TABLET, EXTENDED RELEASE ORAL at 01:02

## 2020-01-01 RX ADMIN — POTASSIUM BICARBONATE 50 MEQ: 978 TABLET, EFFERVESCENT ORAL at 02:02

## 2020-01-01 RX ADMIN — HYDROXYZINE HYDROCHLORIDE 25 MG: 25 TABLET, FILM COATED ORAL at 03:04

## 2020-01-01 RX ADMIN — Medication 400 MG: at 02:02

## 2020-01-01 RX ADMIN — ONDANSETRON 8 MG: 8 TABLET, ORALLY DISINTEGRATING ORAL at 03:02

## 2020-01-01 RX ADMIN — IPRATROPIUM BROMIDE AND ALBUTEROL SULFATE 3 ML: .5; 3 SOLUTION RESPIRATORY (INHALATION) at 04:02

## 2020-01-01 RX ADMIN — PIPERACILLIN SODIUM AND TAZOBACTAM SODIUM 4.5 G: 4; .5 INJECTION, POWDER, LYOPHILIZED, FOR SOLUTION INTRAVENOUS at 05:02

## 2020-01-01 RX ADMIN — AMIODARONE HYDROCHLORIDE 0.5 MG/MIN: 1.8 INJECTION, SOLUTION INTRAVENOUS at 11:04

## 2020-01-01 RX ADMIN — POTASSIUM & SODIUM PHOSPHATES POWDER PACK 280-160-250 MG 2 PACKET: 280-160-250 PACK at 04:04

## 2020-01-01 RX ADMIN — DULOXETINE 30 MG: 30 CAPSULE, DELAYED RELEASE ORAL at 08:01

## 2020-01-01 RX ADMIN — DOCUSATE SODIUM - SENNOSIDES 1 TABLET: 50; 8.6 TABLET, FILM COATED ORAL at 10:02

## 2020-01-01 RX ADMIN — DIGOXIN 0.12 MG: 125 TABLET ORAL at 09:03

## 2020-01-01 RX ADMIN — POTASSIUM CHLORIDE 10 MEQ: 7.46 INJECTION, SOLUTION INTRAVENOUS at 08:03

## 2020-01-01 RX ADMIN — Medication 400 MG: at 09:03

## 2020-01-01 RX ADMIN — GUAIFENESIN AND DEXTROMETHORPHAN HYDROBROMIDE 1 TABLET: 600; 30 TABLET, EXTENDED RELEASE ORAL at 10:02

## 2020-01-01 RX ADMIN — CALCIUM GLUCONATE 1000 MG: 98 INJECTION, SOLUTION INTRAVENOUS at 05:02

## 2020-01-01 RX ADMIN — DIGOXIN 0.12 MG: 125 TABLET ORAL at 10:02

## 2020-01-01 RX ADMIN — MIRTAZAPINE 15 MG: 15 TABLET, FILM COATED ORAL at 08:02

## 2020-01-01 RX ADMIN — Medication 1 CAPSULE: at 09:02

## 2020-01-01 RX ADMIN — SODIUM CHLORIDE 500 ML: 0.9 INJECTION, SOLUTION INTRAVENOUS at 05:02

## 2020-01-01 RX ADMIN — POTASSIUM CHLORIDE 30 MEQ: 750 CAPSULE, EXTENDED RELEASE ORAL at 09:02

## 2020-01-01 RX ADMIN — MIRTAZAPINE 15 MG: 15 TABLET, FILM COATED ORAL at 09:03

## 2020-01-01 RX ADMIN — WARFARIN SODIUM 1 MG: 1 TABLET ORAL at 05:02

## 2020-01-01 RX ADMIN — MIDODRINE HYDROCHLORIDE 5 MG: 5 TABLET ORAL at 02:02

## 2020-01-01 RX ADMIN — MICONAZOLE NITRATE: 20 CREAM TOPICAL at 09:03

## 2020-01-01 RX ADMIN — WARFARIN SODIUM 1 MG: 1 TABLET ORAL at 09:01

## 2020-01-01 RX ADMIN — HYDROCORTISONE ACETATE 25 MG: 25 SUPPOSITORY RECTAL at 09:02

## 2020-01-01 RX ADMIN — FUROSEMIDE 40 MG: 40 TABLET ORAL at 05:03

## 2020-01-01 RX ADMIN — Medication 6 MG: at 08:02

## 2020-01-01 RX ADMIN — DICYCLOMINE HYDROCHLORIDE 10 MG: 10 CAPSULE ORAL at 02:03

## 2020-01-01 RX ADMIN — Medication 1 CAPSULE: at 08:04

## 2020-01-01 RX ADMIN — POTASSIUM CHLORIDE 40 MEQ: 1500 TABLET, EXTENDED RELEASE ORAL at 09:02

## 2020-01-01 RX ADMIN — POTASSIUM CHLORIDE 30 MEQ: 750 CAPSULE, EXTENDED RELEASE ORAL at 09:03

## 2020-01-01 RX ADMIN — MAGNESIUM SULFATE HEPTAHYDRATE 3 G: 500 INJECTION, SOLUTION INTRAMUSCULAR; INTRAVENOUS at 01:01

## 2020-01-01 RX ADMIN — PIPERACILLIN SODIUM AND TAZOBACTAM SODIUM 4.5 G: 4; .5 INJECTION, POWDER, LYOPHILIZED, FOR SOLUTION INTRAVENOUS at 10:02

## 2020-01-01 RX ADMIN — INSULIN ASPART 2 UNITS: 100 INJECTION, SOLUTION INTRAVENOUS; SUBCUTANEOUS at 12:03

## 2020-01-01 RX ADMIN — ASPIRIN 81 MG CHEWABLE TABLET 81 MG: 81 TABLET CHEWABLE at 08:01

## 2020-01-01 RX ADMIN — ONDANSETRON HYDROCHLORIDE 4 MG: 4 TABLET, FILM COATED ORAL at 09:03

## 2020-01-01 RX ADMIN — SODIUM CHLORIDE: 0.9 INJECTION, SOLUTION INTRAVENOUS at 02:02

## 2020-01-01 RX ADMIN — ASPIRIN 81 MG CHEWABLE TABLET 81 MG: 81 TABLET CHEWABLE at 10:02

## 2020-01-01 RX ADMIN — QUETIAPINE FUMARATE 25 MG: 25 TABLET ORAL at 08:02

## 2020-01-01 RX ADMIN — DIGOXIN 500 MCG: 0.25 INJECTION INTRAMUSCULAR; INTRAVENOUS at 05:04

## 2020-01-01 RX ADMIN — POTASSIUM & SODIUM PHOSPHATES POWDER PACK 280-160-250 MG 2 PACKET: 280-160-250 PACK at 09:04

## 2020-01-01 RX ADMIN — DRONABINOL 2.5 MG: 2.5 CAPSULE ORAL at 09:04

## 2020-01-01 RX ADMIN — PANTOPRAZOLE SODIUM 40 MG: 40 TABLET, DELAYED RELEASE ORAL at 09:03

## 2020-01-01 RX ADMIN — PIPERACILLIN SODIUM,TAZOBACTAM SODIUM 4.5 G: 4; .5 INJECTION, POWDER, FOR SOLUTION INTRAVENOUS at 02:02

## 2020-01-01 RX ADMIN — CIPROFLOXACIN HYDROCHLORIDE 500 MG: 500 TABLET, FILM COATED ORAL at 09:03

## 2020-01-01 RX ADMIN — PIPERACILLIN SODIUM,TAZOBACTAM SODIUM 4.5 G: 4; .5 INJECTION, POWDER, FOR SOLUTION INTRAVENOUS at 04:02

## 2020-01-01 RX ADMIN — Medication 6 MG: at 11:04

## 2020-01-01 RX ADMIN — THERA TABS 1 TABLET: TAB at 10:03

## 2020-01-01 RX ADMIN — MIRTAZAPINE 15 MG: 15 TABLET, FILM COATED ORAL at 10:02

## 2020-01-01 RX ADMIN — HEPARIN SODIUM 12 UNITS/KG/HR: 10000 INJECTION, SOLUTION INTRAVENOUS at 07:02

## 2020-01-01 RX ADMIN — IPRATROPIUM BROMIDE AND ALBUTEROL SULFATE 3 ML: .5; 3 SOLUTION RESPIRATORY (INHALATION) at 01:02

## 2020-01-01 RX ADMIN — CEFTRIAXONE 2 G: 2 INJECTION, SOLUTION INTRAVENOUS at 04:01

## 2020-01-01 RX ADMIN — METOPROLOL TARTRATE 5 MG: 5 INJECTION INTRAVENOUS at 05:02

## 2020-01-01 RX ADMIN — POTASSIUM CHLORIDE 30 MEQ: 750 CAPSULE, EXTENDED RELEASE ORAL at 08:04

## 2020-01-01 RX ADMIN — ONDANSETRON 4 MG: 2 INJECTION INTRAMUSCULAR; INTRAVENOUS at 08:02

## 2020-01-01 RX ADMIN — SODIUM CHLORIDE: 0.9 INJECTION, SOLUTION INTRAVENOUS at 12:02

## 2020-01-01 RX ADMIN — MIDODRINE HYDROCHLORIDE 5 MG: 5 TABLET ORAL at 08:02

## 2020-01-01 RX ADMIN — SODIUM CHLORIDE, SODIUM LACTATE, POTASSIUM CHLORIDE, AND CALCIUM CHLORIDE 1000 ML: .6; .31; .03; .02 INJECTION, SOLUTION INTRAVENOUS at 01:02

## 2020-01-01 RX ADMIN — MAGNESIUM SULFATE IN WATER 2 G: 40 INJECTION, SOLUTION INTRAVENOUS at 04:02

## 2020-01-01 RX ADMIN — Medication 6 MG: at 09:02

## 2020-01-01 RX ADMIN — AMIODARONE HYDROCHLORIDE 200 MG: 200 TABLET ORAL at 08:01

## 2020-01-01 RX ADMIN — FUROSEMIDE 20 MG: 10 INJECTION, SOLUTION INTRAMUSCULAR; INTRAVENOUS at 09:02

## 2020-01-01 RX ADMIN — MIDODRINE HYDROCHLORIDE 5 MG: 5 TABLET ORAL at 09:01

## 2020-01-01 RX ADMIN — PIPERACILLIN SODIUM,TAZOBACTAM SODIUM 4.5 G: 4; .5 INJECTION, POWDER, FOR SOLUTION INTRAVENOUS at 09:01

## 2020-01-01 RX ADMIN — DOCUSATE SODIUM - SENNOSIDES 1 TABLET: 50; 8.6 TABLET, FILM COATED ORAL at 12:03

## 2020-01-01 RX ADMIN — Medication 1 CAPSULE: at 12:03

## 2020-01-01 RX ADMIN — MICONAZOLE NITRATE: 20 OINTMENT TOPICAL at 11:02

## 2020-01-01 RX ADMIN — FUROSEMIDE 40 MG: 10 INJECTION, SOLUTION INTRAMUSCULAR; INTRAVENOUS at 08:02

## 2020-01-01 RX ADMIN — METHOCARBAMOL TABLETS 500 MG: 500 TABLET, COATED ORAL at 12:02

## 2020-01-01 RX ADMIN — ACETAMINOPHEN 1000 MG: 500 TABLET ORAL at 12:03

## 2020-01-01 RX ADMIN — WARFARIN SODIUM 2 MG: 2 TABLET ORAL at 04:02

## 2020-01-01 RX ADMIN — AZITHROMYCIN 500 MG: 500 INJECTION, POWDER, LYOPHILIZED, FOR SOLUTION INTRAVENOUS at 09:04

## 2020-01-01 RX ADMIN — CALCIUM GLUCONATE 1 G: 98 INJECTION, SOLUTION INTRAVENOUS at 09:04

## 2020-01-01 RX ADMIN — MAGNESIUM SULFATE HEPTAHYDRATE 3 G: 500 INJECTION, SOLUTION INTRAMUSCULAR; INTRAVENOUS at 10:03

## 2020-01-01 RX ADMIN — WARFARIN SODIUM 1 MG: 1 TABLET ORAL at 05:03

## 2020-01-01 RX ADMIN — PIPERACILLIN AND TAZOBACTAM 4.5 G: 4; .5 INJECTION, POWDER, LYOPHILIZED, FOR SOLUTION INTRAVENOUS; PARENTERAL at 09:03

## 2020-01-01 RX ADMIN — ONDANSETRON 4 MG: 2 INJECTION INTRAMUSCULAR; INTRAVENOUS at 04:03

## 2020-01-01 RX ADMIN — METOPROLOL TARTRATE 25 MG: 25 TABLET ORAL at 11:02

## 2020-01-01 RX ADMIN — INSULIN ASPART 4 UNITS: 100 INJECTION, SOLUTION INTRAVENOUS; SUBCUTANEOUS at 04:03

## 2020-01-01 RX ADMIN — POTASSIUM & SODIUM PHOSPHATES POWDER PACK 280-160-250 MG 1 PACKET: 280-160-250 PACK at 04:03

## 2020-01-01 RX ADMIN — PROMETHAZINE HYDROCHLORIDE 6.25 MG: 25 INJECTION INTRAMUSCULAR; INTRAVENOUS at 08:01

## 2020-01-01 RX ADMIN — ACETAMINOPHEN 650 MG: 325 TABLET ORAL at 09:04

## 2020-01-01 RX ADMIN — METOPROLOL TARTRATE 50 MG: 50 TABLET ORAL at 09:01

## 2020-01-01 RX ADMIN — FUROSEMIDE 40 MG: 10 INJECTION, SOLUTION INTRAMUSCULAR; INTRAVENOUS at 06:02

## 2020-01-01 RX ADMIN — SODIUM CHLORIDE 500 ML: 0.9 INJECTION, SOLUTION INTRAVENOUS at 02:01

## 2020-01-01 RX ADMIN — ONDANSETRON 4 MG: 2 INJECTION INTRAMUSCULAR; INTRAVENOUS at 06:03

## 2020-01-01 RX ADMIN — CARBAMIDE PEROXIDE 6.5% 5 DROP: 6.5 LIQUID AURICULAR (OTIC) at 04:02

## 2020-01-01 RX ADMIN — LOPERAMIDE HYDROCHLORIDE 2 MG: 2 CAPSULE ORAL at 07:04

## 2020-01-01 RX ADMIN — MIDODRINE HYDROCHLORIDE 2.5 MG: 2.5 TABLET ORAL at 08:02

## 2020-01-01 RX ADMIN — ASPIRIN 81 MG CHEWABLE TABLET 81 MG: 81 TABLET CHEWABLE at 09:02

## 2020-01-01 RX ADMIN — DOXYCYCLINE HYCLATE 100 MG: 100 TABLET, COATED ORAL at 10:04

## 2020-01-01 RX ADMIN — Medication 1 G: at 10:04

## 2020-01-01 RX ADMIN — IPRATROPIUM BROMIDE AND ALBUTEROL SULFATE 3 ML: .5; 3 SOLUTION RESPIRATORY (INHALATION) at 03:02

## 2020-01-01 RX ADMIN — MULTIPLE VITAMINS W/ MINERALS TAB 1 TABLET: TAB at 08:04

## 2020-01-01 RX ADMIN — MAGNESIUM SULFATE HEPTAHYDRATE 1 G: 500 INJECTION, SOLUTION INTRAMUSCULAR; INTRAVENOUS at 08:02

## 2020-01-01 RX ADMIN — LOPERAMIDE HYDROCHLORIDE 2 MG: 2 CAPSULE ORAL at 11:04

## 2020-01-01 RX ADMIN — OYSTER SHELL CALCIUM WITH VITAMIN D 1 TABLET: 500; 200 TABLET, FILM COATED ORAL at 08:03

## 2020-01-01 RX ADMIN — IOHEXOL 75 ML: 350 INJECTION, SOLUTION INTRAVENOUS at 07:03

## 2020-01-01 RX ADMIN — MAGNESIUM SULFATE IN WATER 2 G: 40 INJECTION, SOLUTION INTRAVENOUS at 06:02

## 2020-01-01 RX ADMIN — METOPROLOL TARTRATE 25 MG: 25 TABLET ORAL at 09:02

## 2020-01-01 RX ADMIN — DICYCLOMINE HYDROCHLORIDE 10 MG: 10 CAPSULE ORAL at 08:04

## 2020-01-01 RX ADMIN — METOPROLOL SUCCINATE 50 MG: 25 TABLET, EXTENDED RELEASE ORAL at 04:02

## 2020-01-01 RX ADMIN — POTASSIUM CHLORIDE 40 MEQ: 1500 TABLET, EXTENDED RELEASE ORAL at 01:03

## 2020-01-01 RX ADMIN — ASPIRIN 81 MG CHEWABLE TABLET 81 MG: 81 TABLET CHEWABLE at 11:02

## 2020-01-01 RX ADMIN — PIPERACILLIN SODIUM,TAZOBACTAM SODIUM 4.5 G: 4; .5 INJECTION, POWDER, FOR SOLUTION INTRAVENOUS at 11:02

## 2020-01-01 RX ADMIN — POLYETHYLENE GLYCOL 3350 34 G: 17 POWDER, FOR SOLUTION ORAL at 03:03

## 2020-01-01 RX ADMIN — WARFARIN SODIUM 1 MG: 1 TABLET ORAL at 08:04

## 2020-01-01 RX ADMIN — MIDODRINE HYDROCHLORIDE 10 MG: 5 TABLET ORAL at 08:02

## 2020-01-01 RX ADMIN — Medication 6 MG: at 09:04

## 2020-01-01 RX ADMIN — SODIUM CHLORIDE 1000 ML: 0.9 INJECTION, SOLUTION INTRAVENOUS at 04:03

## 2020-01-01 RX ADMIN — POLYETHYLENE GLYCOL 3350, SODIUM SULFATE ANHYDROUS, SODIUM BICARBONATE, SODIUM CHLORIDE, POTASSIUM CHLORIDE 4000 ML: 236; 22.74; 6.74; 5.86; 2.97 POWDER, FOR SOLUTION ORAL at 12:02

## 2020-01-01 RX ADMIN — POTASSIUM CHLORIDE 40 MEQ: 1500 TABLET, FILM COATED, EXTENDED RELEASE ORAL at 05:02

## 2020-01-01 RX ADMIN — DICYCLOMINE HYDROCHLORIDE 10 MG: 10 CAPSULE ORAL at 03:04

## 2020-01-01 RX ADMIN — ONDANSETRON 8 MG: 8 TABLET, ORALLY DISINTEGRATING ORAL at 08:01

## 2020-01-01 RX ADMIN — Medication 0.04 MCG/KG/MIN: at 05:02

## 2020-01-01 RX ADMIN — HYDROXYCHLOROQUINE SULFATE 400 MG: 200 TABLET, FILM COATED ORAL at 09:04

## 2020-01-01 RX ADMIN — DRONABINOL 2.5 MG: 2.5 CAPSULE ORAL at 08:04

## 2020-01-01 RX ADMIN — HYOSCYAMINE SULFATE 0.12 MG: 0.12 SOLUTION/ DROPS ORAL at 11:02

## 2020-01-01 RX ADMIN — METOPROLOL TARTRATE 25 MG: 25 TABLET ORAL at 10:02

## 2020-01-01 RX ADMIN — POTASSIUM CHLORIDE 30 MEQ: 750 CAPSULE, EXTENDED RELEASE ORAL at 08:03

## 2020-01-01 RX ADMIN — DIGOXIN 250 MCG: 0.25 INJECTION INTRAMUSCULAR; INTRAVENOUS at 04:02

## 2020-01-01 RX ADMIN — DIGOXIN 250 MCG: 0.25 INJECTION INTRAMUSCULAR; INTRAVENOUS at 10:04

## 2020-01-01 RX ADMIN — POTASSIUM & SODIUM PHOSPHATES POWDER PACK 280-160-250 MG 2 PACKET: 280-160-250 PACK at 12:04

## 2020-01-01 RX ADMIN — INSULIN ASPART 2 UNITS: 100 INJECTION, SOLUTION INTRAVENOUS; SUBCUTANEOUS at 09:03

## 2020-01-01 RX ADMIN — SODIUM CHLORIDE 1000 ML: 0.9 INJECTION, SOLUTION INTRAVENOUS at 10:02

## 2020-01-01 RX ADMIN — FUROSEMIDE 40 MG: 10 INJECTION, SOLUTION INTRAMUSCULAR; INTRAVENOUS at 04:02

## 2020-01-01 RX ADMIN — FUROSEMIDE 40 MG: 10 INJECTION, SOLUTION INTRAVENOUS at 04:02

## 2020-01-01 RX ADMIN — MICONAZOLE NITRATE: 20 CREAM TOPICAL at 02:03

## 2020-01-01 RX ADMIN — Medication 16 G: at 05:02

## 2020-01-01 RX ADMIN — FUROSEMIDE 40 MG: 40 TABLET ORAL at 08:03

## 2020-01-01 RX ADMIN — Medication: at 12:02

## 2020-01-01 RX ADMIN — POTASSIUM CHLORIDE 10 MEQ: 7.46 INJECTION, SOLUTION INTRAVENOUS at 10:03

## 2020-01-01 RX ADMIN — Medication 6 MG: at 11:02

## 2020-01-01 RX ADMIN — DOXYCYCLINE HYCLATE 100 MG: 100 TABLET, COATED ORAL at 08:04

## 2020-01-01 RX ADMIN — MIDODRINE HYDROCHLORIDE 5 MG: 5 TABLET ORAL at 11:02

## 2020-01-01 RX ADMIN — HEPARIN SODIUM AND DEXTROSE 12 UNITS/KG/HR: 10000; 5 INJECTION INTRAVENOUS at 04:02

## 2020-01-01 RX ADMIN — PHENYLEPHRINE HYDROCHLORIDE 200 MCG: 10 INJECTION INTRAVENOUS at 03:02

## 2020-01-01 RX ADMIN — FUROSEMIDE 20 MG: 20 TABLET ORAL at 09:02

## 2020-01-01 RX ADMIN — HYOSCYAMINE SULFATE 0.12 MG: 0.12 SOLUTION/ DROPS ORAL at 06:02

## 2020-01-01 RX ADMIN — HYOSCYAMINE SULFATE 0.12 MG: 0.12 SOLUTION/ DROPS ORAL at 07:02

## 2020-01-01 RX ADMIN — PIPERACILLIN SODIUM AND TAZOBACTAM SODIUM 4.5 G: 4; .5 INJECTION, POWDER, LYOPHILIZED, FOR SOLUTION INTRAVENOUS at 09:03

## 2020-01-01 RX ADMIN — POTASSIUM & SODIUM PHOSPHATES POWDER PACK 280-160-250 MG 2 PACKET: 280-160-250 PACK at 08:04

## 2020-01-01 RX ADMIN — POTASSIUM PHOSPHATE, MONOBASIC AND POTASSIUM PHOSPHATE, DIBASIC 15 MMOL: 224; 236 INJECTION, SOLUTION INTRAVENOUS at 11:01

## 2020-01-01 RX ADMIN — Medication 400 MG: at 08:03

## 2020-01-01 RX ADMIN — CALCIUM GLUCONATE 1 G: 94 INJECTION, SOLUTION INTRAVENOUS at 12:04

## 2020-01-01 RX ADMIN — MORPHINE SULFATE 4 MG: 2 INJECTION, SOLUTION INTRAMUSCULAR; INTRAVENOUS at 05:04

## 2020-01-01 RX ADMIN — BUDESONIDE 0.5 MG: 0.5 INHALANT RESPIRATORY (INHALATION) at 10:02

## 2020-01-01 RX ADMIN — KETOROLAC TROMETHAMINE 15 MG: 15 INJECTION, SOLUTION INTRAMUSCULAR; INTRAVENOUS at 01:02

## 2020-01-01 RX ADMIN — WARFARIN SODIUM 1 MG: 1 TABLET ORAL at 06:02

## 2020-01-01 RX ADMIN — FUROSEMIDE 40 MG: 10 INJECTION, SOLUTION INTRAMUSCULAR; INTRAVENOUS at 05:02

## 2020-01-01 RX ADMIN — HEPARIN SODIUM AND DEXTROSE 12 UNITS/KG/HR: 10000; 5 INJECTION INTRAVENOUS at 11:02

## 2020-01-01 RX ADMIN — PROMETHAZINE HYDROCHLORIDE 12.5 MG: 25 INJECTION INTRAMUSCULAR; INTRAVENOUS at 06:03

## 2020-01-01 RX ADMIN — OYSTER SHELL CALCIUM WITH VITAMIN D 1 TABLET: 500; 200 TABLET, FILM COATED ORAL at 08:01

## 2020-01-01 RX ADMIN — POTASSIUM CHLORIDE 30 MEQ: 750 CAPSULE, EXTENDED RELEASE ORAL at 09:04

## 2020-01-01 RX ADMIN — MICONAZOLE NITRATE: 20 OINTMENT TOPICAL at 01:02

## 2020-01-01 RX ADMIN — OYSTER SHELL CALCIUM WITH VITAMIN D 1 TABLET: 500; 200 TABLET, FILM COATED ORAL at 09:03

## 2020-01-01 RX ADMIN — ATORVASTATIN CALCIUM 40 MG: 20 TABLET, FILM COATED ORAL at 10:04

## 2020-01-01 RX ADMIN — ACETAZOLAMIDE 375 MG: 250 TABLET ORAL at 05:02

## 2020-01-01 RX ADMIN — FUROSEMIDE 20 MG: 20 TABLET ORAL at 08:02

## 2020-01-01 RX ADMIN — PIPERACILLIN SODIUM AND TAZOBACTAM SODIUM 4.5 G: 4; .5 INJECTION, POWDER, LYOPHILIZED, FOR SOLUTION INTRAVENOUS at 05:03

## 2020-01-01 RX ADMIN — OXYCODONE HYDROCHLORIDE 5 MG: 5 TABLET ORAL at 05:03

## 2020-01-01 RX ADMIN — MAGNESIUM SULFATE HEPTAHYDRATE 3 G: 500 INJECTION, SOLUTION INTRAMUSCULAR; INTRAVENOUS at 11:01

## 2020-01-01 RX ADMIN — MAGNESIUM SULFATE HEPTAHYDRATE 2 G: 40 INJECTION, SOLUTION INTRAVENOUS at 12:04

## 2020-01-01 RX ADMIN — DULOXETINE 30 MG: 30 CAPSULE, DELAYED RELEASE ORAL at 11:02

## 2020-01-01 RX ADMIN — MIDODRINE HYDROCHLORIDE 10 MG: 5 TABLET ORAL at 10:03

## 2020-01-01 RX ADMIN — ALBUTEROL SULFATE 2 PUFF: 90 AEROSOL, METERED RESPIRATORY (INHALATION) at 10:04

## 2020-01-01 RX ADMIN — Medication 6 MG: at 10:04

## 2020-01-01 RX ADMIN — PROPOFOL 20 MG: 10 INJECTION, EMULSION INTRAVENOUS at 03:02

## 2020-01-01 RX ADMIN — METRONIDAZOLE 500 MG: 500 TABLET ORAL at 02:03

## 2020-01-01 RX ADMIN — HYDROXYCHLOROQUINE SULFATE 400 MG: 200 TABLET, FILM COATED ORAL at 08:04

## 2020-01-01 RX ADMIN — HYOSCYAMINE SULFATE 0.12 MG: 0.12 SOLUTION/ DROPS ORAL at 12:02

## 2020-01-01 RX ADMIN — Medication 0.08 MCG/KG/MIN: at 03:02

## 2020-01-01 RX ADMIN — MIRTAZAPINE 15 MG: 15 TABLET, FILM COATED ORAL at 09:04

## 2020-01-01 RX ADMIN — DOCUSATE SODIUM - SENNOSIDES 2 TABLET: 50; 8.6 TABLET, FILM COATED ORAL at 10:03

## 2020-01-01 RX ADMIN — Medication 800 MG: at 10:04

## 2020-01-01 RX ADMIN — ONDANSETRON 8 MG: 2 INJECTION INTRAMUSCULAR; INTRAVENOUS at 09:04

## 2020-01-01 RX ADMIN — DICYCLOMINE HYDROCHLORIDE 10 MG: 10 CAPSULE ORAL at 08:03

## 2020-01-01 RX ADMIN — PROPOFOL 30 MG: 10 INJECTION, EMULSION INTRAVENOUS at 02:02

## 2020-01-01 RX ADMIN — IOHEXOL 75 ML: 350 INJECTION, SOLUTION INTRAVENOUS at 10:01

## 2020-01-01 RX ADMIN — Medication 400 MG: at 10:01

## 2020-01-01 RX ADMIN — DOCUSATE SODIUM - SENNOSIDES 1 TABLET: 50; 8.6 TABLET, FILM COATED ORAL at 04:02

## 2020-01-01 RX ADMIN — WARFARIN SODIUM 2.5 MG: 2.5 TABLET ORAL at 04:02

## 2020-01-01 RX ADMIN — Medication 0.5 MG/HR: at 06:04

## 2020-01-01 RX ADMIN — Medication 16 G: at 11:02

## 2020-01-01 RX ADMIN — POTASSIUM CHLORIDE 40 MEQ: 1500 TABLET, EXTENDED RELEASE ORAL at 02:03

## 2020-01-01 RX ADMIN — MORPHINE SULFATE 2 MG: 2 INJECTION, SOLUTION INTRAMUSCULAR; INTRAVENOUS at 08:04

## 2020-01-01 RX ADMIN — POTASSIUM CHLORIDE 40 MEQ: 1500 TABLET, EXTENDED RELEASE ORAL at 03:02

## 2020-01-01 RX ADMIN — ACETAMINOPHEN 650 MG: 325 TABLET ORAL at 09:02

## 2020-01-01 RX ADMIN — HEPARIN SODIUM 17 UNITS/KG/HR: 10000 INJECTION, SOLUTION INTRAVENOUS at 07:02

## 2020-01-01 RX ADMIN — SODIUM CHLORIDE 500 ML: 0.9 INJECTION, SOLUTION INTRAVENOUS at 12:02

## 2020-01-01 RX ADMIN — AMIODARONE HYDROCHLORIDE 150 MG: 50 INJECTION, SOLUTION INTRAVENOUS at 06:04

## 2020-01-01 RX ADMIN — POLYETHYLENE GLYCOL 3350 17 G: 17 POWDER, FOR SOLUTION ORAL at 09:01

## 2020-01-01 RX ADMIN — SODIUM CHLORIDE 250 ML: 0.9 INJECTION, SOLUTION INTRAVENOUS at 10:02

## 2020-01-01 RX ADMIN — CALCIUM GLUCONATE 1000 MG: 98 INJECTION, SOLUTION INTRAVENOUS at 03:02

## 2020-01-01 RX ADMIN — METRONIDAZOLE 500 MG: 500 TABLET ORAL at 10:03

## 2020-01-01 RX ADMIN — PIPERACILLIN SODIUM,TAZOBACTAM SODIUM 4.5 G: 4; .5 INJECTION, POWDER, FOR SOLUTION INTRAVENOUS at 06:02

## 2020-01-01 RX ADMIN — Medication 20000 UNITS: at 08:04

## 2020-01-01 RX ADMIN — SODIUM CHLORIDE, SODIUM LACTATE, POTASSIUM CHLORIDE, AND CALCIUM CHLORIDE 1000 ML: .6; .31; .03; .02 INJECTION, SOLUTION INTRAVENOUS at 06:01

## 2020-01-01 RX ADMIN — Medication 400 MG: at 10:03

## 2020-01-01 RX ADMIN — MIRTAZAPINE 15 MG: 15 TABLET, FILM COATED ORAL at 10:03

## 2020-01-01 RX ADMIN — MIDODRINE HYDROCHLORIDE 10 MG: 5 TABLET ORAL at 12:03

## 2020-01-01 RX ADMIN — Medication 800 MG: at 09:04

## 2020-01-01 RX ADMIN — CALCIUM GLUCONATE 1 G: 94 INJECTION, SOLUTION INTRAVENOUS at 10:04

## 2020-01-01 RX ADMIN — FUROSEMIDE 40 MG: 10 INJECTION, SOLUTION INTRAMUSCULAR; INTRAVENOUS at 12:02

## 2020-01-01 RX ADMIN — IOHEXOL 15 ML: 350 INJECTION, SOLUTION INTRAVENOUS at 03:01

## 2020-01-01 RX ADMIN — POTASSIUM CHLORIDE 40 MEQ: 1500 TABLET, EXTENDED RELEASE ORAL at 12:04

## 2020-01-01 RX ADMIN — MIDODRINE HYDROCHLORIDE 10 MG: 5 TABLET ORAL at 10:04

## 2020-01-01 RX ADMIN — METRONIDAZOLE 500 MG: 500 TABLET ORAL at 04:03

## 2020-01-01 RX ADMIN — CEFEPIME 2 G: 2 INJECTION, POWDER, FOR SOLUTION INTRAVENOUS at 02:04

## 2020-01-01 RX ADMIN — Medication 1 CAPSULE: at 08:03

## 2020-01-01 RX ADMIN — MIDODRINE HYDROCHLORIDE 5 MG: 5 TABLET ORAL at 05:01

## 2020-01-01 RX ADMIN — Medication 0.5 MCG/KG/MIN: at 10:02

## 2020-01-01 RX ADMIN — COSYNTROPIN 0.25 MG: 0.25 INJECTION, POWDER, LYOPHILIZED, FOR SOLUTION INTRAVENOUS at 08:02

## 2020-01-01 RX ADMIN — AMIODARONE HYDROCHLORIDE 200 MG: 200 TABLET ORAL at 09:01

## 2020-01-01 RX ADMIN — AMIODARONE HYDROCHLORIDE 150 MG: 50 INJECTION, SOLUTION INTRAVENOUS at 07:04

## 2020-01-01 RX ADMIN — CIPROFLOXACIN HYDROCHLORIDE 500 MG: 500 TABLET, FILM COATED ORAL at 08:03

## 2020-01-01 RX ADMIN — MIDODRINE HYDROCHLORIDE 5 MG: 5 TABLET ORAL at 01:01

## 2020-01-01 RX ADMIN — DULOXETINE 30 MG: 30 CAPSULE, DELAYED RELEASE ORAL at 10:02

## 2020-01-01 RX ADMIN — Medication 0.02 MCG/KG/MIN: at 10:02

## 2020-01-01 RX ADMIN — WARFARIN SODIUM 1 MG: 1 TABLET ORAL at 09:04

## 2020-01-01 RX ADMIN — PIPERACILLIN SODIUM AND TAZOBACTAM SODIUM 4.5 G: 4; .5 INJECTION, POWDER, LYOPHILIZED, FOR SOLUTION INTRAVENOUS at 01:03

## 2020-01-01 RX ADMIN — POTASSIUM CHLORIDE 30 MEQ: 750 CAPSULE, EXTENDED RELEASE ORAL at 12:04

## 2020-01-01 RX ADMIN — PIPERACILLIN SODIUM,TAZOBACTAM SODIUM 4.5 G: 4; .5 INJECTION, POWDER, FOR SOLUTION INTRAVENOUS at 09:02

## 2020-01-01 RX ADMIN — DICYCLOMINE HYDROCHLORIDE 10 MG: 10 CAPSULE ORAL at 10:03

## 2020-01-01 RX ADMIN — AMIODARONE HYDROCHLORIDE 400 MG: 200 TABLET ORAL at 09:02

## 2020-01-01 RX ADMIN — IPRATROPIUM BROMIDE AND ALBUTEROL SULFATE 3 ML: .5; 3 SOLUTION RESPIRATORY (INHALATION) at 07:01

## 2020-01-01 RX ADMIN — ENOXAPARIN SODIUM 60 MG: 60 INJECTION SUBCUTANEOUS at 03:04

## 2020-01-01 RX ADMIN — FUROSEMIDE 20 MG: 10 INJECTION, SOLUTION INTRAMUSCULAR; INTRAVENOUS at 12:02

## 2020-01-01 RX ADMIN — MAGNESIUM SULFATE HEPTAHYDRATE 3 G: 500 INJECTION, SOLUTION INTRAMUSCULAR; INTRAVENOUS at 04:02

## 2020-01-01 RX ADMIN — ONDANSETRON 8 MG: 8 TABLET, ORALLY DISINTEGRATING ORAL at 05:02

## 2020-01-01 RX ADMIN — POTASSIUM CHLORIDE 30 MEQ: 750 CAPSULE, EXTENDED RELEASE ORAL at 10:02

## 2020-01-01 RX ADMIN — Medication 400 MG: at 11:02

## 2020-01-01 RX ADMIN — BUDESONIDE 0.5 MG: 0.5 INHALANT RESPIRATORY (INHALATION) at 10:01

## 2020-01-01 RX ADMIN — MIDODRINE HYDROCHLORIDE 10 MG: 5 TABLET ORAL at 08:04

## 2020-01-01 RX ADMIN — Medication 16 G: at 08:02

## 2020-01-01 RX ADMIN — ATORVASTATIN CALCIUM 40 MG: 20 TABLET, FILM COATED ORAL at 09:04

## 2020-01-01 RX ADMIN — CARBAMIDE PEROXIDE 6.5% 5 DROP: 6.5 LIQUID AURICULAR (OTIC) at 12:02

## 2020-01-01 RX ADMIN — CEFTRIAXONE SODIUM 1 G: 1 INJECTION, POWDER, FOR SOLUTION INTRAMUSCULAR; INTRAVENOUS at 09:04

## 2020-01-01 RX ADMIN — DEXMEDETOMIDINE HYDROCHLORIDE 400 MCG: 4 INJECTION, SOLUTION INTRAVENOUS at 03:02

## 2020-01-01 RX ADMIN — PIPERACILLIN SODIUM,TAZOBACTAM SODIUM 4.5 G: 4; .5 INJECTION, POWDER, FOR SOLUTION INTRAVENOUS at 01:01

## 2020-01-01 RX ADMIN — SODIUM CHLORIDE 500 ML: 0.9 INJECTION, SOLUTION INTRAVENOUS at 03:01

## 2020-01-01 RX ADMIN — INSULIN ASPART 2 UNITS: 100 INJECTION, SOLUTION INTRAVENOUS; SUBCUTANEOUS at 12:04

## 2020-01-01 RX ADMIN — POTASSIUM CHLORIDE 60 MEQ: 1500 TABLET, EXTENDED RELEASE ORAL at 06:02

## 2020-01-01 RX ADMIN — IPRATROPIUM BROMIDE AND ALBUTEROL SULFATE 3 ML: .5; 3 SOLUTION RESPIRATORY (INHALATION) at 08:01

## 2020-01-01 RX ADMIN — SODIUM CHLORIDE 250 ML: 0.9 INJECTION, SOLUTION INTRAVENOUS at 06:02

## 2020-01-01 RX ADMIN — DICYCLOMINE HYDROCHLORIDE 10 MG: 10 CAPSULE ORAL at 03:03

## 2020-01-01 RX ADMIN — INSULIN ASPART 1 UNITS: 100 INJECTION, SOLUTION INTRAVENOUS; SUBCUTANEOUS at 10:03

## 2020-01-01 RX ADMIN — ACETAMINOPHEN 650 MG: 325 TABLET ORAL at 03:04

## 2020-01-01 RX ADMIN — Medication 1 CAPSULE: at 08:02

## 2020-01-01 RX ADMIN — OLANZAPINE 2.5 MG: 10 INJECTION, POWDER, FOR SOLUTION INTRAMUSCULAR at 04:02

## 2020-01-01 RX ADMIN — LIDOCAINE HYDROCHLORIDE 100 MG: 20 INJECTION, SOLUTION INTRAVENOUS at 02:02

## 2020-01-01 RX ADMIN — AZITHROMYCIN 500 MG: 250 TABLET, FILM COATED ORAL at 10:04

## 2020-01-01 RX ADMIN — CARBAMIDE PEROXIDE 6.5% 5 DROP: 6.5 LIQUID AURICULAR (OTIC) at 08:02

## 2020-01-01 RX ADMIN — AMIODARONE HYDROCHLORIDE 200 MG: 200 TABLET ORAL at 09:02

## 2020-01-01 RX ADMIN — FUROSEMIDE 80 MG: 80 TABLET ORAL at 09:03

## 2020-01-01 RX ADMIN — SODIUM CHLORIDE 500 ML: 0.9 INJECTION, SOLUTION INTRAVENOUS at 05:01

## 2020-01-01 RX ADMIN — POTASSIUM PHOSPHATE, MONOBASIC AND POTASSIUM PHOSPHATE, DIBASIC 20 MMOL: 224; 236 INJECTION, SOLUTION, CONCENTRATE INTRAVENOUS at 01:02

## 2020-01-01 RX ADMIN — VANCOMYCIN HYDROCHLORIDE 1250 MG: 1.25 INJECTION, POWDER, LYOPHILIZED, FOR SOLUTION INTRAVENOUS at 03:02

## 2020-01-01 RX ADMIN — MIDODRINE HYDROCHLORIDE 10 MG: 5 TABLET ORAL at 12:02

## 2020-01-01 RX ADMIN — ASPIRIN 81 MG CHEWABLE TABLET 81 MG: 81 TABLET CHEWABLE at 08:02

## 2020-01-01 RX ADMIN — HEPARIN SODIUM 12 UNITS/KG/HR: 10000 INJECTION, SOLUTION INTRAVENOUS at 12:02

## 2020-01-01 RX ADMIN — DIGOXIN 0.12 MG: 125 TABLET ORAL at 08:03

## 2020-01-01 RX ADMIN — ONDANSETRON 8 MG: 2 INJECTION INTRAMUSCULAR; INTRAVENOUS at 01:04

## 2020-01-01 RX ADMIN — OYSTER SHELL CALCIUM WITH VITAMIN D 1 TABLET: 500; 200 TABLET, FILM COATED ORAL at 11:02

## 2020-01-01 RX ADMIN — HEPARIN SODIUM 12 UNITS/KG/HR: 10000 INJECTION, SOLUTION INTRAVENOUS at 09:02

## 2020-01-01 RX ADMIN — HEPARIN SODIUM AND DEXTROSE 12 UNITS/KG/HR: 10000; 5 INJECTION INTRAVENOUS at 11:01

## 2020-01-01 RX ADMIN — FUROSEMIDE 40 MG: 40 TABLET ORAL at 10:03

## 2020-01-01 RX ADMIN — POTASSIUM CHLORIDE 40 MEQ: 1500 TABLET, EXTENDED RELEASE ORAL at 10:02

## 2020-01-01 RX ADMIN — POTASSIUM BICARBONATE 50 MEQ: 978 TABLET, EFFERVESCENT ORAL at 05:02

## 2020-01-01 RX ADMIN — OXYCODONE HYDROCHLORIDE 5 MG: 5 TABLET ORAL at 10:03

## 2020-01-01 RX ADMIN — Medication 16 G: at 09:02

## 2020-01-01 RX ADMIN — POTASSIUM CHLORIDE 40 MEQ: 1500 TABLET, EXTENDED RELEASE ORAL at 08:02

## 2020-01-01 RX ADMIN — POTASSIUM PHOSPHATE, MONOBASIC AND POTASSIUM PHOSPHATE, DIBASIC 20 MMOL: 224; 236 INJECTION, SOLUTION, CONCENTRATE INTRAVENOUS at 09:02

## 2020-01-01 RX ADMIN — Medication 0.03 MCG/KG/MIN: at 06:02

## 2020-01-01 RX ADMIN — METOPROLOL TARTRATE 5 MG: 5 INJECTION INTRAVENOUS at 02:02

## 2020-01-01 RX ADMIN — PROPOFOL 60 MG: 10 INJECTION, EMULSION INTRAVENOUS at 12:02

## 2020-01-01 RX ADMIN — DEXTROSE 125 ML: 10 SOLUTION INTRAVENOUS at 03:02

## 2020-01-01 RX ADMIN — MIDODRINE HYDROCHLORIDE 5 MG: 5 TABLET ORAL at 04:02

## 2020-01-01 RX ADMIN — FUROSEMIDE 40 MG: 40 TABLET ORAL at 07:03

## 2020-01-01 RX ADMIN — MIRTAZAPINE 15 MG: 15 TABLET, FILM COATED ORAL at 08:03

## 2020-01-01 RX ADMIN — WARFARIN SODIUM 0.5 MG: 1 TABLET ORAL at 10:04

## 2020-01-01 RX ADMIN — PIPERACILLIN SODIUM,TAZOBACTAM SODIUM 4.5 G: 4; .5 INJECTION, POWDER, FOR SOLUTION INTRAVENOUS at 05:01

## 2020-01-01 RX ADMIN — IPRATROPIUM BROMIDE AND ALBUTEROL SULFATE 3 ML: .5; 3 SOLUTION RESPIRATORY (INHALATION) at 05:02

## 2020-01-01 RX ADMIN — ACETAZOLAMIDE 375 MG: 250 TABLET ORAL at 09:02

## 2020-01-01 RX ADMIN — ACETAZOLAMIDE 250 MG: 250 TABLET ORAL at 02:02

## 2020-01-01 RX ADMIN — AMIODARONE HYDROCHLORIDE 400 MG: 200 TABLET ORAL at 10:04

## 2020-01-01 RX ADMIN — ACETAMINOPHEN 650 MG: 325 TABLET ORAL at 08:02

## 2020-01-01 RX ADMIN — SODIUM CHLORIDE, SODIUM LACTATE, POTASSIUM CHLORIDE, AND CALCIUM CHLORIDE 500 ML: 600; 310; 30; 20 INJECTION, SOLUTION INTRAVENOUS at 06:02

## 2020-01-01 RX ADMIN — POTASSIUM CHLORIDE 40 MEQ: 1500 TABLET, EXTENDED RELEASE ORAL at 03:04

## 2020-01-01 RX ADMIN — POTASSIUM & SODIUM PHOSPHATES POWDER PACK 280-160-250 MG 1 PACKET: 280-160-250 PACK at 12:03

## 2020-01-01 RX ADMIN — POTASSIUM CHLORIDE 40 MEQ: 20 SOLUTION ORAL at 08:01

## 2020-01-01 RX ADMIN — FUROSEMIDE 40 MG: 10 INJECTION, SOLUTION INTRAMUSCULAR; INTRAVENOUS at 03:02

## 2020-01-01 RX ADMIN — THERA TABS 1 TABLET: TAB at 02:03

## 2020-01-01 RX ADMIN — METOPROLOL TARTRATE 5 MG: 5 INJECTION INTRAVENOUS at 04:02

## 2020-01-01 RX ADMIN — POTASSIUM CHLORIDE 40 MEQ: 1500 TABLET, EXTENDED RELEASE ORAL at 09:03

## 2020-01-01 RX ADMIN — HEPARIN SODIUM 14 UNITS/KG/HR: 10000 INJECTION, SOLUTION INTRAVENOUS at 10:02

## 2020-01-01 RX ADMIN — PANTOPRAZOLE SODIUM 80 MG: 40 INJECTION, POWDER, FOR SOLUTION INTRAVENOUS at 09:02

## 2020-01-01 RX ADMIN — AMIODARONE HYDROCHLORIDE 1 MG/MIN: 1.8 INJECTION, SOLUTION INTRAVENOUS at 07:04

## 2020-01-01 RX ADMIN — MIRTAZAPINE 15 MG: 15 TABLET, FILM COATED ORAL at 12:03

## 2020-01-01 RX ADMIN — MAGNESIUM SULFATE IN WATER 2 G: 40 INJECTION, SOLUTION INTRAVENOUS at 02:02

## 2020-01-01 RX ADMIN — CARBAMIDE PEROXIDE 6.5% 5 DROP: 6.5 LIQUID AURICULAR (OTIC) at 11:02

## 2020-01-01 RX ADMIN — DEXTROSE 125 ML: 10 SOLUTION INTRAVENOUS at 12:02

## 2020-01-01 RX ADMIN — POTASSIUM PHOSPHATE, MONOBASIC AND POTASSIUM PHOSPHATE, DIBASIC 30 MMOL: 224; 236 INJECTION, SOLUTION INTRAVENOUS at 06:01

## 2020-01-01 RX ADMIN — DICYCLOMINE HYDROCHLORIDE 10 MG: 10 CAPSULE ORAL at 02:04

## 2020-01-01 RX ADMIN — BUDESONIDE 0.5 MG: 0.5 INHALANT RESPIRATORY (INHALATION) at 08:01

## 2020-01-01 RX ADMIN — AMIODARONE HYDROCHLORIDE 400 MG: 200 TABLET ORAL at 11:02

## 2020-01-01 RX ADMIN — DIBASIC SODIUM PHOSPHATE, MONOBASIC POTASSIUM PHOSPHATE AND MONOBASIC SODIUM PHOSPHATE 2 TABLET: 852; 155; 130 TABLET ORAL at 02:02

## 2020-01-01 RX ADMIN — MULTIPLE VITAMINS W/ MINERALS TAB 1 TABLET: TAB at 09:04

## 2020-01-01 RX ADMIN — Medication: at 05:02

## 2020-01-01 RX ADMIN — POTASSIUM CHLORIDE 40 MEQ: 1500 TABLET, EXTENDED RELEASE ORAL at 01:02

## 2020-01-01 RX ADMIN — Medication: at 10:02

## 2020-01-01 RX ADMIN — FUROSEMIDE 40 MG: 10 INJECTION, SOLUTION INTRAVENOUS at 09:02

## 2020-01-01 RX ADMIN — CALCIUM GLUCONATE 1000 MG: 98 INJECTION, SOLUTION INTRAVENOUS at 06:04

## 2020-01-01 RX ADMIN — OYSTER SHELL CALCIUM WITH VITAMIN D 1 TABLET: 500; 200 TABLET, FILM COATED ORAL at 07:01

## 2020-01-01 RX ADMIN — DEXTROSE 125 ML: 10 SOLUTION INTRAVENOUS at 08:02

## 2020-01-01 RX ADMIN — SODIUM CHLORIDE 1000 ML: 0.9 INJECTION, SOLUTION INTRAVENOUS at 02:01

## 2020-01-01 RX ADMIN — MIDODRINE HYDROCHLORIDE 10 MG: 5 TABLET ORAL at 08:03

## 2020-01-01 RX ADMIN — PIPERACILLIN SODIUM,TAZOBACTAM SODIUM 4.5 G: 4; .5 INJECTION, POWDER, FOR SOLUTION INTRAVENOUS at 11:01

## 2020-01-01 RX ADMIN — POTASSIUM CHLORIDE 40 MEQ: 1500 TABLET, EXTENDED RELEASE ORAL at 05:03

## 2020-01-01 RX ADMIN — POTASSIUM & SODIUM PHOSPHATES POWDER PACK 280-160-250 MG 2 PACKET: 280-160-250 PACK at 11:04

## 2020-01-01 RX ADMIN — Medication 2000 MG: at 03:02

## 2020-01-01 RX ADMIN — MAGNESIUM SULFATE HEPTAHYDRATE 3 G: 500 INJECTION, SOLUTION INTRAMUSCULAR; INTRAVENOUS at 08:03

## 2020-01-01 RX ADMIN — DIGOXIN 0.12 MG: 125 TABLET ORAL at 08:02

## 2020-01-01 RX ADMIN — SODIUM CHLORIDE 1000 ML: 0.9 INJECTION, SOLUTION INTRAVENOUS at 04:01

## 2020-01-01 RX ADMIN — POTASSIUM CHLORIDE 10 MEQ: 7.46 INJECTION, SOLUTION INTRAVENOUS at 11:03

## 2020-01-01 RX ADMIN — ENOXAPARIN SODIUM 70 MG: 80 INJECTION SUBCUTANEOUS at 11:01

## 2020-01-01 RX ADMIN — PROPOFOL 50 MG: 10 INJECTION, EMULSION INTRAVENOUS at 02:02

## 2020-01-01 RX ADMIN — Medication 6 MG: at 01:04

## 2020-01-01 RX ADMIN — LOPERAMIDE HYDROCHLORIDE 2 MG: 2 CAPSULE ORAL at 09:04

## 2020-01-01 RX ADMIN — ONDANSETRON 4 MG: 2 INJECTION INTRAMUSCULAR; INTRAVENOUS at 10:02

## 2020-01-01 RX ADMIN — HEPARIN SODIUM AND DEXTROSE 12 UNITS/KG/HR: 10000; 5 INJECTION INTRAVENOUS at 07:02

## 2020-01-01 RX ADMIN — ONDANSETRON 4 MG: 2 INJECTION INTRAMUSCULAR; INTRAVENOUS at 08:03

## 2020-01-01 RX ADMIN — FUROSEMIDE 20 MG: 10 INJECTION, SOLUTION INTRAMUSCULAR; INTRAVENOUS at 10:02

## 2020-01-01 RX ADMIN — SODIUM CHLORIDE: 0.9 INJECTION, SOLUTION INTRAVENOUS at 09:04

## 2020-01-01 RX ADMIN — SODIUM CHLORIDE 500 ML: 0.9 INJECTION, SOLUTION INTRAVENOUS at 06:04

## 2020-01-01 RX ADMIN — DULOXETINE 30 MG: 30 CAPSULE, DELAYED RELEASE ORAL at 09:01

## 2020-01-02 PROBLEM — I45.2 BIFASCICULAR BLOCK: Status: ACTIVE | Noted: 2020-01-01

## 2020-01-02 PROBLEM — Q20.3 TRANSPOSITION OF THE GREAT ARTERIES: Status: ACTIVE | Noted: 2020-01-01

## 2020-01-02 NOTE — PROGRESS NOTES
Chart has been dictated using voice recognition software.  It is not been reviewed carefully for any transcriptional errors due to this technology.   Subjective:   Patient ID:  Elizabeth Cano is a 69 y.o. female who presents for evaluation of QT prolongation; Diverticulitis of sigmoid colon; Dizziness; Fatigue; and Emesis      HPI:  Patient with atrial fibrillation, COPD, heart failure with preserved ejection fraction, and prior cellulitis of the left lower extremity.  Patient was hospitalized at [facility] for the cellulitis and acute hypoxemic respiratory failure in November-2019.  Patient hospitalized at [facility] 19-21-December-2019 for diverticulitis.  Patient also had an elevated troponin thought to be due to myocardial injury from demand ischemia.  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    Patient has not been out of bed for 2 months. Patient has had significant problems with nausea and vomiting, including in clinic today.  Has chronic COPD on 2L nasal O2 continuously.  Patient denies any chest discomfort on exertion or at rest. With bouts of atrial fibrillation, gets nauseated, starts shaking, gagging, and somewhat short of breath. Gets that feeling at least twice a day. Only one episode of lightheadedness.  No syncope.  No edema.    Cardiac risk factors: diabetes, hyperlipidemia, hypertension, obesity, positive family history, sedentary lifestyle, tobacco use (stop 2 months ago)    Past Medical History:   Diagnosis Date    A-fib     GAVIN (acute kidney injury)     CHF (congestive heart failure)     COPD (chronic obstructive pulmonary disease)     Diabetes mellitus     Hypertension        Past Surgical History:   Procedure Laterality Date    CARDIAC SURGERY         Social History     Tobacco Use    Smoking status: Former Smoker     Packs/day: 1.00     Years: 54.00     Pack years: 54.00      Types: Cigarettes     Last attempt to quit: 2019     Years since quittin.1    Smokeless tobacco: Never Used   Substance Use Topics    Alcohol use: Not Currently    Drug use: Not Currently       Outpatient Medications Prior to Visit   Medication Sig Dispense Refill    albuterol (ACCUNEB) 0.63 mg/3 mL Nebu Take 0.5 mg by nebulization every 6 (six) hours as needed. Rescue      amoxicillin-clavulanate 875-125mg (AUGMENTIN) 875-125 mg per tablet Take 1 tablet by mouth every 12 (twelve) hours. for 12 days 24 tablet 0    aspirin 81 MG Chew Take 1 tablet (81 mg total) by mouth once daily.  0    budesonide (PULMICORT) 0.5 mg/2 mL nebulizer solution Take 0.5 mg by nebulization 2 (two) times daily. Controller      diltiaZEM (CARDIZEM SR) 60 MG Cp12 Take 60 mg by mouth every 12 (twelve) hours.       DULoxetine (CYMBALTA) 30 MG capsule Take 30 mg by mouth once daily.      furosemide (LASIX) 20 MG tablet Take 20 mg by mouth once daily.      magnesium oxide (MAG-OX) 400 mg (241.3 mg magnesium) tablet Take 1 tablet (400 mg total) by mouth once daily.  0    metFORMIN (GLUCOPHAGE) 1000 MG tablet Take 1,000 mg by mouth 2 (two) times daily with meals.      metoprolol tartrate (LOPRESSOR) 50 MG tablet Take 50 mg by mouth 2 (two) times daily.      nystatin (MYCOSTATIN) powder Apply twice daily under breast folds for redness and irritation until resolved      potassium chloride (MICRO-K) 10 MEQ CpSR Take 10 mEq by mouth once daily.      vitamin D (VITAMIN D3) 1000 units Tab Take 1,000 Units by mouth once daily.      warfarin (COUMADIN) 1 MG tablet Take 1 mg by mouth every Mon, Wed, Fri. At 5:00pm       No facility-administered medications prior to visit.        Review of patient's allergies indicates:  No Known Allergies    Review of Systems   Constitution: Positive for decreased appetite, malaise/fatigue and weight loss (> 15 pounds over past month (fluid?)).   Eyes: Positive for visual disturbance (had  "significant decrease in the hospital ).   Respiratory: Positive for shortness of breath (see HPI). Negative for hemoptysis.    Gastrointestinal: Positive for nausea and vomiting. Negative for hematemesis and hematochezia.   Genitourinary: Negative for hematuria.   Neurological: Negative for focal weakness and numbness.        Had episode when she became unresponsive and responded after sternal stimulation      Objective:   Physical Exam   Constitutional: She is oriented to person, place, and time. She appears well-developed and well-nourished.   /60 (BP Location: Left arm, Patient Position: Sitting, BP Method: Medium (Automatic))   Pulse 96   Ht 5' 6" (1.676 m)   SpO2 (!) 87%   BMI 24.94 kg/m²    Neck: Neck supple. No JVD present. Carotid bruit is not present. No thyromegaly present.   Cardiovascular: Regular rhythm, S1 normal, S2 normal and intact distal pulses. Tachycardia present. Exam reveals gallop and S4 (Soft). Exam reveals no friction rub.   No murmur heard.  Pulmonary/Chest: Breath sounds normal. She has no wheezes. She has no rales.   Abdominal: Soft. Bowel sounds are normal. There is no hepatosplenomegaly. There is no tenderness.   Musculoskeletal: She exhibits no edema.   Neurological: She is alert and oriented to person, place, and time. She has normal strength.   Skin: No cyanosis. Nails show clubbing (Mild).       Lab Results   Component Value Date    WBC 8.80 12/31/2019    HGB 14.5 12/31/2019    HCT 47.2 12/31/2019    MCV 94 12/31/2019     12/31/2019       Lab Results   Component Value Date     12/31/2019    K 4.2 12/31/2019    BUN 21 12/31/2019    CREATININE 1.0 12/31/2019     (H) 12/31/2019    HGBA1C 6.4 (H) 12/19/2019    HGB 14.5 12/31/2019    HCT 47.2 12/31/2019     12/31/2019    INR 2.7 (H) 12/21/2019     ECG shows sinus tachycardia with first-degree AV block with a rate of 96 beats per minute.  There is left anterior hemiblock and right bundle branch block " (bifascicular block).  There are no ischemic changes noted, even the heart rate was 114.  Assessment:     1. Chronic heart failure with preserved ejection fraction    2. Paroxysmal atrial fibrillation    3. Type 2 diabetes mellitus without complication, without long-term current use of insulin    4. Chronic obstructive pulmonary disease, unspecified COPD type    5. Diverticulitis of sigmoid colon    6. Bifascicular block    7. Transposition of the great arteries      Patient with paroxysmal atrial fibrillation and heart failure with preserved ejection fraction.  The patient is actually stable at this time and does not appear to have significant symptomatology.  However, it is difficult determine whether she has significant heart failure as the patient does not move at this time.  Her blood pressure is under control on her volume status appears under control is as well.  The patient describes a procedure when she was age 10 that sounds like she had surgical correction of transposition of the great vessels.  Given the patient's history, an echocardiogram will be obtained to determine her function and anatomy.  At this time no changes will be made in her medical regimen.  Patient remains anticoagulated for her atrial fibrillation.  It is unclear how frequently she is having atrial fibrillation but she is describing symptoms almost daily or every other day.  Therefore, a 48 hr Holter monitor will also be obtained to try and assess the patient's rhythm.  Further decisions will be made upon review of these tests.  I  Unless there are intervening problems, patient should return for re-evaluation in 2 months.   Plan:     Elizabeth was seen today for qt prolongation, diverticulitis of sigmoid colon, dizziness, fatigue and emesis.    Diagnoses and all orders for this visit:    Chronic heart failure with preserved ejection fraction  -     Cancel: Echo; Future; Expected date: 01/02/2020  -     Echo; Future; Expected date:  01/02/2020    Paroxysmal atrial fibrillation  -     Holter monitor - 48 hour; Future; Expected date: 01/02/2020  -     Cancel: Echo; Future; Expected date: 01/02/2020  -     Echo; Future; Expected date: 01/02/2020    Type 2 diabetes mellitus without complication, without long-term current use of insulin    Chronic obstructive pulmonary disease, unspecified COPD type    Diverticulitis of sigmoid colon    Bifascicular block    Transposition of the great arteries  -     Echo; Future; Expected date: 01/02/2020          Parth Bell MD  Consultative Cardiology

## 2020-01-02 NOTE — LETTER
January 2, 2020      Vicki Eason MD  [address]  [place] [address]           [facility] - Cardiology  [address]  [facility] [address]  Phone: [phone]          Patient: Elizabeth Cano   MR Number: [id number]   YOB: 1950   Date of Visit: 1/2/2020       Dear Dr. Vicki Eason:    Thank you for referring Elizabeth Cano to me for evaluation. Attached you will find relevant portions of my assessment and plan of care.    If you have questions, please do not hesitate to call me. I look forward to following Elizabeth Cano along with you.    Sincerely,    Parth Bell MD    Enclosure  CC:  No Recipients    If you would like to receive this communication electronically, please contact [email or web address] or [phone] to request more information on [employer] access.    For providers and/or their staff who would like to refer a patient to [facility], please contact us through our one-stop-shop provider referral line, [facility], at [phone].    If you feel you have received this communication in error or would no longer like to receive these types of communications, please e-mail [email or web address]

## 2020-01-06 NOTE — TELEPHONE ENCOUNTER
Calling to advise patient of need to cancel her holter appt 1/7/20 due to equipment error.Will call her to reschedule. Patient verbalized understanding

## 2020-01-14 NOTE — TELEPHONE ENCOUNTER
I Spoke with Ms. Johnson regarding her procedures she is aware it will be scheduled in 6 weeks or later. She needs to complete her ECHO on 1/29/20 first.    Who is managing her Coumadin, blood thinner, or prescribing it for her? I will need to obtain written approval for her to be able to hold prior to scheduling.    Thanks,  GARFIELD Roberto  [phone]

## 2020-01-15 NOTE — TELEPHONE ENCOUNTER
Hi,    Patient needs to be scheduled for Colonoscopy. She is currently on Coumadin. Can she hold her Coumadin 5 days prior, per our Endoscopy protocol? Please advise.    Thanks,  GARFIELD Roberto

## 2020-01-29 PROBLEM — E11.65 TYPE 2 DIABETES MELLITUS WITH HYPERGLYCEMIA, WITHOUT LONG-TERM CURRENT USE OF INSULIN: Status: ACTIVE | Noted: 2019-01-01

## 2020-01-29 PROBLEM — R79.89 ELEVATED TROPONIN: Status: RESOLVED | Noted: 2019-01-01 | Resolved: 2020-01-01

## 2020-01-29 PROBLEM — E83.42 HYPOMAGNESEMIA: Status: RESOLVED | Noted: 2019-01-01 | Resolved: 2020-01-01

## 2020-01-29 PROBLEM — N17.9 AKI (ACUTE KIDNEY INJURY): Status: ACTIVE | Noted: 2020-01-01

## 2020-01-29 PROBLEM — E86.1 HYPOVOLEMIA: Status: ACTIVE | Noted: 2020-01-01

## 2020-01-29 PROBLEM — E83.51 HYPOCALCEMIA: Status: ACTIVE | Noted: 2020-01-01

## 2020-01-29 PROBLEM — A41.9 SEVERE SEPSIS: Status: ACTIVE | Noted: 2020-01-01

## 2020-01-29 PROBLEM — R65.20 SEVERE SEPSIS: Status: ACTIVE | Noted: 2020-01-01

## 2020-01-29 PROBLEM — N30.00 ACUTE CYSTITIS WITHOUT HEMATURIA: Status: ACTIVE | Noted: 2020-01-01

## 2020-01-29 NOTE — ED TRIAGE NOTES
Elizabeth Cano, a 69 y.o. female presents to the ED w/ complaint of generalized weakness, dizziness, urinary issues, recently diagnosed with a UTI    Triage note:  Chief Complaint   Patient presents with    Multiple Complaints     uti finished antibiotics,      Review of patient's allergies indicates:  No Known Allergies  Past Medical History:   Diagnosis Date    A-fib     GAVIN (acute kidney injury)     CHF (congestive heart failure)     COPD (chronic obstructive pulmonary disease)     Diabetes mellitus     Hypertension      LOC: Patient name and date of birth verified. The patient is awake, alert and aware of environment with an appropriate affect, the patient is oriented x 3 and speaking appropriately.   APPEARANCE: Patient resting comfortably, patient is clean and well groomed, patient's clothing is properly fastened.  SKIN: The skin is warm and dry, color consistent with ethnicity, patient has normal skin turgor and moist mucus membranes, skin intact, no breakdown or bruising noted.  MUSCULOSKELETAL: Patient moving all extremities well, no obvious swelling or deformities noted.   RESPIRATORY: Respirations are spontaneous, patient has a normal effort and rate, no accessory muscle use noted.  CARDIAC: Patient has a normal rate and rhythm, no periphreal edema noted, capillary refill < 3 seconds.  ABDOMEN: Soft and non tender to palpation, no distention noted. Bowel sounds present in all four quadrants.  NEUROLOGIC: Eyes open spontaneously, behavior appropriate to situation, follows commands, facial expression symmetrical, bilateral hand grasp equal and even, purposeful motor response noted, normal sensation in all extremities when touched with a finger.

## 2020-01-29 NOTE — ED PROVIDER NOTES
Encounter Date: 2020       History     Chief Complaint   Patient presents with    Multiple Complaints     uti finished antibiotics,      68 y/o WF with history of DM, Afib on coumadin, HTN, COPD, CHF (EF 45%) presents to the ED from [facility] presents to the ED with family c/o hypotension. She was recently treated for UTI - per family was on macrobid and then another abx unsure which one. She reports chills, SOB, diarrhea (resolved), dysuria (improving), decreased urination (has not urinated today), lightheadedness, nausea without vomiting. Per family, she was hypotensive today so they brought her to the ED. She denies fever, chest pain, abdominal pain, headache, URI symptoms, confusion.     The history is provided by the patient.     Review of patient's allergies indicates:  No Known Allergies  Past Medical History:   Diagnosis Date    A-fib     GAVIN (acute kidney injury)     CHF (congestive heart failure)     COPD (chronic obstructive pulmonary disease)     Diabetes mellitus     Hypertension      Past Surgical History:   Procedure Laterality Date    CARDIAC SURGERY       Family History   Problem Relation Age of Onset    Heart disease Mother      Social History     Tobacco Use    Smoking status: Former Smoker     Packs/day: 1.00     Years: 54.00     Pack years: 54.00     Types: Cigarettes     Last attempt to quit: 2019     Years since quittin.2    Smokeless tobacco: Never Used   Substance Use Topics    Alcohol use: Not Currently    Drug use: Not Currently     Review of Systems   Constitutional: Positive for chills. Negative for fever.   HENT: Negative for congestion, rhinorrhea and sore throat.    Eyes: Negative for photophobia and visual disturbance.   Respiratory: Positive for shortness of breath. Negative for cough.    Cardiovascular: Negative for chest pain.   Gastrointestinal: Positive for diarrhea (resolved) and nausea. Negative for abdominal pain, constipation and vomiting.    Genitourinary: Positive for decreased urine volume and dysuria (improving). Negative for hematuria.   Musculoskeletal: Negative for neck pain and neck stiffness.   Skin: Negative for rash and wound.   Neurological: Positive for light-headedness. Negative for numbness and headaches.   Psychiatric/Behavioral: Negative for confusion.       Physical Exam     Initial Vitals [01/29/20 1429]   BP Pulse Resp Temp SpO2   (!) 84/58 84 20 98 °F (36.7 °C) 95 %      MAP       --         Physical Exam    Nursing note and vitals reviewed.  Constitutional: She appears well-developed and well-nourished. She is not diaphoretic. No distress.   HENT:   Head: Normocephalic and atraumatic.   Neck: Normal range of motion. Neck supple.   Cardiovascular: Normal rate, regular rhythm and normal heart sounds. Exam reveals no gallop and no friction rub.    No murmur heard.  Pulmonary/Chest: Breath sounds normal. She has no wheezes. She has no rhonchi. She has no rales.   Abdominal: Soft. Bowel sounds are normal. There is no tenderness. There is no rebound and no guarding.   Musculoskeletal: Normal range of motion.   Neurological: She is alert and oriented to person, place, and time.   Skin: Skin is warm and dry. No rash noted. No erythema.   Psychiatric: She has a normal mood and affect.         ED Course   Critical Care  Date/Time: 1/29/2020 3:55 PM  Performed by: Celso Natarajan DO  Authorized by: Celso Natarajan DO   Direct patient critical care time: 20 minutes  Additional history critical care time: 15 minutes  Ordering / reviewing critical care time: 9 minutes  Documentation critical care time: 6 minutes  Consulting other physicians critical care time: 8 minutes  Consult with family critical care time: 5 minutes  Total critical care time (exclusive of procedural time) : 63 minutes  Critical care was necessary to treat or prevent imminent or life-threatening deterioration of the following conditions: sepsis and dehydration.  Critical  care was time spent personally by me on the following activities: blood draw for specimens, discussions with consultants, interpretation of cardiac output measurements, evaluation of patient's response to treatment, development of treatment plan with patient or surrogate, examination of patient, obtaining history from patient or surrogate, ordering and performing treatments and interventions, ordering and review of laboratory studies, ordering and review of radiographic studies, pulse oximetry, review of old charts and re-evaluation of patient's condition.        Labs Reviewed   CBC W/ AUTO DIFFERENTIAL - Abnormal; Notable for the following components:       Result Value    Mean Corpuscular Hemoglobin Conc 31.1 (*)     RDW 16.0 (*)     All other components within normal limits   COMPREHENSIVE METABOLIC PANEL - Abnormal; Notable for the following components:    Glucose 147 (*)     Calcium 7.3 (*)     Albumin 2.4 (*)     Alkaline Phosphatase 199 (*)     AST 45 (*)     eGFR if  48.0 (*)     eGFR if non  41.7 (*)     All other components within normal limits   LACTIC ACID, PLASMA - Abnormal; Notable for the following components:    Lactate (Lactic Acid) 4.8 (*)     All other components within normal limits   URINALYSIS, REFLEX TO URINE CULTURE - Abnormal; Notable for the following components:    Appearance, UA Hazy (*)     Leukocytes, UA Trace (*)     All other components within normal limits    Narrative:     Preferred Collection Type->Urine, Catheterized   URINALYSIS MICROSCOPIC - Abnormal; Notable for the following components:    WBC, UA 17 (*)     All other components within normal limits    Narrative:     Preferred Collection Type->Urine, Catheterized   PROTIME-INR - Abnormal; Notable for the following components:    Prothrombin Time 12.8 (*)     INR 1.3 (*)     All other components within normal limits   LACTIC ACID, PLASMA - Abnormal; Notable for the following components:     Lactate (Lactic Acid) 2.7 (*)     All other components within normal limits   POCT GLUCOSE, HAND-HELD DEVICE - Abnormal; Notable for the following components:    POC Glucose 115 (*)     All other components within normal limits   ISTAT PROCEDURE - Abnormal; Notable for the following components:    POC PCO2 45.8 (*)     POC PO2 25 (*)     POC SATURATED O2 41 (*)     All other components within normal limits   ISTAT PROCEDURE - Abnormal; Notable for the following components:    POC PH 7.345 (*)     POC PCO2 47.5 (*)     POC PO2 20 (*)     POC SATURATED O2 29 (*)     POC Lactate 2.47 (*)     All other components within normal limits   POCT GLUCOSE, HAND-HELD DEVICE   POCT GLUCOSE     EKG Readings: (Independently Interpreted)   Initial Reading: No STEMI. Previous EKG: Compared with most recent EKG Rhythm: Normal Sinus Rhythm. Heart Rate: 98. Clinical Impression: with RBBB and with Bifasicular Block     ECG Results          EKG 12-lead (Final result)  Result time 01/30/20 12:08:05    Final result by Interface, Lab In [facility] (01/30/20 12:08:05)                 Narrative:    Test Reason : I95.9,    Vent. Rate : 098 BPM     Atrial Rate : 098 BPM     P-R Int : 148 ms          QRS Dur : 154 ms      QT Int : 434 ms       P-R-T Axes : 093 -77 098 degrees     QTc Int : 554 ms    Normal sinus rhythm  Right bundle branch block  Left anterior fascicular block   Bifascicular block   Abnormal ECG  When compared with ECG of 02-JAN-2020 13:20,  FL interval has decreased    Confirmed by VINEET SILVEIRA, SELINA ([id number]) on 1/30/2020 12:08:00 PM    Referred By: [email or web address]   SELF           Confirmed By:SELINA MANLEY MD                            Imaging Results          US Retroperitoneal Complete (Final result)  Result time 01/30/20 01:18:12    Final result by Narciso Martini MD (01/30/20 01:18:12)                 Impression:      Sonographic findings suggestive of chronic medical renal disease.  No hydronephrosis.    Electronically  signed by resident: Terry Vallecillo  Date:    01/30/2020  Time:    01:04    Electronically signed by: Narciso Martini MD  Date:    01/30/2020  Time:    01:18             Narrative:    EXAMINATION:  US RETROPERITONEAL COMPLETE    CLINICAL HISTORY:  UTI with GAVIN;    TECHNIQUE:  Ultrasound of the kidneys and urinary bladder was performed including color flow and Doppler evaluation of the kidneys.    COMPARISON:  CT abdomen pelvis 12/19/2019.    FINDINGS:  Right kidney: The right kidney measures 8.9 cm.  There is cortical thinning, decreased corticomedullary distinction, and decreased perfusion.  Resistive index measures 0.65.  No mass. No renal stone. No hydronephrosis.    Left kidney: The left kidney measures 9.4 cm. There is cortical thinning, decreased corticomedullary distinction, and decreased perfusion.  Resistive index measures 0.63.  No mass. No renal stone. No hydronephrosis.    The bladder is partially distended at the time of scanning and has an unremarkable appearance.                               X-Ray Chest 1 View (Final result)  Result time 01/29/20 15:34:51    Final result by Refugio Pruett MD (01/29/20 15:34:51)                 Impression:      Chronic and stable findings in the chest as noted when compared December 19, 2019.      Electronically signed by: Refugio Pruett  Date:    01/29/2020  Time:    15:34             Narrative:    EXAMINATION:  XR CHEST 1 VIEW    CLINICAL HISTORY:  Hypotension, unspecified    TECHNIQUE:  Single frontal view of the chest was performed.    COMPARISON:  December 19, 2019    FINDINGS:  Stable cardiomegaly, tortuous aorta, arch calcification and normal pulmonary vasculature.  Some stable areas of interstitial prominence and basilar atelectasis or fibrosis.  No new or progressive right or left lung infiltrates.  No free pleural fluid blunting costophrenic sulci.                              X-Rays:   Independently Interpreted Readings:   Chest X-Ray: No infiltrates.  Stable cardiomegaly, tortuous aorta, with arch calcifications, no pneumothorax or free air     Medical Decision Making:   History:   Old Medical Records: I decided to obtain old medical records.  Old Records Summarized: records from clinic visits and records from previous admission(s).  Independently Interpreted Test(s):   I have ordered and independently interpreted X-rays - see prior notes.  I have ordered and independently interpreted EKG Reading(s) - see prior notes  Clinical Tests:   Lab Tests: Ordered and Reviewed  Radiological Study: Ordered and Reviewed  Medical Tests: Ordered and Reviewed  Sepsis Perfusion Assessment: I attest, a sepsis perfusion exam was performed within 6 hours of Septic Shock presentation, following fluid resuscitation.  Other:   I have discussed this case with another health care provider.       <> Summary of the Discussion: Hospital Medicine       APC / Resident Notes:   70 y/o WF with history of DM, Afib on coumadin, HTN, COPD, CHF (EF 45%) presents to the ED from [facility] presents to the ED with family c/o hypotension. Hypotensive upon arrival to the ED at 84/58. Repeat BP when placed in exam room 114/61. RRR. Lungs clear. Abdomen soft and nontender. DDx includes but is not limited UTI, sepsis, dehydration, GAVIN, renal failure, pneumonia. Will check labs, CXR, give IVF.     UA concerning for UTI. No leukocytosis. Cr 1.3 (baseline 0.8). Lactic acid 4.8.    Given 2L IVF (this is more than 30cc/kg bolus using ideal weight). 2g IV rocephin.     Repeat lactic acid 2.47.     Will admit to hospital medicine for UTI, lactic acidosis, hypotension. Discussed with Dr Nichole.          Attending Attestation:     Physician Attestation Statement for NP/PA:   I have conducted a face to face encounter with this patient in addition to the NP/PA, due to          .I have reviewed and concur with the advanced practice clinician's history, physical, assessment, and plan.  I have personally interviewed  and examined the patient at bedside.  See below addendum for my evaluation and additional findings.    Briefly,  this is a 69 y.o. female with a history of type 2 diabetes, atrial fibrillation on Coumadin, hypertension, COPD, CHF with an EF of 45% presenting with hypotension with systolic 80s over 50s.  Repeat blood pressure in the room was elevated to over 100 systolic.  Lungs clear on exam, abdomen staff to nontender.  Patient has a history of UTI in the past, workup for sepsis ensued.  Appears to be dehydrated, with code sepsis obtained.  UA was concerning for UTI however no leukocytosis creatinine 1.3 but lactic acidosis of 4.8.  Patient was given IV fluid bolus at 30 mL/kilogram, 2 g of IV Rocephin, and resuscitated.  Repeat lactic acidosis after fluid boluses 2.47.  Discussed with hospital medicine, will admit for UTI with concern for pyelonephritis, sepsis along with lactic acidosis, hypotension.  Patient agreeable with admission plan.  ECG with no signs of ischemia or STEMI on my read.  Chest x-ray with no acute findings on my read. Critical care time was spent personally by me evaluating the patient's organ dysfunction, developing treatment plan with the ICU, discussing treatment plan with patient and caregivers, discussion with all consultants, evaluation of patient's response to treatment, examination of patient, ordering performed treatments and interventions, ordering and reviewing laboratory studies, ordering and reviewing radiographic studies, and re-evaluating patient's condition.    Complexity:  Critical care      Celso Natarajan DO    Dept of Emergency Medicine   [facility]  Spectralink: [id number]                        Clinical Impression:       ICD-10-CM ICD-9-CM   1. Hypovolemia E86.1 276.52   2. Hypotension I95.9 458.9   3. Urinary tract infection without hematuria, site unspecified N39.0 599.0   4. Lactic acidosis E87.2 276.2   5. Hypomagnesemia E83.42 275.2          Disposition:   Disposition: Admitted  Condition: Serious                     Isabel Kelly PA-C  01/29/20 1906       Celso Natarajan DO  01/30/20 [id number]

## 2020-01-30 PROBLEM — I95.9 HYPOTENSION: Status: ACTIVE | Noted: 2020-01-01

## 2020-01-30 PROBLEM — R65.21 SEPTIC SHOCK: Status: ACTIVE | Noted: 2020-01-01

## 2020-01-30 PROBLEM — K57.92 DIVERTICULITIS: Status: ACTIVE | Noted: 2020-01-01

## 2020-01-30 PROBLEM — R57.9 SHOCK: Status: ACTIVE | Noted: 2020-01-01

## 2020-01-30 NOTE — TREATMENT PLAN
Patient with persistent hypotension, MAP >65. She has received 3L in the last 12 hours. She received 2L at 4pm and 5pm. She received another liter at 3AM. Patient seen at bedside, she is completely asymptomatic. Lungs remain clear with mild wheeze. Will give midodrine 5 mg and additional 500 mL. LA pending for 0730AM. Will hold AM dilt and MTP.    Jason Lorenzana, [facility]

## 2020-01-30 NOTE — PLAN OF CARE
Problem: Wound  Goal: Optimal Wound Healing  Outcome: Ongoing, Progressing     Problem: Fall Injury Risk  Goal: Absence of Fall and Fall-Related Injury  Outcome: Ongoing, Progressing     Problem: Adult Inpatient Plan of Care  Goal: Plan of Care Review  Outcome: Ongoing, Progressing  Goal: Patient-Specific Goal (Individualization)  Outcome: Ongoing, Progressing  Goal: Absence of Hospital-Acquired Illness or Injury  Outcome: Ongoing, Progressing  Goal: Optimal Comfort and Wellbeing  Outcome: Ongoing, Progressing  Goal: Readiness for Transition of Care  Outcome: Ongoing, Progressing  Goal: Rounds/Family Conference  Outcome: Ongoing, Progressing     Problem: Infection  Goal: Infection Symptom Resolution  Outcome: Ongoing, Progressing     Problem: Diabetes Comorbidity  Goal: Blood Glucose Level Within Desired Range  Outcome: Ongoing, Progressing     Problem: Skin Injury Risk Increased  Goal: Skin Health and Integrity  Outcome: Ongoing, Progressing

## 2020-01-30 NOTE — ASSESSMENT & PLAN NOTE
UTI  DIVERTICULITIS     - SBP <90, HR >90, LA 4.2>2.4, GAVIN (0.7>1.3)  - failed outpatient macrobid for UTI  - start rocephin (01/29/2020)  - s/p sepsis bolus with improvement in BP, lungs clear, continue gentle hydration overnight  - Consult critical care  - CT A/P to evaluate diverticular disease

## 2020-01-30 NOTE — ASSESSMENT & PLAN NOTE
UTI   - SBP <90, HR >90, LA 4.2>2.4, GAVIN (0.7>1.3)  - failed outpatient macrobid for UTI  - no UCx available at this time, will need to call NH in AM  - start rocephin (01/29/2020)  - s/p sepsis bolus with improvement in BP, lungs clear, continue gentle hydration overnight

## 2020-01-30 NOTE — NURSING
Calcium level 6.1 report from [facility] lab called critical care at [phone] spoke with  Shakir of result of calcium

## 2020-01-30 NOTE — CARE UPDATE
Rapid Response Nurse Follow-up Note     Followed up with patient for proactive rounding.   No acute issues at this time.   Reviewed plan of care with primary RN, Bhumi.   Please call Rapid Response RN, Roxanne Abdi RN with any questions or concerns at [phone].

## 2020-01-30 NOTE — SUBJECTIVE & OBJECTIVE
Past Medical History:   Diagnosis Date    A-fib     GAVIN (acute kidney injury)     CHF (congestive heart failure)     COPD (chronic obstructive pulmonary disease)     Diabetes mellitus     Hypertension        Past Surgical History:   Procedure Laterality Date    CARDIAC SURGERY         Review of patient's allergies indicates:  No Known Allergies    Family History     Problem Relation (Age of Onset)    Heart disease Mother        Tobacco Use    Smoking status: Former Smoker     Packs/day: 1.00     Years: 54.00     Pack years: 54.00     Types: Cigarettes     Last attempt to quit: 2019     Years since quittin.2    Smokeless tobacco: Never Used   Substance and Sexual Activity    Alcohol use: Not Currently    Drug use: Not Currently    Sexual activity: Not Currently     Partners: Male      Review of Systems   Constitutional: Negative for chills and fever.   Eyes: Negative for visual disturbance.   Respiratory: Negative for shortness of breath.    Cardiovascular: Negative for chest pain.   Gastrointestinal: Positive for nausea. Negative for abdominal distention, abdominal pain and vomiting.   Genitourinary: Negative for decreased urine volume, difficulty urinating, dysuria and urgency.   Musculoskeletal: Negative for myalgias.   Skin: Negative for rash.   Neurological: Negative for dizziness and light-headedness.   Psychiatric/Behavioral: Negative for confusion.     Objective:     Vital Signs (Most Recent):  Temp: 98.1 °F (36.7 °C) (20 1131)  Pulse: 98 (20 1131)  Resp: 17 (20 113)  BP: (!) 104/51 (20 1131)  SpO2: (!) 93 % (20 1131) Vital Signs (24h Range):  Temp:  [98 °F (36.7 °C)-98.6 °F (37 °C)] 98.1 °F (36.7 °C)  Pulse:  [84-98] 98  Resp:  [15-24] 17  SpO2:  [93 %-100 %] 93 %  BP: ()/(50-72) 104/51   Weight: 67.5 kg (148 lb 12.8 oz)  Body mass index is 24.02 kg/m².      Intake/Output Summary (Last 24 hours) at 2020 1201  Last data filed at 2020  0600  Gross per 24 hour   Intake 2820 ml   Output --   Net 2820 ml       Physical Exam   Constitutional: She is oriented to person, place, and time. She appears well-developed and well-nourished. No distress.   HENT:   Head: Normocephalic and atraumatic.   Eyes: Conjunctivae are normal.   Neck: No JVD present.   Cardiovascular: Normal rate, regular rhythm and normal heart sounds.   Pulmonary/Chest: Effort normal and breath sounds normal. No respiratory distress.   Abdominal: Soft. Bowel sounds are normal. She exhibits no distension.   Musculoskeletal: She exhibits no edema.   Neurological: She is alert and oriented to person, place, and time.   Skin: No rash noted.   Psychiatric: She has a normal mood and affect. Her behavior is normal.       Vents:     Lines/Drains/Airways     Peripheral Intravenous Line                 Peripheral IV - Single Lumen 01/30/20 0400 20 G Anterior;Left;Proximal Forearm less than 1 day              Significant Labs:    CBC/Anemia Profile:  Recent Labs   Lab 01/29/20  1450 01/30/20  0421   WBC 7.03 6.31   HGB 14.7 11.5*   HCT 47.2 37.1    227   MCV 96 98   RDW 16.0* 15.9*        Chemistries:  Recent Labs   Lab 01/29/20  1450 01/30/20  0421    144   K 4.6 3.8    113*   CO2 23 24   BUN 12 13   CREATININE 1.3 1.1   CALCIUM 7.3* 6.1*   ALBUMIN 2.4*  --    PROT 6.4  --    BILITOT 0.8  --    ALKPHOS 199*  --    ALT 29  --    AST 45*  --    MG  --  0.8*   PHOS  --  2.2*       All pertinent labs within the past 24 hours have been reviewed.    Significant Imaging: I have reviewed and interpreted all pertinent imaging results/findings within the past 24 hours.

## 2020-01-30 NOTE — H&P
[facility] Hospital Medicine  History & Physical    Patient Name: Elizabeth Cano  MRN: [id number]  Admission Date: 1/29/2020  Attending Physician: Janna Nichole MD   Primary Care Provider: Primary Doctor [facility] Medicine Team: Networked reference to record PCT  Jason Lorenzana PA-C     Patient information was obtained from patient, past medical records and ER records.     Subjective:     Principal Problem:Severe sepsis    Chief Complaint:   Chief Complaint   Patient presents with    Multiple Complaints     uti finished antibiotics,         HPI: Elizabeth Cano is a 69F with afib on coumadin, COPD, HFrEF (EF 45), who presents for evaluation from NH for hypotension. Patient reported dysuria last week, for which she was being treated with Macrobid. She was due to complete her abx course today. Patient states that antibiotics always make her lose her appetite, nauseated, weak. She had lightheadedness on standing, no recent falls, no syncope. Her dysuria has resolved, no flank pain. She had diarrhea earlier in the week, now resolved.  No URI symptoms, no HA, vision changes, CP, SOB. She uses a walker to ambulate at baseline. She feels improved with fluids. She says her BP always runs a little low.    ED: BP 84/58, HR >90, LA 4.8>2.4, UA+, CXR clear, no leukocytosis. Increase in SCr from 0.7 to 1.3    Past Medical History:   Diagnosis Date    A-fib     GAVIN (acute kidney injury)     CHF (congestive heart failure)     COPD (chronic obstructive pulmonary disease)     Diabetes mellitus     Hypertension        Past Surgical History:   Procedure Laterality Date    CARDIAC SURGERY         Review of patient's allergies indicates:  No Known Allergies    No current facility-administered medications on file prior to encounter.      Current Outpatient Medications on File Prior to Encounter   Medication Sig    albuterol (ACCUNEB) 0.63 mg/3 mL Nebu Take 0.5 mg by nebulization every 6 (six)  hours as needed. Rescue    aspirin 81 MG Chew Take 1 tablet (81 mg total) by mouth once daily.    budesonide (PULMICORT) 0.5 mg/2 mL nebulizer solution Take 0.5 mg by nebulization 2 (two) times daily. Controller    diltiaZEM (CARDIZEM SR) 60 MG Cp12 Take 60 mg by mouth every 12 (twelve) hours.     DULoxetine (CYMBALTA) 30 MG capsule Take 30 mg by mouth once daily.    furosemide (LASIX) 20 MG tablet Take 20 mg by mouth once daily.    magnesium oxide (MAG-OX) 400 mg (241.3 mg magnesium) tablet Take 1 tablet (400 mg total) by mouth once daily.    metFORMIN (GLUCOPHAGE) 1000 MG tablet Take 1,000 mg by mouth 2 (two) times daily with meals.    metoprolol tartrate (LOPRESSOR) 50 MG tablet Take 50 mg by mouth 2 (two) times daily.    nystatin (MYCOSTATIN) powder Apply twice daily under breast folds for redness and irritation until resolved    potassium chloride (MICRO-K) 10 MEQ CpSR Take 10 mEq by mouth once daily.    vitamin D (VITAMIN D3) 1000 units Tab Take 1,000 Units by mouth once daily.    warfarin (COUMADIN) 1 MG tablet Take 1 mg by mouth every Mon, Wed, Fri. At 5:00pm     Family History     Problem Relation (Age of Onset)    Heart disease Mother        Tobacco Use    Smoking status: Former Smoker     Packs/day: 1.00     Years: 54.00     Pack years: 54.00     Types: Cigarettes     Last attempt to quit: 2019     Years since quittin.2    Smokeless tobacco: Never Used   Substance and Sexual Activity    Alcohol use: Not Currently    Drug use: Not Currently    Sexual activity: Not Currently     Partners: Male     Review of Systems   Constitutional: Positive for activity change, appetite change and fatigue. Negative for fever.   HENT: Negative for congestion and postnasal drip.    Eyes: Negative for photophobia and visual disturbance.   Respiratory: Negative for cough, shortness of breath and wheezing.    Cardiovascular: Negative for chest pain and leg swelling.   Gastrointestinal: Positive for  nausea. Negative for abdominal distention, abdominal pain and vomiting.   Genitourinary: Positive for decreased urine volume. Negative for difficulty urinating, dysuria and flank pain.   Musculoskeletal: Positive for gait problem. Negative for arthralgias.   Skin: Negative for rash and wound.   Allergic/Immunologic: Negative for immunocompromised state.   Neurological: Positive for dizziness, weakness and light-headedness. Negative for headaches.   Hematological: Bruises/bleeds easily.   Psychiatric/Behavioral: Negative for agitation and confusion.     Objective:     Vital Signs (Most Recent):  Temp: 98 °F (36.7 °C) (01/29/20 1429)  Pulse: 97 (01/29/20 2141)  Resp: 16 (01/29/20 2141)  BP: (!) 99/57 (01/29/20 2141)  SpO2: 98 % (01/29/20 2141) Vital Signs (24h Range):  Temp:  [98 °F (36.7 °C)] 98 °F (36.7 °C)  Pulse:  [84-98] 97  Resp:  [15-24] 16  SpO2:  [95 %-100 %] 98 %  BP: ()/(50-72) 99/57     Weight: 69.4 kg (153 lb)  Body mass index is 24.69 kg/m².    Physical Exam   Constitutional: She is oriented to person, place, and time. She appears well-developed and well-nourished. No distress.   HENT:   Head: Normocephalic and atraumatic.   Eyes: Pupils are equal, round, and reactive to light. EOM are normal.   Neck: Normal range of motion. No JVD present.   Cardiovascular: Normal rate and regular rhythm.   Pulmonary/Chest: Effort normal and breath sounds normal. No stridor. No respiratory distress. She has no wheezes. She has no rales.   Abdominal: Soft. Bowel sounds are normal. She exhibits no distension. There is no tenderness. There is no guarding.   Genitourinary:   Genitourinary Comments: Very dark urine in sample cup at bedside   Musculoskeletal: Normal range of motion. She exhibits no edema.   Neurological: She is alert and oriented to person, place, and time. No cranial nerve deficit.   Skin: Skin is warm and dry. She is not diaphoretic.   Psychiatric: She has a normal mood and affect. Her behavior is  normal. Judgment and thought content normal.   Nursing note and vitals reviewed.        CRANIAL NERVES     CN III, IV, VI   Pupils are equal, round, and reactive to light.  Extraocular motions are normal.        Significant Labs:   Blood Culture:   Recent Labs   Lab 01/29/20  1451   LABBLOO No Growth to date     CBC:   Recent Labs   Lab 01/29/20  1450   WBC 7.03   HGB 14.7   HCT 47.2        CMP:   Recent Labs   Lab 01/29/20  1450      K 4.6      CO2 23   *   BUN 12   CREATININE 1.3   CALCIUM 7.3*   PROT 6.4   ALBUMIN 2.4*   BILITOT 0.8   ALKPHOS 199*   AST 45*   ALT 29   ANIONGAP 15   EGFRNONAA 41.7*     Cardiac Markers: No results for input(s): CKMB, MYOGLOBIN, BNP, TROPISTAT in the last 48 hours.  Lactic Acid:   Recent Labs   Lab 01/29/20  1450   LACTATE 4.8*     Troponin: No results for input(s): TROPONINI in the last 48 hours.  TSH: No results for input(s): TSH in the last 4320 hours.  Urine Culture: No results for input(s): LABURIN in the last 48 hours.  Urine Studies:   Recent Labs   Lab 01/29/20  1505   COLORU Tiffanie   APPEARANCEUA Hazy*   PHUR 5.0   SPECGRAV 1.015   PROTEINUA Negative   GLUCUA Negative   KETONESU Negative   BILIRUBINUA Negative   OCCULTUA Negative   NITRITE Negative   LEUKOCYTESUR Trace*   WBCUA 17*       Significant Imaging: I have reviewed all pertinent imaging results/findings within the past 24 hours.    Assessment/Plan:     * Severe sepsis  UTI   - SBP <90, HR >90, LA 4.2>2.4, GAVIN (0.7>1.3)  - failed outpatient macrobid for UTI  - no UCx available at this time, will need to call NH in AM  - start rocephin (01/29/2020)  - s/p sepsis bolus with improvement in BP, lungs clear, continue gentle hydration overnight    GAVIN (acute kidney injury)  - baseline SCr 0.7, on admit 1.3 (not an abnormal finding, but a doubling of her baseline)  - I/Os  - IVFs overnight  - check renal US given UTI  - likely prerenal from decreased PO intake  - avoid nephrotoxic  agents    Hypocalcemia  - continue home Vit D, add Calcium  - corrects to 8.6    Hypovolemia  - IVFs    Bifascicular block  - stable    Type 2 diabetes mellitus with hyperglycemia, without long-term current use of insulin  - hold home metformin  - low dose SSI for now  - diabetic diet  Lab Results   Component Value Date    HGBA1C 6.4 (H) 12/19/2019         Paroxysmal atrial fibrillation  - continue MTP 50 mg BID  - replace home dilt 12H 60 mg BID with hospital formulary dilt 24H 120 mg, hold for SBP <100  - continue coumadin 1 mg MWF  - INR 1.3 on admit, bridge with warfarin  - PhamD consulted for coumadin dosing    COPD (chronic obstructive pulmonary disease)  - continue nebs PRN  - does not use oxygen    VTE Risk Mitigation (From admission, onward)         Ordered     enoxaparin injection 70 mg  Every 12 hours      01/29/20 2144     warfarin (COUMADIN) tablet 1 mg  Every Mon, Wed, Fri 01/29/20 1916     IP VTE HIGH RISK PATIENT  Once      01/29/20 1916     Reason for No Pharmacological VTE Prophylaxis  Once     Question:  Reasons:  Answer:  Already adequately anticoagulated on oral Anticoagulants    01/29/20 1916                   Jason Lorenzana PA-C  Department of Hospital Medicine   [facility]

## 2020-01-30 NOTE — ASSESSMENT & PLAN NOTE
- continue MTP 50 mg BID  - replace home dilt 12H 60 mg BID with hospital formulary dilt 24H 120 mg, hold for SBP <100  - continue coumadin 1 mg MW  - INR 1.3 on admit, bridge with warfarin  - PhamD consulted for coumadin dosing

## 2020-01-30 NOTE — PLAN OF CARE
Payor: [employer] MEDICARE / Plan: [employer] MEDICARE HMO / Product Type: Capitation /           01/30/20 1503   Discharge Assessment   Assessment Type Discharge Planning Assessment   Assessment information obtained from? Patient   Expected Length of Stay (days) 5   Communicated expected length of stay with patient/caregiver yes   Prior to hospitilization cognitive status: Alert/Oriented   Prior to hospitalization functional status: Assistive Equipment;Needs Assistance   Current cognitive status: Alert/Oriented   Current Functional Status: Assistive Equipment;Needs Assistance   Lives With facility resident  ([facility])   Able to Return to Prior Arrangements yes   Is patient able to care for self after discharge? No   Patient's perception of discharge disposition prison [facility] facility   Readmission Within the Last 30 Days no previous admission in last 30 days   Patient currently being followed by outpatient case management? No   Patient currently receives any other outside agency services? No   Equipment Currently Used at Home walker, standard;bedside commode;bath bench   Do you have any problems affording any of your prescribed medications? No   Is the patient taking medications as prescribed? yes   Discharge Plan A Return to nursing home  ([facility])   DME Needed Upon Discharge  none   Patient/Family in Agreement with Plan yes

## 2020-01-30 NOTE — ASSESSMENT & PLAN NOTE
Pt with persistent hypomagnesemia during previous admission. She had been discharged on PO magnesium, but arrived with low magnesium again. She arrived with acute renal failure as well.     - IV magnesium   - PO magnesium

## 2020-01-30 NOTE — HPI
Ms. Elizabeth Cano is a 69 years old female with afib (on coumadin), COPD, and HFpEF (EF 45% on 1/29/20) admitted to hospital medicine for presumed urinary tract infection. She was diagnosed with a UTI last week at her Nursing Home, [facility], which she was prescribed Macrobid. She completed her course of Macrobid on 01/29/20 and she was noted to be hypotensive rendering her transfer to the hospital.      She reports a decreased appetite for the last month since her hospitalization over the holidays due to uncomplicated diverticulitis which she was treated with Augmentin. She reports feeling nauseated when taking antibiotics. She denies palpitations, dizziness, lightheadedness, chest pain, SOB, change in urinary/bowel habits.      On 01/29/20, she was found to be hypotensive in the 80s/50s in the ED with lactic acid of 4.8. CXR showed no signs for PNA, UA showed mild leukocytosis with WBC 17, nitrite negative, with urine cultures and blood cultures showing NG. She was admitted to  and started on CTX. She has been resuscitated with IVFs since admit with 3L. BP responded appropriately to IVF resuscitation with subsequent downtrend in lactic acid to 2.7. However, overnight on 01/30/20, BP remained 63-67 mmHg MAP goal. Repeat lactic acid uptrend to 3.3. Of note, she received a night time dose of Lopressor 50 mg on 01/30/20, which has since been discontinued.     ICU consulted in am of 01/31/20 for hypotension despite being resuscitated with 3.5 L IV fluids and persistant lactic acidosis in 3-4 range. She improved with IVF hydration and patient was stepped down. Rapid response evaluated patient early AM on 2/10 due to acute increase in oxygen requiriments. Patient began the day on 3L NC and progressed to requiring Comfort flow of 30L and 100% FiO2. Patient denies chest pains, only complaining of minor increased difficulty breathing.

## 2020-01-30 NOTE — HPI
Elizabeth Cano is a 69F with afib on coumadin, COPD, HFrEF (EF 45), who presents for evaluation from NH for hypotension. Patient reported dysuria last week, for which she was being treated with Macrobid. She was due to complete her abx course today. Patient states that antibiotics always make her lose her appetite, nauseated, weak. She had lightheadedness on standing, no recent falls, no syncope. Her dysuria has resolved, no flank pain. She had diarrhea earlier in the week, now resolved.  No URI symptoms, no HA, vision changes, CP, SOB. She uses a walker to ambulate at baseline. She feels improved with fluids. She says her BP always runs a little low.    ED: BP 84/58, HR >90, LA 4.8>2.4, UA+, CXR clear, no leukocytosis. Increase in SCr from 0.7 to 1.3

## 2020-01-30 NOTE — ASSESSMENT & PLAN NOTE
- continue home Vit D, add Calcium  - corrects to 8.6   Hypertension is improving with treatment.  Continue current treatment regimen.  Dietary sodium restriction.  Weight loss.  Regular aerobic exercise.  Ambulatory blood pressure monitoring.  Blood pressure will be reassessed in 4 weeks.

## 2020-01-30 NOTE — CONSULTS
Patient seen and examined at the bedside in room 8072.  Will admit to MICU for hypotension.  Full H & P to follow.     Zachary Teran MD   Internal Medicine PGY - 3  Spectra [phone]  Critical Care Medicine   [facility]

## 2020-01-30 NOTE — PROGRESS NOTES
[facility] Hospital Medicine  Progress Note    Patient Name: Elizabeth Cano  MRN: [id number]  Patient Class: IP- Inpatient   Admission Date: 1/29/2020  Length of Stay: 1 days  Attending Physician: Harjeet Dewey MD  Primary Care Provider: Primary Doctor [facility] Medicine Team: [facility] [place] [facility] Behram Khan, MD    Subjective:     Principal Problem:Septic shock        HPI:  Elizabeth Cano is a 69F with afib on coumadin, COPD, HFrEF (EF 45), who presents for evaluation from NH for hypotension. Patient reported dysuria last week, for which she was being treated with Macrobid. She was due to complete her abx course today. Patient states that antibiotics always make her lose her appetite, nauseated, weak. She had lightheadedness on standing, no recent falls, no syncope. Her dysuria has resolved, no flank pain. She had diarrhea earlier in the week, now resolved.  No URI symptoms, no HA, vision changes, CP, SOB. She uses a walker to ambulate at baseline. She feels improved with fluids. She says her BP always runs a little low.    ED: BP 84/58, HR >90, LA 4.8>2.4, UA+, CXR clear, no leukocytosis. Increase in SCr from 0.7 to 1.3    Overview/Hospital Course:  Pt is a 70 yo F w/ PMHx significant for diverticulitis, atrial fibrillation on anticoagulation, and CHF who presents with one week history of burning with urination and malaise. Associated symptoms included nausea without emesis. Initial labs were significant for severe hypomagnesemia, mild aseptic pyuria and elevated lactate (4.8). Repeat lactate revealed downtrend to 2.7. She was initiated on ceftriaxone and given 3 L of NS for her hypotension. Her hypotension persisted and she was given a dose of PO midodrine which only briefly relieved her hypotension. Eight hours after admission, pt remained hypotensive and a third lactate revealed uptrend to 3.3 mmol/L.     Critical care medicine was consulted due to refractory hypotension with  concern for requiring vasopressors. Current differentials for her shock include sepsis (related to UTI vs diverticular disease) vs cardiac vs adrenal insufficiency.        Interval History: Pt with persistent hypotension requiring consultation and subsequent admission to the MICU.     Review of Systems   Constitutional: Negative for chills and fever.   Eyes: Negative for visual disturbance.   Respiratory: Negative for shortness of breath.    Cardiovascular: Negative for chest pain.   Gastrointestinal: Positive for nausea. Negative for abdominal distention, abdominal pain and vomiting.   Genitourinary: Negative for decreased urine volume, difficulty urinating, dysuria and urgency.   Musculoskeletal: Negative for myalgias.   Skin: Negative for rash.   Neurological: Negative for dizziness and light-headedness.   Psychiatric/Behavioral: Negative for confusion.     Objective:     Vital Signs (Most Recent):  Temp: 98.1 °F (36.7 °C) (01/30/20 1131)  Pulse: 98 (01/30/20 1131)  Resp: 17 (01/30/20 1131)  BP: (!) 104/51 (01/30/20 1131)  SpO2: (!) 93 % (01/30/20 1131) Vital Signs (24h Range):  Temp:  [98 °F (36.7 °C)-98.6 °F (37 °C)] 98.1 °F (36.7 °C)  Pulse:  [84-98] 98  Resp:  [15-24] 17  SpO2:  [93 %-100 %] 93 %  BP: ()/(50-72) 104/51     Weight: 67.5 kg (148 lb 12.8 oz)  Body mass index is 24.02 kg/m².    Intake/Output Summary (Last 24 hours) at 1/30/2020 1334  Last data filed at 1/30/2020 0600  Gross per 24 hour   Intake 2820 ml   Output --   Net 2820 ml      Physical Exam   Constitutional: She is oriented to person, place, and time. She appears well-developed and well-nourished. No distress.   HENT:   Head: Normocephalic and atraumatic.   Eyes: Conjunctivae are normal.   Neck: No JVD present.   Cardiovascular: Normal rate, regular rhythm and normal heart sounds.   Pulmonary/Chest: Effort normal and breath sounds normal. No respiratory distress.   Abdominal: Soft. Bowel sounds are normal. She exhibits no distension.  There is tenderness in the suprapubic area and left lower quadrant.   Musculoskeletal: She exhibits no edema.   Neurological: She is alert and oriented to person, place, and time.   Skin: No rash noted.   Psychiatric: She has a normal mood and affect. Her behavior is normal.       Significant Labs: All pertinent labs within the past 24 hours have been reviewed.    Significant Imaging: I have reviewed all pertinent imaging results/findings within the past 24 hours.      Assessment/Plan:      * Septic shock  UTI  DIVERTICULITIS     - SBP <90, HR >90, LA 4.2>2.4, GAVIN (0.7>1.3)  - failed outpatient macrobid for UTI  - start rocephin (01/29/2020)  - s/p sepsis bolus with improvement in BP, lungs clear, continue gentle hydration overnight  - Consult critical care  - CT A/P to evaluate diverticular disease    GAVIN (acute kidney injury)  - baseline SCr 0.7, on admit 1.3 (not an abnormal finding, but a doubling of her baseline)  - I/Os  - IVFs overnight  - check renal US given UTI  - likely prerenal from decreased PO intake  - avoid nephrotoxic agents    Hypomagnesemia  Pt with persistent hypomagnesemia during previous admission. She had been discharged on PO magnesium, but arrived with low magnesium again. She arrived with acute renal failure as well.     - IV magnesium   - PO magnesium      COPD (chronic obstructive pulmonary disease)  - continue nebs PRN  - does not use oxygen    Chronic heart failure with preserved ejection fraction  Hold diuresis      Paroxysmal atrial fibrillation  - continue MTP 50 mg BID  - Hold metoprolol and diltiazem. Pharm consult to see if she was on both  - continue coumadin 1 mg MWF  - INR 1.3 on admit, bridge with warfarin  - PhamD consulted for coumadin dosing    Type 2 diabetes mellitus with hyperglycemia, without long-term current use of insulin  - hold home metformin  - low dose SSI for now  - diabetic diet  Lab Results   Component Value Date    HGBA1C 5.4 01/30/2020         Bifascicular  block  - stable    Hypocalcemia  - continue home Vit D, add Calcium  - corrects to 8.6    Hypovolemia  - IVFs    Shock  Pt with hypotension despite fluid resuscitation.         VTE Risk Mitigation (From admission, onward)         Ordered     enoxaparin injection 40 mg  Daily      01/30/20 1339     IP VTE HIGH RISK PATIENT  Once      01/29/20 1916     Reason for No Pharmacological VTE Prophylaxis  Once     Question:  Reasons:  Answer:  Already adequately anticoagulated on oral Anticoagulants    01/29/20 1916                      Behram Khan, MD  Department of Hospital Medicine   [facility]

## 2020-01-30 NOTE — ASSESSMENT & PLAN NOTE
- baseline SCr 0.7, on admit 1.3 (not an abnormal finding, but a doubling of her baseline)  - I/Os  - IVFs overnight  - check renal US given UTI  - likely prerenal from decreased PO intake  - avoid nephrotoxic agents

## 2020-01-30 NOTE — ASSESSMENT & PLAN NOTE
- hold home metformin  - low dose SSI for now  - diabetic diet  Lab Results   Component Value Date    HGBA1C 5.4 01/30/2020

## 2020-01-30 NOTE — PROGRESS NOTES
PharmD Consult received for:    1.) Admit medication history/reconciliation: in process, full note to follow    2.) Warfarin dosing: full note to follow        Thank you for the consult,  Khalif Hunt Pharm.D., BCPS  [id number]      **Note: Consults are reviewed Monday-Friday 7:00am-3:30pm. The above recommendations are only suggested. The recommendations should be considered in conjunction with all patient factors.**

## 2020-01-30 NOTE — PLAN OF CARE
01/30/20 1507   Post-Acute Status   Post-Acute Authorization Placement  ([facility] resident)   Post-Acute Placement Status Awaiting Internal Medical Clearance   Discharge Delays None known at this time

## 2020-01-30 NOTE — HOSPITAL COURSE
Pt is a 70 yo F w/ PMHx significant for diverticulitis, atrial fibrillation on anticoagulation, and CHF who presents with one week history of burning with urination and malaise. Associated symptoms included nausea without emesis. Initial labs were significant for severe hypomagnesemia, mild aseptic pyuria and elevated lactate (4.8). Repeat lactate revealed downtrend to 2.7. She was initiated on ceftriaxone and given 3 L of NS for her hypotension. Her hypotension persisted and she was given a dose of PO midodrine which only briefly relieved her hypotension. Eight hours after admission, pt remained hypotensive and a third lactate revealed uptrend to 3.3 mmol/L.     Critical care medicine was consulted due to refractory hypotension with concern for requiring vasopressors. Current differentials for her shock include sepsis (related to UTI vs diverticular disease) vs cardiac vs adrenal insufficiency.     During her brief stay in the MICU, pt received further volume resuscitation with IV fluid bolus and was initiated on midodrine 5 mg TID as an oral vasopressor. CT of the abdomen and pelvis was positive for persistent peridiverticular fat stranding and area of hypoattenuation in the liver near the falciform ligament raising concern for carcinoma as etiology for her persistent infection. Since patient had failed outpatient antibiotic therapy and had established care with colorectal surgery in clinic, CRS was consulted to evaluate for possible surgical intervention given her ICU stay and possibly malignant component contributing to her diverticulitis. Abdominal X-ray revealed minimal translucency under the diaphragm concerning for free air, thus repeat was ordered.    Her cortisol while in the MICU was intermediately low raising suspicion for adrenal insufficiency however cosyntropin stimulation test was negative.

## 2020-01-30 NOTE — ASSESSMENT & PLAN NOTE
Critical Care was consulted for an uptrending lactic acid and hypotension. She was found to be hypotensive in the 80s/50s in the ED with lactic acid of 4.8. CXR showed no signs for PNA, UA showed mild leukocytosis with WBC 17, nitrite negative, with urine cultures and blood cultures showing NG. She was admitted to  and started on CTX. She has been resuscitated with IVFs since admit with 3L. BP responded appropriately to IVF resuscitation with subsequent downtrend in lactic acid to 2.7. However, overnight, BP dropped into the 80s/50s requiring further fluid boluses. Repeat lactic acid this morning showed uptrend to 3.3. Of note, she received a night time dose of Lopressor 50 mg, which has since been discontinued.    -No evidence of cardiogenic shock, EF 45%, extremities warm with strong pulse  -Possibly septic shock, however unclear source. Unclear that this is 2/2 Urosepsis. BP 91/58 during exam, asymptomatic, afebrile, no leukocytosis, BC NG, Urine Cx NG, currently on CTX. Recent history of hospitalization due to diverticulitis.      Plan/Recomendations:  -Admit to MICU  -CT Abd/Pelvis with IV Contrast to rule out complication from diverticulitis  -Will broaden antibiotics to Zosyn  -Aggressively repleting electrolytes K, Ph, Mg, Ca  -IVF resuscitation  -Trend Lactic Acids  -Daily CBCs  -Will start pressors if necessary

## 2020-01-30 NOTE — SUBJECTIVE & OBJECTIVE
Interval History: Pt with persistent hypotension requiring consultation and subsequent admission to the MICU.     Review of Systems   Constitutional: Negative for chills and fever.   Eyes: Negative for visual disturbance.   Respiratory: Negative for shortness of breath.    Cardiovascular: Negative for chest pain.   Gastrointestinal: Positive for nausea. Negative for abdominal distention, abdominal pain and vomiting.   Genitourinary: Negative for decreased urine volume, difficulty urinating, dysuria and urgency.   Musculoskeletal: Negative for myalgias.   Skin: Negative for rash.   Neurological: Negative for dizziness and light-headedness.   Psychiatric/Behavioral: Negative for confusion.     Objective:     Vital Signs (Most Recent):  Temp: 98.1 °F (36.7 °C) (01/30/20 1131)  Pulse: 98 (01/30/20 1131)  Resp: 17 (01/30/20 1131)  BP: (!) 104/51 (01/30/20 1131)  SpO2: (!) 93 % (01/30/20 1131) Vital Signs (24h Range):  Temp:  [98 °F (36.7 °C)-98.6 °F (37 °C)] 98.1 °F (36.7 °C)  Pulse:  [84-98] 98  Resp:  [15-24] 17  SpO2:  [93 %-100 %] 93 %  BP: ()/(50-72) 104/51     Weight: 67.5 kg (148 lb 12.8 oz)  Body mass index is 24.02 kg/m².    Intake/Output Summary (Last 24 hours) at 1/30/2020 1334  Last data filed at 1/30/2020 0600  Gross per 24 hour   Intake 2820 ml   Output --   Net 2820 ml      Physical Exam   Constitutional: She is oriented to person, place, and time. She appears well-developed and well-nourished. No distress.   HENT:   Head: Normocephalic and atraumatic.   Eyes: Conjunctivae are normal.   Neck: No JVD present.   Cardiovascular: Normal rate, regular rhythm and normal heart sounds.   Pulmonary/Chest: Effort normal and breath sounds normal. No respiratory distress.   Abdominal: Soft. Bowel sounds are normal. She exhibits no distension. There is tenderness in the suprapubic area and left lower quadrant.   Musculoskeletal: She exhibits no edema.   Neurological: She is alert and oriented to person, place,  and time.   Skin: No rash noted.   Psychiatric: She has a normal mood and affect. Her behavior is normal.       Significant Labs: All pertinent labs within the past 24 hours have been reviewed.    Significant Imaging: I have reviewed all pertinent imaging results/findings within the past 24 hours.

## 2020-01-30 NOTE — ED NOTES
Hospital medicine V notified about pt's BP of 87/53. MD verbalized that it was okay for pt to go to floor.

## 2020-01-30 NOTE — CARE UPDATE
"RAPID RESPONSE NURSE PROACTIVE ROUNDING NOTE     Time of Visit: 0230    Admit Date: 2020  LOS: 1  Code Status: Full Code   Date of Visit: 2020  : 1950  Age: 69 y.o.  Sex: female  Race: White  Bed: ED :   MRN: [id number]  Was the patient discharged from an ICU this admission? no   Was the patient discharged from a PACU within last 24 hours?  no  Did the patient receive conscious sedation/general anesthesia in last 24 hours?  no  Was the patient in the ED within the past 24 hours?  no  Was the patient started on NIPPV within the past 24 hours?  no  Attending Physician: Junie Prince MD  Primary Service: [facility] MED V    ASSESSMENT     Diagnosis: Severe sepsis    Abnormal Vital Signs: BP (!) 88/50   Pulse 91   Temp 98 °F (36.7 °C) (Oral)   Resp 15   Ht 5' 6" (1.676 m)   Wt 69.4 kg (153 lb)   SpO2 95%   Breastfeeding? No   BMI 24.69 kg/m²      Clinical Issues: Circulatory     Patient  has a past medical history of A-fib, GAVIN (acute kidney injury), CHF (congestive heart failure), COPD (chronic obstructive pulmonary disease), Diabetes mellitus, and Hypertension.    Patient noted to be hypotensive with SBP 80s.   Patient admitted with urosepsis and GAVIN.  Fluid resuscitation did improve BP from 80s to 100s, but patient has come back down to the 80s over past 2 hours.  Lactic 4.8 at 1450 on 2020, repeat 2.5 at 1939 and is now 2.7 at 0158.  On arrival to bedside, patient is AAOx4, denies any pain, dyspnea, dizziness or lightheadedness.  Skin is cool to touch.  Patient states she has had decreased oral intake for "few days now" since starting antibiotics and having frequent nausea and vomiting at nursing home.        INTERVENTIONS/ RECOMMENDATIONS     TIM Burger, with hospital medicine notified of current vitals and lactic.  Provider to order additional 500 ml NS bolus and repeat lactic timed for 730.  Continue to monitor BP with goal MAP > 65.    Discussed plan of care with " RN.    PHYSICIAN ESCALATION     Yes/No  yes    Orders received and case discussed with TIM Valverde.    Disposition: Remain in room ED 11.    FOLLOW-UP     Call back the Rapid Response Nurse, Saturnino Solano RN at [phone] for additional questions or concerns.

## 2020-01-30 NOTE — ASSESSMENT & PLAN NOTE
Patient with PMH Afib with medications list showing Diltizem and Lopressor 50 mg BID, however patient denies taking these medications and reports taking Amiodarone  -Will start Amiodarone 200 mg BID x7 days given setting of shock and labile blood pressures.

## 2020-01-30 NOTE — NURSING
Critical care phoned spoke with Shakir calcium of 6.9 and lactic acid of 4.2. Initial result given from [facility]

## 2020-01-30 NOTE — NURSING
IV attempt x 2 unsuccessful in left hand and left upper arm. IV started in right upper arm 22 g successful.

## 2020-01-30 NOTE — ASSESSMENT & PLAN NOTE
- continue MTP 50 mg BID  - Hold metoprolol and diltiazem. Pharm consult to see if she was on both  - continue coumadin 1 mg MWF  - INR 1.3 on admit, bridge with warfarin  - Dax consulted for coumadin dosing

## 2020-01-30 NOTE — H&P
[facility]  Critical Care Medicine  History & Physical    Patient Name: Elizabeth Cano  MRN: [id number]  Admission Date: 1/29/2020  Hospital Length of Stay: 1 days  Code Status: Full Code  Attending Physician: Harjeet Dewey MD   Primary Care Provider: Primary Doctor No   Principal Problem: Septic shock    Subjective:     HPI:  Elizabeth Cano is a 70 yo F with afib (on coumadin), COPD, and Diastolic HF who was admitted to  with severe sepsis. She was diagnosed with a UTI last week at her Nursing Home, [facility], which she was prescribed Macrobid for. She completed her course of Macrobid yesterday. NH sent patient in to ED for evaluation of hypotension. She reports a decreased appetite for the last month since her hospitalization over the holidays due to uncomplicated diverticulitis which she was treated withAugmentin for. She reports feeling nauseated when taking antibiotics. She denies palpitations, dizziness, lightheadedness, chest pain, SOB, change in urinary/bowel habits. Critical Care was consulted for an uptrending lactic acid and hypotension.    She was found to be hypotensive in the 80s/50s in the ED with lactic acid of 4.8. CXR showed no signs for PNA, UA showed mild leukocytosis with WBC 17, nitrite negative, with urine cultures and blood cultures showing NG. She was admitted to  and started on CTX. She has been resuscitated with IVFs since admit with 3L. BP responded appropriately to IVF resuscitation with subsequent downtrend in lactic acid to 2.7. However, overnight, BP dropped into the 80s/50s requiring further fluid boluses. Repeat lactic acid this morning showed uptrend to 3.3. Of note, she received a night time dose of Lopressor 50 mg, which has since been discontinued.    Hospital/ICU Course:  No notes on file     Past Medical History:   Diagnosis Date    A-fib     GAVIN (acute kidney injury)     CHF (congestive heart failure)     COPD (chronic obstructive pulmonary  disease)     Diabetes mellitus     Hypertension        Past Surgical History:   Procedure Laterality Date    CARDIAC SURGERY         Review of patient's allergies indicates:  No Known Allergies    Family History     Problem Relation (Age of Onset)    Heart disease Mother        Tobacco Use    Smoking status: Former Smoker     Packs/day: 1.00     Years: 54.00     Pack years: 54.00     Types: Cigarettes     Last attempt to quit: 2019     Years since quittin.2    Smokeless tobacco: Never Used   Substance and Sexual Activity    Alcohol use: Not Currently    Drug use: Not Currently    Sexual activity: Not Currently     Partners: Male      Review of Systems   Constitutional: Negative for chills and fever.   Eyes: Negative for visual disturbance.   Respiratory: Negative for shortness of breath.    Cardiovascular: Negative for chest pain.   Gastrointestinal: Positive for nausea. Negative for abdominal distention, abdominal pain and vomiting.   Genitourinary: Negative for decreased urine volume, difficulty urinating, dysuria and urgency.   Musculoskeletal: Negative for myalgias.   Skin: Negative for rash.   Neurological: Negative for dizziness and light-headedness.   Psychiatric/Behavioral: Negative for confusion.     Objective:     Vital Signs (Most Recent):  Temp: 98.1 °F (36.7 °C) (20 1131)  Pulse: 98 (20 1131)  Resp: 17 (20 113)  BP: (!) 104/51 (20 1131)  SpO2: (!) 93 % (20 1131) Vital Signs (24h Range):  Temp:  [98 °F (36.7 °C)-98.6 °F (37 °C)] 98.1 °F (36.7 °C)  Pulse:  [84-98] 98  Resp:  [15-24] 17  SpO2:  [93 %-100 %] 93 %  BP: ()/(50-72) 104/51   Weight: 67.5 kg (148 lb 12.8 oz)  Body mass index is 24.02 kg/m².      Intake/Output Summary (Last 24 hours) at 2020 1201  Last data filed at 2020 0600  Gross per 24 hour   Intake 2820 ml   Output --   Net 2820 ml       Physical Exam   Constitutional: She is oriented to person, place, and time. She appears  well-developed and well-nourished. No distress.   HENT:   Head: Normocephalic and atraumatic.   Eyes: Conjunctivae are normal.   Neck: No JVD present.   Cardiovascular: Normal rate, regular rhythm and normal heart sounds.   Pulmonary/Chest: Effort normal and breath sounds normal. No respiratory distress.   Abdominal: Soft. Bowel sounds are normal. She exhibits no distension.   Musculoskeletal: She exhibits no edema.   Neurological: She is alert and oriented to person, place, and time.   Skin: No rash noted.   Psychiatric: She has a normal mood and affect. Her behavior is normal.       Vents:     Lines/Drains/Airways     Peripheral Intravenous Line                 Peripheral IV - Single Lumen 01/30/20 0400 20 G Anterior;Left;Proximal Forearm less than 1 day              Significant Labs:    CBC/Anemia Profile:  Recent Labs   Lab 01/29/20  1450 01/30/20  0421   WBC 7.03 6.31   HGB 14.7 11.5*   HCT 47.2 37.1    227   MCV 96 98   RDW 16.0* 15.9*        Chemistries:  Recent Labs   Lab 01/29/20  1450 01/30/20  0421    144   K 4.6 3.8    113*   CO2 23 24   BUN 12 13   CREATININE 1.3 1.1   CALCIUM 7.3* 6.1*   ALBUMIN 2.4*  --    PROT 6.4  --    BILITOT 0.8  --    ALKPHOS 199*  --    ALT 29  --    AST 45*  --    MG  --  0.8*   PHOS  --  2.2*       All pertinent labs within the past 24 hours have been reviewed.    Significant Imaging: I have reviewed and interpreted all pertinent imaging results/findings within the past 24 hours.    Assessment/Plan:     Cardiac/Vascular  Paroxysmal atrial fibrillation  Patient with PMH Afib with medications list showing Diltizem and Lopressor 50 mg BID, however patient denies taking these medications and reports taking Amiodarone  -Will start Amiodarone 200 mg BID x7 days given setting of shock and labile blood pressures.    Renal/  Hypocalcemia  -Repleting with IV calcium carbonate    Hypomagnesemia  -Repleting with IV and PO    ID  * Septic shock  Critical Care was  consulted for an uptrending lactic acid and hypotension. She was found to be hypotensive in the 80s/50s in the ED with lactic acid of 4.8. CXR showed no signs for PNA, UA showed mild leukocytosis with WBC 17, nitrite negative, with urine cultures and blood cultures showing NG. She was admitted to  and started on CTX. She has been resuscitated with IVFs since admit with 3L. BP responded appropriately to IVF resuscitation with subsequent downtrend in lactic acid to 2.7. However, overnight, BP dropped into the 80s/50s requiring further fluid boluses. Repeat lactic acid this morning showed uptrend to 3.3. Of note, she received a night time dose of Lopressor 50 mg, which has since been discontinued.    -No evidence of cardiogenic shock, EF 45%, extremities warm with strong pulse  -Possibly septic shock, however unclear source. Unclear that this is 2/2 Urosepsis. BP 91/58 during exam, asymptomatic, afebrile, no leukocytosis, BC NG, Urine Cx NG, currently on CTX. Recent history of hospitalization due to diverticulitis.      Plan/Recomendations:  -Admit to MICU  -CT Abd/Pelvis with IV Contrast to rule out complication from diverticulitis  -Will broaden antibiotics to Zosyn  -Aggressively repleting electrolytes K, Ph, Mg, Ca  -IVF resuscitation  -Trend Lactic Acids  -Daily CBCs  -Will start pressors if necessary     GI  Diverticulitis  See Septic Shock  -CT Abd/Pelvis with Contrast  -Will broaden antibiotics to Zosyn for intra-abdominal coverage    Other  Shock  See Septic Shock  -EKG, TTE, troponins to r/o cardiogenic shock, however less likely        Critical Care Daily Checklist:    A: Awake: RASS Goal/Actual Goal:    Actual:     B: Spontaneous Breathing Trial Performed?     C: SAT & SBT Coordinated?  NA                      D: Delirium: CAM-ICU     E: Early Mobility Performed? Yes   F: Feeding Goal:    Status:     Current Diet Order   Procedures    Diet [facility]; Consistent Carbohydrate     Order  Specific Question:   Indicate patient location for additional diet options:     Answer:   [facility]     Order Specific Question:   Additional Diet Options:     Answer:   Consistent Carbohydrate      AS: Analgesia/Sedation None   T: Thromboembolic Prophylaxis Lovenox   H: HOB > 300 Yes   U: Stress Ulcer Prophylaxis (if needed) None   G: Glucose Control None   B: Bowel Function Stool Occurrence: 1   I: Indwelling Catheter (Lines & Carpio) Necessity PIVs   D: De-escalation of Antimicrobials/Pharmacotherapies Zosyn    Plan for the day/ETD Admit to MICU    Code Status:  Family/Goals of Care: Full Code         Critical secondary to Patient has a condition that poses threat to life and bodily function: shock of unidentifiable origin     Critical care was time spent personally by me on the following activities: development of treatment plan with patient or surrogate and bedside caregivers, discussions with consultants, evaluation of patient's response to treatment, examination of patient, ordering and performing treatments and interventions, ordering and review of laboratory studies, ordering and review of radiographic studies, pulse oximetry, re-evaluation of patient's condition. This critical care time did not overlap with that of any other provider or involve time for any procedures.     Tuan Ryan MD  Critical Care Medicine  [facility]

## 2020-01-30 NOTE — NURSING
IV removed in right upper arm 22 gauge. Patient complains of burning with potassium infusion and wanted removed. Patient is a difficult stick , picc consult submitted.

## 2020-01-30 NOTE — SUBJECTIVE & OBJECTIVE
Past Medical History:   Diagnosis Date    A-fib     GAVIN (acute kidney injury)     CHF (congestive heart failure)     COPD (chronic obstructive pulmonary disease)     Diabetes mellitus     Hypertension        Past Surgical History:   Procedure Laterality Date    CARDIAC SURGERY         Review of patient's allergies indicates:  No Known Allergies    No current facility-administered medications on file prior to encounter.      Current Outpatient Medications on File Prior to Encounter   Medication Sig    albuterol (ACCUNEB) 0.63 mg/3 mL Nebu Take 0.5 mg by nebulization every 6 (six) hours as needed. Rescue    aspirin 81 MG Chew Take 1 tablet (81 mg total) by mouth once daily.    budesonide (PULMICORT) 0.5 mg/2 mL nebulizer solution Take 0.5 mg by nebulization 2 (two) times daily. Controller    diltiaZEM (CARDIZEM SR) 60 MG Cp12 Take 60 mg by mouth every 12 (twelve) hours.     DULoxetine (CYMBALTA) 30 MG capsule Take 30 mg by mouth once daily.    furosemide (LASIX) 20 MG tablet Take 20 mg by mouth once daily.    magnesium oxide (MAG-OX) 400 mg (241.3 mg magnesium) tablet Take 1 tablet (400 mg total) by mouth once daily.    metFORMIN (GLUCOPHAGE) 1000 MG tablet Take 1,000 mg by mouth 2 (two) times daily with meals.    metoprolol tartrate (LOPRESSOR) 50 MG tablet Take 50 mg by mouth 2 (two) times daily.    nystatin (MYCOSTATIN) powder Apply twice daily under breast folds for redness and irritation until resolved    potassium chloride (MICRO-K) 10 MEQ CpSR Take 10 mEq by mouth once daily.    vitamin D (VITAMIN D3) 1000 units Tab Take 1,000 Units by mouth once daily.    warfarin (COUMADIN) 1 MG tablet Take 1 mg by mouth every Mon, Wed, Fri. At 5:00pm     Family History     Problem Relation (Age of Onset)    Heart disease Mother        Tobacco Use    Smoking status: Former Smoker     Packs/day: 1.00     Years: 54.00     Pack years: 54.00     Types: Cigarettes     Last attempt to quit: 11/11/2019     Years  since quittin.2    Smokeless tobacco: Never Used   Substance and Sexual Activity    Alcohol use: Not Currently    Drug use: Not Currently    Sexual activity: Not Currently     Partners: Male     Review of Systems   Constitutional: Positive for activity change, appetite change and fatigue. Negative for fever.   HENT: Negative for congestion and postnasal drip.    Eyes: Negative for photophobia and visual disturbance.   Respiratory: Negative for cough, shortness of breath and wheezing.    Cardiovascular: Negative for chest pain and leg swelling.   Gastrointestinal: Positive for nausea. Negative for abdominal distention, abdominal pain and vomiting.   Genitourinary: Positive for decreased urine volume. Negative for difficulty urinating, dysuria and flank pain.   Musculoskeletal: Positive for gait problem. Negative for arthralgias.   Skin: Negative for rash and wound.   Allergic/Immunologic: Negative for immunocompromised state.   Neurological: Positive for dizziness, weakness and light-headedness. Negative for headaches.   Hematological: Bruises/bleeds easily.   Psychiatric/Behavioral: Negative for agitation and confusion.     Objective:     Vital Signs (Most Recent):  Temp: 98 °F (36.7 °C) (20 1429)  Pulse: 97 (20)  Resp: 16 (20)  BP: (!) 99/57 (20)  SpO2: 98 % (20) Vital Signs (24h Range):  Temp:  [98 °F (36.7 °C)] 98 °F (36.7 °C)  Pulse:  [84-98] 97  Resp:  [15-24] 16  SpO2:  [95 %-100 %] 98 %  BP: ()/(50-72) 99/57     Weight: 69.4 kg (153 lb)  Body mass index is 24.69 kg/m².    Physical Exam   Constitutional: She is oriented to person, place, and time. She appears well-developed and well-nourished. No distress.   HENT:   Head: Normocephalic and atraumatic.   Eyes: Pupils are equal, round, and reactive to light. EOM are normal.   Neck: Normal range of motion. No JVD present.   Cardiovascular: Normal rate and regular rhythm.   Pulmonary/Chest: Effort  normal and breath sounds normal. No stridor. No respiratory distress. She has no wheezes. She has no rales.   Abdominal: Soft. Bowel sounds are normal. She exhibits no distension. There is no tenderness. There is no guarding.   Genitourinary:   Genitourinary Comments: Very dark urine in sample cup at bedside   Musculoskeletal: Normal range of motion. She exhibits no edema.   Neurological: She is alert and oriented to person, place, and time. No cranial nerve deficit.   Skin: Skin is warm and dry. She is not diaphoretic.   Psychiatric: She has a normal mood and affect. Her behavior is normal. Judgment and thought content normal.   Nursing note and vitals reviewed.        CRANIAL NERVES     CN III, IV, VI   Pupils are equal, round, and reactive to light.  Extraocular motions are normal.        Significant Labs:   Blood Culture:   Recent Labs   Lab 01/29/20  1451   LABBLOO No Growth to date     CBC:   Recent Labs   Lab 01/29/20  1450   WBC 7.03   HGB 14.7   HCT 47.2        CMP:   Recent Labs   Lab 01/29/20  1450      K 4.6      CO2 23   *   BUN 12   CREATININE 1.3   CALCIUM 7.3*   PROT 6.4   ALBUMIN 2.4*   BILITOT 0.8   ALKPHOS 199*   AST 45*   ALT 29   ANIONGAP 15   EGFRNONAA 41.7*     Cardiac Markers: No results for input(s): CKMB, MYOGLOBIN, BNP, TROPISTAT in the last 48 hours.  Lactic Acid:   Recent Labs   Lab 01/29/20  1450   LACTATE 4.8*     Troponin: No results for input(s): TROPONINI in the last 48 hours.  TSH: No results for input(s): TSH in the last 4320 hours.  Urine Culture: No results for input(s): LABURIN in the last 48 hours.  Urine Studies:   Recent Labs   Lab 01/29/20  1505   COLORU Tiffanie   APPEARANCEUA Hazy*   PHUR 5.0   SPECGRAV 1.015   PROTEINUA Negative   GLUCUA Negative   KETONESU Negative   BILIRUBINUA Negative   OCCULTUA Negative   NITRITE Negative   LEUKOCYTESUR Trace*   WBCUA 17*       Significant Imaging: I have reviewed all pertinent imaging results/findings within  the past 24 hours.

## 2020-01-30 NOTE — ASSESSMENT & PLAN NOTE
- hold home metformin  - low dose SSI for now  - diabetic diet  Lab Results   Component Value Date    HGBA1C 6.4 (H) 12/19/2019

## 2020-01-30 NOTE — ASSESSMENT & PLAN NOTE
See Septic Shock  -CT Abd/Pelvis with Contrast  -Will broaden antibiotics to Zosyn for intra-abdominal coverage

## 2020-01-31 PROBLEM — K76.9 LIVER LESION, RIGHT LOBE: Status: ACTIVE | Noted: 2020-01-01

## 2020-01-31 PROBLEM — D64.9 ANEMIA: Status: ACTIVE | Noted: 2020-01-01

## 2020-01-31 NOTE — PROGRESS NOTES
[facility]  Critical Care Medicine  Progress Note    Patient Name: Elizabeth Cano  MRN: [id number]  Admission Date: 1/29/2020  Hospital Length of Stay: 2 days  Code Status: Full Code  Attending Provider: Harjeet Dewey MD  Primary Care Provider: Primary Doctor No   Principal Problem: Hypotension    Subjective:     HPI:  Ms. Elizabeth Cano is a 69 years old female with afib (on coumadin), COPD, and HFpEF (EF 45% on 1/29/20) admitted to hospital medicine for presumed urinary tract infection. She was diagnosed with a UTI last week at her Nursing Home, [facility], which she was prescribed Macrobid. She completed her course of Macrobid on 01/29/20 and she was noted to be hypotensive rendering her transfer to the hospital.      She reports a decreased appetite for the last month since her hospitalization over the holidays due to uncomplicated diverticulitis which she was treated with Augmentin. She reports feeling nauseated when taking antibiotics. She denies palpitations, dizziness, lightheadedness, chest pain, SOB, change in urinary/bowel habits.      On 01/29/20, she was found to be hypotensive in the 80s/50s in the ED with lactic acid of 4.8. CXR showed no signs for PNA, UA showed mild leukocytosis with WBC 17, nitrite negative, with urine cultures and blood cultures showing NG. She was admitted to  and started on CTX. She has been resuscitated with IVFs since admit with 3L. BP responded appropriately to IVF resuscitation with subsequent downtrend in lactic acid to 2.7. However, overnight on 01/30/20, BP remained 63-67 mmHg MAP goal. Repeat lactic acid uptrend to 3.3. Of note, she received a night time dose of Lopressor 50 mg on 01/30/20, which has since been discontinued.     ICU consulted in am of 01/31/20 for hypotension despite being resuscitated with 3.5 L IV fluids and persistant lactic acidosis in 3-4 range.     Hospital/ICU Course:  Labs repeated; no signs of hypovolemia Hb is  stable, Echo negative for any pericardial effusion, no hypoxia on exam. Given chronic hypotension/orthostasis vs distributive etiology was on differential,  CT abdomen/pelvis requested. Findings consistent with several diverticula in the sigmoid colon with associated wall thickening, minimal adjacent fat stranding, and prominence of the vasa recta.  Findings appear similar when compared to prior examination dated 12/19/2019 and may represent ongoing diverticulitis or colitis in the correct clinical setting. Area of hypoattenuation within the right hepatic lobe adjacent to the gallbladder fossa, not definitively seen on prior examination and may represent area of fatty liver infiltration or transit hepatic attenuation differences however, in the setting of liver disease, neoplasm is not excluded.      01/31/2020: Overnight, MAP stable 70-80 mmHg. Patient ambulatory with help. Tolerating regular diet. A and O x 4. No cardiopulmonary distress. Abdominal tenderness much improved. Stable for step down. Lactic acid 2.4 in am labs.      Review of Systems   Constitutional: Positive for activity change and appetite change. Negative for chills, fatigue, fever and unexpected weight change.   HENT: Negative for postnasal drip, rhinorrhea, sinus pressure and sinus pain.    Eyes: Negative for photophobia and visual disturbance.   Respiratory: Negative for cough, shortness of breath and wheezing.    Cardiovascular: Negative for chest pain, palpitations and leg swelling.   Gastrointestinal: Positive for abdominal pain. Negative for abdominal distention, blood in stool, constipation, diarrhea, nausea and vomiting.   Genitourinary: Negative for dysuria and flank pain.   Musculoskeletal: Negative for arthralgias.   Skin: Negative for color change, pallor and rash.   Neurological: Negative for dizziness, facial asymmetry and headaches.   Hematological: Negative for adenopathy. Does not bruise/bleed easily.     Objective:     Vital  Signs (Most Recent):  Temp: 97.7 °F (36.5 °C) (01/31/20 1100)  Pulse: 105 (01/31/20 1300)  Resp: 18 (01/31/20 1300)  BP: (!) 80/43 (01/31/20 1300)  SpO2: (!) 92 % (01/31/20 1300) Vital Signs (24h Range):  Temp:  [97.6 °F (36.4 °C)-98.5 °F (36.9 °C)] 97.7 °F (36.5 °C)  Pulse:  [] 105  Resp:  [11-33] 18  SpO2:  [90 %-100 %] 92 %  BP: ()/(43-96) 80/43   Weight: 71.1 kg (156 lb 12 oz)  Body mass index is 25.3 kg/m².      Intake/Output Summary (Last 24 hours) at 1/31/2020 1358  Last data filed at 1/31/2020 1300  Gross per 24 hour   Intake 923.8 ml   Output 400 ml   Net 523.8 ml     Physical Exam   Constitutional: No distress.   HENT:   Mouth/Throat: Oropharynx is clear and moist. No oropharyngeal exudate.   Eyes: Pupils are equal, round, and reactive to light. Conjunctivae are normal. No scleral icterus.   Neck: Normal range of motion. Neck supple. No JVD present. No tracheal deviation present. No thyromegaly present.   Cardiovascular: Normal rate and intact distal pulses. Exam reveals no gallop and no friction rub.   No murmur heard.  Irregular rhythm   Pulmonary/Chest: Effort normal and breath sounds normal. No stridor. No respiratory distress. She has no wheezes. She has no rales.   Abdominal: Soft. Bowel sounds are normal. She exhibits no distension and no mass. There is tenderness (suprapubic). There is no guarding.   Musculoskeletal: Normal range of motion. She exhibits no edema, tenderness or deformity.   Lymphadenopathy:     She has no cervical adenopathy.   Neurological: She is alert. She displays normal reflexes. No cranial nerve deficit or sensory deficit. She exhibits normal muscle tone. Coordination normal.   Skin: Skin is warm. Capillary refill takes less than 2 seconds. No rash noted. She is not diaphoretic. No erythema. No pallor.     Vents:     Lines/Drains/Airways     Peripheral Intravenous Line                 Peripheral IV - Single Lumen 01/30/20 0400 20 G Anterior;Left;Proximal Forearm 1  day         Peripheral IV - Single Lumen 01/30/20 1342 20 G;1 3/4 in Right Forearm 1 day              Significant Labs:    CBC/Anemia Profile:  Recent Labs   Lab 01/30/20  1308 01/31/20  0338 01/31/20  1041   WBC 7.59 5.65 4.93   HGB 11.4* 10.6* 9.9*   HCT 38.5 34.8* 32.1*    194 174   * 96 94   RDW 15.9* 15.9* 15.8*     Chemistries:  Recent Labs   Lab 01/29/20  1450  01/30/20  1308 01/30/20  1641 01/31/20  0338      < > 141 141 137   K 4.6   < > 3.8 3.9 3.6      < > 110 107 106   CO2 23   < > 23 25 22*   BUN 12   < > 12 12 9   CREATININE 1.3   < > 0.9 0.9 0.7   CALCIUM 7.3*   < > 6.1* 6.9* 6.5*   ALBUMIN 2.4*  --  1.8*  --   --    PROT 6.4  --  4.7*  --   --    BILITOT 0.8  --  0.4  --   --    ALKPHOS 199*  --  148*  --   --    ALT 29  --  19  --   --    AST 45*  --  29  --   --    MG  --    < > 1.3*  1.2* 2.1 2.0   PHOS  --    < > 2.6* 2.4* 3.7    < > = values in this interval not displayed.     All pertinent labs within the past 24 hours have been reviewed.    Significant Imaging:  I have reviewed all pertinent imaging results/findings within the past 24 hours.    ABG  Recent Labs   Lab 01/29/20  1839   PH 7.345*   PO2 20*   PCO2 47.5*   HCO3 25.9   BE 0     Assessment/Plan:     Pulmonary  COPD (chronic obstructive pulmonary disease)  Continue Duonebs Q6H  Budesonide nebs BID   No wheezing noted; controlled  Monitor    Cardiac/Vascular  * Hypotension  Likely distributive etiology vs Orthostatsis   1. Several diverticula in the sigmoid colon with associated wall thickening persistence of findings, sigmoidoscopy follow-up to exclude colon carcinoma should also be considered.   2. Area of hypoattenuation within the right hepatic lobe. Follow up RUQ US.  3. Continue Zosyn until after the GI procedure is completed  4. Hold warfarin given if patient is to undergo sigmoidoscopy. Will continue Heparin ggt.   5. PRN fluid bolus; UOP is decreased  6. 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 On amiodarone and rate controlled.   2. Heparin ggt for Anticoagulation  3. Consider cardiology consultation as outpatient.   4. K > 4.0 and Mag > 2.0 Goal to prevent tachyarrhythmia     Renal/  Hypocalcemia  -Repleting with IV calcium carbonate    Hypomagnesemia  -Repleting with IV and PO    Oncology  Anemia  - likely iron deficiency  - anemia work up with am labs     Endocrine  Type 2 diabetes mellitus with hyperglycemia, without long-term current use of insulin  AIC 5.4  Glucose checks QACHS  Goal Glucose 140-180 mg/dL  Diabetic diet    GI  Liver lesion, right lobe  - possible concern of abscess vs malignancy per CT scan  - h/o weight loss and possible colonic lesion  - will obtain RUQ US    Diverticulitis  - CT consistent with recurrent diverticulitis  - possible GI consult for sigmoidoscopy  - continue coverage with zosyn  - lactate monitoring; BP monitoring    Other  Chronic heart failure with preserved ejection fraction  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     C: SAT & SBT Coordinated?  Not indicated                      D: Delirium: CAM-ICU Overall CAM-ICU: Negative   E: Early Mobility Performed? Yes   F: Feeding Goal:    Status:     Current Diet Order   Procedures    Diet Cardiac [facility]; Consistent Carbohydrate     Order Specific Question:   Indicate patient location for additional diet options:     Answer:    [facility]     Order Specific Question:   Additional Diet Options:     Answer:   Consistent Carbohydrate      AS: Analgesia/Sedation Not indicated   T: Thromboembolic Prophylaxis Heparin ggt   H: HOB > 300 Yes   U: Stress Ulcer Prophylaxis (if needed)    G: Glucose Control Glucose checks QACHS   B: Bowel Function Stool Occurrence: 0   I: Indwelling Catheter (Lines & Carpio) Necessity 2 PIV   D: De-escalation of Antimicrobials/Pharmacotherapies Zosyn    Plan for the day/ETD Step down to hospital medicine  Follow up GI for sigmoidoscopy  continue abx for diverticulitis  RUQ US follow up  Transition to coumadin one patient is not scheduled for any procedure      Code Status:  Family/Goals of Care: Full Code       Critical Care no longer required. Will step down to hospital medicine.     Critical care was time spent personally by me on the following activities: development of treatment plan with patient or surrogate and bedside caregivers, discussions with consultants, evaluation of patient's response to treatment, examination of patient, ordering and performing treatments and interventions, ordering and review of laboratory studies, ordering and review of radiographic studies, pulse oximetry, re-evaluation of patient's condition. This critical care time did not overlap with that of any other provider or involve time for any procedures.     Zachary Teran MD   Internal Medicine PGY 3  Spectra [phone]  Critical Care Medicine  [facility]

## 2020-01-31 NOTE — ASSESSMENT & PLAN NOTE
- possible concern of abscess vs malignancy per CT scan  - h/o weight loss and possible colonic lesion  - will obtain RUQ US

## 2020-01-31 NOTE — PLAN OF CARE
CMICU DAILY GOALS       A: Awake    RASS: Goal - RASS Goal: 0-->alert and calm  Actual - RASS ([place] Agitation-Sedation Scale): 0-->alert and calm   Restraint necessity:  N/A  B: Breath   SBT: Not intubated   C: Coordinate A & B, analgesics/sedatives   Pain: managed    SAT: Not intubated  D: Delirium   CAM-ICU: Overall CAM-ICU: Negative  E: Early Mobility   MOVE Screen: Pass   Activity: Activity Management: activity clustered for rest period  FAS: Feeding/Nutrition   Diet order: Diet/Nutrition Received: low saturated fat/low cholesterol,   Fluid restriction:    T: Thrombus   DVT prophylaxis: VTE Required Core Measure: Pharmacological prophylaxis initiated/maintained  H: HOB Elevation   Head of Bed (HOB): HOB at 20-30 degrees  U: Ulcer Prophylaxis   GI: yes  G: Glucose control   managed Glycemic Management: blood glucose monitoring  S: Skin   Bundle compliance: yes   Bathing/Skin Care: bath, partial, incontinence care, linen changed Date 1/31/2020  B: Bowel Function   no issues   I: Indwelling Catheters   Carpio necessity:     CVC necessity: No   IPAD offered: Not appropriate  D: De-escalation Antibx   Yes  Plan for the day   Continue to monitor BP. MAP >65    Family/Goals of care/Code Status   Code Status: Full Code     No acute events throughout day, VS and assessment per flow sheet, patient progressing towards goals as tolerated, plan of care reviewed with Elizabeth Cano and family, all concerns addressed, will continue to monitor.

## 2020-01-31 NOTE — SUBJECTIVE & OBJECTIVE
Review of Systems   Constitutional: Positive for activity change and appetite change. Negative for chills, fatigue, fever and unexpected weight change.   HENT: Negative for postnasal drip, rhinorrhea, sinus pressure and sinus pain.    Eyes: Negative for photophobia and visual disturbance.   Respiratory: Negative for cough, shortness of breath and wheezing.    Cardiovascular: Negative for chest pain, palpitations and leg swelling.   Gastrointestinal: Positive for abdominal pain. Negative for abdominal distention, blood in stool, constipation, diarrhea, nausea and vomiting.   Genitourinary: Negative for dysuria and flank pain.   Musculoskeletal: Negative for arthralgias.   Skin: Negative for color change, pallor and rash.   Neurological: Negative for dizziness, facial asymmetry and headaches.   Hematological: Negative for adenopathy. Does not bruise/bleed easily.     Objective:     Vital Signs (Most Recent):  Temp: 97.7 °F (36.5 °C) (01/31/20 1100)  Pulse: 105 (01/31/20 1300)  Resp: 18 (01/31/20 1300)  BP: (!) 80/43 (01/31/20 1300)  SpO2: (!) 92 % (01/31/20 1300) Vital Signs (24h Range):  Temp:  [97.6 °F (36.4 °C)-98.5 °F (36.9 °C)] 97.7 °F (36.5 °C)  Pulse:  [] 105  Resp:  [11-33] 18  SpO2:  [90 %-100 %] 92 %  BP: ()/(43-96) 80/43   Weight: 71.1 kg (156 lb 12 oz)  Body mass index is 25.3 kg/m².      Intake/Output Summary (Last 24 hours) at 1/31/2020 1358  Last data filed at 1/31/2020 1300  Gross per 24 hour   Intake 923.8 ml   Output 400 ml   Net 523.8 ml     Physical Exam   Constitutional: No distress.   HENT:   Mouth/Throat: Oropharynx is clear and moist. No oropharyngeal exudate.   Eyes: Pupils are equal, round, and reactive to light. Conjunctivae are normal. No scleral icterus.   Neck: Normal range of motion. Neck supple. No JVD present. No tracheal deviation present. No thyromegaly present.   Cardiovascular: Normal rate and intact distal pulses. Exam reveals no gallop and no friction rub.   No  murmur heard.  Irregular rhythm   Pulmonary/Chest: Effort normal and breath sounds normal. No stridor. No respiratory distress. She has no wheezes. She has no rales.   Abdominal: Soft. Bowel sounds are normal. She exhibits no distension and no mass. There is tenderness (suprapubic). There is no guarding.   Musculoskeletal: Normal range of motion. She exhibits no edema, tenderness or deformity.   Lymphadenopathy:     She has no cervical adenopathy.   Neurological: She is alert. She displays normal reflexes. No cranial nerve deficit or sensory deficit. She exhibits normal muscle tone. Coordination normal.   Skin: Skin is warm. Capillary refill takes less than 2 seconds. No rash noted. She is not diaphoretic. No erythema. No pallor.     Vents:     Lines/Drains/Airways     Peripheral Intravenous Line                 Peripheral IV - Single Lumen 01/30/20 0400 20 G Anterior;Left;Proximal Forearm 1 day         Peripheral IV - Single Lumen 01/30/20 1342 20 G;1 3/4 in Right Forearm 1 day              Significant Labs:    CBC/Anemia Profile:  Recent Labs   Lab 01/30/20  1308 01/31/20  0338 01/31/20  1041   WBC 7.59 5.65 4.93   HGB 11.4* 10.6* 9.9*   HCT 38.5 34.8* 32.1*    194 174   * 96 94   RDW 15.9* 15.9* 15.8*     Chemistries:  Recent Labs   Lab 01/29/20  1450  01/30/20  1308 01/30/20  1641 01/31/20  0338      < > 141 141 137   K 4.6   < > 3.8 3.9 3.6      < > 110 107 106   CO2 23   < > 23 25 22*   BUN 12   < > 12 12 9   CREATININE 1.3   < > 0.9 0.9 0.7   CALCIUM 7.3*   < > 6.1* 6.9* 6.5*   ALBUMIN 2.4*  --  1.8*  --   --    PROT 6.4  --  4.7*  --   --    BILITOT 0.8  --  0.4  --   --    ALKPHOS 199*  --  148*  --   --    ALT 29  --  19  --   --    AST 45*  --  29  --   --    MG  --    < > 1.3*  1.2* 2.1 2.0   PHOS  --    < > 2.6* 2.4* 3.7    < > = values in this interval not displayed.     All pertinent labs within the past 24 hours have been reviewed.    Significant Imaging:  I have reviewed  all pertinent imaging results/findings within the past 24 hours.

## 2020-01-31 NOTE — ASSESSMENT & PLAN NOTE
- CT consistent with recurrent diverticulitis  - possible GI consult for sigmoidoscopy  - continue coverage with zosyn  - lactate monitoring; BP monitoring

## 2020-01-31 NOTE — NURSING
Patient notified Daughter Tia Velasquez by phone [phone] of transfer to ICU room 6068. All personal belongings brought to room one clear patient belongings bag.  Complaints of new stomach pains 7/10 describes as cramps and aches then vomited small amount of thick brown and felt better.  Iv infusions calcium gluconate and potassium phospate infusing now.

## 2020-01-31 NOTE — ASSESSMENT & PLAN NOTE
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

## 2020-01-31 NOTE — ASSESSMENT & PLAN NOTE
Likely distributive etiology vs Orthostatsis   1. Several diverticula in the sigmoid colon with associated wall thickening persistence of findings, sigmoidoscopy follow-up to exclude colon carcinoma should also be considered.   2. Area of hypoattenuation within the right hepatic lobe. Follow up RUQ US.  3. Continue Zosyn until after the GI procedure is completed  4. Hold warfarin given if patient is to undergo sigmoidoscopy. Will continue Heparin ggt.   5. PRN fluid bolus; UOP is decreased  6. Midodrine 5 mg TID

## 2020-01-31 NOTE — HOSPITAL COURSE
She was admitted to Hospital Medicine and treated for Urosepsis with antibiotics and IVF resuscitation. Critical Care was consulted for septic shock. She was admitted to MICU, bolused 1L LR with resulting cleared Lactic Acid. CT Abd Pelvis showed findings consistent with several diverticula in the sigmoid colon with associated wall thickening, minimal adjacent fat stranding, and prominence of the vasa recta.  Findings appear similar when compared to prior examination dated 12/19/2019 and may represent ongoing diverticulitis or colitis in the correct clinical setting. Area of hypoattenuation within the right hepatic lobe adjacent to the gallbladder fossa, not definitively seen on prior examination and may represent area of fatty liver infiltration or transit hepatic attenuation differences however, in the setting of liver disease, neoplasm is not excluded.   Following clearing of her lactic acid, she was stepped down to Hospital Medicine with midodrine 5 mg TID. Critical Care was re-consulted in the setting of Afib RVR and hypotension. While on the floor, patient went into Afib RVR, sustained HR >120, /55, requiring lopressor pushes, which subsequently lowered HR to 75s/50s.    2/10: Patient readmitted to MICU for acute hypoxemic respiratory failure. CTA negative for PE, diuresing well with IV lasix. Levophed has been discontinued. Electrolytes were repleted. Currently on zosyn; vancomycin was discontinued today given resolution of shock. Bridging heparin with warfarin to therapeutic INR level for A-fib. Restarted oral lasix and metoprolol. Patient is stable to be transferred to Hospital Medicine. Received topical permethrin treatment for head lice.   2/12- 2/13: Patient developed two episodes of hypotension requiring low dose levo both 02/11 and 02/12 nights. Metoprolol was discontinued and digoxin started for Afib rate control.

## 2020-01-31 NOTE — ASSESSMENT & PLAN NOTE
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 On amiodarone and rate controlled.   2. Heparin ggt for Anticoagulation  3. Consider cardiology consultation as outpatient.   4. K > 4.0 and Mag > 2.0 Goal to prevent tachyarrhythmia

## 2020-01-31 NOTE — PLAN OF CARE
CMICU DAILY GOALS       A: Awake    RASS: Goal - RASS Goal: 0-->alert and calm  Actual - RASS ([place] Agitation-Sedation Scale): 0-->alert and calm   Restraint necessity:    B: Breath   SBT: Not intubated   C: Coordinate A & B, analgesics/sedatives   Pain: managed    SAT: Not intubated  D: Delirium   CAM-ICU: Overall CAM-ICU: Negative  E: Early Mobility   MOVE Screen: Pass   Activity: Activity Management: activity adjusted per tolerance  FAS: Feeding/Nutrition   Diet order: Diet/Nutrition Received: (P) NPO,   Fluid restriction:    T: Thrombus   DVT prophylaxis: VTE Required Core Measure: (P) Pharmacological prophylaxis initiated/maintained  H: HOB Elevation   Head of Bed (HOB): HOB at 20-30 degrees  U: Ulcer Prophylaxis   GI: yes  G: Glucose control   managed Glycemic Management: (P) blood glucose monitoring  S: Skin   Bundle compliance: yes   Bathing/Skin Care: incontinence care, linen changed Date: full chlorhexidene bath 01/31 PM  B: Bowel Function   no issues   I: Indwelling Catheters   Carpio necessity:     CVC necessity: No   IPAD offered: Not appropriate  D: De-escalation Antibx   No  Plan for the day   Stepdown.  Family/Goals of care/Code Status   Code Status: Full Code     No acute events throughout day, VS and assessment per flow sheet, patient progressing towards goals as tolerated, plan of care reviewed with Elizabeth Cano and family, all concerns addressed, will continue to monitor.

## 2020-01-31 NOTE — PLAN OF CARE
01/31/20 1119   Discharge Reassessment   Assessment Type Discharge Planning Reassessment   Do you have any problems affording any of your prescribed medications? No   Discharge Plan A Return to nursing home   DME Needed Upon Discharge  none   Anticipated Discharge Disposition correction Nu   Can the patient answer the patient profile reliably? Yes, cognitively intact   Describe the patient's ability to walk at the present time. Major restrictions/daily assistance from another person   How often would a person be available to care for the patient? Whenever needed   Number of comorbid conditions (as recorded on the chart) Three   During the past month, has the patient often been bothered by feeling down, depressed or hopeless? Yes   During the past month, has the patient often been bothered by little interest or pleasure in doing things? Yes       Jayson Wright RN MSN  Critical Care-   Ext. [phone]

## 2020-01-31 NOTE — RESIDENT HANDOFF
Handoff     Primary Team: [facility] MED V Room Number: 6068/6068 A     Patient Name: Elizabeth Cano MRN: [id number]     Date of Birth: [birth year] Allergies: Patient has no known allergies.     Age: 69 y.o. Admit Date: 1/29/2020     Sex: female  BMI: Body mass index is 25.3 kg/m².     Code Status: Full Code        Illness Level (current clinical status): Watcher - Yes    Reason for Admission: Hypotension    Brief HPI: Ms. Elizabeth Cano is a 69 years old female with afib (on coumadin), COPD, and HFpEF (EF 45% on 1/29/20) admitted to hospital medicine for presumed urinary tract infection. She was diagnosed with a UTI last week at her Nursing Home, [facility], which she was prescribed Macrobid. She completed her course of Macrobid on 01/29/20 and she was noted to be hypotensive rendering her transfer to the hospital.     She reports a decreased appetite for the last month since her hospitalization over the holidays due to uncomplicated diverticulitis which she was treated with Augmentin. She reports feeling nauseated when taking antibiotics. She denies palpitations, dizziness, lightheadedness, chest pain, SOB, change in urinary/bowel habits.      On 01/29/20, she was found to be hypotensive in the 80s/50s in the ED with lactic acid of 4.8. CXR showed no signs for PNA, UA showed mild leukocytosis with WBC 17, nitrite negative, with urine cultures and blood cultures showing NG. She was admitted to  and started on CTX. She has been resuscitated with IVFs since admit with 3L. BP responded appropriately to IVF resuscitation with subsequent downtrend in lactic acid to 2.7. However, overnight on 01/30/20, BP remained 63-67 mmHg MAP goal. Repeat lactic acid uptrend to 3.3. Of note, she received a night time dose of Lopressor 50 mg on 01/30/20, which has since been discontinued.    ICU consulted in am of 01/31/20 for hypotension despite being resuscitated with 3.5 L IV fluids and persistant lactic acidosis in 3-4 range.     ICU  Course:   Labs repeated; no signs of hypovolemia Hb is stable, Echo negative for any pericardial effusion, no hypoxia on exam. Given chronic hypotension/orthostasis vs distributive etiology was on differential,  CT abdomen/pelvis requested. Findings consistent with several diverticula in the sigmoid colon with associated wall thickening, minimal adjacent fat stranding, and prominence of the vasa recta.  Findings appear similar when compared to prior examination dated 12/19/2019 and may represent ongoing diverticulitis or colitis in the correct clinical setting. Area of hypoattenuation within the right hepatic lobe adjacent to the gallbladder fossa, not definitively seen on prior examination and may represent area of fatty liver infiltration or transit hepatic attenuation differences however, in the setting of liver disease, neoplasm is not excluded.     Overnight, MAP stable 70-80 mmHg. Patient ambulatory with help. Tolerating regular diet. A and O x 4. No cardiopulmonary distress. Abdominal tenderness much improved. Stable for step down. Lactic acid 2.4 in am labs.     Tasks:  Hypotension:  Likely distributive etiology vs Orthostatsis   1. Several diverticula in the sigmoid colon with associated wall thickening persistence of findings, sigmoidoscopy follow-up to exclude colon carcinoma should also be considered.   2. Area of hypoattenuation within the right hepatic lobe. Follow up RUQ US.  3. Continue Zosyn until after the GI procedure is completed  4. Hold warfarin given if patient is to undergo sigmoidoscopy. Will continue Heparin ggt.     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 On amiodarone and rate controlled.   2. Heparin ggt for Anticoagulation  3. Consider cardiology consultation as outpatient.   4. K > 4.0 and Mag > 2.0 Goal to prevent tachyarrhythmia     Contingency Plan: Should the patient develop hypotension or respiratory distress call Medical ICU.      Estimated Discharge Date: TBD    Discharge Disposition: Nursing Facility    Mentored By: Dr. JESSICA eTran MD   Internal Medicine PGY - 3  Spectra [id number]  Critical Care Medicine   [facility]

## 2020-02-01 PROBLEM — A41.9 SEPTIC SHOCK: Status: ACTIVE | Noted: 2020-01-01

## 2020-02-01 PROBLEM — R65.21 SEPTIC SHOCK: Status: ACTIVE | Noted: 2020-01-01

## 2020-02-01 PROBLEM — I48.21 PERMANENT ATRIAL FIBRILLATION: Status: ACTIVE | Noted: 2019-01-01

## 2020-02-01 NOTE — PROGRESS NOTES
Pt AAOx4. Pt free of falls/trauma/injuries. Vital signs are in stable condition. Bed in low position, wheels locked, and call light within reach. Heparin checked at 5pm. No changes are needed. Night shift nurse aware. Pt turned Q2 hours. Boots remained on pt heels. Pt received dinner. No distress noted/reported at this time. WCTM.

## 2020-02-01 NOTE — ASSESSMENT & PLAN NOTE
Broad differential still possible. Cortisol during septic shock was inappropriately low.     - Cosyntropin stimulation test tomorrow

## 2020-02-01 NOTE — ASSESSMENT & PLAN NOTE
- CT consistent with recurrent diverticulitis  - CRS has been consulted, possible intervention once medically stable  - continue coverage with zosyn  - lactate monitoring; BP monitoring

## 2020-02-01 NOTE — NURSING
Tele room reading Vfib/Vtach. Pt vitals 90/56  Afib RVR, O2 94% on room air. Pt has no complaints, no chest discomfort. 5m mg metop given earlier with little to no changes noted in pulse but drop of systolic BP in the 80's. No more doses of metoprolol given per parameters. Midodrine ordered. Waiting for midodrine to work for BP. Tele room notified again of HR Afib RVR. Called Dr. Cunha who came to bedside. Pt's bp now 104/55. Notified to give 2nd dose of metoprolol. Pt's BP dropped to the 70's systolic. Dr. Cunha consulted critical care. Awaiting new orders.

## 2020-02-01 NOTE — ASSESSMENT & PLAN NOTE
Critical Care was re-consulted in the setting of Afib RVR and hypotension. While on the floor, patient went into Afib RVR, sustained HR >120, /55, requiring lopressor pushes, which subsequently lowered HR to 75s/50s.  -Patient with PMH Afib with medications list showing Diltizem and Lopressor 50 mg BID, also reports taking amiodarone at home.  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 On the floor Lopressor 50 mg BID was re-started. While on the floor, she developed Afib RVR with HR elevated to as high as 140, sustaining > 120s. Normalized following Lopressor pushes x2 with subsequent decline in BP to 75/40. Now blood pressure increasing to baseline.    Recommendations/Plan:  -Consider transfer patient to step-down unit  -Recommend Amiodarone 400 mg BID.  -Discontinue Lopressor 50 mg BID in setting of chronic hypotension.   -Recommend against further Lopressor 5 mg IV pushes  -Recommend infectious workup/etiology of Afib RVR: blood cultures, UA, CXR  -Recommend checking a troponin  -Recommend aggressive electrolyte repletion: IV and PO Mg, IV repletion of Calcium  -Goal K > 4.0 and Mag > 2.0 Goal to prevent tachyarrhythmia

## 2020-02-01 NOTE — SUBJECTIVE & OBJECTIVE
Interval History: Pt stepped down to hospital floor from MICU overnight. Today with tachycardia concerning for RVR. Colorectal surgery consulted to evaluate her diverticulitis.       Objective:     Vital Signs (Most Recent):  Temp: 99.3 °F (37.4 °C) (02/01/20 1216)  Pulse: (!) 154 (02/01/20 1216)  Resp: (!) 22 (02/01/20 1216)  BP: (!) 118/58 (02/01/20 1216)  SpO2: (!) 92 % (02/01/20 1216) Vital Signs (24h Range):  Temp:  [98.1 °F (36.7 °C)-99.3 °F (37.4 °C)] 99.3 °F (37.4 °C)  Pulse:  [] 154  Resp:  [16-24] 22  SpO2:  [90 %-94 %] 92 %  BP: ()/(50-70) 118/58     Weight: 71.1 kg (156 lb 12 oz)  Body mass index is 25.3 kg/m².    Intake/Output Summary (Last 24 hours) at 2/1/2020 1358  Last data filed at 2/1/2020 0915  Gross per 24 hour   Intake 145.4 ml   Output 0 ml   Net 145.4 ml      Physical Exam   Constitutional: She is oriented to person, place, and time. She appears well-developed and well-nourished. No distress.   HENT:   Head: Normocephalic and atraumatic.   Eyes: Conjunctivae are normal.   Neck: No JVD present.   Cardiovascular: Normal rate, regular rhythm and normal heart sounds.   Pulmonary/Chest: Effort normal and breath sounds normal. No respiratory distress.   Abdominal: Soft. Bowel sounds are normal. She exhibits no distension. There is tenderness in the suprapubic area and left lower quadrant.   Musculoskeletal: She exhibits no edema.   Neurological: She is alert and oriented to person, place, and time.   Skin: No rash noted.   Psychiatric: She has a normal mood and affect. Her behavior is normal.       Significant Labs: All pertinent labs within the past 24 hours have been reviewed.    Significant Imaging: I have reviewed all pertinent imaging results/findings within the past 24 hours.

## 2020-02-01 NOTE — PROGRESS NOTES
"RAPID RESPONSE NURSE PROACTIVE ROUNDING NOTE     Time of Visit: 0943    Admit Date: 2020  LOS: 3  Code Status: Full Code   Date of Visit: 2020  : 1950  Age: 69 y.o.  Sex: female  Race: White  [facility] A:   MRN: [id number]  Was the patient discharged from an ICU this admission? yes   Was the patient discharged from a PACU within last 24 hours?  no  Did the patient receive conscious sedation/general anesthesia in last 24 hours?  no  Was the patient in the ED within the past 24 hours?  no  Was the patient started on NIPPV within the past 24 hours?  no  Attending Physician: Junie Prince MD  Primary Service: [facility] MED V    ASSESSMENT     Diagnosis: Hypotension    Abnormal Vital Signs: BP 97/62 (BP Location: Right arm)   Pulse (!) 128   Temp 98.7 °F (37.1 °C) (Oral)   Resp 18   Ht 5' 6" (1.676 m)   Wt 71.1 kg (156 lb 12 oz)   SpO2 (!) 93%   Breastfeeding? No   BMI 25.30 kg/m²      Clinical Issues: Dysrythmia    Patient  has a past medical history of A-fib, GAVIN (acute kidney injury), CHF (congestive heart failure), COPD (chronic obstructive pulmonary disease), Diabetes mellitus, and Hypertension.    Pt resting comfortably in bed. Complains of mild pain in abdomen. Deines SOB or difficulty breathing. HR 78, Sats 92% on room air.     INTERVENTIONS/ RECOMMENDATIONS     This patient may benefit from continuous cardiac monitoring since her HR is jumping from 70s to 120s.     Discussed plan of care with Liudmila MERRILL.    PHYSICIAN ESCALATION     Yes/No  yes    Orders received and case discussed with Dr. Prince.    Disposition: Remain in room 1156.    FOLLOW-UP     Call back the Rapid Response Nurse, Jasmine Vega RN at [phone] for additional questions or concerns.          "

## 2020-02-01 NOTE — SUBJECTIVE & OBJECTIVE
Past Medical History:   Diagnosis Date    A-fib     GAVIN (acute kidney injury)     CHF (congestive heart failure)     COPD (chronic obstructive pulmonary disease)     Diabetes mellitus     Hypertension        Past Surgical History:   Procedure Laterality Date    CARDIAC SURGERY         Review of patient's allergies indicates:  No Known Allergies    Family History     Problem Relation (Age of Onset)    Heart disease Mother        Tobacco Use    Smoking status: Former Smoker     Packs/day: 1.00     Years: 54.00     Pack years: 54.00     Types: Cigarettes     Last attempt to quit: 2019     Years since quittin.2    Smokeless tobacco: Never Used   Substance and Sexual Activity    Alcohol use: Not Currently    Drug use: Not Currently    Sexual activity: Not Currently     Partners: Male      Review of Systems   Constitutional: Negative for chills and fever.   Eyes: Negative for visual disturbance.   Respiratory: Negative for shortness of breath.    Cardiovascular: Negative for chest pain.   Gastrointestinal: Positive for nausea. Negative for abdominal distention, abdominal pain and vomiting.   Genitourinary: Negative for decreased urine volume, difficulty urinating, dysuria and urgency.   Musculoskeletal: Negative for myalgias.   Skin: Negative for rash.   Neurological: Negative for dizziness and light-headedness.   Psychiatric/Behavioral: Negative for confusion.     Objective:     Vital Signs (Most Recent):  Temp: 99.3 °F (37.4 °C) (20 1216)  Pulse: (!) 126 (20 171)  Resp: 16 (20 1659)  BP: (!) 104/55 (20)  SpO2: (!) 92 % (20) Vital Signs (24h Range):  Temp:  [98.7 °F (37.1 °C)-99.3 °F (37.4 °C)] 99.3 °F (37.4 °C)  Pulse:  [] 126  Resp:  [16-22] 16  SpO2:  [90 %-95 %] 92 %  BP: ()/(50-62) 104/55   Weight: 71.1 kg (156 lb 12 oz)  Body mass index is 25.3 kg/m².      Intake/Output Summary (Last 24 hours) at 2020 8588  Last data filed at 2020  0915  Gross per 24 hour   Intake 120 ml   Output 0 ml   Net 120 ml       Physical Exam   Constitutional: She is oriented to person, place, and time. She appears well-developed and well-nourished. No distress.   HENT:   Head: Normocephalic and atraumatic.   Eyes: Conjunctivae are normal.   Neck: No JVD present.   Cardiovascular: Normal rate, regular rhythm and normal heart sounds.   Pulmonary/Chest: Effort normal and breath sounds normal. No respiratory distress.   Abdominal: Soft. Bowel sounds are normal. She exhibits no distension.   Musculoskeletal: She exhibits no edema.   Neurological: She is alert and oriented to person, place, and time.   Skin: No rash noted.   Psychiatric: She has a normal mood and affect. Her behavior is normal.       Vents:     Lines/Drains/Airways     Peripheral Intravenous Line                 Peripheral IV - Single Lumen 01/30/20 0400 20 G Anterior;Left;Proximal Forearm 2 days         Peripheral IV - Single Lumen 01/30/20 1342 20 G;1 3/4 in Right Forearm 2 days              Significant Labs:    CBC/Anemia Profile:  Recent Labs   Lab 01/31/20  0338 01/31/20  1041 02/01/20  0354   WBC 5.65 4.93 6.17   HGB 10.6* 9.9* 10.7*   HCT 34.8* 32.1* 35.0*    174 177   MCV 96 94 98   RDW 15.9* 15.8* 15.8*        Chemistries:  Recent Labs   Lab 01/31/20  0338 02/01/20  0354 02/01/20  0704    140  --    K 3.6 4.1  --     105  --    CO2 22* 28  --    BUN 9 10  --    CREATININE 0.7 0.7  --    CALCIUM 6.5* 6.8*  --    ALBUMIN  --   --  1.8*   PROT  --   --  4.7*   BILITOT  --   --  0.6   ALKPHOS  --   --  149*   ALT  --   --  21   AST  --   --  32   MG 2.0 1.6  --    PHOS 3.7 2.9  --        All pertinent labs within the past 24 hours have been reviewed.    Significant Imaging: I have reviewed and interpreted all pertinent imaging results/findings within the past 24 hours.

## 2020-02-01 NOTE — CARE UPDATE
Called to bedside by charge nurse to evaluate patient for tachyarrhythmia. Pt with HR of 140's with blood pressure of 80's/40's. Pt currently asymptomatic, but states she has required cardioversion in the past and never experienced symptoms. Four hours prior, pt had been given a dose of IV metoprolol 5 mg with immediate drop in blood pressure (70's/40's) and correction of RVR and eventual normalization of blood pressure.    EKG significant for ventricular tachycardia vs atrial fibrillation with aberrancy. On repeat check, BP was 104/65 mmHg. Decision was made to repeat IV metoprolol 5 mg dose given HR of 120's to 130's. Following administration, her blood pressure once again declined to 70's/40's with minimal decrease in HR. Blood pressure eventually normalized once more.     Pertinent Diagnostics:  K: 4.1  M.6   Ionized Ca: 0.94  EKG: Corrected QTc of 473 msec qrs of 150 msec    Assessment  68 yo F presenting with undifferentiated shock in the setting of diverticulitis. Shock 2/2 sepsis (most likely) vs dysrhythmia vs adrenal insufficiency recently discharged from the MICU. While in the MICU her hypotension was treated with IVF and stabilized with PO midodrine. Her home regimen for atrial fibrillation comprises metoprolol and amiodarone. Metoprolol was discontinued while in the MICU d/t concern for blunting compensatory cardiac output in setting of septic shock. Her dysrhythmia may be related to discontinuation of beta blockade vs initiation of oral vasopressor agent (although midodrine more classically associated with bradycardia) vs underlying sepsis.     Plan  - Critical care medicine consulted for hypotension, electrolyte abnormality and increased nursing care needs  - Repeat Mg, BMP, troponin and EKG ordered  - Calcium chloride 2g   - Magnesium sulfate 2g IV x2  - No more IV metoprolol as blood pressures too tenuous with administration of IV metoprolol while on the floor        Behram Khan, MD  Internal  Medicine, PGY-3  #[phone]  M: [phone]

## 2020-02-01 NOTE — PROGRESS NOTES
[facility] Hospital Medicine  Progress Note    Patient Name: Elizabeth Cano  MRN: [id number]  Patient Class: IP- Inpatient   Admission Date: 1/29/2020  Length of Stay: 3 days  Attending Physician: Junie Prince MD  Primary Care Provider: Primary [facility] Medicine Team: [facility] MED V Behram Khan, MD    Subjective:     Principal Problem:Hypotension        HPI:  Elizabeth Cano is a 69F with afib on coumadin, COPD, HFrEF (EF 45), who presents for evaluation from NH for hypotension. Patient reported dysuria last week, for which she was being treated with Macrobid. She was due to complete her abx course today. Patient states that antibiotics always make her lose her appetite, nauseated, weak. She had lightheadedness on standing, no recent falls, no syncope. Her dysuria has resolved, no flank pain. She had diarrhea earlier in the week, now resolved.  No URI symptoms, no HA, vision changes, CP, SOB. She uses a walker to ambulate at baseline. She feels improved with fluids. She says her BP always runs a little low.    ED: BP 84/58, HR >90, LA 4.8>2.4, UA+, CXR clear, no leukocytosis. Increase in SCr from 0.7 to 1.3    Overview/Hospital Course:  Pt is a 70 yo F w/ PMHx significant for diverticulitis, atrial fibrillation on anticoagulation, and CHF who presents with one week history of burning with urination and malaise. Associated symptoms included nausea without emesis. Initial labs were significant for severe hypomagnesemia, mild aseptic pyuria and elevated lactate (4.8). Repeat lactate revealed downtrend to 2.7. She was initiated on ceftriaxone and given 3 L of NS for her hypotension. Her hypotension persisted and she was given a dose of PO midodrine which only briefly relieved her hypotension. Eight hours after admission, pt remained hypotensive and a third lactate revealed uptrend to 3.3 mmol/L.     Critical care medicine was consulted due to refractory hypotension with concern  for requiring vasopressors. Current differentials for her shock include sepsis (related to UTI vs diverticular disease) vs cardiac vs adrenal insufficiency.     During her brief stay in the MICU, pt received further volume resuscitation with IV fluid bolus and was initiated on midodrine 5 mg TID as an oral vasopressor. CT of the abdomen and pelvis was positive for persistent peridiverticular fat stranding and area of hypoattenuation in the liver near the falciform ligament raising concern for carcinoma as etiology for her persistent infection. Since patient had failed outpatient antibiotic therapy and had established care with colorectal surgery in clinic, CRS was consulted to evaluate for possible surgical intervention given her ICU stay and possibly malignant component contributing to her diverticulitis.     Her cortisol while in the MICU was intermediately low raising suspicion for adrenal insufficiency. A low-dose cosyntropin stimulation test is scheduled for tomorrow AM to evaluate for steroid responsiveness in the setting of septic shock.     Interval History: Pt stepped down to hospital floor from MICU overnight. Today with tachycardia concerning for RVR. Colorectal surgery consulted to evaluate her diverticulitis.       Objective:     Vital Signs (Most Recent):  Temp: 99.3 °F (37.4 °C) (02/01/20 1216)  Pulse: (!) 154 (02/01/20 1216)  Resp: (!) 22 (02/01/20 1216)  BP: (!) 118/58 (02/01/20 1216)  SpO2: (!) 92 % (02/01/20 1216) Vital Signs (24h Range):  Temp:  [98.1 °F (36.7 °C)-99.3 °F (37.4 °C)] 99.3 °F (37.4 °C)  Pulse:  [] 154  Resp:  [16-24] 22  SpO2:  [90 %-94 %] 92 %  BP: ()/(50-70) 118/58     Weight: 71.1 kg (156 lb 12 oz)  Body mass index is 25.3 kg/m².    Intake/Output Summary (Last 24 hours) at 2/1/2020 1358  Last data filed at 2/1/2020 0915  Gross per 24 hour   Intake 145.4 ml   Output 0 ml   Net 145.4 ml      Physical Exam   Constitutional: She is oriented to person, place, and time.  She appears well-developed and well-nourished. No distress.   HENT:   Head: Normocephalic and atraumatic.   Eyes: Conjunctivae are normal.   Neck: No JVD present.   Cardiovascular: Normal rate, regular rhythm and normal heart sounds.   Pulmonary/Chest: Effort normal and breath sounds normal. No respiratory distress.   Abdominal: Soft. Bowel sounds are normal. She exhibits no distension. There is tenderness in the suprapubic area and left lower quadrant.   Musculoskeletal: She exhibits no edema.   Neurological: She is alert and oriented to person, place, and time.   Skin: No rash noted.   Psychiatric: She has a normal mood and affect. Her behavior is normal.       Significant Labs: All pertinent labs within the past 24 hours have been reviewed.    Significant Imaging: I have reviewed all pertinent imaging results/findings within the past 24 hours.      Assessment/Plan:      * Hypotension  Broad differential still possible. Cortisol during septic shock was inappropriately low.     - Cosyntropin stimulation test tomorrow      Diverticulitis  SEPTIC SHOCK    68 yo F w previously diagnosed diverticulitis treated with outpatient therapy presents with hypotension and fever. Repeat imaging revealed persistent peridiverticular fat stranding concerning for ongoing infection and inflammation.     CT A/P (1/30/20): Persistence of diverticula in sigmoid colon with peridiverticular fat stranding concerning for ongoing diverticulitis. Sigmoidoscopy warranted to survey for malignant etiology. No complication or abscess noted. Area of hypoattenuation in the liver near the falciform ligament.   Liver U/S (1/31/20): Area of hypoattenuation most likely signifies steatosis    Likely contributing to her hypotension.     Plan  - IV Pip/gomez  - Colorectal surgery consulted; recs as follows:   - KUB flat/erect   - Daily CRP  - Clear liquid diet    GAVIN (acute kidney injury)  Acute renal failure improving with treatment of her infection.          Hypomagnesemia  Pt with persistent hypomagnesemia during previous admission. She had been discharged on PO magnesium, but arrived with low magnesium again. She arrived with acute renal failure as well.     Plan  - IV magnesium   - PO magnesium    COPD (chronic obstructive pulmonary disease)  - continue nebs PRN  - does not use oxygen    Paroxysmal atrial fibrillation  Pt with long-standing non-valvular atrial fibrillation.     Plan  - Discontinue heparin; no role in bridging with absence of valvular pathology.  - continue metoprolol 50 mg BID  - Continue rhythm control with amiodarone 200 mg  - Hold warfarin pending CRS evaluation for possible procedure.   - PhamD consulted for coumadin dosing    Chronic heart failure with preserved ejection fraction  Hold diuresis      Hypocalcemia  - continue home Vit D, add Calcium  - Ionized calcium low and PTH appropriately elevated    Type 2 diabetes mellitus with hyperglycemia, without long-term current use of insulin  - hold home metformin  - low dose SSI for now  - diabetic diet  Lab Results   Component Value Date    HGBA1C 5.4 01/30/2020         Bifascicular block  - stable    Liver lesion, right lobe  Fatty infiltrate per liver U/S. Will need follow up with hepatology      Septic shock          VTE Risk Mitigation (From admission, onward)         Ordered     IP VTE HIGH RISK PATIENT  Once      01/29/20 1916     Reason for No Pharmacological VTE Prophylaxis  Once     Question:  Reasons:  Answer:  Already adequately anticoagulated on oral Anticoagulants    01/29/20 1916                      Behram Khan, MD  Department of Hospital Medicine   [facility]

## 2020-02-01 NOTE — ASSESSMENT & PLAN NOTE
Pt with long-standing non-valvular atrial fibrillation.     Plan  - Discontinue heparin; no role in bridging with absence of valvular pathology.  - continue metoprolol 50 mg BID  - Continue rhythm control with amiodarone 200 mg  - Hold warfarin pending CRS evaluation for possible procedure.   - Dax consulted for coumadin dosing

## 2020-02-01 NOTE — NURSING
Patient rested well on night shift.  Patient therapeutic labs concerning Heparin drip this night.  IV antibiotics as well.  Patient incontinent of urine this shift.  Able to make needs known effectively.  No c/o pain.

## 2020-02-01 NOTE — ASSESSMENT & PLAN NOTE
SEPTIC SHOCK    68 yo F w previously diagnosed diverticulitis treated with outpatient therapy presents with hypotension and fever. Repeat imaging revealed persistent peridiverticular fat stranding concerning for ongoing infection and inflammation.     CT A/P (1/30/20): Persistence of diverticula in sigmoid colon with peridiverticular fat stranding concerning for ongoing diverticulitis. Sigmoidoscopy warranted to survey for malignant etiology. No complication or abscess noted. Area of hypoattenuation in the liver near the falciform ligament.   Liver U/S (1/31/20): Area of hypoattenuation most likely signifies steatosis    Likely contributing to her hypotension.     Plan  - IV Pip/gomez  - Colorectal surgery consulted; recs as follows:   - KUB flat/erect   - Daily CRP  - Clear liquid diet

## 2020-02-01 NOTE — ASSESSMENT & PLAN NOTE
Pt with persistent hypomagnesemia during previous admission. She had been discharged on PO magnesium, but arrived with low magnesium again. She arrived with acute renal failure as well.     Plan  - IV magnesium   - PO magnesium

## 2020-02-02 NOTE — ASSESSMENT & PLAN NOTE
- possible concern of abscess vs malignancy per CT scan  - h/o weight loss and possible colonic lesion  - RUQ US showing focal fatty infiltration  - CRS consulted, appreciate their recomendations

## 2020-02-02 NOTE — ASSESSMENT & PLAN NOTE
SEPTIC SHOCK    68 yo F w previously diagnosed diverticulitis treated with outpatient therapy presents with hypotension and fever. Repeat imaging revealed persistent peridiverticular fat stranding concerning for ongoing infection and inflammation.     CT A/P (1/30/20): Persistence of diverticula in sigmoid colon with peridiverticular fat stranding concerning for ongoing diverticulitis. Sigmoidoscopy warranted to survey for malignant etiology. No complication or abscess noted. Area of hypoattenuation in the liver near the falciform ligament.   Liver U/S (1/31/20): Area of hypoattenuation most likely signifies steatosis    Likely contributing to her hypotension.     Plan  - IV Pip/gomez  - Colorectal surgery consulted; recs as follows:   - KUB flat/erect   - Serial abdomen exams  - Daily CRP  - Clear liquid diet

## 2020-02-02 NOTE — ASSESSMENT & PLAN NOTE
Acute uncomplicated diverticulitis.  CRP low and tenderness only focal.    - Continue clear liquid diet  - Continue broad spectrum antibiotic (zosyn)  - Serial abdominal exams  - Serial CRPs  - Repeat abdominal radiograph today.    Further cares per primary

## 2020-02-02 NOTE — ASSESSMENT & PLAN NOTE
Pt with long-standing non-valvular atrial fibrillation. CHADSVASc of 3    Plan  - Discontinue heparin; no role in bridging with absence of valvular pathology.  - continue metoprolol 50 mg BID  - PO amiodarone initiated while in the MICU without loading protocol; cardiology consulted as patient also has bifascicular block   - Hold warfarin pending CRS evaluation for possible procedure  - PhamD consulted for coumadin dosing

## 2020-02-02 NOTE — CONSULTS
[facility]  Cardiology  Consult Note    Patient Name: Elizabeth Cano  MRN: [id number]  Admission Date: 1/29/2020  Hospital Length of Stay: 4 days  Code Status: Full Code   Attending Provider: Junie Prince MD   Consulting Provider: Josi Geiger MD  Primary Care Physician: Primary Doctor No  Principal Problem:Hypotension    Patient information was obtained from patient, past medical records and ER records.     Inpatient consult to Cardiology  Consult performed by: Josi Geiger MD  Consult ordered by: Behram Khan, MD        Subjective:     Chief Complaint:  afib    HPI:   70 yo F pmhx of Afib (CHADSVASc-5) was on coumadin, COPD, HFrEF 45%, ?hx of VSD closure but no TGA as reported earlier (per last TTE) , and prior cellulitis of the left lower extremity.  Patient was hospitalized at [facility] for the cellulitis and acute hypoxemic respiratory failure in November-2019.  Patient hospitalized at [facility] 19-21-December-2019 for diverticulitis and cardiology initially saw her for elevated troponin thought to be due to type 2 MI with demand ischemia.     Readmitted for what appeared to be septic shock with elevated lactic acid and hypotension in the setting of diverticulitis and recent UTI. Noted to have loose watery diarrhea in the face of active diverticulitis. Cardiology consulted due to episode of afib with RVR overnight in which she was treated with metoprolol, amiodarone (without proper load), and midodrine??. Today on review back in sinus rhythm with the noted bifascicular block (RBBB and LAFB). Only complaint is abdominal pain and palpitations she felt during the afib episode. Chronically sob with her copd but denies progression. No edema lightheadedness or syncope/pre-syncope.      TTE 1/29/2020  · Patient does not have transposition of the great arteries. Based on what is seen, it is possible patient had an ASD closure.  · Mildly decreased left ventricular systolic  function. The estimated ejection fraction is 45%.  · Mildly reduced right ventricular systolic function. Mild right ventricular enlargement.  · Severe left atrial enlargement.  · Severe right atrial enlargement.  Normal central venous pressure (3 mmHg).    Past Medical History:   Diagnosis Date    A-fib     GAVIN (acute kidney injury)     CHF (congestive heart failure)     COPD (chronic obstructive pulmonary disease)     Diabetes mellitus     Hypertension        Past Surgical History:   Procedure Laterality Date    CARDIAC SURGERY         Review of patient's allergies indicates:  No Known Allergies    No current facility-administered medications on file prior to encounter.      Current Outpatient Medications on File Prior to Encounter   Medication Sig    albuterol (PROVENTIL) 2.5 mg /3 mL (0.083 %) nebulizer solution Take 0.5 mLs by nebulization every 6 (six) hours as needed (wheezing). Rescue     aspirin 81 MG Chew Take 1 tablet (81 mg total) by mouth once daily.    budesonide (PULMICORT) 0.25 mg/2 mL nebulizer solution Take 2 ml by nebulization 2 times daily Controller    diltiaZEM (CARDIZEM SR) 60 MG Cp12 Take 60 mg by mouth every 12 (twelve) hours.     DULoxetine (CYMBALTA) 30 MG capsule Take 30 mg by mouth once daily.    furosemide (LASIX) 20 MG tablet Take 20 mg by mouth once daily.    L. acidophilus/pectin, citrus (ACIDOPHILUS PROBIOTIC ORAL) Take 1 tablet by mouth once daily.    lidocaine (LIDODERM) 5 % Apply topically to right leg as needed for pain    magnesium oxide (MAG-OX) 400 mg (241.3 mg magnesium) tablet Take 1 tablet (400 mg total) by mouth once daily.    metFORMIN (GLUCOPHAGE) 1000 MG tablet Take 1,000 mg by mouth 2 (two) times daily with meals.    metoprolol succinate (TOPROL-XL) 50 MG 24 hr tablet Take 50 mg by mouth 2 (two) times daily.    mirtazapine (REMERON) 15 MG tablet Take 15 mg by mouth every evening.    nystatin (MYCOSTATIN) powder Apply twice daily under breast folds  for redness and irritation until resolved    ondansetron (ZOFRAN) 4 MG tablet Take 4 mg by mouth every 6 (six) hours as needed for Nausea.    potassium chloride (MICRO-K) 10 MEQ CpSR Take 10 mEq by mouth once daily.    senna (SENOKOT) 8.6 mg tablet Take 1 tablet by mouth once daily.    vitamin D (VITAMIN D3) 1000 units Tab Take 1,000 Units by mouth once daily.    warfarin (COUMADIN) 1 MG tablet Take 1 mg by mouth every Mon, Wed, Fri. At 5:00pm     Family History     Problem Relation (Age of Onset)    Heart disease Mother        Tobacco Use    Smoking status: Former Smoker     Packs/day: 1.00     Years: 54.00     Pack years: 54.00     Types: Cigarettes     Last attempt to quit: 2019     Years since quittin.2    Smokeless tobacco: Never Used   Substance and Sexual Activity    Alcohol use: Not Currently    Drug use: Not Currently    Sexual activity: Not Currently     Partners: Male     Review of Systems   Cardiovascular: Positive for palpitations. Negative for chest pain, claudication, dyspnea on exertion, irregular heartbeat, leg swelling, near-syncope, orthopnea, paroxysmal nocturnal dyspnea and syncope.   Gastrointestinal: Positive for bloating and diarrhea. Negative for nausea and vomiting.     Objective:     Vital Signs (Most Recent):  Temp: 98.2 °F (36.8 °C) (20 1239)  Pulse: 90 (20 1239)  Resp: 19 (20 1239)  BP: (!) 102/57 (20 1239)  SpO2: 95 % (20 1239) Vital Signs (24h Range):  Temp:  [97.6 °F (36.4 °C)-98.2 °F (36.8 °C)] 98.2 °F (36.8 °C)  Pulse:  [] 90  Resp:  [16-20] 19  SpO2:  [89 %-99 %] 95 %  BP: ()/(51-58) 102/57     Weight: 71.1 kg (156 lb 12 oz)  Body mass index is 25.3 kg/m².    SpO2: 95 %  O2 Device (Oxygen Therapy): room air      Intake/Output Summary (Last 24 hours) at 2020 1356  Last data filed at 2020 1300  Gross per 24 hour   Intake 120 ml   Output 1 ml   Net 119 ml       Lines/Drains/Airways     Peripheral Intravenous Line                  Peripheral IV - Single Lumen 01/30/20 0400 20 G Anterior;Left;Proximal Forearm 3 days         Peripheral IV - Single Lumen 01/30/20 1342 20 G;1 3/4 in Right Forearm 3 days                Physical Exam  Vital signs reviewed  Constitutional: Oriented to person, place, and time. Appears  well-developed and well-nourished.   HENT:   Head: Normocephalic and atraumatic.   Eyes: EOM are normal.   Neck: Normal range of motion. No JVD present.   Cardiovascular: Normal rate, regular rhythm, normal heart sounds and intact distal pulses.   Exam reveals S4 present  No murmur heard.  Pulmonary/Chest: Effort normal and breath sounds normal. No wheeze or rales present. No chest wall tenderness  Abdominal: Soft. decreased BS. TTP in the lower left quadrant There is no guarding.   Musculoskeletal: There is no edema present   Skin: Skin is warm and dry.       Significant Labs:   BMP:   Recent Labs   Lab 02/01/20  0354 02/01/20  1820 02/02/20  0704   GLU 89 89 71    140 141   K 4.1 4.1 5.0    106 111*   CO2 28 26 26   BUN 10 11 10   CREATININE 0.7 0.7 0.7   CALCIUM 6.8* 7.3* 7.1*   MG 1.6 1.4* 2.3   , CBC   Recent Labs   Lab 02/01/20  0354 02/02/20  0704   WBC 6.17 5.42   HGB 10.7* 10.1*   HCT 35.0* 32.3*    181   , INR   Recent Labs   Lab 02/01/20  0354 02/02/20  0830   INR 1.2 1.1   , Lipid Panel No results for input(s): CHOL, HDL, LDLCALC, TRIG, CHOLHDL in the last 48 hours. and Troponin   Recent Labs   Lab 02/01/20  1820   TROPONINI 0.042*       Significant Imaging: baseline EKG with bifascicular block (LAFB and RBBB); note ekg with afib from yesterday. Telemetry unable to pull up episode    Assessment and Plan:     Paroxysmal atrial fibrillation  Known hx of Afib currently NOT on anticoagulation admitted for septic shock and being treated for diverticulitis.     Recs:  - STOP the midodrine; suspect still behind on volume repletion- decrease PO intake since admission and minimal PO intake while  here in addition to watery diarrhea as reported  - Ok to continue Amiodarone at afib dosing (400mg BID for 10grams and then transition to 200mg daily)   - can hold the metoprolol if she gets hypotensive (MAP <60/65)  - CHADSVASc- 4 thus would resume Heparin gtt ASAP    Chronic heart failure with preserved ejection fraction  Not volume overloaded at this time and likely a bit dry    - continue metoprolol as BP will allow        VTE Risk Mitigation (From admission, onward)         Ordered     IP VTE HIGH RISK PATIENT  Once      01/29/20 1916     Reason for No Pharmacological VTE Prophylaxis  Once     Question:  Reasons:  Answer:  Already adequately anticoagulated on oral Anticoagulants    01/29/20 1916                Thank you for your consult. I will follow-up with patient. Please contact us if you have any additional questions.    Josi Geiger MD  Cardiology   [facility]

## 2020-02-02 NOTE — CONSULTS
[facility]  Colorectal Surgery  Consult Note    Patient Name: Elizabeth Cano  MRN: [id number]  Admission Date: 1/29/2020  Hospital Length of Stay: 3 days  Attending Physician: Junie Prince MD  Primary Care Provider: Primary Doctor No    Inpatient consult to Colorectal Surgery  Consult performed by: Theo Nicole MD  Consult ordered by: Behram Khan, MD        Subjective:     Reason for Consultation: Diverticulitis    History of Present Illness: Elizabeth Cano is a 69 year old female who was admitted 3 days ago with hypotension.  She has a history of diverticulitis for which she was admitted at [facility] a little over 1 month ago on 12/20/2019.  She had associated nausea/vomiting that lead to dehydration.  CT during the admission demonstrated uncomplicated sigmoid diverticulitis.  She completed a 12 day course of Augmentin.  She was discharged to a nursing home.  The patient then followed up with Dr. Laguna in clinic with plans for a colonoscopy.  Of note, she also has co-morbidities of Afib on warfarin, COPD, and heart failure.    The patient was then diagnosed with a UTI while at the the nursing home and treated with a week of Macrobid.  She then presented to the ER with weakness and dizziness and was found to have hypotension, GAVIN, and lactic acidosis. She was treated in the ICU with fluid resuscitation and IV antibiotics for potential urosepsis.  The patient was then stepped down to the floor.    Colorectal surgery is consulted due to concern of ongoing diverticulitis.  The patient states that she continues to have intermittent lower abdominal pain.  Currently, she denies any pain.  She has been tolerating a regular diet without nausea/vomiting.  She is passing flatus and has been having BMs, though her recent BMs have been liquid.    Current Facility-Administered Medications   Medication    acetaminophen tablet 650 mg    albuterol-ipratropium 2.5 mg-0.5 mg/3 mL nebulizer solution 3  mL    albuterol-ipratropium 2.5 mg-0.5 mg/3 mL nebulizer solution 3 mL    amiodarone tablet 400 mg    aspirin chewable tablet 81 mg    bisacodyl suppository 10 mg    budesonide nebulizer solution 0.5 mg    calcium chloride 2 g in dextrose 5 % 100 mL IVPB    calcium-vitamin D3 500 mg(1,250mg) -200 unit per tablet 1 tablet    [START ON 2020] cosyntropin injection 0.25 mg    dextrose 10% (D10W) Bolus    dextrose 10% (D10W) Bolus    DULoxetine DR capsule 30 mg    glucagon (human recombinant) injection 1 mg    glucose chewable tablet 16 g    glucose chewable tablet 24 g    insulin aspart U-100 pen 0-5 Units    iohexol (OMNIPAQUE) oral solution 15 mL    magnesium oxide tablet 400 mg    magnesium sulfate 2g in water 50mL IVPB (premix)    melatonin tablet 6 mg    metoprolol injection 5 mg    metoprolol tartrate (LOPRESSOR) tablet 25 mg    midodrine tablet 5 mg    ondansetron disintegrating tablet 8 mg    phenyleph-min oil-petrolatum 0.25-14-74.9 % ointment 1 applicator    piperacillin-tazobactam 4.5 g in sodium chloride 0.9% 100 mL IVPB (ready to mix system)    promethazine (PHENERGAN) 6.25 mg in dextrose 5 % 50 mL IVPB    sodium chloride 0.9% flush 10 mL       Review of patient's allergies indicates:  No Known Allergies    Past Medical History:   Diagnosis Date    A-fib     GAVIN (acute kidney injury)     CHF (congestive heart failure)     COPD (chronic obstructive pulmonary disease)     Diabetes mellitus     Hypertension      Past Surgical History:   Procedure Laterality Date    CARDIAC SURGERY       Family History     Problem Relation (Age of Onset)    Heart disease Mother        Tobacco Use    Smoking status: Former Smoker     Packs/day: 1.00     Years: 54.00     Pack years: 54.00     Types: Cigarettes     Last attempt to quit: 2019     Years since quittin.2    Smokeless tobacco: Never Used   Substance and Sexual Activity    Alcohol use: Not Currently    Drug use: Not  Currently    Sexual activity: Not Currently     Partners: Male     Review of Systems  Objective:     Vital Signs (Most Recent):  Temp: 99.3 °F (37.4 °C) (02/01/20 1216)  Pulse: (!) 126 (02/01/20 1719)  Resp: 16 (02/01/20 1659)  BP: (!) 104/55 (02/01/20 1719)  SpO2: (!) 92 % (02/01/20 1719) Vital Signs (24h Range):  Temp:  [98.7 °F (37.1 °C)-99.3 °F (37.4 °C)] 99.3 °F (37.4 °C)  Pulse:  [] 126  Resp:  [16-22] 16  SpO2:  [90 %-95 %] 92 %  BP: ()/(50-62) 104/55     Weight: 71.1 kg (156 lb 12 oz)  Body mass index is 25.3 kg/m².    Physical Exam   Constitutional: She is oriented to person, place, and time. She appears well-developed.   HENT:   Head: Normocephalic.   Neck: No tracheal deviation present.   Cardiovascular: Normal rate.   Pulmonary/Chest: Effort normal.   Abdominal: Soft. She exhibits distension.   Moderate focal tenderness in the inferior mid-abdomen. Non-tender in the upper abdomen.   Neurological: She is alert and oriented to person, place, and time.   Skin: Skin is warm and dry.         Significant Labs:  BMP (Last 3 Results):   Recent Labs   Lab 01/31/20  0338 02/01/20  0354 02/01/20  1820    89 89    140 140   K 3.6 4.1 4.1    105 106   CO2 22* 28 26   BUN 9 10 11   CREATININE 0.7 0.7 0.7   CALCIUM 6.5* 6.8* 7.3*   MG 2.0 1.6 1.4*     CBC (Last 3 Results):   Recent Labs   Lab 01/31/20  0338 01/31/20  1041 02/01/20  0354   WBC 5.65 4.93 6.17   RBC 3.63* 3.42* 3.59*   HGB 10.6* 9.9* 10.7*   HCT 34.8* 32.1* 35.0*    174 177   MCV 96 94 98   MCH 29.2 28.9 29.8   MCHC 30.5* 30.8* 30.6*     CRP (Last 3 Results):   Recent Labs   Lab 02/01/20  1252   CRP 15.6*       Significant Diagnostics:  CT abdomen/pelvis at the time of admission: Thickened sigmoid colon without phlegmon, extraluminal gas, nor abscess consistent with persistent/recurrent uncomplicated diverticulitis.  Region of hypoattenuation in the right hepatic lobe adjacent to the gallbladder.    Assessment/Plan:      Active Diagnoses:    Diagnosis Date Noted POA    PRINCIPAL PROBLEM:  Hypotension [I95.9] 01/30/2020 Yes    Septic shock [A41.9, R65.21] 02/01/2020 Yes    Liver lesion, right lobe [K76.9] 01/31/2020 Yes    Diverticulitis [K57.92] 01/30/2020 Yes    Hypocalcemia [E83.51] 01/29/2020 Yes    Acute cystitis without hematuria [N30.00] 01/29/2020 Yes    GAVIN (acute kidney injury) [N17.9] 01/29/2020 Yes    Bifascicular block [I45.2] 01/02/2020 Yes    Hypomagnesemia [E83.42] 12/20/2019 Yes    COPD (chronic obstructive pulmonary disease) [J44.9] 12/19/2019 Yes    Chronic heart failure with preserved ejection fraction [I50.32] 12/19/2019 Yes    Permanent atrial fibrillation [I48.21] 12/19/2019 Yes    Type 2 diabetes mellitus with hyperglycemia, without long-term current use of insulin [E11.65] 12/19/2019 Yes      Problems Resolved During this Admission:       1. Recurrent/persistent acute uncomplicated diverticulitis.    She has moderate tenderness in the inferior abdomen but he CRP has trended down to 15 from previous 25 one month ago.  Her exam is focal tenderness without diffuse peritonitis and recent CT abdomen/pelvis does not reveal any evidence of complication.    Recommend:  - Clear liquid diet  - Continue broad spectrum antibiotic (zosyn)  - Serial abdominal exams  - Serial CRPs    Thank you for your consult. I will follow-up with patient. Please contact us if you have any additional questions.    Theo Nicole MD  Colorectal Surgery  [facility]    I have personally obtained a history and performed a physical exam with and independent to my resident and discussed the findings and plan with the patient.  I agree with the above findings and plan with the following exceptions:  None    Lower abdominal tenderness, suprapubic and left lower quadrant. No evidence of diffuse peritonitis.  Previous CT scan concerning for possible underlying neoplasm.  Would favor endoscopic evaluation during  this hospitalization to facilitate management decisions.    HTheo MD, FACS, FASCRS  Staff Surgeon  Dept of Colon and Rectal Surgery  [facility] [place] [place]

## 2020-02-02 NOTE — CONSULTS
Consult answered 18:28 02/01/2020     Please contact critical care at [phone] if pt's clinical condition changes or with any additional questions/concerns.    Patient is asking if she should also do a Carotid Duplex on Friday. Please call patient to discuss. Thank you.

## 2020-02-02 NOTE — ASSESSMENT & PLAN NOTE
Known hx of Afib currently NOT on anticoagulation admitted for septic shock and being treated for diverticulitis.     Recs:  - STOP the midodrine; suspect still behind on volume repletion- decrease PO intake since admission and minimal PO intake while here in addition to watery diarrhea as reported  - Ok to continue Amiodarone at afib dosing (400mg BID for 10grams and then transition to 200mg daily)   - can hold the metoprolol if she gets hypotensive (MAP <60/65)  - CHADSVASc- 4 thus would resume Heparin gtt ASAP

## 2020-02-02 NOTE — PROGRESS NOTES
[facility]  Colorectal Surgery  Progress Note    Patient Name: Elizabeth Cano  MRN: [id number]  Admission Date: 1/29/2020  Hospital Length of Stay: 4 days  Attending Physician: Junie Prince MD    Subjective:     Interval History:   Afib with RVR yesterday    Lower abdominal pain last night.  None this morning  No nausea/vomiting  Passing flatus, no recent BM  Non-ambulatory at baseline.    Post-Op Info:  * No surgery found *          Medications:  Continuous Infusions:  Scheduled Meds:   albuterol-ipratropium  3 mL Nebulization Q12H    amiodarone  400 mg Oral BID    aspirin  81 mg Oral Daily    budesonide  0.5 mg Nebulization BID    calcium chloride IVPB  2 g Intravenous Once    calcium-vitamin D3  1 tablet Oral BID    DULoxetine  30 mg Oral Daily    magnesium oxide  400 mg Oral Daily    metoprolol tartrate  25 mg Oral BID    midodrine  5 mg Oral TID    piperacillin-tazobactam (ZOSYN) IVPB  4.5 g Intravenous Q8H     PRN Meds:   acetaminophen    albuterol-ipratropium    bisacodyL    Dextrose 10% Bolus    Dextrose 10% Bolus    glucagon (human recombinant)    glucose    glucose    insulin aspart U-100    iohexol    melatonin    metoprolol    ondansetron    phenyleph-min oil-petrolatum    promethazine (PHENERGAN) IVPB    sodium chloride 0.9%        Objective:     Vital Signs (Most Recent):  Temp: 97.9 °F (36.6 °C) (02/02/20 0431)  Pulse: 87 (02/02/20 0835)  Resp: 20 (02/02/20 0818)  BP: (!) 109/53 (02/02/20 0835)  SpO2: (!) 89 % (02/02/20 0818) Vital Signs (24h Range):  Temp:  [97.6 °F (36.4 °C)-99.3 °F (37.4 °C)] 97.9 °F (36.6 °C)  Pulse:  [] 87  Resp:  [16-22] 20  SpO2:  [89 %-99 %] 89 %  BP: ()/(51-58) 109/53     Intake/Output - Last 3 Shifts       01/31 0700 - 02/01 0659 02/01 0700 - 02/02 0659 02/02 0700 - 02/03 0659    P.O.  120     I.V. (mL/kg) 60.8 (0.9)      IV Piggyback 138.4      Total Intake(mL/kg) 199.2 (2.8) 120 (1.7)     Urine (mL/kg/hr)  0  (0)     Emesis/NG output       Total Output  0     Net +199.2 +120            Urine Occurrence 2 x      Stool Occurrence 0 x            Physical Exam   Constitutional: She is oriented to person, place, and time. She appears well-developed and well-nourished.   Neck: No tracheal deviation present.   Cardiovascular: Normal rate.   Pulmonary/Chest: Effort normal.   Abdominal: Soft.   Less distended.  Focal moderate tenderness in the inferior abdomen.  Non-tender in the superior abdomen.   Neurological: She is alert and oriented to person, place, and time.   Skin: Skin is warm and dry.       Significant Labs:  BMP (Last 3 Results):   Recent Labs   Lab 02/01/20  0354 02/01/20  1820 02/02/20  0704   GLU 89 89 71    140 141   K 4.1 4.1 5.0    106 111*   CO2 28 26 26   BUN 10 11 10   CREATININE 0.7 0.7 0.7   CALCIUM 6.8* 7.3* 7.1*   MG 1.6 1.4* 2.3     CBC (Last 3 Results):   Recent Labs   Lab 01/31/20  1041 02/01/20  0354 02/02/20  0704   WBC 4.93 6.17 5.42   RBC 3.42* 3.59* 3.34*   HGB 9.9* 10.7* 10.1*   HCT 32.1* 35.0* 32.3*    177 181   MCV 94 98 97   MCH 28.9 29.8 30.2   MCHC 30.8* 30.6* 31.3*     CRP (Last 3 Results):   Recent Labs   Lab 02/01/20  1252 02/02/20  0704   CRP 15.6* 16.8*       Significant Diagnostics:  I have reviewed all pertinent imaging results/findings within the past 24 hours.    Assessment/Plan:     Diverticulitis  Acute uncomplicated diverticulitis.  CRP low and tenderness only focal.    - Continue clear liquid diet  - Continue broad spectrum antibiotic (zosyn)  - Serial abdominal exams  - Serial CRPs  - Repeat abdominal radiograph today.    Further cares per primary          Theo Nicoel MD  Colorectal Surgery  [facility]

## 2020-02-02 NOTE — SUBJECTIVE & OBJECTIVE
Subjective:     Interval History:   Afib with RVR yesterday    Lower abdominal pain last night.  None this morning  No nausea/vomiting  Passing flatus, no recent BM  Non-ambulatory at baseline.    Post-Op Info:  * No surgery found *          Medications:  Continuous Infusions:  Scheduled Meds:   albuterol-ipratropium  3 mL Nebulization Q12H    amiodarone  400 mg Oral BID    aspirin  81 mg Oral Daily    budesonide  0.5 mg Nebulization BID    calcium chloride IVPB  2 g Intravenous Once    calcium-vitamin D3  1 tablet Oral BID    DULoxetine  30 mg Oral Daily    magnesium oxide  400 mg Oral Daily    metoprolol tartrate  25 mg Oral BID    midodrine  5 mg Oral TID    piperacillin-tazobactam (ZOSYN) IVPB  4.5 g Intravenous Q8H     PRN Meds:   acetaminophen    albuterol-ipratropium    bisacodyL    Dextrose 10% Bolus    Dextrose 10% Bolus    glucagon (human recombinant)    glucose    glucose    insulin aspart U-100    iohexol    melatonin    metoprolol    ondansetron    phenyleph-min oil-petrolatum    promethazine (PHENERGAN) IVPB    sodium chloride 0.9%        Objective:     Vital Signs (Most Recent):  Temp: 97.9 °F (36.6 °C) (02/02/20 0431)  Pulse: 87 (02/02/20 0835)  Resp: 20 (02/02/20 0818)  BP: (!) 109/53 (02/02/20 0835)  SpO2: (!) 89 % (02/02/20 0818) Vital Signs (24h Range):  Temp:  [97.6 °F (36.4 °C)-99.3 °F (37.4 °C)] 97.9 °F (36.6 °C)  Pulse:  [] 87  Resp:  [16-22] 20  SpO2:  [89 %-99 %] 89 %  BP: ()/(51-58) 109/53     Intake/Output - Last 3 Shifts       01/31 0700 - 02/01 0659 02/01 0700 - 02/02 0659 02/02 0700 - 02/03 0659    P.O.  120     I.V. (mL/kg) 60.8 (0.9)      IV Piggyback 138.4      Total Intake(mL/kg) 199.2 (2.8) 120 (1.7)     Urine (mL/kg/hr)  0 (0)     Emesis/NG output       Total Output  0     Net +199.2 +120            Urine Occurrence 2 x      Stool Occurrence 0 x            Physical Exam   Constitutional: She is oriented to person, place, and time. She  appears well-developed and well-nourished.   Neck: No tracheal deviation present.   Cardiovascular: Normal rate.   Pulmonary/Chest: Effort normal.   Abdominal: Soft.   Less distended.  Focal moderate tenderness in the inferior abdomen.  Non-tender in the superior abdomen.   Neurological: She is alert and oriented to person, place, and time.   Skin: Skin is warm and dry.       Significant Labs:  BMP (Last 3 Results):   Recent Labs   Lab 02/01/20  0354 02/01/20  1820 02/02/20  0704   GLU 89 89 71    140 141   K 4.1 4.1 5.0    106 111*   CO2 28 26 26   BUN 10 11 10   CREATININE 0.7 0.7 0.7   CALCIUM 6.8* 7.3* 7.1*   MG 1.6 1.4* 2.3     CBC (Last 3 Results):   Recent Labs   Lab 01/31/20  1041 02/01/20  0354 02/02/20  0704   WBC 4.93 6.17 5.42   RBC 3.42* 3.59* 3.34*   HGB 9.9* 10.7* 10.1*   HCT 32.1* 35.0* 32.3*    177 181   MCV 94 98 97   MCH 28.9 29.8 30.2   MCHC 30.8* 30.6* 31.3*     CRP (Last 3 Results):   Recent Labs   Lab 02/01/20  1252 02/02/20  0704   CRP 15.6* 16.8*       Significant Diagnostics:  I have reviewed all pertinent imaging results/findings within the past 24 hours.

## 2020-02-02 NOTE — CONSULTS
[facility]  Critical Care Medicine  Consult Note    Patient Name: Elizabeth Cano  MRN: [id number]  Admission Date: 1/29/2020  Hospital Length of Stay: 3 days  Code Status: Full Code  Attending Physician: Junie Prince MD   Primary Care Provider: Primary Doctor No   Principal Problem: Hypotension    Inpatient consult to Critical Care Medicine  Consult performed by: Tuan Ryan MD  Consult ordered by: Behram Khan, MD        Subjective:     HPI:  Ms. Elizabeth Cano is a 69 years old female with afib (on coumadin), COPD, and HFpEF (EF 45% on 1/29/20) admitted to hospital medicine for presumed urinary tract infection. She was diagnosed with a UTI last week at her Nursing Home, [facility], which she was prescribed Macrobid. She completed her course of Macrobid on 01/29/20 and she was noted to be hypotensive rendering her transfer to the hospital.      She reports a decreased appetite for the last month since her hospitalization over the holidays due to uncomplicated diverticulitis which she was treated with Augmentin. She reports feeling nauseated when taking antibiotics. She denies palpitations, dizziness, lightheadedness, chest pain, SOB, change in urinary/bowel habits.      On 01/29/20, she was found to be hypotensive in the 80s/50s in the ED with lactic acid of 4.8. CXR showed no signs for PNA, UA showed mild leukocytosis with WBC 17, nitrite negative, with urine cultures and blood cultures showing NG. She was admitted to  and started on CTX. She has been resuscitated with IVFs since admit with 3L. BP responded appropriately to IVF resuscitation with subsequent downtrend in lactic acid to 2.7. However, overnight on 01/30/20, BP remained 63-67 mmHg MAP goal. Repeat lactic acid uptrend to 3.3. Of note, she received a night time dose of Lopressor 50 mg on 01/30/20, which has since been discontinued.     ICU consulted in am of 01/31/20 for hypotension despite being resuscitated with 3.5 L IV  fluids and persistant lactic acidosis in 3-4 range.     Hospital/ICU Course:  She was admitted to Hospital Medicine and treated for Urosepsis with antibiotics and IVF resuscitation. Critical Care was consulted for septic shock. She was admitted to MICU, bolused 1L LR with resulting cleared Lactic Acid. CT Abd Pelvis showed findings consistent with several diverticula in the sigmoid colon with associated wall thickening, minimal adjacent fat stranding, and prominence of the vasa recta.  Findings appear similar when compared to prior examination dated 2019 and may represent ongoing diverticulitis or colitis in the correct clinical setting. Area of hypoattenuation within the right hepatic lobe adjacent to the gallbladder fossa, not definitively seen on prior examination and may represent area of fatty liver infiltration or transit hepatic attenuation differences however, in the setting of liver disease, neoplasm is not excluded.     Following clearing of her lactic acid, she was stepped down to Hospital Medicine with midodrine 5 mg TID. Critical Care was re-consulted in the setting of Afib RVR and hypotension. While on the floor, patient went into Afib RVR, sustained HR >120, /55, requiring lopressor pushes, which subsequently lowered HR to 75s/50s.    Past Medical History:   Diagnosis Date    A-fib     GAVIN (acute kidney injury)     CHF (congestive heart failure)     COPD (chronic obstructive pulmonary disease)     Diabetes mellitus     Hypertension        Past Surgical History:   Procedure Laterality Date    CARDIAC SURGERY         Review of patient's allergies indicates:  No Known Allergies    Family History     Problem Relation (Age of Onset)    Heart disease Mother        Tobacco Use    Smoking status: Former Smoker     Packs/day: 1.00     Years: 54.00     Pack years: 54.00     Types: Cigarettes     Last attempt to quit: 2019     Years since quittin.2    Smokeless tobacco: Never Used    Substance and Sexual Activity    Alcohol use: Not Currently    Drug use: Not Currently    Sexual activity: Not Currently     Partners: Male      Review of Systems   Constitutional: Negative for chills and fever.   Eyes: Negative for visual disturbance.   Respiratory: Negative for shortness of breath.    Cardiovascular: Negative for chest pain.   Gastrointestinal: Positive for nausea. Negative for abdominal distention, abdominal pain and vomiting.   Genitourinary: Negative for decreased urine volume, difficulty urinating, dysuria and urgency.   Musculoskeletal: Negative for myalgias.   Skin: Negative for rash.   Neurological: Negative for dizziness and light-headedness.   Psychiatric/Behavioral: Negative for confusion.     Objective:     Vital Signs (Most Recent):  Temp: 99.3 °F (37.4 °C) (02/01/20 1216)  Pulse: (!) 126 (02/01/20 1719)  Resp: 16 (02/01/20 1659)  BP: (!) 104/55 (02/01/20 1719)  SpO2: (!) 92 % (02/01/20 1719) Vital Signs (24h Range):  Temp:  [98.7 °F (37.1 °C)-99.3 °F (37.4 °C)] 99.3 °F (37.4 °C)  Pulse:  [] 126  Resp:  [16-22] 16  SpO2:  [90 %-95 %] 92 %  BP: ()/(50-62) 104/55   Weight: 71.1 kg (156 lb 12 oz)  Body mass index is 25.3 kg/m².      Intake/Output Summary (Last 24 hours) at 2/1/2020 1755  Last data filed at 2/1/2020 0915  Gross per 24 hour   Intake 120 ml   Output 0 ml   Net 120 ml       Physical Exam   Constitutional: She is oriented to person, place, and time. She appears well-developed and well-nourished. No distress.   HENT:   Head: Normocephalic and atraumatic.   Eyes: Conjunctivae are normal.   Neck: No JVD present.   Cardiovascular: Normal rate, regular rhythm and normal heart sounds.   Pulmonary/Chest: Effort normal and breath sounds normal. No respiratory distress.   Abdominal: Soft. Bowel sounds are normal. She exhibits no distension.   Musculoskeletal: She exhibits no edema.   Neurological: She is alert and oriented to person, place, and time.   Skin: No rash  noted.   Psychiatric: She has a normal mood and affect. Her behavior is normal.       Vents:     Lines/Drains/Airways     Peripheral Intravenous Line                 Peripheral IV - Single Lumen 01/30/20 0400 20 G Anterior;Left;Proximal Forearm 2 days         Peripheral IV - Single Lumen 01/30/20 1342 20 G;1 3/4 in Right Forearm 2 days              Significant Labs:    CBC/Anemia Profile:  Recent Labs   Lab 01/31/20  0338 01/31/20  1041 02/01/20  0354   WBC 5.65 4.93 6.17   HGB 10.6* 9.9* 10.7*   HCT 34.8* 32.1* 35.0*    174 177   MCV 96 94 98   RDW 15.9* 15.8* 15.8*        Chemistries:  Recent Labs   Lab 01/31/20  0338 02/01/20  0354 02/01/20  0704    140  --    K 3.6 4.1  --     105  --    CO2 22* 28  --    BUN 9 10  --    CREATININE 0.7 0.7  --    CALCIUM 6.5* 6.8*  --    ALBUMIN  --   --  1.8*   PROT  --   --  4.7*   BILITOT  --   --  0.6   ALKPHOS  --   --  149*   ALT  --   --  21   AST  --   --  32   MG 2.0 1.6  --    PHOS 3.7 2.9  --        All pertinent labs within the past 24 hours have been reviewed.    Significant Imaging: I have reviewed and interpreted all pertinent imaging results/findings within the past 24 hours.      ABG  Recent Labs   Lab 01/29/20  1839   PH 7.345*   PO2 20*   PCO2 47.5*   HCO3 25.9   BE 0     Assessment/Plan:     Permanent atrial fibrillation  Critical Care was re-consulted in the setting of Afib RVR and hypotension. While on the floor, patient went into Afib RVR, sustained HR >120, /55, requiring lopressor pushes, which subsequently lowered HR to 75s/50s.  -Patient with PMH Afib with medications list showing Diltizem and Lopressor 50 mg BID, also reports taking amiodarone at home.  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 On the floor Lopressor 50 mg BID was re-started. While on the floor, she developed Afib RVR with HR elevated to as high as 140, sustaining > 120s.  Normalized following Lopressor pushes x2 with subsequent decline in BP to 75/40. Now blood pressure increasing to baseline.    Recommendations/Plan:  -Consider transfer patient to step-down unit  -Recommend Amiodarone 400 mg BID.  -Discontinue Lopressor 50 mg BID in setting of chronic hypotension.   -Recommend against further Lopressor 5 mg IV pushes  -Recommend infectious workup/etiology of Afib RVR: blood cultures, UA, CXR  -Recommend checking a troponin  -Recommend aggressive electrolyte repletion: IV and PO Mg, IV repletion of Calcium  -Goal K > 4.0 and Mag > 2.0 Goal to prevent tachyarrhythmia        Critical care was time spent personally by me on the following activities: development of treatment plan with patient or surrogate and bedside caregivers, discussions with consultants, evaluation of patient's response to treatment, examination of patient, ordering and performing treatments and interventions, ordering and review of laboratory studies, ordering and review of radiographic studies, pulse oximetry, re-evaluation of patient's condition. This critical care time did not overlap with that of any other provider or involve time for any procedures.    Thank you for your consult. I will sign off. Please contact us if you have any additional questions.     Tuan Ryan MD  Critical Care Medicine  [facility]

## 2020-02-02 NOTE — ASSESSMENT & PLAN NOTE
Broad differential still possible. Cortisol during septic shock was inappropriately low.     - Cosyntropin stimulation test negative  - Hypotension likely related to sepsis

## 2020-02-02 NOTE — CARE UPDATE
"RAPID RESPONSE NURSE PROACTIVE ROUNDING NOTE     Time of Visit:     Admit Date: 2020  LOS: 3  Code Status: Full Code   Date of Visit: 2020  : 1950  Age: 69 y.o.  Sex: female  Race: White  Bed: 1156/1156 A:   MRN: [id number]  Was the patient discharged from an ICU this admission? yes   Was the patient discharged from a PACU within last 24 hours?  no  Did the patient receive conscious sedation/general anesthesia in last 24 hours?  no  Was the patient in the ED within the past 24 hours?  no  Was the patient started on NIPPV within the past 24 hours?  no  Attending Physician: Junie Prince MD  Primary Service: [facility] MED V    ASSESSMENT     Diagnosis: Hypotension    Abnormal Vital Signs: BP (!) 95/53   Pulse 61   Temp 97.6 °F (36.4 °C)   Resp 20   Ht 5' 6" (1.676 m)   Wt 71.1 kg (156 lb 12 oz)   SpO2 99%   Breastfeeding? No   BMI 25.30 kg/m²      Clinical Issues: Dysrythmia    Patient  has a past medical history of A-fib, GAVIN (acute kidney injury), CHF (congestive heart failure), COPD (chronic obstructive pulmonary disease), Diabetes mellitus, and Hypertension.    Notified by BRITTANIE Fonseca, of concern for patient's heart rhythm. Per 12 lead EKG reading "Atrial fibrillation with RVR". Given IV Lopressor with decrease in BP to 70/50s. CCM consulted with recommendations made. Patient to receive electrolyte replacements, 400 mg PO Amiodarone BID starting at 2100, 25 mg PO Lopressor BID at 2100, and midodrine.     BP at time of assessment 95/53 (71) with midodrine due at 2100, HR 78 per pulse oximeter, RR 18, SpO2 98% on RA. Patient denies CP/HA/SOB. Telemetry leads replaced with slightly better visualization - HR remains 118 on monitor.     Primary nurse to given PM medication.  INTERVENTIONS/ RECOMMENDATIONS     As stated above. Will follow up on patient following administration of PM medications.    Discussed plan of care with Christa MERRILL.    PHYSICIAN ESCALATION     Yes/No  " no    Orders received and case discussed with NA.    Disposition: Remain in room 1156A.    FOLLOW-UP     Call back the Rapid Response Nurse, Comfort Denis RN at [phone] for additional questions or concerns.

## 2020-02-02 NOTE — ASSESSMENT & PLAN NOTE
TTE with EF of 45%    - Hold diuresis and antihypertensives in setting of hypotension  - Continue metoprolol

## 2020-02-02 NOTE — HPI
68 yo F pmhx of Afib ([id number]) was on coumadin, COPD, HFrEF 45%, ?hx of VSD closure but no TGA as reported earlier (per last TTE) , and prior cellulitis of the left lower extremity.  Patient was hospitalized at [facility] for the cellulitis and acute hypoxemic respiratory failure in November-2019.  Patient hospitalized at [facility] 19-21-December-2019 for diverticulitis and cardiology initially saw her for elevated troponin thought to be due to type 2 MI with demand ischemia.     Readmitted for what appeared to be septic shock with elevated lactic acid and hypotension in the setting of diverticulitis and recent UTI. Noted to have loose watery diarrhea in the face of active diverticulitis. Cardiology consulted due to episode of afib with RVR overnight in which she was treated with metoprolol, amiodarone (without proper load), and midodrine??. Today on review back in sinus rhythm with the noted bifascicular block (RBBB and LAFB). Only complaint is abdominal pain and palpitations she felt during the afib episode. Chronically sob with her copd but denies progression. No edema lightheadedness or syncope/pre-syncope.      TTE 1/29/2020  · Patient does not have transposition of the great arteries. Based on what is seen, it is possible patient had an ASD closure.  · Mildly decreased left ventricular systolic function. The estimated ejection fraction is 45%.  · Mildly reduced right ventricular systolic function. Mild right ventricular enlargement.  · Severe left atrial enlargement.  · Severe right atrial enlargement.  · Normal central venous pressure (3 mmHg).

## 2020-02-02 NOTE — ASSESSMENT & PLAN NOTE
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

## 2020-02-02 NOTE — PLAN OF CARE
Problem: Wound  Goal: Optimal Wound Healing  Outcome: Ongoing, Progressing     Problem: Fall Injury Risk  Goal: Absence of Fall and Fall-Related Injury  Outcome: Ongoing, Progressing     Problem: Adult Inpatient Plan of Care  Goal: Plan of Care Review  Outcome: Ongoing, Progressing  Goal: Patient-Specific Goal (Individualization)  Outcome: Ongoing, Progressing  Goal: Absence of Hospital-Acquired Illness or Injury  Outcome: Ongoing, Progressing  Goal: Optimal Comfort and Wellbeing  Outcome: Ongoing, Progressing  Goal: Readiness for Transition of Care  Outcome: Ongoing, Progressing  Goal: Rounds/Family Conference  Outcome: Ongoing, Progressing     Problem: Infection  Goal: Infection Symptom Resolution  Outcome: Ongoing, Progressing     Problem: Diabetes Comorbidity  Goal: Blood Glucose Level Within Desired Range  Outcome: Ongoing, Progressing     Problem: Skin Injury Risk Increased  Goal: Skin Health and Integrity  Outcome: Ongoing, Progressing    Patient remains free from falls.  Bed locked and in lowest position.  Personal items and call light in reach.  Alert and oriented x 4.   No c/o pain or discomfort this shift.  Lungs clear.  Abdomen soft and non tender with active bowel sounds all 4 quadrants.  Moves all extremities without difficulty but patient is weak.  Patients heart rate remained irregular and elevated until routine metoprolol was given.  Current heart rate 88.  Patient continues with IV antibiotics.  Patient is able to call for assistance ans call light within reach.

## 2020-02-02 NOTE — NURSING TRANSFER
Nursing Transfer Note      2/2/2020     Transfer From: [facility] to     Transfer via bed    Transfer with cardiac monitoring    Transported by [facility] RNs    Medicines sent: yes    Chart send with patient: Yes    Patient reassessed at: 2/3 1730    Upon arrival to floor: cardiac monitor applied, patient oriented to room, call bell in reach and bed in lowest position

## 2020-02-02 NOTE — SUBJECTIVE & OBJECTIVE
Interval History: Pt with ongoing abdominal pain in the left lower quadrant. CRS is continuing their evaluation. Pt also with AFib with RVR overnight treated with IV metoprolol.       Objective:     Vital Signs (Most Recent):  Temp: 98.2 °F (36.8 °C) (02/02/20 1239)  Pulse: 90 (02/02/20 1239)  Resp: 19 (02/02/20 1239)  BP: (!) 102/57 (02/02/20 1239)  SpO2: 95 % (02/02/20 1239) Vital Signs (24h Range):  Temp:  [97.6 °F (36.4 °C)-98.2 °F (36.8 °C)] 98.2 °F (36.8 °C)  Pulse:  [] 90  Resp:  [16-20] 19  SpO2:  [89 %-99 %] 95 %  BP: ()/(51-58) 102/57     Weight: 71.1 kg (156 lb 12 oz)  Body mass index is 25.3 kg/m².    Intake/Output Summary (Last 24 hours) at 2/2/2020 1412  Last data filed at 2/2/2020 1300  Gross per 24 hour   Intake 120 ml   Output 1 ml   Net 119 ml      Physical Exam   Constitutional: She is oriented to person, place, and time. She appears well-developed and well-nourished. No distress.   HENT:   Head: Normocephalic and atraumatic.   Eyes: Conjunctivae are normal.   Neck: No JVD present.   Cardiovascular: Normal rate, regular rhythm and normal heart sounds.   Pulmonary/Chest: Effort normal and breath sounds normal. No respiratory distress.   Abdominal: Soft. Bowel sounds are normal. She exhibits no distension. There is tenderness in the suprapubic area and left lower quadrant.   Musculoskeletal: She exhibits no edema.   Neurological: She is alert and oriented to person, place, and time.   Skin: No rash noted.   Psychiatric: She has a normal mood and affect. Her behavior is normal.       Significant Labs: All pertinent labs within the past 24 hours have been reviewed.    Significant Imaging: I have reviewed all pertinent imaging results/findings within the past 24 hours.

## 2020-02-02 NOTE — PROGRESS NOTES
[facility] Hospital Medicine  Progress Note    Patient Name: Elizabeth Cano  MRN: [id number]  Patient Class: IP- Inpatient   Admission Date: 1/29/2020  Length of Stay: 4 days  Attending Physician: Junie Prince MD  Primary Care Provider: Primary Doctor [facility] Medicine Team: [facility]OSP MED V Behram Khan, MD    Subjective:     Principal Problem:Hypotension        HPI:  Elizabeth Cano is a 69F with afib on coumadin, COPD, HFrEF (EF 45), who presents for evaluation from NH for hypotension. Patient reported dysuria last week, for which she was being treated with Macrobid. She was due to complete her abx course today. Patient states that antibiotics always make her lose her appetite, nauseated, weak. She had lightheadedness on standing, no recent falls, no syncope. Her dysuria has resolved, no flank pain. She had diarrhea earlier in the week, now resolved.  No URI symptoms, no HA, vision changes, CP, SOB. She uses a walker to ambulate at baseline. She feels improved with fluids. She says her BP always runs a little low.    ED: BP 84/58, HR >90, LA 4.8>2.4, UA+, CXR clear, no leukocytosis. Increase in SCr from 0.7 to 1.3    Overview/Hospital Course:  Pt is a 68 yo F w/ PMHx significant for diverticulitis, atrial fibrillation on anticoagulation, and CHF who presents with one week history of burning with urination and malaise. Associated symptoms included nausea without emesis. Initial labs were significant for severe hypomagnesemia, mild aseptic pyuria and elevated lactate (4.8). Repeat lactate revealed downtrend to 2.7. She was initiated on ceftriaxone and given 3 L of NS for her hypotension. Her hypotension persisted and she was given a dose of PO midodrine which only briefly relieved her hypotension. Eight hours after admission, pt remained hypotensive and a third lactate revealed uptrend to 3.3 mmol/L.     Critical care medicine was consulted due to refractory hypotension with concern  for requiring vasopressors. Current differentials for her shock include sepsis (related to UTI vs diverticular disease) vs cardiac vs adrenal insufficiency.     During her brief stay in the MICU, pt received further volume resuscitation with IV fluid bolus and was initiated on midodrine 5 mg TID as an oral vasopressor. CT of the abdomen and pelvis was positive for persistent peridiverticular fat stranding and area of hypoattenuation in the liver near the falciform ligament raising concern for carcinoma as etiology for her persistent infection. Since patient had failed outpatient antibiotic therapy and had established care with colorectal surgery in clinic, CRS was consulted to evaluate for possible surgical intervention given her ICU stay and possibly malignant component contributing to her diverticulitis. Abdominal X-ray revealed minimal translucency under the diaphragm concerning for free air, thus repeat was ordered.    Her cortisol while in the MICU was intermediately low raising suspicion for adrenal insufficiency however cosyntropin stimulation test was negative.    Interval History: Pt with ongoing abdominal pain in the left lower quadrant. CRS is continuing their evaluation. Pt also with AFib with RVR overnight treated with IV metoprolol.       Objective:     Vital Signs (Most Recent):  Temp: 98.2 °F (36.8 °C) (02/02/20 1239)  Pulse: 90 (02/02/20 1239)  Resp: 19 (02/02/20 1239)  BP: (!) 102/57 (02/02/20 1239)  SpO2: 95 % (02/02/20 1239) Vital Signs (24h Range):  Temp:  [97.6 °F (36.4 °C)-98.2 °F (36.8 °C)] 98.2 °F (36.8 °C)  Pulse:  [] 90  Resp:  [16-20] 19  SpO2:  [89 %-99 %] 95 %  BP: ()/(51-58) 102/57     Weight: 71.1 kg (156 lb 12 oz)  Body mass index is 25.3 kg/m².    Intake/Output Summary (Last 24 hours) at 2/2/2020 1412  Last data filed at 2/2/2020 1300  Gross per 24 hour   Intake 120 ml   Output 1 ml   Net 119 ml      Physical Exam   Constitutional: She is oriented to person, place, and  time. She appears well-developed and well-nourished. No distress.   HENT:   Head: Normocephalic and atraumatic.   Eyes: Conjunctivae are normal.   Neck: No JVD present.   Cardiovascular: Normal rate, regular rhythm and normal heart sounds.   Pulmonary/Chest: Effort normal and breath sounds normal. No respiratory distress.   Abdominal: Soft. Bowel sounds are normal. She exhibits no distension. There is tenderness in the suprapubic area and left lower quadrant.   Musculoskeletal: She exhibits no edema.   Neurological: She is alert and oriented to person, place, and time.   Skin: No rash noted.   Psychiatric: She has a normal mood and affect. Her behavior is normal.       Significant Labs: All pertinent labs within the past 24 hours have been reviewed.    Significant Imaging: I have reviewed all pertinent imaging results/findings within the past 24 hours.      Assessment/Plan:      * Hypotension  Broad differential still possible. Cortisol during septic shock was inappropriately low.     - Cosyntropin stimulation test negative  - Hypotension likely related to sepsis      Diverticulitis  SEPTIC SHOCK    70 yo F w previously diagnosed diverticulitis treated with outpatient therapy presents with hypotension and fever. Repeat imaging revealed persistent peridiverticular fat stranding concerning for ongoing infection and inflammation.     CT A/P (1/30/20): Persistence of diverticula in sigmoid colon with peridiverticular fat stranding concerning for ongoing diverticulitis. Sigmoidoscopy warranted to survey for malignant etiology. No complication or abscess noted. Area of hypoattenuation in the liver near the falciform ligament.   Liver U/S (1/31/20): Area of hypoattenuation most likely signifies steatosis    Likely contributing to her hypotension.     Plan  - IV Pip/gomez  - Colorectal surgery consulted; recs as follows:   - KUB flat/erect   - Serial abdomen exams  - Daily CRP  - Clear liquid diet    GAVIN (acute kidney  injury)  Acute renal failure improving with treatment of her infection.         Hypomagnesemia  Pt with persistent hypomagnesemia during previous admission. She had been discharged on PO magnesium, but arrived with low magnesium again. She arrived with acute renal failure as well.     Plan  - IV magnesium   - PO magnesium    COPD (chronic obstructive pulmonary disease)  No PFT's on record.     - continue nebs PRN  - does not use oxygen  - Suggest PFT's ordered during PCP follow up.     Permanent atrial fibrillation  Pt with long-standing non-valvular atrial fibrillation. CHADSVASc of 3    Plan  - Discontinue heparin; no role in bridging with absence of valvular pathology.  - continue metoprolol 50 mg BID  - PO amiodarone initiated while in the MICU without loading protocol; cardiology consulted as patient also has bifascicular block   - Hold warfarin pending CRS evaluation for possible procedure  - PhamD consulted for coumadin dosing    Chronic heart failure with preserved ejection fraction  TTE with EF of 45%    - Hold diuresis and antihypertensives in setting of hypotension  - Continue metoprolol      Hypocalcemia  - continue home Vit D, add Calcium  - Ionized calcium low and PTH appropriately elevated    Type 2 diabetes mellitus with hyperglycemia, without long-term current use of insulin  - hold home metformin  - low dose SSI for now  - diabetic diet  Lab Results   Component Value Date    HGBA1C 5.4 01/30/2020         Bifascicular block  Unable to undergo outpatient device evaluation.     Liver lesion, right lobe  Fatty infiltrate per liver U/S. Will need follow up with hepatology      Septic shock        Acute cystitis without hematuria          VTE Risk Mitigation (From admission, onward)         Ordered     IP VTE HIGH RISK PATIENT  Once      01/29/20 1916     Reason for No Pharmacological VTE Prophylaxis  Once     Question:  Reasons:  Answer:  Already adequately anticoagulated on oral Anticoagulants     01/29/20 1916                      Behram Khan, MD  Department of Hospital Medicine   [facility]

## 2020-02-02 NOTE — ASSESSMENT & PLAN NOTE
No PFT's on record.     - continue nebs PRN  - does not use oxygen  - Suggest PFT's ordered during PCP follow up.

## 2020-02-02 NOTE — SUBJECTIVE & OBJECTIVE
Past Medical History:   Diagnosis Date    A-fib     GAVIN (acute kidney injury)     CHF (congestive heart failure)     COPD (chronic obstructive pulmonary disease)     Diabetes mellitus     Hypertension        Past Surgical History:   Procedure Laterality Date    CARDIAC SURGERY         Review of patient's allergies indicates:  No Known Allergies    No current facility-administered medications on file prior to encounter.      Current Outpatient Medications on File Prior to Encounter   Medication Sig    albuterol (PROVENTIL) 2.5 mg /3 mL (0.083 %) nebulizer solution Take 0.5 mLs by nebulization every 6 (six) hours as needed (wheezing). Rescue     aspirin 81 MG Chew Take 1 tablet (81 mg total) by mouth once daily.    budesonide (PULMICORT) 0.25 mg/2 mL nebulizer solution Take 2 ml by nebulization 2 times daily Controller    diltiaZEM (CARDIZEM SR) 60 MG Cp12 Take 60 mg by mouth every 12 (twelve) hours.     DULoxetine (CYMBALTA) 30 MG capsule Take 30 mg by mouth once daily.    furosemide (LASIX) 20 MG tablet Take 20 mg by mouth once daily.    L. acidophilus/pectin, citrus (ACIDOPHILUS PROBIOTIC ORAL) Take 1 tablet by mouth once daily.    lidocaine (LIDODERM) 5 % Apply topically to right leg as needed for pain    magnesium oxide (MAG-OX) 400 mg (241.3 mg magnesium) tablet Take 1 tablet (400 mg total) by mouth once daily.    metFORMIN (GLUCOPHAGE) 1000 MG tablet Take 1,000 mg by mouth 2 (two) times daily with meals.    metoprolol succinate (TOPROL-XL) 50 MG 24 hr tablet Take 50 mg by mouth 2 (two) times daily.    mirtazapine (REMERON) 15 MG tablet Take 15 mg by mouth every evening.    nystatin (MYCOSTATIN) powder Apply twice daily under breast folds for redness and irritation until resolved    ondansetron (ZOFRAN) 4 MG tablet Take 4 mg by mouth every 6 (six) hours as needed for Nausea.    potassium chloride (MICRO-K) 10 MEQ CpSR Take 10 mEq by mouth once daily.    senna (SENOKOT) 8.6 mg tablet Take 1  tablet by mouth once daily.    vitamin D (VITAMIN D3) 1000 units Tab Take 1,000 Units by mouth once daily.    warfarin (COUMADIN) 1 MG tablet Take 1 mg by mouth every Mon, Wed, Fri. At 5:00pm     Family History     Problem Relation (Age of Onset)    Heart disease Mother        Tobacco Use    Smoking status: Former Smoker     Packs/day: 1.00     Years: 54.00     Pack years: 54.00     Types: Cigarettes     Last attempt to quit: 2019     Years since quittin.2    Smokeless tobacco: Never Used   Substance and Sexual Activity    Alcohol use: Not Currently    Drug use: Not Currently    Sexual activity: Not Currently     Partners: Male     Review of Systems   Cardiovascular: Positive for palpitations. Negative for chest pain, claudication, dyspnea on exertion, irregular heartbeat, leg swelling, near-syncope, orthopnea, paroxysmal nocturnal dyspnea and syncope.   Gastrointestinal: Positive for bloating and diarrhea. Negative for nausea and vomiting.     Objective:     Vital Signs (Most Recent):  Temp: 98.2 °F (36.8 °C) (20 1239)  Pulse: 90 (20 1239)  Resp: 19 (20 1239)  BP: (!) 102/57 (20 1239)  SpO2: 95 % (20 1239) Vital Signs (24h Range):  Temp:  [97.6 °F (36.4 °C)-98.2 °F (36.8 °C)] 98.2 °F (36.8 °C)  Pulse:  [] 90  Resp:  [16-20] 19  SpO2:  [89 %-99 %] 95 %  BP: ()/(51-58) 102/57     Weight: 71.1 kg (156 lb 12 oz)  Body mass index is 25.3 kg/m².    SpO2: 95 %  O2 Device (Oxygen Therapy): room air      Intake/Output Summary (Last 24 hours) at 2020 1356  Last data filed at 2020 1300  Gross per 24 hour   Intake 120 ml   Output 1 ml   Net 119 ml       Lines/Drains/Airways     Peripheral Intravenous Line                 Peripheral IV - Single Lumen 20 0400 20 G Anterior;Left;Proximal Forearm 3 days         Peripheral IV - Single Lumen 20 1342 20 G;1 3/4 in Right Forearm 3 days                Physical Exam  Vital signs reviewed  Constitutional:  Oriented to person, place, and time. Appears  well-developed and well-nourished.   HENT:   Head: Normocephalic and atraumatic.   Eyes: EOM are normal.   Neck: Normal range of motion. No JVD present.   Cardiovascular: Normal rate, regular rhythm, normal heart sounds and intact distal pulses.   Exam reveals S4 present  No murmur heard.  Pulmonary/Chest: Effort normal and breath sounds normal. No wheeze or rales present. No chest wall tenderness  Abdominal: Soft. decreased BS. TTP in the lower left quadrant There is no guarding.   Musculoskeletal: There is no edema present   Skin: Skin is warm and dry.       Significant Labs:   BMP:   Recent Labs   Lab 02/01/20  0354 02/01/20  1820 02/02/20  0704   GLU 89 89 71    140 141   K 4.1 4.1 5.0    106 111*   CO2 28 26 26   BUN 10 11 10   CREATININE 0.7 0.7 0.7   CALCIUM 6.8* 7.3* 7.1*   MG 1.6 1.4* 2.3   , CBC   Recent Labs   Lab 02/01/20  0354 02/02/20  0704   WBC 6.17 5.42   HGB 10.7* 10.1*   HCT 35.0* 32.3*    181   , INR   Recent Labs   Lab 02/01/20  0354 02/02/20  0830   INR 1.2 1.1   , Lipid Panel No results for input(s): CHOL, HDL, LDLCALC, TRIG, CHOLHDL in the last 48 hours. and Troponin   Recent Labs   Lab 02/01/20  1820   TROPONINI 0.042*       Significant Imaging: reviewed

## 2020-02-02 NOTE — PLAN OF CARE
Problem: Fall Injury Risk  Goal: Absence of Fall and Fall-Related Injury  Outcome: Ongoing, Progressing   Patient remains free from injury or any falls. Denies any pain or discomfort. Patient afibb rvr/ vtach , hr 120's to 150's. IV 5mg Metoprolol, moderate relief initially, patient hr remained elevated. Med team aware, will continue to monitor.  IV antibiotics as ordered.

## 2020-02-03 NOTE — PROGRESS NOTES
[facility] Hospital Medicine  Progress Note    Patient Name: Elizabeth Cano  MRN: [id number]  Patient Class: IP- Inpatient   Admission Date: 1/29/2020  Length of Stay: 5 days  Attending Physician: Junie Prince MD  Primary Care Provider: [facility] Doctor [facility] Medicine Team: [facility]OSP MED  Rusty Aponte MD    Subjective:     Principal Problem:Hypotension        HPI:  Elizabeth Cano is a 69F with afib on coumadin, COPD, HFrEF (EF 45), who presents for evaluation from NH for hypotension. Patient reported dysuria last week, for which she was being treated with Macrobid. She was due to complete her abx course today. Patient states that antibiotics always make her lose her appetite, nauseated, weak. She had lightheadedness on standing, no recent falls, no syncope. Her dysuria has resolved, no flank pain. She had diarrhea earlier in the week, now resolved.  No URI symptoms, no HA, vision changes, CP, SOB. She uses a walker to ambulate at baseline. She feels improved with fluids. She says her BP always runs a little low.    ED: BP 84/58, HR >90, LA 4.8>2.4, UA+, CXR clear, no leukocytosis. Increase in SCr from 0.7 to 1.3    Overview/Hospital Course:  Pt is a 68 yo F w/ PMHx significant for diverticulitis, atrial fibrillation on anticoagulation, and CHF who presents with one week history of burning with urination and malaise. Associated symptoms included nausea without emesis. Initial labs were significant for severe hypomagnesemia, mild aseptic pyuria and elevated lactate (4.8). Repeat lactate revealed downtrend to 2.7. She was initiated on ceftriaxone and given 3 L of NS for her hypotension. Her hypotension persisted and she was given a dose of PO midodrine which only briefly relieved her hypotension. Eight hours after admission, pt remained hypotensive and a third lactate revealed uptrend to 3.3 mmol/L.     Critical care medicine was consulted due to refractory hypotension with  concern for requiring vasopressors. Current differentials for her shock include sepsis (related to UTI vs diverticular disease) vs cardiac vs adrenal insufficiency.     During her brief stay in the MICU, pt received further volume resuscitation with IV fluid bolus and was initiated on midodrine 5 mg TID as an oral vasopressor. CT of the abdomen and pelvis was positive for persistent peridiverticular fat stranding and area of hypoattenuation in the liver near the falciform ligament raising concern for carcinoma as etiology for her persistent infection. Since patient had failed outpatient antibiotic therapy and had established care with colorectal surgery in clinic, CRS was consulted to evaluate for possible surgical intervention given her ICU stay and possibly malignant component contributing to her diverticulitis. Abdominal X-ray revealed minimal translucency under the diaphragm concerning for free air, thus repeat was ordered.    Her cortisol while in the MICU was intermediately low raising suspicion for adrenal insufficiency however cosyntropin stimulation test was negative.    Interval History:   NAEON. Patient reports colonoscopy tomorrow? Need to confirm with CRS.   Otherwise feeling better, mild abd discomfort.  Discontinue midodrine.   Continue heparin, will discontinue prior to colonoscopy.       Objective:     Vital Signs (Most Recent):  Temp: 98.5 °F (36.9 °C) (02/03/20 1144)  Pulse: 77 (02/03/20 1144)  Resp: 18 (02/03/20 1144)  BP: (!) 90/51 (02/03/20 1144)  SpO2: (!) 90 % (02/03/20 1144) Vital Signs (24h Range):  Temp:  [97.2 °F (36.2 °C)-98.5 °F (36.9 °C)] 98.5 °F (36.9 °C)  Pulse:  [77-90] 77  Resp:  [16-20] 18  SpO2:  [90 %-98 %] 90 %  BP: ()/(50-62) 90/51     Weight: 72 kg (158 lb 11.7 oz)  Body mass index is 25.62 kg/m².    Intake/Output Summary (Last 24 hours) at 2/3/2020 1316  Last data filed at 2/3/2020 1000  Gross per 24 hour   Intake 180 ml   Output 0 ml   Net 180 ml      Physical Exam    Constitutional: She is oriented to person, place, and time. She appears well-developed and well-nourished. No distress.   HENT:   Head: Normocephalic and atraumatic.   Eyes: Conjunctivae are normal.   Neck: No JVD present.   Cardiovascular: Normal rate, regular rhythm and normal heart sounds.   Pulmonary/Chest: Effort normal and breath sounds normal. No respiratory distress.   Abdominal: Soft. Bowel sounds are normal. She exhibits no distension.   Musculoskeletal: She exhibits no edema.   Neurological: She is alert and oriented to person, place, and time.   Skin: No rash noted.   Psychiatric: She has a normal mood and affect. Her behavior is normal.       Significant Labs: All pertinent labs within the past 24 hours have been reviewed.    Significant Imaging: I have reviewed all pertinent imaging results/findings within the past 24 hours.      Assessment/Plan:      * Hypotension  Broad differential still possible. Cortisol during septic shock was inappropriately low.     - Cosyntropin stimulation test negative  - Hypotension likely related to sepsis      Septic shock        Liver lesion, right lobe  Fatty infiltrate per liver U/S. Will need follow up with hepatology      Diverticulitis  SEPTIC SHOCK    70 yo F w previously diagnosed diverticulitis treated with outpatient therapy presents with hypotension and fever. Repeat imaging revealed persistent peridiverticular fat stranding concerning for ongoing infection and inflammation.     CT A/P (1/30/20): Persistence of diverticula in sigmoid colon with peridiverticular fat stranding concerning for ongoing diverticulitis. Sigmoidoscopy warranted to survey for malignant etiology. No complication or abscess noted. Area of hypoattenuation in the liver near the falciform ligament.   Liver U/S (1/31/20): Area of hypoattenuation most likely signifies steatosis    Likely contributing to her hypotension.     Plan  - plan for colonscopy 2/4, NPO at midnight.   - IV Pip/gomez  -  Colorectal surgery consulted; recs as follows:   - KUB flat/erect   - Serial abdomen exams  - Daily CRP  - Clear liquid diet      GAVIN (acute kidney injury)  Acute renal failure improving with treatment of her infection.         Acute cystitis without hematuria        Hypocalcemia  - continue home Vit D, add Calcium  - Ionized calcium low and PTH appropriately elevated    Bifascicular block  Unable to undergo outpatient device evaluation.     Hypomagnesemia  Pt with persistent hypomagnesemia during previous admission. She had been discharged on PO magnesium, but arrived with low magnesium again. She arrived with acute renal failure as well.     Plan  - IV magnesium   - PO magnesium    Type 2 diabetes mellitus with hyperglycemia, without long-term current use of insulin  - hold home metformin  - low dose SSI for now  - diabetic diet  Lab Results   Component Value Date    HGBA1C 5.4 01/30/2020         Paroxysmal atrial fibrillation  Pt with long-standing non-valvular atrial fibrillation. CHADSVASc of 3    Plan  - continue heparin;   - continue metoprolol 50 mg BID  - PO amiodarone initiated while in the MICU without loading protocol;   - Hold warfarin pending CRS evaluation for possible procedure  - PhamD consulted for coumadin dosing    Chronic heart failure with preserved ejection fraction  TTE with EF of 45%    - Hold diuresis and antihypertensives in setting of hypotension  - Continue metoprolol      COPD (chronic obstructive pulmonary disease)  No PFT's on record.     - continue nebs PRN  - does not use oxygen  - Suggest PFT's ordered during PCP follow up.     VTE Risk Mitigation (From admission, onward)         Ordered     heparin 25,000 units in dextrose 5% 250 mL (100 units/mL) infusion LOW INTENSITY nomogram - [facility]  Continuous     Question:  Heparin Infusion Adjustment (DO NOT MODIFY ANSWER)  Answer:  [email or web address]\epic\Images\Pharmacy\HeparinInfusions\heparin LOW INTENSITY nomogram for [facility] [id number]     02/02/20 1803     heparin 25,000 units in dextrose 5% (100 units/ml) IV bolus from bag - ADDITIONAL PRN BOLUS - 60 units/kg  As needed (PRN)     Question:  Heparin Infusion Adjustment (DO NOT MODIFY ANSWER)  Answer:  [email or web address]\epic\Images\Pharmacy\HeparinInfusions\heparin LOW INTENSITY nomogram for [facility] [id number]    02/02/20 1803     heparin 25,000 units in dextrose 5% (100 units/ml) IV bolus from bag - ADDITIONAL PRN BOLUS - 30 units/kg  As needed (PRN)     Question:  Heparin Infusion Adjustment (DO NOT MODIFY ANSWER)  Answer:  [email or web address]\epic\Images\Pharmacy\HeparinInfusions\heparin LOW INTENSITY nomogram for [facility] [id number]    02/02/20 1803     IP VTE HIGH RISK PATIENT  Once      01/29/20 1916     Reason for No Pharmacological VTE Prophylaxis  Once     Question:  Reasons:  Answer:  Already adequately anticoagulated on oral Anticoagulants    01/29/20 1916                      Rusty Aponte MD  Department of Hospital Medicine   [facility]

## 2020-02-03 NOTE — ANESTHESIA PREPROCEDURE EVALUATION
[facility]  Anesthesia Pre-Operative Evaluation         Patient Name: Elizabeth Cano  YOB: 1950  MRN: [id number]    SUBJECTIVE:     Pre-operative evaluation for Procedure(s) (LRB):  COLONOSCOPY (N/A)     02/03/2020    Elizabeth Cano is a 69 y.o. female w/ a significant PMHx of HTN, HFrEF (EF 45%), history of possible ASD closure in the past (no sternal wires noted on CXR) COPD, Afib (on coumadin) and DMII who presents with hypotension and recent UTI with elevated lactate. Patient endorses abdominal pain and has CT on 1/30/2020 showing acute diverticulitis.    She had AFib with RVR on 2/1, started on amiodarone.    Patient now presents for the above procedure(s).      LDA:       Peripheral IV - Single Lumen 01/30/20 1342 20 G;1 3/4 in Right Forearm (Active)   Site Assessment Clean;Dry;Intact;No redness;No swelling 2/3/2020  8:00 AM   Line Status Infusing 2/3/2020  8:00 AM   Dressing Status Intact 2/3/2020  8:00 AM   Dressing Intervention New dressing 1/30/2020  1:00 PM   Dressing Change Due 02/03/20 2/3/2020  8:00 AM   Site Change Due 02/03/20 2/3/2020  8:00 AM   Reason Not Rotated Poor venous access 2/3/2020  8:00 AM   Number of days: 4       Prev airway: None documented.    Drips:    heparin (porcine) in D5W 12 Units/kg/hr (02/03/20 0705)       Patient Active Problem List   Diagnosis    Sinus tachycardia    COPD (chronic obstructive pulmonary disease)    Chronic heart failure with preserved ejection fraction    Diverticulitis of sigmoid colon    Paroxysmal atrial fibrillation    Type 2 diabetes mellitus with hyperglycemia, without long-term current use of insulin    Hypomagnesemia    Bifascicular block    Transposition of the great arteries, surgically corrected at age 10, without documentation    Hypocalcemia    Acute cystitis without hematuria    GAVIN (acute kidney injury)    Hypotension    Diverticulitis    Liver lesion, right lobe    Septic shock        Review of patient's allergies indicates:  No Known Allergies    Current Inpatient Medications:   albuterol-ipratropium  3 mL Nebulization Q12H    amiodarone  400 mg Oral BID    aspirin  81 mg Oral Daily    budesonide  0.5 mg Nebulization BID    calcium chloride IVPB  2 g Intravenous Once    calcium-vitamin D3  1 tablet Oral BID    DULoxetine  30 mg Oral Daily    hydrocortisone  25 mg Rectal BID    hyoscyamine  0.125 mg Oral Q6H    magnesium oxide  400 mg Oral Daily    metoprolol tartrate  25 mg Oral BID    piperacillin-tazobactam (ZOSYN) IVPB  4.5 g Intravenous Q8H       No current facility-administered medications on file prior to encounter.      Current Outpatient Medications on File Prior to Encounter   Medication Sig Dispense Refill    albuterol (PROVENTIL) 2.5 mg /3 mL (0.083 %) nebulizer solution Take 0.5 mLs by nebulization every 6 (six) hours as needed (wheezing). Rescue       aspirin 81 MG Chew Take 1 tablet (81 mg total) by mouth once daily.  0    budesonide (PULMICORT) 0.25 mg/2 mL nebulizer solution Take 2 ml by nebulization 2 times daily Controller      diltiaZEM (CARDIZEM SR) 60 MG Cp12 Take 60 mg by mouth every 12 (twelve) hours.       DULoxetine (CYMBALTA) 30 MG capsule Take 30 mg by mouth once daily.      furosemide (LASIX) 20 MG tablet Take 20 mg by mouth once daily.      L. acidophilus/pectin, citrus (ACIDOPHILUS PROBIOTIC ORAL) Take 1 tablet by mouth once daily.      lidocaine (LIDODERM) 5 % Apply topically to right leg as needed for pain      magnesium oxide (MAG-OX) 400 mg (241.3 mg magnesium) tablet Take 1 tablet (400 mg total) by mouth once daily.  0    metFORMIN (GLUCOPHAGE) 1000 MG tablet Take 1,000 mg by mouth 2 (two) times daily with meals.      metoprolol succinate (TOPROL-XL) 50 MG 24 hr tablet Take 50 mg by mouth 2 (two) times daily.      mirtazapine (REMERON) 15 MG tablet Take 15 mg by mouth every evening.      nystatin (MYCOSTATIN) powder Apply twice  daily under breast folds for redness and irritation until resolved      ondansetron (ZOFRAN) 4 MG tablet Take 4 mg by mouth every 6 (six) hours as needed for Nausea.      potassium chloride (MICRO-K) 10 MEQ CpSR Take 10 mEq by mouth once daily.      senna (SENOKOT) 8.6 mg tablet Take 1 tablet by mouth once daily.      vitamin D (VITAMIN D3) 1000 units Tab Take 1,000 Units by mouth once daily.      warfarin (COUMADIN) 1 MG tablet Take 1 mg by mouth every Mon, Wed, Fri. At 5:00pm         Past Surgical History:   Procedure Laterality Date    CARDIAC SURGERY         Social History     Socioeconomic History    Marital status:      Spouse name: Not on file    Number of children: Not on file    Years of education: Not on file    Highest education level: Not on file   Occupational History    Not on file   Social Needs    Financial resource strain: Not on file    Food insecurity:     Worry: Not on file     Inability: Not on file    Transportation needs:     Medical: Not on file     Non-medical: Not on file   Tobacco Use    Smoking status: Former Smoker     Packs/day: 1.00     Years: 54.00     Pack years: 54.00     Types: Cigarettes     Last attempt to quit: 2019     Years since quittin.2    Smokeless tobacco: Never Used   Substance and Sexual Activity    Alcohol use: Not Currently    Drug use: Not Currently    Sexual activity: Not Currently     Partners: Male   Lifestyle    Physical activity:     Days per week: Not on file     Minutes per session: Not on file    Stress: Not on file   Relationships    Social connections:     Talks on phone: Not on file     Gets together: Not on file     Attends Gnosticism service: Not on file     Active member of club or organization: Not on file     Attends meetings of clubs or organizations: Not on file     Relationship status: Not on file   Other Topics Concern    Not on file   Social History Narrative    Not on file       OBJECTIVE:     Vital Signs  Range (Last 24H):  Temp:  [36.2 °C (97.2 °F)-36.9 °C (98.5 °F)]   Pulse:  [77-93]   Resp:  [16-20]   BP: ()/(50-62)   SpO2:  [90 %-98 %]       Significant Labs:  Lab Results   Component Value Date    WBC 5.75 02/03/2020    WBC 5.66 02/03/2020    HGB 10.4 (L) 02/03/2020    HGB 10.2 (L) 02/03/2020    HCT 34.0 (L) 02/03/2020    HCT 33.6 (L) 02/03/2020     02/03/2020     02/03/2020    ALT 21 02/01/2020    AST 32 02/01/2020     02/03/2020    K 4.3 02/03/2020     02/03/2020    CREATININE 0.6 02/03/2020    BUN 9 02/03/2020    CO2 28 02/03/2020    INR 1.2 02/03/2020    HGBA1C 5.4 01/30/2020       Diagnostic Studies: No relevant studies.    EKG:   Results for orders placed or performed during the hospital encounter of 01/29/20   EKG 12-lead    Collection Time: 02/01/20  2:12 PM    Narrative    Test Reason : I48.91,    Vent. Rate : 133 BPM     Atrial Rate : 138 BPM     P-R Int : 144 ms          QRS Dur : 150 ms      QT Int : 318 ms       P-R-T Axes : 000 -65 103 degrees     QTc Int : 473 ms    Sinus rhythm with frequent Premature atrial complexes in a pattern of  bigeminy  Right bundle branch block  Left anterior fascicular block   Bifascicular block   T wave abnormality, consider lateral ischemia  Abnormal ECG  When compared with ECG of 30-JAN-2020 23:53,  No significant change was found  Reconfirmed by Parth Bell MD ([id number]) on 2/3/2020 1:03:49 PM    Referred By: [email or web address]   SELF           Confirmed By:Parth Bell MD       2D ECHO:  TTE:  Results for orders placed or performed during the hospital encounter of 01/29/20   Echo   Result Value Ref Range    Ascending aorta 2.86 cm    STJ 2.52 cm    AV mean gradient 2 mmHg    Ao peak sylwia 0.92 m/s    Ao VTI 13.27 cm    IVRT 0.11 msec    IVS 0.75 0.6 - 1.1 cm    LA size 3.96 cm    Left Atrium Major Axis 6.33 cm    Left Atrium Minor Axis 6.42 cm    LVIDD 4.00 3.5 - 6.0 cm    LVIDS 2.78 2.1 - 4.0 cm    LVOT diameter 2.03 cm    LVOT peak VTI 10.57 cm     PW 0.80 0.6 - 1.1 cm    MV Peak E Adam 0.71 m/s    PV Peak D Adam 0.41 m/s    PV Peak S Adam 0.27 m/s    RA Major Axis 6.69 cm    RA Width 5.35 cm    RVDD 3.00 cm    Sinus 3.56 cm    TAPSE 0.78 cm    TR Max Adam 2.75 m/s    TDI LATERAL 0.09 m/s    TDI SEPTAL 0.07 m/s    LA WIDTH 4.27 cm    LV Diastolic Volume 69.93 mL    LV Systolic Volume 29.01 mL    RV S' 6.07 cm/s    LVOT peak adam 0.69 m/s    LV LATERAL E/E' RATIO 7.89 m/s    LV SEPTAL E/E' RATIO 10.14 m/s    FS 31 %    LA volume 91.62 cm3    LV mass 89.59 g    Left Ventricle Relative Wall Thickness 0.40 cm    AV valve area 2.58 cm2    AV Velocity Ratio 0.75     AV index (prosthetic) 0.80     Mean e' 0.08 m/s    Pulm vein S/D ratio 0.66     LVOT area 3.2 cm2    LVOT stroke volume 34.19 cm3    AV peak gradient 3 mmHg    E/E' ratio 8.88 m/s    Triscuspid Valve Regurgitation Peak Gradient 30 mmHg    BSA 1.8 m2    LV Systolic Volume Index 16.2 mL/m2    LV Diastolic Volume Index 39.07 mL/m2    LA Volume Index 51.2 mL/m2    LV Mass Index 50 g/m2    Right Atrial Pressure (from IVC) 3 mmHg    TV rest pulmonary artery pressure 33 mmHg    Narrative    · Patient does not have transposition of the great arteries. Based on what   is seen, it is possible patient had an ASD closure.  · Mildly decreased left ventricular systolic function. The estimated   ejection fraction is 45%.  · Mildly reduced right ventricular systolic function. Mild right   ventricular enlargement.  · Severe left atrial enlargement.  · Severe right atrial enlargement.  · Normal central venous pressure (3 mmHg).       JUAN:  No results found for this or any previous visit.    ASSESSMENT/PLAN:         Anesthesia Evaluation    I have reviewed the Patient Summary Reports.    I have reviewed the Nursing Notes.   I have reviewed the Medications.     Review of Systems  Anesthesia Hx:  No problems with previous Anesthesia  History of prior surgery of interest to airway management or planning: Denies Family Hx of  Anesthesia complications.   Denies Personal Hx of Anesthesia complications.   Hematology/Oncology:     Oncology Normal     EENT/Dental:EENT/Dental Normal   Cardiovascular:   Exercise tolerance: good Hypertension Dysrhythmias atrial fibrillation CHF    Pulmonary:   COPD Shortness of breath    Renal/:  Renal/ Normal     Hepatic/GI:  Hepatic/GI Normal    Musculoskeletal:  Musculoskeletal Normal    Neurological:  Neurology Normal    Endocrine:   Diabetes    Dermatological:  Skin Normal    Psych:  Psychiatric Normal           Physical Exam  General:  Well nourished    Airway/Jaw/Neck:  Airway Findings: Mouth Opening: Normal Improves to II with phonation.  TM Distance: 4 - 6 cm       Chest/Lungs:  Chest/Lungs Findings: Clear to auscultation, Normal Respiratory Rate     Heart/Vascular:  Heart Findings: Rate: Normal  Rhythm: Regular Rhythm  Sounds: Normal        Mental Status:  Mental Status Findings:  Cooperative, Alert and Oriented         Anesthesia Plan  Type of Anesthesia, risks & benefits discussed:  Anesthesia Type:  general, MAC  Patient's Preference:   Intra-op Monitoring Plan: standard ASA monitors  Intra-op Monitoring Plan Comments:   Post Op Pain Control Plan: per primary service following discharge from PACU  Post Op Pain Control Plan Comments:   Induction:   IV  Beta Blocker:  Patient is on a Beta-Blocker and has received one dose within the past 24 hours (No further documentation required).       Informed Consent: Patient understands risks and agrees with Anesthesia plan.  Questions answered. Anesthesia consent signed with patient.  ASA Score: 3     Day of Surgery Review of History & Physical:    H&P update referred to the provider.         Ready For Surgery From Anesthesia Perspective.

## 2020-02-03 NOTE — SUBJECTIVE & OBJECTIVE
Interval History: Doing well, NAEON. Patient denies any CV complaints at this time. NPO at midnight w/ bowel prep for C-scope tomorrow with CRS    Review of Systems   Constitution: Positive for decreased appetite. Negative for chills.   HENT: Negative.    Eyes: Negative.    Cardiovascular: Negative for chest pain, irregular heartbeat, leg swelling and orthopnea.   Respiratory: Negative.    Endocrine: Negative.    Musculoskeletal: Negative.    Gastrointestinal: Positive for change in bowel habit and diarrhea. Negative for constipation.   Genitourinary: Negative for dysuria and flank pain.   Neurological: Negative.    Psychiatric/Behavioral: Negative.    Allergic/Immunologic: Negative.      Objective:     Vital Signs (Most Recent):  Temp: 97.9 °F (36.6 °C) (02/03/20 0812)  Pulse: 87 (02/03/20 1144)  Resp: 16 (02/03/20 0812)  BP: 105/60 (02/03/20 0812)  SpO2: 95 % (02/03/20 0812) Vital Signs (24h Range):  Temp:  [97.2 °F (36.2 °C)-98.4 °F (36.9 °C)] 97.9 °F (36.6 °C)  Pulse:  [84-90] 87  Resp:  [16-20] 16  SpO2:  [93 %-98 %] 95 %  BP: ()/(50-62) 105/60     Weight: 72 kg (158 lb 11.7 oz)  Body mass index is 25.62 kg/m².     SpO2: 95 %  O2 Device (Oxygen Therapy): room air      Intake/Output Summary (Last 24 hours) at 2/3/2020 1145  Last data filed at 2/3/2020 1000  Gross per 24 hour   Intake 300 ml   Output 1 ml   Net 299 ml       Lines/Drains/Airways     Peripheral Intravenous Line                 Peripheral IV - Single Lumen 01/30/20 1342 20 G;1 3/4 in Right Forearm 3 days                Physical Exam   Constitutional: She is oriented to person, place, and time. She appears well-developed and well-nourished. No distress.   HENT:   Head: Normocephalic.   Neck: No JVD present.   Cardiovascular: Normal rate, regular rhythm and normal heart sounds.   No murmur heard.  Pulmonary/Chest: Effort normal and breath sounds normal. No respiratory distress.   Abdominal: Soft.   Neurological: She is alert and oriented to  person, place, and time. No cranial nerve deficit.   Skin: Skin is warm and dry.   Vitals reviewed.      Significant Labs: All pertinent lab results from the last 24 hours have been reviewed.    Significant Imaging: =no new

## 2020-02-03 NOTE — ASSESSMENT & PLAN NOTE
Pt with long-standing non-valvular atrial fibrillation. CHADSVASc of 3    Plan  - continue heparin;   - continue metoprolol 50 mg BID  - PO amiodarone initiated while in the MICU without loading protocol;   - Hold warfarin pending CRS evaluation for possible procedure  - PhamD consulted for coumadin dosing

## 2020-02-03 NOTE — SUBJECTIVE & OBJECTIVE
Interval History:   NAEON. Patient reports colonoscopy tomorrow? Need to confirm with CRS.   Otherwise feeling better, mild abd discomfort.  Discontinue midodrine.   Continue heparin, will discontinue prior to colonoscopy.       Objective:     Vital Signs (Most Recent):  Temp: 98.5 °F (36.9 °C) (02/03/20 1144)  Pulse: 77 (02/03/20 1144)  Resp: 18 (02/03/20 1144)  BP: (!) 90/51 (02/03/20 1144)  SpO2: (!) 90 % (02/03/20 1144) Vital Signs (24h Range):  Temp:  [97.2 °F (36.2 °C)-98.5 °F (36.9 °C)] 98.5 °F (36.9 °C)  Pulse:  [77-90] 77  Resp:  [16-20] 18  SpO2:  [90 %-98 %] 90 %  BP: ()/(50-62) 90/51     Weight: 72 kg (158 lb 11.7 oz)  Body mass index is 25.62 kg/m².    Intake/Output Summary (Last 24 hours) at 2/3/2020 1316  Last data filed at 2/3/2020 1000  Gross per 24 hour   Intake 180 ml   Output 0 ml   Net 180 ml      Physical Exam   Constitutional: She is oriented to person, place, and time. She appears well-developed and well-nourished. No distress.   HENT:   Head: Normocephalic and atraumatic.   Eyes: Conjunctivae are normal.   Neck: No JVD present.   Cardiovascular: Normal rate, regular rhythm and normal heart sounds.   Pulmonary/Chest: Effort normal and breath sounds normal. No respiratory distress.   Abdominal: Soft. Bowel sounds are normal. She exhibits no distension.   Musculoskeletal: She exhibits no edema.   Neurological: She is alert and oriented to person, place, and time.   Skin: No rash noted.   Psychiatric: She has a normal mood and affect. Her behavior is normal.       Significant Labs: All pertinent labs within the past 24 hours have been reviewed.    Significant Imaging: I have reviewed all pertinent imaging results/findings within the past 24 hours.

## 2020-02-03 NOTE — ASSESSMENT & PLAN NOTE
Known hx of Afib currently NOT on anticoagulation admitted for septic shock and being treated for diverticulitis.     -Patient currently back in sinus on tele and exam    Recs:    - Ok to continue Amiodarone at afib dosing (400mg BID for 10grams and then transition to 200mg daily)   - can hold the metoprolol if she gets hypotensive (MAP <60/65)  - CHADSVASc- 4 thus would continue Heparin gtt and bridge to warfarin after procedure with CRS  -will sign off on this patient now, please reach out if further questions arise

## 2020-02-03 NOTE — PROGRESS NOTES
[facility]  Cardiology  Progress Note    Patient Name: Elizabeth Cano  MRN: [id number]  Admission Date: 1/29/2020  Hospital Length of Stay: 5 days  Code Status: Full Code   Attending Physician: Junie Prince MD   Primary Care Physician: Primary Doctor No  Expected Discharge Date: 2/3/2020  Principal Problem:Hypotension    Subjective:     Hospital Course:   No notes on file    Interval History: Doing well, NAEON. Patient denies any CV complaints at this time. NPO at midnight w/ bowel prep for C-scope tomorrow with CRS    Review of Systems   Constitution: Positive for decreased appetite. Negative for chills.   HENT: Negative.    Eyes: Negative.    Cardiovascular: Negative for chest pain, irregular heartbeat, leg swelling and orthopnea.   Respiratory: Negative.    Endocrine: Negative.    Musculoskeletal: Negative.    Gastrointestinal: Positive for change in bowel habit and diarrhea. Negative for constipation.   Genitourinary: Negative for dysuria and flank pain.   Neurological: Negative.    Psychiatric/Behavioral: Negative.    Allergic/Immunologic: Negative.      Objective:     Vital Signs (Most Recent):  Temp: 97.9 °F (36.6 °C) (02/03/20 0812)  Pulse: 87 (02/03/20 1144)  Resp: 16 (02/03/20 0812)  BP: 105/60 (02/03/20 0812)  SpO2: 95 % (02/03/20 0812) Vital Signs (24h Range):  Temp:  [97.2 °F (36.2 °C)-98.4 °F (36.9 °C)] 97.9 °F (36.6 °C)  Pulse:  [84-90] 87  Resp:  [16-20] 16  SpO2:  [93 %-98 %] 95 %  BP: ()/(50-62) 105/60     Weight: 72 kg (158 lb 11.7 oz)  Body mass index is 25.62 kg/m².     SpO2: 95 %  O2 Device (Oxygen Therapy): room air      Intake/Output Summary (Last 24 hours) at 2/3/2020 1145  Last data filed at 2/3/2020 1000  Gross per 24 hour   Intake 300 ml   Output 1 ml   Net 299 ml       Lines/Drains/Airways     Peripheral Intravenous Line                 Peripheral IV - Single Lumen 01/30/20 1342 20 G;1 3/4 in Right Forearm 3 days                Physical Exam    Constitutional: She is oriented to person, place, and time. She appears well-developed and well-nourished. No distress.   HENT:   Head: Normocephalic.   Neck: No JVD present.   Cardiovascular: Normal rate, regular rhythm and normal heart sounds.   No murmur heard.  Pulmonary/Chest: Effort normal and breath sounds normal. No respiratory distress.   Abdominal: Soft.   Neurological: She is alert and oriented to person, place, and time. No cranial nerve deficit.   Skin: Skin is warm and dry.   Vitals reviewed.      Significant Labs: All pertinent lab results from the last 24 hours have been reviewed.    Significant Imaging: =no new     Assessment and Plan:     Brief HPI: see above    Paroxysmal atrial fibrillation  Known hx of Afib currently NOT on anticoagulation admitted for septic shock and being treated for diverticulitis.     -Patient currently back in sinus on tele and exam    Recs:    - Ok to continue Amiodarone at afib dosing (400mg BID for 10grams and then transition to 200mg daily)   - can hold the metoprolol if she gets hypotensive (MAP <60/65)  - CHADSVASc- 4 thus would continue Heparin gtt and bridge to warfarin after procedure with CRS  -will sign off on this patient now, please reach out if further questions arise     Chronic heart failure with preserved ejection fraction  Not volume overloaded at this time and likely a bit dry    - continue metoprolol as BP will allow        VTE Risk Mitigation (From admission, onward)         Ordered     heparin 25,000 units in dextrose 5% 250 mL (100 units/mL) infusion LOW INTENSITY nomogram - [facility]  Continuous     Question:  Heparin Infusion Adjustment (DO NOT MODIFY ANSWER)  Answer:  \\[email or web address]\epic\Images\Pharmacy\HeparinInfusions\heparin LOW INTENSITY nomogram for [facility] [id number].pdf    02/02/20 1801     heparin 25,000 units in dextrose 5% (100 units/ml) IV bolus from bag - ADDITIONAL PRN BOLUS - 60 units/kg  As needed (PRN)     Question:  Heparin Infusion Adjustment  (DO NOT MODIFY ANSWER)  Answer:  [email or web address]\epic\Images\Pharmacy\HeparinInfusions\heparin LOW INTENSITY nomogram for [facility] [id number].pdf    02/02/20 1803     heparin 25,000 units in dextrose 5% (100 units/ml) IV bolus from bag - ADDITIONAL PRN BOLUS - 30 units/kg  As needed (PRN)     Question:  Heparin Infusion Adjustment (DO NOT MODIFY ANSWER)  Answer:  [email or web address]\epic\Images\Pharmacy\HeparinInfusions\heparin LOW INTENSITY nomogram for [facility] [id number].pdf    02/02/20 1803     IP VTE HIGH RISK PATIENT  Once      01/29/20 1916     Reason for No Pharmacological VTE Prophylaxis  Once     Question:  Reasons:  Answer:  Already adequately anticoagulated on oral Anticoagulants    01/29/20 1916                Will sign off on this patient at this time. Thank you for the consultation.    Tita Tapia MD  Cardiology  [facility]

## 2020-02-03 NOTE — ASSESSMENT & PLAN NOTE
SEPTIC SHOCK    68 yo F w previously diagnosed diverticulitis treated with outpatient therapy presents with hypotension and fever. Repeat imaging revealed persistent peridiverticular fat stranding concerning for ongoing infection and inflammation.     CT A/P (1/30/20): Persistence of diverticula in sigmoid colon with peridiverticular fat stranding concerning for ongoing diverticulitis. Sigmoidoscopy warranted to survey for malignant etiology. No complication or abscess noted. Area of hypoattenuation in the liver near the falciform ligament.   Liver U/S (1/31/20): Area of hypoattenuation most likely signifies steatosis    Likely contributing to her hypotension.     Plan  - plan for colonscopy 2/4, NPO at midnight.   - IV Pip/gomez  - Colorectal surgery consulted; recs as follows:   - KUB flat/erect   - Serial abdomen exams  - Daily CRP  - Clear liquid diet

## 2020-02-04 PROBLEM — D64.9 ANEMIA: Status: ACTIVE | Noted: 2020-01-01

## 2020-02-04 NOTE — PLAN OF CARE
02/04/20 0800   Discharge Reassessment   Assessment Type Discharge Planning Reassessment   Do you have any problems affording any of your prescribed medications? No   Discharge Plan A Return to nursing home   DME Needed Upon Discharge  none

## 2020-02-04 NOTE — H&P
COLONOSCOPY HISTORY AND PHYSICAL EXAM    Procedure : Colonoscopy      INDICATIONS: recurrent diverticulitis.      Family Hx of CRC: no    Last Colonoscopy:  never  Findings: n/a       Past Medical History:   Diagnosis Date    A-fib     GAVIN (acute kidney injury)     CHF (congestive heart failure)     COPD (chronic obstructive pulmonary disease)     Diabetes mellitus     Hypertension      Sedation Problems: NO  Family History   Problem Relation Age of Onset    Heart disease Mother      Fam Hx of Sedation Problems: NO  Social History     Socioeconomic History    Marital status:      Spouse name: Not on file    Number of children: Not on file    Years of education: Not on file    Highest education level: Not on file   Occupational History    Not on file   Social Needs    Financial resource strain: Not on file    Food insecurity:     Worry: Not on file     Inability: Not on file    Transportation needs:     Medical: Not on file     Non-medical: Not on file   Tobacco Use    Smoking status: Former Smoker     Packs/day: 1.00     Years: 54.00     Pack years: 54.00     Types: Cigarettes     Last attempt to quit: 2019     Years since quittin.2    Smokeless tobacco: Never Used   Substance and Sexual Activity    Alcohol use: Not Currently    Drug use: Not Currently    Sexual activity: Not Currently     Partners: Male   Lifestyle    Physical activity:     Days per week: Not on file     Minutes per session: Not on file    Stress: Not on file   Relationships    Social connections:     Talks on phone: Not on file     Gets together: Not on file     Attends Restoration service: Not on file     Active member of club or organization: Not on file     Attends meetings of clubs or organizations: Not on file     Relationship status: Not on file   Other Topics Concern    Not on file   Social History Narrative    Not on file       Review of Systems - Negative except   Respiratory ROS: no  "dyspnea  Cardiovascular ROS: no exertional chest pain  Gastrointestinal ROS: YES LLQ abdominal discomfort,  NO rectal bleeding  Musculoskeletal ROS: no muscular pain  Neurological ROS: no recent stroke    Physical Exam:  /64 (BP Location: Left arm, Patient Position: Lying)   Pulse 90   Temp 98 °F (36.7 °C) (Oral)   Resp 17   Ht 5' 6" (1.676 m)   Wt 72 kg (158 lb 11.7 oz)   SpO2 (!) 93% Comment: placed back on o2  Breastfeeding? No   BMI 25.62 kg/m²   General: no distress  Head: normocephalic  Mallampati Score   Neck: supple, symmetrical, trachea midline  Lungs:  clear to auscultation bilaterally and normal respiratory effort  Heart: regular rate and rhythm and no murmur  Abdomen: tender in LLQ.  Distended.    Extremities: no cyanosis or edema, or clubbing    ASA:  III    PLAN  COLONOSCOPY.    SedationPlan :MAC    The details of the procedure, the possible need for biopsy or polypectomy and the potential risks including bleeding, perforation, missed polyps were discussed in detail.      "

## 2020-02-04 NOTE — ASSESSMENT & PLAN NOTE
SEPTIC SHOCK    70 yo F w previously diagnosed diverticulitis treated with outpatient therapy presents with hypotension and fever. Repeat imaging revealed persistent peridiverticular fat stranding concerning for ongoing infection and inflammation.     CT A/P (1/30/20): Persistence of diverticula in sigmoid colon with peridiverticular fat stranding concerning for ongoing diverticulitis. Sigmoidoscopy warranted to survey for malignant etiology. No complication or abscess noted. Area of hypoattenuation in the liver near the falciform ligament.   Liver U/S (1/31/20): Area of hypoattenuation most likely signifies steatosis    Likely contributing to her hypotension.     Plan  - plan for colonscopy 2/4, NPO at midnight.   - IV Pip/gomez  - Colorectal surgery consulted; recs as follows:   - KUB flat/erect   - Serial abdomen exams  - Daily CRP  - Clear liquid diet and advance

## 2020-02-04 NOTE — TRANSFER OF CARE
"Anesthesia Transfer of Care Note    Patient: Elizabeth Cano    Procedure(s) Performed: Procedure(s) (LRB):  COLONOSCOPY (N/A)    Patient location: PACU    Anesthesia Type: general    Transport from OR: Transported from OR on 2-3 L/min O2 by NC with adequate spontaneous ventilation    Post pain: adequate analgesia    Post assessment: no apparent anesthetic complications and tolerated procedure well    Post vital signs: stable    Level of consciousness: responds to stimulation and sedated    Nausea/Vomiting: no nausea/vomiting    Complications: none    Transfer of care protocol was followed      Last vitals:   Visit Vitals  BP (!) 109/55   Pulse 88   Temp 36.7 °C (98.1 °F)   Resp 14   Ht 5' 6" (1.676 m)   Wt 72 kg (158 lb 11.7 oz)   SpO2 95%   Breastfeeding? No   BMI 25.62 kg/m²     "

## 2020-02-04 NOTE — CONSULTS
Placed 20g, 8 cm Midline in right brachial vein using u/s guidance.  Max dwell date 3/4/2020.  Lot # [id number].

## 2020-02-04 NOTE — PROGRESS NOTES
Colonoscopy done.   Erythema in the sigmoid colon and extensive diverticular disease consistent with diverticulitis.   Scope able to pass to the cecum.    Recommend:   Liquid diet and advance  abx x 7 days.   No surgical intervention.   Levsin for antispasmodic.      SCOTTIE Laguna MD, FACS  Staff Surgeon  Colon & Rectal Surgery

## 2020-02-04 NOTE — CONSULTS
Wound care consulted for DTI to the perineal/groin area  PMH:  afib on coumadin, COPD, HFrEF (EF 45),liver lesion right lobe, diverticulitis who presents for evaluation from NH for hypotension.   Assessment:  The groin/perineal area has red/isolated lesions, is moist and patient complains of itching.   Recommendations:  Miconazole ointment BID to perineal/groin areas to resolve yeast infection   Nursing to continue care, wound care to follow-up prn  TIMMY Jaimes RN, CWCN  [id number]  Left groin    buttocks

## 2020-02-04 NOTE — PROGRESS NOTES
[facility] Hospital Medicine  Progress Note    Patient Name: Elizabeth Cano  MRN: [id number]  Patient Class: IP- Inpatient   Admission Date: 1/29/2020  Length of Stay: 6 days  Attending Physician: Vicki Eason MD  Primary Care Provider: Cesar Reeves MD    [facility] Medicine Team: [facility] V Rusty Aponte MD    Subjective:     Principal Problem:Septic shock        HPI:  Elizabeth Cano is a 69F with afib on coumadin, COPD, HFrEF (EF 45), who presents for evaluation from NH for hypotension. Patient reported dysuria last week, for which she was being treated with Macrobid. She was due to complete her abx course today. Patient states that antibiotics always make her lose her appetite, nauseated, weak. She had lightheadedness on standing, no recent falls, no syncope. Her dysuria has resolved, no flank pain. She had diarrhea earlier in the week, now resolved.  No URI symptoms, no HA, vision changes, CP, SOB. She uses a walker to ambulate at baseline. She feels improved with fluids. She says her BP always runs a little low.    ED: BP 84/58, HR >90, LA 4.8>2.4, UA+, CXR clear, no leukocytosis. Increase in SCr from 0.7 to 1.3    Overview/Hospital Course:  Pt is a 68 yo F w/ PMHx significant for diverticulitis, atrial fibrillation on anticoagulation, and CHF who presents with one week history of burning with urination and malaise. Associated symptoms included nausea without emesis. Initial labs were significant for severe hypomagnesemia, mild aseptic pyuria and elevated lactate (4.8). Repeat lactate revealed downtrend to 2.7. She was initiated on ceftriaxone and given 3 L of NS for her hypotension. Her hypotension persisted and she was given a dose of PO midodrine which only briefly relieved her hypotension. Eight hours after admission, pt remained hypotensive and a third lactate revealed uptrend to 3.3 mmol/L.     Critical care medicine was consulted due to refractory hypotension  with concern for requiring vasopressors. Current differentials for her shock include sepsis (related to UTI vs diverticular disease) vs cardiac vs adrenal insufficiency.     During her brief stay in the MICU, pt received further volume resuscitation with IV fluid bolus and was initiated on midodrine 5 mg TID as an oral vasopressor. CT of the abdomen and pelvis was positive for persistent peridiverticular fat stranding and area of hypoattenuation in the liver near the falciform ligament raising concern for carcinoma as etiology for her persistent infection. Since patient had failed outpatient antibiotic therapy and had established care with colorectal surgery in clinic, CRS was consulted to evaluate for possible surgical intervention given her ICU stay and possibly malignant component contributing to her diverticulitis. Abdominal X-ray revealed minimal translucency under the diaphragm concerning for free air, thus repeat was ordered.    Her cortisol while in the MICU was intermediately low raising suspicion for adrenal insufficiency however cosyntropin stimulation test was negative.    Interval History:   Overnight patient reports mild abd pain, but overall improved since admission.   NPO for colonoscopy today.      Review of Systems   Respiratory: Negative for choking and shortness of breath.    Gastrointestinal: Positive for abdominal pain (mild). Negative for nausea and vomiting.     Objective:     Vital Signs (Most Recent):  Temp: 97.5 °F (36.4 °C) (02/04/20 1417)  Pulse: 87 (02/04/20 1417)  Resp: 16 (02/04/20 1417)  BP: (!) 114/59 (02/04/20 1417)  SpO2: (!) 94 % (02/04/20 1417) Vital Signs (24h Range):  Temp:  [97.5 °F (36.4 °C)-98.4 °F (36.9 °C)] 97.5 °F (36.4 °C)  Pulse:  [83-93] 87  Resp:  [14-18] 16  SpO2:  [86 %-99 %] 94 %  BP: ()/(52-88) 114/59     Weight: (Refused)  Body mass index is 25.62 kg/m².    Intake/Output Summary (Last 24 hours) at 2/4/2020 1440  Last data filed at 2/4/2020 1303  Gross per  24 hour   Intake 280 ml   Output 2 ml   Net 278 ml      Physical Exam   Constitutional: She is oriented to person, place, and time. She appears well-developed and well-nourished. No distress.   HENT:   Head: Normocephalic and atraumatic.   Eyes: Conjunctivae are normal.   Neck: No JVD present.   Cardiovascular: Normal rate, regular rhythm and normal heart sounds.   Pulmonary/Chest: Effort normal and breath sounds normal. No respiratory distress.   Abdominal: Soft. Bowel sounds are normal. She exhibits no distension. There is tenderness (mild ). A hernia is present.   Musculoskeletal: She exhibits no edema.   Neurological: She is alert and oriented to person, place, and time.   Skin: No rash noted.   Psychiatric: She has a normal mood and affect. Her behavior is normal.       Significant Labs: All pertinent labs within the past 24 hours have been reviewed.    Significant Imaging: I have reviewed all pertinent imaging results/findings within the past 24 hours.      Assessment/Plan:      * Septic shock        Liver lesion, right lobe  Fatty infiltrate per liver U/S. Will need follow up with hepatology      Diverticulitis  SEPTIC SHOCK    68 yo F w previously diagnosed diverticulitis treated with outpatient therapy presents with hypotension and fever. Repeat imaging revealed persistent peridiverticular fat stranding concerning for ongoing infection and inflammation.     CT A/P (1/30/20): Persistence of diverticula in sigmoid colon with peridiverticular fat stranding concerning for ongoing diverticulitis. Sigmoidoscopy warranted to survey for malignant etiology. No complication or abscess noted. Area of hypoattenuation in the liver near the falciform ligament.   Liver U/S (1/31/20): Area of hypoattenuation most likely signifies steatosis    Likely contributing to her hypotension.     Plan  - plan for colonscopy 2/4, NPO at midnight.   - IV Pip/gomez  - Colorectal surgery consulted; recs as follows:   - KUB flat/erect   -  Serial abdomen exams  - Daily CRP  - Clear liquid diet and advance       Hypotension  Broad differential still possible. Cortisol during septic shock was inappropriately low.     - Cosyntropin stimulation test negative  - Hypotension likely related to sepsis      GAVIN (acute kidney injury)  Acute renal failure improving with treatment of her infection.         Acute cystitis without hematuria        Hypocalcemia  - continue home Vit D, add Calcium  - Ionized calcium low and PTH appropriately elevated    Bifascicular block  Unable to undergo outpatient device evaluation.     Hypomagnesemia  Pt with persistent hypomagnesemia during previous admission. She had been discharged on PO magnesium, but arrived with low magnesium again. She arrived with acute renal failure as well.     Plan  - IV magnesium   - PO magnesium    Type 2 diabetes mellitus with hyperglycemia, without long-term current use of insulin  - hold home metformin  - low dose SSI for now  - diabetic diet  Lab Results   Component Value Date    HGBA1C 5.4 01/30/2020         Paroxysmal atrial fibrillation  Pt with long-standing non-valvular atrial fibrillation. CHADSVASc of 3    Plan  - continue heparin;   - continue metoprolol 50 mg BID  - PO amiodarone initiated while in the MICU without loading protocol;   - Hold warfarin pending CRS evaluation for possible procedure  - PhamD consulted for coumadin dosing    Chronic heart failure with preserved ejection fraction  TTE with EF of 45%    - Hold diuresis and antihypertensives in setting of hypotension  - Continue metoprolol      COPD (chronic obstructive pulmonary disease)  No PFT's on record.     - continue nebs PRN  - does not use oxygen  - Suggest PFT's ordered during PCP follow up.     VTE Risk Mitigation (From admission, onward)         Ordered     heparin 25,000 units in dextrose 5% 250 mL (100 units/mL) infusion LOW INTENSITY nomogram - [facility]  Continuous     Question:  Heparin Infusion Adjustment (DO NOT  MODIFY ANSWER)  Answer:  \\[email or web address]\epic\Images\Pharmacy\HeparinInfusions\heparin LOW INTENSITY nomogram for [facility] [id number].pdf    02/02/20 1803     heparin 25,000 units in dextrose 5% (100 units/ml) IV bolus from bag - ADDITIONAL PRN BOLUS - 60 units/kg  As needed (PRN)     Question:  Heparin Infusion Adjustment (DO NOT MODIFY ANSWER)  Answer:  \\[email or web address]\epic\Images\Pharmacy\HeparinInfusions\heparin LOW INTENSITY nomogram for [facility] [id number].pdf    02/02/20 1803     heparin 25,000 units in dextrose 5% (100 units/ml) IV bolus from bag - ADDITIONAL PRN BOLUS - 30 units/kg  As needed (PRN)     Question:  Heparin Infusion Adjustment (DO NOT MODIFY ANSWER)  Answer:  [email or web address]\epic\Images\Pharmacy\HeparinInfusions\heparin LOW INTENSITY nomogram for [facility] [id number].pdf    02/02/20 1803     IP VTE HIGH RISK PATIENT  Once      01/29/20 1916     Reason for No Pharmacological VTE Prophylaxis  Once     Question:  Reasons:  Answer:  Already adequately anticoagulated on oral Anticoagulants    01/29/20 1916                      Rusty Aponte MD  Department of Hospital Medicine   [facility]

## 2020-02-04 NOTE — NURSING TRANSFER
Nursing Transfer Note      2/4/2020     Transfer From: endo    Transfer via stretcher    Transfer with cardiac monitoring    Transported by Transport    Medicines sent: IV heparin and zoysn    Chart send with patient: Yes    Notified: spouse, daughter    Patient reassessed at: 2/4/2020, 1420 (date, time)    Upon arrival to floor: cardiac monitor applied, patient oriented to room, call bell in reach and bed in lowest position

## 2020-02-04 NOTE — PROVATION PATIENT INSTRUCTIONS
Discharge Summary/Instructions after an Endoscopic Procedure  Patient Name: Elizabeth Cano  Patient MRN: [id number]  Patient YOB: 1950 Tuesday, February 04, 2020  Gilberto Laguna MD  RESTRICTIONS:  During your procedure today, you received medications for sedation.  These   medications may affect your judgment, balance and coordination.  Therefore,   for 24 hours, you have the following restrictions:   - DO NOT drive a car, operate machinery, make legal/financial decisions,   sign important papers or drink alcohol.    ACTIVITY:  Today: no heavy lifting, straining or running due to procedural   sedation/anesthesia.  The following day: return to full activity including work.  DIET:  Eat and drink normally unless instructed otherwise.     TREATMENT FOR COMMON SIDE EFFECTS:  - Mild abdominal pain, nausea, belching, bloating or excessive gas:  rest,   eat lightly and use a heating pad.  - Sore Throat: treat with throat lozenges and/or gargle with warm salt   water.  - Because air was used during the procedure, expelling large amounts of air   from your rectum or belching is normal.  - If a bowel prep was taken, you may not have a bowel movement for 1-3 days.    This is normal.  SYMPTOMS TO WATCH FOR AND REPORT TO YOUR PHYSICIAN:  1. Abdominal pain or bloating, other than gas cramps.  2. Chest pain.  3. Back pain.  4. Signs of infection such as: chills or fever occurring within 24 hours   after the procedure.  5. Rectal bleeding, which would show as bright red, maroon, or black stools.   (A tablespoon of blood from the rectum is not serious, especially if   hemorrhoids are present.)  6. Vomiting.  7. Weakness or dizziness.  GO DIRECTLY TO THE NEAREST EMERGENCY ROOM IF YOU HAVE ANY OF THE FOLLOWING:      Difficulty breathing              Chills and/or fever over 101 F   Persistent vomiting and/or vomiting blood   Severe abdominal pain   Severe chest pain   Black, tarry stools   Bleeding- more than one  tablespoon   Any other symptom or condition that you feel may need urgent attention  Your doctor recommends these additional instructions:  If any biopsies were taken, your doctors clinic will contact you in 1 to 2   weeks with any results.  - Return patient to [facility] for ongoing care.   - Resume previous diet.   - Continue present medications.   - Repeat colonoscopy in 10 years for screening purposes.  For questions, problems or results please call your physician - Gilberto Laguna MD at Work:  [phone].  [facility], EMERGENCY ROOM PHONE NUMBER: [phone]  IF A COMPLICATION OR EMERGENCY SITUATION ARISES AND YOU ARE UNABLE TO REACH   YOUR PHYSICIAN - GO DIRECTLY TO THE EMERGENCY ROOM.  Gilberto Laguna MD  2/4/2020 1:12:46 PM  This report has been verified and signed electronically.  [employer]

## 2020-02-04 NOTE — ASSESSMENT & PLAN NOTE
Acute uncomplicated sigmoid diverticulitis.  CRP low and tenderness only focal.  Imaging concerning for possible mass that could be neoplasm    - Colonoscopy/sigmoidoscopy tomorrow  - Continue clear liquid diet; GoLytely prep; NPO at midnight  - Continue broad spectrum antibiotic (zosyn)  - Serial abdominal exams  - Serial CRPs      Further cares per primary

## 2020-02-04 NOTE — SUBJECTIVE & OBJECTIVE
Interval History:   Overnight patient reports mild abd pain, but overall improved since admission.   NPO for colonoscopy today.      Review of Systems   Respiratory: Negative for choking and shortness of breath.    Gastrointestinal: Positive for abdominal pain (mild). Negative for nausea and vomiting.     Objective:     Vital Signs (Most Recent):  Temp: 97.5 °F (36.4 °C) (02/04/20 1417)  Pulse: 87 (02/04/20 1417)  Resp: 16 (02/04/20 1417)  BP: (!) 114/59 (02/04/20 1417)  SpO2: (!) 94 % (02/04/20 1417) Vital Signs (24h Range):  Temp:  [97.5 °F (36.4 °C)-98.4 °F (36.9 °C)] 97.5 °F (36.4 °C)  Pulse:  [83-93] 87  Resp:  [14-18] 16  SpO2:  [86 %-99 %] 94 %  BP: ()/(52-88) 114/59     Weight: (Refused)  Body mass index is 25.62 kg/m².    Intake/Output Summary (Last 24 hours) at 2/4/2020 1440  Last data filed at 2/4/2020 1303  Gross per 24 hour   Intake 280 ml   Output 2 ml   Net 278 ml      Physical Exam   Constitutional: She is oriented to person, place, and time. She appears well-developed and well-nourished. No distress.   HENT:   Head: Normocephalic and atraumatic.   Eyes: Conjunctivae are normal.   Neck: No JVD present.   Cardiovascular: Normal rate, regular rhythm and normal heart sounds.   Pulmonary/Chest: Effort normal and breath sounds normal. No respiratory distress.   Abdominal: Soft. Bowel sounds are normal. She exhibits no distension. There is tenderness (mild ). A hernia is present.   Musculoskeletal: She exhibits no edema.   Neurological: She is alert and oriented to person, place, and time.   Skin: No rash noted.   Psychiatric: She has a normal mood and affect. Her behavior is normal.       Significant Labs: All pertinent labs within the past 24 hours have been reviewed.    Significant Imaging: I have reviewed all pertinent imaging results/findings within the past 24 hours.

## 2020-02-04 NOTE — PROGRESS NOTES
[facility]  Colorectal Surgery  Progress Note    Patient Name: Elizabeth Cano  MRN: [id number]  Admission Date: 1/29/2020  Hospital Length of Stay: 5 days  Attending Physician: Junie Prince MD    Subjective:     Interval History:   No acute events  Abdominal pain overnight but resolved this morning  No nausea/vomiting with clear liquid diet  Having BMs and passing flatus    Post-Op Info:  Procedure(s) (LRB):  COLONOSCOPY (N/A)          Medications:  Continuous Infusions:   heparin (porcine) in D5W 12 Units/kg/hr (02/03/20 0705)     Scheduled Meds:   albuterol-ipratropium  3 mL Nebulization Q12H    amiodarone  400 mg Oral BID    aspirin  81 mg Oral Daily    budesonide  0.5 mg Nebulization BID    calcium chloride IVPB  2 g Intravenous Once    calcium-vitamin D3  1 tablet Oral BID    DULoxetine  30 mg Oral Daily    hydrocortisone  25 mg Rectal BID    hyoscyamine  0.125 mg Oral Q6H    magnesium oxide  400 mg Oral Daily    metoprolol tartrate  25 mg Oral BID    piperacillin-tazobactam (ZOSYN) IVPB  4.5 g Intravenous Q8H     PRN Meds:   acetaminophen    albuterol-ipratropium    bisacodyL    Dextrose 10% Bolus    Dextrose 10% Bolus    glucagon (human recombinant)    glucose    glucose    heparin (PORCINE)    heparin (PORCINE)    insulin aspart U-100    iohexol    melatonin    metoprolol    ondansetron    phenyleph-min oil-petrolatum    promethazine (PHENERGAN) IVPB    sodium chloride 0.9%        Objective:     Vital Signs (Most Recent):  Temp: 98.4 °F (36.9 °C) (02/03/20 1534)  Pulse: 86 (02/03/20 1600)  Resp: 18 (02/03/20 1144)  BP: (!) 86/52 (02/03/20 1534)  SpO2: (!) 90 % (02/03/20 1534) Vital Signs (24h Range):  Temp:  [97.2 °F (36.2 °C)-98.5 °F (36.9 °C)] 98.4 °F (36.9 °C)  Pulse:  [77-93] 86  Resp:  [16-18] 18  SpO2:  [90 %-98 %] 90 %  BP: ()/(50-61) 86/52     Intake/Output - Last 3 Shifts       02/01 0700 - 02/02 0659 02/02 0700 - 02/03 0659 02/03 0700 -  02/04 0659    P.O. 120 120 600    I.V. (mL/kg)       IV Piggyback       Total Intake(mL/kg) 120 (1.7) 120 (1.7) 600 (8.3)    Urine (mL/kg/hr) 0 (0) 1 (0) 1 (0)    Stool   2    Total Output 0 1 3    Net +120 +119 +597           Urine Occurrence  1 x     Stool Occurrence  1 x           Physical Exam   Constitutional: She is oriented to person, place, and time. She appears well-developed and well-nourished.   Neck: No tracheal deviation present.   Cardiovascular: Normal rate.   Pulmonary/Chest: Effort normal.   Abdominal: Soft.   Less distended.  Focal moderate tenderness in the inferior abdomen.  Non-tender in the superior abdomen.   Neurological: She is alert and oriented to person, place, and time.   Skin: Skin is warm and dry.       Significant Labs:  BMP (Last 3 Results):   Recent Labs   Lab 02/01/20  1820 02/02/20  0704 02/03/20  0511   GLU 89 71 68*    141 141   K 4.1 5.0 4.3    111* 108   CO2 26 26 28   BUN 11 10 9   CREATININE 0.7 0.7 0.6   CALCIUM 7.3* 7.1* 7.3*   MG 1.4* 2.3 1.8     CBC (Last 3 Results):   Recent Labs   Lab 02/02/20  0704 02/02/20  1816 02/03/20  0511   WBC 5.42 4.70 5.66  5.75   RBC 3.34* 3.57* 3.43*  3.46*   HGB 10.1* 10.9* 10.2*  10.4*   HCT 32.3* 34.7* 33.6*  34.0*    212 203  197   MCV 97 97 98  98   MCH 30.2 30.5 29.7  30.1   MCHC 31.3* 31.4* 30.4*  30.6*     CRP (Last 3 Results):   Recent Labs   Lab 02/01/20  1252 02/02/20  0704 02/03/20  0511   CRP 15.6* 16.8* 15.3*       Significant Diagnostics:  I have reviewed all pertinent imaging results/findings within the past 24 hours.    Assessment/Plan:     Diverticulitis  Acute uncomplicated sigmoid diverticulitis.  CRP low and tenderness only focal.  Imaging concerning for possible mass that could be neoplasm    - Colonoscopy/sigmoidoscopy tomorrow  - Continue clear liquid diet; GoLytely prep; NPO at midnight  - Continue broad spectrum antibiotic (zosyn)  - Serial abdominal exams  - Serial CRPs      Further  cares per primary          Theo Nicole MD  Colorectal Surgery  [facility]

## 2020-02-04 NOTE — NURSING TRANSFER
Nursing Transfer Note      2/4/2020     Transfer To: Endo    Transfer via stretcher    Transfer with cardiac monitoring, IV meds     Transported by: transport    Medicines sent: IV gtt zosyn and heparin gtt    Chart send with patient: Yes    Notified: spouse, daughter

## 2020-02-04 NOTE — NURSING
Patient experiencing urinary retention; bladder scan showed 450 ml; straight cath patient 400 ml removed. MD Cox notified. Will continue to monitor.

## 2020-02-04 NOTE — SUBJECTIVE & OBJECTIVE
Subjective:     Interval History:   No acute events  Abdominal pain overnight but resolved this morning  No nausea/vomiting with clear liquid diet  Having BMs and passing flatus    Post-Op Info:  Procedure(s) (LRB):  COLONOSCOPY (N/A)          Medications:  Continuous Infusions:   heparin (porcine) in D5W 12 Units/kg/hr (02/03/20 0705)     Scheduled Meds:   albuterol-ipratropium  3 mL Nebulization Q12H    amiodarone  400 mg Oral BID    aspirin  81 mg Oral Daily    budesonide  0.5 mg Nebulization BID    calcium chloride IVPB  2 g Intravenous Once    calcium-vitamin D3  1 tablet Oral BID    DULoxetine  30 mg Oral Daily    hydrocortisone  25 mg Rectal BID    hyoscyamine  0.125 mg Oral Q6H    magnesium oxide  400 mg Oral Daily    metoprolol tartrate  25 mg Oral BID    piperacillin-tazobactam (ZOSYN) IVPB  4.5 g Intravenous Q8H     PRN Meds:   acetaminophen    albuterol-ipratropium    bisacodyL    Dextrose 10% Bolus    Dextrose 10% Bolus    glucagon (human recombinant)    glucose    glucose    heparin (PORCINE)    heparin (PORCINE)    insulin aspart U-100    iohexol    melatonin    metoprolol    ondansetron    phenyleph-min oil-petrolatum    promethazine (PHENERGAN) IVPB    sodium chloride 0.9%        Objective:     Vital Signs (Most Recent):  Temp: 98.4 °F (36.9 °C) (02/03/20 1534)  Pulse: 86 (02/03/20 1600)  Resp: 18 (02/03/20 1144)  BP: (!) 86/52 (02/03/20 1534)  SpO2: (!) 90 % (02/03/20 1534) Vital Signs (24h Range):  Temp:  [97.2 °F (36.2 °C)-98.5 °F (36.9 °C)] 98.4 °F (36.9 °C)  Pulse:  [77-93] 86  Resp:  [16-18] 18  SpO2:  [90 %-98 %] 90 %  BP: ()/(50-61) 86/52     Intake/Output - Last 3 Shifts       02/01 0700 - 02/02 0659 02/02 0700 - 02/03 0659 02/03 0700 - 02/04 0659    P.O. 120 120 600    I.V. (mL/kg)       IV Piggyback       Total Intake(mL/kg) 120 (1.7) 120 (1.7) 600 (8.3)    Urine (mL/kg/hr) 0 (0) 1 (0) 1 (0)    Stool   2    Total Output 0 1 3    Net +120 +119 +597            Urine Occurrence  1 x     Stool Occurrence  1 x           Physical Exam   Constitutional: She is oriented to person, place, and time. She appears well-developed and well-nourished.   Neck: No tracheal deviation present.   Cardiovascular: Normal rate.   Pulmonary/Chest: Effort normal.   Abdominal: Soft.   Less distended.  Focal moderate tenderness in the inferior abdomen.  Non-tender in the superior abdomen.   Neurological: She is alert and oriented to person, place, and time.   Skin: Skin is warm and dry.       Significant Labs:  BMP (Last 3 Results):   Recent Labs   Lab 02/01/20  1820 02/02/20  0704 02/03/20  0511   GLU 89 71 68*    141 141   K 4.1 5.0 4.3    111* 108   CO2 26 26 28   BUN 11 10 9   CREATININE 0.7 0.7 0.6   CALCIUM 7.3* 7.1* 7.3*   MG 1.4* 2.3 1.8     CBC (Last 3 Results):   Recent Labs   Lab 02/02/20  0704 02/02/20  1816 02/03/20  0511   WBC 5.42 4.70 5.66  5.75   RBC 3.34* 3.57* 3.43*  3.46*   HGB 10.1* 10.9* 10.2*  10.4*   HCT 32.3* 34.7* 33.6*  34.0*    212 203  197   MCV 97 97 98  98   MCH 30.2 30.5 29.7  30.1   MCHC 31.3* 31.4* 30.4*  30.6*     CRP (Last 3 Results):   Recent Labs   Lab 02/01/20  1252 02/02/20  0704 02/03/20  0511   CRP 15.6* 16.8* 15.3*       Significant Diagnostics:  I have reviewed all pertinent imaging results/findings within the past 24 hours.

## 2020-02-04 NOTE — ANESTHESIA POSTPROCEDURE EVALUATION
Anesthesia Post Evaluation    Patient: Elizabeth Cano    Procedure(s) Performed: Procedure(s) (LRB):  COLONOSCOPY (N/A)    Final Anesthesia Type: general    Patient location during evaluation: PACU  Patient participation: Yes- Able to Participate  Level of consciousness: awake and alert and oriented  Post-procedure vital signs: reviewed and stable  Pain management: adequate  Airway patency: patent    PONV status at discharge: No PONV  Anesthetic complications: no      Cardiovascular status: blood pressure returned to baseline and hemodynamically stable  Respiratory status: unassisted  Hydration status: euvolemic  Follow-up not needed.          Vitals Value Taken Time   /88 2/4/2020  1:32 PM   Temp 36.8 °C (98.2 °F) 2/4/2020  1:15 PM   Pulse 83 2/4/2020  1:48 PM   Resp 53 2/4/2020  1:31 PM   SpO2 88 % 2/4/2020  1:48 PM   Vitals shown include unvalidated device data.      No case tracking events are documented in the log.      Pain/Amanda Score: Pain Rating Prior to Med Admin: 6 (2/4/2020 11:17 AM)  Amanda Score: 9 (2/4/2020  1:30 PM)

## 2020-02-04 NOTE — NURSING TRANSFER
Nursing Transfer Note      2/4/2020     Transfer to 342    Transfer via stretcher    Transfer with cardiac monitoring (verified patient was seen on monitor)  Transported by nory, pct    Medicines sent: none    Chart send with patient: Yes    Notified: spouse    Report given to GARFIELD Luther 3rd floor

## 2020-02-04 NOTE — PLAN OF CARE
Plan of care discussed with patient. Patient is free of fall/trauma/injury. Denies CP, SOB, or pain/discomfort. Patient is scheduled for a colonoscopy on 2/4/2020. Colon cleanse with golytle has been started. NPO at midnight. APTT 39.1 which is therapeutic so next draw with AM Labs. Mag and K replaced IV. All questions addressed. Will continue to monitor

## 2020-02-05 NOTE — PROGRESS NOTES
02/04/20 2359   Vital Signs   BP (!) 89/51   MAP (mmHg) 66     MD Carroll made aware of pt VS above. Pt asymptomatic. Admin metoprolol 25mg tonight. Pt was NPO today for colonoscopy today on clear liquid diet now. MD stated he will place orders for NS bolus. Will continue to monitor

## 2020-02-05 NOTE — PT/OT/SLP EVAL
Occupational Therapy   Evaluation    Name: Elizabeth Cano  MRN: [id number]  Admitting Diagnosis:  Septic shock 1 Day Post-Op    Recommendations:     Discharge Recommendations: nursing facility, skilled(Skilled services at [facility])  Discharge Equipment Recommendations:  none  Barriers to discharge:  None    Assessment:     Elizabeth Cano is a 69 y.o. female with a medical diagnosis of Septic shock.  She presents with malaise, weakness, and fear of falling that limits her occupational participation. Performance deficits affecting function: weakness, impaired endurance, impaired self care skills, impaired functional mobilty, gait instability, impaired balance, decreased lower extremity function, pain, impaired cardiopulmonary response to activity.      Rehab Prognosis: Fair; patient would benefit from acute skilled OT services to address these deficits and reach maximum level of function.       Plan:     Patient to be seen 3 x/week to address the above listed problems via self-care/home management, therapeutic activities, therapeutic exercises  · Plan of Care Expires: 03/03/20  · Plan of Care Reviewed with: patient    Subjective     Chief Complaint: Weakness  Patient/Family Comments/goals: Return to [facility].     Occupational Profile:  Pt has been a resident of [facility].  Pt reports her children decided to have she and her spouse moved to NH due to their inability to care for themselves.  Pt reports over the last 2 months she has staying in bed due to not feeling well. Pt reports typically she can walk with RW with minimal assistance and can complete feeding, g/h and dressing with set-up    Pain/Comfort:  · Pain Rating 1: 5/10  · Location - Orientation 1: generalized  · Location 1: abdomen  · Pain Addressed 1: Pre-medicate for activity, Reposition, Distraction  · Pain Rating Post-Intervention 1: 5/10    Patients cultural, spiritual, Anabaptism conflicts given the current situation: no    Objective:      Communicated with: RN prior to session.  Patient found HOB elevated with central line, telemetry, PureWick upon OT entry to room.    General Precautions: Standard, fall   Orthopedic Precautions:N/A   Braces: N/A     Occupational Performance:    Bed Mobility:    · Patient completed Rolling/Turning to Left with  contact guard assistance  · Patient completed Scooting/Bridging with contact guard assistance  · Patient completed Supine to Sit with maximal assistance    Functional Mobility/Transfers:  · Patient completed Sit <> Stand Transfer with maximal assistance and of 2 persons  with  hand-held assist   · Functional Mobility: Pt able to take tiny side steps to room chair w/ Max A and HHAx2.    Activities of Daily Living:  · Upper Body Dressing: minimum assistance seated at EOB  · Lower Body Dressing: minimum assistance seated at EOB    Cognitive/Visual Perceptual:  Completely cognitively intact adult w/ no glasses worn or present during eval.       Physical Exam:  Balance:    -       seated: Fair -, Standing Poor  Dominant hand:    -       RH  Upper Extremity Range of Motion:     -       Right Upper Extremity: WFL  -       Left Upper Extremity: WFL  Upper Extremity Strength:    -       Right Upper Extremity: WFL  -       Left Upper Extremity: WFL   Strength:    -       Right Upper Extremity: WFL  -       Left Upper Extremity: WFL  Fine Motor Coordination:    -       Intact  Gross motor coordination:       AMPAC 6 Click ADL:  AMPAC Total Score: 16    Treatment & Education:  -Pt edu on OT role/POC  -Importance of OOB activity with staff assistance  -Safety during functional t/f and mobility  - White board updated  - Multiple self care tasks/functional mobility completed-- assistance level noted above  - All questions/concerns answered within OT scope of practice    Education:    Patient left up in chair with all lines intact, call button in reach and RN notified    GOALS:   Multidisciplinary Problems      Occupational Therapy Goals        Problem: Occupational Therapy Goal    Goal Priority Disciplines Outcome Interventions   Occupational Therapy Goal     OT, PT/OT Ongoing, Progressing    Description:  Goals to be met by: 3/3/2020    Patient will increase functional independence with ADLs by performing:    UE Dressing with Set-up Assistance.  LE Dressing with Set-up Assistance and Assistive Devices as needed.  Grooming while standing at sink with Supervision and Assistive Devices as needed.  Toileting from toilet with Set-up Assistance for hygiene and clothing management.   Bathing from  standing at sink with Set-up Assistance and Assistive Devices as needed.  Toilet transfer to toilet with Supervision.                      History:     Past Medical History:   Diagnosis Date    A-fib     GAVIN (acute kidney injury)     CHF (congestive heart failure)     COPD (chronic obstructive pulmonary disease)     Diabetes mellitus     Hypertension        Past Surgical History:   Procedure Laterality Date    CARDIAC SURGERY      COLONOSCOPY N/A 2/4/2020    Procedure: COLONOSCOPY;  Surgeon: Gilberto Laguna MD;  Location: [address];  Service: Endoscopy;  Laterality: N/A;       Time Tracking:     OT Date of Treatment: 02/05/20  OT Start Time: 0951  OT Stop Time: 1013  OT Total Time (min): 22 min    Billable Minutes:Evaluation 10 mins  Self Care/Home Management 12 mins    Enrique Hoover, OT  2/5/2020

## 2020-02-05 NOTE — PLAN OF CARE
Discharge Recommendation: Pt is currently a resident at [facility]. Pt would benefit from a skilled nursing stay at this facility for intensive therapy services .    Evaluation completed today. PT goals appropriate.    Leticia Norwood PT, DPT  2020  Pager: [phone]        Problem: Physical Therapy Goal  Goal: Physical Therapy Goal  Description  Goals to be met by: 2020     Patient will increase functional independence with mobility by performin. Supine to sit with Contact Guard Assistance  2. Sit to supine with Contact Guard Assistance  3. Sit to stand transfer with Contact Guard Assistance using LRAD  4. Bed to chair transfer with Contact Guard Assistance using Rolling Walker  5. Gait  x 25 feet with Contact Guard Assistance using Rolling Walker.   6. Lower extremity exercise program x20 reps per handout, with independence     Outcome: Ongoing, Progressing

## 2020-02-05 NOTE — ASSESSMENT & PLAN NOTE
A) Most likely secondary to hypovolemia    : completed colonic prep for colonoscopy, no PO intake; further supported by low glucose levels   - hydrate and continue to monitor BP, denies episodes of dizziness/light-headedness    B) low BP readings could be further exacerbated by the recent addition of amio   : will discuss with cardiology regarding use of medication    Unlikely to be cardiogenic/obstructive, septic although possibly unlikely; pt is on abs, no fevers/increase in WBC

## 2020-02-05 NOTE — PROGRESS NOTES
MD Carroll made aware pt complaining her throat is hurting and can she have chloraseptic spray. Md stated it is okay to order the spray. Will continue to monitor

## 2020-02-05 NOTE — PROGRESS NOTES
02/05/20 0744   Vital Signs   BP (!) 89/52   MAP (mmHg) 67     Patient's BP shown above. MD paged twice. Awaiting response. Will continue to monitor.

## 2020-02-05 NOTE — CODE/ RAPID DOCUMENTATION
Rapid Response Nurse Chart Check     Chart check completed, abnormal VS noted. SBP 80's asymptomatic and low UOP  Spoke with bedside RN [place] - Additional bolus to be given   instructed to call [phone] for further concerns or assistance.

## 2020-02-05 NOTE — PROGRESS NOTES
[facility] Hospital Medicine  Progress Note    Patient Name: Elizabeth Cano  MRN: [id number]  Patient Class: IP- Inpatient   Admission Date: 1/29/2020  Length of Stay: 7 days  Attending Physician: Vicki Eason MD  Primary Care Provider: Cesar Reeves MD    [facility] Medicine Team: [facility] V Rusty Aponte MD    Subjective:     Principal Problem:Septic shock        HPI:  Elizabeth Cano is a 69F with afib on coumadin, COPD, HFrEF (EF 45), who presents for evaluation from NH for hypotension. Patient reported dysuria last week, for which she was being treated with Macrobid. She was due to complete her abx course today. Patient states that antibiotics always make her lose her appetite, nauseated, weak. She had lightheadedness on standing, no recent falls, no syncope. Her dysuria has resolved, no flank pain. She had diarrhea earlier in the week, now resolved.  No URI symptoms, no HA, vision changes, CP, SOB. She uses a walker to ambulate at baseline. She feels improved with fluids. She says her BP always runs a little low.    ED: BP 84/58, HR >90, LA 4.8>2.4, UA+, CXR clear, no leukocytosis. Increase in SCr from 0.7 to 1.3    Overview/Hospital Course:  Pt is a 70 yo F w/ PMHx significant for diverticulitis, atrial fibrillation on anticoagulation, and CHF who presents with one week history of burning with urination and malaise. Associated symptoms included nausea without emesis. Initial labs were significant for severe hypomagnesemia, mild aseptic pyuria and elevated lactate (4.8). Repeat lactate revealed downtrend to 2.7. She was initiated on ceftriaxone and given 3 L of NS for her hypotension. Her hypotension persisted and she was given a dose of PO midodrine which only briefly relieved her hypotension. Eight hours after admission, pt remained hypotensive and a third lactate revealed uptrend to 3.3 mmol/L.     Critical care medicine was consulted due to refractory hypotension  with concern for requiring vasopressors. Current differentials for her shock include sepsis (related to UTI vs diverticular disease) vs cardiac vs adrenal insufficiency.     During her brief stay in the MICU, pt received further volume resuscitation with IV fluid bolus and was initiated on midodrine 5 mg TID as an oral vasopressor. CT of the abdomen and pelvis was positive for persistent peridiverticular fat stranding and area of hypoattenuation in the liver near the falciform ligament raising concern for carcinoma as etiology for her persistent infection. Since patient had failed outpatient antibiotic therapy and had established care with colorectal surgery in clinic, CRS was consulted to evaluate for possible surgical intervention given her ICU stay and possibly malignant component contributing to her diverticulitis. Abdominal X-ray revealed minimal translucency under the diaphragm concerning for free air, thus repeat was ordered.    Her cortisol while in the MICU was intermediately low raising suspicion for adrenal insufficiency however cosyntropin stimulation test was negative.    Interval History:   Completed colonoscopy yesterday. No interventions, abd pain continues to improve. Will continue to advance diet.    Patient with episodes of hypotension early morning 2/5, received 1 L (500cc bolus x 2). Suspecting patient is hypovolemic secondary to colonic prep day prior to colonoscopy. Will hydrate patient, and advance diet.   - will discuss with cardiology if afib treatment (amio) is needed; could potentially be contributing to hypotensive episodes.     Review of Systems   Constitutional: Negative for chills and fever.   Respiratory: Negative for choking and shortness of breath.    Gastrointestinal: Positive for abdominal pain (mild). Negative for nausea and vomiting.   Neurological: Negative for light-headedness and headaches.     Objective:     Vital Signs (Most Recent):  Temp: 98 °F (36.7 °C) (02/05/20  1133)  Pulse: 87 (02/05/20 1133)  Resp: 18 (02/05/20 1133)  BP: (!) 110/53 (02/05/20 1133)  SpO2: (!) 93 % (02/05/20 1133) Vital Signs (24h Range):  Temp:  [97.5 °F (36.4 °C)-98.4 °F (36.9 °C)] 98 °F (36.7 °C)  Pulse:  [83-94] 87  Resp:  [16-20] 18  SpO2:  [90 %-99 %] 93 %  BP: ()/(50-88) 110/53     Weight: (bed scale broken pt unable to stand)  Body mass index is 25.62 kg/m².    Intake/Output Summary (Last 24 hours) at 2/5/2020 1311  Last data filed at 2/5/2020 0830  Gross per 24 hour   Intake 545 ml   Output 0 ml   Net 545 ml      Physical Exam   Constitutional: She is oriented to person, place, and time. She appears well-developed and well-nourished. No distress.   HENT:   Head: Normocephalic and atraumatic.   Eyes: Conjunctivae are normal.   Neck: No JVD present.   Cardiovascular: Normal rate, regular rhythm and normal heart sounds.   Pulmonary/Chest: Effort normal and breath sounds normal. No respiratory distress.   Abdominal: Soft. Bowel sounds are normal. She exhibits no distension. There is tenderness (mild ). A hernia is present.   Musculoskeletal: She exhibits no edema.   Neurological: She is alert and oriented to person, place, and time.   Skin: No rash noted.   Psychiatric: She has a normal mood and affect. Her behavior is normal.       Significant Labs: All pertinent labs within the past 24 hours have been reviewed.    Significant Imaging: I have reviewed all pertinent imaging results/findings within the past 24 hours.      Assessment/Plan:      * Septic shock        Liver lesion, right lobe  Fatty infiltrate per liver U/S. Will need follow up with hepatology      Diverticulitis  SEPTIC SHOCK    68 yo F w previously diagnosed diverticulitis treated with outpatient therapy presents with hypotension and fever. Repeat imaging revealed persistent peridiverticular fat stranding concerning for ongoing infection and inflammation.     CT A/P (1/30/20): Persistence of diverticula in sigmoid colon with  peridiverticular fat stranding concerning for ongoing diverticulitis. Sigmoidoscopy warranted to survey for malignant etiology. No complication or abscess noted. Area of hypoattenuation in the liver near the falciform ligament.   Liver U/S (1/31/20): Area of hypoattenuation most likely signifies steatosis    Likely contributing to her hypotension.     Plan  - colonoscopy completed 2/4; no interventions   - advance diet as tolerated  - continue IV abx (zosyn)  - monitor abdom pain      Hypotension  A) Most likely secondary to hypovolemia    : completed colonic prep for colonoscopy, no PO intake; further supported by low glucose levels   - hydrate and continue to monitor BP, denies episodes of dizziness/light-headedness    B) low BP readings could be further exacerbated by the recent addition of amio   : will discuss with cardiology regarding use of medication    Unlikely to be cardiogenic/obstructive, septic although possibly unlikely; pt is on abs, no fevers/increase in WBC           GAVIN (acute kidney injury)  Acute renal failure improving with treatment of her infection.         Acute cystitis without hematuria        Hypocalcemia  - continue home Vit D, add Calcium  - Ionized calcium low and PTH appropriately elevated    Bifascicular block  Unable to undergo outpatient device evaluation.     Hypomagnesemia  Pt with persistent hypomagnesemia during previous admission. She had been discharged on PO magnesium, but arrived with low magnesium again. She arrived with acute renal failure as well.     Plan  - IV magnesium   - PO magnesium    Type 2 diabetes mellitus with hyperglycemia, without long-term current use of insulin  - hold home metformin  - low dose SSI for now  - diabetic diet  Lab Results   Component Value Date    HGBA1C 5.4 01/30/2020         Paroxysmal atrial fibrillation  Pt with long-standing non-valvular atrial fibrillation. CHADSVASc of 3    Plan  - continue heparin;   - continue metoprolol 50 mg BID  -  PO amiodarone initiated while in the MICU without loading protocol;   - Hold warfarin pending CRS evaluation for possible procedure  - PhamD consulted for coumadin dosing    Chronic heart failure with preserved ejection fraction  TTE with EF of 45%    - Hold diuresis and antihypertensives in setting of hypotension  - Continue metoprolol      COPD (chronic obstructive pulmonary disease)  No PFT's on record.     - continue nebs PRN  - does not use oxygen  - Suggest PFT's ordered during PCP follow up.     VTE Risk Mitigation (From admission, onward)         Ordered     heparin 25,000 units in dextrose 5% 250 mL (100 units/mL) infusion LOW INTENSITY nomogram - [facility]  Continuous     Question:  Heparin Infusion Adjustment (DO NOT MODIFY ANSWER)  Answer:  [email or web address]\epic\Images\Pharmacy\HeparinInfusions\heparin LOW INTENSITY nomogram for [facility] [id number]    02/02/20 1803     heparin 25,000 units in dextrose 5% (100 units/ml) IV bolus from bag - ADDITIONAL PRN BOLUS - 60 units/kg  As needed (PRN)     Question:  Heparin Infusion Adjustment (DO NOT MODIFY ANSWER)  Answer:  [email or web address]\epic\Images\Pharmacy\HeparinInfusions\heparin LOW INTENSITY nomogram for [facility] [id number]    02/02/20 1803     heparin 25,000 units in dextrose 5% (100 units/ml) IV bolus from bag - ADDITIONAL PRN BOLUS - 30 units/kg  As needed (PRN)     Question:  Heparin Infusion Adjustment (DO NOT MODIFY ANSWER)  Answer:  [email or web address]\epic\Images\Pharmacy\HeparinInfusions\heparin LOW INTENSITY nomogram for [facility] [id number]    02/02/20 1803     IP VTE HIGH RISK PATIENT  Once      01/29/20 1916     Reason for No Pharmacological VTE Prophylaxis  Once     Question:  Reasons:  Answer:  Already adequately anticoagulated on oral Anticoagulants    01/29/20 1916                      Rusty Aponte MD  Department of Hospital Medicine   [facility]

## 2020-02-05 NOTE — SUBJECTIVE & OBJECTIVE
Interval History:   Completed colonoscopy yesterday. No interventions, abd pain continues to improve. Will continue to advance diet.    Patient with episodes of hypotension early morning 2/5, received 1 L (500cc bolus x 2). Suspecting patient is hypovolemic secondary to colonic prep day prior to colonoscopy. Will hydrate patient, and advance diet.   - will discuss with cardiology if afib treatment (amio) is needed; could potentially be contributing to hypotensive episodes.     Review of Systems   Constitutional: Negative for chills and fever.   Respiratory: Negative for choking and shortness of breath.    Gastrointestinal: Positive for abdominal pain (mild). Negative for nausea and vomiting.   Neurological: Negative for light-headedness and headaches.     Objective:     Vital Signs (Most Recent):  Temp: 98 °F (36.7 °C) (02/05/20 1133)  Pulse: 87 (02/05/20 1133)  Resp: 18 (02/05/20 1133)  BP: (!) 110/53 (02/05/20 1133)  SpO2: (!) 93 % (02/05/20 1133) Vital Signs (24h Range):  Temp:  [97.5 °F (36.4 °C)-98.4 °F (36.9 °C)] 98 °F (36.7 °C)  Pulse:  [83-94] 87  Resp:  [16-20] 18  SpO2:  [90 %-99 %] 93 %  BP: ()/(50-88) 110/53     Weight: (bed scale broken pt unable to stand)  Body mass index is 25.62 kg/m².    Intake/Output Summary (Last 24 hours) at 2/5/2020 1311  Last data filed at 2/5/2020 0830  Gross per 24 hour   Intake 545 ml   Output 0 ml   Net 545 ml      Physical Exam   Constitutional: She is oriented to person, place, and time. She appears well-developed and well-nourished. No distress.   HENT:   Head: Normocephalic and atraumatic.   Eyes: Conjunctivae are normal.   Neck: No JVD present.   Cardiovascular: Normal rate, regular rhythm and normal heart sounds.   Pulmonary/Chest: Effort normal and breath sounds normal. No respiratory distress.   Abdominal: Soft. Bowel sounds are normal. She exhibits no distension. There is tenderness (mild ). A hernia is present.   Musculoskeletal: She exhibits no edema.    Neurological: She is alert and oriented to person, place, and time.   Skin: No rash noted.   Psychiatric: She has a normal mood and affect. Her behavior is normal.       Significant Labs: All pertinent labs within the past 24 hours have been reviewed.    Significant Imaging: I have reviewed all pertinent imaging results/findings within the past 24 hours.

## 2020-02-05 NOTE — PROGRESS NOTES
"Colorectal Surgery Progress Note.    Abdominal pain improved.  Tolerating clear liquid diet  Having bowel function with BMs and flatus without nausea/vomiting    BP (!) 89/52 (BP Location: Left arm, Patient Position: Lying)   Pulse 89   Temp 97.9 °F (36.6 °C) (Oral)   Resp 18   Ht 5' 6" (1.676 m)   Wt 72 kg (158 lb 11.7 oz)   SpO2 95%   Breastfeeding? No   BMI 25.62 kg/m²     Gen: awake, alert, no distress  Abdomen: inferior tenderness improved.    A/P: Diverticulitis, uncomplicated and resolving  - No surgical intervention indicated  - Complete 7 days of antibiotics  - Levsin antispasmodic  - Colorectal surgery will sign off.    Theo Nicole MD  Colon and Rectal Surgery Fellow    "

## 2020-02-05 NOTE — PLAN OF CARE
Pt free of falls and injury during shift. POC reviewed with pt VS stable and Aaox4. SR On telemetry. BG monitored. NS bolus 500mL admin. Heparin gtt infusing, aPTT die at 0627.  2L O2 NC placed.  Midline pulled out, consulted PICC team for another midline placement.  No acute events noted at this time. No complaints. Educated pt why she is at risk for falls and to use all light for assistance ambulating. Yellow non-slip socks on pt. Bed low and locked, call light with in reach. Will continue to monitor.

## 2020-02-05 NOTE — PLAN OF CARE
Plan of care discussed with patient. Patient is free of fall/trauma/injury. Denies CP, SOB, or pain/discomfort. Patient had colonoscopy done today. APTT was 37.4 so increased heparin gtt by 2 units. Heparin gtt 14units/kg/hr. Midline placed in upper right brachial today. All questions addressed. Will continue to monitor

## 2020-02-05 NOTE — PROGRESS NOTES
02/05/20 0511   Vital Signs   BP (!) 89/56   MAP (mmHg) 67     MD Carroll and Terri Kaiser RN made aware pt Vs above. Pt asymptomatic. Pt has not voided this morning bladder scan resulted 156 mL, pt states she does not feel like she has to urinate. EF 45%. Admin NS bolus 500mL last night, BP increased to 106/63 MAP 72. MD stated he will place another order for NS bolus and monitor output. Rapid RN stated admin bolus and continue to monitor pt BP. WCTM

## 2020-02-05 NOTE — PT/OT/SLP EVAL
Physical Therapy Evaluation    Patient Name:  Elizabeth Cano   MRN:  [id number]  Admit Date: 1/29/2020  Admitting Diagnosis:  Septic shock   Length of Stay: 7 days  Recent Surgery: Procedure(s) (LRB):  COLONOSCOPY (N/A) 1 Day Post-Op    Recommendations:     Discharge Recommendations:  nursing facility, skilled(skilled bed at patient's current NH)   Discharge Equipment Recommendations: none   Barriers to discharge: None    Assessment:     Elizabeth Cano is a 69 y.o. female admitted with a medical diagnosis of Septic shock.  She presents with the following impairments/functional limitations: weakness, impaired endurance, impaired self care skills, impaired functional mobilty, gait instability, impaired balance, decreased lower extremity function, decreased upper extremity function, impaired cardiopulmonary response to activity, decreased safety awareness. Pt tolerated initial evaluation well today.  Pt reports a decline in function since last hospital stay approx 1.5 months ago. Pt states she is unable to perform bed mobility or transfer without assist and due to this pt reports she does not leave her room often. Prior to hospital stay pt reports being at a Mod I level with mobility. On this date pt demo's global atrophy in BLE. This decrease in strength causes pt to be extremely unsteady in standing and require assist x 2 for transfers at this time. Furthermore pt is extremely fearful from falling which further limits her ability to assist with transfers. Due to pt's decline in function, it is recommended that pt be placed in a skilled nursing bed at her current facility, [facility], to receive intensive PT/OT services. Pt will benefit from SNF after discharge from acute services in order to continue to progress pt's strength, endurance, and balance to help pt maximize independence at or near [facility].    Rehab Prognosis: Good; patient would benefit from acute skilled PT services to address these deficits and reach  "maximum level of function.      Plan:     During this hospitalization, patient to be seen 4 x/week to address the identified rehab impairments via therapeutic activities, therapeutic exercises, neuromuscular re-education, gait training and progress towards the established goals.    · Plan of Care Expires:  03/07/20    Subjective     RN notified prior to session. No family/friends present upon PT entrance into room.    Chief Complaint: "I'm petrified of falling don't let me fall"  Patient/Family Comments/goals: get stronger  Pain/Comfort:  · Pain Rating 1: 5/10  · Location - Orientation 1: generalized  · Location 1: abdomen  · Pain Addressed 1: Pre-medicate for activity, Reposition, Distraction  · Pain Rating Post-Intervention 1: 5/10    Living Environment:  Patient lives with  at [facility].   Level of Function: Patient reports being Mod I with mobility & with ADLs prior to moving into nursing home. After moving into nursing home pt reports she now requires assist for all bed mobility, transfers, and ADLs.  Patient uses DME as follows: walker, rolling, bedside commode, bath bench. DME owned (not currently used): none.    Patient reports they will have assistance from staff at NH and  upon discharge.    Objective:     Additional staff present: OT for co-evaluation    Patient found HOB elevated with: central line, telemetry, PureWick     General Precautions: Standard, Cardiac fall   Orthopedic Precautions:N/A   Braces: N/A   Body mass index is 25.62 kg/m².  Oxygen Device: Nasal Cannula 2L    Exams:  · Mental Status: Patient is AxOx4 and follows all multi-step verbal commands. Pt is Alert and Cooperative during session.  · Skin Integrity: Visible skin intact and Dry along bilateral shins to feet  · Edema: None noted   · Sensation: Intact  · Hearing: Intact  · Vision:  Intact  · Postural Assessment: slouched posture and rounded shoulders  · Range of Motion:  · RUE: WFL  · LUE: WFL  · RLE: " WFL  · LLE: WFL  · Strength Exam:  · Lower Extremity Strength: 3+/5 BLE    Outcome Measures:  AM-PAC 6 CLICK MOBILITY  Turning over in bed (including adjusting bedclothes, sheets and blankets)?: 3  Sitting down on and standing up from a chair with arms (e.g., wheelchair, bedside commode, etc.): 2  Moving from lying on back to sitting on the side of the bed?: 2  Moving to and from a bed to a chair (including a wheelchair)?: 2  Need to walk in hospital room?: 2  Climbing 3-5 steps with a railing?: 1  Basic Mobility Total Score: 12     Functional Mobility:    Bed Mobility:   · Rolling/Turning to Left: contact guard assistance  · Supine to Sit: maximal assistance; from Lt side of bed  · Assist to bring trunk into full upright sitting  · Scooting anteriorly to EOB to have both feet planted on floor: contact guard assistance    Sitting Balance at Edge of Bed:   Assistance Level Required: Contact Guard Assistance   Time: 8 minutes    Transfers:   · Sit <> Stand Transfer: maximal assistance with hand-held assist   · Stand <> Sit Transfer: maximal assistance with hand-held assist   · [id number] from EOB   · Pt req'd assist x2 for safety purposes and fear of falling but pt demo's enough strength to transfer with assist x 1  · Bed <> Chair Transfer: Stand Pivot technique with maximal assistance with hand-held assist  · Chair on patient's Lt  · Pt able to take steps to assist with transfer but req'd assist to remain upright and prevent buckling      Gait:   · Patient ambulated: 3 side steps to the Rt   · Patient required: maximal assist  · Patient used:  hand-held assist   · Comments: Assist for Weight shift, maintaining upright, preventing buckling    Stairs:   Deferred due to pt's performance with above listed functional mobility    Education:   Time provided for education, counseling and discussion of health disposition in regards to patient's current status   All questions answered within PT scope of practice and to  patient's satisfaction   PT role in POC to address current functional deficits   Pt educated on proper body mechanics, safety techniques, and energy conservation with PT facilitation and cueing throughout session   Call nursing/pct to transfer to chair/use bathroom. Pt stated understanding.   RW ordered for in room use with nursing staff   Whiteboard updated with pt's current mobility status documented above   Safe to perform stand pivot transfer to/from chair/bedside commode assist x 2 and HHA w/ nursing/PCT present   Importance of OOB tolerance prn hrs/day to improve lung ventilation and expansion as well as strengthen postural musculature    Patient left up in chair with all lines intact, call button in reach and RN notified.    GOALS:   Multidisciplinary Problems     Physical Therapy Goals        Problem: Physical Therapy Goal    Goal Priority Disciplines Outcome Goal Variances Interventions   Physical Therapy Goal     PT, PT/OT Ongoing, Progressing     Description:  Goals to be met by: 2020     Patient will increase functional independence with mobility by performin. Supine to sit with Contact Guard Assistance  2. Sit to supine with Contact Guard Assistance  3. Sit to stand transfer with Contact Guard Assistance using LRAD  4. Bed to chair transfer with Contact Guard Assistance using Rolling Walker  5. Gait  x 25 feet with Contact Guard Assistance using Rolling Walker.   6. Lower extremity exercise program x20 reps per handout, with independence                      History:     Past Medical History:   Diagnosis Date    A-fib     GAVIN (acute kidney injury)     CHF (congestive heart failure)     COPD (chronic obstructive pulmonary disease)     Diabetes mellitus     Hypertension        Past Surgical History:   Procedure Laterality Date    CARDIAC SURGERY      COLONOSCOPY N/A 2020    Procedure: COLONOSCOPY;  Surgeon: Gilberto Laguna MD;  Location: [facility] ([address] [place] [address]);  Service:  Endoscopy;  Laterality: N/A;       Time Tracking:     PT Received On: 02/05/20  PT Start Time: 0950     PT Stop Time: 1013  PT Total Time (min): 23 min     Billable Minutes: Evaluation 13 and Therapeutic Activity 10    Leticia Norwood PT, DPT  2/5/2020  Pager: [phone]

## 2020-02-05 NOTE — PLAN OF CARE
A A O x 4,. Free of falls, traumas and injuries. Skin intact. Patient advanced to diabetic diet. Patient's BP has been low throughout shift, lactated ringers given per MAR. Heparin gtt DC today. BG monitored ACHS. Denies shortness of breath, chest pain, nausea, vomiting. Plan of care reviewed with patient. Vital signs stable. Pain monitored. Will continue to monitor.

## 2020-02-05 NOTE — PLAN OF CARE
Pt here with diverticulitis who we were initially consulted for atrial fibrillation. EKGs reviewed and this is actually sinus rhythm with frequent PACs.     Plan  No need for anticoagulation at this point  Would stop amiodarone  Would continue metoprolol  Please have her follow up with cardiology with EKG and possible event monitor.     Flo Holliday MD  Cardiology Fellow  Pager [phone]

## 2020-02-05 NOTE — PLAN OF CARE
Eval complete. Pt tolerated session well. Initiate OT POC.  Discharge Recommendation: Skilled services in NH  DME rec: None    Problem: Occupational Therapy Goal  Goal: Occupational Therapy Goal  Description  Goals to be met by: 3/3/2020    Patient will increase functional independence with ADLs by performing:    UE Dressing with Set-up Assistance.  LE Dressing with Set-up Assistance and Assistive Devices as needed.  Grooming while standing at sink with Supervision and Assistive Devices as needed.  Toileting from toilet with Set-up Assistance for hygiene and clothing management.   Bathing from  standing at sink with Set-up Assistance and Assistive Devices as needed.  Toilet transfer to toilet with Supervision.     Outcome: Ongoing, Progressing

## 2020-02-05 NOTE — NURSING
Patient's aptt came back >150. Heparin gtt stopped. MD notified. Will continue to monitor.     MD to DC heparin orders.

## 2020-02-05 NOTE — ASSESSMENT & PLAN NOTE
SEPTIC SHOCK    68 yo F w previously diagnosed diverticulitis treated with outpatient therapy presents with hypotension and fever. Repeat imaging revealed persistent peridiverticular fat stranding concerning for ongoing infection and inflammation.     CT A/P (1/30/20): Persistence of diverticula in sigmoid colon with peridiverticular fat stranding concerning for ongoing diverticulitis. Sigmoidoscopy warranted to survey for malignant etiology. No complication or abscess noted. Area of hypoattenuation in the liver near the falciform ligament.   Liver U/S (1/31/20): Area of hypoattenuation most likely signifies steatosis    Likely contributing to her hypotension.     Plan  - colonoscopy completed 2/4; no interventions   - advance diet as tolerated  - continue IV abx (zosyn)  - monitor abdom pain

## 2020-02-06 PROBLEM — R11.14 BILIOUS VOMITING: Status: ACTIVE | Noted: 2020-01-01

## 2020-02-06 PROBLEM — D69.6 THROMBOCYTOPENIA: Status: ACTIVE | Noted: 2020-01-01

## 2020-02-06 PROBLEM — R44.1 VISUAL HALLUCINATIONS: Status: ACTIVE | Noted: 2020-01-01

## 2020-02-06 PROBLEM — K92.0 HEMATEMESIS: Status: ACTIVE | Noted: 2020-01-01

## 2020-02-06 NOTE — PLAN OF CARE
Patient remained free from falls and injury throughout shift. No acute events overnight. Patient alert and oriented x4; vital signs stable. Patient denies chest pain and shortness of breath. Safety measures addressed --bed locked and in low position with siderails up x2 and call light/personal items within reach. Patient disoriented upon waking but generally easily reoriented. More disoriented later in the shift requiring a call to team. 1x dose of seroquel and 1x dose of olanzapine ordered. Seroquel administered; mild relief. Plan of care reviewed with patient; all questions and concerns addressed. Patient verbalizes understanding. Will continue to monitor.

## 2020-02-06 NOTE — ANESTHESIA PREPROCEDURE EVALUATION
[facility]  Anesthesia Pre-Operative Evaluation         Patient Name: Elizabeth Cano  YOB: 1950  MRN: [id number]    SUBJECTIVE:     Pre-operative evaluation for Procedure(s) (LRB):  EGD (ESOPHAGOGASTRODUODENOSCOPY) (N/A)  Scheduled for 2/7/2020    HPI 02/06/2020:  Elizabeth Cano is a 69 y.o. female with hx of afib on warfarin, COPD, DM, HFrEF (1/29/2020 EF 45%, mildly reduced RVSF, severe bilateral atrial enlargement), diverticulitis.    Patient was admitted on 1/29/2020 via transfer from nursing home for hypotension, dysuria, N/V/D.  She was briefly admitted to MICU for refractory hypotension and received IVF and midodrine.  Vomiting with upper GI bleeding concern.    Patient presents for the above procedure(s).    Previous Airway:   No prior records in Epic.    Oxygen/Ventilation Requirements:  On room air       Current LDA:        Peripheral IV - Single Lumen 02/05/20 1026 20 G;1 3/4 in Right Upper Arm (Active)   Site Assessment Clean;Dry;Intact 2/6/2020  8:00 AM   Line Status Saline locked 2/6/2020  8:00 AM   Dressing Status Clean;Dry;Intact 2/6/2020  8:00 AM   Dressing Intervention Dressing reinforced 2/5/2020  8:00 PM   Dressing Change Due 02/09/20 2/5/2020  8:00 PM   Site Change Due 02/09/20 2/5/2020  8:00 PM   Reason Not Rotated Not due 2/5/2020  8:00 PM   Number of days: 1            Peripheral IV - Single Lumen 02/06/20 1055 18 G;1 3/4 in Left;Anterior Upper Arm (Active)   Site Assessment Clean;Dry;Intact;No redness;No swelling 2/6/2020 10:55 AM   Line Status Blood return noted;Flushed;Saline locked 2/6/2020 10:55 AM   Dressing Status Clean;Dry;Intact 2/6/2020 10:55 AM   Dressing Intervention New dressing 2/6/2020 10:55 AM   Site Change Due 02/10/20 2/6/2020 10:55 AM   Number of days: 0       Current Drips:  None documented.      Patient Active Problem List   Diagnosis    Sinus tachycardia    COPD (chronic obstructive pulmonary disease)    Chronic heart failure  with preserved ejection fraction    Diverticulitis of sigmoid colon    Paroxysmal atrial fibrillation    Type 2 diabetes mellitus with hyperglycemia, without long-term current use of insulin    Hypomagnesemia    Bifascicular block    Transposition of the great arteries, surgically corrected at age 10, without documentation    Hypocalcemia    Acute cystitis without hematuria    GAVIN (acute kidney injury)    Hypotension    Diverticulitis    Liver lesion, right lobe    Septic shock    Anemia    Bilious vomiting    Visual hallucinations    Thrombocytopenia    Hematemesis       Review of patient's allergies indicates:  No Known Allergies    Outpatient Medications:  No current facility-administered medications on file prior to encounter.      Current Outpatient Medications on File Prior to Encounter   Medication Sig Dispense Refill    albuterol (PROVENTIL) 2.5 mg /3 mL (0.083 %) nebulizer solution Take 0.5 mLs by nebulization every 6 (six) hours as needed (wheezing). Rescue       aspirin 81 MG Chew Take 1 tablet (81 mg total) by mouth once daily.  0    budesonide (PULMICORT) 0.25 mg/2 mL nebulizer solution Take 2 ml by nebulization 2 times daily Controller      diltiaZEM (CARDIZEM SR) 60 MG Cp12 Take 60 mg by mouth every 12 (twelve) hours.       DULoxetine (CYMBALTA) 30 MG capsule Take 30 mg by mouth once daily.      furosemide (LASIX) 20 MG tablet Take 20 mg by mouth once daily.      L. acidophilus/pectin, citrus (ACIDOPHILUS PROBIOTIC ORAL) Take 1 tablet by mouth once daily.      lidocaine (LIDODERM) 5 % Apply topically to right leg as needed for pain      magnesium oxide (MAG-OX) 400 mg (241.3 mg magnesium) tablet Take 1 tablet (400 mg total) by mouth once daily.  0    metFORMIN (GLUCOPHAGE) 1000 MG tablet Take 1,000 mg by mouth 2 (two) times daily with meals.      metoprolol succinate (TOPROL-XL) 50 MG 24 hr tablet Take 50 mg by mouth 2 (two) times daily.      mirtazapine (REMERON) 15 MG  tablet Take 15 mg by mouth every evening.      nystatin (MYCOSTATIN) powder Apply twice daily under breast folds for redness and irritation until resolved      ondansetron (ZOFRAN) 4 MG tablet Take 4 mg by mouth every 6 (six) hours as needed for Nausea.      potassium chloride (MICRO-K) 10 MEQ CpSR Take 10 mEq by mouth once daily.      senna (SENOKOT) 8.6 mg tablet Take 1 tablet by mouth once daily.      vitamin D (VITAMIN D3) 1000 units Tab Take 1,000 Units by mouth once daily.      warfarin (COUMADIN) 1 MG tablet Take 1 mg by mouth every Mon, Wed, Fri. At 5:00pm          Current Inpatient Medications:   albuterol-ipratropium  3 mL Nebulization Q12H    aspirin  81 mg Oral Daily    budesonide  0.5 mg Nebulization BID    calcium chloride IVPB  2 g Intravenous Once    calcium-vitamin D3  1 tablet Oral BID    DULoxetine  30 mg Oral Daily    magnesium oxide  400 mg Oral Daily    metoprolol tartrate  25 mg Oral BID    miconazole nitrate 2%   Topical (Top) BID    [START ON 2/7/2020] pantoprazole  40 mg Oral Daily    piperacillin-tazobactam (ZOSYN) IVPB  4.5 g Intravenous Q8H       Past Surgical History:   Procedure Laterality Date    CARDIAC SURGERY      COLONOSCOPY N/A 2/4/2020    Procedure: COLONOSCOPY;  Surgeon: Gilberto Laguna MD;  Location: [address];  Service: Endoscopy;  Laterality: N/A;       Social History     Socioeconomic History    Marital status:      Spouse name: Not on file    Number of children: Not on file    Years of education: Not on file    Highest education level: Not on file   Occupational History    Not on file   Social Needs    Financial resource strain: Not on file    Food insecurity:     Worry: Not on file     Inability: Not on file    Transportation needs:     Medical: Not on file     Non-medical: Not on file   Tobacco Use    Smoking status: Former Smoker     Packs/day: 1.00     Years: 54.00     Pack years: 54.00     Types: Cigarettes     Last  attempt to quit: 2019     Years since quittin.2    Smokeless tobacco: Never Used   Substance and Sexual Activity    Alcohol use: Not Currently    Drug use: Not Currently    Sexual activity: Not Currently     Partners: Male   Lifestyle    Physical activity:     Days per week: Not on file     Minutes per session: Not on file    Stress: Not on file   Relationships    Social connections:     Talks on phone: Not on file     Gets together: Not on file     Attends Confucianism service: Not on file     Active member of club or organization: Not on file     Attends meetings of clubs or organizations: Not on file     Relationship status: Not on file   Other Topics Concern    Not on file   Social History Narrative    Not on file       OBJECTIVE:   Weight:  Wt Readings from Last 4 Encounters:   20 79.1 kg (174 lb 6.1 oz)   20 69.9 kg (154 lb)   19 70.1 kg (154 lb 8.7 oz)     Body mass index is 28.15 kg/m².    Vital Signs Range (Last 24H):  Temp:  [36.1 °C (96.9 °F)-37.2 °C (99 °F)]   Pulse:  []   Resp:  [16-20]   BP: ()/(51-64)   SpO2:  [92 %-99 %]       CBC:   Lab Results   Component Value Date    WBC 3.45 (L) 2020    HGB 9.9 (L) 2020    HCT 32.2 (L) 2020    MCV 98 2020     (L) 2020       CMP:     Chemistry        Component Value Date/Time     2020 0834    K 3.5 2020 0834     2020 0834    CO2 30 (H) 2020 0834    BUN 6 (L) 2020 0834    CREATININE 0.7 2020 0834    GLU 91 2020 0834        Component Value Date/Time    CALCIUM 7.6 (L) 2020 0834    ALKPHOS 149 (H) 2020 0704    AST 32 2020 0704    ALT 21 2020 0704    BILITOT 0.6 2020 0704    ESTGFRAFRICA >60.0 2020 0834    EGFRNONAA >60.0 2020 0834            INR:  Lab Results   Component Value Date    INR 1.2 2020    INR 1.3 (H) 2020    INR SEE COMMENT 2020       Diagnostic Studies:  EKG:    Results for orders placed or performed during the hospital encounter of 01/29/20   EKG 12-lead    Collection Time: 02/06/20 10:16 AM    Narrative    Test Reason : Z91.89,    Vent. Rate : 098 BPM     Atrial Rate : 098 BPM     P-R Int : 120 ms          QRS Dur : 158 ms      QT Int : 398 ms       P-R-T Axes : 095 -59 097 degrees     QTc Int : 508 ms    Sinus rhythm with Fusion complexes and Premature atrial complexes with   Aberrant conduction  Left axis deviation  Right bundle branch block  T wave abnormality, consider lateral ischemia  Abnormal ECG  When compared with ECG of 01-FEB-2020 14:12,  Fusion complexes are now Present  QRS voltage has decreased  T wave inversion less evident in Anterior leads    Referred By: [email or web address]   SELF           Confirmed By:        2D Echo:  Results for orders placed or performed during the hospital encounter of 01/29/20   Echo   Result Value Ref Range    Ascending aorta 2.86 cm    STJ 2.52 cm    AV mean gradient 2 mmHg    Ao peak adam 0.92 m/s    Ao VTI 13.27 cm    IVRT 0.11 msec    IVS 0.75 0.6 - 1.1 cm    LA size 3.96 cm    Left Atrium Major Axis 6.33 cm    Left Atrium Minor Axis 6.42 cm    LVIDD 4.00 3.5 - 6.0 cm    LVIDS 2.78 2.1 - 4.0 cm    LVOT diameter 2.03 cm    LVOT peak VTI 10.57 cm    PW 0.80 0.6 - 1.1 cm    MV Peak E Adam 0.71 m/s    PV Peak D Adam 0.41 m/s    PV Peak S Adam 0.27 m/s    RA Major Axis 6.69 cm    RA Width 5.35 cm    RVDD 3.00 cm    Sinus 3.56 cm    TAPSE 0.78 cm    TR Max Adam 2.75 m/s    TDI LATERAL 0.09 m/s    TDI SEPTAL 0.07 m/s    LA WIDTH 4.27 cm    LV Diastolic Volume 69.93 mL    LV Systolic Volume 29.01 mL    RV S' 6.07 cm/s    LVOT peak adam 0.69 m/s    LV LATERAL E/E' RATIO 7.89 m/s    LV SEPTAL E/E' RATIO 10.14 m/s    FS 31 %    LA volume 91.62 cm3    LV mass 89.59 g    Left Ventricle Relative Wall Thickness 0.40 cm    AV valve area 2.58 cm2    AV Velocity Ratio 0.75     AV index (prosthetic) 0.80     Mean e' 0.08 m/s    Pulm vein S/D ratio 0.66      LVOT area 3.2 cm2    LVOT stroke volume 34.19 cm3    AV peak gradient 3 mmHg    E/E' ratio 8.88 m/s    Triscuspid Valve Regurgitation Peak Gradient 30 mmHg    BSA 1.8 m2    LV Systolic Volume Index 16.2 mL/m2    LV Diastolic Volume Index 39.07 mL/m2    LA Volume Index 51.2 mL/m2    LV Mass Index 50 g/m2    Right Atrial Pressure (from IVC) 3 mmHg    TV rest pulmonary artery pressure 33 mmHg    Narrative    · Patient does not have transposition of the great arteries. Based on what   is seen, it is possible patient had an ASD closure.  · Mildly decreased left ventricular systolic function. The estimated   ejection fraction is 45%.  · Mildly reduced right ventricular systolic function. Mild right   ventricular enlargement.  · Severe left atrial enlargement.  · Severe right atrial enlargement.  · Normal central venous pressure (3 mmHg).       No results found for this or any previous visit.      ASSESSMENT/PLAN:         Anesthesia Evaluation    I have reviewed the Patient Summary Reports.    I have reviewed the Nursing Notes.   I have reviewed the Medications.     Review of Systems  Anesthesia Hx:  No problems with previous Anesthesia Denies Hx of Anesthetic complications  History of prior surgery of interest to airway management or planning: Denies Family Hx of Anesthesia complications.   Denies Personal Hx of Anesthesia complications.   Social:  Former Smoker, No Alcohol Use    Cardiovascular:   Hypertension Dysrhythmias atrial fibrillation CHF ECG has been reviewed.    Pulmonary:   COPD    Renal/:   Denies Chronic Renal Disease.     Neurological:  Neurology Normal    Endocrine:   Diabetes        Physical Exam  General:  Well nourished    Airway/Jaw/Neck:  Airway Findings: Mouth Opening: Normal Tongue: Normal  General Airway Assessment: Adult  Mallampati: II  TM Distance: Normal, at least 6 cm  Jaw/Neck Findings:  Neck ROM: Normal ROM  Neck Findings: Normal    Eyes/Ears/Nose:  EYES/EARS/NOSE FINDINGS: Normal    Dental:  Dental Findings: In tact   Chest/Lungs:  Chest/Lungs Findings: Clear to auscultation, Normal Respiratory Rate     Heart/Vascular:  Heart Findings: Rate: Tachycardia  Rhythm: Regular Rhythm  Sounds: Normal  Heart murmur: negative       Mental Status:  Mental Status Findings:  Cooperative, Alert and Oriented         Anesthesia Plan  Type of Anesthesia, risks & benefits discussed:  Anesthesia Type:  general, MAC  Patient's Preference:   Intra-op Monitoring Plan: standard ASA monitors  Intra-op Monitoring Plan Comments:   Post Op Pain Control Plan: multimodal analgesia, IV/PO Opioids PRN and per primary service following discharge from PACU  Post Op Pain Control Plan Comments:   Induction:   IV  Beta Blocker:  Patient is on a Beta-Blocker and has received one dose within the past 24 hours (No further documentation required).       Informed Consent: Patient understands risks and agrees with Anesthesia plan.  Questions answered. Anesthesia consent signed with patient.  ASA Score: 4     Day of Surgery Review of History & Physical:  There are no significant changes.  H&P update referred to the provider.         Ready For Surgery From Anesthesia Perspective.

## 2020-02-06 NOTE — ASSESSMENT & PLAN NOTE
Patient is 69-year-old female with past medical history of AFib on Coumadin currently off Coumadin recent colonoscopy, admitted for sepsis due to diverticulitis and a UTI.  GI consulted for possible episode of hematemesis today.  Patient states that emesis was brown with no coffee-grounds or blood.  It is unchanged compared to her emesis before.  Her hemoglobin has been stable with a stable BUN.  An upper GI bleeding seems highly unlikely.  If some bleeding is present, it is likely due to esophagitis or Caryl-Ascencio tear due to repeated emesis. In addition the patient had eaten today, so will be unable to proceed with EGD today.  Will treat conservatively with p.o. PPI 40 mg daily and trend her hemoglobin.    -trend H&H and transfuse as indicated.  -avoid NSAIDs, heparin.  If H&H remains stable can resume warfarin.  -placed on PPI p.o. 40 mg daily.  -defer endoscopic evaluation at this time.  If ongoing concern for GI bleeding please keep the patient NPO after midnight.

## 2020-02-06 NOTE — SUBJECTIVE & OBJECTIVE
Interval History:   Early morning prior to nurse shift change, patient with hematemesis ~200cc noted.   Patient reports abdominal pain. Will consult GI.     Review of Systems   Constitutional: Positive for activity change and fatigue. Negative for appetite change, chills and fever.   HENT: Negative for trouble swallowing.    Respiratory: Negative for cough, choking, chest tightness and shortness of breath.    Cardiovascular: Negative for chest pain and leg swelling.   Gastrointestinal: Positive for abdominal pain, nausea and vomiting. Negative for abdominal distention, blood in stool, constipation and diarrhea.   Genitourinary: Negative for difficulty urinating and dysuria.   Musculoskeletal: Negative for arthralgias and back pain.   Skin: Positive for pallor. Negative for color change.   Neurological: Negative for dizziness and headaches.   Psychiatric/Behavioral: Negative for agitation and confusion.     Objective:     Vital Signs (Most Recent):  Temp: 97.5 °F (36.4 °C) (02/06/20 1514)  Pulse: 96 (02/06/20 1514)  Resp: 18 (02/06/20 1514)  BP: (!) 103/59 (02/06/20 1514)  SpO2: (!) 93 % (02/06/20 1514) Vital Signs (24h Range):  Temp:  [96.9 °F (36.1 °C)-99 °F (37.2 °C)] 97.5 °F (36.4 °C)  Pulse:  [] 96  Resp:  [16-20] 18  SpO2:  [92 %-99 %] 93 %  BP: ()/(51-64) 103/59     Weight: 79.1 kg (174 lb 6.1 oz)  Body mass index is 28.15 kg/m².    Intake/Output Summary (Last 24 hours) at 2/6/2020 1535  Last data filed at 2/6/2020 0800  Gross per 24 hour   Intake 840 ml   Output --   Net 840 ml      Physical Exam   Constitutional: She is oriented to person, place, and time. She appears distressed (mildly).   HENT:   Head: Normocephalic.   Eyes: Conjunctivae are normal. No scleral icterus.   Neck: Normal range of motion. Neck supple.   Cardiovascular: Normal rate and regular rhythm.   Pulmonary/Chest: Effort normal and breath sounds normal.   Abdominal: Soft. Bowel sounds are normal. She exhibits no distension and  no mass. There is tenderness (left lower abdomen). There is no rebound and no guarding. A hernia is present.   Musculoskeletal: Normal range of motion.   Neurological: She is alert and oriented to person, place, and time.   Skin: Skin is warm and dry.       Significant Labs: All pertinent labs within the past 24 hours have been reviewed.    Significant Imaging: I have reviewed all pertinent imaging results/findings within the past 24 hours.

## 2020-02-06 NOTE — ASSESSMENT & PLAN NOTE
Patient is 69-year-old female with past medical history of AFib on Coumadin currently off Coumadin recent colonoscopy, admitted for sepsis due to diverticulitis and a UTI.  GI consulted for possible episode of hematemesis today.  Patient states that emesis was brown with no coffee-grounds or blood.  It is unchanged compared to her emesis before.  Her hemoglobin has been stable with a stable BUN.  An upper GI bleeding seems highly unlikely.  If some bleeding is present, it is likely due to esophagitis or Caryl-Ascencio tear due to repeated emesis. In addition the patient had eaten today, so will be unable to proceed with EGD today.  Will treat conservatively with p.o. PPI 40 mg daily and trend her hemoglobin.    -trend H&H and transfuse as indicated.  -avoid NSAIDs, heparin.  If H&H remains stable can resume warfarin.  -place on PPI p.o. 40 mg daily.  -defer endoscopic evaluation at this time.  If ongoing concern for GI bleeding please keep the patient NPO after midnight.

## 2020-02-06 NOTE — NURSING
MD paged for patient throwing up dark colored emesis. Orders to make patient NPO. Will continue to monitor.

## 2020-02-06 NOTE — ASSESSMENT & PLAN NOTE
SEPTIC SHOCK    70 yo F w previously diagnosed diverticulitis treated with outpatient therapy presents with hypotension and fever. Repeat imaging revealed persistent peridiverticular fat stranding concerning for ongoing infection and inflammation.     CT A/P (1/30/20): Persistence of diverticula in sigmoid colon with peridiverticular fat stranding concerning for ongoing diverticulitis. Sigmoidoscopy warranted to survey for malignant etiology. No complication or abscess noted. Area of hypoattenuation in the liver near the falciform ligament.   Liver U/S (1/31/20): Area of hypoattenuation most likely signifies steatosis    Likely contributing to her hypotension.     Plan  - colonoscopy completed 2/4; no interventions   - advance diet as tolerated  - continue IV abx (zosyn) x 1 more dose (end 2/6)  - monitor abdom pain  - will get abd x-ray to r/o perforation, persistent abd pain, and did have colonoscopy recently

## 2020-02-06 NOTE — PROGRESS NOTES
Patient BP 97/51 MAP 70. Paged [facility] because patient has evening metoprolol (25mg) scheduled. Spoke with TIM Santana who advised that the metoprolol be held. RN will hold and continue to monitor.

## 2020-02-06 NOTE — SUBJECTIVE & OBJECTIVE
Past Medical History:   Diagnosis Date    A-fib     GAVIN (acute kidney injury)     CHF (congestive heart failure)     COPD (chronic obstructive pulmonary disease)     Diabetes mellitus     Hypertension        Past Surgical History:   Procedure Laterality Date    CARDIAC SURGERY      COLONOSCOPY N/A 2020    Procedure: COLONOSCOPY;  Surgeon: Gilberto Laguna MD;  Location: [address];  Service: Endoscopy;  Laterality: N/A;       Review of patient's allergies indicates:  No Known Allergies  Family History     Problem Relation (Age of Onset)    Heart disease Mother        Tobacco Use    Smoking status: Former Smoker     Packs/day: 1.00     Years: 54.00     Pack years: 54.00     Types: Cigarettes     Last attempt to quit: 2019     Years since quittin.2    Smokeless tobacco: Never Used   Substance and Sexual Activity    Alcohol use: Not Currently    Drug use: Not Currently    Sexual activity: Not Currently     Partners: Male     Review of Systems   Constitutional: Positive for activity change and fatigue. Negative for appetite change, chills and fever.   HENT: Negative for trouble swallowing.    Respiratory: Negative for cough, choking, chest tightness and shortness of breath.    Cardiovascular: Negative for chest pain and leg swelling.   Gastrointestinal: Positive for abdominal pain, nausea and vomiting. Negative for abdominal distention, blood in stool, constipation and diarrhea.   Genitourinary: Negative for difficulty urinating and dysuria.   Musculoskeletal: Negative for arthralgias and back pain.   Skin: Positive for pallor. Negative for color change.   Neurological: Negative for dizziness and headaches.   Psychiatric/Behavioral: Negative for agitation and confusion.     Objective:     Vital Signs (Most Recent):  Temp: 97.4 °F (36.3 °C) (20)  Pulse: 94 (20)  Resp: 18 (20)  BP: (!) 111/58 (20)  SpO2: 98 % (20) Vital Signs  (24h Range):  Temp:  [96.9 °F (36.1 °C)-99 °F (37.2 °C)] 97.4 °F (36.3 °C)  Pulse:  [] 94  Resp:  [16-20] 18  SpO2:  [92 %-99 %] 98 %  BP: ()/(51-64) 111/58     Weight: 79.1 kg (174 lb 6.1 oz) (02/06/20 0700)  Body mass index is 28.15 kg/m².      Intake/Output Summary (Last 24 hours) at 2/6/2020 0932  Last data filed at 2/6/2020 0800  Gross per 24 hour   Intake 1080 ml   Output --   Net 1080 ml       Lines/Drains/Airways     Peripheral Intravenous Line                 Peripheral IV - Single Lumen 02/05/20 1026 20 G;1 3/4 in Right Upper Arm less than 1 day                Physical Exam   Constitutional: She is oriented to person, place, and time. She appears distressed (mildly).   HENT:   Head: Normocephalic.   Eyes: Conjunctivae are normal. No scleral icterus.   Neck: Normal range of motion. Neck supple.   Cardiovascular: Normal rate and regular rhythm.   Pulmonary/Chest: Effort normal and breath sounds normal.   Abdominal: Soft. Bowel sounds are normal. She exhibits no distension and no mass. There is tenderness (left lower abdomen). There is no rebound and no guarding.   Musculoskeletal: Normal range of motion.   Neurological: She is alert and oriented to person, place, and time.   Skin: Skin is warm and dry.       Significant Labs:  CBC:   Recent Labs   Lab 02/05/20  0657 02/06/20  0834   WBC 3.66* 3.45*   HGB 9.5* 9.9*   HCT 31.6* 32.2*    148*     BMP:   Recent Labs   Lab 02/06/20  0834   GLU 91      K 3.5      CO2 30*   BUN 6*   CREATININE 0.7   CALCIUM 7.6*   MG 2.3     CMP:   Recent Labs   Lab 02/06/20  0834   GLU 91   CALCIUM 7.6*      K 3.5   CO2 30*      BUN 6*   CREATININE 0.7     Coagulation:   Recent Labs   Lab 02/06/20  0834   INR 1.2   APTT 27.3       Significant Imaging:  Imaging results within the past 24 hours have been reviewed.

## 2020-02-06 NOTE — ASSESSMENT & PLAN NOTE
Patient with episode of hematemesis early morning 2/6, ~200cc.  Did have supra- therapeutic aPTT yesterday (which was discontinued yesterday 2/5)    - holding ASA/AC medications  - trend CBC  - consult GI   : recommend EGD tomorrow (2/7) for evaluation of n/c & weight loss  - x-ray abd to r/o perforation

## 2020-02-06 NOTE — PROGRESS NOTES
Patient refused AM labs. She was stuck once and  was unable to get sufficient blood; refused second stick.

## 2020-02-06 NOTE — PROGRESS NOTES
Patient vomiting dark coffee ground emesis. 200cc in emesis bag plus small additional amount in bed with patient.

## 2020-02-06 NOTE — PROGRESS NOTES
[facility] Hospital Medicine  Progress Note    Patient Name: Elizabeth Cano  MRN: [id number]  Patient Class: IP- Inpatient   Admission Date: 1/29/2020  Length of Stay: 8 days  Attending Physician: Vicki Eason MD  Primary Care Provider: Cesar Reeves MD    [facility] Medicine Team: [facility]OS[facility] MED V Rusty Aponte MD    Subjective:     Principal Problem:Septic shock        HPI:  Elizabeth Cano is a 69F with afib on coumadin, COPD, HFrEF (EF 45), who presents for evaluation from NH for hypotension. Patient reported dysuria last week, for which she was being treated with Macrobid. She was due to complete her abx course today. Patient states that antibiotics always make her lose her appetite, nauseated, weak. She had lightheadedness on standing, no recent falls, no syncope. Her dysuria has resolved, no flank pain. She had diarrhea earlier in the week, now resolved.  No URI symptoms, no HA, vision changes, CP, SOB. She uses a walker to ambulate at baseline. She feels improved with fluids. She says her BP always runs a little low.    ED: BP 84/58, HR >90, LA 4.8>2.4, UA+, CXR clear, no leukocytosis. Increase in SCr from 0.7 to 1.3    Overview/Hospital Course:  Pt is a 70 yo F w/ PMHx significant for diverticulitis, atrial fibrillation on anticoagulation, and CHF who presents with one week history of burning with urination and malaise. Associated symptoms included nausea without emesis. Initial labs were significant for severe hypomagnesemia, mild aseptic pyuria and elevated lactate (4.8). Repeat lactate revealed downtrend to 2.7. She was initiated on ceftriaxone and given 3 L of NS for her hypotension. Her hypotension persisted and she was given a dose of PO midodrine which only briefly relieved her hypotension. Eight hours after admission, pt remained hypotensive and a third lactate revealed uptrend to 3.3 mmol/L.     Critical care medicine was consulted due to refractory hypotension  with concern for requiring vasopressors. Current differentials for her shock include sepsis (related to UTI vs diverticular disease) vs cardiac vs adrenal insufficiency.     During her brief stay in the MICU, pt received further volume resuscitation with IV fluid bolus and was initiated on midodrine 5 mg TID as an oral vasopressor. CT of the abdomen and pelvis was positive for persistent peridiverticular fat stranding and area of hypoattenuation in the liver near the falciform ligament raising concern for carcinoma as etiology for her persistent infection. Since patient had failed outpatient antibiotic therapy and had established care with colorectal surgery in clinic, CRS was consulted to evaluate for possible surgical intervention given her ICU stay and possibly malignant component contributing to her diverticulitis. Abdominal X-ray revealed minimal translucency under the diaphragm concerning for free air, thus repeat was ordered.    Her cortisol while in the MICU was intermediately low raising suspicion for adrenal insufficiency however cosyntropin stimulation test was negative.    Interval History:   Early morning prior to nurse shift change, patient with hematemesis ~200cc noted.   Patient reports abdominal pain. Will consult GI.     Review of Systems   Constitutional: Positive for activity change and fatigue. Negative for appetite change, chills and fever.   HENT: Negative for trouble swallowing.    Respiratory: Negative for cough, choking, chest tightness and shortness of breath.    Cardiovascular: Negative for chest pain and leg swelling.   Gastrointestinal: Positive for abdominal pain, nausea and vomiting. Negative for abdominal distention, blood in stool, constipation and diarrhea.   Genitourinary: Negative for difficulty urinating and dysuria.   Musculoskeletal: Negative for arthralgias and back pain.   Skin: Positive for pallor. Negative for color change.   Neurological: Negative for dizziness and  headaches.   Psychiatric/Behavioral: Negative for agitation and confusion.     Objective:     Vital Signs (Most Recent):  Temp: 97.5 °F (36.4 °C) (02/06/20 1514)  Pulse: 96 (02/06/20 1514)  Resp: 18 (02/06/20 1514)  BP: (!) 103/59 (02/06/20 1514)  SpO2: (!) 93 % (02/06/20 1514) Vital Signs (24h Range):  Temp:  [96.9 °F (36.1 °C)-99 °F (37.2 °C)] 97.5 °F (36.4 °C)  Pulse:  [] 96  Resp:  [16-20] 18  SpO2:  [92 %-99 %] 93 %  BP: ()/(51-64) 103/59     Weight: 79.1 kg (174 lb 6.1 oz)  Body mass index is 28.15 kg/m².    Intake/Output Summary (Last 24 hours) at 2/6/2020 1535  Last data filed at 2/6/2020 0800  Gross per 24 hour   Intake 840 ml   Output --   Net 840 ml      Physical Exam   Constitutional: She is oriented to person, place, and time. She appears distressed (mildly).   HENT:   Head: Normocephalic.   Eyes: Conjunctivae are normal. No scleral icterus.   Neck: Normal range of motion. Neck supple.   Cardiovascular: Normal rate and regular rhythm.   Pulmonary/Chest: Effort normal and breath sounds normal.   Abdominal: Soft. Bowel sounds are normal. She exhibits no distension and no mass. There is tenderness (left lower abdomen). There is no rebound and no guarding. A hernia is present.   Musculoskeletal: Normal range of motion.   Neurological: She is alert and oriented to person, place, and time.   Skin: Skin is warm and dry.       Significant Labs: All pertinent labs within the past 24 hours have been reviewed.    Significant Imaging: I have reviewed all pertinent imaging results/findings within the past 24 hours.      Assessment/Plan:      * Septic shock        Hematemesis  Patient with episode of hematemesis early morning 2/6, ~200cc.  Did have supra- therapeutic aPTT yesterday (which was discontinued yesterday 2/5)    - holding ASA/AC medications  - trend CBC  - consult GI   : recommend EGD tomorrow (2/7) for evaluation of n/c & weight loss  - x-ray abd to r/o perforation       Liver lesion, right  lobe  Fatty infiltrate per liver U/S. Will need follow up with hepatology      Diverticulitis  SEPTIC SHOCK    68 yo F w previously diagnosed diverticulitis treated with outpatient therapy presents with hypotension and fever. Repeat imaging revealed persistent peridiverticular fat stranding concerning for ongoing infection and inflammation.     CT A/P (1/30/20): Persistence of diverticula in sigmoid colon with peridiverticular fat stranding concerning for ongoing diverticulitis. Sigmoidoscopy warranted to survey for malignant etiology. No complication or abscess noted. Area of hypoattenuation in the liver near the falciform ligament.   Liver U/S (1/31/20): Area of hypoattenuation most likely signifies steatosis    Likely contributing to her hypotension.     Plan  - colonoscopy completed 2/4; no interventions   - advance diet as tolerated  - continue IV abx (zosyn) x 1 more dose (end 2/6)  - monitor abdom pain  - will get abd x-ray to r/o perforation, persistent abd pain, and did have colonoscopy recently     Hypotension  A) Most likely secondary to hypovolemia    : completed colonic prep for colonoscopy, no PO intake; further supported by low glucose levels   - hydrate and continue to monitor BP, denies episodes of dizziness/light-headedness    B) low BP readings could be further exacerbated by the recent addition of amio   : will discuss with cardiology regarding use of medication    Unlikely to be cardiogenic/obstructive, septic although possibly unlikely; pt is on abs, no fevers/increase in WBC    - CONTINUE TO MONITOR  - resolving/ resolved           GAVIN (acute kidney injury)  Acute renal failure improving with treatment of her infection.         Acute cystitis without hematuria        Hypocalcemia  - continue home Vit D, add Calcium  - Ionized calcium low and PTH appropriately elevated    Bifascicular block  Unable to undergo outpatient device evaluation.     Hypomagnesemia  Pt with persistent hypomagnesemia  during previous admission. She had been discharged on PO magnesium, but arrived with low magnesium again. She arrived with acute renal failure as well.     Plan  - IV magnesium   - PO magnesium    Type 2 diabetes mellitus with hyperglycemia, without long-term current use of insulin  - hold home metformin  - low dose SSI for now  - diabetic diet  Lab Results   Component Value Date    HGBA1C 5.4 01/30/2020         Paroxysmal atrial fibrillation  Pt with long-standing non-valvular atrial fibrillation. CHADSVASc of 3    Plan  - continue heparin;   - continue metoprolol 50 mg BID  - PO amiodarone initiated while in the MICU without loading protocol;   - Hold warfarin pending CRS evaluation for possible procedure  - PhamD consulted for coumadin dosing    Chronic heart failure with preserved ejection fraction  TTE with EF of 45%    - Hold diuresis and antihypertensives in setting of hypotension  - Continue metoprolol      COPD (chronic obstructive pulmonary disease)  No PFT's on record.     - continue nebs PRN  - does not use oxygen  - Suggest PFT's ordered during PCP follow up.     VTE Risk Mitigation (From admission, onward)         Ordered     IP VTE HIGH RISK PATIENT  Once      01/29/20 1916     Reason for No Pharmacological VTE Prophylaxis  Once     Question:  Reasons:  Answer:  Already adequately anticoagulated on oral Anticoagulants    01/29/20 1916                      Rusty Aponte MD  Department of Hospital Medicine   [facility]

## 2020-02-06 NOTE — PLAN OF CARE
A A O x 4. Free of falls, traumas and injuries. Skin intact. Denies shortness of breath, chest pain, nausea, vomiting. Patient had dark brown emesis this AM. H/H normal. Patient has episodes of confusions but able to reorient easily. NPO at midnight for EGD tomorrow. BG monitored ACHS. Plan of care reviewed with patient. Vital signs stable. Pain monitored. Will continue to monitor.

## 2020-02-06 NOTE — CONSULTS
[facility]  Gastroenterology  Consult Note    Patient Name: Elizabeth Cano  MRN: [id number]  Admission Date: 1/29/2020  Hospital Length of Stay: 8 days  Code Status: Full Code   Attending Provider: Vicki Eason MD   Consulting Provider: Adriana Danielle MD  Primary Care Physician: Cesar Reeves MD  Principal Problem:Septic shock    Inpatient consult to Gastroenterology  Consult performed by: Adriana Danielle MD  Consult ordered by: Rusty Aponte MD        Subjective:     HPI:  Patient is a 69-year-old female with past history of AFib on Coumadin, COPD, heart failure, admitted with hypotension and sepsis found to have both possible UTI and the colitis.  On imaging the patient was found to have fat stranding around the colon, along with possible liver lesion.  Colorectal surgery was consulted to rule out underlying malignancy, patient was found to inflammation of the sigmoid with no other significant findings.  Earlier today patient had an episode of emesis with concern for upper GI bleeding.  GI was consulted.    On evaluation, the patient appears in significant pain. She states that she has bad pain in the left lower quadrant which has being going on since admission.  She does endorse nausea, and she had repeated emesis over the past few days that is brown, and had an additional episode that seemed similar today. She denies coffee grounds or blood in the emesis. She denies epigastric abdominal pain. Denies history of upper GI bleeding.  Denies history of NSAID use. She takes warfarin, but this has been held for the colonoscopy done yesterday.    Patient is currently hemodynamically stable. Labs from today with improved hemoglobin to 9.9 from 9.5.    Past Medical History:   Diagnosis Date    A-fib     GAVIN (acute kidney injury)     CHF (congestive heart failure)     COPD (chronic obstructive pulmonary disease)     Diabetes mellitus     Hypertension        Past Surgical History:    Procedure Laterality Date    CARDIAC SURGERY      COLONOSCOPY N/A 2020    Procedure: COLONOSCOPY;  Surgeon: Gilberto Laguna MD;  Location: [facility] ([address] [place] [address]);  Service: Endoscopy;  Laterality: N/A;       Review of patient's allergies indicates:  No Known Allergies  Family History     Problem Relation (Age of Onset)    Heart disease Mother        Tobacco Use    Smoking status: Former Smoker     Packs/day: 1.00     Years: 54.00     Pack years: 54.00     Types: Cigarettes     Last attempt to quit: 2019     Years since quittin.2    Smokeless tobacco: Never Used   Substance and Sexual Activity    Alcohol use: Not Currently    Drug use: Not Currently    Sexual activity: Not Currently     Partners: Male     Review of Systems   Constitutional: Positive for activity change and fatigue. Negative for appetite change, chills and fever.   HENT: Negative for trouble swallowing.    Respiratory: Negative for cough, choking, chest tightness and shortness of breath.    Cardiovascular: Negative for chest pain and leg swelling.   Gastrointestinal: Positive for abdominal pain, nausea and vomiting. Negative for abdominal distention, blood in stool, constipation and diarrhea.   Genitourinary: Negative for difficulty urinating and dysuria.   Musculoskeletal: Negative for arthralgias and back pain.   Skin: Positive for pallor. Negative for color change.   Neurological: Negative for dizziness and headaches.   Psychiatric/Behavioral: Negative for agitation and confusion.     Objective:     Vital Signs (Most Recent):  Temp: 97.4 °F (36.3 °C) (20)  Pulse: 94 (20)  Resp: 18 (20)  BP: (!) 111/58 (20)  SpO2: 98 % (20) Vital Signs (24h Range):  Temp:  [96.9 °F (36.1 °C)-99 °F (37.2 °C)] 97.4 °F (36.3 °C)  Pulse:  [] 94  Resp:  [16-20] 18  SpO2:  [92 %-99 %] 98 %  BP: ()/(51-64) 111/58     Weight: 79.1 kg (174 lb 6.1 oz) (20 0700)  Body mass index  is 28.15 kg/m².      Intake/Output Summary (Last 24 hours) at 2/6/2020 0932  Last data filed at 2/6/2020 0800  Gross per 24 hour   Intake 1080 ml   Output --   Net 1080 ml       Lines/Drains/Airways     Peripheral Intravenous Line                 Peripheral IV - Single Lumen 02/05/20 1026 20 G;1 3/4 in Right Upper Arm less than 1 day                Physical Exam   Constitutional: She is oriented to person, place, and time. She appears distressed (mildly).   HENT:   Head: Normocephalic.   Eyes: Conjunctivae are normal. No scleral icterus.   Neck: Normal range of motion. Neck supple.   Cardiovascular: Normal rate and regular rhythm.   Pulmonary/Chest: Effort normal and breath sounds normal.   Abdominal: Soft. Bowel sounds are normal. She exhibits no distension and no mass. There is tenderness (left lower abdomen). There is no rebound and no guarding.   Musculoskeletal: Normal range of motion.   Neurological: She is alert and oriented to person, place, and time.   Skin: Skin is warm and dry.       Significant Labs:  CBC:   Recent Labs   Lab 02/05/20  0657 02/06/20  0834   WBC 3.66* 3.45*   HGB 9.5* 9.9*   HCT 31.6* 32.2*    148*     BMP:   Recent Labs   Lab 02/06/20  0834   GLU 91      K 3.5      CO2 30*   BUN 6*   CREATININE 0.7   CALCIUM 7.6*   MG 2.3     CMP:   Recent Labs   Lab 02/06/20  0834   GLU 91   CALCIUM 7.6*      K 3.5   CO2 30*      BUN 6*   CREATININE 0.7     Coagulation:   Recent Labs   Lab 02/06/20  0834   INR 1.2   APTT 27.3       Significant Imaging:  Imaging results within the past 24 hours have been reviewed.    Assessment/Plan:     Bilious vomiting  Patient is 69-year-old female with past medical history of AFib on Coumadin currently off Coumadin recent colonoscopy, admitted for sepsis due to diverticulitis and a UTI.  GI consulted for possible episode of hematemesis today.  Patient states that emesis was brown with no coffee-grounds or blood.  It is unchanged  compared to her emesis before.  Her hemoglobin has been stable with a stable BUN.  An upper GI bleeding seems highly unlikely.  If some bleeding is present, it is likely due to esophagitis or Caryl-Ascencio tear due to repeated emesis. In addition the patient had eaten today, so will be unable to proceed with EGD today.  Would treat conservatively with p.o. PPI 40 mg daily and trend her hemoglobin.    -trend H&H and transfuse as indicated.  -avoid NSAIDs, heparin.  If H&H remains stable can resume warfarin.  -place on PPI p.o. 40 mg daily.  -defer endoscopic evaluation at this time.  If ongoing concern for GI bleeding please keep the patient NPO after midnight.    Addedum: On re-evaluation, the patient was reporting epigastric abdominal pain. She was tender on exam, but appeared fairly comfortable.   -Daughter was at bedside, she reported that the patient has been suffering from repeated nausea or vomiting. She also lost about 20lbs.     -Please obtain KUB to r/o perforation given recent colonoscopy.  -Please keep NPO after MN for EGD to evaluate cause of N/V and weight loss.      Thank you for your consult. I will follow-up with patient. Please contact us if you have any additional questions.    Case Danielle MD  Gastroenterology  [facility]

## 2020-02-06 NOTE — HPI
Patient is a 69-year-old female with past history of AFib on Coumadin, COPD, heart failure, admitted with hypotension and sepsis found to have both possible UTI and the colitis.  On imaging the patient was found to have fat stranding around the colon, along with possible liver lesion.  Colorectal surgery was consulted to rule out underlying malignancy, patient was found to inflammation of the sigmoid with no other significant findings.  Earlier today patient had an episode of emesis with concern for upper GI bleeding.  GI was consulted.    On evaluation, the patient appears in significant pain. She states that she has bad pain in the left lower quadrant which has being going on since admission.  She does endorse nausea, and she had repeated emesis over the past few days that is brown, and had an additional episode that seemed similar today. She denies coffee grounds or blood in the emesis. She denies epigastric abdominal pain. Denies history of upper GI bleeding.  Denies history of NSAID use. She takes warfarin, but this has been held for the colonoscopy done yesterday.    Patient is currently hemodynamically stable. Labs from today with improved hemoglobin to 9.9 from 9.5.

## 2020-02-06 NOTE — ASSESSMENT & PLAN NOTE
A) Most likely secondary to hypovolemia    : completed colonic prep for colonoscopy, no PO intake; further supported by low glucose levels   - hydrate and continue to monitor BP, denies episodes of dizziness/light-headedness    B) low BP readings could be further exacerbated by the recent addition of amio   : will discuss with cardiology regarding use of medication    Unlikely to be cardiogenic/obstructive, septic although possibly unlikely; pt is on abs, no fevers/increase in WBC    - CONTINUE TO MONITOR  - resolving/ resolved

## 2020-02-06 NOTE — PROGRESS NOTES
"Patient confused throughout shift and requires reorientation frequently. Generally easily reoriented, but around 0215, patient began screaming and was significantly more difficult to reorient. She began stating that "she had to get out of here, she needed to go downstairs, she wanted to go back to her room" and was inconsolable. Pages IMV. Dr. Ybarra called back and stated that he would place orders. Awaiting orders. Will continue to monitor.  "

## 2020-02-07 PROBLEM — R41.0 DELIRIUM: Status: ACTIVE | Noted: 2020-01-01

## 2020-02-07 PROBLEM — E86.1 HYPOVOLEMIA: Status: ACTIVE | Noted: 2020-01-01

## 2020-02-07 NOTE — NURSING TRANSFER
Nursing Transfer Note      2/7/2020     Transfer To: 342[address]    Transfer via stretcher    Transfer with cardiac monitoring    Transported by transporter    Medicines sent: o2    Chart send with patient: Yes    Notified: dtr in the room    Patient reassessed at: 2/7/20

## 2020-02-07 NOTE — TRANSFER OF CARE
"Anesthesia Transfer of Care Note    Patient: Elizabeth Cano    Procedure(s) Performed: Procedure(s) (LRB):  EGD (ESOPHAGOGASTRODUODENOSCOPY) (N/A)    Patient location: PACU    Anesthesia Type: general    Transport from OR: Transported from OR on 2-3 L/min O2 by NC with adequate spontaneous ventilation    Post pain: adequate analgesia    Post assessment: no apparent anesthetic complications and tolerated procedure well    Post vital signs: stable    Level of consciousness: awake, alert and confused    Nausea/Vomiting: no nausea/vomiting    Complications: none    Transfer of care protocol was followed      Last vitals:   Visit Vitals  BP (!) 116/55   Pulse 96   Temp 36.2 °C (97.2 °F)   Resp 16   Ht 5' 6" (1.676 m)   Wt 74.6 kg (164 lb 7.4 oz)   SpO2 (!) 94%   Breastfeeding? No   BMI 26.55 kg/m²     "

## 2020-02-07 NOTE — PLAN OF CARE
Problem: Malnutrition  Goal: Improved Nutritional Intake  Intervention: Promote and Optimize Oral Intake  Flowsheets (Taken 2/7/2020 6765)  Oral Nutrition Promotion: nutritional therapy counseling provided; other (see comments) (add Boost Glucose Control TID)    Recommendations     Recommendation:   1. As medically appropriate, advance as tolerated to diabetic cardiac diet.   2. Add Boost Glucose Control (strawberry) TID to increase calorie/protein intake.   3. RD to monitor & follow up.     Goals: Pt to receive nutrition by RD follow up  Nutrition Goal Status: new  Communication of RD Recs: other (comment)(POC)

## 2020-02-07 NOTE — PLAN OF CARE
Discharge Recommendation: SNF - skilled bed for PT/OT at current nursing home    0 goals met today. PT goals appropriate.    Leticia Norwood PT, DPT  2020  Pager: [phone]      Problem: Physical Therapy Goal  Goal: Physical Therapy Goal  Description  Goals to be met by: 2020     Patient will increase functional independence with mobility by performin. Supine to sit with Contact Guard Assistance  2. Sit to supine with Contact Guard Assistance  3. Sit to stand transfer with Contact Guard Assistance using LRAD  4. Bed to chair transfer with Contact Guard Assistance using Rolling Walker  5. Gait  x 25 feet with Contact Guard Assistance using Rolling Walker.   6. Lower extremity exercise program x20 reps per handout, with independence     Outcome: Ongoing, Progressing

## 2020-02-07 NOTE — ASSESSMENT & PLAN NOTE
SEPTIC SHOCK    68 yo F w previously diagnosed diverticulitis treated with outpatient therapy presents with hypotension and fever. Repeat imaging revealed persistent peridiverticular fat stranding concerning for ongoing infection and inflammation.     CT A/P (1/30/20): Persistence of diverticula in sigmoid colon with peridiverticular fat stranding concerning for ongoing diverticulitis. Sigmoidoscopy warranted to survey for malignant etiology. No complication or abscess noted. Area of hypoattenuation in the liver near the falciform ligament.   Liver U/S (1/31/20): Area of hypoattenuation most likely signifies steatosis    Likely contributing to her hypotension.     Plan  - colonoscopy completed 2/4; no interventions   - advance diet as tolerated  - completed IV abx (zosyn) x 1 more dose (end 2/6)  - monitor abdom pain  - negative abd x-ray to r/o perforation,

## 2020-02-07 NOTE — [employer] PATIENT INSTRUCTIONS
Discharge Summary/Instructions after an Endoscopic Procedure  Patient Name: Elizabeth Cano  Patient MRN: [id number]  Patient YOB: 1950 Friday, February 07, 2020  Aleksey Gomez MD  RESTRICTIONS:  During your procedure today, you received medications for sedation.  These   medications may affect your judgment, balance and coordination.  Therefore,   for 24 hours, you have the following restrictions:   - DO NOT drive a car, operate machinery, make legal/financial decisions,   sign important papers or drink alcohol.    ACTIVITY:  Today: no heavy lifting, straining or running due to procedural   sedation/anesthesia.  The following day: return to full activity including work.  DIET:  Eat and drink normally unless instructed otherwise.     TREATMENT FOR COMMON SIDE EFFECTS:  - Mild abdominal pain, nausea, belching, bloating or excessive gas:  rest,   eat lightly and use a heating pad.  - Sore Throat: treat with throat lozenges and/or gargle with warm salt   water.  - Because air was used during the procedure, expelling large amounts of air   from your rectum or belching is normal.  - If a bowel prep was taken, you may not have a bowel movement for 1-3 days.    This is normal.  SYMPTOMS TO WATCH FOR AND REPORT TO YOUR PHYSICIAN:  1. Abdominal pain or bloating, other than gas cramps.  2. Chest pain.  3. Back pain.  4. Signs of infection such as: chills or fever occurring within 24 hours   after the procedure.  5. Rectal bleeding, which would show as bright red, maroon, or black stools.   (A tablespoon of blood from the rectum is not serious, especially if   hemorrhoids are present.)  6. Vomiting.  7. Weakness or dizziness.  GO DIRECTLY TO THE NEAREST EMERGENCY ROOM IF YOU HAVE ANY OF THE FOLLOWING:      Difficulty breathing              Chills and/or fever over 101 F   Persistent vomiting and/or vomiting blood   Severe abdominal pain   Severe chest pain   Black, tarry stools   Bleeding- more than one tablespoon   Any  other symptom or condition that you feel may need urgent attention  Your doctor recommends these additional instructions:  If any biopsies were taken, your doctors clinic will contact you in 1 to 2   weeks with any results.  - [facility] to [facility].   - Advance diet as tolerated.   - Recommend an anti-emetic/anti-nausea medication today.   - Await pathology results.   - Telephone endoscopist for pathology results in 2 weeks.  For questions, problems or results please call your physician - Aleksey Gomez MD at Work:  [phone].  [facility], EMERGENCY ROOM PHONE NUMBER: [phone]  IF A COMPLICATION OR EMERGENCY SITUATION ARISES AND YOU ARE UNABLE TO REACH   YOUR PHYSICIAN - GO DIRECTLY TO THE EMERGENCY ROOM.  Aleksey Gomez MD  2/7/2020 3:42:17 PM  This report has been verified and signed electronically.  [employer]

## 2020-02-07 NOTE — PLAN OF CARE
Plan of care discussed with patient. Patient is free of fall/trauma/injury. Denies CP, SOB, or pain/discomfort. Patient has rounds of confusion, but remains AAOx4. Patient on contact precautions for potential head lice. Patient scheduled for EGD later today. All questions addressed. Will continue to monitor

## 2020-02-07 NOTE — PROGRESS NOTES
"[facility]  Adult Nutrition  Progress Note    SUMMARY       Recommendations    Recommendation:   1. As medically appropriate, advance as tolerated to diabetic cardiac diet.   2. Add Boost Glucose Control (strawberry) TID to increase calorie/protein intake.   3. RD to monitor & follow up.    Goals: Pt to receive nutrition by RD follow up  Nutrition Goal Status: new  Communication of RD Recs: other (comment)(POC)    Reason for Assessment    Reason For Assessment: length of stay  Diagnosis: infection/sepsis(septic shock)  Relevant Medical History: COPD, HF, DM, diverticulitis  Interdisciplinary Rounds: did not attend  General Information Comments: Pt resting in bed with family at bedside this afternoon. Family reports pt with decreased appetite > 3 months PTA 2/2 GI issues, noting pt with diverticulitis. Per chart, pt also w/ nausea and decreased appetite with antibiotics. % intake this admission per RN documentation, however pt has been frequently NPO/clear liquids. Family also reports wt loss over a few months PTA, although were unsure of amount. Per family, pt's UBW is 180#. No wt history per chart, however admit weight of 154# reflects a 14% wt loss from reported UBW. NFPE not completed 2/2 pt with head lice, however pt meets criteria for chronic severe malnutrition 2/2 decreased appetite, wt loss.  Nutrition Discharge Planning: Adequate PO intake to meet needs    Nutrition Risk Screen    Nutrition Risk Screen: no indicators present    Nutrition/Diet History    Spiritual, Cultural Beliefs, Sabianist Practices, Values that Affect Care: no    Anthropometrics    Temp: 97 °F (36.1 °C)  Height Method: Stated  Height: 5' 6" (167.6 cm)  Height (inches): 66 in  Weight Method: Bed Scale  Weight: 74.6 kg (164 lb 7.4 oz)  Weight (lb): 164.46 lb  Ideal Body Weight (IBW), Female: 130 lb  % Ideal Body Weight, Female (lb): 120.58 %  BMI (Calculated): 26.6    Lab/Procedures/Meds    Pertinent Labs " Reviewed: reviewed  Pertinent Labs Comments: BUN 6, Ca 7.7, P 2.5, CRP 17.4  Pertinent Medications Reviewed: reviewed  Pertinent Medications Comments: insulin, magnesium, pantoprazole, warfarin    Estimated/Assessed Needs    Weight Used For Calorie Calculations: 74.6 kg (164 lb 7.4 oz)  Energy Calorie Requirements (kcal): 1603 kcal/day  Energy Need Method: Bosque-St Jeor(x 1.25 PAL)  Protein Requirements: 75-90 g/day(1-1.2 g/kg)  Weight Used For Protein Calculations: 74.6 kg (164 lb 7.4 oz)  Fluid Requirements (mL): per MD or 1 mL/kcal     RDA Method (mL): 1603  CHO Requirement: 200g CHO/day or 50% total kcal    Nutrition Prescription Ordered    Current Diet Order: NPO    Evaluation of Received Nutrient/Fluid Intake    I/O: +6.4L since admit  Comments: LBM 2/6  % Intake of Estimated Energy Needs: 0 - 25 %  % Meal Intake: NPO    Nutrition Risk    Level of Risk/Frequency of Follow-up: (1x/week)     Assessment and Plan    Nutrition Problem:  Severe Protein-Calorie Malnutrition  Malnutrition in the context of Chronic Illness/Injury    Related to (etiology):  Inadequate energy intake 2/2 poor appetite    Signs and Symptoms (as evidenced by):  Energy Intake: <75% of estimated energy requirement for > 3 months  Weight Loss: 14% x 2-3 months    Interventions(treatment strategy):  Collaboration with other providers  Modified diet - diabetic cardiac  Commercial beverage - Boost Glucose Control TID    Nutrition Diagnosis Status:  New     Monitor and Evaluation    Food and Nutrient Intake: energy intake, food and beverage intake  Food and Nutrient Adminstration: diet order  Anthropometric Measurements: weight, weight change, body mass index  Biochemical Data, Medical Tests and Procedures: electrolyte and renal panel, gastrointestinal profile, glucose/endocrine profile, inflammatory profile, lipid profile  Nutrition-Focused Physical Findings: overall appearance     Malnutrition Assessment  Malnutrition Type: chronic  illness  Energy Intake: moderate energy intake      Weight Loss (Malnutrition): (14% x 2-3 months based on UBW, family report)  Energy Intake (Malnutrition): less than 75% for greater than or equal to 3 months     Nutrition Follow-Up    RD Follow-up?: Yes

## 2020-02-07 NOTE — PT/OT/SLP PROGRESS
"Occupational Therapy   Treatment    Name: Elizabeth Cano  MRN: [id number]  Admitting Diagnosis:  Septic shock  3 Days Post-Op    Recommendations:     Discharge Recommendations: nursing facility, skilled(Retrun to NH with skilled bed)  Discharge Equipment Recommendations:  none  Barriers to discharge:  None    Assessment:     Elizabeth Cano is a 69 y.o. female with a medical diagnosis of Septic shock.  She presents with the following performance deficits affecting function: weakness, impaired endurance, impaired cardiopulmonary response to activity, decreased safety awareness, impaired cognition, impaired self care skills, impaired functional mobilty, impaired balance. Pt tolerated session fair this date. Pt oriented but remains slightly confused noted in her irrelevant responses throughout the session. Pt appeared anxious about how many lines she had attached and her current medical state. Pt continues to display hesitancy towards out of bed activities such as transfers and functional mobility due to a fear of falling. Pt performed ADLs this date with SBA while seated EOB. Pt progressing towards goals; however, remains impacted due to the deficits listed above. OT POC remains appropriate for patient on this date. Pt will continue to benefit from skilled OT to address the deficits impacting her occupational performance.     Rehab Prognosis:  Good; patient would benefit from acute skilled OT services to address these deficits and reach maximum level of function.       Plan:     Patient to be seen 3 x/week to address the above listed problems via self-care/home management, therapeutic activities, therapeutic exercises  · Plan of Care Expires: 03/03/20  · Plan of Care Reviewed with: patient    Subjective     Pain/Comfort: "I'm going to fall." Please, don't let me fall." "I feel weaker today."  · Pain Rating 1: (pt did not rate; pt c/o L leg, stomach, and throat pain)  · Pain Rating Post-Intervention 1: (pt did not " rate)  · Pain Addressed 2: Cessation of Activity, Nurse notified    Objective:     Communicated with: RN prior to session.  Patient found HOB elevated with central line, telemetry, PureWick upon OT entry to room.    General Precautions: Standard, fall   Orthopedic Precautions:N/A   Braces: N/A     Occupational Performance:     Bed Mobility:    · Patient completed Rolling/Turning to Left with  minimum assistance and with side rail for linen adjustment  · Patient completed Rolling/Turning to Right with minimum assistance and with side rail for linen adjustment  · Patient completed Scooting/Bridging with maximal assistance and 2 persons for posterior positioning in bed in supine; max A x2 for EOB positioning due to posterior pushing   · Patient completed Supine to Sit with maximal assistance and 2 persons for trunk support to reach upright sitting and BLE placement over bed   · Patient completed Sit to Supine with moderate assistance for BLE guidance to return to supine     Functional Mobility/Transfers:  · Patient completed Sit <> Stand Transfer from bed x2 trials with maximal assistance and of 2 persons  with  no assistive device   · Improved tolerance to upright standing noted on 2nd trial  · Cuing for hip extension on both attempts   · BUE HHA required in standing; 2 persons present for pt's comfort in standing   · Max A of 1 person for static standing balance  · Functional Mobility: Pt took 1 L side step EOB with max A of 2 persons and no AD. No LOB noted. 1 seated RB required due to fear of falling and fatigue.    Activities of Daily Living:  · Feeding:  stand by assistance seated EOB to grasp cup, bring to mouth, and drink  · Grooming: stand by assistance seated EOB for facial hygiene  · Toileting: brief donned upon OT's arrival; no toileting needs during session       [facility] 6 Click ADL: 15    Treatment & Education:  - Role of OT/ OT POC  - Self care safety/ independence  - Functional transfer/ mobility  safety  - Bed mobility safety  - Pursed lip breathing  - Importance of sitting UIC throughout the day as tolerated; RN aware of pt's functional A level  - Energy conservation techniques such as taking rest breaks as needed  - Encouragement provided throughout session   - Pt tolerated sitting EOB for 10 minutes with max A initially due to posterior pushing then decreased to SBA. Pt performed ADLs well EOB requiring SBA for balance safety. Mild dizziness reported subsided with increased time. Pt performed the functional ax's listed above along with the assist levels. No transfer to chair this date for nursing care in supine post session.     Patient left HOB elevated with all lines intact, call button in reach, bed alarm on, RN notified and RN presentEducation:      GOALS:   Multidisciplinary Problems     Occupational Therapy Goals        Problem: Occupational Therapy Goal    Goal Priority Disciplines Outcome Interventions   Occupational Therapy Goal     OT, PT/OT Ongoing, Progressing    Description:  Goals to be met by: 3/3/2020    Patient will increase functional independence with ADLs by performing:    UE Dressing with Set-up Assistance.  LE Dressing with Set-up Assistance and Assistive Devices as needed.  Grooming while standing at sink with Supervision and Assistive Devices as needed.  Toileting from toilet with Set-up Assistance for hygiene and clothing management.   Bathing from  standing at sink with Set-up Assistance and Assistive Devices as needed.  Toilet transfer to toilet with Supervision.                      Time Tracking:     OT Date of Treatment: 02/07/20  OT Start Time: 1004  OT Stop Time: 1030  OT Total Time (min): 26 min    Billable Minutes:Self Care/Home Management 13  Therapeutic Activity 13    Dayna Garrido, NINA  2/7/2020

## 2020-02-07 NOTE — SUBJECTIVE & OBJECTIVE
Interval History:   Patient's continues to have intermittent episodes of confusion and hallucinations. Easily redirectable.     Plan for EGD     Review of Systems   Respiratory: Negative for cough and shortness of breath.    Cardiovascular: Negative for chest pain and palpitations.   Gastrointestinal: Positive for abdominal pain. Negative for abdominal distention, constipation, nausea and vomiting.   Psychiatric/Behavioral: Positive for confusion and hallucinations. The patient is hyperactive.      Objective:     Vital Signs (Most Recent):  Temp: 97 °F (36.1 °C) (02/07/20 1600)  Pulse: 88 (02/07/20 1615)  Resp: (!) 21 (02/07/20 1615)  BP: (!) 113/58 (02/07/20 1600)  SpO2: 96 % (02/07/20 1615) Vital Signs (24h Range):  Temp:  [97 °F (36.1 °C)-97.9 °F (36.6 °C)] 97 °F (36.1 °C)  Pulse:  [] 88  Resp:  [12-22] 21  SpO2:  [82 %-99 %] 96 %  BP: ()/(54-72) 113/58     Weight: 74.6 kg (164 lb 7.4 oz)  Body mass index is 26.55 kg/m².    Intake/Output Summary (Last 24 hours) at 2/7/2020 1633  Last data filed at 2/7/2020 1621  Gross per 24 hour   Intake 240 ml   Output --   Net 240 ml      Physical Exam   Constitutional: She is oriented to person, place, and time. She appears distressed (mildly).   HENT:   Head: Normocephalic.   Eyes: Conjunctivae are normal. No scleral icterus.   Neck: Normal range of motion. Neck supple.   Cardiovascular: Normal rate and regular rhythm.   Pulmonary/Chest: Effort normal and breath sounds normal.   Abdominal: Soft. Bowel sounds are normal. She exhibits no distension and no mass. There is tenderness (left lower abdomen). There is no rebound and no guarding. A hernia is present.   Musculoskeletal: Normal range of motion.   Neurological: She is alert and oriented to person, place, and time.   Skin: Skin is warm and dry.   Psychiatric: She is actively hallucinating (intermittent ).       Significant Labs: All pertinent labs within the past 24 hours have been reviewed.    Significant  Imaging: I have reviewed all pertinent imaging results/findings within the past 24 hours.

## 2020-02-07 NOTE — ANESTHESIA POSTPROCEDURE EVALUATION
Anesthesia Post Evaluation    Patient: Elizabeth Cano    Procedure(s) Performed: Procedure(s) (LRB):  EGD (ESOPHAGOGASTRODUODENOSCOPY) (N/A)    Final Anesthesia Type: general    Patient location during evaluation: PACU  Level of consciousness: awake and confused  Post-procedure vital signs: reviewed and stable  Pain management: adequate  Airway patency: patent    PONV status at discharge: No PONV  Anesthetic complications: no      Cardiovascular status: blood pressure returned to baseline  Respiratory status: spontaneous ventilation and nasal cannula  Hydration status: euvolemic  Follow-up not needed.          Vitals Value Taken Time   BP 86/53 2/7/2020  3:39 PM   Temp 36.1 °C (97 °F) 2/7/2020  3:26 PM   Pulse 86 2/7/2020  3:45 PM   Resp 27 2/7/2020  3:45 PM   SpO2 81 % 2/7/2020  3:45 PM   Vitals shown include unvalidated device data.      No case tracking events are documented in the log.      Pain/Amanda Score: Amanda Score: 8 (2/7/2020  3:30 PM)

## 2020-02-07 NOTE — ASSESSMENT & PLAN NOTE
Patient with worsening episodes of delirium & hallucinations   Family reports similar symtoms during an admission at  in November    - reveiwed medications and discontinued medications associated with worsening Mental status  - further workup in progress infectious/metabolic  - will start low dose seroquel qhs to help with sleep cycles   - continue to monitor

## 2020-02-07 NOTE — PROGRESS NOTES
[facility] Hospital Medicine  Progress Note    Patient Name: Elizabeth Cano  MRN: [id number]  Patient Class: IP- Inpatient   Admission Date: 1/29/2020  Length of Stay: 9 days  Attending Physician: Vicki Eason MD  Primary Care Provider: Cesar Reeves MD    [facility] Medicine Team: [facility] MED V Rusty Aponte MD    Subjective:     Principal Problem:Septic shock        HPI:  Elizabeth Cano is a 69F with afib on coumadin, COPD, HFrEF (EF 45), who presents for evaluation from NH for hypotension. Patient reported dysuria last week, for which she was being treated with Macrobid. She was due to complete her abx course today. Patient states that antibiotics always make her lose her appetite, nauseated, weak. She had lightheadedness on standing, no recent falls, no syncope. Her dysuria has resolved, no flank pain. She had diarrhea earlier in the week, now resolved.  No URI symptoms, no HA, vision changes, CP, SOB. She uses a walker to ambulate at baseline. She feels improved with fluids. She says her BP always runs a little low.    ED: BP 84/58, HR >90, LA 4.8>2.4, UA+, CXR clear, no leukocytosis. Increase in SCr from 0.7 to 1.3    Overview/Hospital Course:  Pt is a 68 yo F w/ PMHx significant for diverticulitis, atrial fibrillation on anticoagulation, and CHF who presents with one week history of burning with urination and malaise. Associated symptoms included nausea without emesis. Initial labs were significant for severe hypomagnesemia, mild aseptic pyuria and elevated lactate (4.8). Repeat lactate revealed downtrend to 2.7. She was initiated on ceftriaxone and given 3 L of NS for her hypotension. Her hypotension persisted and she was given a dose of PO midodrine which only briefly relieved her hypotension. Eight hours after admission, pt remained hypotensive and a third lactate revealed uptrend to 3.3 mmol/L.     Critical care medicine was consulted due to refractory hypotension  with concern for requiring vasopressors. Current differentials for her shock include sepsis (related to UTI vs diverticular disease) vs cardiac vs adrenal insufficiency.     During her brief stay in the MICU, pt received further volume resuscitation with IV fluid bolus and was initiated on midodrine 5 mg TID as an oral vasopressor. CT of the abdomen and pelvis was positive for persistent peridiverticular fat stranding and area of hypoattenuation in the liver near the falciform ligament raising concern for carcinoma as etiology for her persistent infection. Since patient had failed outpatient antibiotic therapy and had established care with colorectal surgery in clinic, CRS was consulted to evaluate for possible surgical intervention given her ICU stay and possibly malignant component contributing to her diverticulitis. Abdominal X-ray revealed minimal translucency under the diaphragm concerning for free air, thus repeat was ordered.    Her cortisol while in the MICU was intermediately low raising suspicion for adrenal insufficiency however cosyntropin stimulation test was negative.    Interval History:   Patient's continues to have intermittent episodes of confusion and hallucinations. Easily redirectable.     Plan for EGD     Review of Systems   Respiratory: Negative for cough and shortness of breath.    Cardiovascular: Negative for chest pain and palpitations.   Gastrointestinal: Positive for abdominal pain. Negative for abdominal distention, constipation, nausea and vomiting.   Psychiatric/Behavioral: Positive for confusion and hallucinations. The patient is hyperactive.      Objective:     Vital Signs (Most Recent):  Temp: 97 °F (36.1 °C) (02/07/20 1600)  Pulse: 88 (02/07/20 1615)  Resp: (!) 21 (02/07/20 1615)  BP: (!) 113/58 (02/07/20 1600)  SpO2: 96 % (02/07/20 1615) Vital Signs (24h Range):  Temp:  [97 °F (36.1 °C)-97.9 °F (36.6 °C)] 97 °F (36.1 °C)  Pulse:  [] 88  Resp:  [12-22] 21  SpO2:  [82 %-99 %]  96 %  BP: ()/(54-72) 113/58     Weight: 74.6 kg (164 lb 7.4 oz)  Body mass index is 26.55 kg/m².    Intake/Output Summary (Last 24 hours) at 2/7/2020 1633  Last data filed at 2/7/2020 1621  Gross per 24 hour   Intake 240 ml   Output --   Net 240 ml      Physical Exam   Constitutional: She is oriented to person, place, and time. She appears distressed (mildly).   HENT:   Head: Normocephalic.   Eyes: Conjunctivae are normal. No scleral icterus.   Neck: Normal range of motion. Neck supple.   Cardiovascular: Normal rate and regular rhythm.   Pulmonary/Chest: Effort normal and breath sounds normal.   Abdominal: Soft. Bowel sounds are normal. She exhibits no distension and no mass. There is tenderness (left lower abdomen). There is no rebound and no guarding. A hernia is present.   Musculoskeletal: Normal range of motion.   Neurological: She is alert and oriented to person, place, and time.   Skin: Skin is warm and dry.   Psychiatric: She is actively hallucinating (intermittent ).       Significant Labs: All pertinent labs within the past 24 hours have been reviewed.    Significant Imaging: I have reviewed all pertinent imaging results/findings within the past 24 hours.      Assessment/Plan:      * Septic shock        Delirium  Patient with worsening episodes of delirium & hallucinations   Family reports similar symtoms during an admission at  in November    - reveiwed medications and discontinued medications associated with worsening Mental status  - further workup in progress infectious/metabolic  - will start low dose seroquel qhs to help with sleep cycles   - continue to monitor           Hematemesis  Patient with episode of hematemesis early morning 2/6, ~200cc.  Did have supra- therapeutic aPTT yesterday (which was discontinued yesterday 2/5)    - holding ASA/AC medications  - trend CBC  - consult GI   : EGD today (2/7) for evaluation of n/c & weight loss   : negative x-ray abd to r/o perforation   -  resolved       Liver lesion, right lobe  Fatty infiltrate per liver U/S. Will need follow up with hepatology      Diverticulitis  SEPTIC SHOCK    70 yo F w previously diagnosed diverticulitis treated with outpatient therapy presents with hypotension and fever. Repeat imaging revealed persistent peridiverticular fat stranding concerning for ongoing infection and inflammation.     CT A/P (1/30/20): Persistence of diverticula in sigmoid colon with peridiverticular fat stranding concerning for ongoing diverticulitis. Sigmoidoscopy warranted to survey for malignant etiology. No complication or abscess noted. Area of hypoattenuation in the liver near the falciform ligament.   Liver U/S (1/31/20): Area of hypoattenuation most likely signifies steatosis    Likely contributing to her hypotension.     Plan  - colonoscopy completed 2/4; no interventions   - advance diet as tolerated  - completed IV abx (zosyn) x 1 more dose (end 2/6)  - monitor abdom pain  - negative abd x-ray to r/o perforation,       Hypotension  A) Most likely secondary to hypovolemia    : completed colonic prep for colonoscopy, no PO intake; further supported by low glucose levels   - hydrate and continue to monitor BP, denies episodes of dizziness/light-headedness    B) low BP readings could be further exacerbated by the recent addition of amio   : will discuss with cardiology regarding use of medication    Unlikely to be cardiogenic/obstructive, septic although possibly unlikely; pt is on abs, no fevers/increase in WBC    - CONTINUE TO MONITOR  - resolving/ resolved           GAVIN (acute kidney injury)  Acute renal failure improving with treatment of her infection.         Acute cystitis without hematuria        Hypocalcemia  - continue home Vit D, add Calcium  - Ionized calcium low and PTH appropriately elevated    Bifascicular block  Unable to undergo outpatient device evaluation.     Hypomagnesemia  Pt with persistent hypomagnesemia during previous  admission. She had been discharged on PO magnesium, but arrived with low magnesium again. She arrived with acute renal failure as well.     Plan  - IV magnesium   - PO magnesium    Type 2 diabetes mellitus with hyperglycemia, without long-term current use of insulin  - hold home metformin  - low dose SSI for now  - diabetic diet  Lab Results   Component Value Date    HGBA1C 5.4 01/30/2020         Paroxysmal atrial fibrillation  Pt with long-standing non-valvular atrial fibrillation. CHADSVASc of 3    Plan  - continue heparin;   - continue metoprolol 50 mg BID  - PO amiodarone initiated while in the MICU without loading protocol;   - Hold warfarin pending CRS evaluation for possible procedure  - PhamD consulted for coumadin dosing    Chronic heart failure with preserved ejection fraction  TTE with EF of 45%    - Hold diuresis and antihypertensives in setting of hypotension  - Continue metoprolol      COPD (chronic obstructive pulmonary disease)  No PFT's on record.     - continue nebs PRN  - does not use oxygen  - Suggest PFT's ordered during PCP follow up.     VTE Risk Mitigation (From admission, onward)         Ordered     warfarin (COUMADIN) tablet 1 mg  Daily      02/07/20 0746     IP VTE HIGH RISK PATIENT  Once      01/29/20 1916     Reason for No Pharmacological VTE Prophylaxis  Once     Question:  Reasons:  Answer:  Already adequately anticoagulated on oral Anticoagulants    01/29/20 1916                      Rusty Aponte MD  Department of Hospital Medicine   [facility]

## 2020-02-07 NOTE — ASSESSMENT & PLAN NOTE
Patient with episode of hematemesis early morning 2/6, ~200cc.  Did have supra- therapeutic aPTT yesterday (which was discontinued yesterday 2/5)    - holding ASA/AC medications  - trend CBC  - consult GI   : EGD today (2/7) for evaluation of n/c & weight loss   : negative x-ray abd to r/o perforation   - resolved

## 2020-02-07 NOTE — PT/OT/SLP PROGRESS
"Physical Therapy Treatment    Patient Name:  Elizabeth Cano   MRN:  [id number]  Admitting Diagnosis:  Septic shock   Recent Surgery: Procedure(s) (LRB):  COLONOSCOPY (N/A) 3 Days Post-Op  Admit Date: 1/29/2020  Length of Stay: 9 days    Recommendations:     Discharge Recommendations:  nursing facility, skilled(return to NH with skilled bed)   Discharge Equipment Recommendations: none   Barriers to discharge: None    Assessment:     Elizabeth Cano is a 69 y.o. female admitted with a medical diagnosis of Septic shock.  She presents with the following impairments/functional limitations:  impaired endurance, weakness, impaired functional mobilty, impaired self care skills, impaired balance, gait instability, decreased lower extremity function, decreased upper extremity function, decreased safety awareness, impaired cardiopulmonary response to activity, impaired fine motor. Pt tolerated session fairly today. Pt with some confusion on this date, closing her eyes and speaking with "family members" but no family/friends were present in the room throughout. Pt with increased anxiety on this date and req'd max coaxing and encouragement from PT for mobility on this date. Due to this, pt initially req'd Max A to sit EOB but was able to progress to SBA when posture was adjusted. Pt req'd Max A x 2 for standing on this date due to extreme anxiety and throughout standing trials vocalized "I'm falling" repeatedly despite pt being in upright stance. Pt will benefit from SNF, specifically at pt's current NH, after discharge from acute services in order to continue to progress pt's strength, endurance, and balance to help pt maximize independence at or near OF.      Rehab Prognosis: Good; patient would benefit from acute skilled PT services to address these deficits and reach maximum level of function.    Recent Surgery: Procedure(s) (LRB):  COLONOSCOPY (N/A) 3 Days Post-Op    Plan:     During this hospitalization, patient to be " "seen 4 x/week to address the identified rehab impairments via gait training, therapeutic activities, therapeutic exercises, neuromuscular re-education and progress toward the following goals:    · Plan of Care Expires:  03/07/20    Subjective     RN notified prior to session. No family/friends present upon PT entrance into room.    Chief Complaint: "I was talking to my granddaughter. Where is she? Did she leave?"  Patient/Family Comments/goals: get better  Pain/Comfort:  Pain Rating 1: other (see comments)(did not rate)  Location - Orientation 1: generalized  Location 1: (legs, stomach)  Pain Addressed 1: Pre-medicate for activity, Reposition, Distraction  Pain Rating Post-Intervention 1: other (see comments)(no change)      Objective:     Additional staff present: OT for co-treatment due to pt's impaired cardiopulmonary reserve as well as extreme anxiety for falling with mobility    Patient found HOB elevated with: peripheral IV, telemetry, PureWick   Mental Status: Patient is oriented to AxOx4 and follows single step commands. Patient is Alert, Confused, and Cooperative during session.    General Precautions: Standard, Cardiac fall, special contact   Orthopedic Precautions:N/A   Braces: N/A   Body mass index is 26.55 kg/m².  Oxygen Device: Nasal Cannula 2L    Outcome Measures:  AM-PAC 6 CLICK MOBILITY  Turning over in bed (including adjusting bedclothes, sheets and blankets)?: 3  Sitting down on and standing up from a chair with arms (e.g., wheelchair, bedside commode, etc.): 2  Moving from lying on back to sitting on the side of the bed?: 2  Moving to and from a bed to a chair (including a wheelchair)?: 2  Need to walk in hospital room?: 2  Climbing 3-5 steps with a railing?: 1  Basic Mobility Total Score: 12     Functional Mobility:    Bed Mobility:   · Rolling/Turning to Left: minimum assistance and with side rail  · Rolling/Turning to Right: minimum assistance and with side rail  · Scooting to HOB via supine " bridge: total assistance and via drawsheet  · Supine to Sit: maximal assistance and 2 persons; from Lt side of bed  · Assist x2 to bring trunk into upright  · Scooting anteriorly to EOB to have both feet planted on floor: contact guard assistance  · Sit to Supine: minimum assistance; to Rt side of bed  · Assist with BLE  · Pt found/returned to bedside chair    Sitting Balance at Edge of Bed:   Assistance Level Required: Stand-by Assistance and Maximum Assistance   Time: 10 min   Postural deviations noted: slouched posture, rounded shoulders and forward head   Comments: Pt initially Max A to sit upright with significant Lt lean. Pt was able to be corrected to upright and progressed to SBA. Pt able to perform sitting balance task reaching inside BUCKY during sitting balance. Pt anxious while EOB.    Transfers:   · Sit <> Stand Transfer: maximal assistance and of 2 persons with hand-held assist   · Stand <> Sit Transfer: maximal assistance and of 2 persons with hand-held assist   · [id number] from EOB   · Blocking at bilateral knees  · Pt picked up Lt foot being blocked and stepped over PT's foot causing severe Lt lean during first stand    Standing Balance:   Assistance Level Required: Maximum Assistance   Patient used: hand-held assist    Time: 30 sec      Gait:  · Patient ambulated: 1 side step to Rt   · Patient required: maximal assist   · Comments: assist for weight shifting as well as blocking of knees to prevent buckling    Education:   Time provided for education, counseling and discussion of health disposition in regards to patient's current status   All questions answered within PT scope of practice and to patient's satisfaction   PT role in POC to address current functional deficits   Pt educated on proper body mechanics, safety techniques, and energy conservation with PT facilitation and cueing throughout session   Call nursing/pct to transfer to chair/use bathroom. Pt stated  understanding.   Whiteboard updated with pt's current mobility status documented above   Safe to perform stand pivot transfer to/from chair/bedside commode assist x 2 and HHA w/ nursing/PCT present    Patient left HOB elevated with all lines intact, call button in reach and RN present.    GOALS:   Multidisciplinary Problems     Physical Therapy Goals        Problem: Physical Therapy Goal    Goal Priority Disciplines Outcome Goal Variances Interventions   Physical Therapy Goal     PT, PT/OT Ongoing, Progressing     Description:  Goals to be met by: 2020     Patient will increase functional independence with mobility by performin. Supine to sit with Contact Guard Assistance  2. Sit to supine with Contact Guard Assistance  3. Sit to stand transfer with Contact Guard Assistance using LRAD  4. Bed to chair transfer with Contact Guard Assistance using Rolling Walker  5. Gait  x 25 feet with Contact Guard Assistance using Rolling Walker.   6. Lower extremity exercise program x20 reps per handout, with independence                    Time Tracking:     PT Received On: 20  PT Start Time: 1004     PT Stop Time: 1029  PT Total Time (min): 25 min       Billable Minutes:   · Therapeutic Activity 13 and Neuromuscular Re-education 12    Treatment Type: Treatment  PT/PTA: PT       Leticia Norwood PT, DPT  2020  Pager: [phone]

## 2020-02-07 NOTE — PLAN OF CARE
Problem: Occupational Therapy Goal  Goal: Occupational Therapy Goal  Description  Goals to be met by: 3/3/2020    Patient will increase functional independence with ADLs by performing:    UE Dressing with Set-up Assistance.  LE Dressing with Set-up Assistance and Assistive Devices as needed.  Grooming while standing at sink with Supervision and Assistive Devices as needed.  Toileting from toilet with Set-up Assistance for hygiene and clothing management.   Bathing from  standing at sink with Set-up Assistance and Assistive Devices as needed.  Toilet transfer to toilet with Supervision.     Outcome: Ongoing, Progressing     Pt progressing towards OT goals. Pt remains fearful of falling affecting her tolerance to out of bed activities. OT POC remains appropriate for patient on this date. Pt will continue to benefit from skilled OT to address the deficits affecting her occupational performance.    Dayna Garrido OTR/L  2/7/20

## 2020-02-07 NOTE — NURSING TRANSFER
Nursing Transfer Note      2/7/2020     Transfer To: endoscopy    Transfer via stretcher    Transfer with 3L to O2, cardiac monitoring    Transported by courrier    Medicines sent: none    Chart send with patient: No    Notified: daughter

## 2020-02-08 NOTE — PROGRESS NOTES
RN notified by another RN that patient was screaming out stating that she was having chest pain. Patient at the time had oxygen in one nostril only. Placed oxygen back in both nostrils at 4L NC and patient SPO2 88-91%. Notified MD MONAE of the events, and that patient has periodically taken her nasal cannula off a few times throughout the day, causing her to desat. Patient's oxygen always comes back up after replacing oxygen. MD MONAE instructed RN to place orders for troponin and d dimer levels to be drawn, EKG, and a bedside chest xray, and stated that he would put in for a psyc consult. IMV MD stated that oxygen goals are >88%. Will continue to monitor.

## 2020-02-08 NOTE — PLAN OF CARE
Patient free from falls and injuries throughout shift.  AAO w/ intermittent confusion and hallucinations.  VSS.  Patient denies chest pain and SOB.  Blood glucose managed through meals and insulin; CBG 88.  Patient continues on metoprolol 25 mg BID PO.  Patient  Given olanzapine 2.5 mg IM x1 for agitation w/ no relief.  Patient screaming out of room.   No acute events overnight. Patient resting well at this time.  Plan of care discussed with patient.  Patient verbalizes understanding.  Will continue to monitor.

## 2020-02-08 NOTE — PLAN OF CARE
Plan of care discussed with patient. Patient is free of fall/trauma/injury. Denies CP, SOB, or pain/discomfort. Patient placed on regular diet. Blood glucose checks ACHS. Patient AAOX4 but becomes confused and shouts out. Patient on contact precautions for lice. All questions addressed. Will continue to monitor

## 2020-02-08 NOTE — PLAN OF CARE
Patient free from falls and injuries throughout shift.  AAO w/ intermittent confusion and hallucinations.  VSS.  Patient denies chest pain and SOB.  Blood glucose managed through meals and insulin; CBG 80.  Patient restarted on quetiapine 25 mg nightly.  PRN medication given to promote sleep.  Patient sleeping throughout the night.    No acute events overnight. Patient resting well at this time.  Plan of care discussed with patient.  Patient verbalizes understanding.  Will continue to monitor.

## 2020-02-08 NOTE — PROGRESS NOTES
[facility] Hospital Medicine  Progress Note    Patient Name: Elizabeth Cano  MRN: [id number]  Patient Class: IP- Inpatient   Admission Date: 1/29/2020  Length of Stay: 10 days  Attending Physician: Vicki Eason MD  Primary Care Provider: Cesar Reeves MD    [facility] Team: [facility] MED V Rusty Aponte MD    Subjective:     Principal Problem:Septic shock        HPI:  Elizabeth Cano is a 69F with afib on coumadin, COPD, HFrEF (EF 45), who presents for evaluation from NH for hypotension. Patient reported dysuria last week, for which she was being treated with Macrobid. She was due to complete her abx course today. Patient states that antibiotics always make her lose her appetite, nauseated, weak. She had lightheadedness on standing, no recent falls, no syncope. Her dysuria has resolved, no flank pain. She had diarrhea earlier in the week, now resolved.  No URI symptoms, no HA, vision changes, CP, SOB. She uses a walker to ambulate at baseline. She feels improved with fluids. She says her BP always runs a little low.    ED: BP 84/58, HR >90, LA 4.8>2.4, UA+, CXR clear, no leukocytosis. Increase in SCr from 0.7 to 1.3    Overview/Hospital Course:  Pt is a 68 yo F w/ PMHx significant for diverticulitis, atrial fibrillation on anticoagulation, and CHF who presents with one week history of burning with urination and malaise. Associated symptoms included nausea without emesis. Initial labs were significant for severe hypomagnesemia, mild aseptic pyuria and elevated lactate (4.8). Repeat lactate revealed downtrend to 2.7. She was initiated on ceftriaxone and given 3 L of NS for her hypotension. Her hypotension persisted and she was given a dose of PO midodrine which only briefly relieved her hypotension. Eight hours after admission, pt remained hypotensive and a third lactate revealed uptrend to 3.3 mmol/L.     Critical care medicine was consulted due to refractory hypotension  with concern for requiring vasopressors. Current differentials for her shock include sepsis (related to UTI vs diverticular disease) vs cardiac vs adrenal insufficiency.     During her brief stay in the MICU, pt received further volume resuscitation with IV fluid bolus and was initiated on midodrine 5 mg TID as an oral vasopressor. CT of the abdomen and pelvis was positive for persistent peridiverticular fat stranding and area of hypoattenuation in the liver near the falciform ligament raising concern for carcinoma as etiology for her persistent infection. Since patient had failed outpatient antibiotic therapy and had established care with colorectal surgery in clinic, CRS was consulted to evaluate for possible surgical intervention given her ICU stay and possibly malignant component contributing to her diverticulitis. Abdominal X-ray revealed minimal translucency under the diaphragm concerning for free air, thus repeat was ordered.    Her cortisol while in the MICU was intermediately low raising suspicion for adrenal insufficiency however cosyntropin stimulation test was negative.    Interval History:   NAEON. Patient doing better this morning, states abdominal pain has improved.     Review of Systems   Respiratory: Negative for cough and shortness of breath.    Cardiovascular: Negative for chest pain and palpitations.   Gastrointestinal: Positive for abdominal pain. Negative for abdominal distention, constipation, nausea and vomiting.   Psychiatric/Behavioral: Positive for confusion and hallucinations. The patient is not hyperactive.      Objective:     Vital Signs (Most Recent):  Temp: 98.4 °F (36.9 °C) (02/08/20 1222)  Pulse: 90 (02/08/20 1222)  Resp: 18 (02/08/20 1222)  BP: (!) 99/54 (02/08/20 1222)  SpO2: (!) 91 % (02/08/20 1222) Vital Signs (24h Range):  Temp:  [97 °F (36.1 °C)-98.4 °F (36.9 °C)] 98.4 °F (36.9 °C)  Pulse:  [] 90  Resp:  [12-21] 18  SpO2:  [82 %-99 %] 91 %  BP: ()/(52-68) 99/54      Weight: 80 kg (176 lb 5.9 oz)  Body mass index is 28.47 kg/m².    Intake/Output Summary (Last 24 hours) at 2/8/2020 1346  Last data filed at 2/8/2020 1200  Gross per 24 hour   Intake 240 ml   Output 0 ml   Net 240 ml      Physical Exam   Constitutional: She is oriented to person, place, and time. No distress.   HENT:   Head: Normocephalic.   Eyes: Conjunctivae are normal. No scleral icterus.   Neck: Normal range of motion. Neck supple.   Cardiovascular: Normal rate and regular rhythm.   Pulmonary/Chest: Effort normal and breath sounds normal.   Abdominal: Soft. Bowel sounds are normal. She exhibits no distension and no mass. There is tenderness (left lower abdomen). There is no rebound and no guarding. A hernia is present.   Musculoskeletal: Normal range of motion.   Neurological: She is alert and oriented to person, place, and time.   Skin: Skin is warm and dry. She is not diaphoretic.   Psychiatric: She is actively hallucinating (intermittent ).       Significant Labs: All pertinent labs within the past 24 hours have been reviewed.    Significant Imaging: I have reviewed all pertinent imaging results/findings within the past 24 hours.      Assessment/Plan:      * Septic shock        Delirium  Patient with worsening episodes of delirium & hallucinations   Family reports similar symtoms during an admission at  in November    - reveiwed medications and discontinued medications associated with worsening Mental status  - further workup for infectious/metabolic causes has been unremarkable  - suspecting this is idiopathic secondary to hospital stay  - continue seroquel qhs to help with sleep cycles   - continue to monitor           Hematemesis  Patient with episode of hematemesis early morning 2/6, ~200cc.  Did have supra- therapeutic aPTT yesterday (which was discontinued yesterday 2/5)    - holding ASA/AC medications  - trend CBC  - consult GI   : EGD today (2/7) for evaluation of n/c & weight loss   : negative  x-ray abd to r/o perforation   - resolved       Liver lesion, right lobe  Fatty infiltrate per liver U/S. Will need follow up with hepatology      Diverticulitis  SEPTIC SHOCK    70 yo F w previously diagnosed diverticulitis treated with outpatient therapy presents with hypotension and fever. Repeat imaging revealed persistent peridiverticular fat stranding concerning for ongoing infection and inflammation.     CT A/P (1/30/20): Persistence of diverticula in sigmoid colon with peridiverticular fat stranding concerning for ongoing diverticulitis. Sigmoidoscopy warranted to survey for malignant etiology. No complication or abscess noted. Area of hypoattenuation in the liver near the falciform ligament.   Liver U/S (1/31/20): Area of hypoattenuation most likely signifies steatosis    Likely contributing to her hypotension.     Plan  - colonoscopy completed 2/4; no interventions   - advance diet as tolerated  - completed IV abx (zosyn) x 1 more dose (end 2/6)  - monitor abdom pain  - negative abd x-ray to r/o perforation,       Hypotension  A) Most likely secondary to hypovolemia    : completed colonic prep for colonoscopy, no PO intake; further supported by low glucose levels   - hydrate and continue to monitor BP, denies episodes of dizziness/light-headedness    B) low BP readings could be further exacerbated by the recent addition of amio   : will discuss with cardiology regarding use of medication    Unlikely to be cardiogenic/obstructive, septic although possibly unlikely; pt is on abs, no fevers/increase in WBC    - CONTINUE TO MONITOR  - resolving/ resolved           GAVIN (acute kidney injury)  Acute renal failure improving with treatment of her infection.         Acute cystitis without hematuria        Hypocalcemia  - continue home Vit D, add Calcium  - Ionized calcium low and PTH appropriately elevated    Bifascicular block  Unable to undergo outpatient device evaluation.     Hypomagnesemia  Pt with persistent  hypomagnesemia during previous admission. She had been discharged on PO magnesium, but arrived with low magnesium again. She arrived with acute renal failure as well.     Plan  - IV magnesium   - PO magnesium    Type 2 diabetes mellitus with hyperglycemia, without long-term current use of insulin  - hold home metformin  - low dose SSI for now  - diabetic diet  Lab Results   Component Value Date    HGBA1C 5.4 01/30/2020         Paroxysmal atrial fibrillation  Pt with long-standing non-valvular atrial fibrillation. CHADSVASc of 3    Plan  - continue warfarin   - continue metoprolol 50 mg BID  - discontinue amiodarone   - PhamD consulted for coumadin dosing    Chronic heart failure with preserved ejection fraction  TTE with EF of 45%    - Hold diuresis and antihypertensives in setting of hypotension  - Continue metoprolol      COPD (chronic obstructive pulmonary disease)  No PFT's on record.     - continue nebs PRN  - does not use oxygen  - Suggest PFT's ordered during PCP follow up.     VTE Risk Mitigation (From admission, onward)         Ordered     warfarin (COUMADIN) tablet 1 mg  Daily      02/07/20 0746     IP VTE HIGH RISK PATIENT  Once      01/29/20 1916     Reason for No Pharmacological VTE Prophylaxis  Once     Question:  Reasons:  Answer:  Already adequately anticoagulated on oral Anticoagulants    01/29/20 1916                      Rusty Aponte MD  Department of Hospital Medicine   [facility]

## 2020-02-08 NOTE — ASSESSMENT & PLAN NOTE
Pt with long-standing non-valvular atrial fibrillation. CHADSVASc of 3    Plan  - continue warfarin   - continue metoprolol 50 mg BID  - discontinue amiodarone   - Dax consulted for coumadin dosing

## 2020-02-08 NOTE — SUBJECTIVE & OBJECTIVE
Interval History:   NAEON. Patient doing better this morning, states abdominal pain has improved.     Review of Systems   Respiratory: Negative for cough and shortness of breath.    Cardiovascular: Negative for chest pain and palpitations.   Gastrointestinal: Positive for abdominal pain. Negative for abdominal distention, constipation, nausea and vomiting.   Psychiatric/Behavioral: Positive for confusion and hallucinations. The patient is not hyperactive.      Objective:     Vital Signs (Most Recent):  Temp: 98.4 °F (36.9 °C) (02/08/20 1222)  Pulse: 90 (02/08/20 1222)  Resp: 18 (02/08/20 1222)  BP: (!) 99/54 (02/08/20 1222)  SpO2: (!) 91 % (02/08/20 1222) Vital Signs (24h Range):  Temp:  [97 °F (36.1 °C)-98.4 °F (36.9 °C)] 98.4 °F (36.9 °C)  Pulse:  [] 90  Resp:  [12-21] 18  SpO2:  [82 %-99 %] 91 %  BP: ()/(52-68) 99/54     Weight: 80 kg (176 lb 5.9 oz)  Body mass index is 28.47 kg/m².    Intake/Output Summary (Last 24 hours) at 2/8/2020 1346  Last data filed at 2/8/2020 1200  Gross per 24 hour   Intake 240 ml   Output 0 ml   Net 240 ml      Physical Exam   Constitutional: She is oriented to person, place, and time. No distress.   HENT:   Head: Normocephalic.   Eyes: Conjunctivae are normal. No scleral icterus.   Neck: Normal range of motion. Neck supple.   Cardiovascular: Normal rate and regular rhythm.   Pulmonary/Chest: Effort normal and breath sounds normal.   Abdominal: Soft. Bowel sounds are normal. She exhibits no distension and no mass. There is tenderness (left lower abdomen). There is no rebound and no guarding. A hernia is present.   Musculoskeletal: Normal range of motion.   Neurological: She is alert and oriented to person, place, and time.   Skin: Skin is warm and dry. She is not diaphoretic.   Psychiatric: She is actively hallucinating (intermittent ).       Significant Labs: All pertinent labs within the past 24 hours have been reviewed.    Significant Imaging: I have reviewed all  pertinent imaging results/findings within the past 24 hours.

## 2020-02-08 NOTE — ASSESSMENT & PLAN NOTE
Patient with worsening episodes of delirium & hallucinations   Family reports similar symtoms during an admission at  in November    - reveiwed medications and discontinued medications associated with worsening Mental status  - further workup for infectious/metabolic causes has been unremarkable  - suspecting this is idiopathic secondary to hospital stay  - continue seroquel qhs to help with sleep cycles   - continue to monitor

## 2020-02-08 NOTE — PROGRESS NOTES
NIKI Stevens, PA-C notified paient without urine output since start of shift.  Patient bladder scanned <140 mL of urine in bladder. PA-C to look over chart.  Will continue to monitor.

## 2020-02-09 PROBLEM — J69.0 ASPIRATION PNEUMONIA: Status: ACTIVE | Noted: 2020-01-01

## 2020-02-09 NOTE — PROGRESS NOTES
Notified IM JOEY SILVEIRA of elevated troponin and d dimer and of findings of the chest xray. Will continue to monitor.

## 2020-02-09 NOTE — PLAN OF CARE
Clinical Swallow Evaluation completed.  Pt presents w/ a functional swallow at this time.  REC:  pt cont po intake with regular consistency diet with thin liquids, oral meds whole, aspiration precautions.  Further Skilled Speech services are not indicated at this time.  Recs reviewed with GARFIELD.      Gretchen Li, ARA-SLP  2/9/2020

## 2020-02-09 NOTE — PT/OT/SLP EVAL
Speech Language Pathology Evaluation  Bedside Swallow  Discharge Summary    Patient Name:  Elizabeth Cano   MRN:  [id number]  Admitting Diagnosis: Septic shock    Recommendations:                 General Recommendations:  Follow-up not indicated  Diet recommendations:  Regular, Thin   Aspiration Precautions: Monitor for s/s of aspiration and Standard aspiration precautions   General Precautions: Standard, aspiration, fall, special contact  Communication strategies:  none    History:     Past Medical History:   Diagnosis Date    A-fib     GAVIN (acute kidney injury)     CHF (congestive heart failure)     COPD (chronic obstructive pulmonary disease)     Diabetes mellitus     Hypertension        Past Surgical History:   Procedure Laterality Date    CARDIAC SURGERY      COLONOSCOPY N/A 2/4/2020    Procedure: COLONOSCOPY;  Surgeon: Gilberto Laguna MD;  Location: [address];  Service: Endoscopy;  Laterality: N/A;       Social History: Patient lives in a local NH.    Prior Intubation HX:  None this admission    Modified Barium Swallow: none this admission    Chest X-Rays: 2/8/20:  Findings suggesting worsening edema noting developing left lower lung zone consolidation is not excluded.  Correlation is advised.    Prior diet: regular with thin liquids.    Occupation/hobbies/homemaking: none expressed.    Subjective     Pt was awake and alert in NAD. She was agreeable to evaluation  Patient goals: none expressed     Pain/Comfort:  · Pain Rating 1: 0/10  · Pain Rating Post-Intervention 1: 0/10    Objective:     Oral Musculature Evaluation  · Oral Musculature: WNL  · Dentition: scattered dentition  · Secretion Management: adequate  · Mucosal Quality: adequate  · Mandibular Strength and Mobility: WFL  · Oral Labial Strength and Mobility: WFL  · Lingual Strength and Mobility: WFL  · Buccal Strength and Mobility: WFL  · Volitional Cough: adequate  · Volitional Swallow: timely  · Voice Prior to PO Intake:  clear    Bedside Swallow Eval:   Consistencies Assessed:  · Thin liquids tsp, cup and straw sips  · Puree tsp bites  · Solids self fed bites     Oral Phase:   · WNL    Pharyngeal Phase:   · WNL  · no overt clinical signs/symptoms of aspiration  · no overt clinical signs/symptoms of pharyngeal dysphagia    Compensatory Strategies  · None    Treatment: Education was provided to pt re: SLP role, evaluation results, diet recs, aspiration precautions and SLP POC.  She indicated good understanding of the information provided and agreed with recommendations.    Assessment:     Elizabeth Cano is a 69 y.o. female with an SLP diagnosis of Dysphagia.  She presents with a functional oropharyngeal swallow at this time.  Further Skilled Speech services are not indicated.    Goals:   Multidisciplinary Problems     SLP Goals        Problem: SLP Goal    Goal Priority Disciplines Outcome   SLP Goal     SLP Ongoing, Progressing                   Plan:     · Patient to be seen:      · Plan of Care expires:     · Plan of Care reviewed with:  patient   · SLP Follow-Up:  No       Discharge recommendations:  nursing facility, skilled   Barriers to Discharge:  Inaccessible Home Environment    Time Tracking:     SLP Treatment Date:   02/09/20  Speech Start Time:  1435  Speech Stop Time:  1443     Speech Total Time (min):  8 min    Billable Minutes: Eval Swallow and Oral Function 8    Gretchen Li CCC-SLP  02/09/2020

## 2020-02-09 NOTE — SUBJECTIVE & OBJECTIVE
Interval History:   Yesterday, patient with episode of chest pain vs. Panic attack. Which resolved on its own, in minutes. Patient is intermittently hallucinating. Troponin mildly elevated, d-dimer mildly elevated. CXR concerning for PNA.       Review of Systems   Respiratory: Negative for cough and shortness of breath.    Cardiovascular: Negative for chest pain and palpitations.   Gastrointestinal: Positive for abdominal pain. Negative for abdominal distention, constipation, nausea and vomiting.   Psychiatric/Behavioral: Positive for confusion and hallucinations. The patient is not hyperactive.      Objective:     Vital Signs (Most Recent):  Temp: 98.1 °F (36.7 °C) (02/09/20 1159)  Pulse: 90 (02/09/20 1400)  Resp: 18 (02/09/20 1159)  BP: (!) 92/54 (02/09/20 1205)  SpO2: (!) 90 % (02/09/20 1205) Vital Signs (24h Range):  Temp:  [97.3 °F (36.3 °C)-98.5 °F (36.9 °C)] 98.1 °F (36.7 °C)  Pulse:  [87-92] 90  Resp:  [18-20] 18  SpO2:  [89 %-97 %] 90 %  BP: ()/(51-60) 92/54     Weight: 81.9 kg (180 lb 8.9 oz)  Body mass index is 29.14 kg/m².    Intake/Output Summary (Last 24 hours) at 2/9/2020 1520  Last data filed at 2/9/2020 1200  Gross per 24 hour   Intake 520 ml   Output --   Net 520 ml      Physical Exam   Constitutional: She is oriented to person, place, and time. No distress.   HENT:   Head: Normocephalic.   Eyes: Conjunctivae are normal. No scleral icterus.   Neck: Normal range of motion. Neck supple.   Cardiovascular: Normal rate and regular rhythm.   Pulmonary/Chest: Effort normal and breath sounds normal.   Abdominal: Soft. Bowel sounds are normal. She exhibits no distension and no mass. There is no tenderness. There is no rebound and no guarding. A hernia is present.   Musculoskeletal: Normal range of motion.   Neurological: She is alert and oriented to person, place, and time.   Skin: Skin is warm and dry. She is not diaphoretic.   Psychiatric: She is actively hallucinating (intermittent ).        Significant Labs: All pertinent labs within the past 24 hours have been reviewed.    Significant Imaging: I have reviewed all pertinent imaging results/findings within the past 24 hours.

## 2020-02-09 NOTE — PROGRESS NOTES
[facility] Hospital Medicine  Progress Note    Patient Name: Elizabeth Cano  MRN: [id number]  Patient Class: IP- Inpatient   Admission Date: 1/29/2020  Length of Stay: 11 days  Attending Physician: Vicki Eason MD  Primary Care Provider: Cesar Reeves MD    [facility] Medicine Team: [facility]OSP MED V Rusty Aopnte MD    Subjective:     Principal Problem:Septic shock        HPI:  Elizabeth Cano is a 69F with afib on coumadin, COPD, HFrEF (EF 45), who presents for evaluation from NH for hypotension. Patient reported dysuria last week, for which she was being treated with Macrobid. She was due to complete her abx course today. Patient states that antibiotics always make her lose her appetite, nauseated, weak. She had lightheadedness on standing, no recent falls, no syncope. Her dysuria has resolved, no flank pain. She had diarrhea earlier in the week, now resolved.  No URI symptoms, no HA, vision changes, CP, SOB. She uses a walker to ambulate at baseline. She feels improved with fluids. She says her BP always runs a little low.    ED: BP 84/58, HR >90, LA 4.8>2.4, UA+, CXR clear, no leukocytosis. Increase in SCr from 0.7 to 1.3    Overview/Hospital Course:  Pt is a 70 yo F w/ PMHx significant for diverticulitis, atrial fibrillation on anticoagulation, and CHF who presents with one week history of burning with urination and malaise. Associated symptoms included nausea without emesis. Initial labs were significant for severe hypomagnesemia, mild aseptic pyuria and elevated lactate (4.8). Repeat lactate revealed downtrend to 2.7. She was initiated on ceftriaxone and given 3 L of NS for her hypotension. Her hypotension persisted and she was given a dose of PO midodrine which only briefly relieved her hypotension. Eight hours after admission, pt remained hypotensive and a third lactate revealed uptrend to 3.3 mmol/L.     Critical care medicine was consulted due to refractory hypotension  with concern for requiring vasopressors. Current differentials for her shock include sepsis (related to UTI vs diverticular disease) vs cardiac vs adrenal insufficiency.     During her brief stay in the MICU, pt received further volume resuscitation with IV fluid bolus and was initiated on midodrine 5 mg TID as an oral vasopressor. CT of the abdomen and pelvis was positive for persistent peridiverticular fat stranding and area of hypoattenuation in the liver near the falciform ligament raising concern for carcinoma as etiology for her persistent infection. Since patient had failed outpatient antibiotic therapy and had established care with colorectal surgery in clinic, CRS was consulted to evaluate for possible surgical intervention given her ICU stay and possibly malignant component contributing to her diverticulitis. Abdominal X-ray revealed minimal translucency under the diaphragm concerning for free air, thus repeat was ordered.    Her cortisol while in the MICU was intermediately low raising suspicion for adrenal insufficiency however cosyntropin stimulation test was negative.    Interval History:   Yesterday, patient with episode of chest pain vs. Panic attack. Which resolved on its own, in minutes. Patient is intermittently hallucinating. Troponin mildly elevated, d-dimer mildly elevated. CXR concerning for PNA.       Review of Systems   Respiratory: Negative for cough and shortness of breath.    Cardiovascular: Negative for chest pain and palpitations.   Gastrointestinal: Positive for abdominal pain. Negative for abdominal distention, constipation, nausea and vomiting.   Psychiatric/Behavioral: Positive for confusion and hallucinations. The patient is not hyperactive.      Objective:     Vital Signs (Most Recent):  Temp: 98.1 °F (36.7 °C) (02/09/20 1159)  Pulse: 90 (02/09/20 1400)  Resp: 18 (02/09/20 1159)  BP: (!) 92/54 (02/09/20 1205)  SpO2: (!) 90 % (02/09/20 1205) Vital Signs (24h Range):  Temp:  [97.3  °F (36.3 °C)-98.5 °F (36.9 °C)] 98.1 °F (36.7 °C)  Pulse:  [87-92] 90  Resp:  [18-20] 18  SpO2:  [89 %-97 %] 90 %  BP: ()/(51-60) 92/54     Weight: 81.9 kg (180 lb 8.9 oz)  Body mass index is 29.14 kg/m².    Intake/Output Summary (Last 24 hours) at 2/9/2020 1520  Last data filed at 2/9/2020 1200  Gross per 24 hour   Intake 520 ml   Output --   Net 520 ml      Physical Exam   Constitutional: She is oriented to person, place, and time. No distress.   HENT:   Head: Normocephalic.   Eyes: Conjunctivae are normal. No scleral icterus.   Neck: Normal range of motion. Neck supple.   Cardiovascular: Normal rate and regular rhythm.   Pulmonary/Chest: Effort normal and breath sounds normal.   Abdominal: Soft. Bowel sounds are normal. She exhibits no distension and no mass. There is no tenderness. There is no rebound and no guarding. A hernia is present.   Musculoskeletal: Normal range of motion.   Neurological: She is alert and oriented to person, place, and time.   Skin: Skin is warm and dry. She is not diaphoretic.   Psychiatric: She is actively hallucinating (intermittent ).       Significant Labs: All pertinent labs within the past 24 hours have been reviewed.    Significant Imaging: I have reviewed all pertinent imaging results/findings within the past 24 hours.      Assessment/Plan:      * Septic shock        Aspiration pneumonia  pt with an hematemesis event early in the week, EGD performed by GI, unremarkable.  Yesterday patient had a panic attack vs. Chest pain, which quickly resolved without any medical intervention. Work-up done; troponin,ekg,d-dimer. CXR    CXR concerning for PNA   :suspecting that the patient aspirated early in the week, at the time of her aspiration event; this also correlates well wit the onset of delirium  - continue zosyn  - considering adding vanco for hospital acquiring PNA       Delirium  Patient with worsening episodes of delirium & hallucinations   Family reports similar symtoms  during an admission at  in November    - reveiwed medications and discontinued medications associated with worsening Mental status  - further workup for infectious/metabolic causes has been unremarkable  - suspecting this is idiopathic secondary to hospital stay  - continue seroquel qhs to help with sleep cycles   - patient CXR concerning for PNA   : pt with an hematemesis event early in the week, EGD performed by GI, suspecting that the patient aspirated at this time, the timing of this event correlated with the onset of delirium   : treating for aspiration PNA            Hematemesis  Patient with episode of hematemesis early morning 2/6, ~200cc.  Did have supra- therapeutic aPTT yesterday (which was discontinued yesterday 2/5)    - holding ASA/AC medications  - trend CBC  - consult GI   : EGD today (2/7) for evaluation of n/c & weight loss   : negative x-ray abd to r/o perforation   - resolved       Liver lesion, right lobe  Fatty infiltrate per liver U/S. Will need follow up with hepatology      Diverticulitis  SEPTIC SHOCK    68 yo F w previously diagnosed diverticulitis treated with outpatient therapy presents with hypotension and fever. Repeat imaging revealed persistent peridiverticular fat stranding concerning for ongoing infection and inflammation.     CT A/P (1/30/20): Persistence of diverticula in sigmoid colon with peridiverticular fat stranding concerning for ongoing diverticulitis. Sigmoidoscopy warranted to survey for malignant etiology. No complication or abscess noted. Area of hypoattenuation in the liver near the falciform ligament.   Liver U/S (1/31/20): Area of hypoattenuation most likely signifies steatosis    Likely contributing to her hypotension.     Plan  - colonoscopy completed 2/4; no interventions   - advance diet as tolerated  - completed IV abx (zosyn) x 1 more dose (end 2/6)  - monitor abdom pain  - negative abd x-ray to r/o perforation,       Hypotension  A) Most likely secondary to  hypovolemia    : completed colonic prep for colonoscopy, no PO intake; further supported by low glucose levels   - hydrate and continue to monitor BP, denies episodes of dizziness/light-headedness    B) low BP readings could be further exacerbated by the recent addition of amio   : will discuss with cardiology regarding use of medication    Unlikely to be cardiogenic/obstructive, septic although possibly unlikely; pt is on abs, no fevers/increase in WBC    - CONTINUE TO MONITOR  - resolving/ resolved           GAVIN (acute kidney injury)  Acute renal failure improving with treatment of her infection.         Acute cystitis without hematuria        Hypocalcemia  - continue home Vit D, add Calcium  - Ionized calcium low and PTH appropriately elevated    Bifascicular block  Unable to undergo outpatient device evaluation.     Hypomagnesemia  Pt with persistent hypomagnesemia during previous admission. She had been discharged on PO magnesium, but arrived with low magnesium again. She arrived with acute renal failure as well.     Plan  - IV magnesium   - PO magnesium    Type 2 diabetes mellitus with hyperglycemia, without long-term current use of insulin  - hold home metformin  - low dose SSI for now  - diabetic diet  Lab Results   Component Value Date    HGBA1C 5.4 01/30/2020         Paroxysmal atrial fibrillation  Pt with long-standing non-valvular atrial fibrillation. CHADSVASc of 3    Plan  - continue warfarin   - continue metoprolol 50 mg BID  - discontinue amiodarone   - PhamD consulted for coumadin dosing    Chronic heart failure with preserved ejection fraction  TTE with EF of 45%    - Hold diuresis and antihypertensives in setting of hypotension  - Continue metoprolol      COPD (chronic obstructive pulmonary disease)  No PFT's on record.     - continue nebs PRN  - does not use oxygen  - Suggest PFT's ordered during PCP follow up.     VTE Risk Mitigation (From admission, onward)         Ordered     warfarin  (COUMADIN) tablet 1 mg  Daily      02/09/20 1520     warfarin (COUMADIN) tablet 2.5 mg  Daily      02/09/20 1519     IP VTE HIGH RISK PATIENT  Once      01/29/20 1916     Reason for No Pharmacological VTE Prophylaxis  Once     Question:  Reasons:  Answer:  Already adequately anticoagulated on oral Anticoagulants    01/29/20 1916                      Rusty Aponte MD  Department of Hospital Medicine   [facility]

## 2020-02-09 NOTE — ASSESSMENT & PLAN NOTE
pt with an hematemesis event early in the week, EGD performed by GI, unremarkable.  Yesterday patient had a panic attack vs. Chest pain, which quickly resolved without any medical intervention. Work-up done; troponin,ekg,d-dimer. CXR    CXR concerning for PNA   :suspecting that the patient aspirated early in the week, at the time of her aspiration event; this also correlates well wit the onset of delirium  - continue zosyn  - considering adding vanco for hospital acquiring PNA

## 2020-02-09 NOTE — PLAN OF CARE
Plan of care discussed with patient. Patient is free of fall/trauma/injury. Denies CP, SOB, or pain/discomfort. Patient on 3-5L NC. Patient started on zosyn Q8H today for aspiration pneumonia. Patient has edema noted in arms and legs. All questions addressed. Will continue to monitor

## 2020-02-09 NOTE — PROGRESS NOTES
RN noted patient has blanchable redness inbetween butt cheeks, wedge was ordered and placed under patient, patient promptly rolled herself off the wedge and then turned to the other side with no assistance. Patient demonstrated that she can turn self with no assist. RN instructed patient on importance of turning every 2 hours to prevent skin breakdown. Patient verbalized understanding.

## 2020-02-09 NOTE — PROGRESS NOTES
Patient in room coughing, RN checking on patient and patient took oxygen out of nostrils. Patient's O2 sat 78%. Patient AAOx4 at the time and RN replaced O2 NC quickly. Patient increased to 84-85% quickly. MD Eason in room and instructed RN that it is ok to increase O2. RN increased O2 to 5L NC. Patient steadily increased back to 90%. Will continue to monitor.

## 2020-02-09 NOTE — PLAN OF CARE
Patient free from falls and injuries throughout shift.  AAO w/ intermittent confusion and hallucinations.  VSS.  Patient denies chest pain and SOB.  Blood glucose managed through meals and insulin; .  Patient continues on quetiapine 25 mg nightly.  PRN medication given to promote sleep.  Patient sleeping throughout the night.    No acute events overnight. Patient resting well at this time.  Plan of care discussed with patient.  Patient verbalizes understanding.  Will continue to monitor.

## 2020-02-09 NOTE — ASSESSMENT & PLAN NOTE
Patient with worsening episodes of delirium & hallucinations   Family reports similar symtoms during an admission at  in November    - reveiwed medications and discontinued medications associated with worsening Mental status  - further workup for infectious/metabolic causes has been unremarkable  - suspecting this is idiopathic secondary to hospital stay  - continue seroquel qhs to help with sleep cycles   - patient CXR concerning for PNA   : pt with an hematemesis event early in the week, EGD performed by GI, suspecting that the patient aspirated at this time, the timing of this event correlated with the onset of delirium   : treating for aspiration PNA

## 2020-02-10 NOTE — PT/OT/SLP DISCHARGE
Physical Therapy Discharge Summary    Name: Elizabeth Cano  MRN: [id number]   Principal Problem: Septic shock     Patient Discharged from acute Physical Therapy on 2/10/2020.  Please refer to prior PT noted date on 20 for functional status.     Assessment:     Patient transferred to lower level of care secondary to respiratory distress    Objective:     GOALS:   Multidisciplinary Problems     Physical Therapy Goals        Problem: Physical Therapy Goal    Goal Priority Disciplines Outcome Goal Variances Interventions   Physical Therapy Goal     PT, PT/OT Ongoing, Progressing     Description:  Goals to be met by: 2020     Patient will increase functional independence with mobility by performin. Supine to sit with Contact Guard Assistance  2. Sit to supine with Contact Guard Assistance  3. Sit to stand transfer with Contact Guard Assistance using LRAD  4. Bed to chair transfer with Contact Guard Assistance using Rolling Walker  5. Gait  x 25 feet with Contact Guard Assistance using Rolling Walker.   6. Lower extremity exercise program x20 reps per handout, with independence                      Reasons for Discontinuation of Therapy Services  Transfer to alternate level of care.      Plan:     Please re-order therapy when appropriate for progressive mobility.    Anitha Trotter, PT  2/10/2020

## 2020-02-10 NOTE — PROGRESS NOTES
02/09/20 2103 02/09/20 2114   Vital Signs   SpO2 (!) 88 %  --    Pulse Oximetry Type Intermittent  --    BP (!) 83/53 (!) 89/52   MAP (mmHg) 63 64   BP Location Right arm Right arm   BP Method Automatic Automatic   Patient Position Lying Lying     NIKI Stevens Pa-C notified of the above BP and SpO2.  Patient currently on 5L NC.  Orders to give another Breathing Tx and to call if patient is needed on high flow NC.  Will continue to monitor.

## 2020-02-10 NOTE — NURSING
Manometry for Central Line Procedure     Manometry Performed: yes    Manometry performed by: MD Shakir

## 2020-02-10 NOTE — PLAN OF CARE
Patient on Comfort Flow 70%.  NPO.  Not medically ready for discharge.          02/10/20 1620   Discharge Reassessment   Provided patient/caregiver education on the expected discharge date and the discharge plan Yes   Do you have any problems affording any of your prescribed medications? No   Discharge Plan A Return to nursing home  ([facility] with SNF)   Discharge Plan B Return to Nursing Home   DME Needed Upon Discharge  none   Patient choice form signed by patient/caregiver N/A   Anticipated Discharge Disposition halfway Nu   Can the patient/caregiver answer the patient profile reliably? Yes, cognitively intact   How does the patient rate their overall health at the present time? Fair   Describe the patient's ability to walk at the present time. Major restrictions/daily assistance from another person   How often would a person be available to care for the patient? Whenever needed   Number of comorbid conditions (as recorded on the chart) Three     Michelle Roy RN, CCRN-K, [facility]  Neuro-Critical Care   [phone]

## 2020-02-10 NOTE — PLAN OF CARE
Patient has indicated that she would like to return to placement at [facility]. Referral sent via  to [facility] to request that patient be re-accepted at previous placement.        02/10/20 1443   Post-Acute Status   Post-Acute Authorization Placement   Post-Acute Placement Status Referrals Sent        Rachel Guardado LMSW   - Case Management

## 2020-02-10 NOTE — H&P
[facility]  Critical Care Medicine  History & Physical    Patient Name: Elizabeth Cano  MRN: [id number]  Admission Date: 1/29/2020  Hospital Length of Stay: 12 days  Code Status: Full Code  Attending Physician: Sonal Kate MD   Primary Care Provider: Cesar Reeves MD   Principal Problem: Septic shock    Subjective:     HPI:  Ms. Elizabeth Cano is a 69 years old female with afib (on coumadin), COPD, and HFpEF (EF 45% on 1/29/20) admitted to hospital medicine for presumed urinary tract infection. She was diagnosed with a UTI last week at her Nursing Home, [facility], which she was prescribed Macrobid. She completed her course of Macrobid on 01/29/20 and she was noted to be hypotensive rendering her transfer to the hospital.      She reports a decreased appetite for the last month since her hospitalization over the holidays due to uncomplicated diverticulitis which she was treated with Augmentin. She reports feeling nauseated when taking antibiotics. She denies palpitations, dizziness, lightheadedness, chest pain, SOB, change in urinary/bowel habits.      On 01/29/20, she was found to be hypotensive in the 80s/50s in the ED with lactic acid of 4.8. CXR showed no signs for PNA, UA showed mild leukocytosis with WBC 17, nitrite negative, with urine cultures and blood cultures showing NG. She was admitted to  and started on CTX. She has been resuscitated with IVFs since admit with 3L. BP responded appropriately to IVF resuscitation with subsequent downtrend in lactic acid to 2.7. However, overnight on 01/30/20, BP remained 63-67 mmHg MAP goal. Repeat lactic acid uptrend to 3.3. Of note, she received a night time dose of Lopressor 50 mg on 01/30/20, which has since been discontinued.     ICU consulted in am of 01/31/20 for hypotension despite being resuscitated with 3.5 L IV fluids and persistant lactic acidosis in 3-4 range. She improved with IVF hydration and patient was stepped down.  Rapid response evaluated patient early AM on 2/10 due to acute increase in oxygen requiriments. Patient began the day on 3L NC and progressed to requiring Comfort flow of 30L and 100% FiO2. Patient denies chest pains, only complaining of minor increased difficulty breathing.    Hospital/ICU Course:  She was admitted to Hospital Medicine and treated for Urosepsis with antibiotics and IVF resuscitation. Critical Care was consulted for septic shock. She was admitted to MICU, bolused 1L LR with resulting cleared Lactic Acid. CT Abd Pelvis showed findings consistent with several diverticula in the sigmoid colon with associated wall thickening, minimal adjacent fat stranding, and prominence of the vasa recta.  Findings appear similar when compared to prior examination dated 12/19/2019 and may represent ongoing diverticulitis or colitis in the correct clinical setting. Area of hypoattenuation within the right hepatic lobe adjacent to the gallbladder fossa, not definitively seen on prior examination and may represent area of fatty liver infiltration or transit hepatic attenuation differences however, in the setting of liver disease, neoplasm is not excluded.     Following clearing of her lactic acid, she was stepped down to Hospital Medicine with midodrine 5 mg TID. Critical Care was re-consulted in the setting of Afib RVR and hypotension. While on the floor, patient went into Afib RVR, sustained HR >120, /55, requiring lopressor pushes, which subsequently lowered HR to 75s/50s.  2/10: Patient readmitted to MICU for acute hypoxemic respiratory failure. CTA negative for PE, diuresing with IV lasix     Past Medical History:   Diagnosis Date    A-fib     GAVIN (acute kidney injury)     CHF (congestive heart failure)     COPD (chronic obstructive pulmonary disease)     Diabetes mellitus     Hypertension        Past Surgical History:   Procedure Laterality Date    CARDIAC SURGERY      COLONOSCOPY N/A 2/4/2020     Procedure: COLONOSCOPY;  Surgeon: Gilberto Laguna MD;  Location: [facility] ([address] [place] [address]);  Service: Endoscopy;  Laterality: N/A;       Review of patient's allergies indicates:  No Known Allergies    Family History     Problem Relation (Age of Onset)    Heart disease Mother        Tobacco Use    Smoking status: Former Smoker     Packs/day: 1.00     Years: 54.00     Pack years: 54.00     Types: Cigarettes     Last attempt to quit: 2019     Years since quittin.2    Smokeless tobacco: Never Used   Substance and Sexual Activity    Alcohol use: Not Currently    Drug use: Not Currently    Sexual activity: Not Currently     Partners: Male      Review of Systems   Constitutional: Negative for chills, fever and unexpected weight change.   HENT: Negative for sore throat.    Eyes: Negative for visual disturbance.   Respiratory: Positive for shortness of breath. Negative for cough, chest tightness and wheezing.    Cardiovascular: Positive for leg swelling. Negative for chest pain and palpitations.   Gastrointestinal: Negative for abdominal pain, constipation, diarrhea, nausea and vomiting.   Genitourinary: Negative for difficulty urinating.   Musculoskeletal: Negative for arthralgias and myalgias.   Neurological: Negative for weakness and headaches.   Hematological: Negative for adenopathy.   Psychiatric/Behavioral: Negative for agitation. The patient is nervous/anxious.      Objective:     Vital Signs (Most Recent):  Temp: 97.6 °F (36.4 °C) (02/10/20 0701)  Pulse: 97 (02/10/20 1100)  Resp: (!) 21 (02/10/20 1100)  BP: 108/60 (02/10/20 1100)  SpO2: 97 % (02/10/20 1100) Vital Signs (24h Range):  Temp:  [97.6 °F (36.4 °C)-98.5 °F (36.9 °C)] 97.6 °F (36.4 °C)  Pulse:  [] 97  Resp:  [18-29] 21  SpO2:  [80 %-100 %] 97 %  BP: ()/(49-90) 108/60   Weight: 81.9 kg (180 lb 8.9 oz)  Body mass index is 29.14 kg/m².      Intake/Output Summary (Last 24 hours) at 2/10/2020 1139  Last data filed at 2/10/2020  1100  Gross per 24 hour   Intake 2220 ml   Output 1900 ml   Net 320 ml       Physical Exam   Constitutional: She is oriented to person, place, and time. She appears well-developed and well-nourished. No distress.   HENT:   Head: Normocephalic and atraumatic.   Eyes: Conjunctivae are normal.   Neck: No JVD present.   Cardiovascular: Normal rate, regular rhythm and normal heart sounds.   Pulmonary/Chest: Effort normal. No respiratory distress. She has no wheezes. She has rales.   Abdominal: Soft. Bowel sounds are normal. She exhibits no distension.   Musculoskeletal: She exhibits edema (2+ pitting bilaterally to the mid shin).   Neurological: She is alert and oriented to person, place, and time.   Skin: No rash noted.   Psychiatric: She has a normal mood and affect. Her behavior is normal.       Vents:  Oxygen Concentration (%): 100 (02/10/20 0817)  Lines/Drains/Airways     Central Venous Catheter Line                 Percutaneous Central Line Insertion/Assessment - triple lumen  02/10/20 0509 right internal jugular less than 1 day          Drain            Female External Urinary Catheter 02/10/20 0600 less than 1 day          Peripheral Intravenous Line                 Peripheral IV - Single Lumen 02/05/20 1026 20 G;1 3/4 in Right Upper Arm 5 days         Peripheral IV - Single Lumen 02/06/20 1055 18 G;1 3/4 in Left;Anterior Upper Arm 4 days              Significant Labs:    CBC/Anemia Profile:  Recent Labs   Lab 02/09/20  0849   WBC 5.31   HGB 9.2*   HCT 32.3*      *   RDW 16.0*        Chemistries:  Recent Labs   Lab 02/09/20  0849 02/10/20  0155    141   K 4.4 3.7    106   CO2 29 25   BUN 9 9   CREATININE 0.6 0.6   CALCIUM 7.8* 7.6*   ALBUMIN  --  1.6*   PROT  --  4.7*   BILITOT  --  0.7   ALKPHOS  --  177*   ALT  --  19   AST  --  27   MG 1.9  --    PHOS 2.0*  --        CMP:   Recent Labs   Lab 02/09/20  0849 02/10/20  0155    141   K 4.4 3.7    106   CO2 29 25   GLU 97 96    BUN 9 9   CREATININE 0.6 0.6   CALCIUM 7.8* 7.6*   PROT  --  4.7*   ALBUMIN  --  1.6*   BILITOT  --  0.7   ALKPHOS  --  177*   AST  --  27   ALT  --  19   ANIONGAP 5* 10   EGFRNONAA >60.0 >60.0     Lactic Acid: No results for input(s): LACTATE in the last 48 hours.  All pertinent labs within the past 24 hours have been reviewed.    Significant Imaging: I have reviewed all pertinent imaging results/findings within the past 24 hours.     Narrative     EXAMINATION:  CTA CHEST NON CORONARY    CLINICAL HISTORY:  Chest pain, acute, PE suspected, high pretest prob;    TECHNIQUE:  During intravenous bolus injection of 75 ml of Omnipaque 350 contrast medium and using low dose technique, the chest was surveyed from above the pulmonary apices through the posterior costophrenic angles.  Data was reconstructed for multiplanar images in axial, sagittal and coronal planes and for maximal intensity projection images in the the axial, sagittal and coronal planes.    COMPARISON:  Chest radiograph 02/09/2020.    FINDINGS:  The upper extremities are partially visualized demonstrate degenerative changes of the shoulder joints.    Base of Neck: No significant abnormality.    Aorta: Left-sided aortic arch with 3 arterial branches.  The aorta maintains normal caliber, contour and course. There is moderate calcification of the thoracic aorta.  There is  mild coronary artery calcification.    Heart/pericardium: The heart is enlarged in size with no significant pericardial effusion.    Pulmonary arteries: Pulmonary arteries distribute normally.  Pulmonary arteries are brightly opacified.  There is no pulmonary thromboembolism or other filling defect or pulmonary infarct.    The visualized pulmonary veins are unremarkable.    Teresa/Mediastinum: Multiple prominent mediastinal lymph nodes with no pathologic adenopathy.  Peripherally calcified aortopulmonary lymph node, likely representing old granulomatous disease.    Esophagus:  Normal.    Upper Abdomen: Reflux of contrast into the hepatic veins, which can be seen in the setting of elevated right heart pressures.  The partially visualized abdominal organs are unremarkable.  There is hypoattenuation of the liver parenchyma with subtle nodular contour suggestive of cirrhosis.    Thoracic soft tissues: Worsening, extensive body wall edema, most pronounced in the lower chest wall.    Bones: No acute fracture. No suspicious lytic or sclerotic lesion.  Age-appropriate degenerative changes of the visualized spine.    Airways: Patent.    Lungs: The lungs demonstrate diffuse subsegmental ground-glass opacities most prominent in the right upper lobe.  There is also interlobular septal thickening.  Aeration abnormalities are new when compared with January 2020.  Small right and trace left pleural effusion with associated compressive atelectasis of the left lower lobe, new when compared with prior abdominal CT.      Impression       No pulmonary thromboembolism or pulmonary infarct.    Cardiomegaly with diffuse subsegmental ground-glass opacities throughout both lungs most prominent in the right upper lobe with diffuse interlobular septal thickening.  Findings are most likely secondary to asymmetric pulmonary edema, with other considerations including infection (including atypical organisms such as fungal or mycobacterial), non infectious inflammation, or aspiration.    Small right and trace left pleural effusion with associated compressive atelectasis of the left lower lobe.    Diffuse body wall edema, worse when compared with the prior study.    Moderate calcific atherosclerosis of the visualized aorta.    Electronically signed by resident: Vito Kendall  Date: 02/10/2020  Time: 03:32    Electronically signed by: Boyd Torrez MD  Date: 02/10/2020  Time: 04:28         Assessment/Plan:     Pulmonary  COPD (chronic obstructive pulmonary disease)  Continue Duonebs Q6H  Budesonide nebs BID   No wheezing  noted; controlled  Monitor        Cardiac/Vascular  Hypotension  Patient on minimal levophed for MAP < 65. Patient appears to have baseline low BP's, mentates well with a MAP > 60. Possible infectious etiology given worsening hypoxemia / shock, CT showing compressive atelectasis vs. Developing consolidation in the left lower lobe.    - Diuresing at this time because of patient's respiratory status  - BMP pending  - Levophed for MAP > 65      Paroxysmal atrial fibrillation  Patient currently in RVR presumed 2/2 to acute respiratory failure  - PRN metoprolol as needed for rate control but would not aggressively treat in setting of severe hypoxemia  - INR subtherapeutic, on heparin gtt    Renal/  Hypocalcemia  Resolved    Hypomagnesemia  -Replete PRN    ID  * Septic shock  Patient with acute hypotension and worsening O2 requirements over the course of 2/9/2020. Started the day on 3L NC and by the end was on Comfort flow 100% FiO2 and 30L. Concern for PE. CTA negative for PE, CXR also with severe pleural edema. Patient had been on zosyn for possible diverticulitis +/- urosepsis.     Broadened abx out to Vancomycin and zosyn given acute decompensation  Diuresing with IV lasix given significantly worsened respiratory status  Weaning O2 as tolerated    Endocrine  Type 2 diabetes mellitus with hyperglycemia, without long-term current use of insulin  AIC 5.4  Glucose checks QACHS  Goal Glucose 140-180 mg/dL  Diabetic diet    GI  Liver lesion, right lobe  - possible concern of abscess vs malignancy per CT scan  - h/o weight loss and possible colonic lesion  - RUQ US showing focal fatty infiltration  - CRS consulted, appreciate their recomendations    Diverticulitis  - CT consistent with recurrent diverticulitis  - CRS consulted during this hospitalization  - On zosyn, broadened out and added vancomycin following episode of shock    Other  Chronic heart failure with preserved ejection fraction  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     C: SAT & SBT Coordinated?  N/A                      D: Delirium: CAM-ICU Overall CAM-ICU: Negative   E: Early Mobility Performed? No   F: Feeding Goal: Goals: Pt to receive nutrition by RD follow up  Status: Nutrition Goal Status: new   Current Diet Order   Procedures    Diet NPO Except for: Medication     Order Specific Question:   Except for     Answer:   Medication      AS: Analgesia/Sedation None   T: Thromboembolic Prophylaxis Heparin gtt   H: HOB > 300 Yes   U: Stress Ulcer Prophylaxis (if needed) Protonix   G: Glucose Control SSI   B: Bowel Function Stool Occurrence: 1   I: Indwelling Catheter (Lines & Carpio) Necessity RIJ Triple lumen   D: De-escalation of Antimicrobials/Pharmacotherapies Broadened out to vancomycin and zosyn    Plan for the day/ETD Diurese, supportive care    Code Status:  Family/Goals of Care: Full Code  Patient updated       Critical secondary to Patient has a condition that poses threat to life and bodily function: Acute hypoxemic respiratory failure     Critical care was time spent personally by me on the following activities: development of treatment plan with patient or surrogate and bedside caregivers, discussions with consultants, evaluation of patient's response to treatment, examination of patient, ordering and performing treatments and interventions, ordering and review of laboratory studies, ordering and review of radiographic studies, pulse oximetry, re-evaluation of patient's condition. This critical care time did not overlap with that of any other provider or involve time for any  procedures.     Jesus Schilling MD  Critical Care Medicine  [facility]

## 2020-02-10 NOTE — PROCEDURES
"Elizabeth Cano is a 69 y.o. female patient.    Temp: 97.9 °F (36.6 °C) (02/10/20 0305)  Pulse: 94 (02/10/20 0505)  Resp: 18 (02/10/20 0505)  BP: (!) 102/55 (02/10/20 0505)  SpO2: 100 % (02/10/20 0505)  Weight: 81.9 kg (180 lb 8.9 oz) (02/09/20 0725)  Height: 5' 6" (167.6 cm) (01/31/20 0800)       Central Line  Date/Time: 2/10/2020 6:01 AM  Location procedure was performed: [facility] CRITICAL CARE MEDICINE  Performed by: Jesus Schilling MD  Pre-operative Diagnosis: Shock  Post-operative diagnosis: Shock  Consent Done: Yes  Time out: Immediately prior to procedure a "time out" was called to verify the correct patient, procedure, equipment, support staff and site/side marked as required.  Indications: med administration and hemodynamic monitoring  Anesthesia: local infiltration    Anesthesia:  Local anesthesia used: yes  Local Anesthetic: lidocaine 1% without epinephrine  Anesthetic total: 3 mL  Preparation: skin prepped with ChloraPrep  Skin prep agent dried: skin prep agent completely dried prior to procedure  Sterile barriers: all five maximum sterile barriers used - cap, mask, sterile gown, sterile gloves, and large sterile sheet  Hand hygiene: hand hygiene performed prior to central venous catheter insertion  Location details: right internal jugular  Catheter type: triple lumen  Ultrasound guidance: yes  Vessel Caliber: medium, patent, compressibility normal  Needle advanced into vessel with real time Ultrasound guidance.  Guidewire confirmed in vessel.  Sterile sheath used.  Manometry: Yes  Number of attempts: 2  Specimens: No  Implants: No  Complications: No          Jesus Schilling  2/10/2020  "

## 2020-02-10 NOTE — CONSULTS
Patient seen and evaluated by MICU team. Patient to be admitted to ICU. Full H&P to follow.    Jesus Schilling MD  Internal Medicine PGY-2  [facility]

## 2020-02-10 NOTE — SUBJECTIVE & OBJECTIVE
Past Medical History:   Diagnosis Date    A-fib     GAVIN (acute kidney injury)     CHF (congestive heart failure)     COPD (chronic obstructive pulmonary disease)     Diabetes mellitus     Hypertension        Past Surgical History:   Procedure Laterality Date    CARDIAC SURGERY      COLONOSCOPY N/A 2020    Procedure: COLONOSCOPY;  Surgeon: Gilberto Laguna MD;  Location: [address];  Service: Endoscopy;  Laterality: N/A;       Review of patient's allergies indicates:  No Known Allergies    Family History     Problem Relation (Age of Onset)    Heart disease Mother        Tobacco Use    Smoking status: Former Smoker     Packs/day: 1.00     Years: 54.00     Pack years: 54.00     Types: Cigarettes     Last attempt to quit: 2019     Years since quittin.2    Smokeless tobacco: Never Used   Substance and Sexual Activity    Alcohol use: Not Currently    Drug use: Not Currently    Sexual activity: Not Currently     Partners: Male      Review of Systems   Constitutional: Negative for chills, fever and unexpected weight change.   HENT: Negative for sore throat.    Eyes: Negative for visual disturbance.   Respiratory: Positive for shortness of breath. Negative for cough, chest tightness and wheezing.    Cardiovascular: Positive for leg swelling. Negative for chest pain and palpitations.   Gastrointestinal: Negative for abdominal pain, constipation, diarrhea, nausea and vomiting.   Genitourinary: Negative for difficulty urinating.   Musculoskeletal: Negative for arthralgias and myalgias.   Neurological: Negative for weakness and headaches.   Hematological: Negative for adenopathy.   Psychiatric/Behavioral: Negative for agitation. The patient is nervous/anxious.      Objective:     Vital Signs (Most Recent):  Temp: 97.6 °F (36.4 °C) (02/10/20 0701)  Pulse: 97 (02/10/20 1100)  Resp: (!) 21 (02/10/20 1100)  BP: 108/60 (02/10/20 1100)  SpO2: 97 % (02/10/20 1100) Vital Signs (24h Range):  Temp:   [97.6 °F (36.4 °C)-98.5 °F (36.9 °C)] 97.6 °F (36.4 °C)  Pulse:  [] 97  Resp:  [18-29] 21  SpO2:  [80 %-100 %] 97 %  BP: ()/(49-90) 108/60   Weight: 81.9 kg (180 lb 8.9 oz)  Body mass index is 29.14 kg/m².      Intake/Output Summary (Last 24 hours) at 2/10/2020 1139  Last data filed at 2/10/2020 1100  Gross per 24 hour   Intake 2220 ml   Output 1900 ml   Net 320 ml       Physical Exam   Constitutional: She is oriented to person, place, and time. She appears well-developed and well-nourished. No distress.   HENT:   Head: Normocephalic and atraumatic.   Eyes: Conjunctivae are normal.   Neck: No JVD present.   Cardiovascular: Normal rate, regular rhythm and normal heart sounds.   Pulmonary/Chest: Effort normal. No respiratory distress. She has no wheezes. She has rales.   Abdominal: Soft. Bowel sounds are normal. She exhibits no distension.   Musculoskeletal: She exhibits edema (2+ pitting bilaterally to the mid shin).   Neurological: She is alert and oriented to person, place, and time.   Skin: No rash noted.   Psychiatric: She has a normal mood and affect. Her behavior is normal.       Vents:  Oxygen Concentration (%): 100 (02/10/20 0817)  Lines/Drains/Airways     Central Venous Catheter Line                 Percutaneous Central Line Insertion/Assessment - triple lumen  02/10/20 0509 right internal jugular less than 1 day          Drain            Female External Urinary Catheter 02/10/20 0600 less than 1 day          Peripheral Intravenous Line                 Peripheral IV - Single Lumen 02/05/20 1026 20 G;1 3/4 in Right Upper Arm 5 days         Peripheral IV - Single Lumen 02/06/20 1055 18 G;1 3/4 in Left;Anterior Upper Arm 4 days              Significant Labs:    CBC/Anemia Profile:  Recent Labs   Lab 02/09/20  0849   WBC 5.31   HGB 9.2*   HCT 32.3*      *   RDW 16.0*        Chemistries:  Recent Labs   Lab 02/09/20  0849 02/10/20  0155    141   K 4.4 3.7    106   CO2 29 25    BUN 9 9   CREATININE 0.6 0.6   CALCIUM 7.8* 7.6*   ALBUMIN  --  1.6*   PROT  --  4.7*   BILITOT  --  0.7   ALKPHOS  --  177*   ALT  --  19   AST  --  27   MG 1.9  --    PHOS 2.0*  --        CMP:   Recent Labs   Lab 02/09/20  0849 02/10/20  0155    141   K 4.4 3.7    106   CO2 29 25   GLU 97 96   BUN 9 9   CREATININE 0.6 0.6   CALCIUM 7.8* 7.6*   PROT  --  4.7*   ALBUMIN  --  1.6*   BILITOT  --  0.7   ALKPHOS  --  177*   AST  --  27   ALT  --  19   ANIONGAP 5* 10   EGFRNONAA >60.0 >60.0     Lactic Acid: No results for input(s): LACTATE in the last 48 hours.  All pertinent labs within the past 24 hours have been reviewed.    Significant Imaging: I have reviewed all pertinent imaging results/findings within the past 24 hours.     Narrative     EXAMINATION:  CTA CHEST NON CORONARY    CLINICAL HISTORY:  Chest pain, acute, PE suspected, high pretest prob;    TECHNIQUE:  During intravenous bolus injection of 75 ml of Omnipaque 350 contrast medium and using low dose technique, the chest was surveyed from above the pulmonary apices through the posterior costophrenic angles.  Data was reconstructed for multiplanar images in axial, sagittal and coronal planes and for maximal intensity projection images in the the axial, sagittal and coronal planes.    COMPARISON:  Chest radiograph 02/09/2020.    FINDINGS:  The upper extremities are partially visualized demonstrate degenerative changes of the shoulder joints.    Base of Neck: No significant abnormality.    Aorta: Left-sided aortic arch with 3 arterial branches.  The aorta maintains normal caliber, contour and course. There is moderate calcification of the thoracic aorta.  There is  mild coronary artery calcification.    Heart/pericardium: The heart is enlarged in size with no significant pericardial effusion.    Pulmonary arteries: Pulmonary arteries distribute normally.  Pulmonary arteries are brightly opacified.  There is no pulmonary thromboembolism or other  filling defect or pulmonary infarct.    The visualized pulmonary veins are unremarkable.    Teresa/Mediastinum: Multiple prominent mediastinal lymph nodes with no pathologic adenopathy.  Peripherally calcified aortopulmonary lymph node, likely representing old granulomatous disease.    Esophagus: Normal.    Upper Abdomen: Reflux of contrast into the hepatic veins, which can be seen in the setting of elevated right heart pressures.  The partially visualized abdominal organs are unremarkable.  There is hypoattenuation of the liver parenchyma with subtle nodular contour suggestive of cirrhosis.    Thoracic soft tissues: Worsening, extensive body wall edema, most pronounced in the lower chest wall.    Bones: No acute fracture. No suspicious lytic or sclerotic lesion.  Age-appropriate degenerative changes of the visualized spine.    Airways: Patent.    Lungs: The lungs demonstrate diffuse subsegmental ground-glass opacities most prominent in the right upper lobe.  There is also interlobular septal thickening.  Aeration abnormalities are new when compared with January 2020.  Small right and trace left pleural effusion with associated compressive atelectasis of the left lower lobe, new when compared with prior abdominal CT.      Impression       No pulmonary thromboembolism or pulmonary infarct.    Cardiomegaly with diffuse subsegmental ground-glass opacities throughout both lungs most prominent in the right upper lobe with diffuse interlobular septal thickening.  Findings are most likely secondary to asymmetric pulmonary edema, with other considerations including infection (including atypical organisms such as fungal or mycobacterial), non infectious inflammation, or aspiration.    Small right and trace left pleural effusion with associated compressive atelectasis of the left lower lobe.    Diffuse body wall edema, worse when compared with the prior study.    Moderate calcific atherosclerosis of the visualized  aorta.    Electronically signed by resident: Vito Kendall  Date: 02/10/2020  Time: 03:32    Electronically signed by: Boyd Torrez MD  Date: 02/10/2020  Time: 04:28

## 2020-02-10 NOTE — PROGRESS NOTES
Viktoria Ferguson, RRT notified patient SpO2 in high 70's-80's.  Patient on 5L NC. Patient not in distress.   RRT updated on POC per MD orders.      RRT at bedside.  Patient put on high flow NC.  SpO2 currently > 95%.  Breathing Tx administered.

## 2020-02-10 NOTE — PLAN OF CARE
POC reviewed with pt at 0500. Pt verbalized understanding. Questions and concerns addressed. No acute events overnight. Pt progressing toward goals. Will continue to monitor. See flowsheets for full assessment and VS info   Central line placed overnight.   Levo titrated on/off to keep MAP >65   WCTM

## 2020-02-10 NOTE — SIGNIFICANT EVENT
"RAPID RESPONSE NURSE NOTE     Admit Date: 2020  LOS: 12  Code Status: Full Code   Date of Consult: 02/10/2020  : 1950  Age: 69 y.o.  Weight:   Wt Readings from Last 1 Encounters:   20 81.9 kg (180 lb 8.9 oz)     Sex: female  Race: White   Bed: [facility]/90 A:   MRN: [id number]  Time Rapid Response Team page Received:   Time Rapid Response Team at Bedside:   Time Rapid Response Team left Bedside: 245  Was the patient discharged from an ICU this admission?   yes  Was the patient discharged from a PACU within last 24 hours?  no  Did the patient receive conscious sedation/general anesthesia within last 24 hours?  no  Was the patient in the ED within the past 24 hours?  no  Was the patient started on NIPPV within the past 24 hours?  no  Did this progress into an ARC or CPA:  no  Attending Physician: Sonal Kate MD  Primary Service: [facility] V  Consult Requested By: Sonal Kate MD     SITUATION     Reason for Call: increasing O2 demands  Called to evaluate the patient for Respiratory    BACKGROUND     Why is the patient in the hospital?: Septic shock    Patient has a past medical history of A-fib, GAVIN (acute kidney injury), CHF (congestive heart failure), COPD (chronic obstructive pulmonary disease), Diabetes mellitus, and Hypertension.    ASSESSMENT/INTERVENTIONS     BP (!) 155/90   Pulse (!) 121   Temp 98 °F (36.7 °C) (Oral)   Resp (!) 29   Ht 5' 6" (1.676 m)   Wt 81.9 kg (180 lb 8.9 oz)   SpO2 (!) 91%   Breastfeeding? No   BMI 29.14 kg/m²     What did you find: Pt was found to be hypoxic by respiratory therapy, O2 sats upper 70's- low 80's, briefly placed on 15 L NRB, then transition to Comfort flow 30L 100%. Multiple RRT to obtain ABG due to very difficult stick. Results for CALIXTO MEZA (MRN [id number]) as of 2/10/2020 02:59. Considering that the pt was on 3 L NC at 0730 this am now on 30 L 100%, MICU was notified and came to BS. Pt also hypotensive, peripheral Levophed was " mixed and infusion initiated do to lack of expediency from pharmacy. Pt placed on cardiac monitor and NRB at taken to 2nd floor CT for PE study. RN X2, MD X2 for transport. Pt tolerated CT then transported to [facility] 90.   Ref. Range 2/10/2020 00:26   POC PH Latest Ref Range: 7.35 - 7.45  7.496 (H)   POC PCO2 Latest Ref Range: 35 - 45 mmHg 38.4   POC PO2 Latest Ref Range: 80 - 100 mmHg 72 (L)   POC BE Latest Ref Range: -2 to 2 mmol/L 6   POC HCO3 Latest Ref Range: 24 - 28 mmol/L 29.7 (H)   POC SATURATED O2 Latest Ref Range: 95 - 100 % 96   POC TCO2 Latest Ref Range: 23 - 27 mmol/L 31 (H)   FiO2 Unknown 100   Flow Unknown 30   Sample Unknown ARTERIAL   DelSys Unknown Nasal Can   Allens Test Unknown Pass   Site Unknown LR   Mode Unknown SPONT       RECOMMENDATIONS     We recommend: ICU upgrade    FOLLOW-UP/CONTINGENCY PLAN     Patient needs a second visit at :     Call the Rapid Response Nurse, Linda Valverde RN at x [phone] for additional questions or concerns.    PHYSICIAN ESCALATION     Orders received and case discussed with Dr. Villanueva.    Disposition: Tx in ICU bed 9096.

## 2020-02-10 NOTE — ASSESSMENT & PLAN NOTE
Patient currently in RVR presumed 2/2 to acute respiratory failure  - PRN metoprolol as needed for rate control but would not aggressively treat in setting of severe hypoxemia  - INR subtherapeutic, on heparin gtt

## 2020-02-10 NOTE — PROGRESS NOTES
Pharmacokinetic Initial Assessment: IV Vancomycin    Assessment/Plan:    Initiate intravenous vancomycin with loading dose of 2000 mg once followed by a maintenance dose of vancomycin 1250mg IV every 12 hours  Desired empiric serum trough concentration is 15 to 20 mcg/mL  Draw vancomycin trough level 30 min prior to fourth dose on 02/11/20 at approximately 1500  Pharmacy will continue to follow and monitor vancomycin.      Please contact pharmacy at extension [phone] with any questions regarding this assessment.     Thank you for the consult,   Je Hairston       Patient brief summary:  Elizabeth Cano is a 69 y.o. female initiated on antimicrobial therapy with IV Vancomycin for treatment of suspected lower respiratory infection/pneumonia    Drug Allergies:   Review of patient's allergies indicates:  No Known Allergies    Actual Body Weight:   81.9 kg    Renal Function:   Estimated Creatinine Clearance: 94.1 mL/min (based on SCr of 0.6 mg/dL).,     Dialysis Method (if applicable):  N/A    CBC (last 72 hours):  Recent Labs   Lab Result Units 02/07/20  0956 02/07/20  1757 02/08/20  0915 02/09/20  0849   WBC K/uL 6.68 7.67 5.36 5.31   Hemoglobin g/dL 9.8* 9.8* 9.6* 9.2*   Hematocrit % 32.8* 31.6* 32.7* 32.3*   Platelets K/uL 155 188 141* 160   Gran% % 75.7* 77.6* 67.1 70.6   Lymph% % 15.7* 15.5* 21.5 17.5*   Mono% % 6.9 5.7 8.4 8.3   Eosinophil% % 1.0 0.4 2.4 2.6   Basophil% % 0.3 0.3 0.2 0.2   Differential Method  Automated Automated Automated Automated       Metabolic Panel (last 72 hours):  Recent Labs   Lab Result Units 02/07/20  0326 02/07/20  1722 02/08/20  0915 02/09/20  0849   Sodium mmol/L 140  --  141 141   Potassium mmol/L 4.8  --  4.7 4.4   Chloride mmol/L 105  --  110 107   CO2 mmol/L 29  --  25 29   Glucose mg/dL 70  --  69* 97   Glucose, UA   --  Negative  --   --    BUN, Bld mg/dL 6*  --  7* 9   Creatinine mg/dL 0.7  --  0.6 0.6   Magnesium mg/dL 1.9  --  1.6 1.9   Phosphorus mg/dL 2.5*  --   --   2.0*       Drug levels (last 3 results):  No results for input(s): VANCOMYCINRA, VANCOMYCINPE, VANCOMYCINTR in the last 72 hours.    Microbiologic Results:  Microbiology Results (last 7 days)     Procedure Component Value Units Date/Time    Blood culture [[id number]] Collected:  02/10/20 0156    Order Status:  Sent Specimen:  Blood Updated:  02/10/20 0218    Narrative:       Collection has been rescheduled by NTT at 02/10/2020 01:57 Reason:   patient transfer to ICU  Collection has been rescheduled by NTT at 02/10/2020 01:57 Reason:   patient transfer to ICU    Blood culture [[id number]]     Order Status:  Sent Specimen:  Blood     Blood Culture #1 **CANNOT BE ORDERED STAT** [[id number]] Collected:  02/07/20 1755    Order Status:  Completed Specimen:  Blood Updated:  02/09/20 2012     Blood Culture, Routine No Growth to date      No Growth to date      No Growth to date    Blood culture [[id number]] Collected:  02/07/20 1756    Order Status:  Completed Specimen:  Blood Updated:  02/09/20 2012     Blood Culture, Routine No Growth to date      No Growth to date      No Growth to date    Urine culture [[id number]]  (Abnormal) Collected:  02/07/20 1722    Order Status:  Completed Specimen:  Urine Updated:  02/09/20 1113     Urine Culture, Routine PEE ALBICANS  > 100,000 cfu/ml  Treatment of asymptomatic candiduria is not recommended (except for   specific populations). Candida isolated in the urine typically   represents colonization. If an indwelling urinary catheter is present  it should be removed or replaced.      Narrative:       Preferred Collection Type->Urine, Clean Catch    Blood culture [[id number]] Collected:  01/30/20 1308    Order Status:  Completed Specimen:  Blood Updated:  02/04/20 1422     Blood Culture, Routine No growth after 5 days.    Blood culture [[id number]] Collected:  01/30/20 1308    Order Status:  Completed Specimen:  Blood Updated:  02/04/20 1412     Blood Culture, Routine No growth after 5  days.    Blood Culture #2 **CANNOT BE ORDERED STAT** [[id number]] Collected:  01/29/20 1456    Order Status:  Completed Specimen:  Blood from Peripheral, Antecubital, Right Updated:  02/03/20 1812     Blood Culture, Routine No growth after 5 days.    Blood Culture #1 **CANNOT BE ORDERED STAT** [[id number]] Collected:  01/29/20 1451    Order Status:  Completed Specimen:  Blood from Peripheral, Antecubital, Left Updated:  02/03/20 1612     Blood Culture, Routine No growth after 5 days.    Clostridium difficile EIA [[id number]] Collected:  02/02/20 2135    Order Status:  Completed Specimen:  Stool Updated:  02/03/20 0425     C. diff Antigen Negative     C difficile Toxins A+B, EIA Negative     Comment: Testing not recommended for children <24 months old.

## 2020-02-10 NOTE — PLAN OF CARE
POC reviewed with pt and family at 1400. Pt and daughter Georgette verbalized understanding. Questions and concerns addressed. No acute events today. Pt progressing toward goals. Will continue to monitor. See flowsheets for full assessment and VS info. Started on heparin drip, NPO except meds until able to wean fI02 more, possibly more diuresis, maintain sats >88%.

## 2020-02-10 NOTE — ASSESSMENT & PLAN NOTE
Patient with acute hypotension and worsening O2 requirements over the course of 2/9/2020. Started the day on 3L NC and by the end was on Comfort flow 100% FiO2 and 30L. Concern for PE. CTA negative for PE, CXR also with severe pleural edema. Patient had been on zosyn for possible diverticulitis +/- urosepsis.     Broadened abx out to Vancomycin and zosyn given acute decompensation  Diuresing with IV lasix given significantly worsened respiratory status  Weaning O2 as tolerated

## 2020-02-10 NOTE — ASSESSMENT & PLAN NOTE
- CT consistent with recurrent diverticulitis  - CRS consulted during this hospitalization  - On zosyn, broadened out and added vancomycin following episode of shock

## 2020-02-10 NOTE — PROGRESS NOTES
Dr. Metzger resident with critical care medicine at bedside aware of urine output , BNP result from earlier and other labs, orders noted, 0n 80% Fi02 now sats 88-93% at present.

## 2020-02-10 NOTE — PT/OT/SLP DISCHARGE
Occupational Therapy Discharge Summary    Elizabeth Cano  MRN: [id number]   Principal Problem: Septic shock      Patient Discharged from acute Occupational Therapy on 02-10-20.  Please refer to prior OT note dated 02-07-20 for functional status.    Assessment:      Patient transferred to lower level of care secondary to decreased respiratory status    Objective:     GOALS:   Multidisciplinary Problems     Occupational Therapy Goals        Problem: Occupational Therapy Goal    Goal Priority Disciplines Outcome Interventions   Occupational Therapy Goal     OT, PT/OT Ongoing, Progressing    Description:  Goals to be met by: 3/3/2020    Patient will increase functional independence with ADLs by performing:    UE Dressing with Set-up Assistance.  LE Dressing with Set-up Assistance and Assistive Devices as needed.  Grooming while standing at sink with Supervision and Assistive Devices as needed.  Toileting from toilet with Set-up Assistance for hygiene and clothing management.   Bathing from  standing at sink with Set-up Assistance and Assistive Devices as needed.  Toilet transfer to toilet with Supervision.                      Reasons for Discontinuation of Therapy Services  Transfer to alternate level of care.      Plan:     Patient Discharged to: ICU will await new orders    VIKY Porter  2/10/2020

## 2020-02-10 NOTE — NURSING TRANSFER
Nursing Transfer Note      2/10/2020     Transfer To: 9096    Transfer via stretcher    Transfer with  to O2, cardiac monitoring    Transported by RN and Rapid RN    Medicines sent: n/a    Chart send with patient: Yes    Notified: Family        Upon arrival to floor: cardiac monitor applied, patient oriented to room, call bell in reach and bed in lowest position

## 2020-02-10 NOTE — PROGRESS NOTES
Dr. Kate and team at bedside, aware of night events, sats, LOC, labs after lasix including K 3.7 and urine output after lasix, FI02 titrated per MD to keep sats 88 or greater, at 90% currently from 100. New orders noted.

## 2020-02-10 NOTE — PROGRESS NOTES
Rapid RN Tabby) @ bedside.  NIKI Stevens notified patient on 30 L High Flow 02.  BP 80-90/40-50's.  ABG and Xray ordered.  Patient started on levophed.  VSS.  Transported to CT.

## 2020-02-10 NOTE — ASSESSMENT & PLAN NOTE
Patient on minimal levophed for MAP < 65. Patient appears to have baseline low BP's, mentates well with a MAP > 60. Possible infectious etiology given worsening hypoxemia / shock, CT showing compressive atelectasis vs. Developing consolidation in the left lower lobe.    - Diuresing at this time because of patient's respiratory status  - BMP pending  - Levophed for MAP > 65

## 2020-02-11 PROBLEM — Z51.5 PALLIATIVE CARE ENCOUNTER: Status: ACTIVE | Noted: 2020-01-01

## 2020-02-11 PROBLEM — J96.01 ACUTE HYPOXEMIC RESPIRATORY FAILURE: Status: ACTIVE | Noted: 2020-01-01

## 2020-02-11 PROBLEM — B85.0 HEAD LICE: Status: ACTIVE | Noted: 2020-01-01

## 2020-02-11 NOTE — PLAN OF CARE
POC reviewed with pt and family at 1400. Pt  and family verbalized understanding. Questions and concerns addressed. No acute events today. Pt progressing toward goals. Will continue to monitor. See flowsheets for full assessment and VS info. Palliative care consult completed, midline, DC central line, weaned 02, plan to transfer when room available.

## 2020-02-11 NOTE — SUBJECTIVE & OBJECTIVE
Interval History/Significant Events: NAEO. Replaced electrolytes. Weaned oxygen from comfort flow to 4L via NC (goal 88-92%). Discontinued vancomycin.     Review of Systems   Constitutional: Negative for chills and fever.   Respiratory: Negative for cough and shortness of breath.    Cardiovascular: Negative for chest pain and palpitations.   Gastrointestinal: Negative for abdominal pain.   Genitourinary: Negative for difficulty urinating, dysuria and frequency.   Neurological: Positive for weakness.     Objective:     Vital Signs (Most Recent):  Temp: 99 °F (37.2 °C) (02/11/20 1200)  Pulse: (!) 125 (02/11/20 1330)  Resp: (!) 25 (02/11/20 1330)  BP: 129/79 (02/11/20 1330)  SpO2: (!) 94 % (02/11/20 1330) Vital Signs (24h Range):  Temp:  [98 °F (36.7 °C)-99 °F (37.2 °C)] 99 °F (37.2 °C)  Pulse:  [] 125  Resp:  [16-97] 25  SpO2:  [22 %-99 %] 94 %  BP: ()/(54-79) 129/79   Weight: 81.9 kg (180 lb 8.9 oz)  Body mass index is 29.14 kg/m².      Intake/Output Summary (Last 24 hours) at 2/11/2020 1535  Last data filed at 2/11/2020 1300  Gross per 24 hour   Intake 760.39 ml   Output 2350 ml   Net -1589.61 ml       Physical Exam   Constitutional: She is oriented to person, place, and time. She appears well-developed and well-nourished. No distress.   HENT:   Head: Normocephalic and atraumatic.   Eyes: EOM are normal.   Neck: Normal range of motion.   Cardiovascular: Normal rate, normal heart sounds and intact distal pulses. An irregularly irregular rhythm present.   No murmur heard.  Pulmonary/Chest: Effort normal. No respiratory distress. She has wheezes. She has rales.   On 4L of O2 via NC   Abdominal: Soft. Bowel sounds are normal. She exhibits no distension. There is no tenderness.   Musculoskeletal: Normal range of motion. She exhibits edema. She exhibits no tenderness.   2+ bilateral pitting LE edema; 2+ Left hand pitting edema.    Neurological: She is alert and oriented to person, place, and time.   Skin: Skin  is warm and dry. She is not diaphoretic.   Psychiatric: She has a normal mood and affect. Her behavior is normal. Thought content normal.       Vents:  Oxygen Concentration (%): 40 (02/11/20 1040)  Lines/Drains/Airways     Central Venous Catheter Line                 Percutaneous Central Line Insertion/Assessment - triple lumen  02/10/20 0509 right internal jugular 1 day          Drain            Female External Urinary Catheter 02/10/20 0600 1 day          Peripheral Intravenous Line                 Peripheral IV - Single Lumen 02/06/20 1055 18 G;1 3/4 in Left;Anterior Upper Arm 5 days              Significant Labs:    CBC/Anemia Profile:  Recent Labs   Lab 02/11/20  0043   WBC 5.09   HGB 8.2*   HCT 26.0*   *   MCV 96   RDW 15.5*        Chemistries:  Recent Labs   Lab 02/10/20  0155 02/10/20  1842 02/11/20  0043    141 140   K 3.7 3.1* 3.4*    102 103   CO2 25 35* 31*   BUN 9 7* 7*   CREATININE 0.6 0.6 0.6   CALCIUM 7.6* 7.4* 7.0*   ALBUMIN 1.6*  --  1.3*   PROT 4.7*  --  4.0*   BILITOT 0.7  --  0.7   ALKPHOS 177*  --  136*   ALT 19  --  15   AST 27  --  27   MG  --   --  1.1*   PHOS  --   --  2.3*       BMP:   Recent Labs   Lab 02/11/20  0043   GLU 74      K 3.4*      CO2 31*   BUN 7*   CREATININE 0.6   CALCIUM 7.0*   MG 1.1*       Significant Imaging:  CXR: I have reviewed all pertinent results/findings within the past 24 hours and my personal findings are:  Multifocal patchy pulmonary parenchymal disease is present throughout both lungs, worse in the right lung than the left.

## 2020-02-11 NOTE — PROVIDER TRANSFER
[facility] Medicine ICU Stepdown Acceptance Note    Date of Admission: 1/29/2020  Date of Transfer / Stepdown: 2/11/2020  Bodharmeshs, C/J, L, Onc (IV chemo w/in 1 month), Gyn/Onc, or other special case?: no   ICU team stepping patient down: [facility]   ICU team member giving verbal handoff:  [id number]  Accepting  team: IMANA    Brief History of Present Illness:      Elizabeth Cano is a 69 y.o. female with A-fib (on coumadin), COPD, and HFpEF (EF 45% on 1/29/20) admitted to [facility] medicine and treated for Urosepsis requiring a short ICU stay for lactic acidosis. On 2/10, she was then readmitted to MICU for acute hypoxemic respiratory failure likely from volume overload.     Hospital/ICU Course:     2/10 MICU admitted for septic shock requiring levophed and acute hypoxemic resp failure. Patient was initially on comfort flow. She diuresed well and is now only requiring 4L of oxygen via NC (goal 88-92%). Levo has been discontinued. Electrolytes were repleted. Currently on Zosyn; vancomycin was discontinued given resolution of shock. Bridging heparin with warfarin to therapeutic INR level for A-fib. Received permethrin topical treatments for head lice. Levo resumed briefly overnight 2/11 around 2200. Awaiting BP stability for transfer to [facility] Medicine.    Consultants and Procedures:     Consultants:   Consults (From admission, onward)        Status Ordering Provider     Inpatient consult to Cardiology  Once     Provider:  (Not yet assigned)    Completed HARRIS, BEHRAM     Inpatient consult to Colorectal Surgery  Once     Provider:  (Not yet assigned)    Completed HARRIS, BEHRAM     Inpatient consult to Critical Care Medicine  Once     Provider:  (Not yet assigned)    Completed HARRIS, BEHRAM     Inpatient consult to Critical Care Medicine  Once     Provider:  (Not yet assigned)    Completed HARRIS, BEHRAM     Inpatient consult to Critical Care Medicine  Once     Provider:  (Not yet assigned)    Completed HARRIS, BEHRAM      Inpatient consult to Critical Care Medicine  Once     Provider:  (Not yet assigned)    Completed CHINMAY GARCIA     Inpatient consult to Gastroenterology  Once     Provider:  (Not yet assigned)    Completed BRITTANY MIJARES     Inpatient consult to Midline team  Once     Provider:  (Not yet assigned)    Completed DENNY ESPINOZA     Inpatient consult to Midline team  Once     Provider:  (Not yet assigned)    Acknowledged SADAF WARE     Inpatient consult to Palliative Care  Once     Provider:  (Not yet assigned)    Completed OBMAHNAZ BRICEÑOYEANDREAS     Inpatient consult to PICC team ([facility])  Once     Provider:  (Not yet assigned)    Completed ROMIE TEJEDA     Inpatient consult to PICC team ([facility])  Once     Provider:  (Not yet assigned)    Completed ROMIE TEJEDA     Inpatient consult to PICC team ([facility])  Once     Provider:  (Not yet assigned)    Completed DENNY ESPINOZA          Procedures:      Transfer Information:     Code status:  Full Code    Diet: Diet Low Sodium, 2gm [facility]; Fluid - 2000mL    Physical Activity: OT/PT    To Do / Pending Studies / Follow ups:    -Wean oxygen. Maintain O2 of 88-92%  -Bridge heparin with warfarin until therapeutic INR level 2-3.   -Follow up with palliative care recs.   -Continue with home Lasix and metoprolol.  -Continue with 7-day treatment of Zosyn.     -Monitor head lice infestation    Patient has been accepted by Hospital Medicine Team IMD, who will assume care of the patient upon arrival to the floor from the ICU. Please contact ICU team with any concerns prior to arrival. Please contact Hospital Medicine at [phone] or [phone] (please do NOT leave a voicemail) when patient arrives to the floor.    Azra Smith MD  Department of Hospital Medicine  Contact Information 7 AM - 7 PM  Spectralink # [phone]  Pager [phone]

## 2020-02-11 NOTE — PLAN OF CARE
Handoff     Primary Team: Networked reference to record [facility]  Room Number: 9096/9096 A     Patient Name: Elizabeth Cano MRN: [id number]     Date of Birth: [id number] Allergies: Patient has no known allergies.     Age: 69 y.o. Admit Date: 1/29/2020     Sex: female  BMI: Body mass index is 29.14 kg/m².     Code Status: Full Code        Illness Level (current clinical status): Watcher - No    Reason for Admission: Septic shock    Brief HPI (pertinent PMH and diagnosis or differential diagnosis): Ms. Elizabeth Cano is a 69 years old female with afib (on coumadin), COPD, and HFpEF (EF 45% on 1/29/20) admitted to hospital medicine and treated for Urosepsis requiring a short ICU stay for lactic acidosis. On 2/10, she was then readmitted for acute hypoxemic respiratory failure likely from volume overload.       Hospital Course (updated, brief assessment by system or problem, significant events):   02/10 MICU admitted for septic shock requiring levophed and acute hypoxemic resp failure. Patient was initially on comfort flow. She diuresed well and is now only requiring 4L of oxygen via NC (goal 88-92%). Levo has been discontinued. Electrolytes were repleted.Currently on zosyn; vancomycin was discontinued today given resolution of shock. Bridging heparin with warfarin to therapeutic INR level for A-fib. Received permethrin topical treatments for head lice. Patient is stable for to be transferred to Hospital Medicine.     Tasks (specific, using if-then statements):   -Wean oxygen. Maintain O2 of 88-92%  -Bridge heparin with warfarin until therapeutic INR level 2-3.   -Follow up with palliative care recs.   -Continue with home lasix and metoprolol.  -Continue with 7 day treatment of zosyn.     - Monitor head lice infestation      Contingency Plan (special circumstances anticipated and plan):      Estimated Discharge Date: TBD    Discharge Disposition: Nursing Facility

## 2020-02-11 NOTE — ASSESSMENT & PLAN NOTE
Patient with acute hypotension and worsening O2 requirements over the course of 2/9/2020. Started the day on 3L NC and by the end was on Comfort flow 100% FiO2 and 30L. Concern for PE. CTA negative for PE, CXR also with severe pleural edema. Patient had been on zosyn for possible diverticulitis +/- urosepsis.     Vancomycin discontinued; continue zosyn for a total of 7 day treatment.   -RESOLVED.  - Stable for step down today.

## 2020-02-11 NOTE — ASSESSMENT & PLAN NOTE
-Improving with diuresing. Weaned Oxygen down to 4L via NC.   -CXR with Multifocal patchy pulmonary parenchymal disease is present throughout both lungs, worse in the right lung than the left. Unchanged from prior.   -Continue with home dose of daily furosemide 20mg

## 2020-02-11 NOTE — PLAN OF CARE
POC reviewed with pt at 0500. Pt verbalized understanding. Questions and concerns addressed. No acute events overnight. Pt progressing toward goals. Will continue to monitor. See flowsheets for full assessment and VS info   Heparin gtt therapeutic  Mg, K replaced.   Levo gtt titrated to keep MAP >65

## 2020-02-11 NOTE — ASSESSMENT & PLAN NOTE
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   - Monitor daily electrolytes

## 2020-02-11 NOTE — SUBJECTIVE & OBJECTIVE
Interval History:  Pt has COPD and was admitted to ICU for acute respiratory issues. Pt off of high flow O2 today. Pt  now on 4L O2 . Pt off pressor support. Pt want to remain full code.     Past Medical History:   Diagnosis Date    A-fib     GAVIN (acute kidney injury)     CHF (congestive heart failure)     COPD (chronic obstructive pulmonary disease)     Diabetes mellitus     Hypertension        Past Surgical History:   Procedure Laterality Date    CARDIAC SURGERY      COLONOSCOPY N/A 2/4/2020    Procedure: COLONOSCOPY;  Surgeon: Gilberto Laguna MD;  Location: [facility] ([address] [place] [address]);  Service: Endoscopy;  Laterality: N/A;    ESOPHAGOGASTRODUODENOSCOPY N/A 2/7/2020    Procedure: EGD (ESOPHAGOGASTRODUODENOSCOPY);  Surgeon: Aleksey Gomez MD;  Location: [facility] ([address] [place] [address]);  Service: Endoscopy;  Laterality: N/A;       Review of patient's allergies indicates:  No Known Allergies    Medications:  Continuous Infusions:   heparin (porcine) in D5W 12 Units/kg/hr (02/11/20 1300)    norepinephrine bitartrate-D5W 0.02 mcg/kg/min (02/10/20 0215)     Scheduled Meds:   albuterol-ipratropium  3 mL Nebulization Q12H    budesonide  0.5 mg Nebulization BID    calcium-vitamin D3  1 tablet Oral BID    DULoxetine  30 mg Oral Daily    [START ON 2/12/2020] furosemide  20 mg Oral Daily    magnesium oxide  400 mg Oral Daily    metoprolol succinate  50 mg Oral BID    miconazole nitrate 2%   Topical (Top) BID    mirtazapine  15 mg Oral QHS    pantoprazole  40 mg Oral Daily    piperacillin-tazobactam (ZOSYN) IVPB  4.5 g Intravenous Q8H    QUEtiapine  25 mg Oral QHS    warfarin  1 mg Oral Once     PRN Meds:acetaminophen, albuterol-ipratropium, bisacodyL, Dextrose 10% Bolus, Dextrose 10% Bolus, glucagon (human recombinant), glucose, glucose, heparin (PORCINE), heparin (PORCINE), insulin aspart U-100, iohexol, melatonin, ondansetron, phenyleph-min oil-petrolatum, sodium chloride 0.9%, sodium chloride 0.9%    Family  History     Problem Relation (Age of Onset)    Heart disease Mother        Tobacco Use    Smoking status: Former Smoker     Packs/day: 1.00     Years: 54.00     Pack years: 54.00     Types: Cigarettes     Last attempt to quit: 2019     Years since quittin.2    Smokeless tobacco: Never Used   Substance and Sexual Activity    Alcohol use: Not Currently    Drug use: Not Currently    Sexual activity: Not Currently     Partners: Male       Review of Systems   Constitutional: Positive for activity change and fatigue.   HENT: Negative.    Eyes: Negative.    Respiratory: Positive for shortness of breath.    Cardiovascular: Negative.    Gastrointestinal: Negative.    Endocrine: Negative.    Genitourinary: Negative.    Musculoskeletal: Negative.    Skin: Positive for pallor.   Allergic/Immunologic: Negative.    Neurological: Positive for weakness.     Objective:     Vital Signs (Most Recent):  Temp: 99 °F (37.2 °C) (20 1200)  Pulse: (!) 125 (20 1330)  Resp: (!) 25 (20 1330)  BP: 129/79 (20 1330)  SpO2: (!) 94 % (20 1330) Vital Signs (24h Range):  Temp:  [98 °F (36.7 °C)-99 °F (37.2 °C)] 99 °F (37.2 °C)  Pulse:  [] 125  Resp:  [16-97] 25  SpO2:  [22 %-99 %] 94 %  BP: ()/(54-79) 129/79     Weight: 81.9 kg (180 lb 8.9 oz)  Body mass index is 29.14 kg/m².    Review of Symptoms  Symptom Assessment (ESAS 0-10 scale)   ESAS 0 1 2 3 4 5 6 7 8 9 10   Pain X             Dyspnea    X          Anxiety              Nausea              Depression               Anorexia              Fatigue              Insomnia              Restlessness               Agitation              CAM / Delirium __ --  ___+   Constipation     __ --  ___+   Diarrhea           __ --  ___+  Bowel Management Plan (BMP): No      Pain Assessment:Pt reports she is comfortable.     OME in 24 hours: 0    Performance Status: 60    ECOG Performance Status Grade: 3 - Confined to bed or chair 50% of waking  hours    Physical Exam   Constitutional: She is oriented to person, place, and time. She appears well-developed. She is cooperative. Nasal cannula in place.   HENT:   Head: Normocephalic and atraumatic.   Eyes: Conjunctivae are normal.   Cardiovascular:   Pt off of epi   Pulmonary/Chest:   Pt on 4L O2 per NC   Abdominal: Normal appearance.   Musculoskeletal:   Pt has weakness to bilateral legs.      Neurological: She is alert and oriented to person, place, and time.   Skin: Skin is warm and dry.   Psychiatric: Her speech is normal and behavior is normal.       Significant Labs: All pertinent labs within the past 24 hours have been reviewed.  CBC:   Recent Labs   Lab 02/11/20 0043   WBC 5.09   HGB 8.2*   HCT 26.0*   MCV 96   *     BMP:  Recent Labs   Lab 02/11/20 0043   GLU 74      K 3.4*      CO2 31*   BUN 7*   CREATININE 0.6   CALCIUM 7.0*   MG 1.1*     LFT:  Lab Results   Component Value Date    AST 27 02/11/2020    ALKPHOS 136 (H) 02/11/2020    BILITOT 0.7 02/11/2020     Albumin:   Albumin   Date Value Ref Range Status   02/11/2020 1.3 (L) 3.5 - 5.2 g/dL Final     Protein:   Total Protein   Date Value Ref Range Status   02/11/2020 4.0 (L) 6.0 - 8.4 g/dL Final     Lactic acid:   Lab Results   Component Value Date    LACTATE 1.1 02/06/2020    LACTATE 1.1 02/05/2020       Significant Imaging: I have reviewed all pertinent imaging results/findings within the past 24 hours.    Advance Care Planning   Advanced Directives::  Living Will: No  LaPOST: No  Do Not Resuscitate Status: No  Medical Power of : Pt's  is next of kin. They live in a NH together.     Decision-Making Capacity: Patient answered questions       Living Arrangements:NH     Psychosocial/Cultural:   Lives in nursing home with her  at [facility]. She has four adult children. Two adult children live OOT.       Spiritual: yes    F- Florecita and Belief: Mormonism    I - Importance:   .  C - Community:     A - Address  in Care: Will have  visit.

## 2020-02-11 NOTE — CONSULTS
[facility]  Palliative Medicine  Consult Note    Patient Name: Elizabeth Cano  MRN: [id number]  Admission Date: 1/29/2020  Hospital Length of Stay: 13 days  Code Status: Full Code   Attending Provider: Sonal Kate MD  Consulting Provider: GURWINDER Gaines  Primary Care Physician: Cesar Reeves MD  Principal Problem:Septic shock    Patient information was obtained from patient and ER records.      Inpatient consult to Palliative Care  Consult performed by: GURWINDER Mendieta  Consult ordered by: Chuck Metzger MD        Assessment/Plan:     Palliative care encounter  Impression: Pt is a 68 y/o female  with afib (on coumadin), COPD, and HFpEF (EF 45% on 1/29/20) admitted to hospital medicine for presumed urinary tract infection. Pt transferred to ICU for Acute hypoxemic respiratory failure: CTA negative for PE. Pt now off comfort flow, on 4 L. Pt off pressors.     Reasons for consult: advance care planning.     Advance care planning: Met with pt along with Apolonia Coto LCSW. Pt reports she lives at [facility] with her  in the same room. Per pt, she was living in an independent living apartment with her . Per wife, they needed more support and they moved to a NH. Per pt, she is mostly in wheelchair but walks some. Per pt goal is to go back to NH with possible SNF. Pt aware she will continue to decline with her COPD and CHF. Pt aware comfort care is an option as she declined further.     MPOA: Pt reports she filled one out. Not in chart. Pt wants her two daughters, Leana and Tia to make medical decisions. Pt's  is next of kin. Pt did not want to fill out paperwork at this time. Paperwork left with pt. Will f/u.  Code status: Per pt, she wants to be a full code. Per pt, if she is unable to get off of life-support, she would want to be made comfortable.     Symptom management:   Pt denies pain, nausea, anxiety.     Per pt SOB noted with exertion:    Pt on 4L NC.     Plan:   Will f/u with pt concerning MPOA.   Will continue to reinforce realistic expectations.   Will follow.             Thank you for your consult. I will follow-up with patient. Please contact us if you have any additional questions.    Subjective:     HPI:   Ptis a 69 years old female with afib (on coumadin), COPD, and HFpEF (EF 45% on 1/29/20) admitted to hospital medicine for presumed urinary tract infection. Per chart review, pt was diagnosed with a UTIat her Nursing Home, [facility], which she was prescribed Macrobid. She completed her course of Macrobid on 01/29/20 and she was noted to be hypotensive rendering her transfer to the hospital.      On admit, per chart review, pt stated she had a decreased appetite from the last month since her hospitalization over the holidays due to uncomplicated diverticulitis which she was treated with Augmentin. She reports feeling nauseated when taking antibiotics. She denies palpitations, dizziness, lightheadedness, chest pain, SOB, change in urinary/bowel habits.      Per chart review, ICU consulted in am of 01/31/20 for hypotension despite being resuscitated with 3.5 L IV fluids and persistant lactic acidosis in 3-4 range. She improved with IVF hydration and patient was stepped down. Rapid response evaluated patient early AM on 2/10 due to acute increase in oxygen requiriments. Patient began the day on 3L NC and progressed to requiring Comfort flow of 30L and 100% FiO2. Patient denies chest pains, only complaining of minor increased difficulty breathing.    Pt off of high flow O2 today and on 4 L NC.   Pressor have been weaned.        Hospital Course:  No notes on file    Interval History:  Pt has COPD and was admitted to ICU for acute respiratory issues. Pt off of high flow O2 today. Pt  now on 4L O2 . Pt off pressor support. Pt want to remain full code.     Past Medical History:   Diagnosis Date    A-fib     GAVIN (acute kidney injury)     CHF  (congestive heart failure)     COPD (chronic obstructive pulmonary disease)     Diabetes mellitus     Hypertension        Past Surgical History:   Procedure Laterality Date    CARDIAC SURGERY      COLONOSCOPY N/A 2020    Procedure: COLONOSCOPY;  Surgeon: Gilberto Laguna MD;  Location: [facility] ([address] [place] [address]);  Service: Endoscopy;  Laterality: N/A;    ESOPHAGOGASTRODUODENOSCOPY N/A 2020    Procedure: EGD (ESOPHAGOGASTRODUODENOSCOPY);  Surgeon: Aleksey Gomez MD;  Location: [facility] ([address] [place] [address]);  Service: Endoscopy;  Laterality: N/A;       Review of patient's allergies indicates:  No Known Allergies    Medications:  Continuous Infusions:   heparin (porcine) in D5W 12 Units/kg/hr (20 1300)    norepinephrine bitartrate-D5W 0.02 mcg/kg/min (02/10/20 0215)     Scheduled Meds:   albuterol-ipratropium  3 mL Nebulization Q12H    budesonide  0.5 mg Nebulization BID    calcium-vitamin D3  1 tablet Oral BID    DULoxetine  30 mg Oral Daily    [START ON 2020] furosemide  20 mg Oral Daily    magnesium oxide  400 mg Oral Daily    metoprolol succinate  50 mg Oral BID    miconazole nitrate 2%   Topical (Top) BID    mirtazapine  15 mg Oral QHS    pantoprazole  40 mg Oral Daily    piperacillin-tazobactam (ZOSYN) IVPB  4.5 g Intravenous Q8H    QUEtiapine  25 mg Oral QHS    warfarin  1 mg Oral Once     PRN Meds:acetaminophen, albuterol-ipratropium, bisacodyL, Dextrose 10% Bolus, Dextrose 10% Bolus, glucagon (human recombinant), glucose, glucose, heparin (PORCINE), heparin (PORCINE), insulin aspart U-100, iohexol, melatonin, ondansetron, phenyleph-min oil-petrolatum, sodium chloride 0.9%, sodium chloride 0.9%    Family History     Problem Relation (Age of Onset)    Heart disease Mother        Tobacco Use    Smoking status: Former Smoker     Packs/day: 1.00     Years: 54.00     Pack years: 54.00     Types: Cigarettes     Last attempt to quit: 2019     Years since quittin.2    Smokeless  tobacco: Never Used   Substance and Sexual Activity    Alcohol use: Not Currently    Drug use: Not Currently    Sexual activity: Not Currently     Partners: Male       Review of Systems   Constitutional: Positive for activity change and fatigue.   HENT: Negative.    Eyes: Negative.    Respiratory: Positive for shortness of breath.    Cardiovascular: Negative.    Gastrointestinal: Negative.    Endocrine: Negative.    Genitourinary: Negative.    Musculoskeletal: Negative.    Skin: Positive for pallor.   Allergic/Immunologic: Negative.    Neurological: Positive for weakness.     Objective:     Vital Signs (Most Recent):  Temp: 99 °F (37.2 °C) (02/11/20 1200)  Pulse: (!) 125 (02/11/20 1330)  Resp: (!) 25 (02/11/20 1330)  BP: 129/79 (02/11/20 1330)  SpO2: (!) 94 % (02/11/20 1330) Vital Signs (24h Range):  Temp:  [98 °F (36.7 °C)-99 °F (37.2 °C)] 99 °F (37.2 °C)  Pulse:  [] 125  Resp:  [16-97] 25  SpO2:  [22 %-99 %] 94 %  BP: ()/(54-79) 129/79     Weight: 81.9 kg (180 lb 8.9 oz)  Body mass index is 29.14 kg/m².    Review of Symptoms  Symptom Assessment (ESAS 0-10 scale)   ESAS 0 1 2 3 4 5 6 7 8 9 10   Pain X             Dyspnea    X          Anxiety              Nausea              Depression               Anorexia              Fatigue              Insomnia              Restlessness               Agitation              CAM / Delirium __ --  ___+   Constipation     __ --  ___+   Diarrhea           __ --  ___+  Bowel Management Plan (BMP): No      Pain Assessment:Pt reports she is comfortable.     OME in 24 hours: 0    Performance Status: 60    ECOG Performance Status Grade: 3 - Confined to bed or chair 50% of waking hours    Physical Exam   Constitutional: She is oriented to person, place, and time. She appears well-developed. She is cooperative. Nasal cannula in place.   HENT:   Head: Normocephalic and atraumatic.   Eyes: Conjunctivae are normal.   Cardiovascular:   Pt off of epi   Pulmonary/Chest:   Pt on  4L O2 per NC   Abdominal: Normal appearance.   Musculoskeletal:   Pt has weakness to bilateral legs.      Neurological: She is alert and oriented to person, place, and time.   Skin: Skin is warm and dry.   Psychiatric: Her speech is normal and behavior is normal.       Significant Labs: All pertinent labs within the past 24 hours have been reviewed.  CBC:   Recent Labs   Lab 02/11/20 0043   WBC 5.09   HGB 8.2*   HCT 26.0*   MCV 96   *     BMP:  Recent Labs   Lab 02/11/20 0043   GLU 74      K 3.4*      CO2 31*   BUN 7*   CREATININE 0.6   CALCIUM 7.0*   MG 1.1*     LFT:  Lab Results   Component Value Date    AST 27 02/11/2020    ALKPHOS 136 (H) 02/11/2020    BILITOT 0.7 02/11/2020     Albumin:   Albumin   Date Value Ref Range Status   02/11/2020 1.3 (L) 3.5 - 5.2 g/dL Final     Protein:   Total Protein   Date Value Ref Range Status   02/11/2020 4.0 (L) 6.0 - 8.4 g/dL Final     Lactic acid:   Lab Results   Component Value Date    LACTATE 1.1 02/06/2020    LACTATE 1.1 02/05/2020       Significant Imaging: I have reviewed all pertinent imaging results/findings within the past 24 hours.    Advance Care Planning   Advanced Directives::  Living Will: No  LaPOST: No  Do Not Resuscitate Status: No  Medical Power of : Pt's  is next of kin. They live in a NH together.     Decision-Making Capacity: Patient answered questions       Living Arrangements:NH     Psychosocial/Cultural:   Lives in nursing home with her  at [facility]. She has four adult children. Two adult children live OOT.       Spiritual: yes    F- Florecita and Belief: Jew    I - Importance:   .  C - Community:     A - Address in Care: Will have  visit.       20 minutes spent with pt concerning advance care planning- goals of care, MPOA, levels of care.     Kristina Piper, CNS  Palliative Medicine  [facility]

## 2020-02-11 NOTE — CONSULTS
Single lumen 18g x 10  midline placed tort brachial- vein.  Max dwell date3/11/20-, Lot# [id number].  Needle advance into the vessel under real time ultrasound guidance.  Image recorded and saved.    20g x 1 3/4' piv placed to RFA.

## 2020-02-11 NOTE — HPI
Ptis a 69 years old female with afib (on coumadin), COPD, and HFpEF (EF 45% on 1/29/20) admitted to hospital medicine for presumed urinary tract infection. Per chart review, pt was diagnosed with a U[facility]at her Nursing Home, [facility], which she was prescribed Macrobid. She completed her course of Macrobid on 01/29/20 and she was noted to be hypotensive rendering her transfer to the hospital.      On admit, per chart review, pt stated she had a decreased appetite from the last month since her hospitalization over the holidays due to uncomplicated diverticulitis which she was treated with Augmentin. She reports feeling nauseated when taking antibiotics. She denies palpitations, dizziness, lightheadedness, chest pain, SOB, change in urinary/bowel habits.      Per chart review, ICU consulted in am of 01/31/20 for hypotension despite being resuscitated with 3.5 L IV fluids and persistant lactic acidosis in 3-4 range. She improved with IVF hydration and patient was stepped down. Rapid response evaluated patient early AM on 2/10 due to acute increase in oxygen requiriments. Patient began the day on 3L NC and progressed to requiring Comfort flow of 30L and 100% FiO2. Patient denies chest pains, only complaining of minor increased difficulty breathing.    Pt off of high flow O2 today and on 4 L NC.   Pressor have been weaned.

## 2020-02-11 NOTE — ASSESSMENT & PLAN NOTE
Patient currently in RVR presumed 2/2 to acute respiratory failure  - PRN metoprolol as needed for rate control but would not aggressively treat in setting of severe hypoxemia  - INR subtherapeutic; Continue with heparin bridge w/ warfarin until INR is therapeutic.   - Metoprolol 50mg BID resumed.   - Maintain Mg >2, K>4.

## 2020-02-11 NOTE — ASSESSMENT & PLAN NOTE
- possible concern of abscess vs malignancy per CT scan  - h/o weight loss and possible colonic lesion  - RUQ US showing focal fatty infiltration

## 2020-02-11 NOTE — PROGRESS NOTES
Dr. Metzger with critical care medicine notified of 's to 130's, ok to give po dose metoprolol early with 5pm coumadin.  Dr. Metzger re-notified and new orders for IV metoprool ordered , midline team here as well.

## 2020-02-11 NOTE — ASSESSMENT & PLAN NOTE
- CT consistent with recurrent diverticulitis  - CRS consulted during this hospitalization- recommend management with antibiotics.   - Continue with zosyn for 7 days

## 2020-02-11 NOTE — PROGRESS NOTES
[facility]  Critical Care Medicine  Progress Note    Patient Name: Elizabeth Cano  MRN: [id number]  Admission Date: 1/29/2020  Hospital Length of Stay: 13 days  Code Status: Full Code  Attending Provider: Sonal Kate MD  Primary Care Provider: Cesar Reeves MD   Principal Problem: Septic shock    Subjective:     HPI:  Ms. Elizabeth Cano is a 69 years old female with afib (on coumadin), COPD, and HFpEF (EF 45% on 1/29/20) admitted to hospital medicine for presumed urinary tract infection. She was diagnosed with a UTI last week at her Nursing Home, [facility], which she was prescribed Macrobid. She completed her course of Macrobid on 01/29/20 and she was noted to be hypotensive rendering her transfer to the hospital.      She reports a decreased appetite for the last month since her hospitalization over the holidays due to uncomplicated diverticulitis which she was treated with Augmentin. She reports feeling nauseated when taking antibiotics. She denies palpitations, dizziness, lightheadedness, chest pain, SOB, change in urinary/bowel habits.      On 01/29/20, she was found to be hypotensive in the 80s/50s in the ED with lactic acid of 4.8. CXR showed no signs for PNA, UA showed mild leukocytosis with WBC 17, nitrite negative, with urine cultures and blood cultures showing NG. She was admitted to  and started on CTX. She has been resuscitated with IVFs since admit with 3L. BP responded appropriately to IVF resuscitation with subsequent downtrend in lactic acid to 2.7. However, overnight on 01/30/20, BP remained 63-67 mmHg MAP goal. Repeat lactic acid uptrend to 3.3. Of note, she received a night time dose of Lopressor 50 mg on 01/30/20, which has since been discontinued.     ICU consulted in am of 01/31/20 for hypotension despite being resuscitated with 3.5 L IV fluids and persistant lactic acidosis in 3-4 range. She improved with IVF hydration and patient was stepped down. Rapid  response evaluated patient early AM on 2/10 due to acute increase in oxygen requiriments. Patient began the day on 3L NC and progressed to requiring Comfort flow of 30L and 100% FiO2. Patient denies chest pains, only complaining of minor increased difficulty breathing.    Hospital/ICU Course:  She was admitted to Hospital Medicine and treated for Urosepsis with antibiotics and IVF resuscitation. Critical Care was consulted for septic shock. She was admitted to MICU, bolused 1L LR with resulting cleared Lactic Acid. CT Abd Pelvis showed findings consistent with several diverticula in the sigmoid colon with associated wall thickening, minimal adjacent fat stranding, and prominence of the vasa recta.  Findings appear similar when compared to prior examination dated 12/19/2019 and may represent ongoing diverticulitis or colitis in the correct clinical setting. Area of hypoattenuation within the right hepatic lobe adjacent to the gallbladder fossa, not definitively seen on prior examination and may represent area of fatty liver infiltration or transit hepatic attenuation differences however, in the setting of liver disease, neoplasm is not excluded.   Following clearing of her lactic acid, she was stepped down to Hospital Medicine with midodrine 5 mg TID. Critical Care was re-consulted in the setting of Afib RVR and hypotension. While on the floor, patient went into Afib RVR, sustained HR >120, /55, requiring lopressor pushes, which subsequently lowered HR to 75s/50s.    2/10: Patient readmitted to MICU for acute hypoxemic respiratory failure. CTA negative for PE, diuresing well with IV lasix. Levophed has been discontinued. Electrolytes were repleted. Currently on zosyn; vancomycin was discontinued today given resolution of shock. Bridging heparin with warfarin to therapeutic INR level for A-fib. Restarted oral lasix and metoprolol. Patient is stable to be transferred to Hospital Medicine.  Received topical  permethrin treatment for head lice.     Interval History/Significant Events: NAEO. Replaced electrolytes. Weaned oxygen from comfort flow to 4L via NC (goal 88-92%). Discontinued vancomycin.     Review of Systems   Constitutional: Negative for chills and fever.   Respiratory: Negative for cough and shortness of breath.    Cardiovascular: Negative for chest pain and palpitations.   Gastrointestinal: Negative for abdominal pain.   Genitourinary: Negative for difficulty urinating, dysuria and frequency.   Neurological: Positive for weakness.     Objective:     Vital Signs (Most Recent):  Temp: 99 °F (37.2 °C) (02/11/20 1200)  Pulse: (!) 125 (02/11/20 1330)  Resp: (!) 25 (02/11/20 1330)  BP: 129/79 (02/11/20 1330)  SpO2: (!) 94 % (02/11/20 1330) Vital Signs (24h Range):  Temp:  [98 °F (36.7 °C)-99 °F (37.2 °C)] 99 °F (37.2 °C)  Pulse:  [] 125  Resp:  [16-97] 25  SpO2:  [22 %-99 %] 94 %  BP: ()/(54-79) 129/79   Weight: 81.9 kg (180 lb 8.9 oz)  Body mass index is 29.14 kg/m².      Intake/Output Summary (Last 24 hours) at 2/11/2020 1535  Last data filed at 2/11/2020 1300  Gross per 24 hour   Intake 760.39 ml   Output 2350 ml   Net -1589.61 ml       Physical Exam   Constitutional: She is oriented to person, place, and time. She appears well-developed and well-nourished. No distress.   HENT:   Head: Normocephalic and atraumatic.   Eyes: EOM are normal.   Neck: Normal range of motion.   Cardiovascular: Normal rate, normal heart sounds and intact distal pulses. An irregularly irregular rhythm present.   No murmur heard.  Pulmonary/Chest: Effort normal. No respiratory distress. She has wheezes. She has rales.   On 4L of O2 via NC   Abdominal: Soft. Bowel sounds are normal. She exhibits no distension. There is no tenderness.   Musculoskeletal: Normal range of motion. She exhibits edema. She exhibits no tenderness.   2+ bilateral pitting LE edema; 2+ Left hand pitting edema.    Neurological: She is alert and oriented  to person, place, and time.   Skin: Skin is warm and dry. She is not diaphoretic.   Psychiatric: She has a normal mood and affect. Her behavior is normal. Thought content normal.       Vents:  Oxygen Concentration (%): 40 (02/11/20 1040)  Lines/Drains/Airways     Central Venous Catheter Line                 Percutaneous Central Line Insertion/Assessment - triple lumen  02/10/20 0509 right internal jugular 1 day          Drain            Female External Urinary Catheter 02/10/20 0600 1 day          Peripheral Intravenous Line                 Peripheral IV - Single Lumen 02/06/20 1055 18 G;1 3/4 in Left;Anterior Upper Arm 5 days              Significant Labs:    CBC/Anemia Profile:  Recent Labs   Lab 02/11/20  0043   WBC 5.09   HGB 8.2*   HCT 26.0*   *   MCV 96   RDW 15.5*        Chemistries:  Recent Labs   Lab 02/10/20  0155 02/10/20  1842 02/11/20  0043    141 140   K 3.7 3.1* 3.4*    102 103   CO2 25 35* 31*   BUN 9 7* 7*   CREATININE 0.6 0.6 0.6   CALCIUM 7.6* 7.4* 7.0*   ALBUMIN 1.6*  --  1.3*   PROT 4.7*  --  4.0*   BILITOT 0.7  --  0.7   ALKPHOS 177*  --  136*   ALT 19  --  15   AST 27  --  27   MG  --   --  1.1*   PHOS  --   --  2.3*       BMP:   Recent Labs   Lab 02/11/20  0043   GLU 74      K 3.4*      CO2 31*   BUN 7*   CREATININE 0.6   CALCIUM 7.0*   MG 1.1*       Significant Imaging:  CXR: I have reviewed all pertinent results/findings within the past 24 hours and my personal findings are:  Multifocal patchy pulmonary parenchymal disease is present throughout both lungs, worse in the right lung than the left.       ABG  Recent Labs   Lab 02/10/20  0424   PH 7.283*   PO2 41   PCO2 57.0*   HCO3 26.9   BE 0     Assessment/Plan:     Derm  Head lice  -S/p 1 permethrin lotion treatment.   - Active lice visualized today. Will re-initiate treatment with permethrin  - Continue to monitor.    Pulmonary  Acute hypoxemic respiratory failure  -Improving with diuresing. Weaned Oxygen down  to 4L via NC.   -CXR with Multifocal patchy pulmonary parenchymal disease is present throughout both lungs, worse in the right lung than the left. Unchanged from prior.   -Continue with home dose of daily furosemide 20mg    COPD (chronic obstructive pulmonary disease)  Continue Duonebs Q6H  Budesonide nebs BID   Maintain O2 of 88-92%        Cardiac/Vascular  Hypotension  Patient on minimal levophed for MAP < 65. Patient appears to have baseline low BP's, mentates well with a MAP > 60. Possible infectious etiology given worsening hypoxemia / shock, CT showing compressive atelectasis vs. Developing consolidation in the left lower lobe.  D/c levophed.   --RESOLVED       Paroxysmal atrial fibrillation  Patient currently in RVR presumed 2/2 to acute respiratory failure  - PRN metoprolol as needed for rate control but would not aggressively treat in setting of severe hypoxemia  - INR subtherapeutic; Continue with heparin bridge w/ warfarin until INR is therapeutic.   - Metoprolol 50mg BID resumed.   - Maintain Mg >2, K>4.     Renal/  Hypocalcemia  -Continue with home calcium-Vit D  Resolved      Hypomagnesemia  -Replete PRN  -Continue home magnesium oxide    ID  * Septic shock  Patient with acute hypotension and worsening O2 requirements over the course of 2/9/2020. Started the day on 3L NC and by the end was on Comfort flow 100% FiO2 and 30L. Concern for PE. CTA negative for PE, CXR also with severe pleural edema. Patient had been on zosyn for possible diverticulitis +/- urosepsis.     Vancomycin discontinued; continue zosyn for a total of 7 day treatment.   -RESOLVED.  - Stable for step down today.     Endocrine  Type 2 diabetes mellitus with hyperglycemia, without long-term current use of insulin  AIC 5.4  Glucose checks QACHS  Goal Glucose 140-180 mg/dL  Diabetic diet    GI  Liver lesion, right lobe  - possible concern of abscess vs malignancy per CT scan  - h/o weight loss and possible colonic lesion  - RUQ US  showing focal fatty infiltration      Diverticulitis  - CT consistent with recurrent diverticulitis  - CRS consulted during this hospitalization- recommend management with antibiotics.   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   - Monitor daily electrolytes      Critical Care Daily Checklist:    A: Awake: RASS Goal/Actual Goal: RASS Goal: 0-->alert and calm  Actual: [place] Agitation Sedation Scale (RASS): Alert and calm   B: Spontaneous Breathing Trial Performed?     C: SAT & SBT Coordinated?  N/A                      D: Delirium: CAM-ICU Overall CAM-ICU: Negative   E: Early Mobility Performed? No   F: Feeding Goal: Goals: Pt to receive nutrition by RD follow up  Status: Nutrition Goal Status: new   Current Diet Order   Procedures    Diet Low Sodium, 2gm [facility]; Fluid - 2000mL     Order Specific Question:   Indicate patient location for additional diet options:     Answer:   [facility]     Order Specific Question:   Fluid restriction:     Answer:   Fluid - 2000mL      AS: Analgesia/Sedation N/A   T: Thromboembolic Prophylaxis Heparin bridge   H: HOB > 300 Yes   U: Stress Ulcer Prophylaxis (if needed)    G: Glucose Control Yes   B: Bowel Function Stool Occurrence: 1   I: Indwelling Catheter (Lines & Carpio) Necessity Central line- to be removed today   D: De-escalation of Antimicrobials/Pharmacotherapies Yes- currently on zosyn    Plan for the day/ETD Step down    Code Status:  Family/Goals of Care: Full Code  Palliative care consulted to assist       Critical secondary to Patient has a condition that poses threat to life and bodily function: Severe Respiratory Distress and septic shock. Stable  for the floor.     Critical care was time spent personally by me on the following activities: development of treatment plan with patient or surrogate and bedside caregivers, discussions with consultants, evaluation of patient's response to treatment, examination of patient, ordering and performing treatments and interventions, ordering and review of laboratory studies, ordering and review of radiographic studies, pulse oximetry, re-evaluation of patient's condition. This critical care time did not overlap with that of any other provider or involve time for any procedures.     Chuck Metzger MD  Critical Care Medicine  [facility]

## 2020-02-11 NOTE — ASSESSMENT & PLAN NOTE
Patient on minimal levophed for MAP < 65. Patient appears to have baseline low BP's, mentates well with a MAP > 60. Possible infectious etiology given worsening hypoxemia / shock, CT showing compressive atelectasis vs. Developing consolidation in the left lower lobe.  D/c levophed.   --RESOLVED

## 2020-02-11 NOTE — PLAN OF CARE
[facility]  Palliative Care   Follow Up Note:    Patient Name: Elizabeth Cano  MRN: [id number]  Admission Date: 1/29/2020  Hospital Length of Stay: 13 days  Code Status: Full Code   Attending Provider: Sonal Kate MD  Palliative Care Provider: CLARK Saez, APRN, AGCNS  Primary Care Physician: Cesar Reeves MD  Principal Problem:Septic shock     Visit made to bedside with Pal Care APRKAYODE. Began establishing rapport. Pt stated that she has been at [facility] with her , in the same room. Pt stated that she was receiving therapy one or two times a week. Pt stated that she was hoping to go to a different nursing home but her daughters were unable to find a new one so she was willing to return. Pt stated that prior to the nursing home pt lived at [facility] for past three years. Pt stated that she has 4 children: 3 daughters and 1 son.    Asked pt if she had Advance Directive. Pt stated that she thinks she completed one while at [facility] which made her daughters Rae and Tia her POA. Informed her that we didn't have POA in our system and that we could complete a new one. Pt denied needing to complete an additional one. Pt stated that daughter Rae lives in [place] and dtr Tia lives in [place] all other children live out of town.    Pt currently on 4 liters oxygen and being step down to floor per pt. Palliative Care to continue to follow and provide support as appropriate.    Full Psychosocial to follow.    Apolonia Coto, ELBERTW, ACHP-SW

## 2020-02-11 NOTE — PROGRESS NOTES
Dr. Kate and team at bedside , all labs reviewed, CXR, lung sounds, urine output and that it is more concentrated this am, discussed with patient palliative care consult , very agreeable .

## 2020-02-11 NOTE — PROGRESS NOTES
Pharmacokinetic Assessment Follow Up: IV Vancomycin    Therapy with vancomycin complete and consult discontinued by provider.  Pharmacy will sign off, please re-consult as needed.    Jada Gustafson, PharmD, BCCCP  [id number]

## 2020-02-11 NOTE — ASSESSMENT & PLAN NOTE
Impression: Pt is a 68 y/o female  with afib (on coumadin), COPD, and HFpEF (EF 45% on 1/29/20) admitted to hospital medicine for presumed urinary tract infection. Pt transferred to ICU for Acute hypoxemic respiratory failure: CTA negative for PE. Pt now off comfort flow, on 4 L. Pt off pressors.     Reasons for consult: advance care planning.     Advance care planning: Met with pt along with Apolonia Coto LCSW. Pt reports she lives at [facility] with her  in the same room. Per pt, she was living in an independent living apartment with her . Per wife, they needed more support and they moved to a NH. Per pt, she is mostly in wheelchair but walks some. Per pt goal is to go back to NH with possible SNF. Pt aware she will continue to decline with her COPD and CHF. Pt aware comfort care is an option as she declined further.     MPOA: Pt reports she filled one out. Not in chart. Pt wants her two daughters, Leana and Tia to make medical decisions. Pt's  is next of kin. Pt did not want to fill out paperwork at this time. Paperwork left with pt. Will f/u.  Code status: Per pt, she wants to be a full code. Per pt, if she is unable to get off of life-support, she would want to be made comfortable.     Symptom management:   Pt denies pain, nausea, anxiety.     Per pt SOB noted with exertion:   Pt on 4L NC.     Plan:   Will f/u with pt concerning MPOA.   Will continue to reinforce realistic expectations.   Will follow.

## 2020-02-11 NOTE — ASSESSMENT & PLAN NOTE
-S/p 1 permethrin lotion treatment.   - Active lice visualized today. Will re-initiate treatment with permethrin  - Continue to monitor.

## 2020-02-12 NOTE — SUBJECTIVE & OBJECTIVE
Interval History/Significant Events: Patient's MAP <65 requiring 0.04mcg of levo over the course of 4 hours overnight. MAP currently in the 70s. Patient continues to improve, sitting on the chair comfortably. Currently on 2L of O2 via NC.     Review of Systems   Constitutional: Negative for chills and fever.   Respiratory: Negative for cough and shortness of breath.    Cardiovascular: Negative for chest pain and palpitations.   Gastrointestinal: Negative for abdominal pain.   Genitourinary: Negative for difficulty urinating, dysuria and frequency.   Neurological: Positive for weakness.     Objective:     Vital Signs (Most Recent):  Temp: 98.2 °F (36.8 °C) (02/12/20 1100)  Pulse: 93 (02/12/20 1300)  Resp: (!) 27 (02/12/20 1300)  BP: 133/67 (02/12/20 1200)  SpO2: 96 % (02/12/20 1300) Vital Signs (24h Range):  Temp:  [98.2 °F (36.8 °C)-99.1 °F (37.3 °C)] 98.2 °F (36.8 °C)  Pulse:  [] 93  Resp:  [16-35] 27  SpO2:  [89 %-99 %] 96 %  BP: ()/(49-88) 133/67   Weight: 81.9 kg (180 lb 8.9 oz)  Body mass index is 29.14 kg/m².      Intake/Output Summary (Last 24 hours) at 2/12/2020 1352  Last data filed at 2/12/2020 1300  Gross per 24 hour   Intake 1174.48 ml   Output 1150 ml   Net 24.48 ml       Physical Exam   Constitutional: She is oriented to person, place, and time. She appears well-developed and well-nourished. No distress.   HENT:   Head: Normocephalic and atraumatic.   Eyes: EOM are normal.   Neck: Normal range of motion.   Cardiovascular: Normal rate, normal heart sounds and intact distal pulses. An irregularly irregular rhythm present.   No murmur heard.  Pulmonary/Chest: Effort normal. No respiratory distress. She has no wheezes. She has rales (Right sided crackles).   On 4L of O2 via NC   Abdominal: Soft. Bowel sounds are normal. She exhibits no distension. There is no tenderness.   Musculoskeletal: Normal range of motion. She exhibits edema. She exhibits no tenderness.   2+ bilateral pitting LE edema;  2+ Left hand pitting edema.    Neurological: She is alert and oriented to person, place, and time.   Skin: Skin is warm and dry. She is not diaphoretic.   Psychiatric: She has a normal mood and affect. Her behavior is normal. Thought content normal.       Vents:  Oxygen Concentration (%): 40 (02/12/20 0739)  Lines/Drains/Airways     Drain            Female External Urinary Catheter 02/10/20 0600 2 days          Peripheral Intravenous Line                 Midline Catheter Insertion/Assessment  - Single Lumen 02/11/20 1545 Right brachial vein 18g x 10cm less than 1 day         Peripheral IV - Single Lumen 02/11/20 1545 20 G Right Forearm less than 1 day              Significant Labs:    CBC/Anemia Profile:  Recent Labs   Lab 02/11/20  0043 02/12/20  0141   WBC 5.09 5.43   HGB 8.2* 8.4*   HCT 26.0* 26.7*   * 168   MCV 96 96   RDW 15.5* 15.5*        Chemistries:  Recent Labs   Lab 02/11/20 0043 02/11/20  1551 02/12/20  0128    138 140   K 3.4* 3.8 3.4*    99 102   CO2 31* 33* 33*   BUN 7* 6* 6*   CREATININE 0.6 0.7 0.7   CALCIUM 7.0* 7.5* 7.1*   ALBUMIN 1.3*  --  1.3*   PROT 4.0*  --  4.1*   BILITOT 0.7  --  0.7   ALKPHOS 136*  --  157*   ALT 15  --  18   AST 27  --  28   MG 1.1* 1.7 1.6   PHOS 2.3*  --  2.3*       BMP:   Recent Labs   Lab 02/12/20  0128   *      K 3.4*      CO2 33*   BUN 6*   CREATININE 0.7   CALCIUM 7.1*   MG 1.6     CMP:   Recent Labs   Lab 02/11/20 0043 02/11/20  1551 02/12/20  0128    138 140   K 3.4* 3.8 3.4*    99 102   CO2 31* 33* 33*   GLU 74 96 147*   BUN 7* 6* 6*   CREATININE 0.6 0.7 0.7   CALCIUM 7.0* 7.5* 7.1*   PROT 4.0*  --  4.1*   ALBUMIN 1.3*  --  1.3*   BILITOT 0.7  --  0.7   ALKPHOS 136*  --  157*   AST 27  --  28   ALT 15  --  18   ANIONGAP 6* 6* 5*   EGFRNONAA >60.0 >60.0 >60.0       Significant Imaging:  CXR: I have reviewed all pertinent results/findings within the past 24 hours and my personal findings are:  Prominent scattered  lung opacification, similar to prior CXR

## 2020-02-12 NOTE — PT/OT/SLP RE-EVAL
Occupational Therapy   Re-evaluation & Treatment     Name: Elizabeth Cano  MRN: [id number]  Admitting Diagnosis:  Septic shock 5 Days Post-Op    Recommendations:     Discharge Recommendations: nursing facility, skilled  Discharge Equipment Recommendations:  none  Barriers to discharge:  None    Assessment:     Elizabeth Cano is a 69 y.o. female with a medical diagnosis of Septic shock.  She presents with generalized weakness and debility from acute stay. She demo good motivation and participation this session with improvement in bed mobility and transfers. OT to cont to follow up 3x/w. She would greatly benefit from increased mobility with nursing staff-- discussed use of bedside commode with RN on this date.  Performance deficits affecting function are weakness, impaired endurance, impaired cardiopulmonary response to activity, impaired self care skills, impaired functional mobilty, gait instability, impaired balance, decreased upper extremity function, decreased lower extremity function, impaired skin.      Rehab Prognosis:  Good-fair+; patient would benefit from acute skilled OT services to address these deficits and reach maximum level of function.       Plan:     Patient to be seen 3 x/week to address the above listed problems via self-care/home management, therapeutic activities, therapeutic exercises, neuromuscular re-education  · Plan of Care Expires: 03/12/20  · Plan of Care Reviewed with: patient    Subjective   Chief Complaint: feeling weak  Patient/Family stated goals: to get better    Occupational Profile: Pt resides in NH with spouse. Since Oct, she has required added assistance for all ADLs/mobility. Assist for dressing, bathing. Wears brief 2/2 poor assistance. RW and min(A) for mobility. Assist for supine<>Sit. Prior to this time period, she was indep with mobility and mod(I) with ADLs.    Pain/Comfort:  · Pain Rating 1: 0/10  · Pain Rating Post-Intervention 1: 0/10    Objective:      Communicated with: RN Rich) prior to session. Completed with PT in ICU setting. Patient found HOB elevated with: blood pressure cuff, pulse ox (continuous), telemetry, SCD, peripheral IV, PureWick upon OT entry to room.    General Precautions: Standard, aspiration, fall, contact, diabetic   Orthopedic Precautions:N/A   Braces: N/A     Occupational Performance:    Bed Mobility:    · Patient completed Rolling/Turning to Right with contact guard assistance and with side rail  · Patient completed Supine to Sit with contact guard assistance and with side rail    Functional Mobility/Transfers:  · Patient completed Sit <> Stand Transfer with minimum assistance  with  no assistive device   · Patient completed Bed <> Chair Transfer using Step Transfer technique with minimum assistance with no assistive device    Activities of Daily Living:  · Feeding:  modified independence with setup  · Upper Body Dressing: minimum assistance EOB  · Lower Body Dressing: maximal assistance B socks EOB  · Toileting: maximal assistance      Cognitive:  Cognitive/Psychosocial Skills:     -       Oriented to: Person, Place, Time and Situation   -       Follows Commands/attention:Follows multistep  commands  -       Safety awareness/insight to disability: intact   -       Mood/Affect/Coping skills/emotional control: Cooperative    Physical Exam:  Postural examination/scapula alignment:    -       Rounded shoulders  -       Forward head  -       Posterior pelvic tilt  Dominant hand:    -       L  Upper Extremity Range of Motion:     -       Right Upper Extremity: WFL  -       Left Upper Extremity: WFL  Upper Extremity Strength:    -       Right Upper Extremity: 4+/5  -       Left Upper Extremity: 4+/5   Strength:    -       Right Upper Extremity: WFL  -       Left Upper Extremity: WFL  Fine Motor Coordination:    -       Intact  Left hand, manipulation of objects and Right hand, manipulation of objects  Gross motor coordination:   WFL  B UE    [facility] 6 Click:  [facility] Total Score: 16   Body mass index is 29.14 kg/m².  Vitals:    02/12/20 1100   BP: 96/60   Pulse: 98   Resp: (!) 25   Temp: 98.2 °F (36.8 °C)       Treatment & Education:  -Pt alert and agreeable to therapy re eval; re edu on therapy role in care  -edu on call light for staff assistance; edu on need for staff assistance for t/fEducation:    -Communication board updated; questions/concerns addressed within OT scope of practice  -no family at bedside for education     Patient left up in chair with all lines intact, call button in reach and RN notified    GOALS:   Multidisciplinary Problems     Occupational Therapy Goals        Problem: Occupational Therapy Goal    Goal Priority Disciplines Outcome Interventions   Occupational Therapy Goal     OT, PT/OT Ongoing, Progressing    Description:  Goals to be met by: 2/26    Patient will increase functional independence with ADLs by performing:    UE Dressing while seated EOB with Set-up Assistance.  LE Dressing (socks, brief) with min(A).  Grooming while seated at sink with set up.  Toileting from BSC with CGA.  Toilet transfer to BSC with CGA.  Functional mobility at household distance for ADL task with AD and min(A).                    History:     Past Medical History:   Diagnosis Date    A-fib     GAVIN (acute kidney injury)     CHF (congestive heart failure)     COPD (chronic obstructive pulmonary disease)     Diabetes mellitus     Hypertension        Past Surgical History:   Procedure Laterality Date    CARDIAC SURGERY      COLONOSCOPY N/A 2/4/2020    Procedure: COLONOSCOPY;  Surgeon: Gilberto Laguna MD;  Location: [address]);  Service: Endoscopy;  Laterality: N/A;    ESOPHAGOGASTRODUODENOSCOPY N/A 2/7/2020    Procedure: EGD (ESOPHAGOGASTRODUODENOSCOPY);  Surgeon: Aleksey Gomez MD;  Location: [address]);  Service: Endoscopy;  Laterality: N/A;       Time Tracking:     OT Date of Treatment: 02/12/20  OT Start  Time: 0901  OT Stop Time: 0923  OT Total Time (min): 22 min    Billable Minutes:Re-eval 10  Therapeutic Activity 8    VIKY Aguirre  2/12/2020

## 2020-02-12 NOTE — ASSESSMENT & PLAN NOTE
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   - Monitor daily electrolytes   - Continue with metoprolol 50mg BID

## 2020-02-12 NOTE — PHYSICIAN QUERY
PT Name: Elizabeth Cano  MR #: [id number]     CDS/: CHINA Frederick, RN, CDS               Contact information:[email or web address]  This form is a permanent document in the medical record.     Query Date: February 12, 2020    Physician Query - Neurological Condition Clarification    By submitting this query, we are merely seeking further clarification of documentation to reflect the severity of illness of your patient. Please utilize your independent clinical judgment when addressing the question(s) below.    The Medical record reflects the following:     Indicators   Supporting Clinical Findings Location in Medical Record   X AMS, Confusion,  LOC, etc.   Delirium- worsening episodes of delirium and hallucinations     She is actively hallucinating (intermittent ).      Patient's continues to have intermittent episodes of confusion and hallucinations. Easily redirectable.      AAO w/ intermittent confusion and hallucinations     Delirium-further workup for infectious/metabolic causes has been unremarkable     Suspecting this is idiopathic secondary to hospital stay     Family reports similar symtoms during an admission at  in November     Suspecting that the patient aspirated at this time, the timing of this event correlated with the onset of delirium               HM, Dr. Aponte/Dr. Eason, 2/7               Nurse note, 2/8 at 4:09 AM     TANGELA, Dr. Aponte/Dr. Eason, 2/8             , DR. Aponte/Dr. Eason, 2/9   X Acute / Chronic Illness  Septic shock, hematemesis, diverticulitis, atrial fibrillation on anticoagulation, and CHF, GAVIN, Acute cystitis without hematuria, hypotension, paroxysmal atrial fibrillation      Aspiration pneumonia    HM, Dr. Aponte/Dr. Eason, 2/7       , DR. Aponte/Dr. Eason, 2/9    Radiology Findings     X Electrolyte Imbalance  Hypocalcemia, hypomagnesemia  , Dr. Aponte/Dr. Eason, 2/7    Medication      Treatment          Reveiwed medications and discontinued  medications associated with worsening Mental status     Further workup in progress infectious/metabolic     Will start low dose seroquel qhs to help with sleep cycles      Treating for Aspiration PNA     Continue zosyn IV and consider adding Vancomycin for hospital acquired PNA  TANGELA, Dr. Aponte/Dr. Eason, 2/7               DR. Fay HONEYCUTT/Dr. Eason, 2/9    Other       Encephalopathy- is a general term for any diffuse disease of the brain that alters brain function or structure. Treatment of the cognitive dysfunction varies but is ultimately dependent on the treatment of the underlying condition.    Major Symptoms of Encephalopathy - Decreased level of consciousness, fluctuating alertness/concentration, confusion, agitation, lethargy, somnolence, drowsiness, obtundation, stupor, or coma.         References: National Institutes of Healths (NIH) National [place] of Neurological Disorders and Strokes;  HCPro 2016; Advisory Board     Clinical Guidelines:   These guidelines will set system standards to assist providers in managing, documentation, and coding of encephalopathy. The intent of this document is to serve as a system guideline, not replace the providers clinical judgment:    Provider, please specify the diagnosis or diagnoses associated with above clinical findings.    [  X ] Metabolic Encephalopathy - Due to electrolye imbalance, metabolic derangements, or infections processes, includes Septic Encephalopathy   [   ] Other Encephalopathy - Includes uremic encephalopathy   [   ] Unspecified Encephalopathy      [   ] Other Neurological Condition-  Includes Post-ictal altered mental status. (please specify condition): _________   [   ]  Clinically Undetermined     Please document in your progress notes daily for the duration of treatment until resolved, and include in your discharge summary.

## 2020-02-12 NOTE — ASSESSMENT & PLAN NOTE
Consulted Palliative care to assist with goals of care discussion. As of this moment, patient expressed preference for full code, although patient may not fully understand her prognosis given her frequent hospitalizations. Will continue to discuss with patient and family.

## 2020-02-12 NOTE — ASSESSMENT & PLAN NOTE
-S/p 1 permethrin lotion treatment.   - Active lice visualized today. Will re-initiate treatment with permethrin. Patient will require nit-combing   - Continue to monitor.

## 2020-02-12 NOTE — PLAN OF CARE
Problem: Adult Inpatient Plan of Care  Goal: Plan of Care Review  Outcome: Ongoing, Progressing     Problem: Adult Inpatient Plan of Care  Goal: Optimal Comfort and Wellbeing  Outcome: Ongoing, Progressing     POC reviewed with patient at 1500. Pt verbalized understanding of the POC. Pt was off levophed throughout the day. Pt was able to sit at the chair for 4 hours, tolerated well but needed maximum assistance when patient was transferred back to the bed. Weaning down oxygen inhalation. Keeping sats 88-92%. Keeping MAP > 65. Contact precautions maintained. Pt on heparin gtt, aptt check next AM labs. Bridging to coumadin. Patient has transfer orders to step down. Questions and concerns addressed. Patient progressing towards goals of care. Please see flow sheet for detailed nursing assessment and VS. Will continue to monitor.

## 2020-02-12 NOTE — ASSESSMENT & PLAN NOTE
Patient on minimal levophed for MAP < 65. Patient appears to have baseline low BP's, mentates well with a MAP > 60. Possible infectious etiology given worsening hypoxemia / shock, CT showing compressive atelectasis vs. Developing consolidation in the left lower lobe.  D/c levophed.   Patient required a short course of Levo 0.04 for MAP <65. MAP improved afterwards, currently sustaining at 72.

## 2020-02-12 NOTE — PROGRESS NOTES
Patient is scheduled to have Coumadin PO at 1700. Patient is currently on heparin gtt. Per Critical Care [place] Obi, continue both medications.

## 2020-02-12 NOTE — PLAN OF CARE
Plan of Care:  Discharge Recommendation: SNF.  Please continue Progressive Mobility Protocol as appropriate.  Appropriate transfer level with nursing staff: Bed <> Chair:  Stand Pivot with minimum assistance with Rolling Walker.    Anitha Trotter PT, DPT  2020  Pager: [phone]    Physical Therapy Treatment Goals:  Multidisciplinary Problems       Physical Therapy Goals          Problem: Physical Therapy Goal    Goal Priority Disciplines Outcome Goal Variances Interventions   Physical Therapy Goal     PT, PT/OT Ongoing, Progressing     Description:  Goals to be met by: 2020     Patient will increase functional independence with mobility by performin. Supine to sit with Contact Guard Assistance  2. Sit to supine with Contact Guard Assistance  3. Sit to stand transfer with Contact Guard Assistance using LRAD  4. Bed to chair transfer with Contact Guard Assistance using Rolling Walker  5. Gait  x 25 feet with Contact Guard Assistance using Rolling Walker.   6. Lower extremity exercise program x20 reps per handout, with independence

## 2020-02-12 NOTE — ASSESSMENT & PLAN NOTE
Patient with acute hypotension and worsening O2 requirements over the course of 2/9/2020. Started the day on 3L NC and by the end was on Comfort flow 100% FiO2 and 30L. Concern for PE. CTA negative for PE, CXR also with severe pleural edema. Patient had been on zosyn for possible diverticulitis +/- urosepsis.     Vancomycin discontinued; continue zosyn for a total of 7 day treatment.   - RESOLVED.  - Stable for step down today.

## 2020-02-12 NOTE — PROGRESS NOTES
Pt sleeping. No family at bedside. Will continue to follow for advance care planning and to reinforce realistic expectations.     Pt to step-down to IM.     Kristina ROADS, APRN, AGCNS

## 2020-02-12 NOTE — PROGRESS NOTES
Wound care follow-up  The groin/perineal area is clear/skin intact.  Patient has no distress at present.  Continue Miconazole for another 7 days   TIMMY Jaimes RN, CN  [id number] care signing off.

## 2020-02-12 NOTE — PHYSICIAN QUERY
PT Name: Elizabeth Cano  MR #: [id number]     PHYSICIAN QUERY -  ELECTROLYTE CLARIFICATION      CDS/: CHINA Frederick, RN, CDS               Contact information:[email or web address]  This form is a permanent document in the medical record.     Query Date: February 12, 2020    By submitting this query, we are merely seeking further clarification of documentation to reflect the severity of illness of your patient. Please utilize your independent clinical judgment when addressing the question(s) below.    The Medical record reflects the following:     Indicators   Supporting Clinical Findings Location in Medical Record   X Lab Value(s)     2/10 2/11 2/12   Potassium 3.1 (L) 3.4 (L) 3.4 (L)         Lab 2/10-2/12   X Treatment                                 Medication  Potassium chloride 40 mEq oral   MAR 1/31, 2/10, 2/11, 2/12    Other       Provider, please specify the diagnosis or diagnoses that correspond(s) to the above indicators. Parth all that apply:    [ X  ] Hypokalemia   [   ] Other electrolyte disturbance (please specify): _______   [   ]  Clinically Undetermined       Please document in your progress notes daily for the duration of treatment until resolved, and include in your discharge summary.

## 2020-02-12 NOTE — PROGRESS NOTES
[facility]  Critical Care Medicine  Progress Note    Patient Name: Elizabeth Cano  MRN: [id number]  Admission Date: 1/29/2020  Hospital Length of Stay: 14 days  Code Status: Full Code  Attending Provider: Sonal Kate MD  Primary Care Provider: Cesar Reeves MD   Principal Problem: Septic shock    Subjective:     HPI:  Ms. Elizabeth Cano is a 69 years old female with afib (on coumadin), COPD, and HFpEF (EF 45% on 1/29/20) admitted to hospital medicine for presumed urinary tract infection. She was diagnosed with a UTI last week at her Nursing Home, [facility], which she was prescribed Macrobid. She completed her course of Macrobid on 01/29/20 and she was noted to be hypotensive rendering her transfer to the hospital.      She reports a decreased appetite for the last month since her hospitalization over the holidays due to uncomplicated diverticulitis which she was treated with Augmentin. She reports feeling nauseated when taking antibiotics. She denies palpitations, dizziness, lightheadedness, chest pain, SOB, change in urinary/bowel habits.      On 01/29/20, she was found to be hypotensive in the 80s/50s in the ED with lactic acid of 4.8. CXR showed no signs for PNA, UA showed mild leukocytosis with WBC 17, nitrite negative, with urine cultures and blood cultures showing NG. She was admitted to  and started on CTX. She has been resuscitated with IVFs since admit with 3L. BP responded appropriately to IVF resuscitation with subsequent downtrend in lactic acid to 2.7. However, overnight on 01/30/20, BP remained 63-67 mmHg MAP goal. Repeat lactic acid uptrend to 3.3. Of note, she received a night time dose of Lopressor 50 mg on 01/30/20, which has since been discontinued.     ICU consulted in am of 01/31/20 for hypotension despite being resuscitated with 3.5 L IV fluids and persistant lactic acidosis in 3-4 range. She improved with IVF hydration and patient was stepped down. Rapid  response evaluated patient early AM on 2/10 due to acute increase in oxygen requiriments. Patient began the day on 3L NC and progressed to requiring Comfort flow of 30L and 100% FiO2. Patient denies chest pains, only complaining of minor increased difficulty breathing.    Hospital/ICU Course:  She was admitted to Hospital Medicine and treated for Urosepsis with antibiotics and IVF resuscitation. Critical Care was consulted for septic shock. She was admitted to MICU, bolused 1L LR with resulting cleared Lactic Acid. CT Abd Pelvis showed findings consistent with several diverticula in the sigmoid colon with associated wall thickening, minimal adjacent fat stranding, and prominence of the vasa recta.  Findings appear similar when compared to prior examination dated 12/19/2019 and may represent ongoing diverticulitis or colitis in the correct clinical setting. Area of hypoattenuation within the right hepatic lobe adjacent to the gallbladder fossa, not definitively seen on prior examination and may represent area of fatty liver infiltration or transit hepatic attenuation differences however, in the setting of liver disease, neoplasm is not excluded.   Following clearing of her lactic acid, she was stepped down to Hospital Medicine with midodrine 5 mg TID. Critical Care was re-consulted in the setting of Afib RVR and hypotension. While on the floor, patient went into Afib RVR, sustained HR >120, /55, requiring lopressor pushes, which subsequently lowered HR to 75s/50s.    2/10: Patient readmitted to MICU for acute hypoxemic respiratory failure. CTA negative for PE, diuresing well with IV lasix. Levophed has been discontinued. Electrolytes were repleted. Currently on zosyn; vancomycin was discontinued today given resolution of shock. Bridging heparin with warfarin to therapeutic INR level for A-fib. Restarted oral lasix and metoprolol. Patient is stable to be transferred to Hospital Medicine.  Received topical  permethrin treatment for head lice.     Interval History/Significant Events: Patient's MAP <65 requiring 0.04mcg of levo over the course of 4 hours overnight. MAP currently in the 70s. Patient continues to improve, sitting on the chair comfortably. Currently on 2L of O2 via NC.     Review of Systems   Constitutional: Negative for chills and fever.   Respiratory: Negative for cough and shortness of breath.    Cardiovascular: Negative for chest pain and palpitations.   Gastrointestinal: Negative for abdominal pain.   Genitourinary: Negative for difficulty urinating, dysuria and frequency.   Neurological: Positive for weakness.     Objective:     Vital Signs (Most Recent):  Temp: 98.2 °F (36.8 °C) (02/12/20 1100)  Pulse: 93 (02/12/20 1300)  Resp: (!) 27 (02/12/20 1300)  BP: 133/67 (02/12/20 1200)  SpO2: 96 % (02/12/20 1300) Vital Signs (24h Range):  Temp:  [98.2 °F (36.8 °C)-99.1 °F (37.3 °C)] 98.2 °F (36.8 °C)  Pulse:  [] 93  Resp:  [16-35] 27  SpO2:  [89 %-99 %] 96 %  BP: ()/(49-88) 133/67   Weight: 81.9 kg (180 lb 8.9 oz)  Body mass index is 29.14 kg/m².      Intake/Output Summary (Last 24 hours) at 2/12/2020 1352  Last data filed at 2/12/2020 1300  Gross per 24 hour   Intake 1174.48 ml   Output 1150 ml   Net 24.48 ml       Physical Exam   Constitutional: She is oriented to person, place, and time. She appears well-developed and well-nourished. No distress.   HENT:   Head: Normocephalic and atraumatic.   Eyes: EOM are normal.   Neck: Normal range of motion.   Cardiovascular: Normal rate, normal heart sounds and intact distal pulses. An irregularly irregular rhythm present.   No murmur heard.  Pulmonary/Chest: Effort normal. No respiratory distress. She has no wheezes. She has rales (Right sided crackles).   On 4L of O2 via NC   Abdominal: Soft. Bowel sounds are normal. She exhibits no distension. There is no tenderness.   Musculoskeletal: Normal range of motion. She exhibits edema. She exhibits no  tenderness.   2+ bilateral pitting LE edema; 2+ Left hand pitting edema.    Neurological: She is alert and oriented to person, place, and time.   Skin: Skin is warm and dry. She is not diaphoretic.   Psychiatric: She has a normal mood and affect. Her behavior is normal. Thought content normal.       Vents:  Oxygen Concentration (%): 40 (02/12/20 0739)  Lines/Drains/Airways     Drain            Female External Urinary Catheter 02/10/20 0600 2 days          Peripheral Intravenous Line                 Midline Catheter Insertion/Assessment  - Single Lumen 02/11/20 1545 Right brachial vein 18g x 10cm less than 1 day         Peripheral IV - Single Lumen 02/11/20 1545 20 G Right Forearm less than 1 day              Significant Labs:    CBC/Anemia Profile:  Recent Labs   Lab 02/11/20  0043 02/12/20  0141   WBC 5.09 5.43   HGB 8.2* 8.4*   HCT 26.0* 26.7*   * 168   MCV 96 96   RDW 15.5* 15.5*        Chemistries:  Recent Labs   Lab 02/11/20 0043 02/11/20  1551 02/12/20  0128    138 140   K 3.4* 3.8 3.4*    99 102   CO2 31* 33* 33*   BUN 7* 6* 6*   CREATININE 0.6 0.7 0.7   CALCIUM 7.0* 7.5* 7.1*   ALBUMIN 1.3*  --  1.3*   PROT 4.0*  --  4.1*   BILITOT 0.7  --  0.7   ALKPHOS 136*  --  157*   ALT 15  --  18   AST 27  --  28   MG 1.1* 1.7 1.6   PHOS 2.3*  --  2.3*       BMP:   Recent Labs   Lab 02/12/20  0128   *      K 3.4*      CO2 33*   BUN 6*   CREATININE 0.7   CALCIUM 7.1*   MG 1.6     CMP:   Recent Labs   Lab 02/11/20  0043 02/11/20  1551 02/12/20  0128    138 140   K 3.4* 3.8 3.4*    99 102   CO2 31* 33* 33*   GLU 74 96 147*   BUN 7* 6* 6*   CREATININE 0.6 0.7 0.7   CALCIUM 7.0* 7.5* 7.1*   PROT 4.0*  --  4.1*   ALBUMIN 1.3*  --  1.3*   BILITOT 0.7  --  0.7   ALKPHOS 136*  --  157*   AST 27  --  28   ALT 15  --  18   ANIONGAP 6* 6* 5*   EGFRNONAA >60.0 >60.0 >60.0       Significant Imaging:  CXR: I have reviewed all pertinent results/findings within the past 24 hours and my  personal findings are:  Prominent scattered lung opacification, similar to prior CXR      ABG  Recent Labs   Lab 02/10/20  0424   PH 7.283*   PO2 41   PCO2 57.0*   HCO3 26.9   BE 0     Assessment/Plan:     Derm  Head lice  -S/p 1 permethrin lotion treatment.   - Active lice visualized today. Will re-initiate treatment with permethrin. Patient will require nit-combing   - Continue to monitor.    Pulmonary  Acute hypoxemic respiratory failure  -Improving with diuresing. Weaned Oxygen down to 4L via NC.   -CXR with Multifocal patchy pulmonary parenchymal disease is present throughout both lungs, worse in the right lung than the left. Unchanged from prior.   -Continue with home dose of daily furosemide 20mg    COPD (chronic obstructive pulmonary disease)  Continue Duonebs Q6H  Budesonide nebs BID   Maintain O2 of 88-92%        Cardiac/Vascular  Hypotension  Patient on minimal levophed for MAP < 65. Patient appears to have baseline low BP's, mentates well with a MAP > 60. Possible infectious etiology given worsening hypoxemia / shock, CT showing compressive atelectasis vs. Developing consolidation in the left lower lobe.  D/c levophed.   Patient required a short course of Levo 0.04 for MAP <65. MAP improved afterwards, currently sustaining at 72.       Paroxysmal atrial fibrillation  Patient currently in RVR presumed 2/2 to acute respiratory failure  - PRN metoprolol as needed for rate control but would not aggressively treat in setting of severe hypoxemia  - INR subtherapeutic; Continue with heparin bridge w/ warfarin until INR is therapeutic.   - Metoprolol 50mg BID resumed.   - Maintain Mg >2, K>4.     Renal/  Hypocalcemia  -Continue with home calcium-Vit D  Resolved      Hypomagnesemia  -Replete PRN  -Continue home magnesium oxide.     ID  * Septic shock  Patient with acute hypotension and worsening O2 requirements over the course of 2/9/2020. Started the day on 3L NC and by the end was on Comfort flow 100% FiO2  and 30L. Concern for PE. CTA negative for PE, CXR also with severe pleural edema. Patient had been on zosyn for possible diverticulitis +/- urosepsis.     Vancomycin discontinued; continue zosyn for a total of 7 day treatment.   - RESOLVED.  - Stable for step down today.     Endocrine  Type 2 diabetes mellitus with hyperglycemia, without long-term current use of insulin  AIC 5.4  Glucose checks QACHS  Goal Glucose 140-180 mg/dL  Diabetic diet    GI  Liver lesion, right lobe  - possible concern of abscess vs malignancy per CT scan  - h/o weight loss and possible colonic lesion  - RUQ US showing focal fatty infiltration      Diverticulitis  - CT consistent with recurrent diverticulitis  - CRS consulted during this hospitalization- recommend management with antibiotics.   - Continue with zosyn for 7 days    Palliative Care  Advance care planning  Consulted Palliative care to assist with goals of care discussion. As of this moment, patient expressed preference for full code, although patient may not fully understand her prognosis given her frequent hospitalizations. Will continue to discuss with patient and family.        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   - Monitor daily electrolytes   - Continue with metoprolol 50mg BID      Critical Care Daily Checklist:    A: Awake: RASS Goal/Actual Goal: RASS Goal: 0-->alert and calm  Actual: [place] Agitation Sedation Scale (RASS): Alert and calm   B: Spontaneous Breathing Trial Performed?     C: SAT & SBT Coordinated?  N/A                      D: Delirium: CAM-ICU Overall CAM-ICU: Negative   E: Early Mobility Performed? Yes   F: Feeding Goal: Goals: Pt to receive nutrition by RD follow up  Status:  Nutrition Goal Status: new   Current Diet Order   Procedures    Diet Low Sodium, 2gm [facility]; Fluid - 2000mL     Order Specific Question:   Indicate patient location for additional diet options:     Answer:   [facility]     Order Specific Question:   Fluid restriction:     Answer:   Fluid - 2000mL      AS: Analgesia/Sedation N/A   T: Thromboembolic Prophylaxis Yes-heparin w/ warfarin   H: HOB > 300 Yes   U: Stress Ulcer Prophylaxis (if needed) No   G: Glucose Control No   B: Bowel Function Stool Occurrence: 1   I: Indwelling Catheter (Lines & Carpio) Necessity No   D: De-escalation of Antimicrobials/Pharmacotherapies yes    Plan for the day/ETD Step down, awaiting bed placement.     Code Status:  Family/Goals of Care: Full Code  Goals of care discussions       Critical secondary to Patient has a condition that poses threat to life and bodily function: Severe Respiratory Distress and septic shock. Stable for the floor.      Critical care was time spent personally by me on the following activities: development of treatment plan with patient or surrogate and bedside caregivers, discussions with consultants, evaluation of patient's response to treatment, examination of patient, ordering and performing treatments and interventions, ordering and review of laboratory studies, ordering and review of radiographic studies, pulse oximetry, re-evaluation of patient's condition. This critical care time did not overlap with that of any other provider or involve time for any procedures.     Chuck Metzger MD  Critical Care Medicine  [facility]

## 2020-02-12 NOTE — PT/OT/SLP RE-EVAL
Physical Therapy Re-Assessment / Treatment    Patient Name:  Elizabeth Cano   MRN:  [id number]  Admit Date: 1/29/2020  Admitting Diagnosis:  Septic shock   Length of Stay: 14 days  Recent Surgery: Procedure(s) (LRB):  EGD (ESOPHAGOGASTRODUODENOSCOPY) (N/A) 5 Days Post-Op    Hospital Course:  1/29/2020: Admit date  2/5/2020: PT initial evaluation  2/10/2020, TF to ICU for respiratory distress  2/11/2020: PT re-consulted    Please refer to PT initial evaluation for PLOF and social history.  Recommendations:     Discharge Recommendations:  nursing facility, skilled   Discharge Equipment Recommendations: none   Barriers to discharge: Decreased caregiver support    Assessment:     Elizabeth Cano is a 69 y.o. female admitted with a medical diagnosis of Septic shock.  She presents with the following impairments/functional limitations: decreased functional mobility and strength. Pt with shuffling steps, increasing fall risk. Pt with good participation in session, fearful of falling. Elizabeth Cano's deficits effect their ability to safely and independently participate in self-care tasks and functional mobility which increases their caregiver burden. Due to her physical therapy diagnosis of debility and deconditioning, they continue to benefit from acute PT services to address the following limitations: high fall risk, weakness, instability, and the need for supervised instruction of exercise. These deficits effect their roles and responsibilities in which they were able to complete prior to admit. Pt would benefit from an intensive and collaborative PT/OT/SLP therapy program to improve quality of life and focus on recovery of impairments.     Problem List: weakness, impaired self care skills, impaired endurance, impaired functional mobilty, gait instability, decreased lower extremity function, decreased upper extremity function, impaired balance, impaired cardiopulmonary response to activity  Rehab Prognosis: Good  "  Plan:     During this hospitalization, patient to be seen 4 x/week to address the above listed problems via gait training, therapeutic activities, therapeutic exercises, neuromuscular re-education  · Plan of Care Expires:  03/12/20   Plan of Care Reviewed with: patient    Subjective     Communicated with RN prior to session.  Patient found supine upon PT entry to room, agreeable to evaluation. Elizabeth Cano's alone present during session.    Chief Complaint: Multiple Complaints (uti finished antibiotics, )    Patient comments/goals: "I'm just so weak. I want to get up." "Everytime I get up, I get sick and nauseous. I haven't been able to do much the last few months."  Pain/Comfort:  · Pain Rating 1: 0/10  · Pain Rating Post-Intervention 1: 0/10    Objective:   Patient found with: blood pressure cuff, pulse ox (continuous), telemetry, oxygen     General Precautions: Standard, Cardiac aspiration, fall, diabetic, special contact((lice))   Orthopedic Precautions:N/A   Braces: N/A   Vitals: /67   Pulse 93   Temp 98.2 °F (36.8 °C) (Oral)   Resp (!) 27   Ht 5' 6" (1.676 m)   Wt 81.9 kg (180 lb 8.9 oz)   SpO2 96%   Breastfeeding? No   BMI 29.14 kg/m²   Oxygen Device: Nasal Cannula 4L    Exams:  · Cognition:   · Alert and Cooperative  · AxOx4  · Command following: Follows multistep  commands  · Fluency: clear/fluent  · Hearing: Intact  · Vision:  Intact visual fields     Left UE Left LE Right UE Right LE   Edema absent present absent present   ROM AROM WFL AROM WFL AROM WFL AROM WFL   Modified Janina Scale 0: No increase in muscle tone 0: No increase in muscle tone 0: No increase in muscle tone 0: No increase in muscle tone   Strength 3/5 3/5 3/5 3/5   Sensation intact to light touch intact to light touch intact to light touch intact to light touch   Coordination normal normal normal normal       Outcome Measures:  AM-PAC 6 CLICK MOBILITY  Turning over in bed (including adjusting bedclothes, sheets " and blankets)?: 3  Sitting down on and standing up from a chair with arms (e.g., wheelchair, bedside commode, etc.): 3  Moving from lying on back to sitting on the side of the bed?: 3  Moving to and from a bed to a chair (including a wheelchair)?: 3  Need to walk in hospital room?: 2  Climbing 3-5 steps with a railing?: 1  Basic Mobility Total Score: 15     Functional Mobility:  Additional staff present: OT  Bed Mobility:   · Supine to Sit: minimum assistance; HOB flat and from right side of bed  · Scooting anteriorly to EOB to have both feet planted on floor: contact guard assistance    Sitting Balance at Edge of Bed:   Assistance Level Required: Contact Guard Assistance   Time: 5 minutes   Postural deviations noted: slouched posture and rounded shoulders   Comments: pt c/o bottom soreness, no LOB    Transfers:    Sit <> Stand Transfer: minimum assistance with hand-held assist    Stand <> Sit Transfer: minimum assistance with hand-held assist   o [id number] from EOB   - Pt with slight posterior lean   Bed <> Chair Transfer: Step Transfer technique with minimum assistance with hand-held assist  o Chair on patient's right      Gait:  · Patient ambulated: 3-4 steps to bedside chair, shuffle steps   · Patient required: minimal assist  · Patient used: hand-held assist  · Gait Pattern observed: 2-point gait  · Gait Deviation(s): shuffle gait, flexed posture and decreased keren, posterior lean  · Impairments due to: impaired balance, decreased strength and decreased endurance  · Comments: pt with slight posterior lean, vc's for safety    Therapeutic Activities & Exercises:   Education:  Patient provided with daily orientation and goals of this PT session. Patient agreed to participate in session. Patient aware of patient's deficits and therapy progression. They were educated to transfer to bedside chair/bedside commode/bathroom with RN/PCT present. Patient educated on Fall risk, home safety and Home exercise  program by explanation. Patient was receptive to education and verbalizes understanding. Encouraged patient to perform daily exercises & mobility to increase endurance and decrease effects of bedrest. Time provided for therapeutic counseling and discussion of health disposition. All questions answered to patient's satisfaction, within scope of PT practice; voiced no other concerns. White board updated in patient's room, RN notified of session.    Patient left up in chair, with head in midline, neutral pelvis & heels floated for skin protection with all lines intact, call button in reach and RN notified.    AM-PAC 6 CLICK MOBILITY  Total Score:15      GOALS:   Multidisciplinary Problems     Physical Therapy Goals        Problem: Physical Therapy Goal    Goal Priority Disciplines Outcome Goal Variances Interventions   Physical Therapy Goal     PT, PT/OT Ongoing, Progressing     Description:  Goals to be met by: 2020     Patient will increase functional independence with mobility by performin. Supine to sit with Contact Guard Assistance  2. Sit to supine with Contact Guard Assistance  3. Sit to stand transfer with Contact Guard Assistance using LRAD  4. Bed to chair transfer with Contact Guard Assistance using Rolling Walker  5. Gait  x 25 feet with Contact Guard Assistance using Rolling Walker.   6. Lower extremity exercise program x20 reps per handout, with independence                     Time Tracking:     PT Received On: 20  PT Start Time: 902     PT Stop Time: 923  PT Total Time (min): 21 min     Billable Minutes: Re-eval 10 and Therapeutic Activity 11    Anitha Trotter PT, DPT  2020  Pager:[phone]

## 2020-02-12 NOTE — PLAN OF CARE
[facility] [address]  Palliative Care   Psychosocial Assessment    Patient Name: Elizabeth Cano  MRN: [id number]  Admission Date: 1/29/2020  Hospital Length of Stay: 14 days  Code Status: Full Code   Attending Provider: Sonal Kate MD  Palliative Care Provider: CLARK Saez, APRN, AGCNS  Primary Care Physician: Cesar Reeves MD  Principal Problem:Septic shock    Reason for Referral: assistance with clarification of goals of care  Consult Order Date: 2/20/20  Primary CM/SW: Fely Porter LCSW    Present during Interview: patient and this SW.      Primary Language:English   Needed: no      Past Medical Situation:   PMH:   Past Medical History:   Diagnosis Date    A-fib     GAVNI (acute kidney injury)     CHF (congestive heart failure)     COPD (chronic obstructive pulmonary disease)     Diabetes mellitus     Hypertension      Mental Health/Substance Use History: n/a  Risk of Abuse, neglect or exploitation: n/a  Current or Previous Trauma and/or evidence of PTSD: n/a  Non-traditional Health practices:     Understanding of diagnosis and prognosis: Will benefit from discussions with daughters and herself regarding px  Experience/Comfort level with health care system:    Patients Mental Status: alert and oriented    Socio-Economic Factors/Resources:  Address: [address] [place] [address]  Phone Number: [phone] (home)     Marital Status:  x 53 years  Perceived impact on household composition: Lives at [facility] with her  for past 3 or 4 months  Children: 4: 3 daughters and 1 son    Patient/Family perceptions about Caregiving Needs; availability and capacity: Supportive family. Pt and Her  have been living at [facility] for past 3 or 4 months. Prior to this, they lived at [facility]. They have one daughter Tia that lives locally and another daughter Rae that lives in [place]. Other two children live out of  town.    Pt has 7 grandchildren and 4 great grandchildren.    Patient/Family Strengths/Resilience: family supportive  Patient/Family Coping Strategies:  relies on family    Activities of Daily Living: Assistance with adls  Support Systems-Family & Community (Home Health, HME etc): [facility]    Transportation:  no    Work/Education History: Pt is a retired housewife.  Self-Care Activities/Hobbies: enjoyed family. Unable to state anything she enjoys     History: no    Financial Resources:[employer]/ Medicaid      Advanced Care Planning & Legal Concerns:   Advanced Directives/Living Will: no  LaPOST/POLST: no   Planning:  no    Power of : no. Pt stated that her daughter took POA home with her.   Surrogate Decision Maker:     Emergency Contacts:\  : Tuan Cano: [phone]  Dtr: Tia Eva: [phone]  Dtr Rae Jaylene: [phone]    Spirituality, Culture & Coping Mechanisms:  F- Florecita and Belief: Anabaptism     I - Importance: has florecita    C - Community/Culture Values:     A - Address in Care: Spiritual care to follow      Goals/Hopes/Expectations: wants to return to SNF and continue receiving treatment  Fears/Anxiety/Concerns: doesn't want to return to [facility] but know its her only option        Preferences about EOL Environment: (own bed, family nearby, pets, music, etc)  tbd    Complicated Bereavement Risk Assessment Tool (CBRAT)  Reference:  [facility] Palliative [facility] Clinical Practice Group (May 2016). Bereavement Risk Screening and Management Guidelines.  Retrieved from: http://www.grpcc.com.au/wp-content/uploads//KGXLJ-Rkmqqmiunrr-Dmocvnjrl-and-Management-Guideline-2016.pdf      Bereaved Client Characteristics   ? Under 18      no  ? Was a Twin   no  ? Young Spouse   no  ? Elderly Spouse    yes  ? Isolated    no  ? Lacks Meaningful Social Support   no  ? Dissatisfied with help available during illness   no  ? New to Financial  [place] no  ? New to Decision-Making   no    Illness  ? Inherited Disorder   no  ? Stigmatized Disease in the family/community   no  ? Lengthy/Burdensome   yes     Bereaved Client's History of Loss   ? Cumulative Multiple Losses   no  ? Previous Mental Health Illnesses   no  ? Current Mental Health Illness   no  ? Other Significant Health Issues   yes   ? Migrant/Refugee   no Death  ? Sudden or Unexpected   yes  ? Traumatic Circumstances Associated with Death   no  ? Significant Cultural/Social Burdens as a result of Death   no   Relationship with   ? Profound Lifelong Partner   yes  ? Highly Dependent    no  ? Antagonistic   no  ? Ambivalent   no  ? Deeply Connected   yes  ? Culturally Defined   no   Risk Factors Scores  0-2  Low  3-5  Moderate  5+  High  All persons scoring moderate to high presume to be at risk**    (** It is acknowledged that protective factors and resilience may outweigh apparent risk factors.      Total Risk Factors Score:   Moderate    Pt lives in Nursing Home with her  in same room. Will benefit from continued follow up regarding support and grief resources from viraj, nursing home staff and family.      Discharge Planning Needs/Plan of Care:       Visit to bedside. Began establishing rapport. Spoke with pt about completing MPOA forms. Explained that without forms, her  is her next of kin. Pt stated that she would like to wait to complete forms.    Will continue working with pt to provide realistic goals.    Apolonia Coto, LEIGH, ACHP-SW

## 2020-02-12 NOTE — PLAN OF CARE
POC reviewed with pt at 0500. Pt verbalized understanding. Questions and concerns addressed. No acute events overnight. Pt progressing toward goals. Will continue to monitor. See flowsheets for full assessment and VS info   Heparin gtt therapeutic at 12 units  Levo on/off throughout the night.

## 2020-02-12 NOTE — PLAN OF CARE
OT eval completed. Cont to rec return to NH with skilled services for OT and PT. Plan of care for 3x/w at this time. VIKY Aguirre 2/12/2020     Problem: Occupational Therapy Goal  Goal: Occupational Therapy Goal  Description  Goals to be met by: 2/26    Patient will increase functional independence with ADLs by performing:    UE Dressing while seated EOB with Set-up Assistance.  LE Dressing (socks, brief) with min(A).  Grooming while seated at sink with set up.  Toileting from BSC with CGA.  Toilet transfer to BSC with CGA.  Functional mobility at household distance for ADL task with AD and min(A).   Outcome: Ongoing, Progressing

## 2020-02-13 NOTE — SUBJECTIVE & OBJECTIVE
Interval History/Significant Events: Patient developed asymptomatic drop in BP and MAP overnight requiring 0.02mcg of Levo. Metoprolol discontinued and was started on digoxin for rate control. Patient continues to have active lice despite comb-niting 2 days ago. Complains of mild achy left lower quadrant pain after eating. Last BM was 2 days ago.     Review of Systems   Constitutional: Negative for chills and fever.   Respiratory: Negative for cough and shortness of breath.    Cardiovascular: Negative for chest pain and palpitations.   Gastrointestinal: Positive for abdominal pain (LLQ).   Genitourinary: Negative for difficulty urinating, dysuria and frequency.   Neurological: Positive for weakness.     Objective:     Vital Signs (Most Recent):  Temp: 97.7 °F (36.5 °C) (02/13/20 0710)  Pulse: 86 (02/13/20 0900)  Resp: (!) 26 (02/13/20 0900)  BP: 97/66 (02/13/20 0900)  SpO2: (!) 91 % (02/13/20 0900) Vital Signs (24h Range):  Temp:  [97.7 °F (36.5 °C)-98.7 °F (37.1 °C)] 97.7 °F (36.5 °C)  Pulse:  [] 86  Resp:  [16-56] 26  SpO2:  [66 %-100 %] 91 %  BP: ()/(37-87) 97/66   Weight: 81.4 kg (179 lb 7.3 oz)  Body mass index is 28.96 kg/m².      Intake/Output Summary (Last 24 hours) at 2/13/2020 0916  Last data filed at 2/13/2020 0900  Gross per 24 hour   Intake 02212.26 ml   Output 575 ml   Net 16093.26 ml       Physical Exam   Constitutional: She is oriented to person, place, and time. She appears well-developed and well-nourished. No distress.   HENT:   Head: Normocephalic and atraumatic.   Eyes: EOM are normal.   Neck: Normal range of motion.   Cardiovascular: Normal rate, normal heart sounds and intact distal pulses. An irregularly irregular rhythm present.   No murmur heard.  Pulmonary/Chest: Effort normal. No respiratory distress. She has no wheezes. She has rales (Most notably on the right sided).   On 4L of O2 via NC   Abdominal: Soft. Bowel sounds are normal. She exhibits no distension. There is no  tenderness.   Musculoskeletal: Normal range of motion. She exhibits edema. She exhibits no tenderness.   2+ bilateral pitting LE edema; 2+ Left hand pitting edema.    Neurological: She is alert and oriented to person, place, and time.   Skin: Skin is warm and dry. She is not diaphoretic.   Psychiatric: She has a normal mood and affect. Her behavior is normal. Thought content normal.       Vents:  Oxygen Concentration (%): 28 (02/13/20 0325)  Lines/Drains/Airways     Drain            Female External Urinary Catheter 02/10/20 0600 3 days          Peripheral Intravenous Line                 Midline Catheter Insertion/Assessment  - Single Lumen 02/11/20 1545 Right brachial vein 18g x 10cm 1 day         Peripheral IV - Single Lumen 02/11/20 1545 20 G Right Forearm 1 day              Significant Labs:    CBC/Anemia Profile:  Recent Labs   Lab 02/12/20  0141 02/13/20  0400   WBC 5.43 8.43   HGB 8.4* 10.2*   HCT 26.7* 32.9*    273   MCV 96 98   RDW 15.5* 15.6*        Chemistries:  Recent Labs   Lab 02/11/20  1551 02/12/20  0128 02/13/20  0400    140 138   K 3.8 3.4* 4.7   CL 99 102 99   CO2 33* 33* 35*   BUN 6* 6* 6*   CREATININE 0.7 0.7 0.7   CALCIUM 7.5* 7.1* 7.7*   ALBUMIN  --  1.3* 1.5*   PROT  --  4.1* 5.0*   BILITOT  --  0.7 0.7   ALKPHOS  --  157* 179*   ALT  --  18 20   AST  --  28 39   MG 1.7 1.6 1.6   PHOS  --  2.3* 2.6*       CMP:   Recent Labs   Lab 02/11/20  1551 02/12/20  0128 02/13/20  0400    140 138   K 3.8 3.4* 4.7   CL 99 102 99   CO2 33* 33* 35*   GLU 96 147* 135*   BUN 6* 6* 6*   CREATININE 0.7 0.7 0.7   CALCIUM 7.5* 7.1* 7.7*   PROT  --  4.1* 5.0*   ALBUMIN  --  1.3* 1.5*   BILITOT  --  0.7 0.7   ALKPHOS  --  157* 179*   AST  --  28 39   ALT  --  18 20   ANIONGAP 6* 5* 4*   EGFRNONAA >60.0 >60.0 >60.0       Significant Imaging:  CXR: I have reviewed all pertinent results/findings within the past 24 hours and my personal findings are:  Prominent scattered lung opacification, similar  to the prior exam

## 2020-02-13 NOTE — PT/OT/SLP PROGRESS
"Physical Therapy Treatment    Patient Name:  Elizabeth Cano   MRN:  [id number]    Recommendations:     Discharge Recommendations:  nursing facility, skilled   Discharge Equipment Recommendations: (TBD)   Barriers to discharge: Inaccessible home and Decreased caregiver support    Assessment:     Elizabeth Cano is a 69 y.o. female admitted with a medical diagnosis of Septic shock.  She presents with the following impairments/functional limitations:  weakness, impaired endurance, impaired functional mobilty, gait instability, impaired balance, decreased upper extremity function, decreased lower extremity function, decreased safety awareness, impaired cardiopulmonary response to activity, impaired self care skills.  Pt presents in the ICU, contact precautions for lice.  Discussed with nsg that pt is to remain in bed throughout tx session, no standing activity today, sec to hypotension.     Established exercise program as pts LEs are becoming increasingly weak. SaO2 dropped to 81% when sitting EOB on 3LPM of supp O2.  Of note, pt has COPD.  L LE> R LE edema.     Rehab Prognosis: Good; patient would benefit from acute skilled PT services to address these deficits and reach maximum level of function.    Recent Surgery: Procedure(s) (LRB):  EGD (ESOPHAGOGASTRODUODENOSCOPY) (N/A) 6 Days Post-Op    Plan:     During this hospitalization, patient to be seen 4 x/week to address the identified rehab impairments via gait training, therapeutic activities, therapeutic exercises, neuromuscular re-education and progress toward the following goals:    · Plan of Care Expires:  03/12/20    Subjective     Chief Complaint: weakness  Patient/Family Comments/goals: "I'm so weak from being in this bed so long."  Pain/Comfort:  · Pain Rating 1: 0/10      Objective:     Communicated with nursing prior to session.  Patient found supine with blood pressure cuff, telemetry, pulse ox (continuous), peripheral IV, PureWick(midline catheter) upon " PT entry to room.     General Precautions: Standard, fall, diabetic, special contact   Orthopedic Precautions:N/A   Braces: N/A     Functional Mobility:  · Bed Mobility:     · Rolling Right: minimum assistance  · Scooting: ModA: to scoot ant sitting EOB; MaxA x 2 persons: to scoot to HOB with pt assistance through LEs  · Bridging: supervision  · Supine to Sit: minimum assistance  · Sit to Supine: minimum assistance  · Balance: Arlette: static sitting balance EOB, with dropping SaO2 81%      AM-PAC 6 CLICK MOBILITY  Turning over in bed (including adjusting bedclothes, sheets and blankets)?: 2  Sitting down on and standing up from a chair with arms (e.g., wheelchair, bedside commode, etc.): 1  Moving from lying on back to sitting on the side of the bed?: 3  Moving to and from a bed to a chair (including a wheelchair)?: 1  Need to walk in hospital room?: 1  Climbing 3-5 steps with a railing?: 1  Basic Mobility Total Score: 9       Therapeutic Activities and Exercises:   Whiteboard updated  Inc time to perform all therex:  Ankle Pumps: 25 reps, in supine  Heel slides: L: 9, R: 10 reps, each LE performed individually, in supine  Hip abd/add: L:11, R: 13 reps, in supine  LBTR: 17 reps, alt LEs, perf in hooklying  Bridges: 12 reps, in hooklying      Patient left supine with all lines intact, call button in reach and nursing notified.    GOALS:   Multidisciplinary Problems     Physical Therapy Goals        Problem: Physical Therapy Goal    Goal Priority Disciplines Outcome Goal Variances Interventions   Physical Therapy Goal     PT, PT/OT Ongoing, Progressing     Description:  Goals to be met by: 2020     Patient will increase functional independence with mobility by performin. Supine to sit with Contact Guard Assistance  2. Sit to supine with Contact Guard Assistance  3. Sit to stand transfer with Contact Guard Assistance using LRAD  4. Bed to chair transfer with Contact Guard Assistance using Rolling Walker  5.  Gait  x 25 feet with Contact Guard Assistance using Rolling Walker.   6. Lower extremity exercise program x20 reps per handout, with independence                       Time Tracking:     PT Received On: 02/13/20  PT Start Time: 1030     PT Stop Time: 1059  PT Total Time (min): 29 min     Billable Minutes: Therapeutic Activity 12 and Therapeutic Exercise 16    Treatment Type: Treatment  PT/PTA: PT     PTA Visit Number: 0     Rae Tsai, PT  02/13/2020

## 2020-02-13 NOTE — ASSESSMENT & PLAN NOTE
Patient currently in RVR presumed 2/2 to acute respiratory failure  - PRN metoprolol as needed for rate control but would not aggressively treat in setting of severe hypoxemia  - INR subtherapeutic; Continue with heparin bridge w/ warfarin until INR is therapeutic.   - Metoprolol 50mg BID discontinued given nightly hypotensive episdoes requiring transient levophed, currently on digoxin 125mcg q6hrs for rate control  - Maintain Mg >2, K>4.

## 2020-02-13 NOTE — ASSESSMENT & PLAN NOTE
Patient on minimal levophed for MAP < 65. Patient appears to have baseline low BP's, mentates well with a MAP > 60. Possible infectious etiology given worsening hypoxemia / shock, CT showing compressive atelectasis vs. Developing consolidation in the left lower lobe.  Patient required another short course of Levo 0.02 for MAP <65 overnight which has now improved this morning.   Will likely initiate midodrine treatments to support blood pressure.

## 2020-02-13 NOTE — ASSESSMENT & PLAN NOTE
Patient currently in RVR presumed 2/2 to acute respiratory failure  - PRN metoprolol as needed for rate control but would not aggressively treat in setting of severe hypoxemia  - INR subtherapeutic; Continue with heparin bridge w/ warfarin until INR is therapeutic.   - Metoprolol 50mg BID discontinued given nightly hypotensive episdoes requiring transient levophed, also received  digoxin 250mcg for rate control  - Maintain Mg >2, K>4.

## 2020-02-13 NOTE — PLAN OF CARE
Patient to step down to the floor soon.  Therapy is recommending SNF.  Patient is a resident of [facility] and wants to return there for SNF.  Palliative Care following.        02/13/20 1033   Discharge Reassessment   Assessment Type Discharge Planning Reassessment   Provided patient/caregiver education on the expected discharge date and the discharge plan Yes   Do you have any problems affording any of your prescribed medications? No   Discharge Plan A Return to nursing home  ([facility] with SNF)   Discharge Plan B Return to Nursing Home   DME Needed Upon Discharge  none   Patient choice form signed by patient/caregiver N/A   Anticipated Discharge Disposition [facility]   Can the patient/caregiver answer the patient profile reliably? Yes, cognitively intact   How does the patient rate their overall health at the present time? Fair   Describe the patient's ability to walk at the present time. Major restrictions/daily assistance from another person   How often would a person be available to care for the patient? Whenever needed   Number of comorbid conditions (as recorded on the chart) Three   Post-Acute Status   Post-Acute Authorization Placement   Post-Acute Placement Status Awaiting Internal Medical Clearance   Discharge Delays None known at this time       Michelle Roy RN, CCRN-K, [facility]  Neuro-Critical Care   [phone]

## 2020-02-13 NOTE — PROGRESS NOTES
1410 Transported pt to CT scan 2nd floor for CT Chest accompanied by this RN and PCt Ayala, hooked to cardiac monitor and o2 inhalation via 2LPM via NC.     1440 Transported pt back to [facility]. No acute events during the scan.

## 2020-02-13 NOTE — ASSESSMENT & PLAN NOTE
Consulted Palliative care to assist with goals of care discussion. As of this moment, patient expressed preference for full code, although patient may not fully understand her prognosis given her frequent hospitalizations. Palliative care to continue with advance care discussions with patient and family.

## 2020-02-13 NOTE — PROGRESS NOTES
[facility]  Palliative Medicine  Progress Note    Patient Name: Elizabeth Cano  MRN: [id number]  Admission Date: 1/29/2020  Hospital Length of Stay: 15 days  Code Status: Full Code   Attending Provider: Sonal Kate MD  Consulting Provider: GURWINDER Gaines  Primary Care Physician: Cesar Reeves MD  Principal Problem:Septic shock    Patient information was obtained from patient and ER records.      Assessment/Plan:     Palliative care encounter  Impression: Pt is a 70 y/o female  with afib (on coumadin), COPD, and HFpEF (EF 45% on 1/29/20) admitted to hospital medicine for presumed urinary tract infection. Pt transferred to ICU for Acute hypoxemic respiratory failure: CTA negative for PE. Pt now off comfort flow, on 2 L. Pt off pressors.  Pt to step down to floor today.     Reasons for consult: advance care planning.     Advance care planning:  Today: Met with pt. No family at bedside.   Pt's goals is to go back to [facility] with PT. PT has been working with pt and recommend SNF.     2/11/2020:  Met with pt along with Apolonia Coto LCSW. Pt reports she lives at [facility] with her  in the same room. Per pt, she was living in an independent living apartment with her . Per wife, they needed more support and they moved to a NH. Per pt, she is mostly in wheelchair but walks some. Per pt goal is to go back to NH with possible SNF. Pt aware she will continue to decline with her COPD and CHF. Pt aware comfort care is an option as she declined further.     MPOA: Pt reports she filled one out. Not in chart. Pt wants her two daughters, Leana and Tia to make medical decisions. Pt's  is next of kin. Pt did not want to fill out paperwork at this time. Paperwork left with pt. Will f/u. Reinforced with pt importance of filling out MPOA.   Code status: Per pt, she wants to be a full code. Per pt, if she is unable to get off of life-support, she would want to be  made comfortable.     Symptom management:   Pt denies pain, nausea, anxiety.     Per pt SOB noted with exertion:   Pt on 2L NC.     Plan:   Will f/u with pt concerning MPOA.    Will continue to reinforce realistic expectations.   Pt to step-down to floor today.   Will follow.             I will follow-up with patient. Please contact us if you have any additional questions.    Subjective:     Chief Complaint:   Chief Complaint   Patient presents with    Multiple Complaints     uti finished antibiotics,        HPI:   Ptis a 69 years old female with afib (on coumadin), COPD, and HFpEF (EF 45% on 1/29/20) admitted to hospital medicine for presumed urinary tract infection. Per chart review, pt was diagnosed with a UTIat her Nursing Home, [facility], which she was prescribed Macrobid. She completed her course of Macrobid on 01/29/20 and she was noted to be hypotensive rendering her transfer to the hospital.      On admit, per chart review, pt stated she had a decreased appetite from the last month since her hospitalization over the holidays due to uncomplicated diverticulitis which she was treated with Augmentin. She reports feeling nauseated when taking antibiotics. She denies palpitations, dizziness, lightheadedness, chest pain, SOB, change in urinary/bowel habits.      Per chart review, ICU consulted in am of 01/31/20 for hypotension despite being resuscitated with 3.5 L IV fluids and persistant lactic acidosis in 3-4 range. She improved with IVF hydration and patient was stepped down. Rapid response evaluated patient early AM on 2/10 due to acute increase in oxygen requiriments. Patient began the day on 3L NC and progressed to requiring Comfort flow of 30L and 100% FiO2. Patient denies chest pains, only complaining of minor increased difficulty breathing.    Pt off of high flow O2 today and on 4 L NC.   Pressor have been weaned.        Hospital Course:  No notes on file    Interval History:  Pt has COPD and was  admitted to ICU for acute respiratory issues. Pt off of high flow O2 today. Pt  now on 4L O2 . Pt off pressor support. Pt want to remain full code.     Past Medical History:   Diagnosis Date    A-fib     GAVIN (acute kidney injury)     CHF (congestive heart failure)     COPD (chronic obstructive pulmonary disease)     Diabetes mellitus     Hypertension        Past Surgical History:   Procedure Laterality Date    CARDIAC SURGERY      COLONOSCOPY N/A 2/4/2020    Procedure: COLONOSCOPY;  Surgeon: Gilberto Laguna MD;  Location: [facility] ([address] [place] [address]);  Service: Endoscopy;  Laterality: N/A;    ESOPHAGOGASTRODUODENOSCOPY N/A 2/7/2020    Procedure: EGD (ESOPHAGOGASTRODUODENOSCOPY);  Surgeon: Aleksey Gomez MD;  Location: [facility] ([address] [place] [address]);  Service: Endoscopy;  Laterality: N/A;       Review of patient's allergies indicates:   Allergen Reactions    Levsin [hyoscyamine sulfate] Hallucinations       Medications:  Continuous Infusions:   norepinephrine bitartrate-D5W Stopped (02/13/20 0710)     Scheduled Meds:   albuterol-ipratropium  3 mL Nebulization Q12H    budesonide  0.5 mg Nebulization BID    calcium-vitamin D3  1 tablet Oral BID    [START ON 2/14/2020] digoxin  0.25 mg Oral Daily    DULoxetine  30 mg Oral Daily    furosemide  20 mg Oral Daily    magnesium oxide  400 mg Oral Daily    miconazole nitrate 2%   Topical (Top) BID    midodrine  10 mg Oral QHS    mirtazapine  15 mg Oral QHS    pantoprazole  40 mg Oral Daily    piperacillin-tazobactam (ZOSYN) IVPB  4.5 g Intravenous Q8H    QUEtiapine  25 mg Oral QHS    warfarin  1 mg Oral Daily     PRN Meds:acetaminophen, albuterol-ipratropium, bisacodyL, Dextrose 10% Bolus, Dextrose 10% Bolus, glucagon (human recombinant), glucose, glucose, insulin aspart U-100, iohexol, melatonin, ondansetron, phenyleph-min oil-petrolatum, polyethylene glycol, senna, sodium chloride 0.9%, sodium chloride 0.9%    Family History     Problem Relation (Age of Onset)     Heart disease Mother        Tobacco Use    Smoking status: Former Smoker     Packs/day: 1.00     Years: 54.00     Pack years: 54.00     Types: Cigarettes     Last attempt to quit: 2019     Years since quittin.2    Smokeless tobacco: Never Used   Substance and Sexual Activity    Alcohol use: Not Currently    Drug use: Not Currently    Sexual activity: Not Currently     Partners: Male       Review of Systems   Constitutional: Positive for activity change and fatigue.   HENT: Negative.    Eyes: Negative.    Respiratory: Positive for shortness of breath.    Cardiovascular: Negative.    Gastrointestinal: Negative.    Endocrine: Negative.    Genitourinary: Negative.    Musculoskeletal: Negative.    Skin: Positive for pallor.   Allergic/Immunologic: Negative.    Neurological: Positive for weakness.     Objective:     Vital Signs (Most Recent):  Temp: 97.8 °F (36.6 °C) (20 1100)  Pulse: 91 (20 1400)  Resp: 17 (20 1400)  BP: 114/61 (20 1400)  SpO2: (!) 94 % (20 1400) Vital Signs (24h Range):  Temp:  [97.7 °F (36.5 °C)-98.7 °F (37.1 °C)] 97.8 °F (36.6 °C)  Pulse:  [] 91  Resp:  [16-56] 17  SpO2:  [66 %-100 %] 94 %  BP: ()/(37-87) 114/61     Weight: 81.4 kg (179 lb 7.3 oz)  Body mass index is 28.96 kg/m².    Review of Symptoms  Symptom Assessment (ESAS 0-10 scale)   ESAS 0 1 2 3 4 5 6 7 8 9 10   Pain X             Dyspnea    X          Anxiety              Nausea              Depression               Anorexia              Fatigue              Insomnia              Restlessness               Agitation              CAM / Delirium __ --  ___+   Constipation     __ --  ___+   Diarrhea           __ --  ___+  Bowel Management Plan (BMP): No      Pain Assessment:Pt reports she is comfortable.     OME in 24 hours: 0    Performance Status: 60    ECOG Performance Status Grade: 3 - Confined to bed or chair 50% of waking hours    Physical Exam   Constitutional: She is oriented to  person, place, and time. She appears well-developed. She is cooperative. Nasal cannula in place.   HENT:   Head: Normocephalic and atraumatic.   Eyes: Conjunctivae are normal.   Cardiovascular:   Pt off of epi   Pulmonary/Chest:   Pt on 2L O2 per NC   Abdominal: Normal appearance.   Musculoskeletal:   Pt has weakness to bilateral legs.      Neurological: She is alert and oriented to person, place, and time.   Skin: Skin is warm and dry.   Psychiatric: Her speech is normal and behavior is normal.       Significant Labs: All pertinent labs within the past 24 hours have been reviewed.  CBC:   Recent Labs   Lab 02/13/20  0400   WBC 8.43   HGB 10.2*   HCT 32.9*   MCV 98        BMP:  Recent Labs   Lab 02/13/20  0400   *      K 4.7   CL 99   CO2 35*   BUN 6*   CREATININE 0.7   CALCIUM 7.7*   MG 1.6     LFT:  Lab Results   Component Value Date    AST 39 02/13/2020    ALKPHOS 179 (H) 02/13/2020    BILITOT 0.7 02/13/2020     Albumin:   Albumin   Date Value Ref Range Status   02/13/2020 1.5 (L) 3.5 - 5.2 g/dL Final     Protein:   Total Protein   Date Value Ref Range Status   02/13/2020 5.0 (L) 6.0 - 8.4 g/dL Final     Lactic acid:   Lab Results   Component Value Date    LACTATE 1.1 02/06/2020    LACTATE 1.1 02/05/2020       Significant Imaging: I have reviewed all pertinent imaging results/findings within the past 24 hours.    Advance Care Planning   Advanced Directives::  Living Will: No  LaPOST: No  Do Not Resuscitate Status: No  Medical Power of : Pt's  is next of kin. They live in a NH together.     Decision-Making Capacity: Patient answered questions       Living Arrangements:NH     Psychosocial/Cultural:   Lives in nursing home with her  at [facility]. She has four adult children. Two adult children live OOT.       Spiritual: yes    F- Florecita and Belief: Moravian    I - Importance:   .  C - Community:     A - Address in Care: Will have  visit.       > 50% of 25 min  visit spent in chart review, face to face discussion of goals of care,  symptom assessment, coordination of care and emotional support.    Kristina Piper, CNS  Palliative Medicine  [facility]

## 2020-02-13 NOTE — ASSESSMENT & PLAN NOTE
Impression: Pt is a 70 y/o female  with afib (on coumadin), COPD, and HFpEF (EF 45% on 1/29/20) admitted to hospital medicine for presumed urinary tract infection. Pt transferred to ICU for Acute hypoxemic respiratory failure: CTA negative for PE. Pt now off comfort flow, on 2 L. Pt off pressors.  Pt to step down to floor today.     Reasons for consult: advance care planning.     Advance care planning:  Today: Met with pt. No family at bedside.   Pt's goals is to go back to [facility] with PT. PT has been working with pt and recommend SNF.     2/11/2020:  Met with pt along with Apolonia Coto LCSW. Pt reports she lives at [facility] with her  in the same room. Per pt, she was living in an independent living apartment with her . Per wife, they needed more support and they moved to a NH. Per pt, she is mostly in wheelchair but walks some. Per pt goal is to go back to NH with possible SNF. Pt aware she will continue to decline with her COPD and CHF. Pt aware comfort care is an option as she declined further.     MPOA: Pt reports she filled one out. Not in chart. Pt wants her two daughters, Leana and Tia to make medical decisions. Pt's  is next of kin. Pt did not want to fill out paperwork at this time. Paperwork left with pt. Will f/u. Reinforced with pt importance of filling out MPOA.   Code status: Per pt, she wants to be a full code. Per pt, if she is unable to get off of life-support, she would want to be made comfortable.     Symptom management:   Pt denies pain, nausea, anxiety.     Per pt SOB noted with exertion:   Pt on 2L NC.     Plan:   Will f/u with pt concerning MPOA.    Will continue to reinforce realistic expectations.   Pt to step-down to floor today.   Will follow.

## 2020-02-13 NOTE — SUBJECTIVE & OBJECTIVE
Interval History:  Pt has COPD and was admitted to ICU for acute respiratory issues. Pt off of high flow O2 today. Pt  now on 4L O2 . Pt off pressor support. Pt want to remain full code.     Past Medical History:   Diagnosis Date    A-fib     GAVIN (acute kidney injury)     CHF (congestive heart failure)     COPD (chronic obstructive pulmonary disease)     Diabetes mellitus     Hypertension        Past Surgical History:   Procedure Laterality Date    CARDIAC SURGERY      COLONOSCOPY N/A 2/4/2020    Procedure: COLONOSCOPY;  Surgeon: Gilberto Laguna MD;  Location: [facility] ([address] [place] [address]);  Service: Endoscopy;  Laterality: N/A;    ESOPHAGOGASTRODUODENOSCOPY N/A 2/7/2020    Procedure: EGD (ESOPHAGOGASTRODUODENOSCOPY);  Surgeon: Aleksey Gomez MD;  Location: [facility] ([address] [place] [address]);  Service: Endoscopy;  Laterality: N/A;       Review of patient's allergies indicates:   Allergen Reactions    Levsin [hyoscyamine sulfate] Hallucinations       Medications:  Continuous Infusions:   norepinephrine bitartrate-D5W Stopped (02/13/20 0710)     Scheduled Meds:   albuterol-ipratropium  3 mL Nebulization Q12H    budesonide  0.5 mg Nebulization BID    calcium-vitamin D3  1 tablet Oral BID    [START ON 2/14/2020] digoxin  0.25 mg Oral Daily    DULoxetine  30 mg Oral Daily    furosemide  20 mg Oral Daily    magnesium oxide  400 mg Oral Daily    miconazole nitrate 2%   Topical (Top) BID    midodrine  10 mg Oral QHS    mirtazapine  15 mg Oral QHS    pantoprazole  40 mg Oral Daily    piperacillin-tazobactam (ZOSYN) IVPB  4.5 g Intravenous Q8H    QUEtiapine  25 mg Oral QHS    warfarin  1 mg Oral Daily     PRN Meds:acetaminophen, albuterol-ipratropium, bisacodyL, Dextrose 10% Bolus, Dextrose 10% Bolus, glucagon (human recombinant), glucose, glucose, insulin aspart U-100, iohexol, melatonin, ondansetron, phenyleph-min oil-petrolatum, polyethylene glycol, senna, sodium chloride 0.9%, sodium chloride 0.9%    Family History      Problem Relation (Age of Onset)    Heart disease Mother        Tobacco Use    Smoking status: Former Smoker     Packs/day: 1.00     Years: 54.00     Pack years: 54.00     Types: Cigarettes     Last attempt to quit: 2019     Years since quittin.2    Smokeless tobacco: Never Used   Substance and Sexual Activity    Alcohol use: Not Currently    Drug use: Not Currently    Sexual activity: Not Currently     Partners: Male       Review of Systems   Constitutional: Positive for activity change and fatigue.   HENT: Negative.    Eyes: Negative.    Respiratory: Positive for shortness of breath.    Cardiovascular: Negative.    Gastrointestinal: Negative.    Endocrine: Negative.    Genitourinary: Negative.    Musculoskeletal: Negative.    Skin: Positive for pallor.   Allergic/Immunologic: Negative.    Neurological: Positive for weakness.     Objective:     Vital Signs (Most Recent):  Temp: 97.8 °F (36.6 °C) (20 1100)  Pulse: 91 (20 1400)  Resp: 17 (20 1400)  BP: 114/61 (20 1400)  SpO2: (!) 94 % (20 1400) Vital Signs (24h Range):  Temp:  [97.7 °F (36.5 °C)-98.7 °F (37.1 °C)] 97.8 °F (36.6 °C)  Pulse:  [] 91  Resp:  [16-56] 17  SpO2:  [66 %-100 %] 94 %  BP: ()/(37-87) 114/61     Weight: 81.4 kg (179 lb 7.3 oz)  Body mass index is 28.96 kg/m².    Review of Symptoms  Symptom Assessment (ESAS 0-10 scale)   ESAS 0 1 2 3 4 5 6 7 8 9 10   Pain X             Dyspnea    X          Anxiety              Nausea              Depression               Anorexia              Fatigue              Insomnia              Restlessness               Agitation              CAM / Delirium __ --  ___+   Constipation     __ --  ___+   Diarrhea           __ --  ___+  Bowel Management Plan (BMP): No      Pain Assessment:Pt reports she is comfortable.     OME in 24 hours: 0    Performance Status: 60    ECOG Performance Status Grade: 3 - Confined to bed or chair 50% of waking hours    Physical Exam    Constitutional: She is oriented to person, place, and time. She appears well-developed. She is cooperative. Nasal cannula in place.   HENT:   Head: Normocephalic and atraumatic.   Eyes: Conjunctivae are normal.   Cardiovascular:   Pt off of epi   Pulmonary/Chest:   Pt on 2L O2 per NC   Abdominal: Normal appearance.   Musculoskeletal:   Pt has weakness to bilateral legs.      Neurological: She is alert and oriented to person, place, and time.   Skin: Skin is warm and dry.   Psychiatric: Her speech is normal and behavior is normal.       Significant Labs: All pertinent labs within the past 24 hours have been reviewed.  CBC:   Recent Labs   Lab 02/13/20  0400   WBC 8.43   HGB 10.2*   HCT 32.9*   MCV 98        BMP:  Recent Labs   Lab 02/13/20  0400   *      K 4.7   CL 99   CO2 35*   BUN 6*   CREATININE 0.7   CALCIUM 7.7*   MG 1.6     LFT:  Lab Results   Component Value Date    AST 39 02/13/2020    ALKPHOS 179 (H) 02/13/2020    BILITOT 0.7 02/13/2020     Albumin:   Albumin   Date Value Ref Range Status   02/13/2020 1.5 (L) 3.5 - 5.2 g/dL Final     Protein:   Total Protein   Date Value Ref Range Status   02/13/2020 5.0 (L) 6.0 - 8.4 g/dL Final     Lactic acid:   Lab Results   Component Value Date    LACTATE 1.1 02/06/2020    LACTATE 1.1 02/05/2020       Significant Imaging: I have reviewed all pertinent imaging results/findings within the past 24 hours.    Advance Care Planning   Advanced Directives::  Living Will: No  LaPOST: No  Do Not Resuscitate Status: No  Medical Power of : Pt's  is next of kin. They live in a NH together.     Decision-Making Capacity: Patient answered questions       Living Arrangements:NH     Psychosocial/Cultural:   Lives in nursing home with her  at [facility]. She has four adult children. Two adult children live OOT.       Spiritual: yes    F- Florecita and Belief: Faith    I - Importance:   .  C - Community:     A - Address in Care: Will have   visit.

## 2020-02-13 NOTE — PROGRESS NOTES
Nursing Transfer Note       Transfer To 1104    Transfer via bed    Transfer with cardiac monitoring    Transported by Deion RN/Julianna PCT/Jamie PCT    Medicines sent: yes    Chart sent with patient: Yes    Notified: daughter    Bedside Handoff given to: Ward    Upon arrival to floor: cardiac monitor applied, patient oriented to room, call bell in reach and bed in lowest position

## 2020-02-13 NOTE — PLAN OF CARE
Plan of care reviewed with pt. All questions and concerns addressed. See flow sheet for assessments, vital signs, I/Os and LDAs. All orders for 7p to 7a fulfilled. Will report off to day nurse.

## 2020-02-13 NOTE — PLAN OF CARE
POC reviewed with pt at 1445. Pt verbalized understanding of the POC. Pt has transfer orders to step down. Levophed off since 7am. MAP and BP tolerating well. Heparin gtt discontinued. Contact precautions maintained. CT chest done this afternoon. O2 inhalation weaning down. Pt had Rehab session this AM. Questions and concerns addressed. Pt progressing towards goals of care. Please see flow sheet for detailed nursing assessment  and VS. Will continue to monitor.

## 2020-02-13 NOTE — ASSESSMENT & PLAN NOTE
Patient with acute hypotension and worsening O2 requirements over the course of 2/9/2020. Started the day on 3L NC and by the end was on Comfort flow 100% FiO2 and 30L. Concern for PE. CTA negative for PE, CXR also with severe pleural edema. Patient had been on zosyn for possible diverticulitis +/- urosepsis.   Vancomycin discontinued; continue zosyn for a total of 7 day treatment.   -RESOLVED

## 2020-02-13 NOTE — ASSESSMENT & PLAN NOTE
-S/p 1 permethrin lotion treatment.   - Active lice visualized today despite permethrin treatment and reported comb-niting.   - Actively considering other options to help eradicate head lice infestation, though patient will require aggressive comb-niting, Discussed with Charge nurse who will assist with investigating if this can be effectively performed.

## 2020-02-13 NOTE — PLAN OF CARE
Problem: Physical Therapy Goal  Goal: Physical Therapy Goal  Description  Goals to be met by: 2020     Patient will increase functional independence with mobility by performin. Supine to sit with Contact Guard Assistance  2. Sit to supine with Contact Guard Assistance  3. Sit to stand transfer with Contact Guard Assistance using LRAD  4. Bed to chair transfer with Contact Guard Assistance using Rolling Walker  5. Gait  x 25 feet with Contact Guard Assistance using Rolling Walker.   6. Lower extremity exercise program x20 reps per handout, with independence      Outcome: Ongoing, Progressing

## 2020-02-13 NOTE — PROGRESS NOTES
[facility]  Critical Care Medicine  Progress Note    Patient Name: Elizabeth Cano  MRN: [id number]  Admission Date: 1/29/2020  Hospital Length of Stay: 15 days  Code Status: Full Code  Attending Provider: Sonal Kate MD  Primary Care Provider: Cesar Reeves MD   Principal Problem: Septic shock    Subjective:     HPI:  Ms. Elizabeth Cano is a 69 years old female with afib (on coumadin), COPD, and HFpEF (EF 45% on 1/29/20) admitted to hospital medicine for presumed urinary tract infection. She was diagnosed with a UTI last week at her Nursing Home, [facility], which she was prescribed Macrobid. She completed her course of Macrobid on 01/29/20 and she was noted to be hypotensive rendering her transfer to the hospital.      She reports a decreased appetite for the last month since her hospitalization over the holidays due to uncomplicated diverticulitis which she was treated with Augmentin. She reports feeling nauseated when taking antibiotics. She denies palpitations, dizziness, lightheadedness, chest pain, SOB, change in urinary/bowel habits.      On 01/29/20, she was found to be hypotensive in the 80s/50s in the ED with lactic acid of 4.8. CXR showed no signs for PNA, UA showed mild leukocytosis with WBC 17, nitrite negative, with urine cultures and blood cultures showing NG. She was admitted to  and started on CTX. She has been resuscitated with IVFs since admit with 3L. BP responded appropriately to IVF resuscitation with subsequent downtrend in lactic acid to 2.7. However, overnight on 01/30/20, BP remained 63-67 mmHg MAP goal. Repeat lactic acid uptrend to 3.3. Of note, she received a night time dose of Lopressor 50 mg on 01/30/20, which has since been discontinued.     ICU consulted in am of 01/31/20 for hypotension despite being resuscitated with 3.5 L IV fluids and persistant lactic acidosis in 3-4 range. She improved with IVF hydration and patient was stepped down. Rapid  response evaluated patient early AM on 2/10 due to acute increase in oxygen requiriments. Patient began the day on 3L NC and progressed to requiring Comfort flow of 30L and 100% FiO2. Patient denies chest pains, only complaining of minor increased difficulty breathing.    Hospital/ICU Course:  She was admitted to Hospital Medicine and treated for Urosepsis with antibiotics and IVF resuscitation. Critical Care was consulted for septic shock. She was admitted to MICU, bolused 1L LR with resulting cleared Lactic Acid. CT Abd Pelvis showed findings consistent with several diverticula in the sigmoid colon with associated wall thickening, minimal adjacent fat stranding, and prominence of the vasa recta.  Findings appear similar when compared to prior examination dated 12/19/2019 and may represent ongoing diverticulitis or colitis in the correct clinical setting. Area of hypoattenuation within the right hepatic lobe adjacent to the gallbladder fossa, not definitively seen on prior examination and may represent area of fatty liver infiltration or transit hepatic attenuation differences however, in the setting of liver disease, neoplasm is not excluded.   Following clearing of her lactic acid, she was stepped down to Hospital Medicine with midodrine 5 mg TID. Critical Care was re-consulted in the setting of Afib RVR and hypotension. While on the floor, patient went into Afib RVR, sustained HR >120, /55, requiring lopressor pushes, which subsequently lowered HR to 75s/50s.    2/10: Patient readmitted to MICU for acute hypoxemic respiratory failure. CTA negative for PE, diuresing well with IV lasix. Levophed has been discontinued. Electrolytes were repleted. Currently on zosyn; vancomycin was discontinued today given resolution of shock. Bridging heparin with warfarin to therapeutic INR level for A-fib. Restarted oral lasix and metoprolol. Patient is stable to be transferred to Hospital Medicine.  Received topical  permethrin treatment for head lice.   2/12- 2/13: Patient developed two episodes of hypotension requiring low dose levo both 02/11 and 02/12 nights. Metoprolol was discontinued and digoxin started for Afib rate control.     Interval History/Significant Events: Patient developed asymptomatic drop in BP and MAP overnight requiring 0.02mcg of Levo. Metoprolol discontinued and was started on digoxin for rate control. Patient continues to have active lice despite comb-niting 2 days ago. Complains of mild achy left lower quadrant pain after eating. Last BM was 2 days ago.     Review of Systems   Constitutional: Negative for chills and fever.   Respiratory: Negative for cough and shortness of breath.    Cardiovascular: Negative for chest pain and palpitations.   Gastrointestinal: Positive for abdominal pain (LLQ).   Genitourinary: Negative for difficulty urinating, dysuria and frequency.   Neurological: Positive for weakness.     Objective:     Vital Signs (Most Recent):  Temp: 97.7 °F (36.5 °C) (02/13/20 0710)  Pulse: 86 (02/13/20 0900)  Resp: (!) 26 (02/13/20 0900)  BP: 97/66 (02/13/20 0900)  SpO2: (!) 91 % (02/13/20 0900) Vital Signs (24h Range):  Temp:  [97.7 °F (36.5 °C)-98.7 °F (37.1 °C)] 97.7 °F (36.5 °C)  Pulse:  [] 86  Resp:  [16-56] 26  SpO2:  [66 %-100 %] 91 %  BP: ()/(37-87) 97/66   Weight: 81.4 kg (179 lb 7.3 oz)  Body mass index is 28.96 kg/m².      Intake/Output Summary (Last 24 hours) at 2/13/2020 0916  Last data filed at 2/13/2020 0900  Gross per 24 hour   Intake 33512.26 ml   Output 575 ml   Net 14552.26 ml       Physical Exam   Constitutional: She is oriented to person, place, and time. She appears well-developed and well-nourished. No distress.   HENT:   Head: Normocephalic and atraumatic.   Eyes: EOM are normal.   Neck: Normal range of motion.   Cardiovascular: Normal rate, normal heart sounds and intact distal pulses. An irregularly irregular rhythm present.   No murmur  heard.  Pulmonary/Chest: Effort normal. No respiratory distress. She has no wheezes. She has rales (Most notably on the right sided).   On 4L of O2 via NC   Abdominal: Soft. Bowel sounds are normal. She exhibits no distension. There is no tenderness.   Musculoskeletal: Normal range of motion. She exhibits edema. She exhibits no tenderness.   2+ bilateral pitting LE edema; 2+ Left hand pitting edema.    Neurological: She is alert and oriented to person, place, and time.   Skin: Skin is warm and dry. She is not diaphoretic.   Psychiatric: She has a normal mood and affect. Her behavior is normal. Thought content normal.       Vents:  Oxygen Concentration (%): 28 (02/13/20 0325)  Lines/Drains/Airways     Drain            Female External Urinary Catheter 02/10/20 0600 3 days          Peripheral Intravenous Line                 Midline Catheter Insertion/Assessment  - Single Lumen 02/11/20 1545 Right brachial vein 18g x 10cm 1 day         Peripheral IV - Single Lumen 02/11/20 1545 20 G Right Forearm 1 day              Significant Labs:    CBC/Anemia Profile:  Recent Labs   Lab 02/12/20  0141 02/13/20  0400   WBC 5.43 8.43   HGB 8.4* 10.2*   HCT 26.7* 32.9*    273   MCV 96 98   RDW 15.5* 15.6*        Chemistries:  Recent Labs   Lab 02/11/20  1551 02/12/20  0128 02/13/20  0400    140 138   K 3.8 3.4* 4.7   CL 99 102 99   CO2 33* 33* 35*   BUN 6* 6* 6*   CREATININE 0.7 0.7 0.7   CALCIUM 7.5* 7.1* 7.7*   ALBUMIN  --  1.3* 1.5*   PROT  --  4.1* 5.0*   BILITOT  --  0.7 0.7   ALKPHOS  --  157* 179*   ALT  --  18 20   AST  --  28 39   MG 1.7 1.6 1.6   PHOS  --  2.3* 2.6*       CMP:   Recent Labs   Lab 02/11/20  1551 02/12/20  0128 02/13/20  0400    140 138   K 3.8 3.4* 4.7   CL 99 102 99   CO2 33* 33* 35*   GLU 96 147* 135*   BUN 6* 6* 6*   CREATININE 0.7 0.7 0.7   CALCIUM 7.5* 7.1* 7.7*   PROT  --  4.1* 5.0*   ALBUMIN  --  1.3* 1.5*   BILITOT  --  0.7 0.7   ALKPHOS  --  157* 179*   AST  --  28 39   ALT  --  18  20   ANIONGAP 6* 5* 4*   EGFRNONAA >60.0 >60.0 >60.0       Significant Imaging:  CXR: I have reviewed all pertinent results/findings within the past 24 hours and my personal findings are:  Prominent scattered lung opacification, similar to the prior exam      ABG  Recent Labs   Lab 02/10/20  0424   PH 7.283*   PO2 41   PCO2 57.0*   HCO3 26.9   BE 0     Assessment/Plan:     Derm  Head lice  -S/p 1 permethrin lotion treatment.   - Active lice visualized today despite permethrin treatment and reported comb-niting.   - Actively considering other options to help eradicate head lice infestation, though patient will require aggressive comb-niting, Discussed with Charge nurse who will assist with investigating if this can be effectively performed.      Pulmonary  Acute hypoxemic respiratory failure  Likely secondary to decompensated HF that has responded well with diureses.   Weaned Oxygen from comfort flow down to 2L via NC.   -CXR 02/121 with Multifocal patchy pulmonary parenchymal disease is present throughout both lungs, worse in the right lung than the left. Unchanged from prior.   -Continue with home dose of daily furosemide 20mg  -Will order Chest CT non contrast to further evaluate given persistent opacities despite diureses.     COPD (chronic obstructive pulmonary disease)  Continue Duonebs Q6H  Budesonide nebs BID   Maintain O2 of 88-92%        Cardiac/Vascular  Hypotension  Patient on minimal levophed for MAP < 65. Patient appears to have baseline low BP's, mentates well with a MAP > 60. Possible infectious etiology given worsening hypoxemia / shock, CT showing compressive atelectasis vs. Developing consolidation in the left lower lobe.  Patient required another short course of Levo 0.02 for MAP <65 overnight which has now improved this morning.   Will likely initiate midodrine treatments to support blood pressure.     Paroxysmal atrial fibrillation  Patient currently in RVR presumed 2/2 to acute respiratory  failure  - PRN metoprolol as needed for rate control but would not aggressively treat in setting of severe hypoxemia  - INR subtherapeutic; Continue with heparin bridge w/ warfarin until INR is therapeutic.   - Metoprolol 50mg BID discontinued given nightly hypotensive episdoes requiring transient levophed, also received  digoxin 250mcg for rate control  - Maintain Mg >2, K>4.     Renal/  Hypocalcemia  -Continue with home calcium-Vit D  Resolved      Hypomagnesemia  -Replete PRN  -Continue home magnesium oxide.     ID  * Septic shock  Patient with acute hypotension and worsening O2 requirements over the course of 2/9/2020. Started the day on 3L NC and by the end was on Comfort flow 100% FiO2 and 30L. Concern for PE. CTA negative for PE, CXR also with severe pleural edema. Patient had been on zosyn for possible diverticulitis +/- urosepsis.   Vancomycin discontinued; continue zosyn for a total of 7 day treatment.   -RESOLVED    Endocrine  Type 2 diabetes mellitus with hyperglycemia, without long-term current use of insulin  AIC 5.4  Glucose checks QACHS  Goal Glucose 140-180 mg/dL  Diabetic diet    GI  Liver lesion, right lobe  - possible concern of abscess vs malignancy per CT scan  - h/o weight loss and possible colonic lesion  - RUQ US showing focal fatty infiltration      Diverticulitis  - CT consistent with recurrent diverticulitis  - CRS consulted during this hospitalization- recommend management with antibiotics.   - Continue with zosyn for 7 days      Palliative Care  Advance care planning  Consulted Palliative care to assist with goals of care discussion. As of this moment, patient expressed preference for full code, although patient may not fully understand her prognosis given her frequent hospitalizations. Palliative care to continue with advance care discussions with patient and family.        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   - Monitor daily electrolytes        Critical Care Daily Checklist:    A: Awake: RASS Goal/Actual Goal: RASS Goal: 0-->alert and calm  Actual: [place] Agitation Sedation Scale (RASS): Alert and calm   B: Spontaneous Breathing Trial Performed?     C: SAT & SBT Coordinated?  NA                      D: Delirium: CAM-ICU Overall CAM-ICU: Negative   E: Early Mobility Performed? Yes   F: Feeding Goal: Goals: Pt to receive nutrition by RD follow up  Status: Nutrition Goal Status: new   Current Diet Order   Procedures    Diet Low Sodium, 2gm [facility]; Fluid - 2000mL     Order Specific Question:   Indicate patient location for additional diet options:     Answer:   [facility]     Order Specific Question:   Fluid restriction:     Answer:   Fluid - 2000mL      AS: Analgesia/Sedation No   T: Thromboembolic Prophylaxis Yes   H: HOB > 300 Yes   U: Stress Ulcer Prophylaxis (if needed) No   G: Glucose Control No   B: Bowel Function Stool Occurrence: 0   I: Indwelling Catheter (Lines & Carpio) Necessity No   D: De-escalation of Antimicrobials/Pharmacotherapies Yes- on zosyn    Plan for the day/ETD Stable for Step down    Code Status:  Family/Goals of Care: Full Code  Goals of care discussions       Critical secondary to Patient has a condition that poses threat to life and bodily function: Severe Respiratory Distress, stable for step down      Critical care was time spent personally by me on the following activities: development of treatment plan with patient or surrogate and bedside caregivers, discussions with consultants, evaluation of patient's response to treatment, examination of patient, ordering and performing treatments and interventions, ordering and review of laboratory studies, ordering and review of  radiographic studies, pulse oximetry, re-evaluation of patient's condition. This critical care time did not overlap with that of any other provider or involve time for any procedures.     Chuck Metzger MD  Critical Care Medicine  [facility]

## 2020-02-13 NOTE — ASSESSMENT & PLAN NOTE
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   - Monitor daily electrolytes

## 2020-02-13 NOTE — ASSESSMENT & PLAN NOTE
Likely secondary to decompensated HF that has responded well with diureses.   Weaned Oxygen from comfort flow down to 2L via NC.   -CXR 02/121 with Multifocal patchy pulmonary parenchymal disease is present throughout both lungs, worse in the right lung than the left. Unchanged from prior.   -Continue with home dose of daily furosemide 20mg  -Will order Chest CT non contrast to further evaluate given persistent opacities despite diureses.

## 2020-02-14 PROBLEM — E43 SEVERE MALNUTRITION: Status: ACTIVE | Noted: 2020-01-01

## 2020-02-14 NOTE — PLAN OF CARE
Problem: Physical Therapy Goal  Goal: Physical Therapy Goal  Description  Goals to be met by: 2020     Patient will increase functional independence with mobility by performin. Supine to sit with Contact Guard Assistance  2. Sit to supine with Contact Guard Assistance - GOAL MET  3. Sit to stand transfer with Contact Guard Assistance using LRAD  4. Bed to chair transfer with Contact Guard Assistance using Rolling Walker  5. Gait  x 25 feet with Contact Guard Assistance using Rolling Walker.   6. Lower extremity exercise program x20 reps per handout, with independence       Outcome: Ongoing, Progressing    Pt achieved 1 goal.

## 2020-02-14 NOTE — PLAN OF CARE
Pt alert x4. Pt continues with IV antibiotics. Remains very weak. Antifungal cream applied to groin and perirectal areas. Nausea controlled with Zofran. Free from falls and injuries. Call bell placed within reach. Bed locked and placed within lowest position. Will cont to monitor

## 2020-02-14 NOTE — PROGRESS NOTES
"[facility]  Adult Nutrition  Progress Note    SUMMARY       Recommendations    Recommendation:   1. Continue low Na diet as tolerated, with fluid restriction per MD.   2. Add Boost Glucose Control (strawberry) TID to increase caloric intake. If vitamin K is a concern (pt on warfarin), suggest Optisource (strawberry) TID.  3. RD to monitor & follow up.    Goals: Pt to receive nutrition by RD follow up  Nutrition Goal Status: goal met  Communication of RD Recs: other (comment)(POC)    Reason for Assessment    Reason For Assessment: RD follow-up  Diagnosis: infection/sepsis(septic shock)  Relevant Medical History: COPD, HF, DM, diverticulitis  Interdisciplinary Rounds: did not attend  General Information Comments: Pt resting in bed with no family at bedside this afternoon. She reports her appetite remains poor and states she has been consuming 50% of meals, confirmed by RN documentation. Wt gain noted since admission, likely fluid related or scale error, will monitor. NFPE not completed 2/2 pt with head lice, however pt continues to meet criteria for severe malnutrition 2/2 decreased appetite and wt loss.  Nutrition Discharge Planning: Adequate PO intake to meet needs    Nutrition Risk Screen    Nutrition Risk Screen: no indicators present    Nutrition/Diet History    Spiritual, Cultural Beliefs, Taoist Practices, Values that Affect Care: no    Anthropometrics    Temp: 98 °F (36.7 °C)  Height Method: Stated  Height: 5' 6" (167.6 cm)  Height (inches): 66 in  Weight Method: Bed Scale  Weight: 81.4 kg (179 lb 7.3 oz)  Weight (lb): 179.46 lb  Ideal Body Weight (IBW), Female: 130 lb  % Ideal Body Weight, Female (lb): 120.58 %  BMI (Calculated): 29    Lab/Procedures/Meds    Pertinent Labs Reviewed: reviewed  Pertinent Labs Comments: BUN 6, Glu 135, Ca 7.7, P 2.6, Alb 1.5  Pertinent Medications Reviewed: reviewed  Pertinent Medications Comments: calcium+vitamin D, lasix, insulin, magnesium oxide, " mirtazapine, ondansetron, pantoprazole, senna, warfarin    Estimated/Assessed Needs    Weight Used For Calorie Calculations: 74.6 kg (164 lb 7.4 oz)  Energy Calorie Requirements (kcal): 1603 kcal/day  Energy Need Method: Harvey-St Jeor(x 1.25 PAL)  Protein Requirements: 75-90 g/day(1-1.2 g/kg)  Weight Used For Protein Calculations: 74.6 kg (164 lb 7.4 oz)  Fluid Requirements (mL): per MD or 1 mL/kcal     RDA Method (mL): 1603  CHO Requirement: 200g CHO/day or 50% total kcal    Nutrition Prescription Ordered    Current Diet Order: Low Na  Nutrition Order Comments: 2000 mL FR    Evaluation of Received Nutrient/Fluid Intake    I/O: +6.9L since admit  Comments: LBM 2/14  Tolerance: tolerating  % Intake of Estimated Energy Needs: 50%  % Meal Intake: 50%    Nutrition Risk    Level of Risk/Frequency of Follow-up: (1x/week)     Assessment and Plan    Nutrition Problem:  Severe Protein-Calorie Malnutrition  Malnutrition in the context of Chronic Illness/Injury     Related to (etiology):  Inadequate energy intake 2/2 poor appetite     Signs and Symptoms (as evidenced by):  Energy Intake: <75% of estimated energy requirement for > 3 months  Weight Loss: 14% x 2-3 months     Interventions(treatment strategy):  Collaboration with other providers  Modified diet - diabetic cardiac  Commercial beverage - Boost Glucose Control TID or Optisource if vitamin K is a concern     Nutrition Diagnosis Status:  Continues    Monitor and Evaluation    Food and Nutrient Intake: energy intake, food and beverage intake  Food and Nutrient Adminstration: diet order  Anthropometric Measurements: weight, weight change, body mass index  Biochemical Data, Medical Tests and Procedures: electrolyte and renal panel, gastrointestinal profile, glucose/endocrine profile, inflammatory profile, lipid profile  Nutrition-Focused Physical Findings: overall appearance     Malnutrition Assessment  Malnutrition Type: chronic illness  Energy Intake: moderate energy  intake      Weight Loss (Malnutrition): (14% x 2-3 months based on UBW, family report)  Energy Intake (Malnutrition): less than 75% for greater than or equal to 3 months     Nutrition Follow-Up    RD Follow-up?: Yes

## 2020-02-14 NOTE — PLAN OF CARE
Plan is to return to [facility] in a SNF capacity.       02/14/20 1442   Discharge Reassessment   Assessment Type Discharge Planning Reassessment   Do you have any problems affording any of your prescribed medications? No   Discharge Plan A Skilled Nursing Facility   Discharge Plan B Return to Nursing Home   DME Needed Upon Discharge  none   Anticipated Discharge Disposition SNF

## 2020-02-14 NOTE — ASSESSMENT & PLAN NOTE
Nutrition Problem:  Severe Protein-Calorie Malnutrition  Malnutrition in the context of Chronic Illness/Injury     Related to (etiology):  Inadequate energy intake 2/2 poor appetite     Signs and Symptoms (as evidenced by):  Energy Intake: <75% of estimated energy requirement for > 3 months  Weight Loss: 14% x 2-3 months     Interventions(treatment strategy):  Collaboration with other providers  Modified diet - diabetic cardiac  Commercial beverage - Boost Glucose Control TID or Optisource if vitamin K is a concern     Nutrition Diagnosis Status:  Continues

## 2020-02-14 NOTE — PLAN OF CARE
Problem: Malnutrition  Goal: Improved Nutritional Intake  Outcome: Ongoing, Progressing  Intervention: Promote and Optimize Oral Intake  Flowsheets (Taken 2/14/2020 1419)  Oral Nutrition Promotion: nutritional therapy counseling provided; other (see comments) (see ONS recs below)     Recommendations     Recommendation:   1. Continue low Na diet as tolerated, with fluid restriction per MD.   2. Add Boost Glucose Control (strawberry) TID to increase caloric intake. If vitamin K is a concern (pt on warfarin), suggest Optisource (strawberry) TID.  3. RD to monitor & follow up.     Goals: Pt to receive nutrition by RD follow up  Nutrition Goal Status: goal met  Communication of RD Recs: other (comment)(POC)

## 2020-02-14 NOTE — PT/OT/SLP PROGRESS
"Physical Therapy Treatment    Patient Name:  Elizabeth Cano   MRN:  [id number]    Recommendations:     Discharge Recommendations:  nursing facility, skilled   Discharge Equipment Recommendations: (TBD)   Barriers to discharge: Inaccessible home and Decreased caregiver support    Assessment:     Elizabeth Cano is a 69 y.o. female admitted with a medical diagnosis of Septic shock.  She presents with the following impairments/functional limitations:  weakness, impaired endurance, impaired self care skills, impaired functional mobilty, gait instability, impaired balance, decreased safety awareness, impaired cardiopulmonary response to activity, decreased lower extremity function.    Pt continues to remain of special contact precautions for lice.  With pt sitting EOB, pt pleaded to be put back into a supine position, rather than address gait or transfer training.  PT believes that the pt is not ready nor appropriate for upright mobility at this time.  Continuously complains of difficulty breathing, PT discussed with nsg, and increased supp O2 from 4LPM to 5LPM.  Also, thoroughly worked on deep and pursed lip breathing.      Rehab Prognosis: Good; patient would benefit from acute skilled PT services to address these deficits and reach maximum level of function.    Recent Surgery: Procedure(s) (LRB):  EGD (ESOPHAGOGASTRODUODENOSCOPY) (N/A) 7 Days Post-Op    Plan:     During this hospitalization, patient to be seen 4 x/week to address the identified rehab impairments via gait training, therapeutic activities, therapeutic exercises, neuromuscular re-education and progress toward the following goals:    · Plan of Care Expires:  03/12/20    Subjective     Chief Complaint: difficulty breathing  Patient/Family Comments/goals: "I can't breath."  Pain/Comfort:  · Pain Rating 1: 0/10      Objective:     Communicated with nursing prior to session.  Patient found supine with blood pressure cuff, telemetry, peripheral IV, " Cheri(midline catheter) upon PT entry to room.     General Precautions: Standard, fall, diabetic, special contact, respiratory   Orthopedic Precautions:N/A   Braces: N/A     Functional Mobility:  · Bed Mobility:     · Rolling Left:  modified independence  · Rolling Right: supervision  · Scooting: Arlette to scoot to HOB  · Bridging: supervision  · Supine to Sit: minimum assistance  · Sit to Supine: modified independence  · Balance: I: static sitting balance EOB      AM-PAC 6 CLICK MOBILITY  Turning over in bed (including adjusting bedclothes, sheets and blankets)?: 3  Sitting down on and standing up from a chair with arms (e.g., wheelchair, bedside commode, etc.): 1  Moving from lying on back to sitting on the side of the bed?: 3  Moving to and from a bed to a chair (including a wheelchair)?: 1  Need to walk in hospital room?: 1  Climbing 3-5 steps with a railing?: 1  Basic Mobility Total Score: 10       Therapeutic Activities and Exercises:   Whiteboard updated  Ankle Pumps: 20 reps, in supine  Heel slides: R:15, L:13 reps, each LE performed individually, in supine  Hip abd/add: 10 reps, in supine  LBTR: 17 reps, alt LEs, perf in hooklying  Bridges: 10 reps, in hooklying  Deep and pursed lip breathing  Bowel management: MaxA for hygiene, pt assisted by maintaining SL positioning without A    Patient left supine with all lines intact, call button in reach and nursing notified.    GOALS:   Multidisciplinary Problems     Physical Therapy Goals        Problem: Physical Therapy Goal    Goal Priority Disciplines Outcome Goal Variances Interventions   Physical Therapy Goal     PT, PT/OT Ongoing, Progressing     Description:  Goals to be met by: 2020     Patient will increase functional independence with mobility by performin. Supine to sit with Contact Guard Assistance  2. Sit to supine with Contact Guard Assistance - GOAL MET  3. Sit to stand transfer with Contact Guard Assistance using LRAD  4. Bed to chair  transfer with Contact Guard Assistance using Rolling Walker  5. Gait  x 25 feet with Contact Guard Assistance using Rolling Walker.   6. Lower extremity exercise program x20 reps per handout, with independence                        Time Tracking:     PT Received On: 02/14/20  PT Start Time: 1024     PT Stop Time: 1057  PT Total Time (min): 33 min     Billable Minutes: Therapeutic Activity 15 and Therapeutic Exercise 16    Treatment Type: Treatment  PT/PTA: PT     PTA Visit Number: 0     Rae Tsai, PT  02/14/2020

## 2020-02-14 NOTE — PLAN OF CARE
Problem: Wound  Goal: Optimal Wound Healing  Outcome: Ongoing, Progressing     Problem: Fall Injury Risk  Goal: Absence of Fall and Fall-Related Injury  Outcome: Ongoing, Progressing     Problem: Adult Inpatient Plan of Care  Goal: Plan of Care Review  Outcome: Ongoing, Progressing     Problem: Diabetes Comorbidity  Goal: Blood Glucose Level Within Desired Range  Outcome: Ongoing, Progressing   POC reviewed with pt VSS afebrile no c/o distress. Safety maintained. Will continue to monitor.

## 2020-02-14 NOTE — PROGRESS NOTES
PHARMACY CONSULT NOTE: WARFARIN  Elizabeth Cano is a 69 y.o. female on warfarin therapy for Atrial Fibrilation PharmD has been consulted for warfarin dosing.    Current order: 1 mg QD   Home dose:  1mg Q M/W/F   INR goal: 2-3   Coumadin clinic enrollment: None- unsure who follow up patient INR     Lab Results   Component Value Date    INR 2.2 (H) 02/13/2020    INR 1.8 (H) 02/12/2020    INR 1.7 (H) 02/11/2020     Significant drug interactions: Zosyn   Diet: Low Sodium   Recommendation(s):   Continue with current warfarin dose and frequency. If INR continues to uptrend over the weekend decrease frequency to every other day (or back to home dose of 1 mg Q M/W/F)   Pharmacy will continue to follow and monitor warfarin    Thank you for the consult,  Mary Murphy  Extension [phone]     **Note: Consults are reviewed Monday-Friday 7:00am-3:30pm. THE ABOVE RECOMMENDATIONS ARE ONLY SUGGESTED.THE RECOMMENDATIONS SHOULD BE CONSIDERED IN CONJUNCTION WITH ALL PATIENT FACTORS. **

## 2020-02-15 NOTE — PROGRESS NOTES
[facility] Hospital Medicine  Progress Note    Patient Name: Elizabeth Cano  MRN: [id number]  Patient Class: IP- Inpatient   Admission Date: 1/29/2020  Length of Stay: 17 days  Attending Physician: Mendoza Caldwell MD  Primary Care Provider: Cesar Reeves MD    [facility] Medicine Team: Networked reference to record PCT  Mendoza Caldwell MD    HPI:  Ms. Elizabeth Cano is a 69 years old female with afib (on coumadin), COPD, and HFpEF (EF 45% on 1/29/20) admitted to hospital medicine for presumed urinary tract infection. She was diagnosed with a UTI last week at her Nursing Home, [facility], which she was prescribed Macrobid. She completed her course of Macrobid on 01/29/20 and she was noted to be hypotensive rendering her transfer to the hospital.      She reports a decreased appetite for the last month since her hospitalization over the holidays due to uncomplicated diverticulitis which she was treated with Augmentin. She reports feeling nauseated when taking antibiotics. She denies palpitations, dizziness, lightheadedness, chest pain, SOB, change in urinary/bowel habits.      On 01/29/20, she was found to be hypotensive in the 80s/50s in the ED with lactic acid of 4.8. CXR showed no signs for PNA, UA showed mild leukocytosis with WBC 17, nitrite negative, with urine cultures and blood cultures showing NG. She was admitted to  and started on CTX. She has been resuscitated with IVFs since admit with 3L. BP responded appropriately to IVF resuscitation with subsequent downtrend in lactic acid to 2.7. However, overnight on 01/30/20, BP remained 63-67 mmHg MAP goal. Repeat lactic acid uptrend to 3.3. Of note, she received a night time dose of Lopressor 50 mg on 01/30/20, which has since been discontinued.     ICU consulted in am of 01/31/20 for hypotension despite being resuscitated with 3.5 L IV fluids and persistant lactic acidosis in 3-4 range. She improved with IVF hydration and  patient was stepped down. Rapid response evaluated patient early AM on 2/10 due to acute increase in oxygen requiriments. Patient began the day on 3L NC and progressed to requiring Comfort flow of 30L and 100% FiO2. Patient denies chest pains, only complaining of minor increased difficulty breathing.    Subjective:     Principal Problem:Septic shock    Interval History: Patient lying in bed, moderate respiratory distress. Reports shortness of breath slightly improved. Continues to have dry cough, fatigue and lower extremity swelling. Reports good urine output.     Review of Systems   Constitutional: Positive for fatigue. Negative for chills and fever.   HENT: Negative for congestion.    Eyes: Negative for visual disturbance.   Respiratory: Positive for cough and shortness of breath.    Cardiovascular: Positive for leg swelling. Negative for chest pain.   Gastrointestinal: Negative for abdominal distention, abdominal pain, nausea and vomiting.   Genitourinary: Negative for dysuria.   Musculoskeletal: Positive for gait problem.   Skin: Negative for wound.   Neurological: Positive for weakness. Negative for dizziness and light-headedness.   Psychiatric/Behavioral: Negative for confusion.     Objective:     Vital Signs (Most Recent):  Temp: 98.6 °F (37 °C) (02/15/20 0427)  Pulse: 96 (02/15/20 0427)  Resp: 19 (02/15/20 0427)  BP: 120/60 (02/15/20 0427)  SpO2: 96 % (02/15/20 0427) Vital Signs (24h Range):  Temp:  [98 °F (36.7 °C)-98.6 °F (37 °C)] 98.6 °F (37 °C)  Pulse:  [] 96  Resp:  [16-20] 19  SpO2:  [89 %-98 %] 96 %  BP: (112-120)/(57-65) 120/60     Weight: 78.7 kg (173 lb 8 oz)  Body mass index is 28 kg/m².    Intake/Output Summary (Last 24 hours) at 2/15/2020 0703  Last data filed at 2/15/2020 0600  Gross per 24 hour   Intake 785 ml   Output 552 ml   Net 233 ml      Physical Exam   Constitutional: She is oriented to person, place, and time. She appears well-developed.   Eyes: Pupils are equal, round, and  reactive to light. Conjunctivae and EOM are normal.   Neck: Neck supple.   Cardiovascular: Normal rate and regular rhythm.   Pulmonary/Chest: She is in respiratory distress. She has rales in the right lower field and the left lower field.   Abdominal: Soft. Bowel sounds are normal. She exhibits no distension. There is no tenderness.   Musculoskeletal: Normal range of motion. She exhibits edema.   Neurological: She is alert and oriented to person, place, and time. No cranial nerve deficit.   Skin: Skin is warm.   Psychiatric: She has a normal mood and affect.         Overview/Hospital Course:  She was admitted to Hospital Medicine and treated for Urosepsis with antibiotics and IVF resuscitation. Critical Care was consulted for septic shock. She was admitted to MICU, bolused 1L LR with resulting cleared Lactic Acid. CT Abd Pelvis showed findings consistent with several diverticula in the sigmoid colon with associated wall thickening, minimal adjacent fat stranding, and prominence of the vasa recta.  Findings appear similar when compared to prior examination dated 12/19/2019 and may represent ongoing diverticulitis or colitis in the correct clinical setting. Area of hypoattenuation within the right hepatic lobe adjacent to the gallbladder fossa, not definitively seen on prior examination and may represent area of fatty liver infiltration or transit hepatic attenuation differences however, in the setting of liver disease, neoplasm is not excluded.   Following clearing of her lactic acid, she was stepped down to Hospital Medicine with midodrine 5 mg TID. Critical Care was re-consulted in the setting of Afib RVR and hypotension. While on the floor, patient went into Afib RVR, sustained HR >120, /55, requiring lopressor pushes, which subsequently lowered HR to 75s/50s.     2/10: Patient readmitted to MICU for acute hypoxemic respiratory failure. CTA negative for PE, diuresing well with IV lasix. Levophed has been  discontinued. Electrolytes were repleted. Currently on zosyn; vancomycin was discontinued today given resolution of shock. Bridging heparin with warfarin to therapeutic INR level for A-fib. Restarted oral lasix and metoprolol. Patient is stable to be transferred to Hospital Medicine.  Received topical permethrin treatment for head lice.   2/12- 2/13: Patient developed two episodes of hypotension requiring low dose levo both 02/11 and 02/12 nights. Metoprolol was discontinued and digoxin started for Afib rate control.     Significant Labs:   A1C:   Recent Labs   Lab 12/19/19  2220 01/30/20  0421   HGBA1C 6.4* 5.4     Blood Culture: No results for input(s): LABBLOO in the last 48 hours.  CBC: No results for input(s): WBC, HGB, HCT, PLT in the last 48 hours.  CMP: No results for input(s): NA, K, CL, CO2, GLU, BUN, CREATININE, CALCIUM, PROT, ALBUMIN, BILITOT, ALKPHOS, AST, ALT, ANIONGAP, EGFRNONAA in the last 48 hours.    Invalid input(s): ESTGFAFRICA  Lactic Acid: No results for input(s): LACTATE in the last 48 hours.  Magnesium: No results for input(s): MG in the last 48 hours.  POCT Glucose:   Recent Labs   Lab 02/14/20  1104 02/14/20  1642 02/14/20  2134   POCTGLUCOSE 151* 69* 102     Troponin: No results for input(s): TROPONINI in the last 48 hours.  Urine Studies: No results for input(s): COLORU, APPEARANCEUA, PHUR, SPECGRAV, PROTEINUA, GLUCUA, KETONESU, BILIRUBINUA, OCCULTUA, NITRITE, UROBILINOGEN, LEUKOCYTESUR, RBCUA, WBCUA, BACTERIA, SQUAMEPITHEL, HYALINECASTS in the last 48 hours.    Invalid input(s): WRIGHTSUR    Significant Imaging: I have reviewed and interpreted all pertinent imaging results/findings within the past 24 hours.    CT chest (2/13):  1.  The patient has widespread pulmonary parenchymal disease that has developed since 12/19/2019.  The radiographic features favor interstitial disease over airspace disease.  I doubt that the patient has pulmonary edema.  Differential diagnosis includes pneumonia  "due to an unusual infectious agent or reaction to widespread aspiration.  However also consider the possibility of drug toxicity in this patient with UTI treated with nitrofurantoin; interstitial lung disease has been described in patients exposed to nitrofurantoin.    2.  In this patient with a history of "cardiac surgery at age 10" , scar under the left breast and no sternotomy, consider the possibility of remote repair of coarctation.  Aortic caliber, contour and course appear unremarkable on the current study obtained without intravenous contrast medium.  No aortic aneurysm or dissection identified on contrast enhanced study dated 02/10/2020.    3.  Radiopaque material is present in the interatrial septum at the level of the foramen ovale raising the question of a percutaneous closure of a small secundum ASD or PFO.    4.  And pulmonary arteries are large.  Size of the arteries plus enlarged right heart chambers suggest pulmonary arterial hypertension, possibly long-standing. Of interest, the patient has radiopaque material at the expected location of the fossa ovalis suggesting percutaneous closure.  Therefore pulmonary arterial hypertension could be the result of pulmonary arterial intimal hyperplasia due to longstanding overcirculation in childhood or young adulthood.  There is also radiopaque material at the distal aspect of the right atrial appendage, possible surgical access point to the right atrium.  In the lungs the arteries are particularly large in the right upper lobe.  In old literature of radiology there are numerous descriptions of asymmetric distribution of pulmonary blood flow in the setting of ASD, with right pulmonary arteries larger than left pulmonary arteries.  Assessment/Plan:      Active Diagnoses:    Diagnosis Date Noted POA    PRINCIPAL PROBLEM:  Septic shock [A41.9, R65.21] 02/01/2020 Yes    Severe malnutrition [E43] 02/14/2020 Yes    Palliative care encounter [Z51.5] 02/11/2020 Not " Applicable    Acute hypoxemic respiratory failure [J96.01] 02/11/2020 No    Head lice [B85.0] 02/11/2020 Yes    Advance care planning [Z71.89]  Not Applicable    Goals of care, counseling/discussion [Z71.89]  Not Applicable    Aspiration pneumonia [J69.0] 02/09/2020 No    Hypovolemia [E86.1] 02/07/2020 Yes    Delirium [R41.0] 02/07/2020 No    Bilious vomiting [R11.14] 02/06/2020 No    Visual hallucinations [R44.1] 02/06/2020 No    Thrombocytopenia [D69.6] 02/06/2020 No    Hematemesis [K92.0] 02/06/2020 No    Anemia [D64.9] 02/04/2020 Yes    Liver lesion, right lobe [K76.9] 01/31/2020 Yes    Hypotension [I95.9] 01/30/2020 Yes    Diverticulitis [K57.92] 01/30/2020 Yes    Hypocalcemia [E83.51] 01/29/2020 Yes    Acute cystitis without hematuria [N30.00] 01/29/2020 Yes    GAVIN (acute kidney injury) [N17.9] 01/29/2020 Yes    Bifascicular block [I45.2] 01/02/2020 Yes    Hypomagnesemia [E83.42] 12/20/2019 Yes    COPD (chronic obstructive pulmonary disease) [J44.9] 12/19/2019 Yes    Chronic heart failure with preserved ejection fraction [I50.32] 12/19/2019 Yes    Paroxysmal atrial fibrillation [I48.0] 12/19/2019 Yes    Type 2 diabetes mellitus with hyperglycemia, without long-term current use of insulin [E11.65] 12/19/2019 Yes      Problems Resolved During this Admission:     Scheduled Meds:   albuterol-ipratropium  3 mL Nebulization Q12H    budesonide  0.5 mg Nebulization BID    calcium-vitamin D3  1 tablet Oral BID    dextromethorphan-guaifenesin  mg  1 tablet Oral BID    digoxin  0.125 mg Oral Daily    DULoxetine  30 mg Oral Daily    furosemide  20 mg Oral Daily    magnesium oxide  400 mg Oral Daily    miconazole nitrate 2%   Topical (Top) BID    midodrine  10 mg Oral QHS    mirtazapine  15 mg Oral QHS    pantoprazole  40 mg Oral Daily    piperacillin-tazobactam (ZOSYN) IVPB  4.5 g Intravenous Q8H    QUEtiapine  25 mg Oral QHS    warfarin  1 mg Oral Daily     Continuous  Infusions:    PRN Meds:.acetaminophen, albuterol-ipratropium, benzonatate, bisacodyL, Dextrose 10% Bolus, Dextrose 10% Bolus, glucagon (human recombinant), glucose, glucose, insulin aspart U-100, iohexol, melatonin, ondansetron, phenyleph-min oil-petrolatum, polyethylene glycol, senna, sodium chloride 0.9%, sodium chloride 0.9%    PLAN:    Acute hypoxemic respiratory failure  Pneumonia   Pulmonary edema  - Likely secondary to decompensated HF that has responded well with diureses.  - Weaned Oxygen from comfort flow down to 2L via NC.   -CXR 02/121 with Multifocal patchy pulmonary parenchymal disease is present throughout both lungs, worse in the right lung than the left. Unchanged from prior.   -Continue with home dose of daily furosemide 20mg  - given IV lasix 20 mg IV x2 doses on 2/14  - strict I/Os. Ordered purewick  - Chest CT non contrast (2/13) did not show significant pulmonary edema but did show evidence of interstitial lung disease and pulmonary hypertension   -  Oxygen requirement increased from 2L to 4L. Sating at 94-98% on 4L. Weaning oxygen as tolerated. Now on 3L NC.    - if respiratory status not back to baseline when euvolemic, will order 6MWT and consult pulmonary   - day 7/7 of zosyn     Hypotension  Septic shock- resolved    UTI due to candida albicans   - Patient with acute hypotension and worsening O2 requirements over the course of 2/9/2020. Started the day on 3L NC and by the end was on Comfort flow 100% FiO2 and 30L. Concern for PE. CTA negative for PE, CXR also with severe pleural edema. Patient had been on zosyn for possible diverticulitis +/- urosepsis.   - Vancomycin discontinued; continue zosyn for a total of 7 day treatment.   - Patient on minimal levophed for MAP < 65. Patient appears to have baseline low BP's, mentates well with a MAP > 60. Possible infectious etiology given worsening hypoxemia / shock, CT showing compressive atelectasis vs. Developing consolidation in the left lower  lobe.  - Patient required another short course of Levo 0.02 for MAP <65 overnight which has now improved this morning.   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   - Monitor daily electrolytes     Paroxysmal atrial fibrillation  - Patient currently in RVR presumed 2/2 to acute respiratory failure  - PRN metoprolol as needed for rate control but would not aggressively treat in setting of severe hypoxemia  - INR subtherapeutic; Continue with heparin bridge w/ warfarin until INR is therapeutic.   - Metoprolol 50mg BID discontinued given nightly hypotensive episdoes requiring transient levophed, also received  digoxin 250mcg for rate control  - Maintain Mg >2, K>4.     Type 2 diabetes mellitus with hyperglycemia, without long-term current use of insulin  - AIC 5.4  - Glucose checks QACHS  - Goal Glucose 140-180 mg/dL  - Diabetic diet    Head lice  -S/p 1 permethrin lotion treatment.   - Active lice visualized today despite permethrin treatment and reported comb-niting.   - Actively considering other options to help eradicate head lice infestation, though patient will require aggressive comb-niting, Discussed with Charge nurse who will assist with investigating if this can be effectively performed.    -  lindane shampoo applied on 2/11, will need repeat treatment       Hypocalcemia- resolved   -Continue with home calcium-Vit D       Hypomagnesemia  -Replete PRN  -Continue home magnesium oxide.     Liver lesion, right lobe  - possible concern of abscess vs malignancy per CT  scan  - h/o weight loss and possible colonic lesion  - RUQ US showing focal fatty infiltration        Diverticulitis  - CT consistent with recurrent diverticulitis  - CRS consulted during this hospitalization- recommend management with antibiotics.   - Continue with zosyn for 7 days       Debiilty  - PT/OT recommend SNF  - followup with CM/SW regarding palcement     Advance care planning  - Consulted Palliative care to assist with goals of care discussion. As of this moment, patient expressed preference for full code, although patient may not fully understand her prognosis given her frequent hospitalizations. Palliative care to continue with advance care discussions with patient and family.         VTE Risk Mitigation (From admission, onward)         Ordered     warfarin (COUMADIN) tablet 1 mg  Daily      02/12/20 1139     IP VTE HIGH RISK PATIENT  Once      01/29/20 1916     Reason for No Pharmacological VTE Prophylaxis  Once     Question:  Reasons:  Answer:  Already adequately anticoagulated on oral Anticoagulants    01/29/20 1916              Time spent in care of patient: > 35 minutes     Menodza Caldwell MD  Department of Hospital Medicine   [facility]

## 2020-02-15 NOTE — PROGRESS NOTES
PHARMACY CONSULT NOTE: WARFARIN    Elizabeth Cano is a 69 y.o. female on warfarin therapy for Atrial Fibrilation PharmD has been consulted for warfarin dosing.    Current order: 1 mg daily  Home dose: 1mg every Mon, Wed, Fri   INR goal: 2-3   Coumadin clinic enrollment: None- unsure who follow up patient INR     Lab Results   Component Value Date    INR 3.3 (H) 02/15/2020    INR 2.2 (H) 02/13/2020    INR 1.8 (H) 02/12/2020     Significant drug interactions: Zosyn   Diet: Low Sodium     Recommendation(s):    Hold warfarin today, then decrease dose to 1 mg every other day   Pharmacy will continue to follow and monitor warfarin    Thank you for the consult,  Jada Gonsalez  Extension [phone]    **Note: Consults are reviewed Monday-Friday 7:00am-3:30pm. THE ABOVE RECOMMENDATIONS ARE ONLY SUGGESTED.THE RECOMMENDATIONS SHOULD BE CONSIDERED IN CONJUNCTION WITH ALL PATIENT FACTORS. **

## 2020-02-16 NOTE — PLAN OF CARE
Pt alert x4. Appetite extremely poor. Pt encouraged to eat by staff. Remains very weak. Antifungal cream applied to groin and perirectal areas. Nausea controlled with Zofran. Free from falls and injuries. Call bell placed within reach. Bed locked and placed within lowest position. Will cont to monitor

## 2020-02-16 NOTE — PROGRESS NOTES
[facility] Hospital Medicine  Progress Note    Patient Name: Elizabeth Cano  MRN: [id number]  Patient Class: IP- Inpatient   Admission Date: 1/29/2020  Length of Stay: 18 days  Attending Physician: Mendoza Caldwell MD  Primary Care Provider: Cesar Reeves MD    [facility] Medicine Team: Networked reference to record PCT  Mendoza Caldwell MD    HPI:  Ms. Elizabeth Cano is a 69 years old female with afib (on coumadin), COPD, and HFpEF (EF 45% on 1/29/20) admitted to hospital medicine for presumed urinary tract infection. She was diagnosed with a UTI last week at her Nursing Home, [facility], which she was prescribed Macrobid. She completed her course of Macrobid on 01/29/20 and she was noted to be hypotensive rendering her transfer to the hospital.      She reports a decreased appetite for the last month since her hospitalization over the holidays due to uncomplicated diverticulitis which she was treated with Augmentin. She reports feeling nauseated when taking antibiotics. She denies palpitations, dizziness, lightheadedness, chest pain, SOB, change in urinary/bowel habits.      On 01/29/20, she was found to be hypotensive in the 80s/50s in the ED with lactic acid of 4.8. CXR showed no signs for PNA, UA showed mild leukocytosis with WBC 17, nitrite negative, with urine cultures and blood cultures showing NG. She was admitted to  and started on CTX. She has been resuscitated with IVFs since admit with 3L. BP responded appropriately to IVF resuscitation with subsequent downtrend in lactic acid to 2.7. However, overnight on 01/30/20, BP remained 63-67 mmHg MAP goal. Repeat lactic acid uptrend to 3.3. Of note, she received a night time dose of Lopressor 50 mg on 01/30/20, which has since been discontinued.     ICU consulted in am of 01/31/20 for hypotension despite being resuscitated with 3.5 L IV fluids and persistant lactic acidosis in 3-4 range. She improved with IVF hydration and  patient was stepped down. Rapid response evaluated patient early AM on 2/10 due to acute increase in oxygen requiriments. Patient began the day on 3L NC and progressed to requiring Comfort flow of 30L and 100% FiO2. Patient denies chest pains, only complaining of minor increased difficulty breathing.    Subjective:     Principal Problem:Septic shock    Interval History: Patient lying in bed, moderate respiratory distress. Reports shortness of breath and lower extremity swelling continue to improve. Continues to remain fatigued and weak. No new complaints.     Review of Systems   Constitutional: Positive for fatigue. Negative for chills and fever.   HENT: Negative for congestion.    Eyes: Negative for visual disturbance.   Respiratory: Positive for cough and shortness of breath.    Cardiovascular: Positive for leg swelling. Negative for chest pain.   Gastrointestinal: Negative for abdominal distention, abdominal pain, nausea and vomiting.   Genitourinary: Negative for dysuria.   Musculoskeletal: Positive for gait problem.   Skin: Negative for wound.   Neurological: Positive for weakness. Negative for dizziness and light-headedness.   Psychiatric/Behavioral: Negative for confusion.     Objective:     Vital Signs (Most Recent):  Temp: 98 °F (36.7 °C) (02/16/20 1159)  Pulse: 101 (02/16/20 1520)  Resp: 18 (02/16/20 1520)  BP: 126/63 (02/16/20 1159)  SpO2: 95 % (02/16/20 1520) Vital Signs (24h Range):  Temp:  [97.5 °F (36.4 °C)-98.2 °F (36.8 °C)] 98 °F (36.7 °C)  Pulse:  [] 101  Resp:  [16-20] 18  SpO2:  [90 %-98 %] 95 %  BP: (121-149)/(63-70) 126/63     Weight: 76.6 kg (168 lb 14 oz)  Body mass index is 27.26 kg/m².    Intake/Output Summary (Last 24 hours) at 2/16/2020 1624  Last data filed at 2/16/2020 1226  Gross per 24 hour   Intake --   Output 700 ml   Net -700 ml        Physical Exam   Constitutional: She is oriented to person, place, and time. She appears well-developed.   Eyes: Pupils are equal, round, and  reactive to light. Conjunctivae and EOM are normal.   Neck: Neck supple.   Cardiovascular: Normal rate and regular rhythm.   Pulmonary/Chest: She is in respiratory distress. She has rales in the right lower field and the left lower field.   Abdominal: Soft. Bowel sounds are normal. She exhibits no distension. There is no tenderness.   Musculoskeletal: Normal range of motion. She exhibits edema.   Neurological: She is alert and oriented to person, place, and time. No cranial nerve deficit.   Skin: Skin is warm.   Psychiatric: She has a normal mood and affect.         Overview/Hospital Course:  She was admitted to Hospital Medicine and treated for Urosepsis with antibiotics and IVF resuscitation. Critical Care was consulted for septic shock. She was admitted to MICU, bolused 1L LR with resulting cleared Lactic Acid. CT Abd Pelvis showed findings consistent with several diverticula in the sigmoid colon with associated wall thickening, minimal adjacent fat stranding, and prominence of the vasa recta.  Findings appear similar when compared to prior examination dated 12/19/2019 and may represent ongoing diverticulitis or colitis in the correct clinical setting. Area of hypoattenuation within the right hepatic lobe adjacent to the gallbladder fossa, not definitively seen on prior examination and may represent area of fatty liver infiltration or transit hepatic attenuation differences however, in the setting of liver disease, neoplasm is not excluded.   Following clearing of her lactic acid, she was stepped down to Hospital Medicine with midodrine 5 mg TID. Critical Care was re-consulted in the setting of Afib RVR and hypotension. While on the floor, patient went into Afib RVR, sustained HR >120, /55, requiring lopressor pushes, which subsequently lowered HR to 75s/50s.     2/10: Patient readmitted to MICU for acute hypoxemic respiratory failure. CTA negative for PE, diuresing well with IV lasix. Levophed has been  discontinued. Electrolytes were repleted. Currently on zosyn; vancomycin was discontinued today given resolution of shock. Bridging heparin with warfarin to therapeutic INR level for A-fib. Restarted oral lasix and metoprolol. Patient is stable to be transferred to Hospital Medicine.  Received topical permethrin treatment for head lice.   2/12- 2/13: Patient developed two episodes of hypotension requiring low dose levo both 02/11 and 02/12 nights. Metoprolol was discontinued and digoxin started for Afib rate control.     Significant Labs:   A1C:   Recent Labs   Lab 12/19/19  2220 01/30/20  0421   HGBA1C 6.4* 5.4     CBC:   Recent Labs   Lab 02/15/20  0755 02/16/20  0726   WBC 6.41 6.40   HGB 10.3* 9.7*   HCT 33.8* 32.5*    227     CMP:   Recent Labs   Lab 02/15/20  0755 02/16/20  0726    143   K 3.9 4.0    99   CO2 31* 34*    116*   BUN 9 12   CREATININE 0.8 1.0   CALCIUM 8.0* 7.9*   PROT 5.1* 5.0*   ALBUMIN 1.5* 1.6*   BILITOT 0.5 0.4   ALKPHOS 245* 241*   AST 30 27   ALT 18 15   ANIONGAP 11 10   EGFRNONAA >60.0 57.2*     Magnesium:   Recent Labs   Lab 02/15/20  0755 02/16/20  0726   MG 1.6 1.6     Recent Labs   Lab 02/16/20  0726   INR 3.8*       POCT Glucose:   Recent Labs   Lab 02/15/20  2224 02/16/20  0747 02/16/20  1146   POCTGLUCOSE 149* 108 122*       Significant Imaging: I have reviewed and interpreted all pertinent imaging results/findings within the past 24 hours.    CT chest (2/13):  1.  The patient has widespread pulmonary parenchymal disease that has developed since 12/19/2019.  The radiographic features favor interstitial disease over airspace disease.  I doubt that the patient has pulmonary edema.  Differential diagnosis includes pneumonia due to an unusual infectious agent or reaction to widespread aspiration.  However also consider the possibility of drug toxicity in this patient with UTI treated with nitrofurantoin; interstitial lung disease has been described in patients  "exposed to nitrofurantoin.    2.  In this patient with a history of "cardiac surgery at age 10" , scar under the left breast and no sternotomy, consider the possibility of remote repair of coarctation.  Aortic caliber, contour and course appear unremarkable on the current study obtained without intravenous contrast medium.  No aortic aneurysm or dissection identified on contrast enhanced study dated 02/10/2020.    3.  Radiopaque material is present in the interatrial septum at the level of the foramen ovale raising the question of a percutaneous closure of a small secundum ASD or PFO.    4.  And pulmonary arteries are large.  Size of the arteries plus enlarged right heart chambers suggest pulmonary arterial hypertension, possibly long-standing. Of interest, the patient has radiopaque material at the expected location of the fossa ovalis suggesting percutaneous closure.  Therefore pulmonary arterial hypertension could be the result of pulmonary arterial intimal hyperplasia due to longstanding overcirculation in childhood or young adulthood.  There is also radiopaque material at the distal aspect of the right atrial appendage, possible surgical access point to the right atrium.  In the lungs the arteries are particularly large in the right upper lobe.  In old literature of radiology there are numerous descriptions of asymmetric distribution of pulmonary blood flow in the setting of ASD, with right pulmonary arteries larger than left pulmonary arteries.    Assessment/Plan:      Active Diagnoses:    Diagnosis Date Noted POA    PRINCIPAL PROBLEM:  Septic shock [A41.9, R65.21] 02/01/2020 Yes    Severe malnutrition [E43] 02/14/2020 Yes    Palliative care encounter [Z51.5] 02/11/2020 Not Applicable    Acute hypoxemic respiratory failure [J96.01] 02/11/2020 No    Head lice [B85.0] 02/11/2020 Yes    Advance care planning [Z71.89]  Not Applicable    Goals of care, counseling/discussion [Z71.89]  Not Applicable    " Aspiration pneumonia [J69.0] 02/09/2020 No    Hypovolemia [E86.1] 02/07/2020 Yes    Delirium [R41.0] 02/07/2020 No    Bilious vomiting [R11.14] 02/06/2020 No    Visual hallucinations [R44.1] 02/06/2020 No    Thrombocytopenia [D69.6] 02/06/2020 No    Hematemesis [K92.0] 02/06/2020 No    Anemia [D64.9] 02/04/2020 Yes    Liver lesion, right lobe [K76.9] 01/31/2020 Yes    Hypotension [I95.9] 01/30/2020 Yes    Diverticulitis [K57.92] 01/30/2020 Yes    Hypocalcemia [E83.51] 01/29/2020 Yes    Acute cystitis without hematuria [N30.00] 01/29/2020 Yes    GAVIN (acute kidney injury) [N17.9] 01/29/2020 Yes    Bifascicular block [I45.2] 01/02/2020 Yes    Hypomagnesemia [E83.42] 12/20/2019 Yes    COPD (chronic obstructive pulmonary disease) [J44.9] 12/19/2019 Yes    Chronic heart failure with preserved ejection fraction [I50.32] 12/19/2019 Yes    Paroxysmal atrial fibrillation [I48.0] 12/19/2019 Yes    Type 2 diabetes mellitus with hyperglycemia, without long-term current use of insulin [E11.65] 12/19/2019 Yes      Problems Resolved During this Admission:     Scheduled Meds:   albuterol-ipratropium  3 mL Nebulization Q4H    budesonide  0.5 mg Nebulization BID    calcium-vitamin D3  1 tablet Oral BID    dextromethorphan-guaifenesin  mg  1 tablet Oral BID    digoxin  0.125 mg Oral Daily    DULoxetine  30 mg Oral Daily    furosemide  20 mg Oral BID    magnesium oxide  400 mg Oral Daily    miconazole nitrate 2%   Topical (Top) BID    midodrine  10 mg Oral QHS    mirtazapine  15 mg Oral QHS    pantoprazole  40 mg Oral Daily    QUEtiapine  25 mg Oral QHS     Continuous Infusions:    PRN Meds:.acetaminophen, albuterol-ipratropium, benzonatate, bisacodyL, Dextrose 10% Bolus, Dextrose 10% Bolus, glucagon (human recombinant), glucose, glucose, insulin aspart U-100, iohexol, melatonin, ondansetron, phenyleph-min oil-petrolatum, polyethylene glycol, promethazine (PHENERGAN) IVPB, senna, sodium chloride  0.9%, sodium chloride 0.9%    PLAN:    Acute hypoxemic respiratory failure  Pneumonia   Pulmonary edema  - Likely secondary to decompensated HF that has responded well with diureses.  - Weaned Oxygen from comfort flow down to 2L via NC.   -CXR 02/121 with Multifocal patchy pulmonary parenchymal disease is present throughout both lungs, worse in the right lung than the left. Unchanged from prior.   - Increased to daily furosemide 20 mg BID  - s/p IV diuresis prn   - strict I/Os. Ordered purewick  - Chest CT non contrast (2/13) did not show significant pulmonary edema but did show evidence of interstitial lung disease and pulmonary hypertension   - Weaned down to 1L NC. Goal O2 88-92%  - if unable to wean oxygen after adequate diuresis, will consult pulmonary and order 6MWT  - completed 7 days of zosyn    Hypotension  Septic shock- resolved    UTI due to candida albicans   - Patient with acute hypotension and worsening O2 requirements over the course of 2/9/2020. Started the day on 3L NC and by the end was on Comfort flow 100% FiO2 and 30L. Concern for PE. CTA negative for PE, CXR also with severe pleural edema. Patient had been on zosyn for possible diverticulitis +/- urosepsis.   - Vancomycin discontinued; completed zosyn x7 day   - Patient on minimal levophed for MAP < 65. Patient appears to have baseline low BP's, mentates well with a MAP > 60. Possible infectious etiology given worsening hypoxemia / shock, CT showing compressive atelectasis vs. Developing consolidation in the left lower lobe.  - Patient required another short course of Levo 0.02 for MAP <65 overnight which has now improved this morning.   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 Ordered purewick since patient incontinent   - Patient hypervolemic, significant pulmonary edema on CXR  - s/p lasix IV prn  - continue lasix 20 mg BID  - Monitor daily electrolytes     Decreased po intake  - nutrition consulted. followup recs  - Boost Plus TID     Paroxysmal atrial fibrillation  - Patient currently in RVR presumed 2/2 to acute respiratory failure  - PRN metoprolol as needed for rate control but would not aggressively treat in setting of severe hypoxemia  - INR initially subtherapeutic and was on heparin bridge w/ warfarin. Now INR supratherapeutic and holding warfarin.    - Metoprolol 50mg BID discontinued given nightly hypotensive episdoes requiring transient levophed, also received  digoxin 250mcg for rate control  - Maintain Mg >2, K>4.     Type 2 diabetes mellitus with hyperglycemia, without long-term current use of insulin  - AIC 5.4  - Glucose checks QACHS  - Goal Glucose 140-180 mg/dL  - Diabetic diet    Head lice  -S/p 1 permethrin lotion treatment.   - Active lice visualized today despite permethrin treatment and reported comb-niting.   - Actively considering other options to help eradicate head lice infestation, though patient will require aggressive comb-niting, Discussed with Charge nurse who will assist with investigating if this can be effectively performed.    -  lindane shampoo applied on 2/11, will need repeat treatment    Hypocalcemia- resolved   -Continue with home calcium-Vit D    Hypomagnesemia  -Replete PRN  -Continue home magnesium oxide.     Liver lesion, right lobe  - possible concern of abscess vs malignancy per CT scan  - h/o weight loss and possible colonic lesion  - RUQ US showing focal fatty infiltration     Diverticulitis  - CT consistent with recurrent diverticulitis  - CRS consulted during this hospitalization- recommend management with antibiotics.   - Continue with zosyn for 7 days     Debiilty  - PT/OT  recommend SNF  - followup with CM/SW regarding palcement     Advance care planning  - Consulted Palliative care to assist with goals of care discussion. As of this moment, patient expressed preference for full code, although patient may not fully understand her prognosis given her frequent hospitalizations. Palliative care to continue with advance care discussions with patient and family.         VTE Risk Mitigation (From admission, onward)         Ordered     IP VTE HIGH RISK PATIENT  Once      01/29/20 1916     Reason for No Pharmacological VTE Prophylaxis  Once     Question:  Reasons:  Answer:  Already adequately anticoagulated on oral Anticoagulants    01/29/20 1916              Time spent in care of patient: > 35 minutes     Mendoza Caldwell MD  Department of Hospital Medicine   [facility]

## 2020-02-16 NOTE — PROGRESS NOTES
PHARMACY CONSULT NOTE: WARFARIN    Elizabeth Cano is a 69 y.o. female on warfarin therapy for Atrial Fibrilation PharmD has been consulted for warfarin dosing.    Current order: 1 mg daily  Home dose: 1mg every Mon, Wed, Fri   INR goal: 2-3   Coumadin clinic enrollment: None- unsure who follows patient's INR     Lab Results   Component Value Date    INR 3.8 (H) 02/16/2020    INR 3.3 (H) 02/15/2020    INR 2.2 (H) 02/13/2020     Diet: Low Sodium     Recommendation(s):    Hold warfarin today, then decrease dose to 1 mg every other day   Pharmacy will continue to follow and monitor warfarin    Thank you for the consult,  Jada Gonsalez  Extension [phone]    **Note: Consults are reviewed Monday-Friday 7:00am-3:30pm. THE ABOVE RECOMMENDATIONS ARE ONLY SUGGESTED.THE RECOMMENDATIONS SHOULD BE CONSIDERED IN CONJUNCTION WITH ALL PATIENT FACTORS. **

## 2020-02-17 NOTE — PROGRESS NOTES
[facility] Hospital Medicine  Progress Note    Patient Name: Elizabeth Cano  MRN: [id number]  Patient Class: IP- Inpatient   Admission Date: 1/29/2020  Length of Stay: 19 days  Attending Physician: Mendoza Caldwell MD  Primary Care Provider: Cesar Reeves MD    [facility] Medicine Team: Networked reference to record PCT  Mendoza Caldwell MD    HPI:  Ms. Elizabeth Cano is a 69 years old female with afib (on coumadin), COPD, and HFpEF (EF 45% on 1/29/20) admitted to hospital medicine for presumed urinary tract infection. She was diagnosed with a UTI last week at her Nursing Home, [facility], which she was prescribed Macrobid. She completed her course of Macrobid on 01/29/20 and she was noted to be hypotensive rendering her transfer to the hospital.      She reports a decreased appetite for the last month since her hospitalization over the holidays due to uncomplicated diverticulitis which she was treated with Augmentin. She reports feeling nauseated when taking antibiotics. She denies palpitations, dizziness, lightheadedness, chest pain, SOB, change in urinary/bowel habits.      On 01/29/20, she was found to be hypotensive in the 80s/50s in the ED with lactic acid of 4.8. CXR showed no signs for PNA, UA showed mild leukocytosis with WBC 17, nitrite negative, with urine cultures and blood cultures showing NG. She was admitted to  and started on CTX. She has been resuscitated with IVFs since admit with 3L. BP responded appropriately to IVF resuscitation with subsequent downtrend in lactic acid to 2.7. However, overnight on 01/30/20, BP remained 63-67 mmHg MAP goal. Repeat lactic acid uptrend to 3.3. Of note, she received a night time dose of Lopressor 50 mg on 01/30/20, which has since been discontinued.     ICU consulted in am of 01/31/20 for hypotension despite being resuscitated with 3.5 L IV fluids and persistant lactic acidosis in 3-4 range. She improved with IVF hydration and  patient was stepped down. Rapid response evaluated patient early AM on 2/10 due to acute increase in oxygen requiriments. Patient began the day on 3L NC and progressed to requiring Comfort flow of 30L and 100% FiO2. Patient denies chest pains, only complaining of minor increased difficulty breathing.    Subjective:     Principal Problem:Septic shock    Interval History: Patient lying in bed, mild respiratory distress. Family at bedside. Reports slight improvement in shortness of breath and lower extremity swelling. Continues to have fatigue, decreased energy and poor appetite.     Review of Systems   Constitutional: Positive for fatigue. Negative for chills and fever.   HENT: Negative for congestion.    Eyes: Negative for visual disturbance.   Respiratory: Positive for cough and shortness of breath.    Cardiovascular: Positive for leg swelling. Negative for chest pain.   Gastrointestinal: Negative for abdominal distention, abdominal pain, nausea and vomiting.   Genitourinary: Negative for dysuria.   Musculoskeletal: Positive for gait problem.   Skin: Negative for wound.   Neurological: Positive for weakness. Negative for dizziness and light-headedness.   Psychiatric/Behavioral: Negative for confusion.     Objective:     Vital Signs (Most Recent):  Temp: 97.8 °F (36.6 °C) (02/17/20 0814)  Pulse: 105 (02/17/20 0814)  Resp: 20 (02/17/20 0814)  BP: (!) 128/92 (02/17/20 0814)  SpO2: 96 % (02/17/20 0814) Vital Signs (24h Range):  Temp:  [97.6 °F (36.4 °C)-98 °F (36.7 °C)] 97.8 °F (36.6 °C)  Pulse:  [] 105  Resp:  [16-20] 20  SpO2:  [90 %-98 %] 96 %  BP: (103-150)/(58-92) 128/92     Weight: 73 kg (160 lb 15 oz)  Body mass index is 25.98 kg/m².    Intake/Output Summary (Last 24 hours) at 2/17/2020 0842  Last data filed at 2/17/2020 0500  Gross per 24 hour   Intake 480 ml   Output 1775 ml   Net -1295 ml        Physical Exam   Constitutional: She is oriented to person, place, and time. She appears well-developed.    Eyes: Pupils are equal, round, and reactive to light. Conjunctivae and EOM are normal.   Neck: Neck supple.   Cardiovascular: Normal rate and regular rhythm.   Pulmonary/Chest: She is in respiratory distress. She has rales in the right lower field and the left lower field.   Abdominal: Soft. Bowel sounds are normal. She exhibits no distension. There is no tenderness.   Musculoskeletal: Normal range of motion. She exhibits edema.   Neurological: She is alert and oriented to person, place, and time. No cranial nerve deficit.   Skin: Skin is warm.   Psychiatric: She has a normal mood and affect.         Overview/Hospital Course:  She was admitted to Hospital Medicine and treated for Urosepsis with antibiotics and IVF resuscitation. Critical Care was consulted for septic shock. She was admitted to MICU, bolused 1L LR with resulting cleared Lactic Acid. CT Abd Pelvis showed findings consistent with several diverticula in the sigmoid colon with associated wall thickening, minimal adjacent fat stranding, and prominence of the vasa recta.  Findings appear similar when compared to prior examination dated 12/19/2019 and may represent ongoing diverticulitis or colitis in the correct clinical setting. Area of hypoattenuation within the right hepatic lobe adjacent to the gallbladder fossa, not definitively seen on prior examination and may represent area of fatty liver infiltration or transit hepatic attenuation differences however, in the setting of liver disease, neoplasm is not excluded.   Following clearing of her lactic acid, she was stepped down to Hospital Medicine with midodrine 5 mg TID. Critical Care was re-consulted in the setting of Afib RVR and hypotension. While on the floor, patient went into Afib RVR, sustained HR >120, /55, requiring lopressor pushes, which subsequently lowered HR to 75s/50s.     2/10: Patient readmitted to MICU for acute hypoxemic respiratory failure. CTA negative for PE, diuresing well  with IV lasix. Levophed has been discontinued. Electrolytes were repleted. Currently on zosyn; vancomycin was discontinued today given resolution of shock. Bridging heparin with warfarin to therapeutic INR level for A-fib. Restarted oral lasix and metoprolol. Patient is stable to be transferred to Hospital Medicine.  Received topical permethrin treatment for head lice.   2/12- 2/13: Patient developed two episodes of hypotension requiring low dose levo both 02/11 and 02/12 nights. Metoprolol was discontinued and digoxin started for Afib rate control.     Significant Labs:   A1C:   Recent Labs   Lab 12/19/19  2220 01/30/20  0421   HGBA1C 6.4* 5.4     CBC:   Recent Labs   Lab 02/16/20  0726 02/17/20  0652   WBC 6.40 4.83   HGB 9.7* 9.0*   HCT 32.5* 29.7*    201     CMP:   Recent Labs   Lab 02/16/20  0726 02/17/20  0652    142   K 4.0 2.8*   CL 99 98   CO2 34* 34*   * 73   BUN 12 11   CREATININE 1.0 0.8   CALCIUM 7.9* 7.5*   PROT 5.0* 4.5*   ALBUMIN 1.6* 1.5*   BILITOT 0.4 0.4   ALKPHOS 241* 212*   AST 27 32   ALT 15 16   ANIONGAP 10 10   EGFRNONAA 57.2* >60.0     Magnesium:   Recent Labs   Lab 02/16/20  0726 02/17/20  0652   MG 1.6 1.5*     Recent Labs   Lab 02/17/20  0652   INR 3.7*       POCT Glucose:   Recent Labs   Lab 02/16/20  1624 02/16/20  2216 02/17/20  0814   POCTGLUCOSE 135* 94 75       Significant Imaging: I have reviewed and interpreted all pertinent imaging results/findings within the past 24 hours.    CT chest (2/13):  1.  The patient has widespread pulmonary parenchymal disease that has developed since 12/19/2019.  The radiographic features favor interstitial disease over airspace disease.  I doubt that the patient has pulmonary edema.  Differential diagnosis includes pneumonia due to an unusual infectious agent or reaction to widespread aspiration.  However also consider the possibility of drug toxicity in this patient with UTI treated with nitrofurantoin; interstitial lung disease  "has been described in patients exposed to nitrofurantoin.    2.  In this patient with a history of "cardiac surgery at age 10" , scar under the left breast and no sternotomy, consider the possibility of remote repair of coarctation.  Aortic caliber, contour and course appear unremarkable on the current study obtained without intravenous contrast medium.  No aortic aneurysm or dissection identified on contrast enhanced study dated 02/10/2020.    3.  Radiopaque material is present in the interatrial septum at the level of the foramen ovale raising the question of a percutaneous closure of a small secundum ASD or PFO.    4.  And pulmonary arteries are large.  Size of the arteries plus enlarged right heart chambers suggest pulmonary arterial hypertension, possibly long-standing. Of interest, the patient has radiopaque material at the expected location of the fossa ovalis suggesting percutaneous closure.  Therefore pulmonary arterial hypertension could be the result of pulmonary arterial intimal hyperplasia due to longstanding overcirculation in childhood or young adulthood.  There is also radiopaque material at the distal aspect of the right atrial appendage, possible surgical access point to the right atrium.  In the lungs the arteries are particularly large in the right upper lobe.  In old literature of radiology there are numerous descriptions of asymmetric distribution of pulmonary blood flow in the setting of ASD, with right pulmonary arteries larger than left pulmonary arteries.    Assessment/Plan:      Active Diagnoses:    Diagnosis Date Noted POA    PRINCIPAL PROBLEM:  Septic shock [A41.9, R65.21] 02/01/2020 Yes    Severe malnutrition [E43] 02/14/2020 Yes    Palliative care encounter [Z51.5] 02/11/2020 Not Applicable    Acute hypoxemic respiratory failure [J96.01] 02/11/2020 No    Head lice [B85.0] 02/11/2020 Yes    Advance care planning [Z71.89]  Not Applicable    Goals of care, counseling/discussion " [Z71.89]  Not Applicable    Aspiration pneumonia [J69.0] 02/09/2020 No    Hypovolemia [E86.1] 02/07/2020 Yes    Delirium [R41.0] 02/07/2020 No    Bilious vomiting [R11.14] 02/06/2020 No    Visual hallucinations [R44.1] 02/06/2020 No    Thrombocytopenia [D69.6] 02/06/2020 No    Hematemesis [K92.0] 02/06/2020 No    Anemia [D64.9] 02/04/2020 Yes    Liver lesion, right lobe [K76.9] 01/31/2020 Yes    Hypotension [I95.9] 01/30/2020 Yes    Diverticulitis [K57.92] 01/30/2020 Yes    Hypocalcemia [E83.51] 01/29/2020 Yes    Acute cystitis without hematuria [N30.00] 01/29/2020 Yes    GAVIN (acute kidney injury) [N17.9] 01/29/2020 Yes    Bifascicular block [I45.2] 01/02/2020 Yes    Hypomagnesemia [E83.42] 12/20/2019 Yes    COPD (chronic obstructive pulmonary disease) [J44.9] 12/19/2019 Yes    Chronic heart failure with preserved ejection fraction [I50.32] 12/19/2019 Yes    Paroxysmal atrial fibrillation [I48.0] 12/19/2019 Yes    Type 2 diabetes mellitus with hyperglycemia, without long-term current use of insulin [E11.65] 12/19/2019 Yes      Problems Resolved During this Admission:     Scheduled Meds:   albuterol-ipratropium  3 mL Nebulization Q4H    budesonide  0.5 mg Nebulization BID    calcium-vitamin D3  1 tablet Oral BID    dextromethorphan-guaifenesin  mg  1 tablet Oral BID    digoxin  0.125 mg Oral Daily    DULoxetine  30 mg Oral Daily    furosemide (LASIX) IV  40 mg Intravenous Once    furosemide  40 mg Oral BID    magnesium oxide  400 mg Oral Daily    miconazole nitrate 2%   Topical (Top) BID    midodrine  10 mg Oral QHS    mirtazapine  15 mg Oral QHS    ondansetron  4 mg Intravenous Once    pantoprazole  40 mg Oral Daily    QUEtiapine  25 mg Oral QHS     Continuous Infusions:    PRN Meds:.acetaminophen, albuterol-ipratropium, benzonatate, bisacodyL, Dextrose 10% Bolus, Dextrose 10% Bolus, glucagon (human recombinant), glucose, glucose, insulin aspart U-100, iohexol, melatonin,  ondansetron, phenyleph-min oil-petrolatum, polyethylene glycol, promethazine (PHENERGAN) IVPB, senna, sodium chloride 0.9%, sodium chloride 0.9%    PLAN:    Acute hypoxemic respiratory failure  Pneumonia   Pulmonary edema  - Likely secondary to decompensated HF that has responded well with diureses.  - Weaned Oxygen from comfort flow down to 2L via NC.   -CXR 02/121 with Multifocal patchy pulmonary parenchymal disease is present throughout both lungs, worse in the right lung than the left. Unchanged from prior.   - on lasix IV 40 mg BID   - strict I/Os. Ordered purewick  - Chest CT non contrast (2/13) did not show significant pulmonary edema but did show evidence of interstitial lung disease and pulmonary hypertension   - On 3L NC. Goal O2 88-92%. Wean as tolerated  - if unable to wean oxygen after adequate diuresis, will consult pulmonary and order 6MWT  - completed 7 days of zosyn    Hypotension  Septic shock- resolved    UTI due to candida albicans   - Patient with acute hypotension and worsening O2 requirements over the course of 2/9/2020. Started the day on 3L NC and by the end was on Comfort flow 100% FiO2 and 30L. Concern for PE. CTA negative for PE, CXR also with severe pleural edema. Patient had been on zosyn for possible diverticulitis +/- urosepsis.   - Vancomycin discontinued; completed zosyn x7 day   - Patient on minimal levophed for MAP < 65. Patient appears to have baseline low BP's, mentates well with a MAP > 60. Possible infectious etiology given worsening hypoxemia / shock, CT showing compressive atelectasis vs. Developing consolidation in the left lower lobe.  - Patient required another short course of Levo 0.02 for MAP <65 overnight which has now improved this morning.   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 Ordered purewick since patient incontinent   - Patient hypervolemic, significant pulmonary edema on CXR  - Monitor daily electrolytes     Decreased po intake  - nutrition consulted. followup recs  - Boost Plus TID     Paroxysmal atrial fibrillation  - Patient currently in RVR presumed 2/2 to acute respiratory failure  - PRN metoprolol as needed for rate control but would not aggressively treat in setting of severe hypoxemia  - INR initially subtherapeutic and was on heparin bridge w/ warfarin. Now INR supratherapeutic and holding warfarin.    - Metoprolol 50mg BID discontinued given nightly hypotensive episdoes requiring transient levophed, also received  digoxin 250mcg for rate control  - Maintain Mg >2, K>4.     Type 2 diabetes mellitus with hyperglycemia, without long-term current use of insulin  - AIC 5.4  - Glucose checks QACHS  - Goal Glucose 140-180 mg/dL  - Diabetic diet    Head lice  -S/p 1 permethrin lotion treatment.   - Active lice visualized today despite permethrin treatment and reported comb-niting.   - Actively considering other options to help eradicate head lice infestation, though patient will require aggressive comb-niting, Discussed with Charge nurse who will assist with investigating if this can be effectively performed.    -  lindane shampoo applied on 2/11, will need repeat treatment    Hypocalcemia- resolved   -Continue with home calcium-Vit D    Hypomagnesemia  -Replete PRN  -Continue home magnesium oxide.     Liver lesion, right lobe  - possible concern of abscess vs malignancy per CT scan  - h/o weight loss and possible colonic lesion  - RUQ US showing focal fatty infiltration     Diverticulitis  - CT consistent with recurrent diverticulitis  - CRS consulted during this  hospitalization- recommend management with antibiotics.   - Continue with zosyn for 7 days     Debiilty  - PT/OT recommend SNF  - followup with CM/SW regarding palcement     Advance care planning  - Consulted Palliative care to assist with goals of care discussion. As of this moment, patient expressed preference for full code, although patient may not fully understand her prognosis given her frequent hospitalizations. Palliative care to continue with advance care discussions with patient and family.         VTE Risk Mitigation (From admission, onward)         Ordered     IP VTE HIGH RISK PATIENT  Once      01/29/20 1916     Reason for No Pharmacological VTE Prophylaxis  Once     Question:  Reasons:  Answer:  Already adequately anticoagulated on oral Anticoagulants    01/29/20 1916              Time spent in care of patient: > 35 minutes     Mendoza Caldwell MD  Department of Hospital Medicine   [facility]

## 2020-02-17 NOTE — PROGRESS NOTES
[facility]  Palliative Medicine  Progress Note    Patient Name: Elizabeth Cano  MRN: [id number]  Admission Date: 1/29/2020  Hospital Length of Stay: 19 days  Code Status: Full Code   Attending Provider: Mendoza Caldwell MD  Consulting Provider: GURWINDER Gaines  Primary Care Physician: Cesar Reeves MD  Principal Problem:Septic shock    Patient information was obtained from patient and ER records.      Assessment/Plan:     Palliative care encounter  Impression: Pt is a 68 y/o female  with afib (on coumadin), COPD, and HFpEF (EF 45% on 1/29/20) admitted to hospital medicine for presumed urinary tract infection. Pt transferred to ICU for Acute hypoxemic respiratory failure: CTA negative for PE. Pt on 4L O2 NC. Pt is a full code. Pt is alert, oriented to person, place, and situation.     Reasons for consult: advance care planning.     Advance care planning:  Today:   Pt's goals is to go back to [facility] with PT. PT has been working with pt and recommend SNF. Pt open to completing MPOA paperwork. Per pt, she wants her daughter, Tia to be first MPOA, followed by Rae, and then her .   Prior to signing paperwork, she want her daughter to read over and then will sign when this RYNE follows-up.   Education provided to pt on MPOA paperwork. Also provided education with pt on Living Will. Per pt, she does not want to complete at this time but would like to read over. Will bring pt 5 Wishes Booklet.     2/11/2020:  Met with pt along with Apolonia Coto LCSW. Pt reports she lives at [facility] with her  in the same room. Per pt, she was living in an independent living apartment with her . Per wife, they needed more support and they moved to a NH. Per pt, she is mostly in wheelchair but walks some. Per pt goal is to go back to NH with possible SNF. Pt aware she will continue to decline with her COPD and CHF. Pt aware comfort care is an option as she declines  further.       Symptom management:   Pt denies pain, nausea, anxiety.     Dyspnea:   Per pt SOB noted with exertion:   Pt on 4L NC.     Recs:  Consider Roxanol 2.5 mg q 4 hrs prn dyspnea.     Plan:   Will f/u with pt concerning MPOA- Pt wants her daughter to look at form prior to signing. Form at bedside with names listed.   Pt would like a copy of a Living Will form to read over. Pt does not want to complete at this time.   Will continue to reinforce realistic expectations.   Per pt plan is back to NH with SNF.   Will follow.             I will follow-up with patient. Please contact us if you have any additional questions.    Subjective:     Chief Complaint:   Chief Complaint   Patient presents with    Multiple Complaints     uti finished antibiotics,        HPI:   Ptis a 69 years old female with afib (on coumadin), COPD, and HFpEF (EF 45% on 1/29/20) admitted to hospital medicine for presumed urinary tract infection. Per chart review, pt was diagnosed with a U[facility]at her Nursing Home, [facility], which she was prescribed Macrobid. She completed her course of Macrobid on 01/29/20 and she was noted to be hypotensive rendering her transfer to the hospital.      On admit, per chart review, pt stated she had a decreased appetite from the last month since her hospitalization over the holidays due to uncomplicated diverticulitis which she was treated with Augmentin. She reports feeling nauseated when taking antibiotics. She denies palpitations, dizziness, lightheadedness, chest pain, SOB, change in urinary/bowel habits.      Per chart review, ICU consulted in am of 01/31/20 for hypotension despite being resuscitated with 3.5 L IV fluids and persistant lactic acidosis in 3-4 range. She improved with IVF hydration and patient was stepped down. Rapid response evaluated patient early AM on 2/10 due to acute increase in oxygen requiriments. Patient began the day on 3L NC and progressed to requiring Comfort flow of 30L and  100% FiO2. Patient denies chest pains, only complaining of minor increased difficulty breathing.    Pt off of high flow O2 today and on 4 L NC.   Pressor have been weaned.        Hospital Course:  No notes on file    Interval History:  Pt has COPD and was admitted to ICU for acute respiratory issues. Pt  now on 4L O2 . Pt off pressor support. Pt want to remain full code.     Past Medical History:   Diagnosis Date    A-fib     GAVIN (acute kidney injury)     CHF (congestive heart failure)     COPD (chronic obstructive pulmonary disease)     Diabetes mellitus     Hypertension        Past Surgical History:   Procedure Laterality Date    CARDIAC SURGERY      COLONOSCOPY N/A 2/4/2020    Procedure: COLONOSCOPY;  Surgeon: Gilberto Laguna MD;  Location: [facility] ([address] [place] [address]);  Service: Endoscopy;  Laterality: N/A;    ESOPHAGOGASTRODUODENOSCOPY N/A 2/7/2020    Procedure: EGD (ESOPHAGOGASTRODUODENOSCOPY);  Surgeon: Aleksey Gomez MD;  Location: [address]);  Service: Endoscopy;  Laterality: N/A;       Review of patient's allergies indicates:   Allergen Reactions    Levsin [hyoscyamine sulfate] Hallucinations       Medications:  Continuous Infusions:    Scheduled Meds:   albuterol-ipratropium  3 mL Nebulization Q4H    budesonide  0.5 mg Nebulization BID    calcium-vitamin D3  1 tablet Oral BID    dextromethorphan-guaifenesin  mg  1 tablet Oral BID    digoxin  0.125 mg Oral Daily    DULoxetine  30 mg Oral Daily    furosemide (LASIX) IV  40 mg Intravenous Q12H    magnesium oxide  400 mg Oral Daily    magnesium sulfate IVPB  2 g Intravenous Once    miconazole nitrate 2%   Topical (Top) BID    midodrine  10 mg Oral QHS    mirtazapine  15 mg Oral QHS    ondansetron  4 mg Intravenous Once    pantoprazole  40 mg Oral Daily    potassium chloride  40 mEq Oral Q4H    potassium phosphate IVPB  20 mmol Intravenous Once    QUEtiapine  25 mg Oral QHS     PRN Meds:acetaminophen,  albuterol-ipratropium, benzonatate, bisacodyL, Dextrose 10% Bolus, Dextrose 10% Bolus, glucagon (human recombinant), glucose, glucose, insulin aspart U-100, iohexol, melatonin, ondansetron, phenyleph-min oil-petrolatum, polyethylene glycol, promethazine (PHENERGAN) IVPB, senna, sodium chloride 0.9%, sodium chloride 0.9%    Family History     Problem Relation (Age of Onset)    Heart disease Mother        Tobacco Use    Smoking status: Former Smoker     Packs/day: 1.00     Years: 54.00     Pack years: 54.00     Types: Cigarettes     Last attempt to quit: 2019     Years since quittin.2    Smokeless tobacco: Never Used   Substance and Sexual Activity    Alcohol use: Not Currently    Drug use: Not Currently    Sexual activity: Not Currently     Partners: Male       Review of Systems   Constitutional: Positive for activity change and fatigue.   HENT: Negative.    Eyes: Negative.    Respiratory: Positive for shortness of breath.    Cardiovascular: Negative.    Gastrointestinal: Negative.    Endocrine: Negative.    Genitourinary: Negative.    Musculoskeletal: Negative.    Skin: Positive for pallor.   Allergic/Immunologic: Negative.    Neurological: Positive for weakness.     Objective:     Vital Signs (Most Recent):  Temp: 97.8 °F (36.6 °C) (20)  Pulse: 105 (20)  Resp: 20 (20)  BP: (!) 128/92 (20)  SpO2: 96 % (20) Vital Signs (24h Range):  Temp:  [97.6 °F (36.4 °C)-98 °F (36.7 °C)] 97.8 °F (36.6 °C)  Pulse:  [] 105  Resp:  [16-20] 20  SpO2:  [90 %-98 %] 96 %  BP: (103-150)/(58-92) 128/92     Weight: 73 kg (160 lb 15 oz)  Body mass index is 25.98 kg/m².    Review of Symptoms  Symptom Assessment (ESAS 0-10 scale)   ESAS 0 1 2 3 4 5 6 7 8 9 10   Pain X             Dyspnea    X          Anxiety              Nausea              Depression               Anorexia              Fatigue              Insomnia              Restlessness               Agitation               CAM / Delirium __ --  ___+   Constipation     __ --  ___+   Diarrhea           __ --  ___+  Bowel Management Plan (BMP): No      Pain Assessment:Pt reports she is comfortable.     OME in 24 hours: 0    Performance Status: 60    ECOG Performance Status Grade: 3 - Confined to bed or chair 50% of waking hours    Physical Exam   Constitutional: She is oriented to person, place, and time. She appears well-developed. She is cooperative. Nasal cannula in place.   HENT:   Head: Normocephalic and atraumatic.   Eyes: Conjunctivae are normal.   Cardiovascular:   Pt off of epi   Pulmonary/Chest:   Pt on 2L O2 per NC   Abdominal: Normal appearance.   Musculoskeletal:   Pt has weakness to bilateral legs.      Neurological: She is alert and oriented to person, place, and time.   Skin: Skin is warm and dry.   Psychiatric: Her speech is normal and behavior is normal.       Significant Labs: All pertinent labs within the past 24 hours have been reviewed.  CBC:   Recent Labs   Lab 02/17/20  0652   WBC 4.83   HGB 9.0*   HCT 29.7*   MCV 97        BMP:  Recent Labs   Lab 02/17/20  0652   GLU 73      K 2.8*   CL 98   CO2 34*   BUN 11   CREATININE 0.8   CALCIUM 7.5*   MG 1.5*     LFT:  Lab Results   Component Value Date    AST 32 02/17/2020    ALKPHOS 212 (H) 02/17/2020    BILITOT 0.4 02/17/2020     Albumin:   Albumin   Date Value Ref Range Status   02/17/2020 1.5 (L) 3.5 - 5.2 g/dL Final     Protein:   Total Protein   Date Value Ref Range Status   02/17/2020 4.5 (L) 6.0 - 8.4 g/dL Final     Lactic acid:   Lab Results   Component Value Date    LACTATE 1.1 02/06/2020    LACTATE 1.1 02/05/2020       Significant Imaging: I have reviewed all pertinent imaging results/findings within the past 24 hours.    Advance Care Planning   Advanced Directives::  Living Will: No  LaPOST: No  Do Not Resuscitate Status: No  Medical Power of : Pt's  is next of kin. They live in a NH together.     Decision-Making  Capacity: Patient answered questions       Living Arrangements:NH     Psychosocial/Cultural:   Lives in nursing home with her  at [facility]. She has four adult children. Two adult children live OOT.       Spiritual: yes    F- Florecita and Belief: Nondenominational    I - Importance:   .  C - Community:     A - Address in Care: Will have  visit.         20 minutes spent with pt concerning MPOA paperwork, Living Will. Education provide. Pt to sugn MPOA after daughter visits and reads over.     Kristina Piper, CNS  Palliative Medicine  [facility]

## 2020-02-17 NOTE — ASSESSMENT & PLAN NOTE
Impression: Pt is a 70 y/o female  with afib (on coumadin), COPD, and HFpEF (EF 45% on 1/29/20) admitted to hospital medicine for presumed urinary tract infection. Pt transferred to ICU for Acute hypoxemic respiratory failure: CTA negative for PE. Pt on 4L O2 NC. Pt is a full code. Pt is alert, oriented to person, place, and situation.     Reasons for consult: advance care planning.     Advance care planning:  Today:   Pt's goals is to go back to [facility] with PT. PT has been working with pt and recommend SNF. Pt open to completing MPOA paperwork. Per pt, she wants her daughter, Tia to be first MPOA, followed by Rae, and then her .   Prior to signing paperwork, she want her daughter to read over and then will sign when this RYNE follows-up.   Education provided to pt on MPOA paperwork. Also provided education with pt on Living Will. Per pt, she does not want to complete at this time but would like to read over. Will bring pt 5 Wishes Booklet.     2/11/2020:  Met with pt along with Apolonia Coto LCSW. Pt reports she lives at [facility] with her  in the same room. Per pt, she was living in an independent living apartment with her . Per wife, they needed more support and they moved to a NH. Per pt, she is mostly in wheelchair but walks some. Per pt goal is to go back to NH with possible SNF. Pt aware she will continue to decline with her COPD and CHF. Pt aware comfort care is an option as she declines further.       Symptom management:   Pt denies pain, nausea, anxiety.     Dyspnea:   Per pt SOB noted with exertion:   Pt on 4L NC.     Recs:  Consider Roxanol 2.5 mg q 4 hrs prn dyspnea.     Plan:   Will f/u with pt concerning MPOA- Pt wants her daughter to look at form prior to signing. Form at bedside with names listed.   Pt would like a copy of a Living Will form to read over. Pt does not want to complete at this time.   Will continue to reinforce realistic expectations.   Per pt  plan is back to NH with SNF.   Will follow.

## 2020-02-17 NOTE — NURSING
Nurse bladder scanned pt as pt only urinated 75ml after receiving Lasix. Bladder scan showed greater than 500cc. Nurse performed prn straight cath,725ml.Md notified and made aware

## 2020-02-17 NOTE — PROGRESS NOTES
PHARMACY CONSULT NOTE: WARFARIN    Elizabeth Cano is a 69 y.o. female on warfarin therapy for Atrial Fibrilation PharmD has been consulted for warfarin dosing.    Current order: hold   Home dose: 1mg every Mon, Wed, Fri   INR goal: 2-3   Coumadin clinic enrollment: None- unsure who follows patient's INR     Lab Results   Component Value Date    INR 3.7 (H) 02/17/2020    INR 3.8 (H) 02/16/2020    INR 3.3 (H) 02/15/2020     Diet: Low Sodium     Recommendation(s):    Hold warfarin today, then decrease dose to 1 mg every Mon, Wed, Fri    Pharmacy will continue to follow and monitor warfarin    Thank you for the consult,  Jada Gonsalez  Extension [phone]    **Note: Consults are reviewed Monday-Friday 7:00am-3:30pm. THE ABOVE RECOMMENDATIONS ARE ONLY SUGGESTED.THE RECOMMENDATIONS SHOULD BE CONSIDERED IN CONJUNCTION WITH ALL PATIENT FACTORS. **

## 2020-02-17 NOTE — CONSULTS
"  [facility]  Adult Nutrition  Consult Note    SUMMARY     Recommendations    Recommendation:   1. Continue low Na diet as tolerated, with fluid restriction per MD. Encourage PO intake.   2. Continue Boost Plus TID as tolerated. If blood glucose becomes elevated, suggest Boost Glucose Control.   3. If PO intake remains poor, pt may benefit from supplemental enteral nutrition.   4. RD to monitor & follow up.    Goals: PO intake > 50% by RD follow up  Nutrition Goal Status: new  Communication of RD Recs: other (comment)(POC)    Reason for Assessment    Reason For Assessment: consult  Diagnosis: infection/sepsis(septic shock)  Relevant Medical History: COPD, HF, DM, diverticulitis  Interdisciplinary Rounds: did not attend  General Information Comments: Pt resting in bed with family at bedside. She continues to report poor appetite and states she is consuming 25% of meals. Pt reports she has been drinking Boost. Encouraged increased PO intake of meals and ONS. Fluid wt fluctuations noted since last RD visit. NFPE not completed 2/2 pt with head lice, however pt continues with severe malnutrition.  Nutrition Discharge Planning: Adequate PO intake to meet needs    Nutrition Risk Screen    Nutrition Risk Screen: no indicators present    Nutrition/Diet History    Spiritual, Cultural Beliefs, Buddhist Practices, Values that Affect Care: no    Anthropometrics    Temp: 97.8 °F (36.6 °C)  Height Method: Stated  Height: 5' 6" (167.6 cm)  Height (inches): 66 in  Weight Method: Bed Scale  Weight: 73 kg (160 lb 15 oz)  Weight (lb): 160.94 lb  Ideal Body Weight (IBW), Female: 130 lb  % Ideal Body Weight, Female (lb): 120.58 %  BMI (Calculated): 26    Lab/Procedures/Meds    Pertinent Labs Reviewed: reviewed  Pertinent Labs Comments: Ca 7.5, P 2.3, Mg 1.5  Pertinent Medications Reviewed: reviewed  Pertinent Medications Comments: calcium + vitamin D, lasix, insulin, magnesium oxide, mirtazapine, ondansetron, " pantoprazole, phenergan, senna    Estimated/Assessed Needs    Weight Used For Calorie Calculations: 73 kg (160 lb 15 oz)  Energy Calorie Requirements (kcal): 1647 kcal/day  Energy Need Method: Carlton-St Jeor(x 1.3 PAL)  Protein Requirements: 73-88 g/day(1-1.2 g/kg)  Weight Used For Protein Calculations: 73 kg (160 lb 15 oz)  Fluid Requirements (mL): per MD or 1 mL/kcal     RDA Method (mL): 1647  CHO Requirement: 206g CHO/day or 50% total kcal    Nutrition Prescription Ordered    Current Diet Order: Low Na  Nutrition Order Comments: 2000 mL FR  Oral Nutrition Supplement: Boost Plus TID    Evaluation of Received Nutrient/Fluid Intake    I/O: +5.1L since admit  Comments: LBM 2/14  Tolerance: tolerating  % Intake of Estimated Energy Needs: 25 - 50 %  % Meal Intake: 0 - 25 %    Nutrition Risk    Level of Risk/Frequency of Follow-up: (1x/week)     Assessment and Plan    Severe malnutrition  Nutrition Problem:  Severe Protein-Calorie Malnutrition  Malnutrition in the context of Chronic Illness/Injury     Related to (etiology):  Inadequate energy intake 2/2 poor appetite     Signs and Symptoms (as evidenced by):  Energy Intake: <75% of estimated energy requirement for > 3 months  Weight Loss: 14% x 2-3 months     Interventions(treatment strategy):  Collaboration with other providers  Modified diet - low Na  Commercial beverage - Boost Plus TID     Nutrition Diagnosis Status:  Continues    Monitor and Evaluation    Food and Nutrient Intake: energy intake, food and beverage intake  Food and Nutrient Adminstration: diet order  Anthropometric Measurements: weight, weight change, body mass index  Biochemical Data, Medical Tests and Procedures: electrolyte and renal panel, gastrointestinal profile, glucose/endocrine profile, inflammatory profile, lipid profile  Nutrition-Focused Physical Findings: overall appearance     Malnutrition Assessment  Malnutrition Type: chronic illness  Energy Intake: moderate energy intake     Weight  Loss (Malnutrition): (14% x 2-3 months based on UBW, family report)  Energy Intake (Malnutrition): less than 75% for greater than or equal to 3 months     Nutrition Follow-Up    RD Follow-up?: Yes

## 2020-02-17 NOTE — PLAN OF CARE
Problem: Malnutrition  Goal: Improved Nutritional Intake  Outcome: Ongoing, Progressing  Intervention: Promote and Optimize Oral Intake  Flowsheets (Taken 2/17/2020 1503)  Oral Nutrition Promotion: calorie dense liquids provided; nutritional therapy counseling provided     Recommendations     Recommendation:   1. Continue low Na diet as tolerated, with fluid restriction per MD. Encourage PO intake.   2. Continue Boost Plus TID as tolerated. If blood glucose becomes elevated, suggest Boost Glucose Control.   3. If PO intake remains poor, pt may benefit from supplemental enteral nutrition.   4. RD to monitor & follow up.     Goals: PO intake > 50% by RD follow up  Nutrition Goal Status: new  Communication of RD Recs: other (comment)(POC)

## 2020-02-17 NOTE — MEDICAL/APP STUDENT
Progress Note  Hospital Medicine      Admit Date: 1/29/2020    SUBJECTIVE:     Follow-up For:  Septic shock    HPI:     Ms. Elizabeth Cano is a 69 y.o. female w/ pmh significant for HFpEF (45% on 1/29/20), afib (on coumadin), T2DM, Diverticulitis, and COPD presented for septic shock following and episode of UTI. She was first diagnosed with a UTI at her Nursing Home, [facility], and completed a course of Macrobid on 01/29/20. She was noted to be hypotensive and transferred to hospital. She reported generalized weakness, dizziness, chills, SOB, diarrhea (resolved), dysuria (improving), decreased urination, lightheadedness, and nausea w/o vomiting.     Over the last month, she reported decrease in appetite since hospitalization over the holidays due to uncomplicated diverticulitis, treated with Augmentin. At baseline, pt ambulates w/ walker and BP runs low.     Hospital Course:     01/29/20: Chest X-ray suggested no infiltrates, pneumothorax, free air. Stable cardiomegaly, tortuous aorta w/ arch calcifications. Sepsis perfusion exam perfomed within 6 hours of septic shock presentation. Pt given 3L IVF (more than 30cc/kg bolus using ideal weight. 2L given b/t 4pm and 5pm, 1L given at 3am.) and 2g IV rocephin. Lactic acid at 2.47 and uptrending to 3.3.   5mg midodrine and 500 mL NS bolus given for hypotension, monitor BP w/ goal of MAP > 65    01/30/20: Pt admitted to critical care, started on Amiodarone 200 mg BID x7 for paroxysmal atrial fibrillation and Zosyn for septic shock with IVF resuscitation and electrolyte supplements of K, Ph, Mg, and Ca. Diuresis and metformin withheld.     01/31/20: Pt MAP stabilized 70-80 mmHg, lactic acid at 2.4 in am, pt stepped down from critical care. CT consistent with recurrent diverticulitis, recommended for clear liquid diet and broad spectrum antibiotics (Zosyn) continuation. Liver U/S showed area of hypoattenuation likely signifies steatosis.     02/01/20: Pt experienced  tachyarrhythmia ('s) and given IV metoprolol (5 mg) with associated drop in BP (70's/40's). Cycled repeated twice. Lopressor 50 mg BID was discontinued and amiodarone (400 mg BID) recommended with aggressive electrolyte repletion. Critical care consulted.     02/02/20: Cardio consult recommends stop midodrine, ok to continue amiodarone at afib dosing, can hold metoprolol if pt MAP < 60/65. Started on heparin anticoagulation to bridge coumadin. Diuresis and antihypertensives still held.      02/04/20: Miconazole ointment BID to perineal/groin area for yeast infection. Colonoscopy consistent with diverticulitis. Recommended liquid diet, abx x7 days, levsin for antispasmodic.     02/05/20: Amiodarone stopped via cardio consult, metoprolol continued with EKG monitoring. Metoprolol held at 8pm, pt BP at 97/51 MAP 70.     02/06/20: Pt had ground coffee coloured hematemesis. Heparin/NSAID avoided as per GI consult. Zosyn course ended.     02/07/20: PT/OT recommends SNF upon discharge. Pt has intermittent episodes of delirium (similar to previous Nov admission), start low dose seroquel to help with sleep cycles. Abd x-ray negative for perforations.     02/08/20: Pt complains of chest pain and SOB, oxygen placed in both nostrils at 4L NC with pt SPO2 88-91%.     02/09/20: Pt. Suspected pneumonia from chest x-ray, placed on zosyn Q8H for aspiration pneumonia, presumed from EGD. Noted edema of arms and legs. Pt O2 stats at 78% and O2 increased to 5L NC    02/10/20: Pt on 30L high flow O2, BP 80-90/40-50's. Levophed started, pt remains intermittently confused with hallucinations, admitted to ICU. Vancomycin started 1250 mg IV every 12 hours. Diuresis started due to respiratory status.     02/11/20: Vancomycin completed and discontinued. Metoprolol started w/ coumadin, levophed discontinued. Visualized head lice, initiate treatment with permethrin. Stepped down from critical care.     02/13/20: Oxygen weaned from comfort  flow down to 2L via NC. CXR showed multifocal patchy pulmonary parenchymal disease throughout both lungs, worse in right than left, unchanged from prior imaging.     02/15/20: Vancomycin discontinued. Zosyn continued for total of 7 days. Midodrine continued. PRN metoprolol as needed for rate control.     Interval History:     Overnight, pt completed Zosyn x7 day dose and continued on 20 mg BID furosemide. Pt reports continued fatigue, no appetite, overall slightly better than yesterday with slight wheeze. Pt is cold despite several blankets.     Review of Systems:  Review of Systems   Constitutional: Positive for malaise/fatigue. Negative for chills and diaphoresis.   HENT: Negative for sore throat.    Eyes: Negative for double vision, discharge and redness.   Respiratory: Positive for wheezing. Negative for cough.    Cardiovascular: Positive for leg swelling. Negative for chest pain and palpitations.   Gastrointestinal: Negative for abdominal pain, nausea and vomiting.   Genitourinary: Negative for dysuria and urgency.   Musculoskeletal: Negative for joint pain and myalgias.   Skin: Negative for rash.   Neurological: Negative for dizziness, tremors, loss of consciousness and headaches.         Scheduled Meds:   albuterol-ipratropium  3 mL Nebulization Q4H    budesonide  0.5 mg Nebulization BID    calcium-vitamin D3  1 tablet Oral BID    dextromethorphan-guaifenesin  mg  1 tablet Oral BID    digoxin  0.125 mg Oral Daily    DULoxetine  30 mg Oral Daily    furosemide (LASIX) IV  40 mg Intravenous Q12H    magnesium oxide  400 mg Oral Daily    miconazole nitrate 2%   Topical (Top) BID    midodrine  10 mg Oral QHS    mirtazapine  15 mg Oral QHS    ondansetron  4 mg Intravenous Once    pantoprazole  40 mg Oral Daily    potassium phosphate IVPB  20 mmol Intravenous Once    QUEtiapine  25 mg Oral QHS     Continuous Infusions:  PRN Meds:.acetaminophen, albuterol-ipratropium, benzonatate, bisacodyL,  Dextrose 10% Bolus, Dextrose 10% Bolus, glucagon (human recombinant), glucose, glucose, insulin aspart U-100, iohexol, melatonin, ondansetron, phenyleph-min oil-petrolatum, polyethylene glycol, promethazine (PHENERGAN) IVPB, senna, sodium chloride 0.9%, sodium chloride 0.9%     OBJECTIVE:     Vital Signs Range (Last 24H):  Temp:  [97.6 °F (36.4 °C)-97.9 °F (36.6 °C)]   Pulse:  []   Resp:  [16-20]   BP: (103-150)/(58-92)   SpO2:  [90 %-98 %]     I & O (Last 24H):    Intake/Output Summary (Last 24 hours) at 2/17/2020 1311  Last data filed at 2/17/2020 0500  Gross per 24 hour   Intake 360 ml   Output 1075 ml   Net -715 ml       Physical Exam:  General appearance: no distress, mild respiratory distress on room air  Mental status: Alert and oriented x 3  HEENT:  conjunctivae/corneas clear, PERRL  Neck: supple, thyroid not enlarged  Pulm:   Bilateral crackles upon ascultation  Card: RRR, S1, S2 normal, no murmur, click, rub or gallop  Abd: soft, NT, ND, BS present; no masses, no organomegaly  Ext: bilateral upper and lower limb edema. Left lower limb more swollen than right, both with pitting edema past knees.   Pulses: 2+, symmetric  Skin: color, texture, turgor normal. No rashes or lesions  Neuro: CN II-XII grossly intact, no focal numbness or weakness, normal strength and tone       Laboratory Results:    Recent Labs   Lab 02/15/20  0755 02/16/20  0726 02/17/20  0652   WBC 6.41 6.40 4.83   HGB 10.3* 9.7* 9.0*   HCT 33.8* 32.5* 29.7*    227 201     Recent Labs   Lab 02/15/20  0755 02/16/20  0726 02/17/20  0652    143 142   K 3.9 4.0 2.8*    99 98   CO2 31* 34* 34*   BUN 9 12 11   CREATININE 0.8 1.0 0.8    116* 73   CALCIUM 8.0* 7.9* 7.5*   MG 1.6 1.6 1.5*   PHOS 3.9 3.7 2.3*     Recent Labs   Lab 02/15/20  0755 02/16/20  0726 02/17/20  0652   ALKPHOS 245* 241* 212*   ALT 18 15 16   AST 30 27 32   ALBUMIN 1.5* 1.6* 1.5*   PROT 5.1* 5.0* 4.5*   BILITOT 0.5 0.4 0.4   INR 3.3* 3.8* 3.7*       Recent Labs   Lab 02/15/20  2224 02/16/20  0747 02/16/20  1146 02/16/20  1624 02/16/20  2216 02/17/20  0814   POCTGLUCOSE 149* 108 122* 135* 94 75     No results for input(s): LACTATE in the last 72 hours.     No results for input(s): CPK, CPKMB, MB, TROPONINI in the last 72 hours.  Hemoglobin A1C   Date Value Ref Range Status   01/30/2020 5.4 4.0 - 5.6 % Final     Comment:     ADA Screening Guidelines:  5.7-6.4%  Consistent with prediabetes  >or=6.5%  Consistent with diabetes  High levels of fetal hemoglobin interfere with the HbA1C  assay. Heterozygous hemoglobin variants (HbS, HgC, etc)do  not significantly interfere with this assay.   However, presence of multiple variants may affect accuracy.     12/19/2019 6.4 (H) 4.0 - 5.6 % Final     Comment:     ADA Screening Guidelines:  5.7-6.4%  Consistent with prediabetes  >or=6.5%  Consistent with diabetes  High levels of fetal hemoglobin interfere with the HbA1C  assay. Heterozygous hemoglobin variants (HbS, HgC, etc)do  not significantly interfere with this assay.   However, presence of multiple variants may affect accuracy.         Recent Labs   Lab 02/17/20  0652   INR 3.7*          Diagnostic Results:  Recent imaging reviewed    Microbiology:  Recent cultured reviewed     ASSESSMENT/PLAN:     Ms. Elizabeth Cano is a 69 y.o. female w/ pmh significant for HFpEF (45% on 1/29/20), afib (on coumadin), T2DM, Diverticulitis, and COPD presented for septic shock following and episode of UTI, currently being evaluated for acute hypoxemic respiratory failure and pulmonary edema.     ***Acute Hypoxemic Respiratory Failure/Pulmonary Edema  - Likely secondary to decompensated HF, responding to furosemide.   - CXR showed multifocal patchy pulmonary parenchymal disease throughout both lungs, worse in right than left, unchanged from prior imaging.  - continue on IV lasix 40 mg BID  - On 3L NC. Goal O2 88-92%, wean as tolerated.     ***Hypotension/Septic Shock  - UTI due to candida  albicans  - completed zosyn x7 day  - continue on midodrine with target MAP < 65    ***Head Lice  - continue premythrin treatment as previous    ***Hypocalcemia  -  Continue with home calcium-Vit D    ***Hypomagnesemia  - Continue with home magnesium oxide    ***Diverticulitis   - CT consistent with chronic, recurrent diverticulitis  - Patient complete course of zosyn x7 day    Code: Full     Diet: Low Na,diabetic diet as tolerated, w/ fluid restrictions. Boost glucose control (strawberry) TID. Optisource TID if vitamin K is a concern.     VTE Risk Mitigation (From admission, onward)         Ordered     IP VTE HIGH RISK PATIENT  Once      01/29/20 1916     Reason for No Pharmacological VTE Prophylaxis  Once     Question:  Reasons:  Answer:  Already adequately anticoagulated on oral Anticoagulants    01/29/20 1916              Debility: PT/OT recommends SNF upon discharge.       Ramya Humphries, MS3

## 2020-02-17 NOTE — PT/OT/SLP PROGRESS
Occupational Therapy   Treatment    Name: Elizabeth Cano  MRN: [id number]  Admitting Diagnosis:  Septic shock  10 Days Post-Op    Recommendations:     Discharge Recommendations: nursing facility, skilled  Discharge Equipment Recommendations:  other (see comments)(tbd)  Barriers to discharge:  None    Assessment:     Elizabeth Cano is a 69 y.o. female with a medical diagnosis of Septic shock.  She presents with impaired ADL and mobility performance deficits. Pt completed bed mobility and sat EOB ~15 min for UE therapeutic exercise and extensive therapeutic listening and education occurred. Pt demo CGA-min (A) with bed mobility and SBA at EOB. Pt refused OOB mobility 2/2 fear of falling and reported lightheadedness. Pt educated on importance of repositioning and OOB ax to improve QOL and increase (I). Performance deficits affecting function are weakness, impaired endurance, impaired functional mobilty, impaired self care skills, gait instability, impaired cardiopulmonary response to activity, decreased safety awareness, decreased lower extremity function, pain. Pt would benefit from continued OT skilled services 3x/wk to improve daily living skills to optimize QOL. Pt is recommended to discharge to SNF at this time.      Rehab Prognosis:  Good; patient would benefit from acute skilled OT services to address these deficits and reach maximum level of function.       Plan:     Patient to be seen 3 x/week to address the above listed problems via therapeutic activities, self-care/home management, therapeutic exercises, neuromuscular re-education  · Plan of Care Expires: 03/12/20  · Plan of Care Reviewed with: patient    Subjective     Pain/Comfort:  · Pain Rating 1: 0/10  · Location - Orientation 1: generalized  · Pain Addressed 1: Reposition, Distraction, Pre-medicate for activity    Objective:     Communicated with: RN prior to session.  Patient found HOB elevated with blood pressure cuff, telemetry, peripheral IV,  Cheri upon OT entry to room.    General Precautions: Standard, fall, special contact, respiratory   Orthopedic Precautions:N/A   Braces: N/A     Occupational Performance:     Bed Mobility:    · Patient completed Rolling/Turning to Left with  contact guard assistance  · Patient completed Scooting/Bridging with minimum assistance  · Patient completed Supine to Sit with contact guard assistance  · Patient completed Sit to Supine with minimum assistance     Functional Mobility/Transfers:  · Pt deferred despite max education. Pt reporting lightheadedness and dizziness despite additional time given at EOB with guided breathing.    Activities of Daily Living:  · Lower Body Dressing: total assistance adjusting sock fit at EOB       [facility] 6 Click ADL: 16    Treatment & Education:  Pt educated on role of occupational therapy, POC, and safety during ADLs and functional mobility. Pt and OT discussed importance of safe, continued mobility to optimize daily living skills. Pt verbalized understanding.   Pt completed the following during session: bed mobility, EOB sitting tolerance, therapeutic listening (addressing fear of falling, guided breathing exercises), UE exercises, and safe return to bed.   Pt completed the following 1x8 to promote fx'l (I): shoulder flex/ext, elbow flex/ext, wrist flex/ext, finger flex/ext, finger add/abduction at EOB. White board updated during session. Pt given instruction to call for medical staff/nurse for assistance.       Patient left HOB elevated with all lines intact, call button in reachJamie notified and pt's PCT presentEducation:      GOALS:   Multidisciplinary Problems     Occupational Therapy Goals        Problem: Occupational Therapy Goal    Goal Priority Disciplines Outcome Interventions   Occupational Therapy Goal     OT, PT/OT Ongoing, Progressing    Description:  Goals to be met by: 2/26    Patient will increase functional independence with ADLs by performing:    UE Dressing while  seated EOB with Set-up Assistance.  LE Dressing (socks, brief) with min(A).  Grooming while seated at sink with set up.  Toileting from BSC with CGA.  Toilet transfer to BSC with CGA.  Functional mobility at household distance for ADL task with AD and min(A).                    Time Tracking:     OT Date of Treatment: 02/17/20  OT Start Time: 1120  OT Stop Time: 1143  OT Total Time (min): 23 min    Billable Minutes:Self Care/Home Management 13 min  Therapeutic Activity 10 min    Bruna Noriega, OT  2/17/2020

## 2020-02-17 NOTE — PLAN OF CARE
Pt slept intermittently throughout evening.  No complaints of pain.  Swelling to BLE 2+, BUE 3+.  Left arm weeping, wrapped with kerlix, elevated on pillow.    Purwick in place.  Pt had not output for 6 hrs, bladder scan performed.  Pt states she had no urge to urinate. In and out cath per order, completed for 675 mls.    Pt also reports decrease in hearing, denies pain to ears.  Will pass on to AM nurse.    Pt afib on telemetry, low 100s. Pt sating 93% on 1 L NC.    Pt repositioning independently.      Midline in place.

## 2020-02-17 NOTE — NURSING
Nurse assessed patient this AM. Pt oxygen saturation at 81% on 1L NC.No complaints of SOB/difficulty breathing.  Nurse adjusted to 4L. Pt sating 95-96% on 4L. Pt also stated she doesn't feel like eating. Blood sugar 75. No s/s of hypoglycemia. Nurse attempted to encourage patient to eat breakfast. Will continue to assess pt throughout the shift

## 2020-02-17 NOTE — SUBJECTIVE & OBJECTIVE
Interval History:  Pt has COPD and was admitted to ICU for acute respiratory issues. Pt  now on 4L O2 . Pt off pressor support. Pt want to remain full code.     Past Medical History:   Diagnosis Date    A-fib     GAVIN (acute kidney injury)     CHF (congestive heart failure)     COPD (chronic obstructive pulmonary disease)     Diabetes mellitus     Hypertension        Past Surgical History:   Procedure Laterality Date    CARDIAC SURGERY      COLONOSCOPY N/A 2/4/2020    Procedure: COLONOSCOPY;  Surgeon: Gilberto Laguna MD;  Location: [facility] ([address] [place] [address]);  Service: Endoscopy;  Laterality: N/A;    ESOPHAGOGASTRODUODENOSCOPY N/A 2/7/2020    Procedure: EGD (ESOPHAGOGASTRODUODENOSCOPY);  Surgeon: Aleksey Gomez MD;  Location: [facility] ([address] [place] [address]);  Service: Endoscopy;  Laterality: N/A;       Review of patient's allergies indicates:   Allergen Reactions    Levsin [hyoscyamine sulfate] Hallucinations       Medications:  Continuous Infusions:    Scheduled Meds:   albuterol-ipratropium  3 mL Nebulization Q4H    budesonide  0.5 mg Nebulization BID    calcium-vitamin D3  1 tablet Oral BID    dextromethorphan-guaifenesin  mg  1 tablet Oral BID    digoxin  0.125 mg Oral Daily    DULoxetine  30 mg Oral Daily    furosemide (LASIX) IV  40 mg Intravenous Q12H    magnesium oxide  400 mg Oral Daily    magnesium sulfate IVPB  2 g Intravenous Once    miconazole nitrate 2%   Topical (Top) BID    midodrine  10 mg Oral QHS    mirtazapine  15 mg Oral QHS    ondansetron  4 mg Intravenous Once    pantoprazole  40 mg Oral Daily    potassium chloride  40 mEq Oral Q4H    potassium phosphate IVPB  20 mmol Intravenous Once    QUEtiapine  25 mg Oral QHS     PRN Meds:acetaminophen, albuterol-ipratropium, benzonatate, bisacodyL, Dextrose 10% Bolus, Dextrose 10% Bolus, glucagon (human recombinant), glucose, glucose, insulin aspart U-100, iohexol, melatonin, ondansetron, phenyleph-min oil-petrolatum, polyethylene  glycol, promethazine (PHENERGAN) IVPB, senna, sodium chloride 0.9%, sodium chloride 0.9%    Family History     Problem Relation (Age of Onset)    Heart disease Mother        Tobacco Use    Smoking status: Former Smoker     Packs/day: 1.00     Years: 54.00     Pack years: 54.00     Types: Cigarettes     Last attempt to quit: 2019     Years since quittin.2    Smokeless tobacco: Never Used   Substance and Sexual Activity    Alcohol use: Not Currently    Drug use: Not Currently    Sexual activity: Not Currently     Partners: Male       Review of Systems   Constitutional: Positive for activity change and fatigue.   HENT: Negative.    Eyes: Negative.    Respiratory: Positive for shortness of breath.    Cardiovascular: Negative.    Gastrointestinal: Negative.    Endocrine: Negative.    Genitourinary: Negative.    Musculoskeletal: Negative.    Skin: Positive for pallor.   Allergic/Immunologic: Negative.    Neurological: Positive for weakness.     Objective:     Vital Signs (Most Recent):  Temp: 97.8 °F (36.6 °C) (20)  Pulse: 105 (20)  Resp: 20 (20)  BP: (!) 128/92 (20)  SpO2: 96 % (20) Vital Signs (24h Range):  Temp:  [97.6 °F (36.4 °C)-98 °F (36.7 °C)] 97.8 °F (36.6 °C)  Pulse:  [] 105  Resp:  [16-20] 20  SpO2:  [90 %-98 %] 96 %  BP: (103-150)/(58-92) 128/92     Weight: 73 kg (160 lb 15 oz)  Body mass index is 25.98 kg/m².    Review of Symptoms  Symptom Assessment (ESAS 0-10 scale)   ESAS 0 1 2 3 4 5 6 7 8 9 10   Pain X             Dyspnea    X          Anxiety              Nausea              Depression               Anorexia              Fatigue              Insomnia              Restlessness               Agitation              CAM / Delirium __ --  ___+   Constipation     __ --  ___+   Diarrhea           __ --  ___+  Bowel Management Plan (BMP): No      Pain Assessment:Pt reports she is comfortable.     OME in 24 hours: 0    Performance Status:  60    ECOG Performance Status Grade: 3 - Confined to bed or chair 50% of waking hours    Physical Exam   Constitutional: She is oriented to person, place, and time. She appears well-developed. She is cooperative. Nasal cannula in place.   HENT:   Head: Normocephalic and atraumatic.   Eyes: Conjunctivae are normal.   Cardiovascular:   Pt off of epi   Pulmonary/Chest:   Pt on 2L O2 per NC   Abdominal: Normal appearance.   Musculoskeletal:   Pt has weakness to bilateral legs.      Neurological: She is alert and oriented to person, place, and time.   Skin: Skin is warm and dry.   Psychiatric: Her speech is normal and behavior is normal.       Significant Labs: All pertinent labs within the past 24 hours have been reviewed.  CBC:   Recent Labs   Lab 02/17/20  0652   WBC 4.83   HGB 9.0*   HCT 29.7*   MCV 97        BMP:  Recent Labs   Lab 02/17/20  0652   GLU 73      K 2.8*   CL 98   CO2 34*   BUN 11   CREATININE 0.8   CALCIUM 7.5*   MG 1.5*     LFT:  Lab Results   Component Value Date    AST 32 02/17/2020    ALKPHOS 212 (H) 02/17/2020    BILITOT 0.4 02/17/2020     Albumin:   Albumin   Date Value Ref Range Status   02/17/2020 1.5 (L) 3.5 - 5.2 g/dL Final     Protein:   Total Protein   Date Value Ref Range Status   02/17/2020 4.5 (L) 6.0 - 8.4 g/dL Final     Lactic acid:   Lab Results   Component Value Date    LACTATE 1.1 02/06/2020    LACTATE 1.1 02/05/2020       Significant Imaging: I have reviewed all pertinent imaging results/findings within the past 24 hours.    Advance Care Planning   Advanced Directives::  Living Will: No  LaPOST: No  Do Not Resuscitate Status: No  Medical Power of : Pt's  is next of kin. They live in a NH together.     Decision-Making Capacity: Patient answered questions       Living Arrangements:NH     Psychosocial/Cultural:   Lives in nursing home with her  at [facility]. She has four adult children. Two adult children live OOT.       Spiritual: yes    F-  [place] and Belief: Adventist    I - Importance:   .  C - Community:     A - Address in Care: Will have  visit.

## 2020-02-17 NOTE — PLAN OF CARE
Problem: Occupational Therapy Goal  Goal: Occupational Therapy Goal  Description  Goals to be met by: 2/26    Patient will increase functional independence with ADLs by performing:    UE Dressing while seated EOB with Set-up Assistance.  LE Dressing (socks, brief) with min(A).  Grooming while seated at sink with set up.  Toileting from BSC with CGA.  Toilet transfer to BSC with CGA.  Functional mobility at household distance for ADL task with AD and min(A).   Continue OT POC.  Bruna Noriega OT  2/17/2020    Outcome: Ongoing, Progressing

## 2020-02-17 NOTE — PT/OT/SLP PROGRESS
Physical Therapy      Patient Name:  Elizabeth Cano   MRN:  [id number]    Patient not seen today secondary to Patient unwilling to participate(Max encouragement provided; however, pt continued to decline 2* fatigue, malaise, and feeling cold). Will follow-up at next scheduled session as able.    Nusrat Leyva, PT, DPT   2/17/2020  [phone]

## 2020-02-18 NOTE — PROGRESS NOTES
[facility]  Palliative Medicine  Progress Note    Patient Name: Elizabeth Cano  MRN: [id number]  Admission Date: 1/29/2020  Hospital Length of Stay: 20 days  Code Status: Full Code   Attending Provider: Mendoza Caldwell MD  Consulting Provider: GURWINDER Gaines  Primary Care Physician: Cesar Reeves MD  Principal Problem:Septic shock    Patient information was obtained from patient and ER records.      Assessment/Plan:     Palliative care encounter  Impression: Pt is a 70 y/o female  with afib (on coumadin), COPD, and HFpEF (EF 45% on 1/29/20) admitted to hospital medicine for presumed urinary tract infection. Pt transferred to ICU for Acute hypoxemic respiratory failure: CTA negative for PE. Pt on 4L O2 NC. Pt is a full code. Pt is alert, oriented to person, place, and situation.     Reasons for consult: advance care planning.     Advance care planning:  Today:   Pt's goals is to go back to [facility] with PT. PT has been working with pt and recommend SNF.     Education on MPOA paperwork completed. Pt completed MPOA paperwork. She named her daughter Tia first MPOA followed by daughter Rae and then her .     Living Will paperwork also given to pt who wants to look over.     2/11/2020:  Met with pt along with Apolonia Coto LCSW. Pt reports she lives at [facility] with her  in the same room. Per pt, she was living in an independent living apartment with her . Per wife, they needed more support and they moved to a NH. Per pt, she is mostly in wheelchair but walks some. Per pt goal is to go back to NH with possible SNF. Pt aware she will continue to decline with her COPD and CHF. Pt aware comfort care is an option as she declines further.       Symptom management:   Pt denies pain, nausea, anxiety.     Dyspnea:   Per pt SOB noted with exertion:   Pt on 4L NC.     Recs:  Consider Roxanol 2.5 mg q 4 hrs prn dyspnea.     Plan:   MPOA paperwork completed  today.   Pt would like a copy of a Living Will form to read over. Pt does not want to complete at this time.   Will continue to reinforce realistic expectations.   Per pt plan is back to NH with SNF.   Will follow.             I will follow-up with patient. Please contact us if you have any additional questions.    Subjective:     Chief Complaint:   Chief Complaint   Patient presents with    Multiple Complaints     uti finished antibiotics,        HPI:   Ptis a 69 years old female with afib (on coumadin), COPD, and HFpEF (EF 45% on 1/29/20) admitted to hospital medicine for presumed urinary tract infection. Per chart review, pt was diagnosed with a UTIat her Nursing Home, [facility], which she was prescribed Macrobid. She completed her course of Macrobid on 01/29/20 and she was noted to be hypotensive rendering her transfer to the hospital.      On admit, per chart review, pt stated she had a decreased appetite from the last month since her hospitalization over the holidays due to uncomplicated diverticulitis which she was treated with Augmentin. She reports feeling nauseated when taking antibiotics. She denies palpitations, dizziness, lightheadedness, chest pain, SOB, change in urinary/bowel habits.      Per chart review, ICU consulted in am of 01/31/20 for hypotension despite being resuscitated with 3.5 L IV fluids and persistant lactic acidosis in 3-4 range. She improved with IVF hydration and patient was stepped down. Rapid response evaluated patient early AM on 2/10 due to acute increase in oxygen requiriments. Patient began the day on 3L NC and progressed to requiring Comfort flow of 30L and 100% FiO2. Patient denies chest pains, only complaining of minor increased difficulty breathing.    Pt off of high flow O2 today and on 4 L NC.   Pressor have been weaned.        Hospital Course:  No notes on file    Interval History:  Pt has COPD and was admitted to ICU for acute respiratory issues. Pt  now on 4L O2 .  Pt off pressor support. Pt want to remain full code.     Past Medical History:   Diagnosis Date    A-fib     GAVIN (acute kidney injury)     CHF (congestive heart failure)     COPD (chronic obstructive pulmonary disease)     Diabetes mellitus     Hypertension        Past Surgical History:   Procedure Laterality Date    CARDIAC SURGERY      COLONOSCOPY N/A 2/4/2020    Procedure: COLONOSCOPY;  Surgeon: Gilberto Laguna MD;  Location: [address]);  Service: Endoscopy;  Laterality: N/A;    ESOPHAGOGASTRODUODENOSCOPY N/A 2/7/2020    Procedure: EGD (ESOPHAGOGASTRODUODENOSCOPY);  Surgeon: Aleksey Gomez MD;  Location: [address]);  Service: Endoscopy;  Laterality: N/A;       Review of patient's allergies indicates:   Allergen Reactions    Levsin [hyoscyamine sulfate] Hallucinations       Medications:  Continuous Infusions:    Scheduled Meds:   albuterol-ipratropium  3 mL Nebulization Q4H    budesonide  0.5 mg Nebulization BID    calcium-vitamin D3  1 tablet Oral BID    dextromethorphan-guaifenesin  mg  1 tablet Oral BID    digoxin  0.125 mg Oral Daily    DULoxetine  30 mg Oral Daily    magnesium oxide  400 mg Oral Daily    miconazole nitrate 2%   Topical (Top) BID    midodrine  10 mg Oral QHS    mirtazapine  15 mg Oral QHS    ondansetron  4 mg Intravenous Once    pantoprazole  40 mg Oral Daily    QUEtiapine  25 mg Oral QHS     PRN Meds:acetaminophen, albuterol-ipratropium, benzonatate, bisacodyL, Dextrose 10% Bolus, Dextrose 10% Bolus, glucagon (human recombinant), glucose, glucose, insulin aspart U-100, iohexol, melatonin, ondansetron, phenyleph-min oil-petrolatum, polyethylene glycol, promethazine (PHENERGAN) IVPB, senna, sodium chloride 0.9%, sodium chloride 0.9%    Family History     Problem Relation (Age of Onset)    Heart disease Mother        Tobacco Use    Smoking status: Former Smoker     Packs/day: 1.00     Years: 54.00     Pack years: 54.00     Types: Cigarettes      Last attempt to quit: 2019     Years since quittin.2    Smokeless tobacco: Never Used   Substance and Sexual Activity    Alcohol use: Not Currently    Drug use: Not Currently    Sexual activity: Not Currently     Partners: Male       Review of Systems   Constitutional: Positive for activity change and fatigue.   HENT: Negative.    Eyes: Negative.    Respiratory: Positive for shortness of breath.    Cardiovascular: Negative.    Gastrointestinal: Negative.    Endocrine: Negative.    Genitourinary: Negative.    Musculoskeletal: Negative.    Skin: Positive for pallor.   Allergic/Immunologic: Negative.    Neurological: Positive for weakness.     Objective:     Vital Signs (Most Recent):  Temp: 97.8 °F (36.6 °C) (20 1159)  Pulse: 90 (20 1204)  Resp: 18 (20 1204)  BP: (!) 112/54 (20 1159)  SpO2: (!) 91 % (20 1204) Vital Signs (24h Range):  Temp:  [97.8 °F (36.6 °C)-98.8 °F (37.1 °C)] 97.8 °F (36.6 °C)  Pulse:  [] 90  Resp:  [16-20] 18  SpO2:  [91 %-98 %] 91 %  BP: (107-124)/(54-78) 112/54     Weight: 73 kg (160 lb 15 oz)  Body mass index is 25.98 kg/m².    Review of Symptoms  Symptom Assessment (ESAS 0-10 scale)   ESAS 0 1 2 3 4 5 6 7 8 9 10   Pain X             Dyspnea    X          Anxiety              Nausea              Depression               Anorexia              Fatigue              Insomnia              Restlessness               Agitation              CAM / Delirium __ --  ___+   Constipation     __ --  ___+   Diarrhea           __ --  ___+  Bowel Management Plan (BMP): No      Pain Assessment:Pt reports she is comfortable.     OME in 24 hours: 0    Performance Status: 60    ECOG Performance Status Grade: 3 - Confined to bed or chair 50% of waking hours    Physical Exam   Constitutional: She is oriented to person, place, and time. She appears well-developed. She is cooperative. Nasal cannula in place.   HENT:   Head: Normocephalic and atraumatic.   Eyes:  Conjunctivae are normal.   Cardiovascular:   Pt off of epi   Pulmonary/Chest:   Pt on 2L O2 per NC   Abdominal: Normal appearance.   Musculoskeletal:   Pt has weakness to bilateral legs.      Neurological: She is alert and oriented to person, place, and time.   Skin: Skin is warm and dry.   Psychiatric: Her speech is normal and behavior is normal.       Significant Labs: All pertinent labs within the past 24 hours have been reviewed.  CBC:   Recent Labs   Lab 02/18/20  0712   WBC 4.56   HGB 8.3*   HCT 28.0*   MCV 98        BMP:  Recent Labs   Lab 02/18/20  0712   GLU 95      K 4.1   CL 97   CO2 38*   BUN 10   CREATININE 0.7   CALCIUM 7.6*   MG 1.8     LFT:  Lab Results   Component Value Date    AST 37 02/18/2020    ALKPHOS 231 (H) 02/18/2020    BILITOT 0.4 02/18/2020     Albumin:   Albumin   Date Value Ref Range Status   02/18/2020 1.5 (L) 3.5 - 5.2 g/dL Final     Protein:   Total Protein   Date Value Ref Range Status   02/18/2020 4.4 (L) 6.0 - 8.4 g/dL Final     Lactic acid:   Lab Results   Component Value Date    LACTATE 1.1 02/06/2020    LACTATE 1.1 02/05/2020       Significant Imaging: I have reviewed all pertinent imaging results/findings within the past 24 hours.    Advance Care Planning   Advanced Directives::  Living Will: No  LaPOST: No  Do Not Resuscitate Status: No  Medical Power of :  Completed with pt today.     Decision-Making Capacity: Patient answered questions       Living Arrangements:NH     Psychosocial/Cultural:   Lives in nursing home with her  at [facility]. She has four adult children. Two adult children live OOT.       Spiritual: yes    F- Florecita and Belief: Taoism    I - Importance:   .  C - Community:     A - Address in Care: Will have  visit.       > 50% of 35 min visit spent in chart review, face to face discussion of goals of care,  symptom assessment, coordination of care and emotional support.    Kristina Piper, CNS  Palliative  Medicine  [facility]

## 2020-02-18 NOTE — PLAN OF CARE
Pt continues with contact precautions due to lice. Pt rec'd lice treatment. Nurse observed small bugs on comb. No reports of itching. Continues to sat at 93% on 2L. No reports of nausea this shift. Bladder scanned at 530pm, 200cc of urine remained in bladder after patient urinated. Will pass on to night shift nurse. Call bell placed within reach. Bed locked and placed within lowest position.

## 2020-02-18 NOTE — SUBJECTIVE & OBJECTIVE
Interval History:  Pt has COPD and was admitted to ICU for acute respiratory issues. Pt  now on 4L O2 . Pt off pressor support. Pt want to remain full code.     Past Medical History:   Diagnosis Date    A-fib     GAVIN (acute kidney injury)     CHF (congestive heart failure)     COPD (chronic obstructive pulmonary disease)     Diabetes mellitus     Hypertension        Past Surgical History:   Procedure Laterality Date    CARDIAC SURGERY      COLONOSCOPY N/A 2/4/2020    Procedure: COLONOSCOPY;  Surgeon: Gilberto Laguna MD;  Location: [facility] ([address] [place] [address]);  Service: Endoscopy;  Laterality: N/A;    ESOPHAGOGASTRODUODENOSCOPY N/A 2/7/2020    Procedure: EGD (ESOPHAGOGASTRODUODENOSCOPY);  Surgeon: Aleksey Gomez MD;  Location: [facility] ([address] [place] [address]);  Service: Endoscopy;  Laterality: N/A;       Review of patient's allergies indicates:   Allergen Reactions    Levsin [hyoscyamine sulfate] Hallucinations       Medications:  Continuous Infusions:    Scheduled Meds:   albuterol-ipratropium  3 mL Nebulization Q4H    budesonide  0.5 mg Nebulization BID    calcium-vitamin D3  1 tablet Oral BID    dextromethorphan-guaifenesin  mg  1 tablet Oral BID    digoxin  0.125 mg Oral Daily    DULoxetine  30 mg Oral Daily    magnesium oxide  400 mg Oral Daily    miconazole nitrate 2%   Topical (Top) BID    midodrine  10 mg Oral QHS    mirtazapine  15 mg Oral QHS    ondansetron  4 mg Intravenous Once    pantoprazole  40 mg Oral Daily    QUEtiapine  25 mg Oral QHS     PRN Meds:acetaminophen, albuterol-ipratropium, benzonatate, bisacodyL, Dextrose 10% Bolus, Dextrose 10% Bolus, glucagon (human recombinant), glucose, glucose, insulin aspart U-100, iohexol, melatonin, ondansetron, phenyleph-min oil-petrolatum, polyethylene glycol, promethazine (PHENERGAN) IVPB, senna, sodium chloride 0.9%, sodium chloride 0.9%    Family History     Problem Relation (Age of Onset)    Heart disease Mother        Tobacco Use    Smoking  status: Former Smoker     Packs/day: 1.00     Years: 54.00     Pack years: 54.00     Types: Cigarettes     Last attempt to quit: 2019     Years since quittin.2    Smokeless tobacco: Never Used   Substance and Sexual Activity    Alcohol use: Not Currently    Drug use: Not Currently    Sexual activity: Not Currently     Partners: Male       Review of Systems   Constitutional: Positive for activity change and fatigue.   HENT: Negative.    Eyes: Negative.    Respiratory: Positive for shortness of breath.    Cardiovascular: Negative.    Gastrointestinal: Negative.    Endocrine: Negative.    Genitourinary: Negative.    Musculoskeletal: Negative.    Skin: Positive for pallor.   Allergic/Immunologic: Negative.    Neurological: Positive for weakness.     Objective:     Vital Signs (Most Recent):  Temp: 97.8 °F (36.6 °C) (20 1159)  Pulse: 90 (20 1204)  Resp: 18 (20 1204)  BP: (!) 112/54 (20 1159)  SpO2: (!) 91 % (20 1204) Vital Signs (24h Range):  Temp:  [97.8 °F (36.6 °C)-98.8 °F (37.1 °C)] 97.8 °F (36.6 °C)  Pulse:  [] 90  Resp:  [16-20] 18  SpO2:  [91 %-98 %] 91 %  BP: (107-124)/(54-78) 112/54     Weight: 73 kg (160 lb 15 oz)  Body mass index is 25.98 kg/m².    Review of Symptoms  Symptom Assessment (ESAS 0-10 scale)   ESAS 0 1 2 3 4 5 6 7 8 9 10   Pain X             Dyspnea    X          Anxiety              Nausea              Depression               Anorexia              Fatigue              Insomnia              Restlessness               Agitation              CAM / Delirium __ --  ___+   Constipation     __ --  ___+   Diarrhea           __ --  ___+  Bowel Management Plan (BMP): No      Pain Assessment:Pt reports she is comfortable.     OME in 24 hours: 0    Performance Status: 60    ECOG Performance Status Grade: 3 - Confined to bed or chair 50% of waking hours    Physical Exam   Constitutional: She is oriented to person, place, and time. She appears well-developed.  She is cooperative. Nasal cannula in place.   HENT:   Head: Normocephalic and atraumatic.   Eyes: Conjunctivae are normal.   Cardiovascular:   Pt off of epi   Pulmonary/Chest:   Pt on 2L O2 per NC   Abdominal: Normal appearance.   Musculoskeletal:   Pt has weakness to bilateral legs.      Neurological: She is alert and oriented to person, place, and time.   Skin: Skin is warm and dry.   Psychiatric: Her speech is normal and behavior is normal.       Significant Labs: All pertinent labs within the past 24 hours have been reviewed.  CBC:   Recent Labs   Lab 02/18/20  0712   WBC 4.56   HGB 8.3*   HCT 28.0*   MCV 98        BMP:  Recent Labs   Lab 02/18/20 0712   GLU 95      K 4.1   CL 97   CO2 38*   BUN 10   CREATININE 0.7   CALCIUM 7.6*   MG 1.8     LFT:  Lab Results   Component Value Date    AST 37 02/18/2020    ALKPHOS 231 (H) 02/18/2020    BILITOT 0.4 02/18/2020     Albumin:   Albumin   Date Value Ref Range Status   02/18/2020 1.5 (L) 3.5 - 5.2 g/dL Final     Protein:   Total Protein   Date Value Ref Range Status   02/18/2020 4.4 (L) 6.0 - 8.4 g/dL Final     Lactic acid:   Lab Results   Component Value Date    LACTATE 1.1 02/06/2020    LACTATE 1.1 02/05/2020       Significant Imaging: I have reviewed all pertinent imaging results/findings within the past 24 hours.    Advance Care Planning   Advanced Directives::  Living Will: No  LaPOST: No  Do Not Resuscitate Status: No  Medical Power of :  Completed with pt today.     Decision-Making Capacity: Patient answered questions       Living Arrangements:NH     Psychosocial/Cultural:   Lives in nursing home with her  at [facility]. She has four adult children. Two adult children live OOT.       Spiritual: yes    F- Folrecita and Belief: Worship    I - Importance:   .  C - Community:     A - Address in Care: Will have  visit.

## 2020-02-18 NOTE — ASSESSMENT & PLAN NOTE
Impression: Pt is a 68 y/o female  with afib (on coumadin), COPD, and HFpEF (EF 45% on 1/29/20) admitted to hospital medicine for presumed urinary tract infection. Pt transferred to ICU for Acute hypoxemic respiratory failure: CTA negative for PE. Pt on 4L O2 NC. Pt is a full code. Pt is alert, oriented to person, place, and situation.     Reasons for consult: advance care planning.     Advance care planning:  Today:   Pt's goals is to go back to [facility] with PT. PT has been working with pt and recommend SNF.     Education on MPOA paperwork completed. Pt completed MPOA paperwork. She named her daughter Tia first MPOA followed by daughter Rae and then her .     Living Will paperwork also given to pt who wants to look over.     2/11/2020:  Met with pt along with Apolonia Coto LCSW. Pt reports she lives at [facility] with her  in the same room. Per pt, she was living in an independent living apartment with her . Per wife, they needed more support and they moved to a NH. Per pt, she is mostly in wheelchair but walks some. Per pt goal is to go back to NH with possible SNF. Pt aware she will continue to decline with her COPD and CHF. Pt aware comfort care is an option as she declines further.       Symptom management:   Pt denies pain, nausea, anxiety.     Dyspnea:   Per pt SOB noted with exertion:   Pt on 4L NC.     Recs:  Consider Roxanol 2.5 mg q 4 hrs prn dyspnea.     Plan:   MPOA paperwork completed today.   Pt would like a copy of a Living Will form to read over. Pt does not want to complete at this time.   Will continue to reinforce realistic expectations.   Per pt plan is back to NH with SNF.   Will follow.

## 2020-02-18 NOTE — PROGRESS NOTES
PHARMACY CONSULT NOTE: WARFARIN    Elizabeth Cano is a 69 y.o. female on warfarin therapy for Atrial Fibrilation PharmD has been consulted for warfarin dosing.    Current order: hold   Home dose: 1mg every Mon, Wed, Fri   INR goal: 2-3   Coumadin clinic enrollment: None- unsure who follows patient's INR     Lab Results   Component Value Date    INR 2.7 (H) 02/18/2020    INR 3.7 (H) 02/17/2020    INR 3.8 (H) 02/16/2020     Diet: Low Sodium     Recommendation(s):    Resume warfarin 1 mg every Tues, Thur, Sat   Pharmacy will continue to follow and monitor warfarin    Thank you for the consult,  Jada Gonsalez  Extension [phone]    **Note: Consults are reviewed Monday-Friday 7:00am-3:30pm. THE ABOVE RECOMMENDATIONS ARE ONLY SUGGESTED.THE RECOMMENDATIONS SHOULD BE CONSIDERED IN CONJUNCTION WITH ALL PATIENT FACTORS. **

## 2020-02-18 NOTE — PROGRESS NOTES
[facility] Hospital Medicine  Progress Note    Patient Name: Elizabeth Cano  MRN: [id number]  Patient Class: IP- Inpatient   Admission Date: 1/29/2020  Length of Stay: 20 days  Attending Physician: Mendoza Caldwell MD  Primary Care Provider: Cesar Reeves MD    [facility] Medicine Team: Networked reference to record PCT  Mendoza Caldwell MD    HPI:  Ms. Elizabeth Cano is a 69 years old female with afib (on coumadin), COPD, and HFpEF (EF 45% on 1/29/20) admitted to hospital medicine for presumed urinary tract infection. She was diagnosed with a UTI last week at her Nursing Home, [facility], which she was prescribed Macrobid. She completed her course of Macrobid on 01/29/20 and she was noted to be hypotensive rendering her transfer to the hospital.      She reports a decreased appetite for the last month since her hospitalization over the holidays due to uncomplicated diverticulitis which she was treated with Augmentin. She reports feeling nauseated when taking antibiotics. She denies palpitations, dizziness, lightheadedness, chest pain, SOB, change in urinary/bowel habits.      On 01/29/20, she was found to be hypotensive in the 80s/50s in the ED with lactic acid of 4.8. CXR showed no signs for PNA, UA showed mild leukocytosis with WBC 17, nitrite negative, with urine cultures and blood cultures showing NG. She was admitted to  and started on CTX. She has been resuscitated with IVFs since admit with 3L. BP responded appropriately to IVF resuscitation with subsequent downtrend in lactic acid to 2.7. However, overnight on 01/30/20, BP remained 63-67 mmHg MAP goal. Repeat lactic acid uptrend to 3.3. Of note, she received a night time dose of Lopressor 50 mg on 01/30/20, which has since been discontinued.     ICU consulted in am of 01/31/20 for hypotension despite being resuscitated with 3.5 L IV fluids and persistant lactic acidosis in 3-4 range. She improved with IVF hydration and  patient was stepped down. Rapid response evaluated patient early AM on 2/10 due to acute increase in oxygen requiriments. Patient began the day on 3L NC and progressed to requiring Comfort flow of 30L and 100% FiO2. Patient denies chest pains, only complaining of minor increased difficulty breathing.    Subjective:     Principal Problem:Septic shock    Interval History: Patient lying in bed, no respiratory distress. Continues to reports fatigue, lethargy and decreased appetite. Breathing is close to baseline.     Review of Systems   Constitutional: Positive for fatigue. Negative for chills and fever.   HENT: Negative for congestion.    Eyes: Negative for visual disturbance.   Respiratory: Positive for shortness of breath (improving).    Cardiovascular: Positive for leg swelling. Negative for chest pain.   Gastrointestinal: Negative for abdominal distention, abdominal pain, nausea and vomiting.   Genitourinary: Negative for dysuria.   Musculoskeletal: Positive for gait problem.   Skin: Negative for wound.   Neurological: Positive for weakness. Negative for dizziness and light-headedness.   Psychiatric/Behavioral: Negative for confusion.     Objective:     Vital Signs (Most Recent):  Temp: 98 °F (36.7 °C) (02/18/20 0444)  Pulse: 107 (02/18/20 0444)  Resp: 18 (02/18/20 0444)  BP: 120/73 (02/18/20 0444)  SpO2: 95 % (02/18/20 0444) Vital Signs (24h Range):  Temp:  [97.8 °F (36.6 °C)-98.8 °F (37.1 °C)] 98 °F (36.7 °C)  Pulse:  [] 107  Resp:  [16-20] 18  SpO2:  [90 %-97 %] 95 %  BP: (107-128)/(56-92) 120/73     Weight: 73 kg (160 lb 15 oz)  Body mass index is 25.98 kg/m².    Intake/Output Summary (Last 24 hours) at 2/18/2020 0647  Last data filed at 2/17/2020 1800  Gross per 24 hour   Intake 480 ml   Output 950 ml   Net -470 ml        Physical Exam   Constitutional: She is oriented to person, place, and time. She appears well-developed.   Eyes: Pupils are equal, round, and reactive to light. Conjunctivae and EOM are  normal.   Neck: Neck supple.   Cardiovascular: Normal rate and regular rhythm.   Pulmonary/Chest: She is in respiratory distress. She has rales in the right lower field and the left lower field.   Abdominal: Soft. Bowel sounds are normal. She exhibits no distension. There is no tenderness.   Musculoskeletal: Normal range of motion. She exhibits edema.   Neurological: She is alert and oriented to person, place, and time. No cranial nerve deficit.   Skin: Skin is warm.   Psychiatric: She has a normal mood and affect.         Overview/Hospital Course:  She was admitted to Hospital Medicine and treated for Urosepsis with antibiotics and IVF resuscitation. Critical Care was consulted for septic shock. She was admitted to MICU, bolused 1L LR with resulting cleared Lactic Acid. CT Abd Pelvis showed findings consistent with several diverticula in the sigmoid colon with associated wall thickening, minimal adjacent fat stranding, and prominence of the vasa recta.  Findings appear similar when compared to prior examination dated 12/19/2019 and may represent ongoing diverticulitis or colitis in the correct clinical setting. Area of hypoattenuation within the right hepatic lobe adjacent to the gallbladder fossa, not definitively seen on prior examination and may represent area of fatty liver infiltration or transit hepatic attenuation differences however, in the setting of liver disease, neoplasm is not excluded.   Following clearing of her lactic acid, she was stepped down to Hospital Medicine with midodrine 5 mg TID. Critical Care was re-consulted in the setting of Afib RVR and hypotension. While on the floor, patient went into Afib RVR, sustained HR >120, /55, requiring lopressor pushes, which subsequently lowered HR to 75s/50s.     2/10: Patient readmitted to MICU for acute hypoxemic respiratory failure. CTA negative for PE, diuresing well with IV lasix. Levophed has been discontinued. Electrolytes were repleted.  Currently on zosyn; vancomycin was discontinued today given resolution of shock. Bridging heparin with warfarin to therapeutic INR level for A-fib. Restarted oral lasix and metoprolol. Patient is stable to be transferred to Hospital Medicine.  Received topical permethrin treatment for head lice.   2/12- 2/13: Patient developed two episodes of hypotension requiring low dose levo both 02/11 and 02/12 nights. Metoprolol was discontinued and digoxin started for Afib rate control.     Significant Labs:   A1C:   Recent Labs   Lab 12/19/19  2220 01/30/20  0421   HGBA1C 6.4* 5.4     CBC:   Recent Labs   Lab 02/16/20  0726 02/17/20  0652   WBC 6.40 4.83   HGB 9.7* 9.0*   HCT 32.5* 29.7*    201     CMP:   Recent Labs   Lab 02/16/20  0726 02/17/20  0652    142   K 4.0 2.8*   CL 99 98   CO2 34* 34*   * 73   BUN 12 11   CREATININE 1.0 0.8   CALCIUM 7.9* 7.5*   PROT 5.0* 4.5*   ALBUMIN 1.6* 1.5*   BILITOT 0.4 0.4   ALKPHOS 241* 212*   AST 27 32   ALT 15 16   ANIONGAP 10 10   EGFRNONAA 57.2* >60.0     Magnesium:   Recent Labs   Lab 02/16/20  0726 02/17/20  0652   MG 1.6 1.5*     Recent Labs   Lab 02/17/20  0652   INR 3.7*       POCT Glucose:   Recent Labs   Lab 02/17/20  1126 02/17/20  1619 02/17/20  2207   POCTGLUCOSE 88 119* 77       Significant Imaging: I have reviewed and interpreted all pertinent imaging results/findings within the past 24 hours.    CT chest (2/13):  1.  The patient has widespread pulmonary parenchymal disease that has developed since 12/19/2019.  The radiographic features favor interstitial disease over airspace disease.  I doubt that the patient has pulmonary edema.  Differential diagnosis includes pneumonia due to an unusual infectious agent or reaction to widespread aspiration.  However also consider the possibility of drug toxicity in this patient with UTI treated with nitrofurantoin; interstitial lung disease has been described in patients exposed to nitrofurantoin.    2.  In this  "patient with a history of "cardiac surgery at age 10" , scar under the left breast and no sternotomy, consider the possibility of remote repair of coarctation.  Aortic caliber, contour and course appear unremarkable on the current study obtained without intravenous contrast medium.  No aortic aneurysm or dissection identified on contrast enhanced study dated 02/10/2020.    3.  Radiopaque material is present in the interatrial septum at the level of the foramen ovale raising the question of a percutaneous closure of a small secundum ASD or PFO.    4.  And pulmonary arteries are large.  Size of the arteries plus enlarged right heart chambers suggest pulmonary arterial hypertension, possibly long-standing. Of interest, the patient has radiopaque material at the expected location of the fossa ovalis suggesting percutaneous closure.  Therefore pulmonary arterial hypertension could be the result of pulmonary arterial intimal hyperplasia due to longstanding overcirculation in childhood or young adulthood.  There is also radiopaque material at the distal aspect of the right atrial appendage, possible surgical access point to the right atrium.  In the lungs the arteries are particularly large in the right upper lobe.  In old literature of radiology there are numerous descriptions of asymmetric distribution of pulmonary blood flow in the setting of ASD, with right pulmonary arteries larger than left pulmonary arteries.    Assessment/Plan:      Active Diagnoses:    Diagnosis Date Noted POA    PRINCIPAL PROBLEM:  Septic shock [A41.9, R65.21] 02/01/2020 Yes    Dyspnea [R06.00]  Unknown    Severe malnutrition [E43] 02/14/2020 Yes    Palliative care encounter [Z51.5] 02/11/2020 Not Applicable    Acute hypoxemic respiratory failure [J96.01] 02/11/2020 No    Head lice [B85.0] 02/11/2020 Yes    Advance care planning [Z71.89]  Not Applicable    Goals of care, counseling/discussion [Z71.89]  Not Applicable    Aspiration " pneumonia [J69.0] 02/09/2020 No    Hypovolemia [E86.1] 02/07/2020 Yes    Delirium [R41.0] 02/07/2020 No    Bilious vomiting [R11.14] 02/06/2020 No    Visual hallucinations [R44.1] 02/06/2020 No    Thrombocytopenia [D69.6] 02/06/2020 No    Hematemesis [K92.0] 02/06/2020 No    Anemia [D64.9] 02/04/2020 Yes    Liver lesion, right lobe [K76.9] 01/31/2020 Yes    Hypotension [I95.9] 01/30/2020 Yes    Diverticulitis [K57.92] 01/30/2020 Yes    Hypocalcemia [E83.51] 01/29/2020 Yes    Acute cystitis without hematuria [N30.00] 01/29/2020 Yes    GAVIN (acute kidney injury) [N17.9] 01/29/2020 Yes    Bifascicular block [I45.2] 01/02/2020 Yes    Hypomagnesemia [E83.42] 12/20/2019 Yes    COPD (chronic obstructive pulmonary disease) [J44.9] 12/19/2019 Yes    Chronic heart failure with preserved ejection fraction [I50.32] 12/19/2019 Yes    Paroxysmal atrial fibrillation [I48.0] 12/19/2019 Yes    Type 2 diabetes mellitus with hyperglycemia, without long-term current use of insulin [E11.65] 12/19/2019 Yes      Problems Resolved During this Admission:     Scheduled Meds:   albuterol-ipratropium  3 mL Nebulization Q4H    budesonide  0.5 mg Nebulization BID    calcium-vitamin D3  1 tablet Oral BID    dextromethorphan-guaifenesin  mg  1 tablet Oral BID    digoxin  0.125 mg Oral Daily    DULoxetine  30 mg Oral Daily    furosemide (LASIX) IV  40 mg Intravenous Q12H    magnesium oxide  400 mg Oral Daily    miconazole nitrate 2%   Topical (Top) BID    midodrine  10 mg Oral QHS    mirtazapine  15 mg Oral QHS    ondansetron  4 mg Intravenous Once    pantoprazole  40 mg Oral Daily    QUEtiapine  25 mg Oral QHS     Continuous Infusions:    PRN Meds:.acetaminophen, albuterol-ipratropium, benzonatate, bisacodyL, Dextrose 10% Bolus, Dextrose 10% Bolus, glucagon (human recombinant), glucose, glucose, insulin aspart U-100, iohexol, melatonin, ondansetron, phenyleph-min oil-petrolatum, polyethylene glycol,  promethazine (PHENERGAN) IVPB, senna, sodium chloride 0.9%, sodium chloride 0.9%    PLAN:    Acute hypoxemic respiratory failure  Pneumonia   Pulmonary edema  - Likely secondary to decompensated HF that has responded well with diureses.  - Weaned Oxygen from comfort flow down to 2L via NC.   -CXR 02/121 with Multifocal patchy pulmonary parenchymal disease is present throughout both lungs, worse in the right lung than the left. Unchanged from prior.   - on lasix IV 40 mg BID   - strict I/Os. Ordered purewick  - Chest CT non contrast (2/13) did not show significant pulmonary edema but did show evidence of interstitial lung disease and pulmonary hypertension   - On 3L NC. Goal O2 88-92%. Wean as tolerated  - if unable to wean oxygen after adequate diuresis, will consult pulmonary and order 6MWT  - completed 7 days of zosyn    Hypotension  Septic shock- resolved    UTI due to candida albicans   - Patient with acute hypotension and worsening O2 requirements over the course of 2/9/2020. Started the day on 3L NC and by the end was on Comfort flow 100% FiO2 and 30L. Concern for PE. CTA negative for PE, CXR also with severe pleural edema. Patient had been on zosyn for possible diverticulitis +/- urosepsis.   - Vancomycin discontinued; completed zosyn x7 day   - Patient on minimal levophed for MAP < 65. Patient appears to have baseline low BP's, mentates well with a MAP > 60. Possible infectious etiology given worsening hypoxemia / shock, CT showing compressive atelectasis vs. Developing consolidation in the left lower lobe.  - Patient required another short course of Levo 0.02 for MAP <65 overnight which has now improved this morning.   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 Ordered purewick since patient incontinent   - Patient hypervolemic, significant pulmonary edema on CXR  - Monitor daily electrolytes     Decreased po intake  - nutrition consulted. followup recs  - Boost Plus TID     Paroxysmal atrial fibrillation  - Patient currently in RVR presumed 2/2 to acute respiratory failure  - PRN metoprolol as needed for rate control but would not aggressively treat in setting of severe hypoxemia  - INR initially subtherapeutic and was on heparin bridge w/ warfarin.   - Previously INR supratherapeutic. Resumed warfarin on 2/18 since INR now therapeutic   - Metoprolol 50mg BID discontinued given nightly hypotensive episdoes requiring transient levophed, also received  digoxin 250mcg for rate control  - Maintain Mg >2, K>4.     Type 2 diabetes mellitus with hyperglycemia, without long-term current use of insulin  - AIC 5.4  - Glucose checks QACHS  - Goal Glucose 140-180 mg/dL  - Diabetic diet    Head lice  -S/p 1 permethrin lotion treatment.   - Active lice visualized today despite permethrin treatment and reported comb-niting.   - Actively considering other options to help eradicate head lice infestation, though patient will require aggressive comb-niting   -  lindane shampoo applied on 2/11, will need repeat treatment  - continue contact isolation     Hypocalcemia- resolved   -Continue with home calcium-Vit D    Hypomagnesemia  -Replete PRN  -Continue home magnesium oxide.     Liver lesion, right lobe  - possible concern of abscess vs malignancy per CT scan  - h/o weight loss and possible colonic lesion  - RUQ US showing focal fatty infiltration     Diverticulitis- resolved   - CT consistent with recurrent diverticulitis  - CRS consulted during this hospitalization- recommend management with antibiotics.   - Continue with zosyn  for 7 days     Debiilty  - PT/OT recommend SNF  - followup with CM/SW regarding palcement     Advance care planning  - Consulted Palliative care to assist with goals of care discussion. As of this moment, patient expressed preference for full code, although patient may not fully understand her prognosis given her frequent hospitalizations. Palliative care to continue with advance care discussions with patient and family.         VTE Risk Mitigation (From admission, onward)         Ordered     IP VTE HIGH RISK PATIENT  Once      01/29/20 1916     Reason for No Pharmacological VTE Prophylaxis  Once     Question:  Reasons:  Answer:  Already adequately anticoagulated on oral Anticoagulants    01/29/20 1916              Time spent in care of patient: > 35 minutes     Mendoza Caldwell MD  Department of Hospital Medicine   [facility]

## 2020-02-18 NOTE — PLAN OF CARE
Plan is to discharge [facility].       02/18/20 1514   Discharge Reassessment   Assessment Type Discharge Planning Reassessment   Do you have any problems affording any of your prescribed medications? No   Discharge Plan A Skilled Nursing Facility   Discharge Plan B Return to Nursing Home   DME Needed Upon Discharge  none   Anticipated Discharge Disposition SNF

## 2020-02-18 NOTE — PLAN OF CARE
[facility]  Palliative Care   Follow Up Note:    Patient Name: Elizabeth Cano  MRN: [id number]  Admission Date: 1/29/2020  Hospital Length of Stay: 20 days  Code Status: Full Code   Attending Provider: Mendoza Caldwell MD  Palliative Care Provider: CLARK Saez, APRN, AGCNS  Primary Care Physician: Cesar Reeves MD  Principal Problem:Septic shock     Visit to bedside. Asssited with MPOA paperwork with [facility] Care APRN.     Pt made dtr Tia Velasquez ([phone]) as primary and Rae Esteban as secondary ([phone]). Third is Tuan Cano ([phone]).    Form placed in blue chart.    ELBERT WinnW, ACHP-SW

## 2020-02-19 PROBLEM — R33.9 URINARY RETENTION: Status: ACTIVE | Noted: 2020-01-01

## 2020-02-19 NOTE — PLAN OF CARE
Problem: Wound  Goal: Optimal Wound Healing  Outcome: Ongoing, Progressing     Problem: Fall Injury Risk  Goal: Absence of Fall and Fall-Related Injury  Outcome: Ongoing, Progressing     Problem: Adult Inpatient Plan of Care  Goal: Plan of Care Review  Outcome: Ongoing, Progressing  Goal: Patient-Specific Goal (Individualization)  Outcome: Ongoing, Progressing  Goal: Absence of Hospital-Acquired Illness or Injury  Outcome: Ongoing, Progressing  Goal: Optimal Comfort and Wellbeing  Outcome: Ongoing, Progressing  Goal: Readiness for Transition of Care  Outcome: Ongoing, Progressing  Goal: Rounds/Family Conference  Outcome: Ongoing, Progressing     Problem: Infection  Goal: Infection Symptom Resolution  Outcome: Ongoing, Progressing     Problem: Diabetes Comorbidity  Goal: Blood Glucose Level Within Desired Range  Outcome: Ongoing, Progressing     Problem: Skin Injury Risk Increased  Goal: Skin Health and Integrity  Outcome: Ongoing, Progressing     Problem: Malnutrition  Goal: Improved Nutritional Intake  Outcome: Ongoing, Progressing     Problem: Coping Ineffective  Goal: Effective Coping  Outcome: Ongoing, Progressing

## 2020-02-19 NOTE — PT/OT/SLP PROGRESS
Occupational Therapy      Patient Name:  Elizabeth Cano   MRN:  [id number]    Patient not seen today secondary to Other (Comment)(Pt not seen this date.). Will follow-up as schedule allows .    Bruna Noriega OT  2/19/2020

## 2020-02-19 NOTE — SUBJECTIVE & OBJECTIVE
Past Medical History:   Diagnosis Date    A-fib     GAVIN (acute kidney injury)     CHF (congestive heart failure)     COPD (chronic obstructive pulmonary disease)     Diabetes mellitus     Hypertension        Past Surgical History:   Procedure Laterality Date    CARDIAC SURGERY      COLONOSCOPY N/A 2020    Procedure: COLONOSCOPY;  Surgeon: Gilberto Laguna MD;  Location: [address]);  Service: Endoscopy;  Laterality: N/A;    ESOPHAGOGASTRODUODENOSCOPY N/A 2020    Procedure: EGD (ESOPHAGOGASTRODUODENOSCOPY);  Surgeon: Aleksey Gomez MD;  Location: [facility] ([address] [place] [address]);  Service: Endoscopy;  Laterality: N/A;       Review of patient's allergies indicates:   Allergen Reactions    Levsin [hyoscyamine sulfate] Hallucinations       Family History     Problem Relation (Age of Onset)    Heart disease Mother          Tobacco Use    Smoking status: Former Smoker     Packs/day: 1.00     Years: 54.00     Pack years: 54.00     Types: Cigarettes     Last attempt to quit: 2019     Years since quittin.2    Smokeless tobacco: Never Used   Substance and Sexual Activity    Alcohol use: Not Currently    Drug use: Not Currently    Sexual activity: Not Currently     Partners: Male       Review of Systems   Constitutional: Negative for chills and fever.   HENT: Negative for sore throat and trouble swallowing.    Eyes: Negative for pain and discharge.   Respiratory: Positive for shortness of breath. Negative for wheezing.    Cardiovascular: Positive for leg swelling. Negative for palpitations.   Gastrointestinal: Positive for nausea. Negative for abdominal pain, diarrhea and vomiting.   Genitourinary:        As per HPI   Musculoskeletal: Negative for back pain and joint swelling.   Skin: Negative for rash and wound.   Neurological: Negative for seizures, weakness and headaches.   Psychiatric/Behavioral: Negative for hallucinations. The patient is not nervous/anxious.      Objective:     Temp:   [98.1 °F (36.7 °C)-98.8 °F (37.1 °C)] 98.1 °F (36.7 °C)  Pulse:  [] 88  Resp:  [16-19] 16  SpO2:  [86 %-97 %] 94 %  BP: ()/(53-60) 111/57     Body mass index is 25.98 kg/m².    Date 02/19/20 0700 - 02/20/20 0659   Shift 0716-3874 5969-9508 8616-2769 24 Hour Total   INTAKE   P.O. 0   0   Shift Total(mL/kg) 0(0)   0(0)   OUTPUT   Shift Total(mL/kg)       Weight (kg) 73 73 73 73     Bladder Scan Volume (mL): 475 mL (02/18/20 1400)    Drains     Drain                 Urethral Catheter 02/18/20 1438 Non-latex 16 Fr. 1 day                Physical Exam   Nursing note and vitals reviewed.  Constitutional: She is oriented to person, place, and time. Vital signs are normal. She appears well-developed and well-nourished. No distress.   HENT:   Head: Normocephalic and atraumatic.   Eyes: Right eye exhibits no discharge. Left eye exhibits no discharge. No scleral icterus.   Neck: Trachea normal. Neck supple. No tracheal deviation present.   Cardiovascular: Normal rate and intact distal pulses.    Pulmonary/Chest: Effort normal. No accessory muscle usage or stridor. No respiratory distress.   NC   Abdominal: Soft. She exhibits no distension. There is no splenomegaly or hepatomegaly. There is no tenderness. There is no CVA tenderness. No hernia.   Musculoskeletal: She exhibits edema (2+ BLE). She exhibits no deformity.   Neurological: She is alert and oriented to person, place, and time.   Skin: Skin is warm and dry. No lesion and no rash noted. No cyanosis.     Psychiatric: She has a normal mood and affect. Her behavior is normal. Judgment and thought content normal.     Significant Labs:    BMP:  Recent Labs   Lab 02/17/20  0652 02/18/20  0712 02/19/20  0653    141 143   K 2.8* 4.1 3.8   CL 98 97 97   CO2 34* 38* 38*   BUN 11 10 9   CREATININE 0.8 0.7 0.6   CALCIUM 7.5* 7.6* 7.6*       CBC:  Recent Labs   Lab 02/17/20  0652 02/18/20  0712 02/19/20  0653   WBC 4.83 4.56 4.29   HGB 9.0* 8.3* 9.0*   HCT 29.7*  28.0* 30.7*    180 203       All pertinent labs results from the past 24 hours have been reviewed.    Significant Imaging:  All pertinent imaging results/findings from the past 24 hours have been reviewed.

## 2020-02-19 NOTE — PLAN OF CARE
Problem: Wound  Goal: Optimal Wound Healing  Outcome: Ongoing, Progressing     Problem: Fall Injury Risk  Goal: Absence of Fall and Fall-Related Injury  Outcome: Ongoing, Progressing     Problem: Adult Inpatient Plan of Care  Goal: Plan of Care Review  Outcome: Ongoing, Progressing  Goal: Patient-Specific Goal (Individualization)  Outcome: Ongoing, Progressing  Goal: Absence of Hospital-Acquired Illness or Injury  Outcome: Ongoing, Progressing  Goal: Optimal Comfort and Wellbeing  Outcome: Ongoing, Progressing  Goal: Readiness for Transition of Care  Outcome: Ongoing, Progressing  Goal: Rounds/Family Conference  Outcome: Ongoing, Progressing     Problem: Infection  Goal: Infection Symptom Resolution  Outcome: Ongoing, Progressing     Problem: Diabetes Comorbidity  Goal: Blood Glucose Level Within Desired Range  Outcome: Ongoing, Progressing     Problem: Skin Injury Risk Increased  Goal: Skin Health and Integrity  Outcome: Ongoing, Progressing     Problem: Malnutrition  Goal: Improved Nutritional Intake  Outcome: Ongoing, Progressing     Problem: Coping Ineffective  Goal: Effective Coping  Outcome: Ongoing, Progressing     Patient alert and oriented. Stll with poor appetite. Patient requested soup for dinner after much encouragement from staff. Continues on precautions. Xray and us done this shift

## 2020-02-19 NOTE — PROGRESS NOTES
PHARMACY CONSULT NOTE: WARFARIN    Elizabeth Cano is a 69 y.o. female on warfarin therapy for Atrial Fibrilation PharmD has been consulted for warfarin dosing.    Current order: hold   Home dose: 1mg every Mon, Wed, Fri   INR goal: 2-3   Coumadin clinic enrollment: None- unsure who follows patient's INR     Lab Results   Component Value Date    INR 2.1 (H) 02/19/2020    INR 2.7 (H) 02/18/2020    INR 3.7 (H) 02/17/2020     Diet: Low Sodium     Recommendation(s):    Continue warfarin 1 mg every Tues, Thur, Sat   Pharmacy will continue to follow and monitor warfarin    Thank you for the consult,  Jada Gonsalez  Extension [phone]    **Note: Consults are reviewed Monday-Friday 7:00am-3:30pm. THE ABOVE RECOMMENDATIONS ARE ONLY SUGGESTED.THE RECOMMENDATIONS SHOULD BE CONSIDERED IN CONJUNCTION WITH ALL PATIENT FACTORS. **

## 2020-02-19 NOTE — HPI
Elizabeth Cano is a 69 y.o. female with A-fib (on coumadin), COPD, and HFpEF (EF 45%) ad[place] [facility] [place] hypotension, respiratory distress, and elevated lactate requiring ICU admission. Vitals have since improved and she has been stepped down to the floor. Prior to admission was diagnosed with a UTI at her Nursing Home, and was prescribed Macrobid. Unclear if a UA was done at that time. Urine culture from 2/7 is growing Candida. Blood cultures are NGTD. Creatinine is 0.6, WBC count is wnl. CT from 1/30/20 shows no evidence of hydronephrosis, but moderately distended bladder. Renal US 2/18 also shows no evidence of hydronephrosis. Recent hospitalization for uncomplicated diverticulitis. UOP since admission is not well documented until Purewick device was used. She then had a random bladder scan which showed 2/16 with 700 cc. She then had 2 intermittent caths and then aguirre was placed on 2/18 with return of 600 cc of urine. Urology is consulted for assistance with retention

## 2020-02-19 NOTE — PROGRESS NOTES
[facility] Hospital Medicine  Progress Note    Patient Name: Elizabeth Cano  MRN: [id number]  Patient Class: IP- Inpatient   Admission Date: 1/29/2020  Length of Stay: 21 days  Attending Physician: Mendoza Caldwell MD  Primary Care Provider: Cesar Reeves MD    [facility] Medicine Team: Networked reference to record PCT  Mendoza Caldwell MD    HPI:  Ms. Elizabeth Cano is a 69 years old female with afib (on coumadin), COPD, and HFpEF (EF 45% on 1/29/20) admitted to hospital medicine for presumed urinary tract infection. She was diagnosed with a UTI last week at her Nursing Home, [facility], which she was prescribed Macrobid. She completed her course of Macrobid on 01/29/20 and she was noted to be hypotensive rendering her transfer to the hospital.      She reports a decreased appetite for the last month since her hospitalization over the holidays due to uncomplicated diverticulitis which she was treated with Augmentin. She reports feeling nauseated when taking antibiotics. She denies palpitations, dizziness, lightheadedness, chest pain, SOB, change in urinary/bowel habits.      On 01/29/20, she was found to be hypotensive in the 80s/50s in the ED with lactic acid of 4.8. CXR showed no signs for PNA, UA showed mild leukocytosis with WBC 17, nitrite negative, with urine cultures and blood cultures showing NG. She was admitted to  and started on CTX. She has been resuscitated with IVFs since admit with 3L. BP responded appropriately to IVF resuscitation with subsequent downtrend in lactic acid to 2.7. However, overnight on 01/30/20, BP remained 63-67 mmHg MAP goal. Repeat lactic acid uptrend to 3.3. Of note, she received a night time dose of Lopressor 50 mg on 01/30/20, which has since been discontinued.     ICU consulted in am of 01/31/20 for hypotension despite being resuscitated with 3.5 L IV fluids and persistant lactic acidosis in 3-4 range. She improved with IVF hydration and  patient was stepped down. Rapid response evaluated patient early AM on 2/10 due to acute increase in oxygen requiriments. Patient began the day on 3L NC and progressed to requiring Comfort flow of 30L and 100% FiO2. Patient denies chest pains, only complaining of minor increased difficulty breathing.    Subjective:     Principal Problem:Septic shock    Interval History: Patient lying in bed, no respiratory distress. Reports lethargy, fatigue, poor appetite, and lower extremity pain and swelling. Reports shortness of breath is improving.     Review of Systems   Constitutional: Positive for fatigue. Negative for chills and fever.   HENT: Negative for congestion.    Eyes: Negative for visual disturbance.   Respiratory: Positive for shortness of breath (improving).    Cardiovascular: Positive for leg swelling. Negative for chest pain.   Gastrointestinal: Negative for abdominal distention, abdominal pain, nausea and vomiting.   Genitourinary: Negative for dysuria.   Musculoskeletal: Positive for gait problem.   Skin: Negative for wound.   Neurological: Positive for weakness. Negative for dizziness and light-headedness.   Psychiatric/Behavioral: Negative for confusion.     Objective:     Vital Signs (Most Recent):  Temp: 98.8 °F (37.1 °C) (02/19/20 0417)  Pulse: 87 (02/19/20 0417)  Resp: 18 (02/19/20 0417)  BP: 128/60 (02/19/20 0417)  SpO2: 97 % (02/19/20 0417) Vital Signs (24h Range):  Temp:  [97.8 °F (36.6 °C)-98.8 °F (37.1 °C)] 98.8 °F (37.1 °C)  Pulse:  [] 87  Resp:  [16-19] 18  SpO2:  [86 %-98 %] 97 %  BP: ()/(52-60) 128/60     Weight: 73 kg (160 lb 15 oz)  Body mass index is 25.98 kg/m².    Intake/Output Summary (Last 24 hours) at 2/19/2020 0638  Last data filed at 2/18/2020 2200  Gross per 24 hour   Intake 0 ml   Output 1700 ml   Net -1700 ml        Physical Exam   Constitutional: She is oriented to person, place, and time. She appears well-developed.   Eyes: Pupils are equal, round, and reactive to  light. Conjunctivae and EOM are normal.   Neck: Neck supple.   Cardiovascular: Normal rate and regular rhythm.   Pulmonary/Chest: She is in respiratory distress. She has rales in the right lower field and the left lower field.   Abdominal: Soft. Bowel sounds are normal. She exhibits no distension. There is no tenderness.   Musculoskeletal: Normal range of motion. She exhibits edema.   Neurological: She is alert and oriented to person, place, and time. No cranial nerve deficit.   Skin: Skin is warm.   Psychiatric: She has a normal mood and affect.         Overview/Hospital Course:  She was admitted to Hospital Medicine and treated for Urosepsis with antibiotics and IVF resuscitation. Critical Care was consulted for septic shock. She was admitted to MICU, bolused 1L LR with resulting cleared Lactic Acid. CT Abd Pelvis showed findings consistent with several diverticula in the sigmoid colon with associated wall thickening, minimal adjacent fat stranding, and prominence of the vasa recta.  Findings appear similar when compared to prior examination dated 12/19/2019 and may represent ongoing diverticulitis or colitis in the correct clinical setting. Area of hypoattenuation within the right hepatic lobe adjacent to the gallbladder fossa, not definitively seen on prior examination and may represent area of fatty liver infiltration or transit hepatic attenuation differences however, in the setting of liver disease, neoplasm is not excluded.   Following clearing of her lactic acid, she was stepped down to Hospital Medicine with midodrine 5 mg TID. Critical Care was re-consulted in the setting of Afib RVR and hypotension. While on the floor, patient went into Afib RVR, sustained HR >120, /55, requiring lopressor pushes, which subsequently lowered HR to 75s/50s.     2/10: Patient readmitted to MICU for acute hypoxemic respiratory failure. CTA negative for PE, diuresing well with IV lasix. Levophed has been discontinued.  Electrolytes were repleted. Currently on zosyn; vancomycin was discontinued today given resolution of shock. Bridging heparin with warfarin to therapeutic INR level for A-fib. Restarted oral lasix and metoprolol. Patient is stable to be transferred to Hospital Medicine.  Received topical permethrin treatment for head lice.   2/12- 2/13: Patient developed two episodes of hypotension requiring low dose levo both 02/11 and 02/12 nights. Metoprolol was discontinued and digoxin started for Afib rate control.     Significant Labs:   A1C:   Recent Labs   Lab 12/19/19  2220 01/30/20  0421   HGBA1C 6.4* 5.4     CBC:   Recent Labs   Lab 02/17/20  0652 02/18/20  0712   WBC 4.83 4.56   HGB 9.0* 8.3*   HCT 29.7* 28.0*    180     CMP:   Recent Labs   Lab 02/17/20  0652 02/18/20  0712    141   K 2.8* 4.1   CL 98 97   CO2 34* 38*   GLU 73 95   BUN 11 10   CREATININE 0.8 0.7   CALCIUM 7.5* 7.6*   PROT 4.5* 4.4*   ALBUMIN 1.5* 1.5*   BILITOT 0.4 0.4   ALKPHOS 212* 231*   AST 32 37   ALT 16 16   ANIONGAP 10 6*   EGFRNONAA >60.0 >60.0     Magnesium:   Recent Labs   Lab 02/17/20  0652 02/18/20  0712   MG 1.5* 1.8     Recent Labs   Lab 02/18/20  0712   INR 2.7*       POCT Glucose:   Recent Labs   Lab 02/18/20  0826 02/18/20  1738 02/18/20  2242   POCTGLUCOSE 93 86 112*       Significant Imaging: I have reviewed and interpreted all pertinent imaging results/findings within the past 24 hours.    CT chest (2/13):  1.  The patient has widespread pulmonary parenchymal disease that has developed since 12/19/2019.  The radiographic features favor interstitial disease over airspace disease.  I doubt that the patient has pulmonary edema.  Differential diagnosis includes pneumonia due to an unusual infectious agent or reaction to widespread aspiration.  However also consider the possibility of drug toxicity in this patient with UTI treated with nitrofurantoin; interstitial lung disease has been described in patients exposed to  "nitrofurantoin.    2.  In this patient with a history of "cardiac surgery at age 10" , scar under the left breast and no sternotomy, consider the possibility of remote repair of coarctation.  Aortic caliber, contour and course appear unremarkable on the current study obtained without intravenous contrast medium.  No aortic aneurysm or dissection identified on contrast enhanced study dated 02/10/2020.    3.  Radiopaque material is present in the interatrial septum at the level of the foramen ovale raising the question of a percutaneous closure of a small secundum ASD or PFO.    4.  And pulmonary arteries are large.  Size of the arteries plus enlarged right heart chambers suggest pulmonary arterial hypertension, possibly long-standing. Of interest, the patient has radiopaque material at the expected location of the fossa ovalis suggesting percutaneous closure.  Therefore pulmonary arterial hypertension could be the result of pulmonary arterial intimal hyperplasia due to longstanding overcirculation in childhood or young adulthood.  There is also radiopaque material at the distal aspect of the right atrial appendage, possible surgical access point to the right atrium.  In the lungs the arteries are particularly large in the right upper lobe.  In old literature of radiology there are numerous descriptions of asymmetric distribution of pulmonary blood flow in the setting of ASD, with right pulmonary arteries larger than left pulmonary arteries.    Assessment/Plan:      Active Diagnoses:    Diagnosis Date Noted POA    PRINCIPAL PROBLEM:  Septic shock [A41.9, R65.21] 02/01/2020 Yes    Dyspnea [R06.00]  Unknown    Severe malnutrition [E43] 02/14/2020 Yes    Palliative care encounter [Z51.5] 02/11/2020 Not Applicable    Acute hypoxemic respiratory failure [J96.01] 02/11/2020 No    Head lice [B85.0] 02/11/2020 Yes    Advance care planning [Z71.89]  Not Applicable    Goals of care, counseling/discussion [Z71.89]  Not " Applicable    Aspiration pneumonia [J69.0] 02/09/2020 No    Hypovolemia [E86.1] 02/07/2020 Yes    Delirium [R41.0] 02/07/2020 No    Bilious vomiting [R11.14] 02/06/2020 No    Visual hallucinations [R44.1] 02/06/2020 No    Thrombocytopenia [D69.6] 02/06/2020 No    Hematemesis [K92.0] 02/06/2020 No    Anemia [D64.9] 02/04/2020 Yes    Liver lesion, right lobe [K76.9] 01/31/2020 Yes    Hypotension [I95.9] 01/30/2020 Yes    Diverticulitis [K57.92] 01/30/2020 Yes    Hypocalcemia [E83.51] 01/29/2020 Yes    Acute cystitis without hematuria [N30.00] 01/29/2020 Yes    GAVIN (acute kidney injury) [N17.9] 01/29/2020 Yes    Bifascicular block [I45.2] 01/02/2020 Yes    Hypomagnesemia [E83.42] 12/20/2019 Yes    COPD (chronic obstructive pulmonary disease) [J44.9] 12/19/2019 Yes    Chronic heart failure with preserved ejection fraction [I50.32] 12/19/2019 Yes    Paroxysmal atrial fibrillation [I48.0] 12/19/2019 Yes    Type 2 diabetes mellitus with hyperglycemia, without long-term current use of insulin [E11.65] 12/19/2019 Yes      Problems Resolved During this Admission:     Scheduled Meds:   albuterol-ipratropium  3 mL Nebulization Q4H    budesonide  0.5 mg Nebulization BID    calcium-vitamin D3  1 tablet Oral BID    dextromethorphan-guaifenesin  mg  1 tablet Oral BID    digoxin  0.125 mg Oral Daily    DULoxetine  30 mg Oral Daily    furosemide  40 mg Intravenous BID    magnesium oxide  400 mg Oral Daily    miconazole nitrate 2%   Topical (Top) BID    midodrine  10 mg Oral QHS    mirtazapine  15 mg Oral QHS    ondansetron  4 mg Intravenous Once    pantoprazole  40 mg Oral Daily    QUEtiapine  25 mg Oral QHS    warfarin  1 mg Oral Every Tues, Thurs, Sat     Continuous Infusions:    PRN Meds:.acetaminophen, albuterol-ipratropium, benzonatate, bisacodyL, Dextrose 10% Bolus, Dextrose 10% Bolus, glucagon (human recombinant), glucose, glucose, insulin aspart U-100, iohexol, melatonin, morphine,  ondansetron, phenyleph-min oil-petrolatum, polyethylene glycol, promethazine (PHENERGAN) IVPB, senna, sodium chloride 0.9%, sodium chloride 0.9%    PLAN:    Acute hypoxemic respiratory failure  Pneumonia   Pulmonary edema  - Likely secondary to decompensated HF that has responded well with diureses.  - Weaned Oxygen from comfort flow down to 2L via NC.   -CXR 02/121 with Multifocal patchy pulmonary parenchymal disease is present throughout both lungs, worse in the right lung than the left. Unchanged from prior.   - on lasix IV 40 mg BID   - strict I/Os. Ordered purewick  - Chest CT non contrast (2/13) did not show significant pulmonary edema but did show evidence of interstitial lung disease and pulmonary hypertension   - On 3L NC. Goal O2 88-92%. Wean as tolerated  - if unable to wean oxygen after adequate diuresis, will consult pulmonary and order 6MWT  - completed 7 days of zosyn    Hypotension  Septic shock- resolved    UTI due to candida albicans   - Patient with acute hypotension and worsening O2 requirements over the course of 2/9/2020. Started the day on 3L NC and by the end was on Comfort flow 100% FiO2 and 30L. Concern for PE. CTA negative for PE, CXR also with severe pleural edema. Patient had been on zosyn for possible diverticulitis +/- urosepsis.   - Vancomycin discontinued; completed zosyn x7 day   - Patient on minimal levophed for MAP < 65. Patient appears to have baseline low BP's, mentates well with a MAP > 60. Possible infectious etiology given worsening hypoxemia / shock, CT showing compressive atelectasis vs. Developing consolidation in the left lower lobe.  - Patient required another short course of Levo 0.02 for MAP <65 overnight which has now improved this morning.   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 Ordered purewick since patient incontinent   - Patient hypervolemic, significant pulmonary edema on CXR  - Monitor daily electrolytes     Decreased po intake  - nutrition consulted. followup recs  - Boost Plus TID     Paroxysmal atrial fibrillation  - Patient currently in RVR presumed 2/2 to acute respiratory failure  - PRN metoprolol as needed for rate control but would not aggressively treat in setting of severe hypoxemia  - INR initially subtherapeutic and was on heparin bridge w/ warfarin.   - Previously INR supratherapeutic. Resumed warfarin on 2/18 since INR now therapeutic   - Metoprolol 50mg BID discontinued given nightly hypotensive episdoes requiring transient levophed, also received  digoxin 250mcg for rate control  - Maintain Mg >2, K>4.     Type 2 diabetes mellitus with hyperglycemia, without long-term current use of insulin  - AIC 5.4  - Glucose checks QACHS  - Goal Glucose 140-180 mg/dL  - Diabetic diet    Head lice  -S/p 1 permethrin lotion treatment.   - Active lice visualized today despite permethrin treatment and reported comb-niting.   - Actively considering other options to help eradicate head lice infestation, though patient will require aggressive comb-niting   -  lindane shampoo and permethrin as needed   - continue contact isolation     Hypocalcemia- resolved   -Continue with home calcium-Vit D    Hypomagnesemia  -Replete PRN  -Continue home magnesium oxide.     Liver lesion, right lobe  - possible concern of abscess vs malignancy per CT scan  - h/o weight loss and possible colonic lesion  - RUQ US showing focal fatty infiltration     Diverticulitis- resolved   - CT consistent with recurrent diverticulitis  - CRS consulted during this hospitalization- recommend  management with antibiotics.   - Continue with zosyn for 7 days     Debiilty  - PT/OT recommend SNF  - followup with CM/SW regarding palcement     Advance care planning  - Consulted Palliative care to assist with goals of care discussion. As of this moment, patient expressed preference for full code, although patient may not fully understand her prognosis given her frequent hospitalizations. Palliative care to continue with advance care discussions with patient and family.         VTE Risk Mitigation (From admission, onward)         Ordered     warfarin (COUMADIN) tablet 1 mg  Every Tues, Thurs, Sat      02/18/20 1452     IP VTE HIGH RISK PATIENT  Once      01/29/20 1916     Reason for No Pharmacological VTE Prophylaxis  Once     Question:  Reasons:  Answer:  Already adequately anticoagulated on oral Anticoagulants    01/29/20 1916              Time spent in care of patient: > 35 minutes     Mendoza Caldwell MD  Department of Hospital Medicine   [facility]

## 2020-02-20 PROBLEM — R44.1 VISUAL HALLUCINATIONS: Status: RESOLVED | Noted: 2020-01-01 | Resolved: 2020-01-01

## 2020-02-20 PROBLEM — R41.0 DELIRIUM: Status: RESOLVED | Noted: 2020-01-01 | Resolved: 2020-01-01

## 2020-02-20 PROBLEM — N30.00 ACUTE CYSTITIS WITHOUT HEMATURIA: Status: RESOLVED | Noted: 2020-01-01 | Resolved: 2020-01-01

## 2020-02-20 PROBLEM — K92.0 HEMATEMESIS: Status: RESOLVED | Noted: 2020-01-01 | Resolved: 2020-01-01

## 2020-02-20 NOTE — PT/OT/SLP PROGRESS
Physical Therapy Treatment    Patient Name:  Elizabeth Cano   MRN:  [id number]    Recommendations:     Discharge Recommendations:  nursing facility, skilled   Discharge Equipment Recommendations: (TBD)   Barriers to discharge: Decreased caregiver support    Assessment:     Elizabeth Cano is a 69 y.o. female admitted with a medical diagnosis of Septic shock.  She presents with the following impairments/functional limitations:  weakness, impaired sensation, pain, gait instability, impaired endurance, impaired self care skills, impaired balance, impaired functional mobilty . Pt seen and willing to participate in therapy session with maximum encouragement. Pt able to perform bed mobility with SBA and STS transfers with mod A of 2 persons and RW. Pt performed 5 side steps L and R at edge of bed and 5 steps to and from bedside chair with min-mod A and RW. Pt appeared anxious with transfers and ambulation and required moderate encouragement and education to breathe and maintain safety.    Rehab Prognosis: Good; patient would benefit from acute skilled PT services to address these deficits and reach maximum level of function.    Recent Surgery: Procedure(s) (LRB):  EGD (ESOPHAGOGASTRODUODENOSCOPY) (N/A) 13 Days Post-Op    Plan:     During this hospitalization, patient to be seen 4 x/week to address the identified rehab impairments via gait training, therapeutic activities, therapeutic exercises, neuromuscular re-education and progress toward the following goals:    · Plan of Care Expires:  03/12/20    Subjective     Chief Complaint: Pain in B hips  Patient/Family Comments/goals: return to PLOF  Pain/Comfort:  · Pain Rating 1: other (see comments)(did not rate)  · Location - Side 1: Bilateral  · Location - Orientation 1: anterior  · Location 1: hip((pt reports hip pain, but points to proximal anterior thigh)  · Pain Addressed 1: Distraction, Reposition  · Pain Rating Post-Intervention 1: other (see comments)(did not  rate)      Objective:     Communicated with nurse prior to session.  Patient found HOB elevated with telemetry, aguirre catheter upon PT entry to room.     General Precautions: Standard, fall, special contact, respiratory   Orthopedic Precautions:N/A   Braces: N/A     Functional Mobility:  · Bed Mobility:     · Supine to Sit: stand by assistance  · Sit to Supine: stand by assistance  · Transfers:     · Sit to Stand:  minimum assistance and moderate assistance of 2 persons with rolling walker; 3 trials, 2 from EOB with mod A of 2 persons, 1 from bedside chair with min A of 2 persons. Maximum verbal cues for technique  · Bed to Chair: moderate assistance with  rolling walker  using  Step Transfer  · Gait: 5 side steps L and R at EOB with mod A and RW; by 5 steps from EOB to bedside chair with mod A and RW; 5 steps from bedside chair to EOB with min A and RW. Seated rest breaks in between. Pt appears extremely anxious and fearful of falling. When ambulating to bedside chair, pt expressed fear of falling and started to try to sit in chair too soon, requiring assistance to guide her all the way into chair. Pt educated on safety awareness with transfers and importance of trying to remain calm. Pt demonstrated improvement in safety awareness with return to EOB.      AM-PAC 6 CLICK MOBILITY  Turning over in bed (including adjusting bedclothes, sheets and blankets)?: 3  Sitting down on and standing up from a chair with arms (e.g., wheelchair, bedside commode, etc.): 3  Moving from lying on back to sitting on the side of the bed?: 3  Moving to and from a bed to a chair (including a wheelchair)?: 2  Need to walk in hospital room?: 2  Climbing 3-5 steps with a railing?: 1  Basic Mobility Total Score: 14       Therapeutic Activities and Exercises:   AP, LAQ, GS, QS, seated marches - 10 reps each    Pt educated on PT role/POC, benefits of OOB activity/ambulation, importance of safety awareness and technique with transfers. Pt  verbalized understanding.    Patient left HOB elevated with all lines intact, call button in reach, nurse notified and  and daughter present..    GOALS:   Multidisciplinary Problems     Physical Therapy Goals        Problem: Physical Therapy Goal    Goal Priority Disciplines Outcome Goal Variances Interventions   Physical Therapy Goal     PT, PT/OT Ongoing, Progressing     Description:  Goals to be met by: 2020     Patient will increase functional independence with mobility by performin. Supine to sit with Contact Guard Assistance - met 2020   2. Sit to supine with Contact Guard Assistance - GOAL MET  3. Sit to stand transfer with Contact Guard Assistance using LRAD  4. Bed to chair transfer with Contact Guard Assistance using Rolling Walker  5. Gait  x 25 feet with Contact Guard Assistance using Rolling Walker.   6. Lower extremity exercise program x20 reps per handout, with independence                         Time Tracking:     PT Received On: 20  PT Start Time: 1357     PT Stop Time: 1422  PT Total Time (min): 25 min     Billable Minutes: Gait Training 15 and Therapeutic Exercise 10    Treatment Type: Treatment  PT/PTA: PT     PTA Visit Number: 0     BRENDA Buck  2020

## 2020-02-20 NOTE — PROGRESS NOTES
[facility]  Palliative Medicine  Progress Note    Patient Name: Elizabeth Cano  MRN: [id number]  Admission Date: 1/29/2020  Hospital Length of Stay: 22 days  Code Status: Full Code   Attending Provider: Azra Smith MD  Consulting Provider: GURWINDER Gaines  Primary Care Physician: Cesar Reeves MD  Principal Problem:Septic shock    Patient information was obtained from patient and ER records.      Assessment/Plan:     Palliative care encounter  Impression: Pt is a 70 y/o female  with afib (on coumadin), COPD, and HFpEF (EF 45% on 1/29/20) admitted to hospital medicine for presumed urinary tract infection. Pt transferred to ICU for Acute hypoxemic respiratory failure: CTA negative for PE. Pt on 4L O2 NC. Pt is a full code. Pt is alert, oriented to person, place, and situation.     Reasons for consult: advance care planning.     Advance care planning:  Today:   Pt's goals is to go back to [facility] with PT. PT has been working with pt and recommend SNF.   Living Will paperwork also given to pt who wants to look over- [facility]. Pt to look over and speak to family.     Spoke to Dr. Smith concerning plan of care.    2/11/2020:  Met with pt along with Apolonia Coto LCSW. Pt reports she lives at [facility] with her  in the same room. Per pt, she was living in an independent living apartment with her . Per wife, they needed more support and they moved to a NH. Per pt, she is mostly in wheelchair but walks some. Per pt goal is to go back to NH with possible SNF. Pt aware she will continue to decline with her COPD and CHF. Pt aware comfort care is an option as she declines further.       Symptom management:   Pt denies pain, nausea, anxiety.     Dyspnea:   Per pt SOB noted with exertion:   Pt on 4L NC.     Recs:  Consider Roxanol 2.5 mg q 4 hrs prn dyspnea.     Plan:   MPOA paperwork completed today.   [facility] booklet given to opt.   Will continue to  reinforce realistic expectations.   Per pt plan is back to NH with SNF.   Will follow.             I will follow-up with patient. Please contact us if you have any additional questions.    Subjective:     Chief Complaint:   Chief Complaint   Patient presents with    Multiple Complaints     uti finished antibiotics,        HPI:   Ptis a 69 years old female with afib (on coumadin), COPD, and HFpEF (EF 45% on 1/29/20) admitted to hospital medicine for presumed urinary tract infection. Per chart review, pt was diagnosed with a UTIat her Nursing Home, [facility], which she was prescribed Macrobid. She completed her course of Macrobid on 01/29/20 and she was noted to be hypotensive rendering her transfer to the hospital.      On admit, per chart review, pt stated she had a decreased appetite from the last month since her hospitalization over the holidays due to uncomplicated diverticulitis which she was treated with Augmentin. She reports feeling nauseated when taking antibiotics. She denies palpitations, dizziness, lightheadedness, chest pain, SOB, change in urinary/bowel habits.      Per chart review, ICU consulted in am of 01/31/20 for hypotension despite being resuscitated with 3.5 L IV fluids and persistant lactic acidosis in 3-4 range. She improved with IVF hydration and patient was stepped down. Rapid response evaluated patient early AM on 2/10 due to acute increase in oxygen requiriments. Patient began the day on 3L NC and progressed to requiring Comfort flow of 30L and 100% FiO2. Patient denies chest pains, only complaining of minor increased difficulty breathing.    Pt off of high flow O2 today and on 4 L NC.   Pressor have been weaned.        Hospital Course:  No notes on file    Interval History:  Pt has COPD and was admitted to ICU for acute respiratory issues. Pt  now on 4L O2 . Pt off pressor support. Pt want to remain full code.     Past Medical History:   Diagnosis Date    A-fib     GAVIN (acute kidney  injury)     CHF (congestive heart failure)     COPD (chronic obstructive pulmonary disease)     Diabetes mellitus     Hypertension        Past Surgical History:   Procedure Laterality Date    CARDIAC SURGERY      COLONOSCOPY N/A 2/4/2020    Procedure: COLONOSCOPY;  Surgeon: Gilberto Laguna MD;  Location: [facility] ([address] [place] [address]);  Service: Endoscopy;  Laterality: N/A;    ESOPHAGOGASTRODUODENOSCOPY N/A 2/7/2020    Procedure: EGD (ESOPHAGOGASTRODUODENOSCOPY);  Surgeon: Aleksey Gomez MD;  Location: [facility] ([address] [place] [address]);  Service: Endoscopy;  Laterality: N/A;       Review of patient's allergies indicates:   Allergen Reactions    Levsin [hyoscyamine sulfate] Hallucinations       Medications:  Continuous Infusions:    Scheduled Meds:   albuterol-ipratropium  3 mL Nebulization Q4H    budesonide  0.5 mg Nebulization BID    calcium-vitamin D3  1 tablet Oral BID    carbamide peroxide  5 drop Both Ears BID    dextromethorphan-guaifenesin  mg  1 tablet Oral BID    digoxin  0.125 mg Oral Daily    DULoxetine  30 mg Oral Daily    furosemide  40 mg Intravenous BID    magnesium oxide  400 mg Oral Daily    miconazole nitrate 2%   Topical (Top) BID    midodrine  10 mg Oral QHS    mirtazapine  15 mg Oral QHS    ondansetron  4 mg Intravenous Once    pantoprazole  40 mg Oral Daily    QUEtiapine  25 mg Oral QHS    senna-docusate 8.6-50 mg  1 tablet Oral BID    warfarin  1 mg Oral Every Tues, Thurs, Sat     PRN Meds:acetaminophen, albuterol-ipratropium, benzonatate, bisacodyL, Dextrose 10% Bolus, Dextrose 10% Bolus, glucagon (human recombinant), glucose, glucose, insulin aspart U-100, iohexol, melatonin, morphine, ondansetron, phenyleph-min oil-petrolatum, polyethylene glycol, promethazine (PHENERGAN) IVPB, senna, simethicone, sodium chloride 0.9%, sodium chloride 0.9%    Family History     Problem Relation (Age of Onset)    Heart disease Mother        Tobacco Use    Smoking status: Former Smoker      Packs/day: 1.00     Years: 54.00     Pack years: 54.00     Types: Cigarettes     Last attempt to quit: 2019     Years since quittin.2    Smokeless tobacco: Never Used   Substance and Sexual Activity    Alcohol use: Not Currently    Drug use: Not Currently    Sexual activity: Not Currently     Partners: Male       Review of Systems   Constitutional: Positive for activity change and fatigue.   HENT: Negative.    Eyes: Negative.    Respiratory: Positive for shortness of breath.    Cardiovascular: Negative.    Gastrointestinal: Negative.    Endocrine: Negative.    Genitourinary: Negative.    Musculoskeletal: Negative.    Skin: Positive for pallor.   Allergic/Immunologic: Negative.    Neurological: Positive for weakness.     Objective:     Vital Signs (Most Recent):  Temp: 96 °F (35.6 °C) (20 0437)  Pulse: 73 (20 0758)  Resp: 18 (20 0758)  BP: 128/60 (20 0437)  SpO2: 96 % (20 0758) Vital Signs (24h Range):  Temp:  [96 °F (35.6 °C)-98.2 °F (36.8 °C)] 96 °F (35.6 °C)  Pulse:  [71-91] 73  Resp:  [16-20] 18  SpO2:  [92 %-98 %] 96 %  BP: (105-128)/(52-60) 128/60     Weight: 73 kg (160 lb 15 oz)  Body mass index is 25.98 kg/m².    Review of Symptoms  Symptom Assessment (ESAS 0-10 scale)   ESAS 0 1 2 3 4 5 6 7 8 9 10   Pain X             Dyspnea    X          Anxiety              Nausea              Depression               Anorexia              Fatigue              Insomnia              Restlessness               Agitation              CAM / Delirium __ --  ___+   Constipation     __ --  ___+   Diarrhea           __ --  ___+  Bowel Management Plan (BMP): No      Pain Assessment:Pt reports she is comfortable.     OME in 24 hours: 0    Performance Status: 60    ECOG Performance Status Grade: 3 - Confined to bed or chair 50% of waking hours    Physical Exam   Constitutional: She is oriented to person, place, and time. She appears well-developed. She is cooperative. Nasal cannula in  place.   HENT:   Head: Normocephalic and atraumatic.   Eyes: Conjunctivae are normal.   Cardiovascular:   Pt off of epi   Pulmonary/Chest:   Pt on 2L O2 per NC   Abdominal: Normal appearance.   Musculoskeletal:   Pt has weakness to bilateral legs.      Neurological: She is alert and oriented to person, place, and time.   Skin: Skin is warm and dry.   Psychiatric: Her speech is normal and behavior is normal.       Significant Labs: All pertinent labs within the past 24 hours have been reviewed.  CBC:   Recent Labs   Lab 02/20/20  0709   WBC 4.82   HGB 8.8*   HCT 30.0*   *        BMP:  Recent Labs   Lab 02/20/20  0709   GLU 75      K 4.0   CL 95   CO2 40*   BUN 7*   CREATININE 0.6   CALCIUM 7.7*   MG 1.7     LFT:  Lab Results   Component Value Date    AST 49 (H) 02/20/2020    ALKPHOS 230 (H) 02/20/2020    BILITOT 0.6 02/20/2020     Albumin:   Albumin   Date Value Ref Range Status   02/20/2020 1.4 (L) 3.5 - 5.2 g/dL Final     Protein:   Total Protein   Date Value Ref Range Status   02/20/2020 4.5 (L) 6.0 - 8.4 g/dL Final     Lactic acid:   Lab Results   Component Value Date    LACTATE 1.1 02/06/2020    LACTATE 1.1 02/05/2020       Significant Imaging: I have reviewed all pertinent imaging results/findings within the past 24 hours.    Advance Care Planning   Advanced Directives::  Living Will: No  LaPOST: No  Do Not Resuscitate Status: No  Medical Power of :  Completed with pt today.     Decision-Making Capacity: Patient answered questions       Living Arrangements:NH     Psychosocial/Cultural:   Lives in nursing home with her  at [facility]. She has four adult children. Two adult children live OOT.       Spiritual: yes    F- Florecita and Belief: Tenriism    I - Importance:   .  C - Community:     A - Address in Care: Will have  visit. Interval History:           > 50% of 35 min visit spent in chart review, face to face discussion of goals of care,  symptom assessment,  coordination of care and emotional support.    Kristina Piper, CNS  Palliative Medicine  [facility]

## 2020-02-20 NOTE — PROGRESS NOTES
PHARMACY CONSULT NOTE: WARFARIN    Elizabeth Cano is a 69 y.o. female on warfarin therapy for Atrial Fibrilation PharmD has been consulted for warfarin dosing.    Current order: hold   Home dose: 1mg every Mon, Wed, Fri   INR goal: 2-3   Coumadin clinic enrollment: None- unsure who follows patient's INR     Lab Results   Component Value Date    INR 1.9 (H) 02/20/2020    INR 2.1 (H) 02/19/2020    INR 2.7 (H) 02/18/2020     Diet: Low Sodium     Recommendation(s):    Continue warfarin 1 mg every Tues, Thur, Sat   Pharmacy will continue to follow and monitor warfarin    Thank you for the consult,  Jada Gonsalez  Extension [phone]    **Note: Consults are reviewed Monday-Friday 7:00am-3:30pm. THE ABOVE RECOMMENDATIONS ARE ONLY SUGGESTED.THE RECOMMENDATIONS SHOULD BE CONSIDERED IN CONJUNCTION WITH ALL PATIENT FACTORS. **

## 2020-02-20 NOTE — ASSESSMENT & PLAN NOTE
Impression: Pt is a 70 y/o female  with afib (on coumadin), COPD, and HFpEF (EF 45% on 1/29/20) admitted to hospital medicine for presumed urinary tract infection. Pt transferred to ICU for Acute hypoxemic respiratory failure: CTA negative for PE. Pt on 4L O2 NC. Pt is a full code. Pt is alert, oriented to person, place, and situation.     Reasons for consult: advance care planning.     Advance care planning:  Today:   Pt's goals is to go back to [facility] with PT. PT has been working with pt and recommend SNF.   Living Will paperwork also given to pt who wants to look over- [facility]. Pt to look over and speak to family.     Spoke to Dr. Smith concerning plan of care.    2/11/2020:  Met with pt along with Apolonia Coto LCSW. Pt reports she lives at [facility] with her  in the same room. Per pt, she was living in an independent living apartment with her . Per wife, they needed more support and they moved to a NH. Per pt, she is mostly in wheelchair but walks some. Per pt goal is to go back to NH with possible SNF. Pt aware she will continue to decline with her COPD and CHF. Pt aware comfort care is an option as she declines further.       Symptom management:   Pt denies pain, nausea, anxiety.     Dyspnea:   Per pt SOB noted with exertion:   Pt on 4L NC.     Recs:  Consider Roxanol 2.5 mg q 4 hrs prn dyspnea.     Plan:   MPOA paperwork completed today.   [facility] booklet given to opt.   Will continue to reinforce realistic expectations.   Per pt plan is back to NH with SNF.   Will follow.

## 2020-02-20 NOTE — SUBJECTIVE & OBJECTIVE
Interval History:  Pt has COPD and was admitted to ICU for acute respiratory issues. Pt  now on 4L O2 . Pt off pressor support. Pt want to remain full code.     Past Medical History:   Diagnosis Date    A-fib     GAVIN (acute kidney injury)     CHF (congestive heart failure)     COPD (chronic obstructive pulmonary disease)     Diabetes mellitus     Hypertension        Past Surgical History:   Procedure Laterality Date    CARDIAC SURGERY      COLONOSCOPY N/A 2/4/2020    Procedure: COLONOSCOPY;  Surgeon: Gilberto Laguna MD;  Location: [facility] ([address] [place] [address]);  Service: Endoscopy;  Laterality: N/A;    ESOPHAGOGASTRODUODENOSCOPY N/A 2/7/2020    Procedure: EGD (ESOPHAGOGASTRODUODENOSCOPY);  Surgeon: Aleksey Gomez MD;  Location: [facility] ([address] [place] [address]);  Service: Endoscopy;  Laterality: N/A;       Review of patient's allergies indicates:   Allergen Reactions    Levsin [hyoscyamine sulfate] Hallucinations       Medications:  Continuous Infusions:    Scheduled Meds:   albuterol-ipratropium  3 mL Nebulization Q4H    budesonide  0.5 mg Nebulization BID    calcium-vitamin D3  1 tablet Oral BID    carbamide peroxide  5 drop Both Ears BID    dextromethorphan-guaifenesin  mg  1 tablet Oral BID    digoxin  0.125 mg Oral Daily    DULoxetine  30 mg Oral Daily    furosemide  40 mg Intravenous BID    magnesium oxide  400 mg Oral Daily    miconazole nitrate 2%   Topical (Top) BID    midodrine  10 mg Oral QHS    mirtazapine  15 mg Oral QHS    ondansetron  4 mg Intravenous Once    pantoprazole  40 mg Oral Daily    QUEtiapine  25 mg Oral QHS    senna-docusate 8.6-50 mg  1 tablet Oral BID    warfarin  1 mg Oral Every Tues, Thurs, Sat     PRN Meds:acetaminophen, albuterol-ipratropium, benzonatate, bisacodyL, Dextrose 10% Bolus, Dextrose 10% Bolus, glucagon (human recombinant), glucose, glucose, insulin aspart U-100, iohexol, melatonin, morphine, ondansetron, phenyleph-min oil-petrolatum, polyethylene glycol,  promethazine (PHENERGAN) IVPB, senna, simethicone, sodium chloride 0.9%, sodium chloride 0.9%    Family History     Problem Relation (Age of Onset)    Heart disease Mother        Tobacco Use    Smoking status: Former Smoker     Packs/day: 1.00     Years: 54.00     Pack years: 54.00     Types: Cigarettes     Last attempt to quit: 2019     Years since quittin.2    Smokeless tobacco: Never Used   Substance and Sexual Activity    Alcohol use: Not Currently    Drug use: Not Currently    Sexual activity: Not Currently     Partners: Male       Review of Systems   Constitutional: Positive for activity change and fatigue.   HENT: Negative.    Eyes: Negative.    Respiratory: Positive for shortness of breath.    Cardiovascular: Negative.    Gastrointestinal: Negative.    Endocrine: Negative.    Genitourinary: Negative.    Musculoskeletal: Negative.    Skin: Positive for pallor.   Allergic/Immunologic: Negative.    Neurological: Positive for weakness.     Objective:     Vital Signs (Most Recent):  Temp: 96 °F (35.6 °C) (20 0437)  Pulse: 73 (20 0758)  Resp: 18 (20 0758)  BP: 128/60 (20 0437)  SpO2: 96 % (20 0758) Vital Signs (24h Range):  Temp:  [96 °F (35.6 °C)-98.2 °F (36.8 °C)] 96 °F (35.6 °C)  Pulse:  [71-91] 73  Resp:  [16-20] 18  SpO2:  [92 %-98 %] 96 %  BP: (105-128)/(52-60) 128/60     Weight: 73 kg (160 lb 15 oz)  Body mass index is 25.98 kg/m².    Review of Symptoms  Symptom Assessment (ESAS 0-10 scale)   ESAS 0 1 2 3 4 5 6 7 8 9 10   Pain X             Dyspnea    X          Anxiety              Nausea              Depression               Anorexia              Fatigue              Insomnia              Restlessness               Agitation              CAM / Delirium __ --  ___+   Constipation     __ --  ___+   Diarrhea           __ --  ___+  Bowel Management Plan (BMP): No      Pain Assessment:Pt reports she is comfortable.     OME in 24 hours: 0    Performance Status:  60    ECOG Performance Status Grade: 3 - Confined to bed or chair 50% of waking hours    Physical Exam   Constitutional: She is oriented to person, place, and time. She appears well-developed. She is cooperative. Nasal cannula in place.   HENT:   Head: Normocephalic and atraumatic.   Eyes: Conjunctivae are normal.   Cardiovascular:   Pt off of epi   Pulmonary/Chest:   Pt on 2L O2 per NC   Abdominal: Normal appearance.   Musculoskeletal:   Pt has weakness to bilateral legs.      Neurological: She is alert and oriented to person, place, and time.   Skin: Skin is warm and dry.   Psychiatric: Her speech is normal and behavior is normal.       Significant Labs: All pertinent labs within the past 24 hours have been reviewed.  CBC:   Recent Labs   Lab 02/20/20  0709   WBC 4.82   HGB 8.8*   HCT 30.0*   *        BMP:  Recent Labs   Lab 02/20/20  0709   GLU 75      K 4.0   CL 95   CO2 40*   BUN 7*   CREATININE 0.6   CALCIUM 7.7*   MG 1.7     LFT:  Lab Results   Component Value Date    AST 49 (H) 02/20/2020    ALKPHOS 230 (H) 02/20/2020    BILITOT 0.6 02/20/2020     Albumin:   Albumin   Date Value Ref Range Status   02/20/2020 1.4 (L) 3.5 - 5.2 g/dL Final     Protein:   Total Protein   Date Value Ref Range Status   02/20/2020 4.5 (L) 6.0 - 8.4 g/dL Final     Lactic acid:   Lab Results   Component Value Date    LACTATE 1.1 02/06/2020    LACTATE 1.1 02/05/2020       Significant Imaging: I have reviewed all pertinent imaging results/findings within the past 24 hours.    Advance Care Planning   Advanced Directives::  Living Will: No  LaPOST: No  Do Not Resuscitate Status: No  Medical Power of :  Completed with pt today.     Decision-Making Capacity: Patient answered questions       Living Arrangements:NH     Psychosocial/Cultural:   Lives in nursing home with her  at [facility]. She has four adult children. Two adult children live OOT.       Spiritual: yes    F- Florecita and Belief:  Terry BRICEÑO - Importance:   .  C - Community:     A - Address in Care: Will have  visit. Interval History:

## 2020-02-20 NOTE — PROGRESS NOTES
[facility] Hospital Medicine  Progress Note    Patient Name: Elizabeth Cano  MRN: [id number]  Patient Class: IP- Inpatient   Admission Date: 1/29/2020  Length of Stay: 22 days  Attending Physician: Azra Smith MD  Primary Care Provider: Cesar Reeves MD    [facility] Medicine Team: [facility]P MED D Azra Smith MD    Subjective:     Principal Problem:Septic shock    Interval History:   Chief Complaint   Patient presents with    Multiple Complaints     uti finished antibiotics,      Follow up visit for Septic shock    History / Events Overnight: No significant events reported by Nursing. Patient with no new complaints.    Data reviewed 2/20/2020:  H&H stable; INR near therapeutic.    Review of Systems   Constitutional: Negative for fever.   Respiratory: Negative for shortness of breath.      Objective:     Vital Signs (Most Recent):  Temp: 98.9 °F (37.2 °C) (02/20/20 1602)  Pulse: 83 (02/20/20 1602)  Resp: 18 (02/20/20 1602)  BP: (!) 119/55 (02/20/20 1602)  SpO2: 97 % (02/20/20 1602) Vital Signs (24h Range):  Temp:  [96 °F (35.6 °C)-98.9 °F (37.2 °C)] 98.9 °F (37.2 °C)  Pulse:  [71-91] 83  Resp:  [16-20] 18  SpO2:  [92 %-98 %] 97 %  BP: (105-128)/(52-60) 119/55     Weight: 73 kg (160 lb 15 oz)  Body mass index is 25.98 kg/m².    Intake/Output Summary (Last 24 hours) at 2/20/2020 1613  Last data filed at 2/20/2020 0500  Gross per 24 hour   Intake 120 ml   Output 1300 ml   Net -1180 ml      Physical Exam   Constitutional: She is cooperative. No distress.   Eyes: Conjunctivae and lids are normal. No scleral icterus.   Cardiovascular: Normal rate, regular rhythm, S1 normal and S2 normal.   Pulmonary/Chest: No respiratory distress. She has no wheezes. She has no rhonchi.   Abdominal: Soft. Normal appearance and bowel sounds are normal. There is no tenderness.   Musculoskeletal: Normal range of motion. She exhibits edema.   Neurological: She is alert. She is not disoriented.   Skin:  Skin is warm and dry. No cyanosis. No pallor.       Significant Labs:   A1C:   Recent Labs   Lab 12/19/19  2220 01/30/20  0421   HGBA1C 6.4* 5.4     CBC:   Recent Labs   Lab 02/19/20  0653 02/20/20  0709   WBC 4.29 4.82   HGB 9.0* 8.8*   HCT 30.7* 30.0*    199     CMP:   Recent Labs   Lab 02/19/20  0653 02/20/20  0709    142   K 3.8 4.0   CL 97 95   CO2 38* 40*   GLU 81 75   BUN 9 7*   CREATININE 0.6 0.6   CALCIUM 7.6* 7.7*   PROT 4.7* 4.5*   ALBUMIN 1.6* 1.4*   BILITOT 0.5 0.6   ALKPHOS 251* 230*   AST 51* 49*   ALT 19 19   ANIONGAP 8 7*   EGFRNONAA >60.0 >60.0     Coagulation:   Recent Labs   Lab 02/20/20  0709   INR 1.9*     Magnesium:   Recent Labs   Lab 02/19/20  0653 02/20/20  0709   MG 1.9 1.7     POCT Glucose:   Recent Labs   Lab 02/19/20  1643 02/19/20  2131 02/20/20  1225   POCTGLUCOSE 72 78 81     TSH:   Recent Labs   Lab 02/07/20  1757   TSH 1.967       Assessment/Plan:      Active Diagnoses:    Diagnosis Date Noted POA    PRINCIPAL PROBLEM:  Septic shock [A41.9, R65.21] 02/01/2020 Yes    Urinary retention [R33.9] 02/19/2020 Yes    Dyspnea [R06.00]  Yes    Severe malnutrition [E43] 02/14/2020 Yes    Palliative care encounter [Z51.5] 02/11/2020 Not Applicable    Acute hypoxemic respiratory failure [J96.01] 02/11/2020 Yes    Head lice [B85.0] 02/11/2020 Yes    Advance care planning [Z71.89]  Not Applicable    Goals of care, counseling/discussion [Z71.89]  Not Applicable    Aspiration pneumonia [J69.0] 02/09/2020 No    Hypovolemia [E86.1] 02/07/2020 Yes    Bilious vomiting [R11.14] 02/06/2020 Yes    Thrombocytopenia [D69.6] 02/06/2020 No    Anemia [D64.9] 02/04/2020 Yes    Liver lesion, right lobe [K76.9] 01/31/2020 Yes    Hypotension [I95.9] 01/30/2020 Yes    Diverticulitis [K57.92] 01/30/2020 Yes    Hypocalcemia [E83.51] 01/29/2020 Yes    GAVIN (acute kidney injury) [N17.9] 01/29/2020 Yes    Bifascicular block [I45.2] 01/02/2020 Yes    Hypomagnesemia [E83.42] 12/20/2019 Yes     COPD (chronic obstructive pulmonary disease) [J44.9] 12/19/2019 Yes    Chronic heart failure with preserved ejection fraction [I50.32] 12/19/2019 Yes    Paroxysmal atrial fibrillation [I48.0] 12/19/2019 Yes    Type 2 diabetes mellitus with hyperglycemia, without long-term current use of insulin [E11.65] 12/19/2019 Yes      Problems Resolved During this Admission:    Diagnosis Date Noted Date Resolved POA    Delirium [R41.0] 02/07/2020 02/20/2020 No    Visual hallucinations [R44.1] 02/06/2020 02/20/2020 No    Hematemesis [K92.0] 02/06/2020 02/20/2020 No    Acute cystitis without hematuria [N30.00] 01/29/2020 02/20/2020 Yes       Overview / ICU Course:    Elizabeth Cano is a 69 y.o. female with medical history significant for A-fib (on coumadin), COPD, and HFpEF (EF 45%)  admitted for Septic shock.    Inpatient Medications Prescribed for Management of Current Problems:     Scheduled Meds:    albuterol-ipratropium  3 mL Nebulization Q4H    budesonide  0.5 mg Nebulization BID    calcium-vitamin D3  1 tablet Oral BID    carbamide peroxide  5 drop Both Ears BID    dextromethorphan-guaifenesin  mg  1 tablet Oral BID    digoxin  0.125 mg Oral Daily    DULoxetine  30 mg Oral Daily    furosemide  40 mg Intravenous BID    magnesium oxide  400 mg Oral Daily    miconazole nitrate 2%   Topical (Top) BID    midodrine  10 mg Oral QHS    mirtazapine  15 mg Oral QHS    ondansetron  4 mg Intravenous Once    pantoprazole  40 mg Oral Daily    QUEtiapine  25 mg Oral QHS    senna-docusate 8.6-50 mg  1 tablet Oral BID    warfarin  1 mg Oral Every Tues, Thurs, Sat     Continuous Infusions:   As Needed: acetaminophen, albuterol-ipratropium, benzonatate, bisacodyL, Dextrose 10% Bolus, Dextrose 10% Bolus, glucagon (human recombinant), glucose, glucose, insulin aspart U-100, iohexol, melatonin, morphine, ondansetron, phenyleph-min oil-petrolatum, polyethylene glycol, promethazine (PHENERGAN) IVPB, senna,  simethicone, sodium chloride 0.9%, sodium chloride 0.9%    Assessment and Plan by Problem    Acute hypoxemic respiratory failure  Pneumonia   Pulmonary edema  - Likely secondary to decompensated HF that has responded well with diureses.  - Weaned Oxygen from comfort flow down to 2L via NC.   -CXR 02/121 with Multifocal patchy pulmonary parenchymal disease is present throughout both lungs, worse in the right lung than the left. Unchanged from prior.   - on Lasix IV 40 mg BID   - strict I/Os.  - Chest CT non contrast (2/13) did not show significant pulmonary edema but did show evidence of interstitial lung disease and pulmonary hypertension   - On 3L NC. Goal O2 88-92%. Wean as tolerated  - if unable to wean oxygen after adequate diuresis, consider Pulmonology consult and 6MWT  - completed 7 day course of Zosyn     Hypotension  Septic shock, unclear etiology but likely diverticulitis - resolved    UTI due to Candida albicans   - Patient with acute hypotension and worsening O2 requirements over the course of 2/9/2020. Started the day on 3L NC and by the end was on Comfort flow 100% FiO2 and 30L. Concern for PE. CTA negative for PE, CXR also with severe pleural edema. Patient had been on Zosyn for possible diverticulitis +/- urosepsis.   - Vancomycin discontinued; completed Zosyn x7 day   - Patient on minimal levophed for MAP < 65. Patient appears to have baseline low BP's, mentates well with a MAP > 60. Possible infectious etiology given worsening hypoxemia / shock, CT showing compressive atelectasis vs. Developing consolidation in the left lower lobe.  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 Ordered purewick since patient incontinent   - Patient hypervolemic, significant pulmonary edema on CXR  - Monitor daily electrolytes      Decreased po intake  - nutrition consulted.  - Boost Plus TID      Paroxysmal atrial fibrillation  - Patient in RVR presumed due to acute respiratory failure  - PRN metoprolol as needed for rate control but would not aggressively treat in setting of severe hypoxemia and hypotension  - INR initially subtherapeutic and was on heparin bridge w/ warfarin.   - Previously INR supratherapeutic. Resumed warfarin on 2/18 since INR now therapeutic   - Metoprolol 50mg BID discontinued given nightly hypotensive episdoes requiring transient levophed, also received digoxin 250mcg for rate control  - Maintain Mg >2, K>4.      Type 2 diabetes mellitus with hyperglycemia, without long-term current use of insulin  - AIC 5.4%  - Glucose checks QACHS  - Goal Glucose 140-180 mg/dL  - Diabetic diet     Head lice  -S/p 1 permethrin lotion treatment.   - Active lice visualized today despite permethrin treatment and reported comb-niting.   - Actively considering other options to help eradicate head lice infestation, though patient will require aggressive comb-niting   -  lindane shampoo and permethrin as needed   - continue contact isolation      Hypocalcemia - resolved   -Continue with home calcium-Vit D     Hypomagnesemia  -Replete PRN  -Continue home magnesium oxide.      Liver lesion, right lobe  - possible concern of abscess vs malignancy per CT scan  - h/o weight loss and possible colonic lesion  - RUQ US showing focal fatty infiltration     Diverticulitis - resolved   - CT consistent with recurrent diverticulitis  - CRS consulted during this hospitalization - recommend  management with antibiotics.   - Continued Zosyn for 7 days     Debiilty  - PT/OT recommend SNF    Discharge plan and follow up  [facility]  [facility] 2/21/2020  Previous admission:  12/19/19  Goals of Care:  General Prognosis: fair  Goals: Curative  Comfort Only: No  Hospice: No  Code Status: Full Code    Diet: Diet Low Sodium, 2gm [facility]; Fluid - 1500mL  GI Prophylaxis: Indicated due to multiple minor risk factors  Significant LDAs:   IV Access Type: Mid-line  Urinary Catheter Indication if present: Urinary Retention  Other Lines/Tubes/Drains:    VTE Risk Mitigation (From admission, onward)         Ordered     warfarin (COUMADIN) tablet 1 mg  Every Tues, Thurs, Sat      02/18/20 1452     IP VTE HIGH RISK PATIENT  Once      01/29/20 1916     Reason for No Pharmacological VTE Prophylaxis  Once     Question:  Reasons:  Answer:  Already adequately anticoagulated on oral Anticoagulants    01/29/20 1916                Azra Smith MD  Department of Hospital Medicine   [facility]

## 2020-02-20 NOTE — PLAN OF CARE
Problem: Adult Inpatient Plan of Care  Goal: Plan of Care Review  Outcome: Ongoing, Progressing  Patient turned and repositioned self independently. No skin breakdown noted. Patient pain and safety monitored q 1-2 hrs this shift. Blood glucose monitored throughout shift. Bedrest maintained. Bed locked and in lowest position. Bed side rails elevated x 2. Call light and personal belongings in reach. Carpio patent and draining. Contact precautions maintained. Will cont. to monitor.

## 2020-02-20 NOTE — PT/OT/SLP PROGRESS
Physical Therapy      Patient Name:  Elizabeth Cano   MRN:  [id number]    Patient not seen today secondary to Patient declining to participate on two attempts despite encouragement and education. Will follow-up on next scheduled visit.    Luis Cui, PTA

## 2020-02-20 NOTE — PLAN OF CARE
Pt seen and goals remain appropriate. Afasneh Gunderson, SPT      Problem: Physical Therapy Goal  Goal: Physical Therapy Goal  Description  Goals to be met by: 2020     Patient will increase functional independence with mobility by performin. Supine to sit with Contact Guard Assistance - met 2020   2. Sit to supine with Contact Guard Assistance - GOAL MET  3. Sit to stand transfer with Contact Guard Assistance using LRAD  4. Bed to chair transfer with Contact Guard Assistance using Rolling Walker  5. Gait  x 25 feet with Contact Guard Assistance using Rolling Walker.   6. Lower extremity exercise program x20 reps per handout, with independence        Outcome: Ongoing, Progressing

## 2020-02-20 NOTE — CONSULTS
[facility]  Urology  Consult Note    Patient Name: Elizabeth Cano  MRN: [id number]  Admission Date: 1/29/2020  Hospital Length of Stay: 21   Code Status: Full Code   Attending Provider: Mendoza Caldwell MD   Consulting Provider: Khalif Verdin MD  Primary Care Physician: Cesar Reeves MD  Principal Problem:Septic shock    Inpatient consult to Urology  Consult performed by: Khalif Verdin MD  Consult ordered by: Mendoza Caldwell MD          Subjective:     HPI:  Elizabeth Cano is a 69 y.o. female with A-fib (on coumadin), COPD, and HFpEF (EF 45%) admitted to [facility] for hypotension, respiratory distress, and elevated lactate requiring ICU admission. Vitals have since improved and she has been stepped down to the floor. Prior to admission was diagnosed with a UTI at her Nursing Home, and was prescribed Macrobid. Unclear if a UA was done at that time. Urine culture from 2/7 is growing Candida. Blood cultures are NGTD. Creatinine is 0.6, WBC count is wnl. CT from 1/30/20 shows no evidence of hydronephrosis, but moderately distended bladder. Renal US 2/18 also shows no evidence of hydronephrosis. Recent hospitalization for uncomplicated diverticulitis. UOP since admission is not well documented until Purewick device was used. She then had a random bladder scan which showed 2/16 with 700 cc. She then had 2 intermittent caths and then aguirre was placed on 2/18 with return of 600 cc of urine. Urology is consulted for assistance with retention    Past Medical History:   Diagnosis Date    A-fib     GAVIN (acute kidney injury)     CHF (congestive heart failure)     COPD (chronic obstructive pulmonary disease)     Diabetes mellitus     Hypertension        Past Surgical History:   Procedure Laterality Date    CARDIAC SURGERY      COLONOSCOPY N/A 2/4/2020    Procedure: COLONOSCOPY;  Surgeon: Gilberto Laguna MD;  Location: [facility] ([address] [place] [address]);  Service: Endoscopy;  Laterality: N/A;     ESOPHAGOGASTRODUODENOSCOPY N/A 2020    Procedure: EGD (ESOPHAGOGASTRODUODENOSCOPY);  Surgeon: Aleksey Gomez MD;  Location: [address]);  Service: Endoscopy;  Laterality: N/A;       Review of patient's allergies indicates:   Allergen Reactions    Levsin [hyoscyamine sulfate] Hallucinations       Family History     Problem Relation (Age of Onset)    Heart disease Mother          Tobacco Use    Smoking status: Former Smoker     Packs/day: 1.00     Years: 54.00     Pack years: 54.00     Types: Cigarettes     Last attempt to quit: 2019     Years since quittin.2    Smokeless tobacco: Never Used   Substance and Sexual Activity    Alcohol use: Not Currently    Drug use: Not Currently    Sexual activity: Not Currently     Partners: Male       Review of Systems   Constitutional: Negative for chills and fever.   HENT: Negative for sore throat and trouble swallowing.    Eyes: Negative for pain and discharge.   Respiratory: Positive for shortness of breath. Negative for wheezing.    Cardiovascular: Positive for leg swelling. Negative for palpitations.   Gastrointestinal: Positive for nausea. Negative for abdominal pain, diarrhea and vomiting.   Genitourinary:        As per HPI   Musculoskeletal: Negative for back pain and joint swelling.   Skin: Negative for rash and wound.   Neurological: Negative for seizures, weakness and headaches.   Psychiatric/Behavioral: Negative for hallucinations. The patient is not nervous/anxious.      Objective:     Temp:  [98.1 °F (36.7 °C)-98.8 °F (37.1 °C)] 98.1 °F (36.7 °C)  Pulse:  [] 88  Resp:  [16-19] 16  SpO2:  [86 %-97 %] 94 %  BP: ()/(53-60) 111/57     Body mass index is 25.98 kg/m².    Date 20 0700 - 20 0659   Shift 9578-7371 9909-5113 6776-3174 24 Hour Total   INTAKE   P.O. 0   0   Shift Total(mL/kg) 0(0)   0(0)   OUTPUT   Shift Total(mL/kg)       Weight (kg) 73 73 73 73     Bladder Scan Volume (mL): 475 mL (20 1400)    Drains      Drain                 Urethral Catheter 02/18/20 1438 Non-latex 16 Fr. 1 day                Physical Exam   Nursing note and vitals reviewed.  Constitutional: She is oriented to person, place, and time. Vital signs are normal. She appears well-developed and well-nourished. No distress.   HENT:   Head: Normocephalic and atraumatic.   Eyes: Right eye exhibits no discharge. Left eye exhibits no discharge. No scleral icterus.   Neck: Trachea normal. Neck supple. No tracheal deviation present.   Cardiovascular: Normal rate and intact distal pulses.    Pulmonary/Chest: Effort normal. No accessory muscle usage or stridor. No respiratory distress.   NC   Abdominal: Soft. She exhibits no distension. There is no splenomegaly or hepatomegaly. There is no tenderness. There is no CVA tenderness. No hernia.   Musculoskeletal: She exhibits edema (2+ BLE). She exhibits no deformity.   Neurological: She is alert and oriented to person, place, and time.   Skin: Skin is warm and dry. No lesion and no rash noted. No cyanosis.     Psychiatric: She has a normal mood and affect. Her behavior is normal. Judgment and thought content normal.     Significant Labs:    BMP:  Recent Labs   Lab 02/17/20  0652 02/18/20  0712 02/19/20  0653    141 143   K 2.8* 4.1 3.8   CL 98 97 97   CO2 34* 38* 38*   BUN 11 10 9   CREATININE 0.8 0.7 0.6   CALCIUM 7.5* 7.6* 7.6*       CBC:  Recent Labs   Lab 02/17/20  0652 02/18/20  0712 02/19/20  0653   WBC 4.83 4.56 4.29   HGB 9.0* 8.3* 9.0*   HCT 29.7* 28.0* 30.7*    180 203       All pertinent labs results from the past 24 hours have been reviewed.    Significant Imaging:  All pertinent imaging results/findings from the past 24 hours have been reviewed.        Assessment and Plan:     Urinary retention  No previous history of urinary retention or urinary symptoms prior to admission / over the past few days    Recommend continued work with PT/OT  Encourage ambulation  Bowel regimen  Maintain aguirre,  recommend follow up in 1 week with voiding trial.        VTE Risk Mitigation (From admission, onward)         Ordered     warfarin (COUMADIN) tablet 1 mg  Every Tues, Thurs, Sat      02/18/20 1452     IP VTE HIGH RISK PATIENT  Once      01/29/20 1916     Reason for No Pharmacological VTE Prophylaxis  Once     Question:  Reasons:  Answer:  Already adequately anticoagulated on oral Anticoagulants    01/29/20 1916                Thank you for your consult. I will sign off. Please contact us if you have any additional questions.    Khalif Verdin MD  Urology  [facility]

## 2020-02-21 NOTE — PLAN OF CARE
Problem: Occupational Therapy Goal  Goal: Occupational Therapy Goal  Description  Goals to be met by: 2/26    Patient will increase functional independence with ADLs by performing:    UE Dressing while seated EOB with Set-up Assistance.  LE Dressing (socks, brief) with min(A).  Grooming while seated at sink with set up.  Toileting from BSC with CGA.  Toilet transfer to BSC with CGA.  Functional mobility at household distance for ADL task with AD and min(A).   Continue OT POC.  Bruna Noriega OT  /2/21/2020    Outcome: Ongoing, Progressing

## 2020-02-21 NOTE — ASSESSMENT & PLAN NOTE
Impression: Pt is a 68 y/o female  with afib (on coumadin), COPD, and HFpEF (EF 45% on 1/29/20) admitted to hospital medicine for presumed urinary tract infection. Pt transferred to ICU for Acute hypoxemic respiratory failure: CTA negative for PE. Pt on 4L O2 NC. Pt is a full code. Pt is alert, oriented to person, place, and situation.     Reasons for consult: advance care planning.     Advance care planning:  Today:   Pt's goals is to go back to [facility] with PT. PT has been working with pt and recommend SNF.   Living Will paperwork also given to pt who wants to look over- [facility]. Per pt she has spoken to family about [facility] and will complete after d/c.       2/11/2020:  Met with pt along with Apolonia Coto LCSW. Pt reports she lives at [facility] with her  in the same room. Per pt, she was living in an independent living apartment with her . Per wife, they needed more support and they moved to a NH. Per pt, she is mostly in wheelchair but walks some. Per pt goal is to go back to NH with possible SNF. Pt aware she will continue to decline with her COPD and CHF. Pt aware comfort care is an option as she declines further.       Symptom management:   Pt denies pain, nausea, anxiety.     Dyspnea:   Per pt SOB noted with exertion:   Pt on 4L NC.     Recs:  Consider Roxanol 2.5 mg q 4 hrs prn dyspnea.     Plan:   MPOA paperwork completed today.   [facility] booklet given to opt.   Per pt plan is back to NH with SNF.   Pal care will sign off. Please re consult if needed.

## 2020-02-21 NOTE — PROGRESS NOTES
PHARMACY CONSULT NOTE: WARFARIN    Elizabeth Cano is a 69 y.o. female on warfarin therapy for Atrial Fibrilation PharmD has been consulted for warfarin dosing.    Current order: warfarin 1 mg every Tues, Thur, Sat  Home dose: 1mg every Mon, Wed, Fri   INR goal: 2-3   Coumadin clinic enrollment: not currently enrolled, unsure who follows patient's INR outpatient    Lab Results   Component Value Date    INR 1.7 (H) 02/21/2020    INR 1.9 (H) 02/20/2020    INR 2.1 (H) 02/19/2020     Diet: Low Sodium     Recommendation(s):    Increase warfarin to 1 mg every other day (start today)   Pharmacy will continue to follow and monitor warfarin    Thank you for the consult,  Jada Gonsalez  Extension [phone]    **Note: Consults are reviewed Monday-Friday 7:00am-3:30pm. THE ABOVE RECOMMENDATIONS ARE ONLY SUGGESTED.THE RECOMMENDATIONS SHOULD BE CONSIDERED IN CONJUNCTION WITH ALL PATIENT FACTORS. **

## 2020-02-21 NOTE — PROGRESS NOTES
[facility] Hospital Medicine  Progress Note    Patient Name: Elizabeth Cano  MRN: [id number]  Patient Class: IP- Inpatient   Admission Date: 1/29/2020  Length of Stay: 23 days  Attending Physician: Azra Smith MD  Primary Care Provider: Cesar Reeves MD    [facility] Medicine Team: [facility] Azra Smith MD    Subjective:     Principal Problem:Septic shock    Interval History:   Chief Complaint   Patient presents with    Multiple Complaints     uti finished antibiotics,      Follow up visit for Septic shock    History / Events Overnight: No significant events reported by Nursing. Patient complains of persistent L ear hearing loss despite carbamide peroxide. Patient refusing BiPAP.    Data reviewed 2/21/2020:  H&H stable; INR near therapeutic. Bicarb uptrending.    Review of Systems   Constitutional: Negative for fever.   Respiratory: Negative for shortness of breath.      Objective:     Vital Signs (Most Recent):  Temp: 98.3 °F (36.8 °C) (02/21/20 1318)  Pulse: 73 (02/21/20 1630)  Resp: 16 (02/21/20 1630)  BP: (!) 145/67 (02/21/20 1318)  SpO2: 97 % (02/21/20 1630) Vital Signs (24h Range):  Temp:  [97.5 °F (36.4 °C)-99.2 °F (37.3 °C)] 98.3 °F (36.8 °C)  Pulse:  [70-85] 73  Resp:  [16-18] 16  SpO2:  [92 %-100 %] 97 %  BP: (113-145)/(58-67) 145/67     Weight: 73 kg (160 lb 15 oz)  Body mass index is 25.98 kg/m².    Intake/Output Summary (Last 24 hours) at 2/21/2020 1709  Last data filed at 2/21/2020 1300  Gross per 24 hour   Intake 520 ml   Output 1700 ml   Net -1180 ml      Physical Exam   Constitutional: She is cooperative. No distress.   Eyes: Conjunctivae and lids are normal. No scleral icterus.   Cardiovascular: Normal rate, regular rhythm, S1 normal and S2 normal.   Pulmonary/Chest: No respiratory distress. She has no wheezes. She has no rhonchi.   Abdominal: Soft. Normal appearance and bowel sounds are normal. There is no tenderness.   Musculoskeletal: Normal range of  motion. She exhibits edema.   Neurological: She is alert. She is not disoriented.   Skin: Skin is warm and dry. No cyanosis. No pallor.       Significant Labs:   A1C:   Recent Labs   Lab 12/19/19  2220 01/30/20  0421   HGBA1C 6.4* 5.4     CBC:   Recent Labs   Lab 02/20/20  0709 02/21/20  0738   WBC 4.82 4.25   HGB 8.8* 9.1*   HCT 30.0* 30.9*    227     CMP:   Recent Labs   Lab 02/20/20  0709 02/21/20  0738    144   K 4.0 3.9   CL 95 96   CO2 40* 42*   GLU 75 75   BUN 7* 7*   CREATININE 0.6 0.6   CALCIUM 7.7* 7.5*   PROT 4.5* 4.6*   ALBUMIN 1.4* 1.5*   BILITOT 0.6 0.5   ALKPHOS 230* 233*   AST 49* 54*   ALT 19 21   ANIONGAP 7* 6*   EGFRNONAA >60.0 >60.0     Coagulation:   Recent Labs   Lab 02/21/20  0738   INR 1.7*     Magnesium:   Recent Labs   Lab 02/20/20  0709 02/21/20  0738   MG 1.7 1.7     POCT Glucose:   Recent Labs   Lab 02/20/20  1225 02/20/20  2059 02/21/20  1205   POCTGLUCOSE 81 84 86     TSH:   Recent Labs   Lab 02/07/20  1757   TSH 1.967       Assessment/Plan:      Active Diagnoses:    Diagnosis Date Noted POA    PRINCIPAL PROBLEM:  Septic shock [A41.9, R65.21] 02/01/2020 Yes    Urinary retention [R33.9] 02/19/2020 Yes    Dyspnea [R06.00]  Yes    Severe malnutrition [E43] 02/14/2020 Yes    Palliative care encounter [Z51.5] 02/11/2020 Not Applicable    Acute hypoxemic respiratory failure [J96.01] 02/11/2020 Yes    Head lice [B85.0] 02/11/2020 Yes    Advance care planning [Z71.89]  Not Applicable    Goals of care, counseling/discussion [Z71.89]  Not Applicable    Aspiration pneumonia [J69.0] 02/09/2020 No    Hypovolemia [E86.1] 02/07/2020 Yes    Bilious vomiting [R11.14] 02/06/2020 Yes    Thrombocytopenia [D69.6] 02/06/2020 No    Anemia [D64.9] 02/04/2020 Yes    Liver lesion, right lobe [K76.9] 01/31/2020 Yes    Hypotension [I95.9] 01/30/2020 Yes    Diverticulitis [K57.92] 01/30/2020 Yes    Hypocalcemia [E83.51] 01/29/2020 Yes    GAVIN (acute kidney injury) [N17.9] 01/29/2020  Yes    Bifascicular block [I45.2] 01/02/2020 Yes    Hypomagnesemia [E83.42] 12/20/2019 Yes    COPD (chronic obstructive pulmonary disease) [J44.9] 12/19/2019 Yes    Chronic heart failure with preserved ejection fraction [I50.32] 12/19/2019 Yes    Paroxysmal atrial fibrillation [I48.0] 12/19/2019 Yes    Type 2 diabetes mellitus with hyperglycemia, without long-term current use of insulin [E11.65] 12/19/2019 Yes      Problems Resolved During this Admission:    Diagnosis Date Noted Date Resolved POA    Delirium [R41.0] 02/07/2020 02/20/2020 No    Visual hallucinations [R44.1] 02/06/2020 02/20/2020 No    Hematemesis [K92.0] 02/06/2020 02/20/2020 No    Acute cystitis without hematuria [N30.00] 01/29/2020 02/20/2020 Yes       Overview / ICU Course:    Elizabeth Cano is a 69 y.o. female with medical history significant for A-fib (on coumadin), COPD, and HFpEF (EF 45%)  admitted for Septic shock.    Inpatient Medications Prescribed for Management of Current Problems:     Scheduled Meds:    albuterol-ipratropium  3 mL Nebulization Q4H    budesonide  0.5 mg Nebulization BID    calcium-vitamin D3  1 tablet Oral BID    carbamide peroxide  5 drop Both Ears BID    dextromethorphan-guaifenesin  mg  1 tablet Oral BID    digoxin  0.125 mg Oral Daily    DULoxetine  30 mg Oral Daily    furosemide  40 mg Intravenous BID    magnesium oxide  400 mg Oral Daily    miconazole nitrate 2%   Topical (Top) BID    midodrine  10 mg Oral QHS    mirtazapine  15 mg Oral QHS    pantoprazole  40 mg Oral Daily    QUEtiapine  25 mg Oral QHS    senna-docusate 8.6-50 mg  1 tablet Oral BID    warfarin  1 mg Oral Every other day     Continuous Infusions:   As Needed: acetaminophen, albuterol-ipratropium, benzonatate, bisacodyL, Dextrose 10% Bolus, Dextrose 10% Bolus, glucagon (human recombinant), glucose, glucose, insulin aspart U-100, melatonin, morphine, ondansetron, phenyleph-min oil-petrolatum, polyethylene glycol,  promethazine (PHENERGAN) IVPB, senna, simethicone, sodium chloride 0.9%, sodium chloride 0.9%    Assessment and Plan by Problem    Acute hypoxemic respiratory failure  Pneumonia   Pulmonary edema  - Likely secondary to decompensated HF that has responded well with diureses.  - Weaned Oxygen from comfort flow down to 2L via NC.   -CXR 02/121 with Multifocal patchy pulmonary parenchymal disease is present throughout both lungs, worse in the right lung than the left. Unchanged from prior.   - on Lasix IV 40 mg BID   - strict I/Os.  - Chest CT non contrast (2/13) did not show significant pulmonary edema but did show evidence of interstitial lung disease and pulmonary hypertension   - On O2. Goal SpO2 88-92%. Wean as tolerated  - if unable to wean oxygen after adequate diuresis, consider Pulmonology consult and 6MWT  - completed 7 day course of Zosyn     Hypotension  Septic shock, unclear etiology but likely diverticulitis - resolved    UTI due to Candida albicans   - Patient with acute hypotension and worsening O2 requirements over the course of 2/9/2020. Started the day on 3L NC and by the end was on Comfort flow 100% FiO2 and 30L. Concern for PE. CTA negative for PE, CXR also with severe pleural edema. Patient had been on Zosyn for possible diverticulitis +/- urosepsis.   - Vancomycin discontinued; completed Zosyn x7 day   - Patient on minimal levophed for MAP < 65. Patient appears to have baseline low BP's, mentates well with a MAP > 60. Possible infectious etiology given worsening hypoxemia / shock, CT showing compressive atelectasis vs. Developing consolidation in the left lower lobe.  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 Ordered purewick since patient incontinent   - Patient hypervolemic, significant pulmonary edema on CXR  - Monitor daily electrolytes   - Problem is gradually improving; continuing to monitor clinical status 2/21/2020.      Decreased po intake  - nutrition consulted.  - Boost Plus TID      Paroxysmal atrial fibrillation  - Patient in RVR presumed due to acute respiratory failure  - PRN metoprolol as needed for rate control but would not aggressively treat in setting of severe hypoxemia and hypotension  - INR initially subtherapeutic and was on heparin bridge w/ warfarin.   - Previously INR supratherapeutic. Resumed warfarin on 2/18 since INR now therapeutic   - Metoprolol 50mg BID discontinued given nightly hypotensive episdoes requiring transient levophed, also received digoxin 250mcg for rate control  - Maintain Mg >2, K>4.      Type 2 diabetes mellitus with hyperglycemia, without long-term current use of insulin  - AIC 5.4%  - Glucose checks QACHS  - Goal Glucose 140-180 mg/dL  - Diabetic diet     Head lice  -S/p 1 permethrin lotion treatment.   - Active lice visualized today despite permethrin treatment and reported comb-niting.   - Actively considering other options to help eradicate head lice infestation, though patient will require aggressive comb-niting   -  lindane shampoo and permethrin as needed   - continue contact isolation      Hypocalcemia - resolved   -Continue with home calcium-Vit D     Hypomagnesemia  -Replete PRN  -Continue home magnesium oxide.      Liver lesion, right lobe  - possible concern of abscess vs malignancy per CT scan  - h/o weight loss and possible colonic lesion  - RUQ US showing focal fatty infiltration  - The  gallbladder contains sludge and stones      Diverticulitis - resolved   - CT consistent with recurrent diverticulitis  - CRS consulted during this hospitalization - recommend management with antibiotics.   - Continued Zosyn for 7 days     Debiilty  - PT/OT recommend SNF    Discharge plan and follow up  senior living Facility  [facility] 2/24/2020  Previous admission:  12/19/19  Goals of Care:  General Prognosis: fair  Goals: Curative  Comfort Only: No  Hospice: No  Code Status: Full Code    Diet: Diet Low Sodium, 2gm [facility]; Fluid - 1500mL  GI Prophylaxis: Indicated due to multiple minor risk factors  Significant LDAs:   IV Access Type: Mid-line  Urinary Catheter Indication if present: Urinary Retention  Other Lines/Tubes/Drains:    VTE Risk Mitigation (From admission, onward)         Ordered     warfarin (COUMADIN) tablet 1 mg  Every other day      02/21/20 1108     IP VTE HIGH RISK PATIENT  Once      01/29/20 1916     Reason for No Pharmacological VTE Prophylaxis  Once     Question:  Reasons:  Answer:  Already adequately anticoagulated on oral Anticoagulants    01/29/20 1916                Azra Smith MD  Department of Hospital Medicine   [facility]

## 2020-02-21 NOTE — PT/OT/SLP PROGRESS
Physical Therapy      Patient Name:  Elizabeth Cano   MRN:  [id number]      Pt not seen on this date. Chart reviewed and pt remain appropriate for PT services at this time.  Will see pt as schedule allows.      Zak Morales, PT,DPT  2/21/2020

## 2020-02-21 NOTE — PT/OT/SLP PROGRESS
"Occupational Therapy   Treatment    Name: Elizabeth Cano  MRN: [id number]  Admitting Diagnosis:  Septic shock  14 Days Post-Op    Recommendations:     Discharge Recommendations: nursing facility, skilled  Discharge Equipment Recommendations:  (tbd)  Barriers to discharge:  None    Assessment:     Elizabeth Cano is a 69 y.o. female with a medical diagnosis of Septic shock.  She presents with impaired ADL and mobility performance deficits. Pt found resting and required max encouragement to participate. Session focused on bed mobility, EOB sitting tolerance, tricep exercises in preparation for standing, and grooming tasks. Pt SBA for bed mobility and SBA at EOB ~10 min. Pt required mod (A) to fully clear hips from bed during x10 tricep extension attempts. Pt also appearing anxious and required guided breathing techniques to alleviate evident shortness of breath. Pt educated on importance of OOB mobility however pt refusing stating "I'm just so lightheaded, I need to get back to bed please." Performance deficits affecting function are weakness, impaired endurance, impaired self care skills, impaired functional mobilty, impaired sensation, gait instability, impaired balance, decreased safety awareness, pain, impaired cardiopulmonary response to activity. Pt would benefit from continued OT skilled services 3x/wk to improve daily living skills to optimize QOL. Pt is recommended to discharge to SNF at this time.       Rehab Prognosis:  Good; patient would benefit from acute skilled OT services to address these deficits and reach maximum level of function.       Plan:     Patient to be seen 3 x/week to address the above listed problems via self-care/home management, therapeutic activities, neuromuscular re-education, therapeutic exercises  · Plan of Care Expires: 03/12/20  · Plan of Care Reviewed with: patient    Subjective     Pain/Comfort:  · Pain Rating 1: 0/10  · Pain Rating Post-Intervention 1: 0/10  · Pain Rating " Post-Intervention 2: 0/10    Objective:     Communicated with: RN prior to session.  Patient found HOB elevated with telemetry, aguirre catheter upon OT entry to room.    General Precautions: Standard, fall, special contact, respiratory(lice (hair boufant worn))   Orthopedic Precautions:N/A   Braces: N/A     Occupational Performance:     Bed Mobility:    · Patient completed Rolling/Turning to Left with  stand by assistance  · Patient completed Scooting/Bridging with stand by assistance  · Patient completed Supine to Sit with stand by assistance  · Patient completed Sit to Supine with stand by assistance     Functional Mobility/Transfers:  · Patient completed Sit <> Stand Transfer with moderate assistance  with  hand-held assist  (Pt attempted tricep extension exercises to further improve t/f ability. Pt educated on importance of OOB mobility. Pt able to complete x10 tricep extensions with SBA however required mod (A) for hip clearance/half standing). Pt needed max vc's to alleviate anxiety with mobility.    Activities of Daily Living:  · Feeding:  setup of meal tray (pt taking sips of OJ at EOB)  · Grooming: setup washing face at EOB       [facility] 6 Click ADL: 16    Treatment & Education:  Pt educated on role of occupational therapy, POC, and safety during ADLs and functional mobility. Pt and OT discussed importance of safe, continued mobility to optimize daily living skills. Pt verbalized understanding.   Pt completed the following during session: bed mobility, EOB sitting tolerance ~10 min, sit<stand attempts using tricep extension technique for hip clearance, grooming task, feeding, safe return to bed. RN Carleen notified as pt with low blood sugar reported. OJ given. White board updated during session. Pt given instruction to call for medical staff/nurse for assistance.       Patient left HOB elevated with all lines intact, call button in reach and RN notifiedEducation:      GOALS:   Multidisciplinary Problems      Occupational Therapy Goals        Problem: Occupational Therapy Goal    Goal Priority Disciplines Outcome Interventions   Occupational Therapy Goal     OT, PT/OT Ongoing, Progressing    Description:  Goals to be met by: 2/26    Patient will increase functional independence with ADLs by performing:    UE Dressing while seated EOB with Set-up Assistance.  LE Dressing (socks, brief) with min(A).  Grooming while seated at sink with set up.  Toileting from BSC with CGA.  Toilet transfer to BSC with CGA.  Functional mobility at household distance for ADL task with AD and min(A).                    Time Tracking:     OT Date of Treatment: 02/21/20  OT Start Time: 0816  OT Stop Time: 0831  OT Total Time (min): 15 min    Billable Minutes:Self Care/Home Management 15 min    Bruna Noriega OT  2/21/2020

## 2020-02-21 NOTE — SUBJECTIVE & OBJECTIVE
Interval History:  Pt has COPD and was admitted to ICU for acute respiratory issues. Pt  now on 4L O2 . Pt off pressor support. Pt want to remain full code.     Past Medical History:   Diagnosis Date    A-fib     GAVIN (acute kidney injury)     CHF (congestive heart failure)     COPD (chronic obstructive pulmonary disease)     Diabetes mellitus     Hypertension        Past Surgical History:   Procedure Laterality Date    CARDIAC SURGERY      COLONOSCOPY N/A 2/4/2020    Procedure: COLONOSCOPY;  Surgeon: Gilberto Laguna MD;  Location: [facility] ([address] [place] [address]);  Service: Endoscopy;  Laterality: N/A;    ESOPHAGOGASTRODUODENOSCOPY N/A 2/7/2020    Procedure: EGD (ESOPHAGOGASTRODUODENOSCOPY);  Surgeon: Aleksey Gomez MD;  Location: [facility] ([address] [place] [address]);  Service: Endoscopy;  Laterality: N/A;       Review of patient's allergies indicates:   Allergen Reactions    Levsin [hyoscyamine sulfate] Hallucinations       Medications:  Continuous Infusions:    Scheduled Meds:   albuterol-ipratropium  3 mL Nebulization Q4H    budesonide  0.5 mg Nebulization BID    calcium-vitamin D3  1 tablet Oral BID    carbamide peroxide  5 drop Both Ears BID    dextromethorphan-guaifenesin  mg  1 tablet Oral BID    digoxin  0.125 mg Oral Daily    DULoxetine  30 mg Oral Daily    furosemide  40 mg Intravenous BID    magnesium oxide  400 mg Oral Daily    miconazole nitrate 2%   Topical (Top) BID    midodrine  10 mg Oral QHS    mirtazapine  15 mg Oral QHS    pantoprazole  40 mg Oral Daily    QUEtiapine  25 mg Oral QHS    senna-docusate 8.6-50 mg  1 tablet Oral BID    warfarin  1 mg Oral Every Tues, Thurs, Sat     PRN Meds:acetaminophen, albuterol-ipratropium, benzonatate, bisacodyL, Dextrose 10% Bolus, Dextrose 10% Bolus, glucagon (human recombinant), glucose, glucose, insulin aspart U-100, melatonin, morphine, ondansetron, phenyleph-min oil-petrolatum, polyethylene glycol, promethazine (PHENERGAN) IVPB, senna, simethicone,  sodium chloride 0.9%, sodium chloride 0.9%    Family History     Problem Relation (Age of Onset)    Heart disease Mother        Tobacco Use    Smoking status: Former Smoker     Packs/day: 1.00     Years: 54.00     Pack years: 54.00     Types: Cigarettes     Last attempt to quit: 2019     Years since quittin.2    Smokeless tobacco: Never Used   Substance and Sexual Activity    Alcohol use: Not Currently    Drug use: Not Currently    Sexual activity: Not Currently     Partners: Male       Review of Systems   Constitutional: Positive for activity change and fatigue.   HENT: Negative.    Eyes: Negative.    Respiratory: Positive for shortness of breath.    Cardiovascular: Negative.    Gastrointestinal: Negative.    Endocrine: Negative.    Genitourinary: Negative.    Musculoskeletal: Negative.    Skin: Positive for pallor.   Allergic/Immunologic: Negative.    Neurological: Positive for weakness.     Objective:     Vital Signs (Most Recent):  Temp: 99.2 °F (37.3 °C) (20)  Pulse: 74 (20)  Resp: 18 (20)  BP: 134/63 (20)  SpO2: 97 % (20) Vital Signs (24h Range):  Temp:  [97.5 °F (36.4 °C)-99.2 °F (37.3 °C)] 99.2 °F (37.3 °C)  Pulse:  [70-93] 74  Resp:  [18] 18  SpO2:  [92 %-100 %] 97 %  BP: (113-134)/(55-63) 134/63     Weight: 73 kg (160 lb 15 oz)  Body mass index is 25.98 kg/m².    Review of Symptoms  Symptom Assessment (ESAS 0-10 scale)   ESAS 0 1 2 3 4 5 6 7 8 9 10   Pain X             Dyspnea    X          Anxiety              Nausea              Depression               Anorexia              Fatigue              Insomnia              Restlessness               Agitation              CAM / Delirium __ --  ___+   Constipation     __ --  ___+   Diarrhea           __ --  ___+  Bowel Management Plan (BMP): No      Pain Assessment:Pt reports she is comfortable.     OME in 24 hours: 0    Performance Status: 60    ECOG Performance Status Grade: 3 - Confined to  bed or chair 50% of waking hours    Physical Exam   Constitutional: She is oriented to person, place, and time. She appears well-developed. She is cooperative. Nasal cannula in place.   HENT:   Head: Normocephalic and atraumatic.   Eyes: Conjunctivae are normal.   Cardiovascular:   Pt off of epi   Pulmonary/Chest:   Pt on 2L O2 per NC   Abdominal: Normal appearance.   Musculoskeletal:   Pt has weakness to bilateral legs.      Neurological: She is alert and oriented to person, place, and time.   Skin: Skin is warm and dry.   Psychiatric: Her speech is normal and behavior is normal.       Significant Labs: All pertinent labs within the past 24 hours have been reviewed.  CBC:   Recent Labs   Lab 02/21/20  0738   WBC 4.25   HGB 9.1*   HCT 30.9*   *        BMP:  Recent Labs   Lab 02/21/20  0738   GLU 75      K 3.9   CL 96   CO2 42*   BUN 7*   CREATININE 0.6   CALCIUM 7.5*   MG 1.7     LFT:  Lab Results   Component Value Date    AST 54 (H) 02/21/2020    ALKPHOS 233 (H) 02/21/2020    BILITOT 0.5 02/21/2020     Albumin:   Albumin   Date Value Ref Range Status   02/21/2020 1.5 (L) 3.5 - 5.2 g/dL Final     Protein:   Total Protein   Date Value Ref Range Status   02/21/2020 4.6 (L) 6.0 - 8.4 g/dL Final     Lactic acid:   Lab Results   Component Value Date    LACTATE 1.1 02/06/2020    LACTATE 1.1 02/05/2020       Significant Imaging: I have reviewed all pertinent imaging results/findings within the past 24 hours.    Advance Care Planning   Advanced Directives::  Living Will: No  LaPOST: No  Do Not Resuscitate Status: No  Medical Power of :  Completed with pt today.     Decision-Making Capacity: Patient answered questions       Living Arrangements:[place]     Psychosocial/Cultural:   Lives in nursing home with her  at [facility]. She has four adult children. Two adult children live OOT.       Spiritual: yes    F- Florecita and Belief: Faith    I - Importance:   .  C - Community:     A - Address  in Care: Will have  visit. Interval History:

## 2020-02-21 NOTE — PLAN OF CARE
CM called [employer] and was informed denial for SNF was upheld.  Plan is to return to [facility] when medically ready.       02/21/20 1332   Discharge Reassessment   Assessment Type Discharge Planning Reassessment   Do you have any problems affording any of your prescribed medications? No   Discharge Plan A Return to nursing home   Discharge Plan B New Nursing Home placement - correction care facility   DME Needed Upon Discharge  none   Anticipated Discharge Disposition [facility]iary [facility]

## 2020-02-21 NOTE — PROGRESS NOTES
[facility]  Palliative Medicine  Progress Note    Patient Name: Elizabeth Cano  MRN: [id number]  Admission Date: 1/29/2020  Hospital Length of Stay: 23 days  Code Status: Full Code   Attending Provider: Azra Smith MD  Consulting Provider: GURWINDER Gaines  Primary Care Physician: Cesar Reeves MD  Principal Problem:Septic shock    Patient information was obtained from patient and ER records.      Assessment/Plan:     Palliative care encounter  Impression: Pt is a 70 y/o female  with afib (on coumadin), COPD, and HFpEF (EF 45% on 1/29/20) admitted to hospital medicine for presumed urinary tract infection. Pt transferred to ICU for Acute hypoxemic respiratory failure: CTA negative for PE. Pt on 4L O2 NC. Pt is a full code. Pt is alert, oriented to person, place, and situation.     Reasons for consult: advance care planning.     Advance care planning:  Today:   Pt's goals is to go back to [facility] with PT. PT has been working with pt and recommend SNF.   Living Will paperwork also given to pt who wants to look over- [facility] [employer]. Per pt she has spoken to family about 5 wishes and will complete after d/c.       2/11/2020:  Met with pt along with Apolonia Coto LCSW. Pt reports she lives at [facility] with her  in the same room. Per pt, she was living in an independent living apartment with her . Per wife, they needed more support and they moved to a NH. Per pt, she is mostly in wheelchair but walks some. Per pt goal is to go back to NH with possible SNF. Pt aware she will continue to decline with her COPD and CHF. Pt aware comfort care is an option as she declines further.       Symptom management:   Pt denies pain, nausea, anxiety.     Dyspnea:   Per pt SOB noted with exertion:   Pt on 4L NC.     Recs:  Consider Roxanol 2.5 mg q 4 hrs prn dyspnea.     Plan:   MPOA paperwork completed today.   [employer] Wishes booklet given to opt.   Per pt plan is back to NH with  SNF.   Pal care will sign off. Please re consult if needed.             I will sign off. Please contact us if you have any additional questions.    Subjective:     Chief Complaint:   Chief Complaint   Patient presents with    Multiple Complaints     uti finished antibiotics,        HPI:   Ptis a 69 years old female with afib (on coumadin), COPD, and HFpEF (EF 45% on 1/29/20) admitted to hospital medicine for presumed urinary tract infection. Per chart review, pt was diagnosed with a UTIat her Nursing Home, [facility], which she was prescribed Macrobid. She completed her course of Macrobid on 01/29/20 and she was noted to be hypotensive rendering her transfer to the hospital.      On admit, per chart review, pt stated she had a decreased appetite from the last month since her hospitalization over the holidays due to uncomplicated diverticulitis which she was treated with Augmentin. She reports feeling nauseated when taking antibiotics. She denies palpitations, dizziness, lightheadedness, chest pain, SOB, change in urinary/bowel habits.      Per chart review, ICU consulted in am of 01/31/20 for hypotension despite being resuscitated with 3.5 L IV fluids and persistant lactic acidosis in 3-4 range. She improved with IVF hydration and patient was stepped down. Rapid response evaluated patient early AM on 2/10 due to acute increase in oxygen requiriments. Patient began the day on 3L NC and progressed to requiring Comfort flow of 30L and 100% FiO2. Patient denies chest pains, only complaining of minor increased difficulty breathing.    Pt off of high flow O2 today and on 4 L NC.   Pressor have been weaned.        Hospital Course:  No notes on file    Interval History:  Pt has COPD and was admitted to ICU for acute respiratory issues. Pt  now on 4L O2 . Pt off pressor support. Pt want to remain full code.     Past Medical History:   Diagnosis Date    A-fib     GAVIN (acute kidney injury)     CHF (congestive heart  failure)     COPD (chronic obstructive pulmonary disease)     Diabetes mellitus     Hypertension        Past Surgical History:   Procedure Laterality Date    CARDIAC SURGERY      COLONOSCOPY N/A 2/4/2020    Procedure: COLONOSCOPY;  Surgeon: Gilberto Laguna MD;  Location: [facility] ([address] [place] [address]);  Service: Endoscopy;  Laterality: N/A;    ESOPHAGOGASTRODUODENOSCOPY N/A 2/7/2020    Procedure: EGD (ESOPHAGOGASTRODUODENOSCOPY);  Surgeon: Aleksey Gomez MD;  Location: [facility] ([address] [place] [address]);  Service: Endoscopy;  Laterality: N/A;       Review of patient's allergies indicates:   Allergen Reactions    Levsin [hyoscyamine sulfate] Hallucinations       Medications:  Continuous Infusions:    Scheduled Meds:   albuterol-ipratropium  3 mL Nebulization Q4H    budesonide  0.5 mg Nebulization BID    calcium-vitamin D3  1 tablet Oral BID    carbamide peroxide  5 drop Both Ears BID    dextromethorphan-guaifenesin  mg  1 tablet Oral BID    digoxin  0.125 mg Oral Daily    DULoxetine  30 mg Oral Daily    furosemide  40 mg Intravenous BID    magnesium oxide  400 mg Oral Daily    miconazole nitrate 2%   Topical (Top) BID    midodrine  10 mg Oral QHS    mirtazapine  15 mg Oral QHS    pantoprazole  40 mg Oral Daily    QUEtiapine  25 mg Oral QHS    senna-docusate 8.6-50 mg  1 tablet Oral BID    warfarin  1 mg Oral Every Tues, Thurs, Sat     PRN Meds:acetaminophen, albuterol-ipratropium, benzonatate, bisacodyL, Dextrose 10% Bolus, Dextrose 10% Bolus, glucagon (human recombinant), glucose, glucose, insulin aspart U-100, melatonin, morphine, ondansetron, phenyleph-min oil-petrolatum, polyethylene glycol, promethazine (PHENERGAN) IVPB, senna, simethicone, sodium chloride 0.9%, sodium chloride 0.9%    Family History     Problem Relation (Age of Onset)    Heart disease Mother        Tobacco Use    Smoking status: Former Smoker     Packs/day: 1.00     Years: 54.00     Pack years: 54.00     Types: Cigarettes     Last  attempt to quit: 2019     Years since quittin.2    Smokeless tobacco: Never Used   Substance and Sexual Activity    Alcohol use: Not Currently    Drug use: Not Currently    Sexual activity: Not Currently     Partners: Male       Review of Systems   Constitutional: Positive for activity change and fatigue.   HENT: Negative.    Eyes: Negative.    Respiratory: Positive for shortness of breath.    Cardiovascular: Negative.    Gastrointestinal: Negative.    Endocrine: Negative.    Genitourinary: Negative.    Musculoskeletal: Negative.    Skin: Positive for pallor.   Allergic/Immunologic: Negative.    Neurological: Positive for weakness.     Objective:     Vital Signs (Most Recent):  Temp: 99.2 °F (37.3 °C) (20)  Pulse: 74 (20)  Resp: 18 (20)  BP: 134/63 (20)  SpO2: 97 % (20) Vital Signs (24h Range):  Temp:  [97.5 °F (36.4 °C)-99.2 °F (37.3 °C)] 99.2 °F (37.3 °C)  Pulse:  [70-93] 74  Resp:  [18] 18  SpO2:  [92 %-100 %] 97 %  BP: (113-134)/(55-63) 134/63     Weight: 73 kg (160 lb 15 oz)  Body mass index is 25.98 kg/m².    Review of Symptoms  Symptom Assessment (ESAS 0-10 scale)   ESAS 0 1 2 3 4 5 6 7 8 9 10   Pain X             Dyspnea    X          Anxiety              Nausea              Depression               Anorexia              Fatigue              Insomnia              Restlessness               Agitation              CAM / Delirium __ --  ___+   Constipation     __ --  ___+   Diarrhea           __ --  ___+  Bowel Management Plan (BMP): No      Pain Assessment:Pt reports she is comfortable.     OME in 24 hours: 0    Performance Status: 60    ECOG Performance Status Grade: 3 - Confined to bed or chair 50% of waking hours    Physical Exam   Constitutional: She is oriented to person, place, and time. She appears well-developed. She is cooperative. Nasal cannula in place.   HENT:   Head: Normocephalic and atraumatic.   Eyes: Conjunctivae are normal.    Cardiovascular:   Pt off of epi   Pulmonary/Chest:   Pt on 2L O2 per NC   Abdominal: Normal appearance.   Musculoskeletal:   Pt has weakness to bilateral legs.      Neurological: She is alert and oriented to person, place, and time.   Skin: Skin is warm and dry.   Psychiatric: Her speech is normal and behavior is normal.       Significant Labs: All pertinent labs within the past 24 hours have been reviewed.  CBC:   Recent Labs   Lab 02/21/20  0738   WBC 4.25   HGB 9.1*   HCT 30.9*   *        BMP:  Recent Labs   Lab 02/21/20  0738   GLU 75      K 3.9   CL 96   CO2 42*   BUN 7*   CREATININE 0.6   CALCIUM 7.5*   MG 1.7     LFT:  Lab Results   Component Value Date    AST 54 (H) 02/21/2020    ALKPHOS 233 (H) 02/21/2020    BILITOT 0.5 02/21/2020     Albumin:   Albumin   Date Value Ref Range Status   02/21/2020 1.5 (L) 3.5 - 5.2 g/dL Final     Protein:   Total Protein   Date Value Ref Range Status   02/21/2020 4.6 (L) 6.0 - 8.4 g/dL Final     Lactic acid:   Lab Results   Component Value Date    LACTATE 1.1 02/06/2020    LACTATE 1.1 02/05/2020       Significant Imaging: I have reviewed all pertinent imaging results/findings within the past 24 hours.    Advance Care Planning   Advanced Directives::  Living Will: No  LaPOST: No  Do Not Resuscitate Status: No  Medical Power of :  Completed with pt today.     Decision-Making Capacity: Patient answered questions       Living Arrangements:NH     Psychosocial/Cultural:   Lives in nursing home with her  at [facility]. She has four adult children. Two adult children live OOT.       Spiritual: yes    F- Florecita and Belief: Religious    I - Importance:   .  C - Community:     A - Address in Care: Will have  visit. Interval History:         > 50% of 25 min visit spent in chart review, face to face discussion of goals of care,  symptom assessment, coordination of care and emotional support.    Kristina Piper, CNS  Palliative  Medicine  [facility]

## 2020-02-22 NOTE — CARE UPDATE
PT advised by RT that the BIPAP is for hypercapnia and not hypoxemia; PT further educated on purpose and benefit of BIPAP and the consequences of non-compliance; The RT has tried fitting a nasal mask for PT comfort; PT still refuses BIPAP; Will continue to monitor

## 2020-02-22 NOTE — PLAN OF CARE
Patient resting in bed. No pain noted. Patient was medicated per orders. Patient was free of falls. Patient refused to get out of bed all day. Patient did not eat. Patient voices no needs or concerns at this time. Bed in lowest position. Side rails up x2. Call light in reach.

## 2020-02-22 NOTE — PROGRESS NOTES
[facility] Hospital Medicine  Progress Note    Patient Name: Elizabeth Cano  MRN: [id number]  Patient Class: IP- Inpatient   Admission Date: 1/29/2020  Length of Stay: 24 days  Attending Physician: Azra Smith MD  Primary Care Provider: Cesar Reeves MD    [facility] Medicine Team: [facility] HOSP MED D Azra Smith MD    Subjective:     Principal Problem:Septic shock    Interval History:   Chief Complaint   Patient presents with    Multiple Complaints     uti finished antibiotics,      Follow up visit for Septic shock    History / Events Overnight: No significant events reported by Nursing. Patient refusing BiPAP.    Data reviewed 2/22/2020:  H&H stable; INR near therapeutic. Bicarb still uptrending.    Review of Systems   Constitutional: Negative for fever.   Respiratory: Negative for shortness of breath.      Objective:     Vital Signs (Most Recent):  Temp: 98.7 °F (37.1 °C) (02/22/20 1641)  Pulse: 84 (02/22/20 1641)  Resp: 16 (02/22/20 1641)  BP: 128/62 (02/22/20 1641)  SpO2: 95 % (02/22/20 1641) Vital Signs (24h Range):  Temp:  [97.4 °F (36.3 °C)-98.9 °F (37.2 °C)] 98.7 °F (37.1 °C)  Pulse:  [71-88] 84  Resp:  [13-20] 16  SpO2:  [90 %-99 %] 95 %  BP: (115-138)/(54-88) 128/62     Weight: 49 kg (108 lb 0.4 oz)  Body mass index is 17.44 kg/m².    Intake/Output Summary (Last 24 hours) at 2/22/2020 1702  Last data filed at 2/22/2020 1613  Gross per 24 hour   Intake 900 ml   Output 2475 ml   Net -1575 ml      Physical Exam   Constitutional: She is cooperative. No distress.   Eyes: Conjunctivae and lids are normal. No scleral icterus.   Cardiovascular: Normal rate, regular rhythm, S1 normal and S2 normal.   Pulmonary/Chest: No respiratory distress. She has no wheezes. She has no rhonchi.   Abdominal: Soft. Normal appearance and bowel sounds are normal. There is no tenderness.   Musculoskeletal: Normal range of motion. She exhibits edema.   Neurological: She is alert. She is not  disoriented.   Skin: Skin is warm and dry. No cyanosis. No pallor.       Significant Labs:   A1C:   Recent Labs   Lab 12/19/19  2220 01/30/20  0421   HGBA1C 6.4* 5.4     CBC:   Recent Labs   Lab 02/21/20  0738 02/22/20  0554   WBC 4.25 4.93   HGB 9.1* 9.4*   HCT 30.9* 31.9*    258     CMP:   Recent Labs   Lab 02/21/20  0738 02/22/20  0554    142   K 3.9 3.1*   CL 96 93*   CO2 42* 42*   GLU 75 69*   BUN 7* 8   CREATININE 0.6 0.6   CALCIUM 7.5* 7.7*   PROT 4.6* 4.6*   ALBUMIN 1.5* 1.5*   BILITOT 0.5 0.5   ALKPHOS 233* 245*   AST 54* 54*   ALT 21 20   ANIONGAP 6* 7*   EGFRNONAA >60.0 >60.0     Coagulation:   Recent Labs   Lab 02/22/20  0554   INR 1.7*     Magnesium:   Recent Labs   Lab 02/21/20  0738 02/22/20  0554   MG 1.7 1.6     POCT Glucose:   Recent Labs   Lab 02/21/20  1719 02/21/20  2008 02/22/20  0914   POCTGLUCOSE 80 84 74     TSH:   Recent Labs   Lab 02/07/20  1757   TSH 1.967       Assessment/Plan:      Active Diagnoses:    Diagnosis Date Noted POA    PRINCIPAL PROBLEM:  Septic shock [A41.9, R65.21] 02/01/2020 Yes    Urinary retention [R33.9] 02/19/2020 Yes    Dyspnea [R06.00]  Yes    Severe malnutrition [E43] 02/14/2020 Yes    Palliative care encounter [Z51.5] 02/11/2020 Not Applicable    Acute hypoxemic respiratory failure [J96.01] 02/11/2020 Yes    Head lice [B85.0] 02/11/2020 Yes    Advance care planning [Z71.89]  Not Applicable    Goals of care, counseling/discussion [Z71.89]  Not Applicable    Aspiration pneumonia [J69.0] 02/09/2020 No    Hypovolemia [E86.1] 02/07/2020 Yes    Bilious vomiting [R11.14] 02/06/2020 Yes    Thrombocytopenia [D69.6] 02/06/2020 No    Anemia [D64.9] 02/04/2020 Yes    Liver lesion, right lobe [K76.9] 01/31/2020 Yes    Hypotension [I95.9] 01/30/2020 Yes    Diverticulitis [K57.92] 01/30/2020 Yes    Hypocalcemia [E83.51] 01/29/2020 Yes    GAVIN (acute kidney injury) [N17.9] 01/29/2020 Yes    Bifascicular block [I45.2] 01/02/2020 Yes     Hypomagnesemia [E83.42] 12/20/2019 Yes    COPD (chronic obstructive pulmonary disease) [J44.9] 12/19/2019 Yes    Chronic heart failure with preserved ejection fraction [I50.32] 12/19/2019 Yes    Paroxysmal atrial fibrillation [I48.0] 12/19/2019 Yes    Type 2 diabetes mellitus with hyperglycemia, without long-term current use of insulin [E11.65] 12/19/2019 Yes      Problems Resolved During this Admission:    Diagnosis Date Noted Date Resolved POA    Delirium [R41.0] 02/07/2020 02/20/2020 No    Visual hallucinations [R44.1] 02/06/2020 02/20/2020 No    Hematemesis [K92.0] 02/06/2020 02/20/2020 No    Acute cystitis without hematuria [N30.00] 01/29/2020 02/20/2020 Yes       Overview / ICU Course:    Elizabeth Cano is a 69 y.o. female with medical history significant for A-fib (on coumadin), COPD, and HFpEF (EF 45%)  admitted for Septic shock.    Inpatient Medications Prescribed for Management of Current Problems:     Scheduled Meds:    albuterol-ipratropium  3 mL Nebulization Q4H    budesonide  0.5 mg Nebulization BID    calcium-vitamin D3  1 tablet Oral BID    carbamide peroxide  5 drop Both Ears BID    dextromethorphan-guaifenesin  mg  1 tablet Oral BID    digoxin  0.125 mg Oral Daily    DULoxetine  30 mg Oral Daily    furosemide  40 mg Intravenous BID    magnesium oxide  400 mg Oral Daily    miconazole nitrate 2%   Topical (Top) BID    midodrine  10 mg Oral QHS    mirtazapine  15 mg Oral QHS    pantoprazole  40 mg Oral Daily    potassium chloride  40 mEq Oral TID    QUEtiapine  25 mg Oral QHS    senna-docusate 8.6-50 mg  1 tablet Oral BID    warfarin  1 mg Oral Every other day     Continuous Infusions:   As Needed: acetaminophen, albuterol-ipratropium, benzonatate, bisacodyL, Dextrose 10% Bolus, Dextrose 10% Bolus, glucagon (human recombinant), glucose, glucose, insulin aspart U-100, melatonin, morphine, ondansetron, phenyleph-min oil-petrolatum, polyethylene glycol, promethazine  (PHENERGAN) IVPB, senna, simethicone, sodium chloride 0.9%, sodium chloride 0.9%    Assessment and Plan by Problem    Acute hypoxemic respiratory failure  Pneumonia   Pulmonary edema  - Likely secondary to decompensated HF that has responded well with diureses.  - Weaned Oxygen from comfort flow down to 2L via NC.   -CXR 02/121 with Multifocal patchy pulmonary parenchymal disease is present throughout both lungs, worse in the right lung than the left. Unchanged from prior.   - on Lasix IV 40 mg BID   - strict I/Os.  - Chest CT non contrast (2/13) did not show significant pulmonary edema but did show evidence of interstitial lung disease and pulmonary hypertension   - On O2. Goal SpO2 88-92%. Wean as tolerated  - if unable to wean oxygen after adequate diuresis, consider Pulmonology consult and 6MWT  - completed 7-day course of Zosyn     Hypotension  Septic shock, unclear etiology but likely diverticulitis - resolved    UTI due to Candida albicans   - Patient with acute hypotension and worsening O2 requirements over the course of 2/9/2020. Started the day on 3L NC and by the end was on Comfort flow 100% FiO2 and 30L. Concern for PE. CTA negative for PE, CXR also with severe pleural edema. Patient had been on Zosyn for possible diverticulitis +/- urosepsis.   - Vancomycin discontinued; completed Zosyn x7 day   - Patient on minimal levophed for MAP < 65. Patient appears to have baseline low BP's, mentates well with a MAP > 60. Possible infectious etiology given worsening hypoxemia / shock, CT showing compressive atelectasis vs. Developing consolidation in the left lower lobe.  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 Ordered purewick since patient incontinent   - Patient hypervolemic, significant pulmonary edema on CXR  - Monitor daily electrolytes, Continuing current management with diuresis.  - Problem is gradually improving; continuing to monitor clinical status 2/22/2020.      Decreased po intake  - nutrition consulted.  - Boost Plus TID      Paroxysmal atrial fibrillation  - Patient in RVR presumed due to acute respiratory failure  - PRN metoprolol as needed for rate control but would not aggressively treat in setting of severe hypoxemia and hypotension  - INR initially subtherapeutic and was on heparin bridge w/ warfarin.   - Previously INR supratherapeutic. Resumed warfarin on 2/18 since INR now therapeutic   - Metoprolol 50mg BID discontinued given nightly hypotensive episdoes requiring transient levophed, also received digoxin 250mcg for rate control  - Maintain Mg >2, K>4.      Type 2 diabetes mellitus with hyperglycemia, without long-term current use of insulin  - AIC 5.4%  - Glucose checks QACHS  - Goal Glucose 140-180 mg/dL  - Diabetic diet     Head lice  -S/p 1 permethrin lotion treatment.   - Active lice visualized today despite permethrin treatment and reported comb-niting.   - Actively considering other options to help eradicate head lice infestation, though patient will require aggressive comb-niting   -  lindane shampoo and permethrin as needed   - continue contact isolation      Hypocalcemia - resolved   -Continue with home calcium-Vit D     Hypomagnesemia  -Replete PRN  -Continue home magnesium oxide.      Liver lesion, right lobe  - possible concern of abscess vs malignancy per CT scan  - h/o weight loss and possible colonic lesion  - RUQ  US showing focal fatty infiltration  - The gallbladder contains sludge and stones      Diverticulitis - resolved   - CT consistent with recurrent diverticulitis  - CRS consulted during this hospitalization - recommend management with antibiotics.   - Continued Zosyn for 7 days     Debiilty  - PT/OT recommend SNF    Discharge plan and follow up  assisted Facility  [facility] 2/24/2020  Previous admission:  12/19/19  Goals of Care:  General Prognosis: fair  Goals: Curative  Comfort Only: No  Hospice: No  Code Status: Full Code    Diet: Diet Low Sodium, 2gm [facility]; Fluid - 1500mL  GI Prophylaxis: Indicated due to multiple minor risk factors  Significant LDAs:   IV Access Type: Mid-line  Urinary Catheter Indication if present: Urinary Retention  Other Lines/Tubes/Drains:    VTE Risk Mitigation (From admission, onward)         Ordered     warfarin (COUMADIN) tablet 1 mg  Every other day      02/21/20 1108     IP VTE HIGH RISK PATIENT  Once      01/29/20 1916     Reason for No Pharmacological VTE Prophylaxis  Once     Question:  Reasons:  Answer:  Already adequately anticoagulated on oral Anticoagulants    01/29/20 1916                Azra Smith MD  Department of Hospital Medicine   [facility]

## 2020-02-22 NOTE — PROGRESS NOTES
Patient refusing go sit up to eat breakfast. Educated about co2 being high and MD wanting patient to get up and move around. Patient voices understanding but still refusing.

## 2020-02-22 NOTE — PLAN OF CARE
"Patient medicated per orders. Patient was free of falls. Patient did not eat breakfast, lunch, or dinner. Patient states "I just dont have an appetite." Bed in lowest position. Side rails up x2. Call light in reach.   "

## 2020-02-23 NOTE — PROGRESS NOTES
[facility] Hospital Medicine  Progress Note    Patient Name: Elizabeth Cano  MRN: [id number]  Patient Class: IP- Inpatient   Admission Date: 1/29/2020  Length of Stay: 25 days  Attending Physician: Azra Smith MD  Primary Care Provider: Cesar Reeves MD    [facility] Medicine Team: [facility] MED D Azra Smith MD    Subjective:     Principal Problem:Septic shock    Interval History:   Chief Complaint   Patient presents with    Multiple Complaints     uti finished antibiotics,      Follow up visit for Septic shock    History / Events Overnight: No significant events reported by Nursing. Patient refusing BiPAP. Very poor oral intake due to poor appetite.    Data reviewed 2/23/2020:  H&H stable; INR near therapeutic. Bicarb still uptrending. sCr and BUN stable.    Review of Systems   Constitutional: Negative for fever.   Respiratory: Negative for shortness of breath.      Objective:     Vital Signs (Most Recent):  Temp: 98.9 °F (37.2 °C) (02/23/20 1525)  Pulse: 90 (02/23/20 1628)  Resp: 20 (02/23/20 1628)  BP: (!) 129/58 (02/23/20 1525)  SpO2: 95 % (02/23/20 1628) Vital Signs (24h Range):  Temp:  [97.1 °F (36.2 °C)-98.9 °F (37.2 °C)] 98.9 °F (37.2 °C)  Pulse:  [] 90  Resp:  [15-23] 20  SpO2:  [90 %-98 %] 95 %  BP: (113-129)/(57-60) 129/58     Weight: 49 kg (108 lb 0.4 oz)  Body mass index is 17.44 kg/m².    Intake/Output Summary (Last 24 hours) at 2/23/2020 1653  Last data filed at 2/23/2020 1050  Gross per 24 hour   Intake 900 ml   Output 770 ml   Net 130 ml      Physical Exam   Constitutional: She is cooperative. No distress.   Eyes: Conjunctivae and lids are normal. No scleral icterus.   Cardiovascular: Normal rate, regular rhythm, S1 normal and S2 normal.   Pulmonary/Chest: No respiratory distress. She has no wheezes. She has no rhonchi.   Abdominal: Soft. Normal appearance and bowel sounds are normal. There is no tenderness.   Musculoskeletal: Normal range of motion.  She exhibits edema.   Neurological: She is alert. She is not disoriented.   Skin: Skin is warm and dry. No cyanosis. No pallor.       Significant Labs:   A1C:   Recent Labs   Lab 12/19/19  2220 01/30/20  0421   HGBA1C 6.4* 5.4     CBC:   Recent Labs   Lab 02/22/20  0554 02/23/20  0812   WBC 4.93 4.88   HGB 9.4* 10.0*   HCT 31.9* 32.5*    269     CMP:   Recent Labs   Lab 02/22/20  0554 02/23/20  0812    143   K 3.1* 5.1   CL 93* 95   CO2 42* 43*   GLU 69* 82   BUN 8 8   CREATININE 0.6 0.7   CALCIUM 7.7* 8.1*   PROT 4.6* 5.1*   ALBUMIN 1.5* 1.7*   BILITOT 0.5 0.6   ALKPHOS 245* 260*   AST 54* 53*   ALT 20 21   ANIONGAP 7* 5*   EGFRNONAA >60.0 >60.0     Coagulation:   Recent Labs   Lab 02/23/20  0812   INR 1.6*     Magnesium:   Recent Labs   Lab 02/22/20  0554 02/23/20  0812   MG 1.6 1.6     POCT Glucose:   Recent Labs   Lab 02/23/20  0029 02/23/20  0810 02/23/20  1216   POCTGLUCOSE 71 83 77     TSH:   Recent Labs   Lab 02/07/20  1757   TSH 1.967       Assessment/Plan:      Active Diagnoses:    Diagnosis Date Noted POA    PRINCIPAL PROBLEM:  Septic shock [A41.9, R65.21] 02/01/2020 Yes    Urinary retention [R33.9] 02/19/2020 Yes    Dyspnea [R06.00]  Yes    Severe malnutrition [E43] 02/14/2020 Yes    Palliative care encounter [Z51.5] 02/11/2020 Not Applicable    Acute hypoxemic respiratory failure [J96.01] 02/11/2020 Yes    Head lice [B85.0] 02/11/2020 Yes    Advance care planning [Z71.89]  Not Applicable    Goals of care, counseling/discussion [Z71.89]  Not Applicable    Aspiration pneumonia [J69.0] 02/09/2020 No    Hypovolemia [E86.1] 02/07/2020 Yes    Bilious vomiting [R11.14] 02/06/2020 Yes    Thrombocytopenia [D69.6] 02/06/2020 No    Anemia [D64.9] 02/04/2020 Yes    Liver lesion, right lobe [K76.9] 01/31/2020 Yes    Hypotension [I95.9] 01/30/2020 Yes    Diverticulitis [K57.92] 01/30/2020 Yes    Hypocalcemia [E83.51] 01/29/2020 Yes    GAVIN (acute kidney injury) [N17.9] 01/29/2020 Yes     Bifascicular block [I45.2] 01/02/2020 Yes    Hypomagnesemia [E83.42] 12/20/2019 Yes    COPD (chronic obstructive pulmonary disease) [J44.9] 12/19/2019 Yes    Chronic heart failure with preserved ejection fraction [I50.32] 12/19/2019 Yes    Paroxysmal atrial fibrillation [I48.0] 12/19/2019 Yes    Type 2 diabetes mellitus with hyperglycemia, without long-term current use of insulin [E11.65] 12/19/2019 Yes      Problems Resolved During this Admission:    Diagnosis Date Noted Date Resolved POA    Delirium [R41.0] 02/07/2020 02/20/2020 No    Visual hallucinations [R44.1] 02/06/2020 02/20/2020 No    Hematemesis [K92.0] 02/06/2020 02/20/2020 No    Acute cystitis without hematuria [N30.00] 01/29/2020 02/20/2020 Yes       Overview / ICU Course:    Elizabeth Cano is a 69 y.o. female with medical history significant for A-fib (on coumadin), COPD, and HFpEF (EF 45%)  admitted for Septic shock.    Inpatient Medications Prescribed for Management of Current Problems:     Scheduled Meds:    albuterol-ipratropium  3 mL Nebulization Q4H    calcium-vitamin D3  1 tablet Oral BID    carbamide peroxide  5 drop Both Ears BID    dextromethorphan-guaifenesin  mg  1 tablet Oral BID    digoxin  0.125 mg Oral Daily    DULoxetine  30 mg Oral Daily    furosemide  40 mg Intravenous BID    magnesium oxide  400 mg Oral Daily    miconazole nitrate 2%   Topical (Top) BID    midodrine  10 mg Oral QHS    mirtazapine  15 mg Oral QHS    pantoprazole  40 mg Oral Daily    QUEtiapine  25 mg Oral QHS    senna-docusate 8.6-50 mg  1 tablet Oral BID    warfarin  1 mg Oral Every other day     Continuous Infusions:   As Needed: acetaminophen, albuterol-ipratropium, benzonatate, bisacodyL, Dextrose 10% Bolus, Dextrose 10% Bolus, glucagon (human recombinant), glucose, glucose, melatonin, morphine, ondansetron, phenyleph-min oil-petrolatum, polyethylene glycol, promethazine (PHENERGAN) IVPB, senna, simethicone, sodium chloride  0.9%, sodium chloride 0.9%    Assessment and Plan by Problem    Acute hypoxemic respiratory failure  Pneumonia   Pulmonary edema  - Likely secondary to decompensated HF that has responded well with diureses.  - Weaned Oxygen from comfort flow down to 2L via NC.   -CXR 02/121 with Multifocal patchy pulmonary parenchymal disease is present throughout both lungs, worse in the right lung than the left. Unchanged from prior.   - on Lasix IV 40 mg BID   - strict I/Os.  - Chest CT non contrast (2/13) did not show significant pulmonary edema but did show evidence of interstitial lung disease and pulmonary hypertension   - On O2. Goal SpO2 88-92%. Wean as tolerated  - if unable to wean oxygen after adequate diuresis, consider Pulmonology consult and 6MWT  - completed 7-day course of Zosyn     Hypotension  Septic shock, unclear etiology but likely diverticulitis - resolved    UTI due to Candida albicans   - Patient with acute hypotension and worsening O2 requirements over the course of 2/9/2020. Started the day on 3L NC and by the end was on Comfort flow 100% FiO2 and 30L. Concern for PE. CTA negative for PE, CXR also with severe pleural edema. Patient had been on Zosyn for possible diverticulitis +/- urosepsis.   - Vancomycin discontinued; completed Zosyn x7 day   - Patient on minimal levophed for MAP < 65. Patient appears to have baseline low BP's, mentates well with a MAP > 60. Possible infectious etiology given worsening hypoxemia / shock, CT showing compressive atelectasis vs. Developing consolidation in the left lower lobe.  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 Ordered purewick since patient incontinent   - Patient hypervolemic, significant pulmonary edema on CXR  - Monitor daily electrolytes, Continuing current management with diuresis.  - repeat BNP 2/24 pending  - Problem is gradually improving; continuing to monitor clinical status 2/23/2020.      Decreased po intake  - nutrition consulted.  - Boost Plus TID      Paroxysmal atrial fibrillation  - Patient in RVR presumed due to acute respiratory failure  - PRN metoprolol as needed for rate control but would not aggressively treat in setting of severe hypoxemia and hypotension  - INR initially subtherapeutic and was on heparin bridge w/ warfarin.   - Previously INR supratherapeutic. Resumed warfarin on 2/18 since INR now therapeutic   - Metoprolol 50mg BID discontinued given nightly hypotensive episdoes requiring transient levophed, also received digoxin 250mcg for rate control  - Maintain Mg >2, K>4.      Type 2 diabetes mellitus with hyperglycemia, without long-term current use of insulin  - AIC 5.4%  - Glucose checks QACHS  - Goal Glucose 140-180 mg/dL  - Diabetic diet     Head lice  -S/p 1 permethrin lotion treatment.   - Active lice visualized today despite permethrin treatment and reported comb-niting.   - Actively considering other options to help eradicate head lice infestation, though patient will require aggressive comb-niting   -  lindane shampoo and permethrin as needed   - continue contact isolation      Hypocalcemia - resolved   -Continue with home calcium-Vit D     Hypomagnesemia  -Replete PRN  -Continue home magnesium oxide.      Liver lesion, right lobe  - possible concern of abscess vs malignancy per CT scan  - h/o weight loss and possible colonic lesion  - RUQ US showing focal fatty  infiltration  - The gallbladder contains sludge and stones      Diverticulitis - resolved   - CT consistent with recurrent diverticulitis  - CRS consulted during this hospitalization - recommend management with antibiotics.   - Continued Zosyn for 7 days     Hearing loss  - No improvement with carbamide peroxide  - Consult ENT    Debiilty  - PT/OT recommend SNF    Discharge plan and follow up  [facility]  [facility] 2/26/2020  Previous admission:  12/19/19  Goals of Care:  General Prognosis: fair  Goals: Curative  Comfort Only: No  Hospice: No  Code Status: Full Code    Diet: Diet Low Sodium, 2gm [facility]; Fluid - 1500mL  GI Prophylaxis: Indicated due to multiple minor risk factors  Significant LDAs:   IV Access Type: Mid-line  Urinary Catheter Indication if present: Urinary Retention  Other Lines/Tubes/Drains:    VTE Risk Mitigation (From admission, onward)         Ordered     warfarin (COUMADIN) tablet 1 mg  Every other day      02/21/20 1108     IP VTE HIGH RISK PATIENT  Once      01/29/20 1916     Reason for No Pharmacological VTE Prophylaxis  Once     Question:  Reasons:  Answer:  Already adequately anticoagulated on oral Anticoagulants    01/29/20 1916                Azra Smith MD  Department of Hospital Medicine   [facility]

## 2020-02-23 NOTE — PROGRESS NOTES
"Patient refusing to get up and sit in chair. Patient refusing to sit on side of bed to eat. Patient states "im not hungry, I have no appetite. I'll get up later."  "

## 2020-02-24 PROBLEM — H61.23 BILATERAL HEARING LOSS DUE TO CERUMEN IMPACTION: Status: ACTIVE | Noted: 2020-01-01

## 2020-02-24 NOTE — PROGRESS NOTES
Patient receiving comb- niting treatment at bedside. The person who is doing the treatment ( outside agency) states that there are no more lice present in the patient head. Contact precautions still in place. Will continue to monitor.

## 2020-02-24 NOTE — PLAN OF CARE
Problem: Fall Injury Risk  Goal: Absence of Fall and Fall-Related Injury  Outcome: Met     Problem: Infection  Goal: Infection Symptom Resolution  Outcome: Met

## 2020-02-24 NOTE — PLAN OF CARE
Problem: Malnutrition  Goal: Improved Nutritional Intake  Outcome: Ongoing, Not Progressing  Intervention: Promote and Optimize Oral Intake  Flowsheets (Taken 2/24/2020 9451)  Oral Nutrition Promotion: calorie dense liquids provided; nutritional therapy counseling provided     Recommendations     1. Suggest liberalizing diet to regular to increase food items available for pt to order. Encourage PO intake.  2. Recommend changing Boost Plus to Boost Breeze TID - pt dislikes Boost Plus but is agreeable to trying Boost Breeze.   3. Yogurt added to tray per pt's request.   4. Pt may benefit from nocturnal TFs if poor PO intake continues. Suggest Isosource 1.5 @ 65 mL/hr x 12 hrs. This provides 1170 kcal, 53g protein, and 593 mL free water to meet 74% EEN, 78% EPN in order to promote PO intake during the day.   5. RD to monitor & follow up.     Goals: PO intake > 50% by RD follow up  Nutrition Goal Status: goal not met  Communication of RD Recs: other (comment)(POC)

## 2020-02-24 NOTE — PROGRESS NOTES
[facility] Hospital Medicine  Progress Note    Patient Name: Elizabeth Cano  MRN: [id number]  Patient Class: IP- Inpatient   Admission Date: 1/29/2020  Length of Stay: 26 days  Attending Physician: Azra Smith MD  Primary Care Provider: Cesar Reeves MD    [facility] Medicine Team: [facility] HOSP MED D Azra Smith MD    Subjective:     Principal Problem:Septic shock    Interval History:   Chief Complaint   Patient presents with    Multiple Complaints     uti finished antibiotics,      Follow up visit for Septic shock    History / Events Overnight: No significant events reported by Nursing. Very poor oral intake due to poor appetite. Remains on O2.    Data reviewed 2/24/2020:  H&H stable; INR near therapeutic. Bicarb still uptrending. sCr and BUN stable. BP elevated. Phos low    Review of Systems   Constitutional: Negative for fever.   HENT: Positive for hearing loss.    Respiratory: Negative for shortness of breath.      Objective:     Vital Signs (Most Recent):  Temp: 97.7 °F (36.5 °C) (02/24/20 1116)  Pulse: 75 (02/24/20 1530)  Resp: 17 (02/24/20 1116)  BP: (!) 104/50 (02/24/20 1116)  SpO2: (!) 93 % (02/24/20 1116) Vital Signs (24h Range):  Temp:  [97.7 °F (36.5 °C)-99 °F (37.2 °C)] 97.7 °F (36.5 °C)  Pulse:  [64-77] 75  Resp:  [16-18] 17  SpO2:  [93 %-98 %] 93 %  BP: (104-121)/(50-73) 104/50     Weight: 48.3 kg (106 lb 6.4 oz)  Body mass index is 17.17 kg/m².    Intake/Output Summary (Last 24 hours) at 2/24/2020 1742  Last data filed at 2/24/2020 1600  Gross per 24 hour   Intake 600 ml   Output 3100 ml   Net -2500 ml      Physical Exam   Constitutional: She is cooperative. No distress.   Eyes: Conjunctivae and lids are normal. No scleral icterus.   Cardiovascular: Normal rate, regular rhythm, S1 normal and S2 normal.   Pulmonary/Chest: No respiratory distress. She has no wheezes. She has no rhonchi.   Abdominal: Soft. Normal appearance and bowel sounds are normal. There is no  tenderness.   Musculoskeletal: Normal range of motion. She exhibits edema.   Neurological: She is alert. She is not disoriented.   Skin: Skin is warm and dry. No cyanosis. No pallor.       Significant Labs:   A1C:   Recent Labs   Lab 12/19/19  2220 01/30/20  0421   HGBA1C 6.4* 5.4     CBC:   Recent Labs   Lab 02/23/20  0812 02/24/20  0742   WBC 4.88 6.08   HGB 10.0* 10.7*   HCT 32.5* 35.7*    273     CMP:   Recent Labs   Lab 02/23/20  0812 02/24/20  0742    140   K 5.1 4.1   CL 95 94*   CO2 43* 39*   GLU 82 74   BUN 8 8   CREATININE 0.7 0.6   CALCIUM 8.1* 8.0*   PROT 5.1* 5.1*   ALBUMIN 1.7* 1.7*   BILITOT 0.6 0.6   ALKPHOS 260* 259*   AST 53* 49*   ALT 21 20   ANIONGAP 5* 7*   EGFRNONAA >60.0 >60.0     Coagulation:   Recent Labs   Lab 02/24/20  0742   INR 1.6*     Magnesium:   Recent Labs   Lab 02/23/20  0812 02/24/20  0742   MG 1.6 1.6     POCT Glucose:   Recent Labs   Lab 02/24/20  0834 02/24/20  1146 02/24/20  1616   POCTGLUCOSE 82 86 111*     TSH:   Recent Labs   Lab 02/07/20  1757   TSH 1.967       Assessment/Plan:      Active Diagnoses:    Diagnosis Date Noted POA    PRINCIPAL PROBLEM:  Septic shock [A41.9, R65.21] 02/01/2020 Yes    Bilateral hearing loss due to cerumen impaction [H61.23] 02/24/2020 Yes    Urinary retention [R33.9] 02/19/2020 Yes    Dyspnea [R06.00]  Yes    Severe malnutrition [E43] 02/14/2020 Yes    Palliative care encounter [Z51.5] 02/11/2020 Not Applicable    Acute hypoxemic respiratory failure [J96.01] 02/11/2020 Yes    Head lice [B85.0] 02/11/2020 Yes    Advance care planning [Z71.89]  Not Applicable    Goals of care, counseling/discussion [Z71.89]  Not Applicable    Aspiration pneumonia [J69.0] 02/09/2020 No    Hypovolemia [E86.1] 02/07/2020 Yes    Bilious vomiting [R11.14] 02/06/2020 Yes    Thrombocytopenia [D69.6] 02/06/2020 No    Anemia [D64.9] 02/04/2020 Yes    Liver lesion, right lobe [K76.9] 01/31/2020 Yes    Hypotension [I95.9] 01/30/2020 Yes     Diverticulitis [K57.92] 01/30/2020 Yes    Hypocalcemia [E83.51] 01/29/2020 Yes    GAVIN (acute kidney injury) [N17.9] 01/29/2020 Yes    Bifascicular block [I45.2] 01/02/2020 Yes    Hypomagnesemia [E83.42] 12/20/2019 Yes    COPD (chronic obstructive pulmonary disease) [J44.9] 12/19/2019 Yes    Chronic heart failure with preserved ejection fraction [I50.32] 12/19/2019 Yes    Paroxysmal atrial fibrillation [I48.0] 12/19/2019 Yes    Type 2 diabetes mellitus with hyperglycemia, without long-term current use of insulin [E11.65] 12/19/2019 Yes      Problems Resolved During this Admission:    Diagnosis Date Noted Date Resolved POA    Delirium [R41.0] 02/07/2020 02/20/2020 No    Visual hallucinations [R44.1] 02/06/2020 02/20/2020 No    Hematemesis [K92.0] 02/06/2020 02/20/2020 No    Acute cystitis without hematuria [N30.00] 01/29/2020 02/20/2020 Yes       Overview / ICU Course:    Elizabeth Cano is a 69 y.o. female with medical history significant for A-fib (on coumadin), COPD, and HFpEF (EF 45%)  admitted for Septic shock.    Inpatient Medications Prescribed for Management of Current Problems:     Scheduled Meds:    albuterol-ipratropium  3 mL Nebulization Q4H    calcium-vitamin D3  1 tablet Oral BID    carbamide peroxide  5 drop Both Ears BID    dextromethorphan-guaifenesin  mg  1 tablet Oral BID    digoxin  0.125 mg Oral Daily    DULoxetine  30 mg Oral Daily    furosemide  40 mg Intravenous TID    magnesium oxide  400 mg Oral Daily    miconazole nitrate 2%   Topical (Top) BID    midodrine  10 mg Oral QHS    mirtazapine  15 mg Oral QHS    pantoprazole  40 mg Oral Daily    [START ON 2/25/2020] potassium chloride  30 mEq Oral Daily    QUEtiapine  25 mg Oral QHS    senna-docusate 8.6-50 mg  1 tablet Oral BID    [START ON 2/25/2020] warfarin  1 mg Oral Every Mon, Tue, Thu, Fri, Sun     Continuous Infusions:   As Needed: acetaminophen, albuterol-ipratropium, benzonatate, bisacodyL, Dextrose  10% Bolus, Dextrose 10% Bolus, glucagon (human recombinant), glucose, glucose, melatonin, morphine, ondansetron, phenyleph-min oil-petrolatum, polyethylene glycol, promethazine (PHENERGAN) IVPB, senna, simethicone, sodium chloride 0.9%, sodium chloride 0.9%    Assessment and Plan by Problem    Acute hypoxemic respiratory failure  Pneumonia   Pulmonary edema  - Likely secondary to decompensated HF that has responded well with diureses.  - Weaned Oxygen from comfort flow down to 2L via NC.   -CXR 02/121 with Multifocal patchy pulmonary parenchymal disease is present throughout both lungs, worse in the right lung than the left. Unchanged from prior.   - on Lasix IV 40 mg BID   - strict I/Os.  - Chest CT non contrast (2/13) did not show significant pulmonary edema but did show evidence of interstitial lung disease and pulmonary hypertension   - On O2. Goal SpO2 88-92%. Wean as tolerated  - if unable to wean oxygen after adequate diuresis, consider Pulmonology consult and 6MWT  - completed 7-day course of Zosyn  - Inadequate diuresis; issues with fluid restriction and I/Os. Increased Lasix to TID 2/24     Hypotension  Septic shock, unclear etiology but likely diverticulitis - resolved    UTI due to Candida albicans   - Patient with acute hypotension and worsening O2 requirements over the course of 2/9/2020. Started the day on 3L NC and by the end was on Comfort flow 100% FiO2 and 30L. Concern for PE. CTA negative for PE, CXR also with severe pleural edema. Patient had been on Zosyn for possible diverticulitis +/- urosepsis.   - Vancomycin discontinued; completed Zosyn x7 day   - Patient on minimal levophed for MAP < 65. Patient appears to have baseline low BP's, mentates well with a MAP > 60. Possible infectious etiology given worsening hypoxemia / shock, CT showing compressive atelectasis vs. Developing consolidation in the left lower lobe.  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 Ordered purewick since patient incontinent   - Patient hypervolemic, significant pulmonary edema on CXR  - Monitor daily electrolytes, Continuing current management with diuresis.  - repeat BNP 2/24 significantly elevated; Changed Lasix 40 mg to TID  - Problem is gradually improving; continuing to monitor clinical status 2/24/2020.      Decreased po intake  - nutrition consulted.  - Boost Plus TID      Paroxysmal atrial fibrillation  - Patient in RVR presumed due to acute respiratory failure  - PRN metoprolol as needed for rate control but would not aggressively treat in setting of severe hypoxemia and hypotension  - INR initially subtherapeutic and was on heparin bridge with warfarin.   - Previously INR supratherapeutic. Resumed warfarin on 2/18 since INR near therapeutic. PharmD consulted for management.  - Metoprolol 50mg BID discontinued given nightly hypotensive episdoes requiring transient levophed, also received digoxin 250mcg for rate control  - Maintain Mg >2, K>4.      Type 2 diabetes mellitus with hyperglycemia, without long-term current use of insulin  - AIC 5.4%  - Glucose checks QACHS  - Goal Glucose 140-180 mg/dL  - Diabetic diet     Head lice  -S/p 1 permethrin lotion treatment.   - Active lice visualized today despite permethrin treatment and reported comb-niting.   - Actively considering other options to help eradicate head lice infestation, though patient will require aggressive  comb-niting   -  lindane shampoo and permethrin as needed   - continue contact isolation      Hypocalcemia - resolved   -Continue with home calcium-Vit D     Hypomagnesemia  -Replete PRN  -Continue home magnesium oxide.      Liver lesion, right lobe  - possible concern of abscess vs malignancy per CT scan  - h/o weight loss and possible colonic lesion  - RUQ US showing focal fatty infiltration  - The gallbladder contains sludge and stones      Diverticulitis - resolved   - CT consistent with recurrent diverticulitis  - CRS consulted during this hospitalization - recommend management with antibiotics.   - Continued Zosyn for 7 days     Hearing loss due to cerumen impaction  - No improvement with carbamide peroxide  - Consulted ENT; confirmed cerumen impaction. Follow up with ENT as OP.    [facility]charge plan and follow up  [facility]26/[facility]  Previous admission:  12/19/19  Goals of Care:  General Prognosis: fair  Goals: Curative  Comfort Only: No  Hospice: No  Code Status: Full Code    Diet: Diet Low Sodium, 2gm [facility]; Fluid - 1500mL  GI Prophylaxis: Indicated due to multiple minor risk factors  Significant LDAs:   IV Access Type: Mid-line  Urinary Catheter Indication if present: Urinary Retention  Other Lines/Tubes/Drains:    VTE Risk Mitigation (From admission, onward)         Ordered     warfarin (COUMADIN) tablet 1 mg  Every Mon, Tue, Thu, Fri, Sun      02/24/20 1035     IP VTE HIGH RISK PATIENT  Once      01/29/20 1916     Reason for No Pharmacological VTE Prophylaxis  Once     Question:  Reasons:  Answer:  Already adequately anticoagulated on oral Anticoagulants    01/29/20 1916                Azra Smith MD  Department of Hospital Medicine   [facility]

## 2020-02-24 NOTE — PLAN OF CARE
Patient medicated per orders. Patient free of falls. Patient refused to get out of bed. Patient did not eat breakfast, lunch, or dinner.

## 2020-02-24 NOTE — CONSULTS
Otolaryngology - Head and Neck Surgery  Consultation Report      Consultation from: hospital medicine  Reason for Consultation:  Hearing loss      Subjective:      CC:   Chief Complaint   Patient presents with    Multiple Complaints     uti finished antibiotics,        HPI     69F currently admitted to hospital medicine for acute hypoxemic resp failure presents to ENT for evaluation of hearing loss. Pt noticed change in hearing several weeks ago first in her left ear then in her right ear. Her ears do feel full bilaterally. She has not had any otalgia, otorrhea, vertigo, recent ear infections, hearing evaluation. Hearing loss has been stable.       ROS: ENT-focused ROS completed and is negative unless otherwise stated in the HPI.     Past Medical History:   Diagnosis Date    A-fib     GAVIN (acute kidney injury)     CHF (congestive heart failure)     COPD (chronic obstructive pulmonary disease)     Diabetes mellitus     Hypertension      Past Surgical History:   Procedure Laterality Date    CARDIAC SURGERY      COLONOSCOPY N/A 2020    Procedure: COLONOSCOPY;  Surgeon: Gilberto Laguna MD;  Location: [address]);  Service: Endoscopy;  Laterality: N/A;    ESOPHAGOGASTRODUODENOSCOPY N/A 2020    Procedure: EGD (ESOPHAGOGASTRODUODENOSCOPY);  Surgeon: Aleksey Gomez MD;  Location: [address]);  Service: Endoscopy;  Laterality: N/A;     Social History     Tobacco Use    Smoking status: Former Smoker     Packs/day: 1.00     Years: 54.00     Pack years: 54.00     Types: Cigarettes     Last attempt to quit: 2019     Years since quittin.2    Smokeless tobacco: Never Used   Substance Use Topics    Alcohol use: Not Currently     Family History   Problem Relation Age of Onset    Heart disease Mother        Current Facility-Administered Medications:     acetaminophen tablet 650 mg, 650 mg, Oral, Q4H PRN, Jason Lorenzana PA-C, 650 mg at 20 9857    albuterol-ipratropium 2.5  mg-0.5 mg/3 mL nebulizer solution 3 mL, 3 mL, Nebulization, Q4H, Mendoza Caldwell MD, 3 mL at 02/24/20 1116    albuterol-ipratropium 2.5 mg-0.5 mg/3 mL nebulizer solution 3 mL, 3 mL, Nebulization, Q2H PRN, Mendoza Caldwell MD, 3 mL at 02/19/20 0151    benzonatate capsule 100 mg, 100 mg, Oral, TID PRN, Mendoza Caldwell MD    bisacodyl suppository 10 mg, 10 mg, Rectal, Daily PRN, Jason Lorenzana PA-C    calcium-vitamin D3 500 mg(1,250mg) -200 unit per tablet 1 tablet, 1 tablet, Oral, BID, Jason Lorenzana PA-C, 1 tablet at 02/24/20 0843    carbamide peroxide 6.5 % otic solution 5 drop, 5 drop, Both Ears, BID, Mendoza Caldwell MD, 5 drop at 02/24/20 1235    dextromethorphan-guaifenesin  mg per 12 hr tablet 1 tablet, 1 tablet, Oral, BID, Mendoza Caldwell MD, 1 tablet at 02/24/20 0843    dextrose 10% (D10W) Bolus, 12.5 g, Intravenous, PRN, Jason Lorenzana PA-C, Last Rate: 1,000 mL/hr at 02/07/20 1528, 125 mL at 02/07/20 1528    dextrose 10% (D10W) Bolus, 25 g, Intravenous, PRN, Jason Lorenzana PA-C    digoxin tablet 0.125 mg, 0.125 mg, Oral, Daily, Chuck Metzger MD, 0.125 mg at 02/24/20 0843    DULoxetine DR capsule 30 mg, 30 mg, Oral, Daily, Jason Lorenzana PA-C, 30 mg at 02/24/20 0843    furosemide injection 40 mg, 40 mg, Intravenous, BID, Mendoza Caldwell MD, 40 mg at 02/24/20 0843    glucagon (human recombinant) injection 1 mg, 1 mg, Intramuscular, PRN, Jason Lorenzana PA-C    glucose chewable tablet 16 g, 16 g, Oral, PRN, TIM Valverde-DARREN, 16 g at 02/14/20 0908    glucose chewable tablet 24 g, 24 g, Oral, PRN, Jason Lorenzana PA-C    magnesium oxide tablet 400 mg, 400 mg, Oral, Daily, Behram Khan, MD, 400 mg at 02/24/20 0843    melatonin tablet 6 mg, 6 mg, Oral, Nightly PRN, Jason Lorenzana PA-C, 6 mg at 02/20/20 2055    miconazole nitrate 2% ointment, , Topical (Top), BID, Vicki Eason MD    midodrine tablet 10 mg, 10 mg, Oral, QHS, Chuck Metzger MD, 10 mg at 02/23/20 2117     mirtazapine tablet 15 mg, 15 mg, Oral, QHS, Chuck Metzger MD, 15 mg at 02/23/20 2117    morphine 10 mg/5 mL solution 2.5 mg, 2.5 mg, Oral, Q4H PRN, Mendoza Caldwell MD    ondansetron injection 4 mg, 4 mg, Intravenous, Q6H PRN, Mendoza Caldwell MD, 4 mg at 02/21/20 2000    pantoprazole EC tablet 40 mg, 40 mg, Oral, Daily, Rusty Aponte MD, 40 mg at 02/24/20 0843    phenyleph-min oil-petrolatum 0.25-14-74.9 % ointment 1 applicator, 1 applicator, Rectal, QID PRN, Jason Lorenzana PA-C    polyethylene glycol packet 17 g, 17 g, Oral, Daily PRN, Chuck Metzger MD    promethazine (PHENERGAN) 12.5 mg in dextrose 5 % 50 mL IVPB, 12.5 mg, Intravenous, Q6H PRN, Mendoza Caldwell MD    QUEtiapine tablet 25 mg, 25 mg, Oral, QHS, Rusty Aponte MD, 25 mg at 02/23/20 2117    senna tablet 8.6 mg, 8.6 mg, Oral, Daily PRN, Chuck Metzger MD, 8.6 mg at 02/13/20 0941    senna-docusate 8.6-50 mg per tablet 1 tablet, 1 tablet, Oral, BID, Mendoza Caldwell MD, 1 tablet at 02/23/20 2117    simethicone chewable tablet 80 mg, 1 tablet, Oral, TID PRN, Mendoza Caldwell MD    sodium chloride 0.9% flush 10 mL, 10 mL, Intravenous, PRN, Isabel Kelly PA-C    sodium chloride 0.9% flush 10 mL, 10 mL, Intravenous, PRN, Jesus Schilling MD    [START ON 2/25/2020] warfarin (COUMADIN) tablet 1 mg, 1 mg, Oral, Every Mon, Tue, Thu, Fri, Azra Perry MD    warfarin (COUMADIN) tablet 2 mg, 2 mg, Oral, Once, Azra Smith MD  Levsin [hyoscyamine sulfate]      Objective:     Temp:  [97.7 °F (36.5 °C)-99 °F (37.2 °C)] 97.7 °F (36.5 °C)  Pulse:  [64-90] 77  Resp:  [16-20] 17  SpO2:  [92 %-98 %] 93 %  BP: (104-129)/(50-73) 104/50    General Appearance:   Awake, Alert and Oriented. NAD. Appropriate affect and appearance     Neuro:   Spontaneous eye opening, appropriate verbal responses, follows commands  Pupils equal, round & brisk. EOMI, no proptosis, no spontaneous nystagmus  Face is symmetric, HB I, non-edematous and SILT  bilaterally  Vision grossly intact, Hearing grossly intact  TERELL, normal tone    Head and Face:   Normocephalic, atraumatic, No facial deformities, skin is intact and non-erythematous    Ears:  Periauricular regions non-erythematous, non-fluctuanct non-tender  Pinna normal bilaterally, no skin lesions  Bilateral cerumen impaction    Nose:   External nose is symmetric, no skin lesions    OC/OP:  Tongue midline on extension, non-edematous, soft  No labial, buccal, oral tongue or floor of mouth lesions  Soft palate symmetric, midline and without lesions or masses, tonsils symmetric  No masses or lesions of the visualized oropharynx    Neck:  Neck is symmetric, non-edematous, non-erythematous  Trachea is midline and easily palpable,  No palpable adenopathy or masses in levels I-VI    Glands:  Parotid and submandibular glands are symmetric and non-tender.   No thyroid nodules or masses, non-tender     Respiratory:  Normal work of breathing, no accessory muscle use, no stridor    Voice:  Normal vocal quality, volume, prosody and articulation         Recent Labs   Lab 02/18/20  0712 02/19/20  0653 02/20/20  0709 02/21/20  0738 02/22/20  0554 02/23/20  0812 02/24/20  0742   WBC 4.56 4.29 4.82 4.25 4.93 4.88 6.08   HGB 8.3* 9.0* 8.8* 9.1* 9.4* 10.0* 10.7*   HCT 28.0* 30.7* 30.0* 30.9* 31.9* 32.5* 35.7*    203 199 227 258 269 273    143 142 144 142 143 140   K 4.1 3.8 4.0 3.9 3.1* 5.1 4.1   BUN 10 9 7* 7* 8 8 8   CREATININE 0.7 0.6 0.6 0.6 0.6 0.7 0.6   CALCIUM 7.6* 7.6* 7.7* 7.5* 7.7* 8.1* 8.0*   PHOS 2.1* 2.0* 2.0* 2.3* 2.3* 2.0* 1.8*   MG 1.8 1.9 1.7 1.7 1.6 1.6 1.6           Assessment/Plan:      Decreased hearing 2/2 Cerumen impaction bilaterally    - Able to clear some cerumen at bedside but canals still impacted  - Recommend follow up in otology clinic after discharge for disimpaction and formal audiogram       Chavez Carvajal, DO  Otorhinolaryngology-Head & Neck Surgery  [facility]  02/24/2020

## 2020-02-24 NOTE — PROGRESS NOTES
"[facility]  Adult Nutrition  Progress Note    SUMMARY       Recommendations    1. Suggest liberalizing diet to regular to increase food items available for pt to order. Encourage PO intake.  2. Recommend changing Boost Plus to Boost Breeze TID - pt dislikes Boost Plus but is agreeable to trying Boost Breeze.   3. Yogurt added to tray per pt's request.   4. Pt may benefit from nocturnal TFs if poor PO intake continues. Suggest Isosource 1.5 @ 65 mL/hr x 12 hrs. This provides 1170 kcal, 53g protein, and 593 mL free water to meet 74% EEN, 78% EPN in order to promote PO intake during the day.   5. RD to monitor & follow up.    Goals: PO intake > 50% by RD follow up  Nutrition Goal Status: goal not met  Communication of RD Recs: other (comment)(POC)    Reason for Assessment    Reason For Assessment: RD follow-up  Diagnosis: infection/sepsis(septic shock)  Relevant Medical History: COPD, HF, DM, diverticulitis  Interdisciplinary Rounds: did not attend  General Information Comments: Pt continues with poor appetite and states she has not been eating this week, 0% intake per RN documentation. Pt has also not been drinking Boost 2/2 disliking the taste. She is agreeable to trying Boost Breeze instead. Wt loss noted since admission, unclear how much true wt loss has occurred 2/2 fluid changes. NFPE not completed 2/2 pt with head lice, however pt continues with severe malnutrition 2/2 decreased appetite and wt loss.  Nutrition Discharge Planning: Adequate PO intake to meet needs    Nutrition Risk Screen    Nutrition Risk Screen: no indicators present    Nutrition/Diet History    Spiritual, Cultural Beliefs, Roman Catholic Practices, Values that Affect Care: no    Anthropometrics    Temp: 97.7 °F (36.5 °C)  Height Method: Stated  Height: 5' 6" (167.6 cm)  Height (inches): 66 in  Weight Method: Bed Scale  Weight: 48.3 kg (106 lb 6.4 oz)  Weight (lb): 106.4 lb  Ideal Body Weight (IBW), Female: 130 lb  % Ideal Body " Weight, Female (lb): 120.58 %  BMI (Calculated): 17.2    Lab/Procedures/Meds    Pertinent Labs Reviewed: reviewed  Pertinent Labs Comments: Ca 8, P 1.8, Alb 1.7  Pertinent Medications Reviewed: reviewed  Pertinent Medications Comments: calcium + vitamin D, lasix, magnesium oxide, mirtazapine, pantoprazole, phenergan, senna-docusate, warfarin    Estimated/Assessed Needs    Weight Used For Calorie Calculations: 48.3 kg (106 lb 7.7 oz)  Energy Calorie Requirements (kcal): 6972-0631 kcal/day  Energy Need Method: Kcal/kg(30-35)  Protein Requirements: 63-73 g/day(1.3-1.5 g/kg)  Weight Used For Protein Calculations: 48.3 kg (106 lb 7.7 oz)  Fluid Requirements (mL): per MD or 1 mL/kcal     RDA Method (mL): 1449  CHO Requirement: (-)    Nutrition Prescription Ordered    Current Diet Order: Low Na  Nutrition Order Comments: 1500 mL FR  Oral Nutrition Supplement: Boost Plus TID    Evaluation of Received Nutrient/Fluid Intake    I/O: -3.2L since admit  Comments: LBM 2/24  Tolerance: (-)  % Intake of Estimated Energy Needs: 0 - 25 %  % Meal Intake: 0 - 25 %    Nutrition Risk    Level of Risk/Frequency of Follow-up: (1x/week)     Assessment and Plan    Severe malnutrition  Nutrition Problem:  Severe Protein-Calorie Malnutrition  Malnutrition in the context of Chronic Illness/Injury     Related to (etiology):  Inadequate energy intake 2/2 poor appetite     Signs and Symptoms (as evidenced by):  Energy Intake: <75% of estimated energy requirement for > 3 months  Weight Loss: 14% x 2-3 months     Interventions(treatment strategy):  Collaboration with other providers  Modified diet - diabetic cardiac  Commercial beverage - Boost Glucose Control TID or Optisource if vitamin K is a concern     Nutrition Diagnosis Status:  Continues     Monitor and Evaluation    Food and Nutrient Intake: energy intake, food and beverage intake  Food and Nutrient Adminstration: diet order  Anthropometric Measurements: weight, weight change, body mass  index  Biochemical Data, Medical Tests and Procedures: electrolyte and renal panel, gastrointestinal profile, glucose/endocrine profile, inflammatory profile, lipid profile  Nutrition-Focused Physical Findings: overall appearance     Malnutrition Assessment  Malnutrition Type: chronic illness  Energy Intake: moderate energy intake      Weight Loss (Malnutrition): (14% x 2-3 months based on UBW, family report)  Energy Intake (Malnutrition): less than 75% for greater than or equal to 3 months     Nutrition Follow-Up    RD Follow-up?: Yes

## 2020-02-24 NOTE — NURSING
Received report from [facility]; pt awake, watching TV; denies distress; SR on the monitor; contact isolation in progress; R arm swelling noted; Carpio in place for urinary retention; call light within reach; will continue to monitor

## 2020-02-24 NOTE — PROGRESS NOTES
PHARMACY CONSULT NOTE: WARFARIN    Elizabeth Cano is a 69 y.o. female on warfarin therapy for Atrial Fibrilation PharmD has been consulted for warfarin dosing.    Current order: warfarin 1 mg every other day  Home dose: 1mg every Mon, Wed, Fri   INR goal: 2-3   Coumadin clinic enrollment: not currently enrolled, unsure who follows patient's INR outpatient    Lab Results   Component Value Date    INR 1.6 (H) 02/24/2020    INR 1.6 (H) 02/23/2020    INR 1.7 (H) 02/22/2020     Diet: Low Sodium     Recommendation(s):    Give warfarin 2 mg boost dose x 1 today, then increase warfarin to 1 mg every Mon, Tues, Thur, Fri, Sun   Pharmacy will continue to follow and monitor warfarin    Thank you for the consult,  Jada Gonsalez  Extension [phone]    **Note: Consults are reviewed Monday-Friday 7:00am-3:30pm. THE ABOVE RECOMMENDATIONS ARE ONLY SUGGESTED.THE RECOMMENDATIONS SHOULD BE CONSIDERED IN CONJUNCTION WITH ALL PATIENT FACTORS. **

## 2020-02-24 NOTE — PLAN OF CARE
Pt not medically ready for discharge.    PLAN: [facility] (resident)    CM will follow-up and assist team.    Julianna Maldonado RN   - [facility]  [id number]

## 2020-02-25 NOTE — PROGRESS NOTES
[facility] Hospital Medicine  Progress Note    Patient Name: Elizabeth Cano  MRN: [id number]  Patient Class: IP- Inpatient   Admission Date: 1/29/2020  Length of Stay: 27 days  Attending Physician: Azra Smith MD  Primary Care Provider: Cesar Reeves MD    [facility]: [facility] Azra Smith MD    Subjective:     Principal Problem:Septic shock    Interval History:   Chief Complaint   Patient presents with    Multiple Complaints     uti finished antibiotics,      Follow up visit for Septic shock    History / Events Overnight: No significant events reported by Nursing. Very poor oral intake due to poor appetite. Remains on O2.    Data reviewed 2/25/2020:  H&H stable; INR near therapeutic. Bicarb remains elevated. sCr and BUN stable. BP elevated. Phos low    Review of Systems   Constitutional: Negative for fever.   HENT: Positive for hearing loss.    Respiratory: Negative for shortness of breath.      Objective:     Vital Signs (Most Recent):  Temp: 97.2 °F (36.2 °C) (02/25/20 0900)  Pulse: 75 (02/25/20 1106)  Resp: 18 (02/25/20 0900)  BP: (!) 106/53 (02/25/20 0900)  SpO2: 98 % (02/25/20 0900) Vital Signs (24h Range):  Temp:  [97.2 °F (36.2 °C)-98.4 °F (36.9 °C)] 97.2 °F (36.2 °C)  Pulse:  [69-75] 75  Resp:  [17-18] 18  SpO2:  [90 %-98 %] 98 %  BP: (106-120)/(53-60) 106/53     Weight: 48.3 kg (106 lb 6.4 oz)  Body mass index is 17.17 kg/m².    Intake/Output Summary (Last 24 hours) at 2/25/2020 1251  Last data filed at 2/25/2020 0600  Gross per 24 hour   Intake 720 ml   Output 1450 ml   Net -730 ml      Physical Exam   Constitutional: She is cooperative. No distress.   Eyes: Conjunctivae and lids are normal. No scleral icterus.   Cardiovascular: Normal rate, regular rhythm, S1 normal and S2 normal.   Pulmonary/Chest: No respiratory distress. She has no wheezes. She has no rhonchi.   Abdominal: Soft. Normal appearance and bowel sounds are normal. There is no  tenderness.   Musculoskeletal: Normal range of motion. She exhibits edema.   Neurological: She is alert. She is not disoriented.   Skin: Skin is warm and dry. No cyanosis. No pallor.       Significant Labs:   A1C:   Recent Labs   Lab 12/19/19  2220 01/30/20  0421   HGBA1C 6.4* 5.4     CBC:   Recent Labs   Lab 02/24/20  0742 02/25/20  0730   WBC 6.08 4.05   HGB 10.7* 10.4*   HCT 35.7* 35.3*    261     CMP:   Recent Labs   Lab 02/24/20  0742 02/25/20  0730    143   K 4.1 4.0   CL 94* 93*   CO2 39* 42*   GLU 74 72   BUN 8 8   CREATININE 0.6 0.7   CALCIUM 8.0* 8.0*   PROT 5.1* 5.1*   ALBUMIN 1.7* 1.7*   BILITOT 0.6 0.5   ALKPHOS 259* 246*   AST 49* 48*   ALT 20 18   ANIONGAP 7* 8   EGFRNONAA >60.0 >60.0     Coagulation:   Recent Labs   Lab 02/25/20  0711   INR 1.9*     Magnesium:   Recent Labs   Lab 02/24/20  0742 02/25/20  0730   MG 1.6 1.6     POCT Glucose:   Recent Labs   Lab 02/24/20  2308 02/25/20  0814 02/25/20  1103   POCTGLUCOSE 80 78 88     TSH:   Recent Labs   Lab 02/07/20  1757   TSH 1.967       Assessment/Plan:      Active Diagnoses:    Diagnosis Date Noted POA    PRINCIPAL PROBLEM:  Septic shock [A41.9, R65.21] 02/01/2020 Yes    Bilateral hearing loss due to cerumen impaction [H61.23] 02/24/2020 Yes    Urinary retention [R33.9] 02/19/2020 Yes    Dyspnea [R06.00]  Yes    Severe malnutrition [E43] 02/14/2020 Yes    Palliative care encounter [Z51.5] 02/11/2020 Not Applicable    Acute hypoxemic respiratory failure [J96.01] 02/11/2020 Yes    Head lice [B85.0] 02/11/2020 Yes    Advance care planning [Z71.89]  Not Applicable    Goals of care, counseling/discussion [Z71.89]  Not Applicable    Aspiration pneumonia [J69.0] 02/09/2020 No    Hypovolemia [E86.1] 02/07/2020 Yes    Bilious vomiting [R11.14] 02/06/2020 Yes    Thrombocytopenia [D69.6] 02/06/2020 No    Anemia [D64.9] 02/04/2020 Yes    Liver lesion, right lobe [K76.9] 01/31/2020 Yes    Hypotension [I95.9] 01/30/2020 Yes     Diverticulitis [K57.92] 01/30/2020 Yes    Hypocalcemia [E83.51] 01/29/2020 Yes    GAVIN (acute kidney injury) [N17.9] 01/29/2020 Yes    Bifascicular block [I45.2] 01/02/2020 Yes    Hypomagnesemia [E83.42] 12/20/2019 Yes    COPD (chronic obstructive pulmonary disease) [J44.9] 12/19/2019 Yes    Chronic heart failure with preserved ejection fraction [I50.32] 12/19/2019 Yes    Paroxysmal atrial fibrillation [I48.0] 12/19/2019 Yes    Type 2 diabetes mellitus with hyperglycemia, without long-term current use of insulin [E11.65] 12/19/2019 Yes      Problems Resolved During this Admission:    Diagnosis Date Noted Date Resolved POA    Delirium [R41.0] 02/07/2020 02/20/2020 No    Visual hallucinations [R44.1] 02/06/2020 02/20/2020 No    Hematemesis [K92.0] 02/06/2020 02/20/2020 No    Acute cystitis without hematuria [N30.00] 01/29/2020 02/20/2020 Yes       Overview / ICU Course:    Elizabeth Cano is a 69 y.o. female with medical history significant for A-fib (on coumadin), COPD, and HFpEF (EF 45%)  admitted for Septic shock.    Inpatient Medications Prescribed for Management of Current Problems:     Scheduled Meds:    acetaZOLAMIDE  250 mg Oral BID    calcium-vitamin D3  1 tablet Oral BID    carbamide peroxide  5 drop Both Ears BID    dextromethorphan-guaifenesin  mg  1 tablet Oral BID    digoxin  0.125 mg Oral Daily    DULoxetine  30 mg Oral Daily    furosemide  40 mg Intravenous TID    magnesium oxide  400 mg Oral Daily    miconazole nitrate 2%   Topical (Top) BID    midodrine  10 mg Oral QHS    mirtazapine  15 mg Oral QHS    pantoprazole  40 mg Oral Daily    potassium chloride  30 mEq Oral Daily    QUEtiapine  25 mg Oral QHS    senna-docusate 8.6-50 mg  1 tablet Oral BID    warfarin  1 mg Oral Every Mon, Tue, Thu, Fri, Sun     Continuous Infusions:   As Needed: acetaminophen, albuterol-ipratropium, benzonatate, bisacodyL, Dextrose 10% Bolus, Dextrose 10% Bolus, glucagon (human  recombinant), glucose, glucose, melatonin, morphine, ondansetron, phenyleph-min oil-petrolatum, polyethylene glycol, promethazine (PHENERGAN) IVPB, senna, simethicone, sodium chloride 0.9%, sodium chloride 0.9%    Assessment and Plan by Problem    Acute hypoxemic respiratory failure  Pneumonia   Pulmonary edema  - Likely secondary to decompensated HF that has responded well with diureses.  - Weaned Oxygen from comfort flow down to 2L via NC.   -CXR 02/121 with Multifocal patchy pulmonary parenchymal disease is present throughout both lungs, worse in the right lung than the left. Unchanged from prior.   - on Lasix IV 40 mg BID   - strict I/Os.  - Chest CT non contrast (2/13) did not show significant pulmonary edema but did show evidence of interstitial lung disease and pulmonary hypertension   - On O2. Goal SpO2 88-92%. Wean as tolerated  - if unable to wean oxygen after adequate diuresis, consider Pulmonology consult and 6MWT  - completed 7-day course of Zosyn  - Inadequate diuresis; issues with fluid restriction and I/Os. Increased Lasix to TID 2/24     Hypotension  Septic shock, unclear etiology but likely diverticulitis - resolved    UTI due to Candida albicans   - Patient with acute hypotension and worsening O2 requirements over the course of 2/9/2020. Started the day on 3L NC and by the end was on Comfort flow 100% FiO2 and 30L. Concern for PE. CTA negative for PE, CXR also with severe pleural edema. Patient had been on Zosyn for possible diverticulitis +/- urosepsis.   - Vancomycin discontinued; completed Zosyn x7 day   - Patient on minimal levophed for MAP < 65. Patient appears to have baseline low BP's, mentates well with a MAP > 60. Possible infectious etiology given worsening hypoxemia / shock, CT showing compressive atelectasis vs. Developing consolidation in the left lower lobe.  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 Ordered purewick since patient incontinent   - Patient hypervolemic, significant pulmonary edema on CXR  - Monitor daily electrolytes, Continuing current management with diuresis.  - repeat BNP 2/24 significantly elevated; Changed Lasix 40 mg to TID  - alkalosis persisting; trial of Diamox 2/25  - Problem is gradually improving; continuing to monitor clinical status 2/25/2020.      Decreased po intake  - nutrition consulted.  - Boost Plus TID      Paroxysmal atrial fibrillation  - Patient in RVR presumed due to acute respiratory failure  - PRN metoprolol as needed for rate control but would not aggressively treat in setting of severe hypoxemia and hypotension  - INR initially subtherapeutic and was on heparin bridge with warfarin.   - Previously INR supratherapeutic. Resumed warfarin on 2/18 since INR near therapeutic. PharmD consulted for management.  - Metoprolol 50mg BID discontinued given nightly hypotensive episdoes requiring transient levophed, also received digoxin 250mcg for rate control  - Maintain Mg >2, K>4.      Type 2 diabetes mellitus with hyperglycemia, without long-term current use of insulin  - AIC 5.4%  - Glucose checks QACHS  - Goal Glucose 140-180 mg/dL  - Diabetic diet     Head lice  -S/p 1 permethrin lotion treatment.   - Active lice visualized today despite permethrin treatment and reported comb-niting.   - Actively considering other options to help eradicate head lice infestation, though patient will require aggressive comb-niting   -   lindane shampoo and permethrin as needed   - continue contact isolation      Hypocalcemia - resolved   -Continue with home calcium-Vit D     Hypomagnesemia  -Replete PRN  -Continue home magnesium oxide.      Liver lesion, right lobe  - possible concern of abscess vs malignancy per CT scan  - h/o weight loss and possible colonic lesion  - RUQ US showing focal fatty infiltration  - The gallbladder contains sludge and stones      Diverticulitis - resolved   - CT consistent with recurrent diverticulitis  - CRS consulted during this hospitalization - recommend management with antibiotics.   - Continued Zosyn for 7 days     Hearing loss due to cerumen impaction  - No improvement with carbamide peroxide  - Consulted ENT; confirmed cerumen impaction. Follow up with ENT as OP.    Discharge plan and follow up  [facility]  [facility] 2/26/2020  Previous admission:  12/19/19  Goals of Care:  General Prognosis: fair  Goals: Curative  Comfort Only: No  Hospice: No  Code Status: Full Code    Diet: Diet Low Sodium, 2gm [facility]; Fluid - 1500mL  GI Prophylaxis: Indicated due to multiple minor risk factors  Significant LDAs:   IV Access Type: Mid-line  Urinary Catheter Indication if present: Urinary Retention  Other Lines/Tubes/Drains:    VTE Risk Mitigation (From admission, onward)         Ordered     warfarin (COUMADIN) tablet 1 mg  Every Mon, Tue, Thu, Fri, Sun      02/24/20 1035     IP VTE HIGH RISK PATIENT  Once      01/29/20 1916     Reason for No Pharmacological VTE Prophylaxis  Once     Question:  Reasons:  Answer:  Already adequately anticoagulated on oral Anticoagulants    01/29/20 1916                Azra Smith MD  Department of Hospital Medicine   [facility]

## 2020-02-25 NOTE — PLAN OF CARE
Problem: Diabetes Comorbidity  Goal: Blood Glucose Level Within Desired Range  Outcome: Ongoing, Progressing     Problem: Malnutrition  Goal: Improved Nutritional Intake  Outcome: Ongoing, Not Progressing     Patient remained free of falls today. Patient knows to call when assistance needed. Patient only ate some of a subway sandwich and sprite today for lunch. Patient states she has no desire to eat. No distress noted. Call bell in reach. Bed in lowest position. Safety maintained. Will continue to monitor.

## 2020-02-26 NOTE — PROGRESS NOTES
[facility] Hospital Medicine  Progress Note    Patient Name: Elizabeth Cano  MRN: [id number]  Patient Class: IP- Inpatient   Admission Date: 1/29/2020  Length of Stay: 28 days  Attending Physician: Azra Smith MD  Primary Care Provider: Cesar Reeves MD    [facility] Medicine Team: [facility] [place] [facility] MED D Azra Smith MD    Subjective:     Principal Problem:Septic shock    Interval History:   Chief Complaint   Patient presents with    Multiple Complaints     uti finished antibiotics,      Follow up visit for Septic shock    History / Events Overnight: No significant events reported by Nursing. Very poor oral intake due to poor appetite. Remains on O2.    Data reviewed 2/26/2020:  H&H stable. Bicarb remains elevated but improved. sCr and BUN stable.     Review of Systems   Constitutional: Negative for fever.   HENT: Positive for hearing loss.    Respiratory: Negative for shortness of breath.      Objective:     Vital Signs (Most Recent):  Temp: 98 °F (36.7 °C) (02/26/20 1208)  Pulse: 70 (02/26/20 1522)  Resp: 16 (02/26/20 1208)  BP: (!) 113/54 (02/26/20 1208)  SpO2: (!) 91 % (02/26/20 1208) Vital Signs (24h Range):  Temp:  [97 °F (36.1 °C)-98 °F (36.7 °C)] 98 °F (36.7 °C)  Pulse:  [56-75] 70  Resp:  [14-18] 16  SpO2:  [91 %-98 %] 91 %  BP: ()/(53-60) 113/54     Weight: 48.3 kg (106 lb 6.4 oz)  Body mass index is 17.17 kg/m².    Intake/Output Summary (Last 24 hours) at 2/26/2020 1730  Last data filed at 2/26/2020 1350  Gross per 24 hour   Intake 480 ml   Output 800 ml   Net -320 ml      Physical Exam   Constitutional: She is cooperative. No distress.   Eyes: Conjunctivae and lids are normal. No scleral icterus.   Cardiovascular: Normal rate, regular rhythm, S1 normal and S2 normal.   Pulmonary/Chest: No respiratory distress. She has no wheezes. She has no rhonchi.   Abdominal: Soft. Normal appearance and bowel sounds are normal. There is no tenderness.   Musculoskeletal: Normal  range of motion. She exhibits edema.   Neurological: She is alert. She is not disoriented.   Skin: Skin is warm and dry. No cyanosis. No pallor.       Significant Labs:   A1C:   Recent Labs   Lab 12/19/19  2220 01/30/20  0421   HGBA1C 6.4* 5.4     CBC:   Recent Labs   Lab 02/25/20  0730 02/26/20  0716   WBC 4.05 4.92   HGB 10.4* 10.9*   HCT 35.3* 36.3*    270     CMP:   Recent Labs   Lab 02/25/20  0730 02/26/20  0716    141   K 4.0 3.2*   CL 93* 94*   CO2 42* 38*   GLU 72 72   BUN 8 9   CREATININE 0.7 0.7   CALCIUM 8.0* 7.6*   PROT 5.1* 5.0*   ALBUMIN 1.7* 1.6*   BILITOT 0.5 0.6   ALKPHOS 246* 239*   AST 48* 49*   ALT 18 19   ANIONGAP 8 9   EGFRNONAA >60.0 >60.0     Coagulation:   Recent Labs   Lab 02/26/20  0716   INR 2.0*     Magnesium:   Recent Labs   Lab 02/25/20  0730 02/26/20  0716   MG 1.6 1.6     POCT Glucose:   Recent Labs   Lab 02/25/20  2224 02/26/20  0759 02/26/20  1206   POCTGLUCOSE 101 72 71     TSH:   Recent Labs   Lab 02/07/20  1757   TSH 1.967       Assessment/Plan:      Active Diagnoses:    Diagnosis Date Noted POA    PRINCIPAL PROBLEM:  Septic shock [A41.9, R65.21] 02/01/2020 Yes    Bilateral hearing loss due to cerumen impaction [H61.23] 02/24/2020 Yes    Urinary retention [R33.9] 02/19/2020 Yes    Dyspnea [R06.00]  Yes    Severe malnutrition [E43] 02/14/2020 Yes    Palliative care encounter [Z51.5] 02/11/2020 Not Applicable    Acute hypoxemic respiratory failure [J96.01] 02/11/2020 Yes    Head lice [B85.0] 02/11/2020 Yes    Advance care planning [Z71.89]  Not Applicable    Goals of care, counseling/discussion [Z71.89]  Not Applicable    Aspiration pneumonia [J69.0] 02/09/2020 No    Hypovolemia [E86.1] 02/07/2020 Yes    Bilious vomiting [R11.14] 02/06/2020 Yes    Thrombocytopenia [D69.6] 02/06/2020 No    Anemia [D64.9] 02/04/2020 Yes    Liver lesion, right lobe [K76.9] 01/31/2020 Yes    Hypotension [I95.9] 01/30/2020 Yes    Diverticulitis [K57.92] 01/30/2020 Yes     Hypocalcemia [E83.51] 01/29/2020 Yes    GAVIN (acute kidney injury) [N17.9] 01/29/2020 Yes    Bifascicular block [I45.2] 01/02/2020 Yes    Hypomagnesemia [E83.42] 12/20/2019 Yes    COPD (chronic obstructive pulmonary disease) [J44.9] 12/19/2019 Yes    Chronic heart failure with preserved ejection fraction [I50.32] 12/19/2019 Yes    Paroxysmal atrial fibrillation [I48.0] 12/19/2019 Yes    Type 2 diabetes mellitus with hyperglycemia, without long-term current use of insulin [E11.65] 12/19/2019 Yes      Problems Resolved During this Admission:    Diagnosis Date Noted Date Resolved POA    Delirium [R41.0] 02/07/2020 02/20/2020 No    Visual hallucinations [R44.1] 02/06/2020 02/20/2020 No    Hematemesis [K92.0] 02/06/2020 02/20/2020 No    Acute cystitis without hematuria [N30.00] 01/29/2020 02/20/2020 Yes       Overview / ICU Course:    Elizabeth Cano is a 69 y.o. female with medical history significant for A-fib (on coumadin), COPD, and HFpEF (EF 45%)  admitted for Septic shock.    Inpatient Medications Prescribed for Management of Current Problems:     Scheduled Meds:    acetaZOLAMIDE  375 mg Oral BID    calcium-vitamin D3  1 tablet Oral BID    carbamide peroxide  5 drop Both Ears BID    dextromethorphan-guaifenesin  mg  1 tablet Oral BID    digoxin  0.125 mg Oral Daily    DULoxetine  30 mg Oral Daily    furosemide  40 mg Intravenous TID    magnesium oxide  400 mg Oral Daily    miconazole nitrate 2%   Topical (Top) BID    midodrine  10 mg Oral QHS    mirtazapine  15 mg Oral QHS    pantoprazole  40 mg Oral Daily    potassium chloride  30 mEq Oral Daily    potassium chloride  30 mEq Oral TID    QUEtiapine  25 mg Oral QHS    senna-docusate 8.6-50 mg  1 tablet Oral BID    warfarin  1 mg Oral Every Mon, Tue, Thu, Fri, Sun     Continuous Infusions:   As Needed: acetaminophen, albuterol-ipratropium, benzonatate, bisacodyL, Dextrose 10% Bolus, Dextrose 10% Bolus, glucagon (human recombinant),  glucose, glucose, melatonin, morphine, ondansetron, phenyleph-min oil-petrolatum, polyethylene glycol, promethazine (PHENERGAN) IVPB, senna, simethicone, sodium chloride 0.9%, sodium chloride 0.9%    Assessment and Plan by Problem    Acute hypoxemic respiratory failure  Pneumonia   Pulmonary edema  - Likely secondary to decompensated HF that has responded well with diureses.  - Weaned Oxygen from comfort flow down to 2L via NC.   -CXR 02/121 with Multifocal patchy pulmonary parenchymal disease is present throughout both lungs, worse in the right lung than the left. Unchanged from prior.   - on Lasix IV 40 mg BID   - strict I/Os.  - Chest CT non contrast (2/13) did not show significant pulmonary edema but did show evidence of interstitial lung disease and pulmonary hypertension   - On O2. Goal SpO2 88-92%. Wean as tolerated  - if unable to wean oxygen after adequate diuresis, consider Pulmonology consult and 6MWT  - completed 7-day course of Zosyn  - Inadequate diuresis; issues with fluid restriction and I/Os. Increased Lasix to TID 2/24  - continuing diuresis 2/26/2020     Hypotension  Septic shock, unclear etiology but likely diverticulitis - resolved    UTI due to Candida albicans   - Patient with acute hypotension and worsening O2 requirements over the course of 2/9/2020. Started the day on 3L NC and by the end was on Comfort flow 100% FiO2 and 30L. Concern for PE. CTA negative for PE, CXR also with severe pleural edema. Patient had been on Zosyn for possible diverticulitis +/- urosepsis.   - Vancomycin discontinued; completed Zosyn x7 day   - Patient on minimal levophed for MAP < 65. Patient appears to have baseline low BP's, mentates well with a MAP > 60. Possible infectious etiology given worsening hypoxemia / shock, CT showing compressive atelectasis vs. Developing consolidation in the left lower lobe.  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 Ordered purewick since patient incontinent   - Patient hypervolemic, significant pulmonary edema on CXR  - Monitor daily electrolytes, Continuing current management with diuresis.  - repeat BNP 2/24 significantly elevated; Changed Lasix 40 mg to TID  - alkalosis persisting; trial of Diamox 2/25  - Diamox dose repeated 2/26  - Problem is gradually improving; continuing to monitor clinical status 2/26/2020.      Decreased po intake  - nutrition consulted.  - Boost Plus TID   - Re-consulted Palliative Care due to failure to thrive.     Paroxysmal atrial fibrillation  - Patient in RVR presumed due to acute respiratory failure  - PRN metoprolol as needed for rate control but would not aggressively treat in setting of severe hypoxemia and hypotension  - INR initially subtherapeutic and was on heparin bridge with warfarin.   - Previously INR supratherapeutic. Resumed warfarin on 2/18 since INR near therapeutic. PharmD consulted for management.  - Metoprolol 50mg BID discontinued given nightly hypotensive episdoes requiring transient levophed, also received digoxin 250mcg for rate control  - Maintain Mg >2, K>4.      Type 2 diabetes mellitus with hyperglycemia, without long-term current use of insulin  - AIC 5.4%  - Glucose checks QACHS  - Goal Glucose 140-180 mg/dL  - Diabetic diet     Head lice  -S/p 1 permethrin lotion treatment.   - Active lice visualized today despite permethrin treatment and reported comb-niting.   - Actively  considering other options to help eradicate head lice infestation, though patient will require aggressive comb-niting   -  lindane shampoo and permethrin as needed   - continue contact isolation      Hypocalcemia - resolved   -Continue with home calcium-Vit D     Hypomagnesemia  -Replete PRN  -Continue home magnesium oxide.      Liver lesion, right lobe  - possible concern of abscess vs malignancy per CT scan  - h/o weight loss and possible colonic lesion  - RUQ US showing focal fatty infiltration  - The gallbladder contains sludge and stones      Diverticulitis - resolved   - CT consistent with recurrent diverticulitis  - CRS consulted during this hospitalization - recommend management with antibiotics.   - Continued Zosyn for 7 days     Hearing loss due to cerumen impaction  - No improvement with carbamide peroxide  - Consulted ENT; confirmed cerumen impaction. Follow up with ENT as OP.    Discharge plan and follow up  [facility] 2/28/2020  Previous admission:  12/19/19  Goals of Care:  General Prognosis: fair  Goals: Curative  Comfort Only: No  Hospice: No  Code Status: Full Code    Diet: Diet Low Sodium, 2gm [facility]; Fluid - 1500mL  GI Prophylaxis: Indicated due to multiple minor risk factors  Significant LDAs:   IV Access Type: Mid-line  Urinary Catheter Indication if present: Urinary Retention  Other Lines/Tubes/Drains:    VTE Risk Mitigation (From admission, onward)         Ordered     warfarin (COUMADIN) tablet 1 mg  Every Mon, Tue, Thu, Fri, Sun      02/24/20 1035     IP VTE HIGH RISK PATIENT  Once      01/29/20 1916     Reason for No Pharmacological VTE Prophylaxis  Once     Question:  Reasons:  Answer:  Already adequately anticoagulated on oral Anticoagulants    01/29/20 1916                Azra Smith MD  Department of Hospital Medicine   [facility]

## 2020-02-26 NOTE — PT/OT/SLP PROGRESS
Occupational Therapy      Patient Name:  Elizabeth Cano   MRN:  [id number]      Patient not seen today for therapy services. Per chart review, pt remains appropriate for therapy. Will follow-up as schedule allows.    Bruna Noriega OT  2/26/2020

## 2020-02-26 NOTE — PROGRESS NOTES
PHARMACY CONSULT NOTE: WARFARIN    Elizabeth Cano is a 69 y.o. female on warfarin therapy for Atrial Fibrilation PharmD has been consulted for warfarin dosing.    Current order: warfarin 1 mg every Mon, Tues, Thur, Fri, Sun  Home dose: 1mg every Mon, Wed, Fri   INR goal: 2-3   Coumadin clinic enrollment: not currently enrolled, unsure who follows patient's INR outpatient    Lab Results   Component Value Date    INR 2.0 (H) 02/26/2020    INR 1.9 (H) 02/25/2020    INR 1.6 (H) 02/24/2020     Diet: Low Sodium     Recommendation(s):    Continue warfarin 1 mg every Mon, Tues, Thur, Fri, Sun   Pharmacy will continue to follow and monitor warfarin    Thank you for the consult,  Jada Gonsalez  Extension [phone]    **Note: Consults are reviewed Monday-Friday 7:00am-3:30pm. THE ABOVE RECOMMENDATIONS ARE ONLY SUGGESTED.THE RECOMMENDATIONS SHOULD BE CONSIDERED IN CONJUNCTION WITH ALL PATIENT FACTORS. **

## 2020-02-26 NOTE — CONSULTS
[facility]  Palliative Medicine  Consult Note    Patient Name: Elizabeth Cano  MRN: [id number]  Admission Date: 1/29/2020  Hospital Length of Stay: 28 days  Code Status: Full Code   Attending Provider: Azra Smith MD  Consulting Provider: GURWINDER Thayer  Primary Care Physician: Cesar Reeves MD  Principal Problem:Septic shock        Inpatient consult to Palliative Care  Consult performed by: GURWINDER Izquierdo  Consult ordered by: Azra Smith MD  Reason for consult: goals of care   Assessment/Recommendations: Palliative medicine re-consult order received.  Patient is known to palliative medicine and has been followed by ELVER Sadler.  As per discussion with Dr. Smith the patient does not need to be seen today. Palliative medicine will continue to follow.   ELVER Lowery, [facility]  Palliative Medicine   Ext [phone]

## 2020-02-26 NOTE — PT/OT/SLP PROGRESS
Physical Therapy      Patient Name:  Elizabeth Cano   MRN:  [id number]    Patient not seen on this date, however per chart review pt continues to benefit from acute PT services. PT to follow up and POC allows. Please continue progressive mobility as appropriate. Negrita Romero, PT 2/26/2020

## 2020-02-27 NOTE — SUBJECTIVE & OBJECTIVE
Interval History:  Pt has COPD and was admitted to ICU for acute respiratory issues. Pt  now on 4L O2 . Pt off pressor support. Pt want to remain full code.     Past Medical History:   Diagnosis Date    A-fib     GAVIN (acute kidney injury)     CHF (congestive heart failure)     COPD (chronic obstructive pulmonary disease)     Diabetes mellitus     Hypertension        Past Surgical History:   Procedure Laterality Date    CARDIAC SURGERY      COLONOSCOPY N/A 2/4/2020    Procedure: COLONOSCOPY;  Surgeon: Gilberto Laguna MD;  Location: [facility] ([address] [place] [address]);  Service: Endoscopy;  Laterality: N/A;    ESOPHAGOGASTRODUODENOSCOPY N/A 2/7/2020    Procedure: EGD (ESOPHAGOGASTRODUODENOSCOPY);  Surgeon: Aleksey Gomez MD;  Location: [facility] ([address] [place] [address]);  Service: Endoscopy;  Laterality: N/A;       Review of patient's allergies indicates:   Allergen Reactions    Levsin [hyoscyamine sulfate] Hallucinations       Medications:  Continuous Infusions:    Scheduled Meds:   calcium-vitamin D3  1 tablet Oral BID    carbamide peroxide  5 drop Both Ears BID    dextromethorphan-guaifenesin  mg  1 tablet Oral BID    digoxin  0.125 mg Oral Daily    DULoxetine  30 mg Oral Daily    furosemide  40 mg Intravenous TID    magnesium oxide  400 mg Oral Daily    miconazole nitrate 2%   Topical (Top) BID    midodrine  10 mg Oral QHS    mirtazapine  15 mg Oral QHS    pantoprazole  40 mg Oral Daily    potassium chloride  30 mEq Oral Daily    QUEtiapine  25 mg Oral QHS    senna-docusate 8.6-50 mg  1 tablet Oral BID    warfarin  1 mg Oral Every Mon, Tue, Thu, Fri, Sun     PRN Meds:acetaminophen, albuterol-ipratropium, benzonatate, bisacodyL, Dextrose 10% Bolus, Dextrose 10% Bolus, glucagon (human recombinant), glucose, glucose, melatonin, morphine, ondansetron, phenyleph-min oil-petrolatum, polyethylene glycol, promethazine (PHENERGAN) IVPB, senna, simethicone, sodium chloride 0.9%, sodium chloride 0.9%    Family History      Problem Relation (Age of Onset)    Heart disease Mother        Tobacco Use    Smoking status: Former Smoker     Packs/day: 1.00     Years: 54.00     Pack years: 54.00     Types: Cigarettes     Last attempt to quit: 2019     Years since quittin.2    Smokeless tobacco: Never Used   Substance and Sexual Activity    Alcohol use: Not Currently    Drug use: Not Currently    Sexual activity: Not Currently     Partners: Male       Review of Systems   Constitutional: Positive for activity change and fatigue.   HENT: Negative.    Eyes: Negative.    Respiratory: Positive for shortness of breath.    Cardiovascular: Negative.    Gastrointestinal: Negative.    Endocrine: Negative.    Genitourinary: Negative.    Musculoskeletal: Negative.    Skin: Positive for pallor.   Allergic/Immunologic: Negative.    Neurological: Positive for weakness.     Objective:     Vital Signs (Most Recent):  Temp: 97.9 °F (36.6 °C) (20 08)  Pulse: 76 (20 1125)  Resp: 16 (20 0813)  BP: 125/61 (20 0813)  SpO2: 98 % (20 0900) Vital Signs (24h Range):  Temp:  [97.4 °F (36.3 °C)-98 °F (36.7 °C)] 97.9 °F (36.6 °C)  Pulse:  [70-76] 76  Resp:  [16-18] 16  SpO2:  [91 %-99 %] 98 %  BP: (112-125)/(54-61) 125/61     Weight: 48.3 kg (106 lb 6.4 oz)  Body mass index is 17.17 kg/m².    Review of Symptoms  Symptom Assessment (ESAS 0-10 scale)   ESAS 0 1 2 3 4 5 6 7 8 9 10   Pain X             Dyspnea X             Anxiety              Nausea              Depression               Anorexia      X        Fatigue    X          Insomnia              Restlessness               Agitation              CAM / Delirium __ --  ___+   Constipation     __ --  ___+   Diarrhea           __ --  ___+  Bowel Management Plan (BMP): No      Pain Assessment:Pt reports she is comfortable.     OME in 24 hours: 0    Performance Status: 60    ECOG Performance Status Grade: 3 - Confined to bed or chair 50% of waking hours    Physical Exam    Constitutional: She is oriented to person, place, and time. She appears well-developed. She is cooperative. Nasal cannula in place.   HENT:   Head: Normocephalic and atraumatic.   Eyes: Conjunctivae are normal.   Cardiovascular:   Pt off of epi   Pulmonary/Chest:   Pt on 2L O2 per NC   Abdominal: Normal appearance.   Musculoskeletal:   Pt has weakness to bilateral legs.    Neurological: She is alert and oriented to person, place, and time.   Skin: Skin is warm and dry.   Psychiatric: Her speech is normal and behavior is normal.       Significant Labs: All pertinent labs within the past 24 hours have been reviewed.  CBC:   Recent Labs   Lab 02/27/20  0836   WBC 5.49   HGB 11.1*   HCT 36.8*   MCV 97        BMP:  Recent Labs   Lab 02/27/20  0729 02/27/20  0836   GLU SEE COMMENT 84   NA SEE COMMENT 139   K SEE COMMENT 3.7   CL SEE COMMENT 96   CO2 SEE COMMENT 35*   BUN SEE COMMENT 12   CREATININE SEE COMMENT 0.8   CALCIUM SEE COMMENT 7.5*   MG 1.5*  --      LFT:  Lab Results   Component Value Date    AST 54 (H) 02/27/2020    ALKPHOS 257 (H) 02/27/2020    BILITOT 0.6 02/27/2020     Albumin:   Albumin   Date Value Ref Range Status   02/27/2020 1.7 (L) 3.5 - 5.2 g/dL Final     Protein:   Total Protein   Date Value Ref Range Status   02/27/2020 5.3 (L) 6.0 - 8.4 g/dL Final     Lactic acid:   Lab Results   Component Value Date    LACTATE 1.1 02/06/2020    LACTATE 1.1 02/05/2020       Significant Imaging: I have reviewed all pertinent imaging results/findings within the past 24 hours.    Advance Care Planning   Advanced Directives::  Living Will: No  LaPOST: No  Do Not Resuscitate Status: No  Medical Power of :  Completed with pt today.     Decision-Making Capacity: Patient answered questions       Living Arrangements:NH     Psychosocial/Cultural:   Lives in nursing home with her  at [facility]. She has four adult children. Two adult children live OOT.       Spiritual: yes    F- Florecita and Belief:  Terry BRICEÑO - Importance:   .  C - Community:     A - Address in Care: Will have  visit.

## 2020-02-27 NOTE — ASSESSMENT & PLAN NOTE
"Impression: Pt is a 68 y/o female  with afib (on coumadin), COPD, and HFpEF (EF 45% on 1/29/20) admitted to hospital medicine for presumed urinary tract infection. Pt transferred to ICU for Acute hypoxemic respiratory failure: CTA negative for PE.. Pt is a full code. Pt is alert, oriented to person, place, and situation.     Reasons for consult: advance care planning.     Advance care planning:  Today:   Pt's goals is to go back to [facility].  Spoke to pt about her not  abeating per Dr. Smith. Per pt, she just doesn't feel like eating. Per pt, she drinks her nutritional drinks. Pt denies depression. Pt denies missing her  who is at NH. Per pt, she just has no desire to eat right now. Spoke to pt about goals of care. Per pt, she wants to continue aggressive treatment and be able to come back to hospital if needed. Comfort care/trasnition to hospice care discussed if pt continues to not eat. Per pt, she is not interested in hospice care at this time.     Advance Directives:   MPOA: Completed with pt this admit.   Code status: Discussed with pt who wants to be a Full code. Per pt, she wants to be placed on life-support for at "short-period of time to see if she can get better".      2/11/2020:  Met with pt along with Apolonia Coto LCSW. Pt reports she lives at [facility] with her  in the same room. Per pt, she was living in an independent living apartment with her . Per wife, they needed more support and they moved to a NH. Per pt, she is mostly in wheelchair but walks some. Per pt goal is to go back to NH with possible SNF. Pt aware she will continue to decline with her COPD and CHF. Pt aware comfort care is an option as she declines further.       Symptom management:   Pt denies pain, nausea, anxiety.     Anorexia:   Pt reports no desire to eat real food.     Pt on Remeron 15 mg nightly.   Continue.         Plan:   MPOA paperwork completed today.   [facility] booklet given to opt. "   Per pt plan is back to NH with SNF.   Pal care will sign off. Please re consult if needed.

## 2020-02-27 NOTE — PT/OT/SLP PROGRESS
"Physical Therapy      Patient Name:  Elizabeth Cano   MRN:  [id number]    Patient not seen today secondary to Patient unwilling to participate(pt begancrying when encouraged, stating "can't I just rest some"). Will follow-up 02/28/2020  .    Alcon Noriega, PT    "

## 2020-02-27 NOTE — PT/OT/SLP PROGRESS
Occupational Therapy      Patient Name:  Elizabeth Cano   MRN:  [id number]    Patient not seen today for OT. Per chart review, pt remains appropriate for continued skilled therapy. Will follow up as schedule allows.    rBuna Noriega OT  2/27/2020

## 2020-02-27 NOTE — PLAN OF CARE
TAMIKO spoke with Yoselin at [facility] to discuss the patient's d/c plans. Per Yoselin, patient is able to return, alf with Hospice and [facility] TAMIKO will update patient's Care Team. TAMIKO will continue to follow.     Mila Friedman LMSW   - [facility]  Ext. [phone]

## 2020-02-27 NOTE — PROGRESS NOTES
"  [facility] Hospital Medicine  Progress Note    Patient Name: Elizabeth Cano  MRN: [id number]  Patient Class: IP- Inpatient   Admission Date: 1/29/2020  Length of Stay: 29 days  Attending Physician: Azra Smith MD  Primary Care Provider: Cesar Reeves MD    [facility] Medicine Team: [facility] D Azra Smith MD    Subjective:     Principal Problem:Septic shock    Interval History:   Chief Complaint   Patient presents with    Multiple Complaints     uti finished antibiotics,      Follow up visit for Septic shock    History / Events Overnight: No significant events reported by Nursing. Very poor oral intake due to poor appetite. Remains on O2. Reports that she has "had enough"    Data reviewed 2/27/2020:  H&H stable. Bicarb remains elevated but improved. sCr and BUN stable.     Review of Systems   Constitutional: Negative for fever.   HENT: Positive for hearing loss.    Respiratory: Negative for shortness of breath.      Objective:     Vital Signs (Most Recent):  Temp: 97.3 °F (36.3 °C) (02/27/20 1617)  Pulse: 69 (02/27/20 1617)  Resp: 18 (02/27/20 1617)  BP: (!) 118/58 (02/27/20 1617)  SpO2: 97 % (02/27/20 1617) Vital Signs (24h Range):  Temp:  [97.3 °F (36.3 °C)-98 °F (36.7 °C)] 97.3 °F (36.3 °C)  Pulse:  [69-77] 69  Resp:  [16-18] 18  SpO2:  [94 %-99 %] 97 %  BP: (112-125)/(57-61) 118/58     Weight: 48.3 kg (106 lb 6.4 oz)  Body mass index is 17.17 kg/m².    Intake/Output Summary (Last 24 hours) at 2/27/2020 1735  Last data filed at 2/27/2020 1600  Gross per 24 hour   Intake 340 ml   Output 3000 ml   Net -2660 ml      Physical Exam   Constitutional: She is cooperative. No distress.   Eyes: Conjunctivae and lids are normal. No scleral icterus.   Cardiovascular: Normal rate, regular rhythm, S1 normal and S2 normal.   Pulmonary/Chest: No respiratory distress. She has no wheezes. She has no rhonchi.   Abdominal: Soft. Normal appearance and bowel sounds are normal. There is no " tenderness.   Musculoskeletal: Normal range of motion. She exhibits edema.   Neurological: She is alert. She is not disoriented.   Skin: Skin is warm and dry. No cyanosis. No pallor.       Significant Labs:   A1C:   Recent Labs   Lab 12/19/19  2220 01/30/20  0421   HGBA1C 6.4* 5.4     CBC:   Recent Labs   Lab 02/26/20  0716 02/27/20  0836   WBC 4.92 5.49   HGB 10.9* 11.1*   HCT 36.3* 36.8*    300     CMP:   Recent Labs   Lab 02/26/20  0716 02/27/20  0729 02/27/20  0836    SEE COMMENT 139   K 3.2* SEE COMMENT 3.7   CL 94* SEE COMMENT 96   CO2 38* SEE COMMENT 35*   GLU 72 SEE COMMENT 84   BUN 9 SEE COMMENT 12   CREATININE 0.7 SEE COMMENT 0.8   CALCIUM 7.6* SEE COMMENT 7.5*   PROT 5.0* SEE COMMENT 5.3*   ALBUMIN 1.6* SEE COMMENT 1.7*   BILITOT 0.6 SEE COMMENT 0.6   ALKPHOS 239* SEE COMMENT 257*   AST 49* SEE COMMENT 54*   ALT 19 SEE COMMENT 19   ANIONGAP 9 SEE COMMENT 8   EGFRNONAA >60.0 SEE COMMENT >60.0     Coagulation:   Recent Labs   Lab 02/27/20  0836   INR 1.9*     Magnesium:   Recent Labs   Lab 02/26/20  0716 02/27/20  0729   MG 1.6 1.5*     POCT Glucose:   Recent Labs   Lab 02/27/20  0210 02/27/20  0812 02/27/20  1208   POCTGLUCOSE 82 78 102     TSH:   Recent Labs   Lab 02/07/20  1757   TSH 1.967       Assessment/Plan:      Active Diagnoses:    Diagnosis Date Noted POA    PRINCIPAL PROBLEM:  Septic shock [A41.9, R65.21] 02/01/2020 Yes    Anorexia [R63.0]  Unknown    Bilateral hearing loss due to cerumen impaction [H61.23] 02/24/2020 Yes    Urinary retention [R33.9] 02/19/2020 Yes    Dyspnea [R06.00]  Yes    Severe malnutrition [E43] 02/14/2020 Yes    Palliative care encounter [Z51.5] 02/11/2020 Not Applicable    Acute hypoxemic respiratory failure [J96.01] 02/11/2020 Yes    Head lice [B85.0] 02/11/2020 Yes    Advance care planning [Z71.89]  Not Applicable    Goals of care, counseling/discussion [Z71.89]  Not Applicable    Aspiration pneumonia [J69.0] 02/09/2020 No    Hypovolemia  [E86.1] 02/07/2020 Yes    Bilious vomiting [R11.14] 02/06/2020 Yes    Thrombocytopenia [D69.6] 02/06/2020 No    Anemia [D64.9] 02/04/2020 Yes    Liver lesion, right lobe [K76.9] 01/31/2020 Yes    Hypotension [I95.9] 01/30/2020 Yes    Diverticulitis [K57.92] 01/30/2020 Yes    Hypocalcemia [E83.51] 01/29/2020 Yes    GAVIN (acute kidney injury) [N17.9] 01/29/2020 Yes    Bifascicular block [I45.2] 01/02/2020 Yes    Hypomagnesemia [E83.42] 12/20/2019 Yes    COPD (chronic obstructive pulmonary disease) [J44.9] 12/19/2019 Yes    Chronic heart failure with preserved ejection fraction [I50.32] 12/19/2019 Yes    Paroxysmal atrial fibrillation [I48.0] 12/19/2019 Yes    Type 2 diabetes mellitus with hyperglycemia, without long-term current use of insulin [E11.65] 12/19/2019 Yes      Problems Resolved During this Admission:    Diagnosis Date Noted Date Resolved POA    Delirium [R41.0] 02/07/2020 02/20/2020 No    Visual hallucinations [R44.1] 02/06/2020 02/20/2020 No    Hematemesis [K92.0] 02/06/2020 02/20/2020 No    Acute cystitis without hematuria [N30.00] 01/29/2020 02/20/2020 Yes       Overview / ICU Course:    Elizabeth Cano is a 69 y.o. female with medical history significant for A-fib (on coumadin), COPD, and HFpEF (EF 45%)  admitted for Septic shock.    Inpatient Medications Prescribed for Management of Current Problems:     Scheduled Meds:    calcium-vitamin D3  1 tablet Oral BID    carbamide peroxide  5 drop Both Ears BID    dextromethorphan-guaifenesin  mg  1 tablet Oral BID    digoxin  0.125 mg Oral Daily    DULoxetine  30 mg Oral Daily    furosemide  40 mg Intravenous TID    magnesium oxide  400 mg Oral Daily    miconazole nitrate 2%   Topical (Top) BID    midodrine  10 mg Oral QHS    mirtazapine  15 mg Oral QHS    pantoprazole  40 mg Oral Daily    potassium chloride  30 mEq Oral Daily    QUEtiapine  25 mg Oral QHS    senna-docusate 8.6-50 mg  1 tablet Oral BID    warfarin  1  mg Oral Every Mon, Tue, Thu, Fri, Sun     Continuous Infusions:   As Needed: acetaminophen, albuterol-ipratropium, benzonatate, bisacodyL, Dextrose 10% Bolus, Dextrose 10% Bolus, glucagon (human recombinant), glucose, glucose, melatonin, morphine, ondansetron, phenyleph-min oil-petrolatum, polyethylene glycol, promethazine (PHENERGAN) IVPB, senna, simethicone, sodium chloride 0.9%, sodium chloride 0.9%    Assessment and Plan by Problem    Acute hypoxemic respiratory failure  Pneumonia   Pulmonary edema  - Likely secondary to decompensated HF that has responded well with diureses.  - Weaned Oxygen from comfort flow down to 2L via NC.   -CXR 02/121 with Multifocal patchy pulmonary parenchymal disease is present throughout both lungs, worse in the right lung than the left. Unchanged from prior.   - on Lasix IV 40 mg BID   - strict I/Os.  - Chest CT non contrast (2/13) did not show significant pulmonary edema but did show evidence of interstitial lung disease and pulmonary hypertension   - On O2. Goal SpO2 88-92%. Wean as tolerated  - if unable to wean oxygen after adequate diuresis, consider Pulmonology consult and 6MWT  - completed 7-day course of Zosyn  - Inadequate diuresis; issues with fluid restriction and I/Os. Increased Lasix to TID 2/24  - continuing diuresis 2/27/2020     Hypotension  Septic shock, unclear etiology but likely diverticulitis - resolved    UTI due to Candida albicans   - Patient with acute hypotension and worsening O2 requirements over the course of 2/9/2020. Started the day on 3L NC and by the end was on Comfort flow 100% FiO2 and 30L. Concern for PE. CTA negative for PE, CXR also with severe pleural edema. Patient had been on Zosyn for possible diverticulitis +/- urosepsis.   - Vancomycin discontinued; completed Zosyn x7 day   - Patient on minimal levophed for MAP < 65. Patient appears to have baseline low BP's, mentates well with a MAP > 60. Possible infectious etiology given worsening  hypoxemia / shock, CT showing compressive atelectasis vs. Developing consolidation in the left lower lobe.  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 Ordered purewick since patient incontinent   - Patient hypervolemic, significant pulmonary edema on CXR  - Monitor daily electrolytes, Continuing current management with diuresis.  - repeat BNP 2/24 significantly elevated; Changed Lasix 40 mg to TID  - alkalosis persisting; trial of Diamox 2/25  - Diamox dose repeated 2/26  - Bicarb improved; continuing diuresis  - Problem is gradually improving; continuing to monitor clinical status 2/27/2020.      Decreased po intake  - nutrition consulted.  - Boost Plus TID   - Re-consulted Palliative Care due to failure to thrive.  - Consulting Psychiatry. Patient already on mirtazapine and cannot take Megace due to CHF. Psych medications may need adjusting, patient agrees.      Paroxysmal atrial fibrillation  - Patient in RVR presumed due to acute respiratory failure  - PRN metoprolol as needed for rate control but would not aggressively treat in setting of severe hypoxemia and hypotension  - INR initially subtherapeutic and was on heparin bridge with warfarin.   - Previously INR supratherapeutic. Resumed warfarin on 2/18 since INR near therapeutic. PharmD consulted for management.  - Metoprolol 50mg BID discontinued given nightly hypotensive  episdoes requiring transient levophed, also received digoxin 250mcg for rate control  - Maintain Mg >2, K>4.      Type 2 diabetes mellitus with hyperglycemia, without long-term current use of insulin  - AIC 5.4%  - Glucose checks QACHS  - Goal Glucose 140-180 mg/dL  - Diabetic diet     Head lice  -S/p 1 permethrin lotion treatment.   - Active lice visualized today despite permethrin treatment and reported comb-niting.   - Actively considering other options to help eradicate head lice infestation, though patient will require aggressive comb-niting   -  lindane shampoo and permethrin as needed   - continue contact isolation      Hypocalcemia - resolved   -Continue with home calcium-Vit D     Hypomagnesemia  -Replete PRN  -Continue home magnesium oxide.      Liver lesion, right lobe  - possible concern of abscess vs malignancy per CT scan  - h/o weight loss and possible colonic lesion  - RUQ US showing focal fatty infiltration  - The gallbladder contains sludge and stones      Diverticulitis - resolved   - CT consistent with recurrent diverticulitis  - CRS consulted during this hospitalization - recommend management with antibiotics.   - Continued Zosyn for 7 days     Hearing loss due to cerumen impaction  - No improvement with carbamide peroxide  - Consulted ENT; confirmed cerumen impaction. Follow up with ENT as OP.    Discharge plan and follow up  snf Facility  LI 2/28/2020  Previous admission:  12/19/19  Goals of Care:  General Prognosis: fair  Goals: Curative  Comfort Only: No  Hospice: No  Code Status: Full Code    Diet: Diet Low Sodium, 2gm [facility]; Fluid - 1500mL  GI Prophylaxis: Indicated due to multiple minor risk factors  Significant LDAs:   IV Access Type: Mid-line  Urinary Catheter Indication if present: Urinary Retention  Other Lines/Tubes/Drains:    VTE Risk Mitigation (From admission, onward)         Ordered     warfarin (COUMADIN) tablet 1 mg  Every Mon, Tue, Thu, Fri, Sun       02/24/20 1035     IP VTE HIGH RISK PATIENT  Once      01/29/20 1916     Reason for No Pharmacological VTE Prophylaxis  Once     Question:  Reasons:  Answer:  Already adequately anticoagulated on oral Anticoagulants    01/29/20 1916                Azra Smith MD  Department of Hospital Medicine   [facility]

## 2020-02-27 NOTE — PLAN OF CARE
Problem: Wound  Goal: Optimal Wound Healing  Outcome: Ongoing, Progressing     Problem: Adult Inpatient Plan of Care  Goal: Plan of Care Review  Outcome: Ongoing, Progressing  Goal: Patient-Specific Goal (Individualization)  Outcome: Ongoing, Progressing  Goal: Absence of Hospital-Acquired Illness or Injury  Outcome: Ongoing, Progressing  Goal: Optimal Comfort and Wellbeing  Outcome: Ongoing, Progressing  Goal: Readiness for Transition of Care  Outcome: Ongoing, Progressing  Goal: Rounds/Family Conference  Outcome: Ongoing, Progressing     Problem: Diabetes Comorbidity  Goal: Blood Glucose Level Within Desired Range  Outcome: Ongoing, Progressing     Problem: Skin Injury Risk Increased  Goal: Skin Health and Integrity  Outcome: Ongoing, Progressing     Problem: Malnutrition  Goal: Improved Nutritional Intake  Outcome: Ongoing, Progressing     Problem: Coping Ineffective  Goal: Effective Coping  Outcome: Ongoing, Progressing

## 2020-02-27 NOTE — PROGRESS NOTES
PHARMACY CONSULT NOTE: WARFARIN    Elizabeth Cano is a 69 y.o. female on warfarin therapy for Atrial Fibrilation PharmD has been consulted for warfarin dosing.    Current order: warfarin 1 mg every Mon, Tues, Thur, Fri, Sun  Home dose: 1mg every Mon, Wed, Fri   INR goal: 2-3   Coumadin clinic enrollment: not currently enrolled, unsure who follows patient's INR outpatient    Lab Results   Component Value Date    INR 1.9 (H) 02/27/2020    INR 2.0 (H) 02/26/2020    INR 1.9 (H) 02/25/2020     Diet: Low Sodium     Recommendation(s):    Continue warfarin 1 mg every Mon, Tues, Thur, Fri, Sun   Pharmacy will continue to follow and monitor warfarin    Thank you for the consult,  Jada Gonaslez  Extension [phone]    **Note: Consults are reviewed Monday-Friday 7:00am-3:30pm. THE ABOVE RECOMMENDATIONS ARE ONLY SUGGESTED.THE RECOMMENDATIONS SHOULD BE CONSIDERED IN CONJUNCTION WITH ALL PATIENT FACTORS. **

## 2020-02-27 NOTE — PLAN OF CARE
"[facility]  Palliative Care   Follow Up Note:    Patient Name: Elizabeth Cano  MRN: [id number]  Admission Date: 1/29/2020  Hospital Length of Stay: 29 days  Code Status: Full Code   Attending Provider: Azra Smith MD  Palliative Care Provider: CLARK Saez, APRN, AGCNS  Primary Care Physician: Cesar Reeves MD  Principal Problem:Septic shock     Palliative Care Re-consulted by Dr. Smith. Spoke with pt at bedside with [facility]. Pt alert and oriented. Pt unable to express why she has not been eating. Pt stated that she doesn't miss her  and doesn't feel sad nor depressed. Pt stated that she "just doesn't feel like eating". When asked what she thinks will happen if she doesn't eat, pt stated that she thinks they will "just have to put a tube in me."  [facility] Care APRN explained that they probably won't put a tube in her as she is able to physically eat.    Asked pt if she wanted to be kept comfortable and not return to nursing home but not return to hospital if needed. Pt stated that she would want to come to hospital. Pt confirmed again that if she would need to be intubated, she would want to be intubated for a short time.     Briefly discussed hospice if/when she no longer wanted to return to hospital. Pt stated that this is not what she wants at this time.    Palliative Care to continue to follow and provide support as needed.      Apolonia Coto, LIEGH, [facility]SW    "

## 2020-02-27 NOTE — PLAN OF CARE
Problem: Wound  Goal: Optimal Wound Healing  Outcome: Ongoing, Progressing     Problem: Adult Inpatient Plan of Care  Goal: Plan of Care Review  Outcome: Ongoing, Progressing  Goal: Patient-Specific Goal (Individualization)  Outcome: Ongoing, Progressing  Goal: Absence of Hospital-Acquired Illness or Injury  Outcome: Ongoing, Progressing  Goal: Optimal Comfort and Wellbeing  Outcome: Ongoing, Progressing  Goal: Readiness for Transition of Care  Outcome: Ongoing, Progressing  Goal: Rounds/Family Conference  Outcome: Ongoing, Progressing     Problem: Diabetes Comorbidity  Goal: Blood Glucose Level Within Desired Range  Outcome: Ongoing, Progressing     Problem: Skin Injury Risk Increased  Goal: Skin Health and Integrity  Outcome: Ongoing, Progressing     Problem: Coping Ineffective  Goal: Effective Coping  Outcome: Ongoing, Progressing     Problem: Malnutrition  Goal: Improved Nutritional Intake  Outcome: Ongoing, Progressing    Patient remains on bedrest. Attempted to reposition patient q2 hours. Patient often refused. Refused all meals. Encouraged fluids. Patient pleasant with no behaviors noted.

## 2020-02-27 NOTE — CONSULTS
"[facility]  Palliative Medicine  Consult Note    Patient Name: Elizabeth Cano  MRN: [id number]  Admission Date: 1/29/2020  Hospital Length of Stay: 29 days  Code Status: Full Code   Attending Provider: Azra Smith MD  Consulting Provider: GURWINDER Gaines  Primary Care Physician: Cesar Reeves MD  Principal Problem:Septic shock    Patient information was obtained from patient and ER records.      Consults  Assessment/Plan:     Palliative care encounter  Impression: Pt is a 68 y/o female  with afib (on coumadin), COPD, and HFpEF (EF 45% on 1/29/20) admitted to hospital medicine for presumed urinary tract infection. Pt transferred to ICU for Acute hypoxemic respiratory failure: CTA negative for PE.. Pt is a full code. Pt is alert, oriented to person, place, and situation.     Reasons for consult: advance care planning.     Advance care planning:  Today:   Pt's goals is to go back to [facility].  Spoke to pt about her not  abeating per Dr. Smith. Per pt, she just doesn't feel like eating. Per pt, she drinks her nutritional drinks. Pt denies depression. Pt denies missing her  who is at NH. Per pt, she just has no desire to eat right now. Spoke to pt about goals of care. Per pt, she wants to continue aggressive treatment and be able to come back to hospital if needed. Comfort care/trasnition to hospice care discussed if pt continues to not eat. Per pt, she is not interested in hospice care at this time.     Advance Directives:   MPOA: Completed with pt this admit.   Code status: Discussed with pt who wants to be a Full code. Per pt, she wants to be placed on life-support for at "short-period of time to see if she can get better".      2/11/2020:  Met with pt along with Apolonia Coto LCSW. Pt reports she lives at [facility] with her  in the same room. Per pt, she was living in an independent living apartment with her . Per wife, they needed more support " and they moved to a NH. Per pt, she is mostly in wheelchair but walks some. Per pt goal is to go back to NH with possible SNF. Pt aware she will continue to decline with her COPD and CHF. Pt aware comfort care is an option as she declines further.       Symptom management:   Pt denies pain, nausea, anxiety.     Anorexia:   Pt reports no desire to eat real food.     Pt on Remeron 15 mg nightly.   Continue.         Plan:   MPOA paperwork completed today.   [facility] booklet given to opt.   Per pt plan is back to NH with SNF.   Pal care will sign off. Please re consult if needed.             Thank you for your consult. I will follow-up with patient. Please contact us if you have any additional questions.    Subjective:     HPI:   Ptis a 69 years old female with afib (on coumadin), COPD, and HFpEF (EF 45% on 1/29/20) admitted to hospital medicine for presumed urinary tract infection. Per chart review, pt was diagnosed with a UTIat her Nursing Home, [facility], which she was prescribed Macrobid. She completed her course of Macrobid on 01/29/20 and she was noted to be hypotensive rendering her transfer to the hospital.      On admit, per chart review, pt stated she had a decreased appetite from the last month since her hospitalization over the holidays due to uncomplicated diverticulitis which she was treated with Augmentin. She reports feeling nauseated when taking antibiotics. She denies palpitations, dizziness, lightheadedness, chest pain, SOB, change in urinary/bowel habits.      Per chart review, ICU consulted in am of 01/31/20 for hypotension despite being resuscitated with 3.5 L IV fluids and persistant lactic acidosis in 3-4 range. She improved with IVF hydration and patient was stepped down. Rapid response evaluated patient early AM on 2/10 due to acute increase in oxygen requiriments. Patient began the day on 3L NC and progressed to requiring Comfort flow of 30L and 100% FiO2. Patient denies chest pains,  only complaining of minor increased difficulty breathing.    Pt off of high flow O2 today and on 4 L NC.   Pressor have been weaned.        Hospital Course:  No notes on file    Interval History:  Pt has COPD and was admitted to ICU for acute respiratory issues. Pt  now on 4L O2 . Pt off pressor support. Pt want to remain full code.     Past Medical History:   Diagnosis Date    A-fib     GAVIN (acute kidney injury)     CHF (congestive heart failure)     COPD (chronic obstructive pulmonary disease)     Diabetes mellitus     Hypertension        Past Surgical History:   Procedure Laterality Date    CARDIAC SURGERY      COLONOSCOPY N/A 2/4/2020    Procedure: COLONOSCOPY;  Surgeon: Gilberto Laguna MD;  Location: [facility] ([address] [place] [address]);  Service: Endoscopy;  Laterality: N/A;    ESOPHAGOGASTRODUODENOSCOPY N/A 2/7/2020    Procedure: EGD (ESOPHAGOGASTRODUODENOSCOPY);  Surgeon: Aleksey Gomez MD;  [facility]  Service: Endoscopy;  Laterality: N/A;       Review of patient's allergies indicates:   Allergen Reactions    Levsin [hyoscyamine sulfate] Hallucinations       Medications:  Continuous Infusions:    Scheduled Meds:   calcium-vitamin D3  1 tablet Oral BID    carbamide peroxide  5 drop Both Ears BID    dextromethorphan-guaifenesin  mg  1 tablet Oral BID    digoxin  0.125 mg Oral Daily    DULoxetine  30 mg Oral Daily    furosemide  40 mg Intravenous TID    magnesium oxide  400 mg Oral Daily    miconazole nitrate 2%   Topical (Top) BID    midodrine  10 mg Oral QHS    mirtazapine  15 mg Oral QHS    pantoprazole  40 mg Oral Daily    potassium chloride  30 mEq Oral Daily    QUEtiapine  25 mg Oral QHS    senna-docusate 8.6-50 mg  1 tablet Oral BID    warfarin  1 mg Oral Every Mon, Tue, Thu, Fri, Sun     PRN Meds:acetaminophen, albuterol-ipratropium, benzonatate, bisacodyL, Dextrose 10% Bolus, Dextrose 10% Bolus, glucagon (human recombinant), glucose, glucose, melatonin, morphine,  ondansetron, phenyleph-min oil-petrolatum, polyethylene glycol, promethazine (PHENERGAN) IVPB, senna, simethicone, sodium chloride 0.9%, sodium chloride 0.9%    Family History     Problem Relation (Age of Onset)    Heart disease Mother        Tobacco Use    Smoking status: Former Smoker     Packs/day: 1.00     Years: 54.00     Pack years: 54.00     Types: Cigarettes     Last attempt to quit: 2019     Years since quittin.2    Smokeless tobacco: Never Used   Substance and Sexual Activity    Alcohol use: Not Currently    Drug use: Not Currently    Sexual activity: Not Currently     Partners: Male       Review of Systems   Constitutional: Positive for activity change and fatigue.   HENT: Negative.    Eyes: Negative.    Respiratory: Positive for shortness of breath.    Cardiovascular: Negative.    Gastrointestinal: Negative.    Endocrine: Negative.    Genitourinary: Negative.    Musculoskeletal: Negative.    Skin: Positive for pallor.   Allergic/Immunologic: Negative.    Neurological: Positive for weakness.     Objective:     Vital Signs (Most Recent):  Temp: 97.9 °F (36.6 °C) (20 0813)  Pulse: 76 (20 1125)  Resp: 16 (20 0813)  BP: 125/61 (20 0813)  SpO2: 98 % (20 0900) Vital Signs (24h Range):  Temp:  [97.4 °F (36.3 °C)-98 °F (36.7 °C)] 97.9 °F (36.6 °C)  Pulse:  [70-76] 76  Resp:  [16-18] 16  SpO2:  [91 %-99 %] 98 %  BP: (112-125)/(54-61) 125/61     Weight: 48.3 kg (106 lb 6.4 oz)  Body mass index is 17.17 kg/m².    Review of Symptoms  Symptom Assessment (ESAS 0-10 scale)   ESAS 0 1 2 3 4 5 6 7 8 9 10   Pain X             Dyspnea X             Anxiety              Nausea              Depression               Anorexia      X        Fatigue    X          Insomnia              Restlessness               Agitation              CAM / Delirium __ --  ___+   Constipation     __ --  ___+   Diarrhea           __ --  ___+  Bowel Management Plan (BMP): No      Pain Assessment:Pt  reports she is comfortable.     OME in 24 hours: 0    Performance Status: 60    ECOG Performance Status Grade: 3 - Confined to bed or chair 50% of waking hours    Physical Exam   Constitutional: She is oriented to person, place, and time. She appears well-developed. She is cooperative. Nasal cannula in place.   HENT:   Head: Normocephalic and atraumatic.   Eyes: Conjunctivae are normal.   Cardiovascular:   Pt off of epi   Pulmonary/Chest:   Pt on 2L O2 per NC   Abdominal: Normal appearance.   Musculoskeletal:   Pt has weakness to bilateral legs.    Neurological: She is alert and oriented to person, place, and time.   Skin: Skin is warm and dry.   Psychiatric: Her speech is normal and behavior is normal.       Significant Labs: All pertinent labs within the past 24 hours have been reviewed.  CBC:   Recent Labs   Lab 02/27/20  0836   WBC 5.49   HGB 11.1*   HCT 36.8*   MCV 97        BMP:  Recent Labs   Lab 02/27/20  0729 02/27/20  0836   GLU SEE COMMENT 84   NA SEE COMMENT 139   K SEE COMMENT 3.7   CL SEE COMMENT 96   CO2 SEE COMMENT 35*   BUN SEE COMMENT 12   CREATININE SEE COMMENT 0.8   CALCIUM SEE COMMENT 7.5*   MG 1.5*  --      LFT:  Lab Results   Component Value Date    AST 54 (H) 02/27/2020    ALKPHOS 257 (H) 02/27/2020    BILITOT 0.6 02/27/2020     Albumin:   Albumin   Date Value Ref Range Status   02/27/2020 1.7 (L) 3.5 - 5.2 g/dL Final     Protein:   Total Protein   Date Value Ref Range Status   02/27/2020 5.3 (L) 6.0 - 8.4 g/dL Final     Lactic acid:   Lab Results   Component Value Date    LACTATE 1.1 02/06/2020    LACTATE 1.1 02/05/2020       Significant Imaging: I have reviewed all pertinent imaging results/findings within the past 24 hours.    Advance Care Planning   Advanced Directives::  Living Will: No  LaPOST: No  Do Not Resuscitate Status: No  Medical Power of :  Completed with pt today.     Decision-Making Capacity: Patient answered questions       Living Arrangements:NH      Psychosocial/Cultural:   Lives in nursing home with her  at [facility]. She has four adult children. Two adult children live OOT.       Spiritual: yes    F- Florecita and Belief: Judaism    I - Importance:   .  C - Community:     A - Address in Care: Will have  visit.          > 50% of 35 min visit spent in chart review, face to face discussion of goals of care,  symptom assessment, coordination of care and emotional support.    Kristina Piper, CNS  Palliative Medicine  [facility]

## 2020-02-28 NOTE — PT/OT/SLP PROGRESS
Physical Therapy      Patient Name:  Elizabeth Cano   MRN:  [id number]    Patient not seen today secondary to Patient unwilling to participate. Will follow-up 3/2/20.    Alcon Noriega PT

## 2020-02-28 NOTE — PLAN OF CARE
Patient medicated per orders. Patient was free of falls. Patient refused to get out of bed. Patient did not eat any meals. Patient refused to sit on side of bed. Patient voices no needs or concerns at this time. Bed in lowest position. Side rails up x2. Call light in reach.

## 2020-02-28 NOTE — PROGRESS NOTES
PHARMACY CONSULT NOTE: WARFARIN    Elizabeth Cano is a 69 y.o. female on warfarin therapy for Atrial Fibrilation PharmD has been consulted for warfarin dosing.    Current order: warfarin 1 mg every Mon, Tues, Thur, Fri, Sun  Home dose: 1mg every Mon, Wed, Fri   INR goal: 2-3   Coumadin clinic enrollment: not currently enrolled, unsure who follows patient's INR outpatient    Lab Results   Component Value Date    INR 1.9 (H) 02/28/2020    INR 1.9 (H) 02/27/2020    INR 2.0 (H) 02/26/2020     Diet: Low Sodium     Recommendation(s):    Continue warfarin 1 mg every Mon, Tues, Thur, Fri, Sun   Pharmacy will continue to follow and monitor warfarin    Thank you for the consult,  Jada Gonsalez  Extension [phone]    **Note: Consults are reviewed Monday-Friday 7:00am-3:30pm. THE ABOVE RECOMMENDATIONS ARE ONLY SUGGESTED.THE RECOMMENDATIONS SHOULD BE CONSIDERED IN CONJUNCTION WITH ALL PATIENT FACTORS. **

## 2020-02-28 NOTE — SUBJECTIVE & OBJECTIVE
"  Family History     Problem Relation (Age of Onset)    Heart disease Mother        Tobacco Use    Smoking status: Former Smoker     Packs/day: 1.00     Years: 54.00     Pack years: 54.00     Types: Cigarettes     Last attempt to quit: 2019     Years since quittin.2    Smokeless tobacco: Never Used   Substance and Sexual Activity    Alcohol use: Not Currently    Drug use: Not Currently    Sexual activity: Not Currently     Partners: Male     Psychotherapeutics (From admission, onward)    Start     Stop Route Frequency Ordered    20  mirtazapine tablet 15 mg      -- Oral Nightly 20 1402    20 2100  QUEtiapine tablet 25 mg      -- Oral Nightly 20 1633    20 0900  DULoxetine DR capsule 30 mg      -- Oral Daily 20 191           Review of Systems  Objective:     Vital Signs (Most Recent):  Temp: 97.5 °F (36.4 °C) (20 1129)  Pulse: 65 (20 1145)  Resp: 17 (20 1129)  BP: (!) 116/55 (20 1205)  SpO2: 100 % (20 1129) Vital Signs (24h Range):  Temp:  [97.2 °F (36.2 °C)-98 °F (36.7 °C)] 97.5 °F (36.4 °C)  Pulse:  [65-73] 65  Resp:  [16-18] 17  SpO2:  [94 %-100 %] 100 %  BP: (113-134)/(53-63) 116/55     Height: 5' 6" (167.6 cm)  Weight: 48.3 kg (106 lb 6.4 oz)  Body mass index is 17.17 kg/m².      Intake/Output Summary (Last 24 hours) at 2020 1409  Last data filed at 2020 1337  Gross per 24 hour   Intake 300 ml   Output 2200 ml   Net -1900 ml       Physical Exam   Psychiatric:   Appearance: Appears stated age, adequately groomed   Behavior: calm and cooperative with interview, fair eye contact  Motor:  no PMA/PMR, no tics or tremors  Speech: normal rate, normal volume, normal quantity  Mood: "tired and crumby"  Affect: mood congruent, appears distressed and reports feeling nauseated   Thought Content: -SI, -HI, no delusional content elicited   Thought Process: linear, logical and goal directed   Perceptual Disturbances: -AH, -VH,  not " actively responding to internal stimuli, does not appear internally preoccupied   Orientation: A&Ox4  Memory: intact recent, intact remote  [facility]  Insight: fair  Judgement: fair         Significant Labs: All pertinent labs within the past 24 hours have been reviewed.    Significant Imaging: I have reviewed all pertinent imaging results/findings within the past 24 hours.

## 2020-02-28 NOTE — PT/OT/SLP PROGRESS
Occupational Therapy      Patient Name:  Elizabeth Cano   MRN:  [id number]    Patient not seen today. Per chart, pt refusing most care at this time, including eating, repositioning, and OOB mobility. Pt POC includes SNF placement upon discharge however trialed based on pt progression and overall participation. Pt tentatively scheduled 3/2/2020 to determine if pt skilled appropriate.    Bruna Noriega OT  2/28/2020

## 2020-02-28 NOTE — HPI
History of Present Illness:   Ms. Elizabeth Cano is a 69 years old female with afib (on coumadin), COPD, and HFpEF (EF 45% on 1/29/20) admitted to hospital medicine for presumed urinary tract infection. She was diagnosed with a UTI last week at her Nursing Home, [facility], which she was prescribed Macrobid. She completed her course of Macrobid on 01/29/20 and she was noted to be hypotensive rendering her transfer to the hospital.      She reports a decreased appetite for the last month since her hospitalization over the holidays due to uncomplicated diverticulitis which she was treated with Augmentin. She reports feeling nauseated when taking antibiotics. She denies palpitations, dizziness, lightheadedness, chest pain, SOB, change in urinary/bowel habits.      On 01/29/20, she was found to be hypotensive in the 80s/50s in the ED with lactic acid of 4.8. CXR showed no signs for PNA, UA showed mild leukocytosis with WBC 17, nitrite negative, with urine cultures and blood cultures showing NG. She was admitted to  and started on CTX. She has been resuscitated with IVFs since admit with 3L. BP responded appropriately to IVF resuscitation with subsequent downtrend in lactic acid to 2.7. However, overnight on 01/30/20, BP remained 63-67 mmHg MAP goal. Repeat lactic acid uptrend to 3.3. Of note, she received a night time dose of Lopressor 50 mg on 01/30/20, which has since been discontinued.     ICU consulted in am of 01/31/20 for hypotension despite being resuscitated with 3.5 L IV fluids and persistant lactic acidosis in 3-4 range. She improved with IVF hydration and patient was stepped down. Rapid response evaluated patient early AM on 2/10 due to acute increase in oxygen requiriments. Patient began the day on 3L NC and progressed to requiring Comfort flow of 30L and 100% FiO2. Patient denies chest pains, only complaining of minor increased difficulty breathing.     Overview/Hospital Course:    Acute hypoxemic  respiratory failure  Pneumonia   Pulmonary edema  - Likely secondary to decompensated HF that has responded well with diureses.  - Weaned Oxygen from comfort flow down to 2L via NC.   -CXR 02/121 with Multifocal patchy pulmonary parenchymal disease is present throughout both lungs, worse in the right lung than the left. Unchanged from prior.   - on Lasix IV 40 mg BID   - strict I/Os.  - Chest CT non contrast (2/13) did not show significant pulmonary edema but did show evidence of interstitial lung disease and pulmonary hypertension   - On O2. Goal SpO2 88-92%. Wean as tolerated  - if unable to wean oxygen after adequate diuresis, consider Pulmonology consult and 6MWT  - completed 7-day course of Zosyn  - Inadequate diuresis; issues with fluid restriction and I/Os. Increased Lasix to TID 2/24  - continuing diuresis 2/27/2020     Hypotension  Septic shock, unclear etiology but likely diverticulitis - resolved    UTI due to Candida albicans   - Patient with acute hypotension and worsening O2 requirements over the course of 2/9/2020. Started the day on 3L NC and by the end was on Comfort flow 100% FiO2 and 30L. Concern for PE. CTA negative for PE, CXR also with severe pleural edema. Patient had been on Zosyn for possible diverticulitis +/- urosepsis.   - Vancomycin discontinued; completed Zosyn x7 day   - Patient on minimal levophed for MAP < 65. Patient appears to have baseline low BP's, mentates well with a MAP > 60. Possible infectious etiology given worsening hypoxemia / shock, CT showing compressive atelectasis vs. Developing consolidation in the left lower lobe.  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 Ordered purewick since patient incontinent   - Patient hypervolemic, significant pulmonary edema on CXR  - Monitor daily electrolytes, Continuing current management with diuresis.  - repeat BNP 2/24 significantly elevated; Changed Lasix 40 mg to TID  - alkalosis persisting; trial of Diamox 2/25  - Diamox dose repeated 2/26  - Bicarb improved; continuing diuresis  - Problem is gradually improving; continuing to monitor clinical status 2/27/2020.      Decreased po intake  - nutrition consulted.  - Boost Plus TID   - Re-consulted Palliative Care due to failure to thrive.  - Consulting Psychiatry. Patient already on mirtazapine and cannot take Megace due to CHF. Psych medications may need adjusting, patient agrees.      Paroxysmal atrial fibrillation  - Patient in RVR presumed due to acute respiratory failure  - PRN metoprolol as needed for rate control but would not aggressively treat in setting of severe hypoxemia and hypotension  - INR initially subtherapeutic and was on heparin bridge with warfarin.   - Previously INR supratherapeutic. Resumed warfarin on 2/18 since INR near therapeutic. PharmD consulted for management.  - Metoprolol 50mg BID discontinued given nightly hypotensive episdoes requiring transient levophed, also received digoxin 250mcg for rate control  - Maintain Mg >2, K>4.      Type 2 diabetes mellitus with hyperglycemia, without long-term current use of insulin  - AIC 5.4%  - Glucose checks QACHS  - Goal Glucose 140-180 mg/dL  - Diabetic diet     Head lice  -S/p 1 permethrin lotion treatment.   - Active lice visualized today despite permethrin treatment and reported comb-niting.   - Actively considering other  options to help eradicate head lice infestation, though patient will require aggressive comb-niting   -  lindane shampoo and permethrin as needed   - continue contact isolation      Hypocalcemia - resolved   -Continue with home calcium-Vit D     Hypomagnesemia  -Replete PRN  -Continue home magnesium oxide.      Liver lesion, right lobe  - possible concern of abscess vs malignancy per CT scan  - h/o weight loss and possible colonic lesion  - RUQ US showing focal fatty infiltration  - The gallbladder contains sludge and stones      Diverticulitis - resolved   - CT consistent with recurrent diverticulitis  - CRS consulted during this hospitalization - recommend management with antibiotics.   - Continued Zosyn for 7 days     Hearing loss due to cerumen impaction  - No improvement with carbamide peroxide  - Consulted ENT; confirmed cerumen impaction. Follow up with ENT as OP.     Discharge plan and follow up  [facility]  [facility] 2/28/2020  Previous admission:  12/19/19  Goals of Care:  General Prognosis: fair  Goals: Curative  Comfort Only: No  Hospice: No  Code Status: Full Code     Diet: Diet Low Sodium, 2gm [facility]; Fluid - 1500mL  GI Prophylaxis: Indicated due to multiple minor risk factors  Significant LDAs:   IV Access Type: Mid-line  Urinary Catheter Indication if present: Urinary Retention  Other Lines/Tubes/Drains:    Subjective:   Patient is calm and cooperative on exam.  She denies depression, guilt, problems with concentration, anhedonia, or suicidality.  Reports that she does have low energy with excessive sleeping and decreased appetite.  Reports feeling weak and run down secondary to inability to eat.  Reports nausea/vomiting interferes with her appetite. States that she has about a 30 lb associated weight loss over the past month with nausea, vomiting and decreased appetite.  Denies any feelings of anxiety.  Reports one panic attack lifetime.  Denies any acute stressors.  She is hoping  to get physically well and return home with her .  Denies SI/HI/AVH.  A&Ox4.         Psychiatric Review Of Systems - Is patient experiencing or having changes in:  Negative except as noted above.      Psychiatric History:  Diagnose(s): Denies   Previous Medication Trials: Currently on Duloxetine 30, Seroquel 25, Mirtazipine 15  Previous Psychiatric Hospitalizations: Denies   Previous Suicide Attempts: Denies   History of Violence: Denies   Outpatient Psychiatrist: Denies      Social History:  Has a 6th grade education.  Did not work.  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 One child currently incarcerated for the past 10 years, with 10 more years left.  Another child currently in substance abuse treatment.  Denies any strains in her relationship with her . They live together in a assisted living facility.  Reports that she feels comfortable and safe in the assisted living facility, but she does not enjoy living in assisted living.  They moved into their current living situation about 3-4 months ago.       Substance Abuse History:  Denies      Legal History:  Denies      Family Psychiatric History:   Daughter with substance use history and substance induced mood disorder

## 2020-02-28 NOTE — ASSESSMENT & PLAN NOTE
Ms. Elizabeth Cano is a 69 years old female with afib (on coumadin), COPD, and HFpEF (EF 45% on 1/29/20) admitted to hospital medicine for presumed urinary tract infection. She was diagnosed with a UTI last week at her Nursing Home, [facility], which she was prescribed Macrobid. She completed her course of Macrobid on 01/29/20 and she was noted to be hypotensive rendering her transfer to the hospital. Later found to have diverticulitis.  Psychiatry was consulted for query of depression in context of poor PO intake and failure to thrive.  On face to face evaluation the patient denies any mood symptoms or psychiatric history.  She continues to report nausea and poor appetite that is impacting her PO intake.  Reports decreased energy and excessive fatigue, but reports motivation to get better and return home.  She is currently on duloxetine, seroquel and mirtazipine.  Would recommend simplifying regimen to reduce potential for deleterious side effects and currently unclear benefit.  No need for PEC.      Rec:   - Continue Remeron 15mg PO qHS foor mood/appetite/sleep  - Discontinue Seroquel 25mg PO qHS   - Discontinue Duloxetine 30mg PO qDay       Psychiatry will follow along.

## 2020-02-28 NOTE — PLAN OF CARE
Plan is to return to [facility] when medically ready.       02/28/20 1407   Discharge Reassessment   Assessment Type Discharge Planning Reassessment   Do you have any problems affording any of your prescribed medications? No   Discharge Plan A Return to nursing home   Discharge Plan B New Nursing Home placement - intermediate care facility   DME Needed Upon Discharge  none   Anticipated Discharge Disposition [facility]

## 2020-02-28 NOTE — CONSULTS
[facility]  Psychiatry  Progress Note    Patient Name: Elizabeth Caon  MRN: [id number]   Code Status: Full Code  Admission Date: 1/29/2020  Hospital Length of Stay: 30 days  Expected Discharge Date: 3/3/2020  Attending Physician: Azra Smith MD  Primary Care Provider: Cesar Reeves MD    Current Legal Status: N/A    Patient information was obtained from patient, past medical records and ER records.     Subjective:     Principal Problem:Septic shock       History of Present Illness:   Ms. Elizabeth Cano is a 69 years old female with afib (on coumadin), COPD, and HFpEF (EF 45% on 1/29/20) admitted to hospital medicine for presumed urinary tract infection. She was diagnosed with a UTI last week at her Nursing Home, [facility], which she was prescribed Macrobid. She completed her course of Macrobid on 01/29/20 and she was noted to be hypotensive rendering her transfer to the hospital.      She reports a decreased appetite for the last month since her hospitalization over the holidays due to uncomplicated diverticulitis which she was treated with Augmentin. She reports feeling nauseated when taking antibiotics. She denies palpitations, dizziness, lightheadedness, chest pain, SOB, change in urinary/bowel habits.      On 01/29/20, she was found to be hypotensive in the 80s/50s in the ED with lactic acid of 4.8. CXR showed no signs for PNA, UA showed mild leukocytosis with WBC 17, nitrite negative, with urine cultures and blood cultures showing NG. She was admitted to  and started on CTX. She has been resuscitated with IVFs since admit with 3L. BP responded appropriately to IVF resuscitation with subsequent downtrend in lactic acid to 2.7. However, overnight on 01/30/20, BP remained 63-67 mmHg MAP goal. Repeat lactic acid uptrend to 3.3. Of note, she received a night time dose of Lopressor 50 mg on 01/30/20, which has since been discontinued.     ICU consulted in am of 01/31/20 for  hypotension despite being resuscitated with 3.5 L IV fluids and persistant lactic acidosis in 3-4 range. She improved with IVF hydration and patient was stepped down. Rapid response evaluated patient early AM on 2/10 due to acute increase in oxygen requiriments. Patient began the day on 3L NC and progressed to requiring Comfort flow of 30L and 100% FiO2. Patient denies chest pains, only complaining of minor increased difficulty breathing.     Overview/Hospital Course:    Acute hypoxemic respiratory failure  Pneumonia   Pulmonary edema  - Likely secondary to decompensated HF that has responded well with diureses.  - Weaned Oxygen from comfort flow down to 2L via NC.   -CXR 02/121 with Multifocal patchy pulmonary parenchymal disease is present throughout both lungs, worse in the right lung than the left. Unchanged from prior.   - on Lasix IV 40 mg BID   - strict I/Os.  - Chest CT non contrast (2/13) did not show significant pulmonary edema but did show evidence of interstitial lung disease and pulmonary hypertension   - On O2. Goal SpO2 88-92%. Wean as tolerated  - if unable to wean oxygen after adequate diuresis, consider Pulmonology consult and 6MWT  - completed 7-day course of Zosyn  - Inadequate diuresis; issues with fluid restriction and I/Os. Increased Lasix to TID 2/24  - continuing diuresis 2/27/2020     Hypotension  Septic shock, unclear etiology but likely diverticulitis - resolved    UTI due to Candida albicans   - Patient with acute hypotension and worsening O2 requirements over the course of 2/9/2020. Started the day on 3L NC and by the end was on Comfort flow 100% FiO2 and 30L. Concern for PE. CTA negative for PE, CXR also with severe pleural edema. Patient had been on Zosyn for possible diverticulitis +/- urosepsis.   - Vancomycin discontinued; completed Zosyn x7 day   - Patient on minimal levophed for MAP < 65. Patient appears to have baseline low BP's, mentates well with a MAP > 60. Possible  infectious etiology given worsening hypoxemia / shock, CT showing compressive atelectasis vs. Developing consolidation in the left lower lobe.  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 Ordered purewick since patient incontinent   - Patient hypervolemic, significant pulmonary edema on CXR  - Monitor daily electrolytes, Continuing current management with diuresis.  - repeat BNP 2/24 significantly elevated; Changed Lasix 40 mg to TID  - alkalosis persisting; trial of Diamox 2/25  - Diamox dose repeated 2/26  - Bicarb improved; continuing diuresis  - Problem is gradually improving; continuing to monitor clinical status 2/27/2020.      Decreased po intake  - nutrition consulted.  - Boost Plus TID   - Re-consulted Palliative Care due to failure to thrive.  - Consulting Psychiatry. Patient already on mirtazapine and cannot take Megace due to CHF. Psych medications may need adjusting, patient agrees.      Paroxysmal atrial fibrillation  - Patient in RVR presumed due to acute respiratory failure  - PRN metoprolol as needed for rate control but would not aggressively treat in setting of severe hypoxemia and hypotension  - INR initially subtherapeutic and was on heparin bridge with warfarin.   - Previously INR supratherapeutic. Resumed warfarin on 2/18 since INR near therapeutic. PharmD consulted for management.  - Metoprolol 50mg BID  discontinued given nightly hypotensive episdoes requiring transient levophed, also received digoxin 250mcg for rate control  - Maintain Mg >2, K>4.      Type 2 diabetes mellitus with hyperglycemia, without long-term current use of insulin  - AIC 5.4%  - Glucose checks QACHS  - Goal Glucose 140-180 mg/dL  - Diabetic diet     Head lice  -S/p 1 permethrin lotion treatment.   - Active lice visualized today despite permethrin treatment and reported comb-niting.   - Actively considering other options to help eradicate head lice infestation, though patient will require aggressive comb-niting   -  lindane shampoo and permethrin as needed   - continue contact isolation      Hypocalcemia - resolved   -Continue with home calcium-Vit D     Hypomagnesemia  -Replete PRN  -Continue home magnesium oxide.      Liver lesion, right lobe  - possible concern of abscess vs malignancy per CT scan  - h/o weight loss and possible colonic lesion  - RUQ US showing focal fatty infiltration  - The gallbladder contains sludge and stones      Diverticulitis - resolved   - CT consistent with recurrent diverticulitis  - CRS consulted during this hospitalization - recommend management with antibiotics.   - Continued Zosyn for 7 days     Hearing loss due to cerumen impaction  - No improvement with carbamide peroxide  - Consulted ENT; confirmed cerumen impaction. Follow up with ENT as OP.     Discharge plan and follow up  [facility]  [facility] 2/28/2020  Previous admission:  12/19/19  Goals of Care:  General Prognosis: fair  Goals: Curative  Comfort Only: No  Hospice: No  Code Status: Full Code     Diet: Diet Low Sodium, 2gm [facility]; Fluid - 1500mL  GI Prophylaxis: Indicated due to multiple minor risk factors  Significant LDAs:   IV Access Type: Mid-line  Urinary Catheter Indication if present: Urinary Retention  Other Lines/Tubes/Drains:    Subjective:   Patient is calm and cooperative on exam.  She denies depression, guilt, problems  with concentration, anhedonia, or suicidality.  Reports that she does have low energy with excessive sleeping and decreased appetite.  Reports feeling weak and run down secondary to inability to eat.  Reports nausea/vomiting interferes with her appetite. States that she has about a 30 lb associated weight loss over the past month with nausea, vomiting and decreased appetite.  Denies any feelings of anxiety.  Reports one panic attack lifetime.  Denies any acute stressors.  She is hoping to get physically well and return home with her .  Denies SI/HI/AVH.  A&Ox4.         Psychiatric Review Of Systems - Is patient experiencing or having changes in:  Negative except as noted above.      Psychiatric History:  Diagnose(s):  Denies   Previous Medication Trials:  Currently on Duloxetine 30, Seroquel 25, Mirtazipine 15  Previous Psychiatric Hospitalizations:  Denies   Previous Suicide Attempts:  Denies   History of Violence:  Denies   Outpatient Psychiatrist: Denies      Social History:  Has a 6th grade education.  Did not work.  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 One child currently incarcerated for the past 10 years, with 10 more years left.  Another child currently in substance abuse treatment.  Denies any strains in her relationship with her . They live together in a assisted living facility.  Reports that she feels comfortable and safe in the assisted living facility, but she does not enjoy living in assisted living.  They moved into their current living situation about 3-4 months ago.       Substance Abuse History:  Denies      Legal History:  Denies      Family Psychiatric History:   Daughter with substance use history and substance induced mood disorder      Hospital Course: No notes on file      Family History     Problem Relation (Age of Onset)    Heart disease Mother        Tobacco Use    Smoking status: Former Smoker     Packs/day: 1.00     Years: 54.00     Pack years: 54.00      "Types: Cigarettes     Last attempt to quit: 2019     Years since quittin.2    Smokeless tobacco: Never Used   Substance and Sexual Activity    Alcohol use: Not Currently    Drug use: Not Currently    Sexual activity: Not Currently     Partners: Male     Psychotherapeutics (From admission, onward)    Start     Stop Route Frequency Ordered    20  mirtazapine tablet 15 mg      -- Oral Nightly 20 1402    20 2100  QUEtiapine tablet 25 mg      -- Oral Nightly 20 1633    20 0900  DULoxetine DR capsule 30 mg      -- Oral Daily 20 1916           Review of Systems  Objective:     Vital Signs (Most Recent):  Temp: 97.5 °F (36.4 °C) (20 1129)  Pulse: 65 (20 1145)  Resp: 17 (20 1129)  BP: (!) 116/55 (20 1205)  SpO2: 100 % (20 1129) Vital Signs (24h Range):  Temp:  [97.2 °F (36.2 °C)-98 °F (36.7 °C)] 97.5 °F (36.4 °C)  Pulse:  [65-73] 65  Resp:  [16-18] 17  SpO2:  [94 %-100 %] 100 %  BP: (113-134)/(53-63) 116/55     Height: 5' 6" (167.6 cm)  Weight: 48.3 kg (106 lb 6.4 oz)  Body mass index is 17.17 kg/m².      Intake/Output Summary (Last 24 hours) at 2020 1409  Last data filed at 2020 1337  Gross per 24 hour   Intake 300 ml   Output 2200 ml   Net -1900 ml       Physical Exam   Psychiatric:   Appearance: Appears stated age, adequately groomed   Behavior: calm and cooperative with interview, fair eye contact  Motor:  no PMA/PMR, no tics or tremors  Speech: normal rate, normal volume, normal quantity  Mood: "tired and crumby"  Affect: mood congruent, appears distressed and reports feeling nauseated   Thought Content: -SI, -HI, no delusional content elicited   Thought Process: linear, logical and goal directed   Perceptual Disturbances: -AH, -VH,  not actively responding to internal stimuli, does not appear internally preoccupied   Orientation: A&Ox4  Memory: intact recent, intact remote  Attention: passed SAVESHOSHANA  Insight: " fair  Judgement: fair         Significant Labs: All pertinent labs within the past 24 hours have been reviewed.    Significant Imaging: I have reviewed all pertinent imaging results/findings within the past 24 hours.    Assessment/Plan:     Anorexia  Ms. Elizabeth Cano is a 69 years old female with afib (on coumadin), COPD, and HFpEF (EF 45% on 1/29/20) admitted to hospital medicine for presumed urinary tract infection. She was diagnosed with a UTI last week at her Nursing Home, [facility], which she was prescribed Macrobid. She completed her course of Macrobid on 01/29/20 and she was noted to be hypotensive rendering her transfer to the hospital. Later found to have diverticulitis.  Psychiatry was consulted for query of depression in context of poor PO intake and failure to thrive.  On face to face evaluation the patient denies any mood symptoms or psychiatric history.  She continues to report nausea and poor appetite that is impacting her PO intake.  Reports decreased energy and excessive fatigue, but reports motivation to get better and return home.  She is currently on duloxetine, seroquel and mirtazipine.  Would recommend simplifying regimen to reduce potential for deleterious side effects and currently unclear benefit.  No need for PEC.      Rec:   - Continue Remeron 15mg PO qHS foor mood/appetite/sleep  - Discontinue Seroquel 25mg PO qHS   - Discontinue Duloxetine 30mg PO qDay       Psychiatry will follow along.           Pattie Carlton MD   Psychiatry  [facility]

## 2020-02-29 NOTE — PROGRESS NOTES
[facility] Hospital Medicine  Progress Note    Patient Name: Elizabeth Cano  MRN: [id number]  Patient Class: IP- Inpatient   Admission Date: 1/29/2020  Length of Stay: 30 days  Attending Physician: Azra Smith MD  Primary Care Provider: Cesar Reeves MD    [facility] Medicine Team: [facility] MED D Azra Smith MD    Subjective:     Principal Problem:Septic shock    Interval History:   Chief Complaint   Patient presents with    Multiple Complaints     uti finished antibiotics,      Follow up visit for Septic shock    History / Events Overnight: On Call Provider contacted about multiple BMs overnight  Very poor oral intake due to poor appetite. Remains on O2.     Data reviewed 2/28/2020:  H&H stable. Bicarb remains elevated but improved with Diamox. sCr and BUN stable.     Review of Systems   Constitutional: Negative for fever.   HENT: Positive for hearing loss.    Respiratory: Negative for shortness of breath.      Objective:     Vital Signs (Most Recent):  Temp: 97.6 °F (36.4 °C) (02/28/20 1526)  Pulse: 68 (02/28/20 1552)  Resp: 18 (02/28/20 1526)  BP: (!) 155/70 (02/28/20 1742)  SpO2: 97 % (02/28/20 1526) Vital Signs (24h Range):  Temp:  [97.2 °F (36.2 °C)-98 °F (36.7 °C)] 97.6 °F (36.4 °C)  Pulse:  [65-73] 68  Resp:  [16-18] 18  SpO2:  [94 %-100 %] 97 %  BP: (103-155)/(53-70) 155/70     Weight: 48.3 kg (106 lb 6.4 oz)  Body mass index is 17.17 kg/m².    Intake/Output Summary (Last 24 hours) at 2/28/2020 1831  Last data filed at 2/28/2020 1715  Gross per 24 hour   Intake 350 ml   Output 1000 ml   Net -650 ml      Physical Exam   Constitutional: She is cooperative. No distress.   Eyes: Conjunctivae and lids are normal. No scleral icterus.   Cardiovascular: Normal rate, regular rhythm, S1 normal and S2 normal.   Pulmonary/Chest: No respiratory distress. She has no wheezes. She has no rhonchi.   Abdominal: Soft. Normal appearance and bowel sounds are normal. There is no  tenderness.   Musculoskeletal: Normal range of motion. She exhibits edema (minimal).   Neurological: She is alert. She is not disoriented.   Skin: Skin is warm and dry. No cyanosis. No pallor.       Significant Labs:   A1C:   Recent Labs   Lab 12/19/19  2220 01/30/20  0421   HGBA1C 6.4* 5.4     CBC:   Recent Labs   Lab 02/27/20  0836 02/28/20  0757   WBC 5.49 6.48   HGB 11.1* 11.0*   HCT 36.8* 35.6*    246     CMP:   Recent Labs   Lab 02/27/20  0729 02/27/20  0836 02/28/20  0757 02/28/20  1605   NA SEE COMMENT 139 140 138   K SEE COMMENT 3.7 3.7 3.1*   CL SEE COMMENT 96 96 98   CO2 SEE COMMENT 35* 34* 32*   GLU SEE COMMENT 84 89 187*   BUN SEE COMMENT 12 12 11   CREATININE SEE COMMENT 0.8 0.8 0.7   CALCIUM SEE COMMENT 7.5* 7.8* 7.3*   PROT SEE COMMENT 5.3* 5.5*  --    ALBUMIN SEE COMMENT 1.7* 1.8*  --    BILITOT SEE COMMENT 0.6 0.6  --    ALKPHOS SEE COMMENT 257* 258*  --    AST SEE COMMENT 54* 51*  --    ALT SEE COMMENT 19 19  --    ANIONGAP SEE COMMENT 8 10 8   EGFRNONAA SEE COMMENT >60.0 >60.0 >60.0     Coagulation:   Recent Labs   Lab 02/28/20  0757   INR 1.9*     Magnesium:   Recent Labs   Lab 02/27/20  0729 02/28/20  0757   MG 1.5* 1.7     POCT Glucose:   Recent Labs   Lab 02/28/20  0829 02/28/20  1243 02/28/20  1739   POCTGLUCOSE 88 86 133*     TSH:   Recent Labs   Lab 02/07/20  1757   TSH 1.967       Assessment/Plan:      Active Diagnoses:    Diagnosis Date Noted POA    PRINCIPAL PROBLEM:  Septic shock [A41.9, R65.21] 02/01/2020 Yes    Anorexia [R63.0]  Unknown    Bilateral hearing loss due to cerumen impaction [H61.23] 02/24/2020 Yes    Urinary retention [R33.9] 02/19/2020 Yes    Dyspnea [R06.00]  Yes    Severe malnutrition [E43] 02/14/2020 Yes    Palliative care encounter [Z51.5] 02/11/2020 Not Applicable    Acute hypoxemic respiratory failure [J96.01] 02/11/2020 Yes    Head lice [B85.0] 02/11/2020 Yes    Advance care planning [Z71.89]  Not Applicable    Goals of care,  counseling/discussion [Z71.89]  Not Applicable    Aspiration pneumonia [J69.0] 02/09/2020 No    Hypovolemia [E86.1] 02/07/2020 Yes    Bilious vomiting [R11.14] 02/06/2020 Yes    Thrombocytopenia [D69.6] 02/06/2020 No    Anemia [D64.9] 02/04/2020 Yes    Liver lesion, right lobe [K76.9] 01/31/2020 Yes    Hypotension [I95.9] 01/30/2020 Yes    Diverticulitis [K57.92] 01/30/2020 Yes    Hypocalcemia [E83.51] 01/29/2020 Yes    GAVIN (acute kidney injury) [N17.9] 01/29/2020 Yes    Bifascicular block [I45.2] 01/02/2020 Yes    Hypomagnesemia [E83.42] 12/20/2019 Yes    COPD (chronic obstructive pulmonary disease) [J44.9] 12/19/2019 Yes    Chronic heart failure with preserved ejection fraction [I50.32] 12/19/2019 Yes    Paroxysmal atrial fibrillation [I48.0] 12/19/2019 Yes    Type 2 diabetes mellitus with hyperglycemia, without long-term current use of insulin [E11.65] 12/19/2019 Yes      Problems Resolved During this Admission:    Diagnosis Date Noted Date Resolved POA    Delirium [R41.0] 02/07/2020 02/20/2020 No    Visual hallucinations [R44.1] 02/06/2020 02/20/2020 No    Hematemesis [K92.0] 02/06/2020 02/20/2020 No    Acute cystitis without hematuria [N30.00] 01/29/2020 02/20/2020 Yes       Overview / ICU Course:    Elizabeth Cano is a 69 y.o. female with medical history significant for A-fib (on coumadin), COPD, and HFpEF (EF 45%)  admitted for Septic shock.    Inpatient Medications Prescribed for Management of Current Problems:     Scheduled Meds:    calcium-vitamin D3  1 tablet Oral BID    carbamide peroxide  5 drop Both Ears BID    dextromethorphan-guaifenesin  mg  1 tablet Oral BID    digoxin  0.125 mg Oral Daily    furosemide  40 mg Intravenous BID    Lactobacillus rhamnosus GG  1 capsule Oral BID    magnesium oxide  400 mg Oral BID    miconazole nitrate 2%   Topical (Top) BID    midodrine  10 mg Oral QHS    mirtazapine  15 mg Oral QHS    pantoprazole  40 mg Oral Daily     potassium chloride  30 mEq Oral Daily    senna-docusate 8.6-50 mg  1 tablet Oral BID    warfarin  1 mg Oral Every Mon, Tue, Thu, Fri, Sun     Continuous Infusions:   As Needed: acetaminophen, albuterol-ipratropium, benzonatate, bisacodyL, Dextrose 10% Bolus, Dextrose 10% Bolus, glucagon (human recombinant), glucose, glucose, melatonin, morphine, ondansetron, phenyleph-min oil-petrolatum, polyethylene glycol, promethazine (PHENERGAN) IVPB, senna, simethicone, sodium chloride 0.9%, sodium chloride 0.9%    Assessment and Plan by Problem    Acute hypoxemic respiratory failure  Pneumonia   Pulmonary edema  - Likely secondary to decompensated HF that has responded well with diureses.  - Weaned Oxygen from comfort flow down to 2L via NC.   -CXR 02/121 with Multifocal patchy pulmonary parenchymal disease is present throughout both lungs, worse in the right lung than the left. Unchanged from prior.   - on Lasix IV 40 mg BID   - strict I/Os.  - Chest CT non contrast (2/13) did not show significant pulmonary edema but did show evidence of interstitial lung disease and pulmonary hypertension   - On O2. Goal SpO2 88-92%. Wean as tolerated  - if unable to wean oxygen after adequate diuresis, consider Pulmonology consult and 6MWT  - completed 7-day course of Zosyn  - Inadequate diuresis; issues with fluid restriction and I/Os. Increased Lasix to TID on 2/24; resumed BID on 2/28  - continuing diuresis 2/28/2020     Hypotension  Septic shock, unclear etiology but likely diverticulitis - resolved    UTI due to Candida albicans   - Patient with acute hypotension and worsening O2 requirements over the course of 2/9/2020. Started the day on 3L NC and by the end was on Comfort flow 100% FiO2 and 30L. Concern for PE. CTA negative for PE, CXR also with severe pleural edema. Patient had been on Zosyn for possible diverticulitis +/- urosepsis.   - Vancomycin discontinued; completed Zosyn x7 day   - Patient on minimal levophed for MAP < 65.  Patient appears to have baseline low BP's, mentates well with a MAP > 60. Possible infectious etiology given worsening hypoxemia / shock, CT showing compressive atelectasis vs. Developing consolidation in the left lower lobe.  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 Ordered purewick since patient incontinent   - Patient hypervolemic, significant pulmonary edema on CXR  - Monitor daily electrolytes, Continuing current management with diuresis.  - repeat BNP 2/24 significantly elevated; Changed Lasix 40 mg to TID  - alkalosis persisting; trial of Diamox 2/25  - Diamox dose repeated 2/26  - Bicarb improved; continuing diuresis  - blood pressure stable on midodrine.  Patient appears near euvolemic.  Changed Lasix 40 mg IV to BID 2/28  - Problem is gradually improving; continuing to monitor clinical status 2/28/2020.      Decreased po intake  - nutrition consulted.  - Boost Plus TID   - Re-consulted Palliative Care due to failure to thrive.  - Consulting Psychiatry. Patient already on mirtazapine and cannot take Megace due to CHF. Psych medications may need adjusting, patient agrees.   - Psychiatry recommends discontinuation of Seroquel and duloxetine 2/28     Paroxysmal atrial fibrillation  - Patient in RVR presumed due to acute respiratory failure  - PRN metoprolol as needed for rate control but would not aggressively treat in  setting of severe hypoxemia and hypotension  - INR initially subtherapeutic and was on heparin bridge with warfarin.   - Previously INR supratherapeutic. Resumed warfarin on 2/18 since INR near therapeutic. PharmD consulted for management.  - Metoprolol 50mg BID discontinued given nightly hypotensive episdoes requiring transient levophed, also received digoxin 250mcg for rate control  - Maintain Mg >2, K>4.      Type 2 diabetes mellitus with hyperglycemia, without long-term current use of insulin  - AIC 5.4%  - Glucose checks QACHS  - Goal Glucose 140-180 mg/dL  - Diabetic diet     Head lice  -S/p 1 permethrin lotion treatment.   - Active lice visualized today despite permethrin treatment and reported comb-niting.   - Actively considering other options to help eradicate head lice infestation, though patient will require aggressive comb-niting   -  lindane shampoo and permethrin as needed   - continue contact isolation      Hypocalcemia - resolved   -Continue with home calcium-Vit D     Hypomagnesemia  -Replete PRN  -Continue home magnesium oxide.      Liver lesion, right lobe  - possible concern of abscess vs malignancy per CT scan  - h/o weight loss and possible colonic lesion  - RUQ US showing focal fatty infiltration  - The gallbladder contains sludge and stones      Diverticulitis - resolved   - CT consistent with recurrent diverticulitis  - CRS consulted during this hospitalization - recommend management with antibiotics.   - Continued Zosyn for 7 days  - diarrhea reported 2/28; Gunjan-Colace discontinued     Hearing loss due to cerumen impaction  - No improvement with carbamide peroxide  - Consulted ENT; confirmed cerumen impaction. Follow up with ENT as OP.    [facility] plan and follow up  [facility]  Previous admission:  12/19/19  Goals of Care:  General Prognosis: fair  Goals: Curative  Comfort Only: No  Hospice: No  Code Status: Full Code    Diet: Diet Low Sodium, 2gm Jelenasmeri  Facility; Fluid - 1500mL  GI Prophylaxis: Indicated due to multiple minor risk factors  Significant LDAs:   IV Access Type: Mid-line  Urinary Catheter Indication if present: Urinary Retention  Other Lines/Tubes/Drains:    VTE Risk Mitigation (From admission, onward)         Ordered     warfarin (COUMADIN) tablet 1 mg  Every Mon, Tue, Thu, Fri, Sun      02/24/20 1035     IP VTE HIGH RISK PATIENT  Once      01/29/20 1916     Reason for No Pharmacological VTE Prophylaxis  Once     Question:  Reasons:  Answer:  Already adequately anticoagulated on oral Anticoagulants    01/29/20 1916                Azra Smith MD  Department of Hospital Medicine   [facility]

## 2020-02-29 NOTE — PROGRESS NOTES
PHARMACY CONSULT NOTE: WARFARIN    Elizabeth Cano is a 69 y.o. female on warfarin therapy for Atrial Fibrilation PharmD has been consulted for warfarin dosing.    Current order: warfarin 1 mg every Mon, Tues, Thur, Fri, Sun  Home dose: 1mg every Mon, Wed, Fri   INR goal: 2-3   Coumadin clinic enrollment: not currently enrolled, unsure who follows patient's INR outpatient    Lab Results   Component Value Date    INR 1.9 (H) 02/29/2020    INR 1.9 (H) 02/28/2020    INR 1.9 (H) 02/27/2020     Diet: Low Sodium     Recommendation(s):    Increase to warfarin 1 mg PO daily   Pharmacy will continue to follow and monitor warfarin    Thank you for the consult,  Khalif Hunt, Pharm.D., BCPS  [id number]      **Note: Consults are reviewed Monday-Friday 7:00am-3:30pm. THE ABOVE RECOMMENDATIONS ARE ONLY SUGGESTED.THE RECOMMENDATIONS SHOULD BE CONSIDERED IN CONJUNCTION WITH ALL PATIENT FACTORS. **

## 2020-02-29 NOTE — PROGRESS NOTES
[facility] Hospital Medicine  Progress Note    Patient Name: Elizabeth Cano  MRN: [id number]  Patient Class: IP- Inpatient   Admission Date: 1/29/2020  Length of Stay: 31 days  Attending Physician: Mendoza Caldwell MD  Primary Care Provider: Cesar Reeves MD    [facility] Medicine Team: [facility] MED D     Subjective:     Principal Problem:Septic shock    Interval History:   Chief Complaint   Patient presents with    Multiple Complaints     uti finished antibiotics,      Follow up visit for Septic shock    History / Events Overnight: On Call Provider contacted about multiple BMs overnight  Patient lying in bed, no respiratory distress. Reports continued fatigue, decreased appetite and depressed mood. Urinating well, denies dysuria. Trying to eat po supplements. Denies recurrence of head lice or any itching on scalp. Reports shortness of breath and lower extremity swelling improving.     Data reviewed 2/29/2020    Review of Systems   Constitutional: Positive for fatigue. Negative for fever.   HENT: Positive for hearing loss.    Respiratory: Negative for shortness of breath.    Cardiovascular: Positive for leg swelling. Negative for chest pain.   Gastrointestinal: Negative for abdominal distention, abdominal pain, nausea and vomiting.   Genitourinary: Negative for dysuria.   Neurological: Positive for weakness. Negative for dizziness.   Psychiatric/Behavioral: Negative for confusion.     Objective:     Vital Signs (Most Recent):  Temp: 97.6 °F (36.4 °C) (02/29/20 0411)  Pulse: 73 (02/29/20 0411)  Resp: 19 (02/29/20 0411)  BP: 123/68 (02/29/20 0411)  SpO2: (!) 93 % (02/29/20 0411) Vital Signs (24h Range):  Temp:  [97.4 °F (36.3 °C)-98.1 °F (36.7 °C)] 97.6 °F (36.4 °C)  Pulse:  [65-73] 73  Resp:  [16-19] 19  SpO2:  [93 %-100 %] 93 %  BP: (102-155)/(55-70) 123/68     Weight: 49 kg (108 lb 0.4 oz)  Body mass index is 17.44 kg/m².    Intake/Output Summary (Last 24 hours) at 2/29/2020 0746  Last  data filed at 2/28/2020 1840  Gross per 24 hour   Intake 100 ml   Output 50 ml   Net 50 ml      Physical Exam   Constitutional: She is cooperative. No distress.   Eyes: Conjunctivae and lids are normal. No scleral icterus.   Cardiovascular: Normal rate, regular rhythm, S1 normal and S2 normal.   Pulmonary/Chest: No respiratory distress. She has no wheezes. She has no rhonchi.   Abdominal: Soft. Normal appearance and bowel sounds are normal. There is no tenderness.   Musculoskeletal: Normal range of motion. She exhibits edema (minimal).   Neurological: She is alert. She is not disoriented.   Skin: Skin is warm and dry. No cyanosis. No pallor.       Significant Labs:   A1C:   Recent Labs   Lab 12/19/19  2220 01/30/20  0421   HGBA1C 6.4* 5.4     CBC:   Recent Labs   Lab 02/27/20  0836 02/28/20  0757 02/29/20  0354   WBC 5.49 6.48 6.47   HGB 11.1* 11.0* 11.2*   HCT 36.8* 35.6* 36.9*    246 265     CMP:   Recent Labs   Lab 02/27/20  0836 02/28/20  0757 02/28/20  1605 02/29/20  0354    140 138 139   K 3.7 3.7 3.1* 4.2   CL 96 96 98 98   CO2 35* 34* 32* 32*   GLU 84 89 187* 98   BUN 12 12 11 12   CREATININE 0.8 0.8 0.7 0.8   CALCIUM 7.5* 7.8* 7.3* 8.2*   PROT 5.3* 5.5*  --  5.5*   ALBUMIN 1.7* 1.8*  --  1.7*   BILITOT 0.6 0.6  --  0.6   ALKPHOS 257* 258*  --  257*   AST 54* 51*  --  51*   ALT 19 19  --  17   ANIONGAP 8 10 8 9   EGFRNONAA >60.0 >60.0 >60.0 >60.0     Coagulation:   Recent Labs   Lab 02/29/20  0354   INR 1.9*     Magnesium:   Recent Labs   Lab 02/28/20  0757 02/29/20  0353   MG 1.7 1.8     POCT Glucose:   Recent Labs   Lab 02/28/20  1243 02/28/20  1739 02/28/20  2153   POCTGLUCOSE 86 133* 83     TSH:   Recent Labs   Lab 02/07/20  1757   TSH 1.967       Assessment/Plan:      Active Diagnoses:    Diagnosis Date Noted POA    PRINCIPAL PROBLEM:  Septic shock [A41.9, R65.21] 02/01/2020 Yes    Anorexia [R63.0]  Unknown    Bilateral hearing loss due to cerumen impaction [H61.23] 02/24/2020 Yes     Urinary retention [R33.9] 02/19/2020 Yes    Dyspnea [R06.00]  Yes    Severe malnutrition [E43] 02/14/2020 Yes    Palliative care encounter [Z51.5] 02/11/2020 Not Applicable    Acute hypoxemic respiratory failure [J96.01] 02/11/2020 Yes    Head lice [B85.0] 02/11/2020 Yes    Advance care planning [Z71.89]  Not Applicable    Goals of care, counseling/discussion [Z71.89]  Not Applicable    Aspiration pneumonia [J69.0] 02/09/2020 No    Hypovolemia [E86.1] 02/07/2020 Yes    Bilious vomiting [R11.14] 02/06/2020 Yes    Thrombocytopenia [D69.6] 02/06/2020 No    Anemia [D64.9] 02/04/2020 Yes    Liver lesion, right lobe [K76.9] 01/31/2020 Yes    Hypotension [I95.9] 01/30/2020 Yes    Diverticulitis [K57.92] 01/30/2020 Yes    Hypocalcemia [E83.51] 01/29/2020 Yes    GAVIN (acute kidney injury) [N17.9] 01/29/2020 Yes    Bifascicular block [I45.2] 01/02/2020 Yes    Hypomagnesemia [E83.42] 12/20/2019 Yes    COPD (chronic obstructive pulmonary disease) [J44.9] 12/19/2019 Yes    Chronic heart failure with preserved ejection fraction [I50.32] 12/19/2019 Yes    Paroxysmal atrial fibrillation [I48.0] 12/19/2019 Yes    Type 2 diabetes mellitus with hyperglycemia, without long-term current use of insulin [E11.65] 12/19/2019 Yes      Problems Resolved During this Admission:    Diagnosis Date Noted Date Resolved POA    Delirium [R41.0] 02/07/2020 02/20/2020 No    Visual hallucinations [R44.1] 02/06/2020 02/20/2020 No    Hematemesis [K92.0] 02/06/2020 02/20/2020 No    Acute cystitis without hematuria [N30.00] 01/29/2020 02/20/2020 Yes       Overview / ICU Course:    Elizabeth Cano is a 69 y.o. female with medical history significant for A-fib (on coumadin), COPD, and HFpEF (EF 45%)  admitted for Septic shock.    Inpatient Medications Prescribed for Management of Current Problems:     Scheduled Meds:    calcium-vitamin D3  1 tablet Oral BID    carbamide peroxide  5 drop Both Ears BID     dextromethorphan-guaifenesin  mg  1 tablet Oral BID    digoxin  0.125 mg Oral Daily    furosemide  40 mg Intravenous BID    Lactobacillus rhamnosus GG  1 capsule Oral BID    magnesium oxide  400 mg Oral BID    miconazole nitrate 2%   Topical (Top) BID    midodrine  10 mg Oral QHS    mirtazapine  15 mg Oral QHS    pantoprazole  40 mg Oral Daily    potassium chloride  30 mEq Oral Daily    warfarin  1 mg Oral Every Mon, Tue, Thu, Fri, Sun     Continuous Infusions:   As Needed: acetaminophen, albuterol-ipratropium, benzonatate, bisacodyL, Dextrose 10% Bolus, Dextrose 10% Bolus, glucagon (human recombinant), glucose, glucose, melatonin, morphine, ondansetron, phenyleph-min oil-petrolatum, polyethylene glycol, promethazine (PHENERGAN) IVPB, senna, simethicone, sodium chloride 0.9%, sodium chloride 0.9%    Assessment and Plan:    Acute hypoxemic respiratory failure  Pneumonia   Pulmonary edema  - Likely secondary to decompensated HF that has responded well with diureses.  - Weaned Oxygen from comfort flow down to 2L via NC.   -CXR 02/121 with Multifocal patchy pulmonary parenchymal disease is present throughout both lungs, worse in the right lung than the left. Unchanged from prior.   - on Lasix IV 40 mg BID   - strict I/Os.  - Chest CT non contrast (2/13) did not show significant pulmonary edema but did show evidence of interstitial lung disease and pulmonary hypertension   - On O2. Goal SpO2 88-92%. Wean as tolerated  - if unable to wean oxygen after adequate diuresis, consider Pulmonology consult and 6MWT  - completed 7-day course of Zosyn  - Inadequate diuresis; issues with fluid restriction and I/Os. Increased Lasix to TID on 2/24; resumed BID on 2/28  - continuing diuresis 2/29/2020     Hypotension  Septic shock, unclear etiology but likely diverticulitis - resolved    UTI due to Candida albicans   - Patient with acute hypotension and worsening O2 requirements over the course of 2/9/2020. Started the  day on 3L NC and by the end was on Comfort flow 100% FiO2 and 30L. Concern for PE. CTA negative for PE, CXR also with severe pleural edema. Patient had been on Zosyn for possible diverticulitis +/- urosepsis.   - Vancomycin discontinued; completed Zosyn x7 day   - Patient on minimal levophed for MAP < 65. Patient appears to have baseline low BP's, mentates well with a MAP > 60. Possible infectious etiology given worsening hypoxemia / shock, CT showing compressive atelectasis vs. Developing consolidation in the left lower lobe.  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 Ordered purewick since patient incontinent   - plan to switch to po lasix tomorrow   - Patient hypervolemic, significant pulmonary edema on CXR  - Monitor daily electrolytes, Continuing current management with diuresis.  - repeat BNP 2/24 significantly elevated  - alkalosis persisting; trial of Diamox 2/25  - Diamox dose repeated 2/26  - Bicarb improved; continuing diuresis  - blood pressure stable on midodrine.  Patient appears near euvolemic.  Changed Lasix 40 mg IV to BID 2/28  - Problem is gradually improving; continuing to monitor clinical status 2/29/2020.      Decreased po intake  - nutrition consulted.  - Boost Plus TID   - Re-consulted Palliative Care due to failure to thrive.  - Consulting Psychiatry. Patient already on mirtazapine and cannot take  Megace due to CHF. Psych medications may need adjusting, patient agrees.   - Psychiatry recommends discontinuation of Seroquel and duloxetine 2/28     Paroxysmal atrial fibrillation  - Patient in RVR presumed due to acute respiratory failure  - PRN metoprolol as needed for rate control but would not aggressively treat in setting of severe hypoxemia and hypotension  - INR initially subtherapeutic and was on heparin bridge with warfarin.   - Previously INR supratherapeutic. Resumed warfarin on 2/18 since INR near therapeutic. PharmD consulted for management.  - Metoprolol 50mg BID discontinued given nightly hypotensive episdoes requiring transient levophed, also received digoxin 250mcg for rate control  - Maintain Mg >2, K>4.      Type 2 diabetes mellitus with hyperglycemia, without long-term current use of insulin  - AIC 5.4%  - Glucose checks QACHS  - Goal Glucose 140-180 mg/dL  - Diabetic diet     Head lice  -S/p 1 permethrin lotion treatment.   - Active lice visualized today despite permethrin treatment and reported comb-niting.   - Actively considering other options to help eradicate head lice infestation, though patient will require aggressive comb-niting   -  lindane shampoo and permethrin as needed   - continue contact isolation      Hypocalcemia - resolved   -Continue with home calcium-Vit D     Hypomagnesemia  -Replete PRN  -Continue home magnesium oxide.      Liver lesion, right lobe  - possible concern of abscess vs malignancy per CT scan  - h/o weight loss and possible colonic lesion  - RUQ US showing focal fatty infiltration  - The gallbladder contains sludge and stones      Diverticulitis - resolved   - CT consistent with recurrent diverticulitis  - CRS consulted during this hospitalization - recommend management with antibiotics.   - Continued Zosyn for 7 days  - diarrhea reported 2/28; Gunjan-Colace discontinued     Hearing loss due to cerumen impaction  - No improvement with carbamide peroxide  -  Consulted ENT; confirmed cerumen impaction. Follow up with ENT as OP.    Discharge plan and follow up  senior living Facility  [facility] 3/3/2020  Previous admission:  12/19/19  Goals of Care:  General Prognosis: fair  Goals: Curative  Comfort Only: No  Hospice: No  Code Status: Full Code    Diet: Diet Low Sodium, 2gm [facility]; Fluid - 1500mL  GI Prophylaxis: Indicated due to multiple minor risk factors  Significant LDAs:   IV Access Type: Mid-line  Urinary Catheter Indication if present: Urinary Retention  Other Lines/Tubes/Drains:    VTE Risk Mitigation (From admission, onward)         Ordered     warfarin (COUMADIN) tablet 1 mg  Every Mon, Tue, Thu, Fri, Sun      02/24/20 1035     IP VTE HIGH RISK PATIENT  Once      01/29/20 1916     Reason for No Pharmacological VTE Prophylaxis  Once     Question:  Reasons:  Answer:  Already adequately anticoagulated on oral Anticoagulants    01/29/20 1916              Time spent in care of patient: > 35 minutes     Mendoza Caldwell MD  Department of Hospital Medicine   [facility]

## 2020-03-01 PROBLEM — F43.21 ADJUSTMENT DISORDER WITH DEPRESSED MOOD: Status: ACTIVE | Noted: 2020-01-01

## 2020-03-01 NOTE — PROGRESS NOTES
[facility] Hospital Medicine  Progress Note    Patient Name: Elizabeth Cano  MRN: [id number]  Patient Class: IP- Inpatient   Admission Date: 1/29/2020  Length of Stay: 32 days  Attending Physician: Mendoza Caldwell MD  Primary Care Provider: Cesar Reeves MD    [facility] Medicine Team: [facility] [place] [facility] according to the [facility] D     Subjective:     Principal Problem:Septic shock    Interval History:   Chief Complaint   Patient presents with    Multiple Complaints     uti finished antibiotics,      Follow up visit for Septic shock    History / Events Overnight: On Call Provider contacted about multiple BMs overnight  Patient lying in bed, no respiratory distress. Reports continued fatigue, decreased appetite and depressed mood. Reports breathing is normal. Denies itchy scalp.     Data reviewed 3/1/2020    Review of Systems   Constitutional: Positive for fatigue. Negative for fever.   HENT: Positive for hearing loss.    Respiratory: Negative for shortness of breath.    Cardiovascular: Positive for leg swelling. Negative for chest pain.   Gastrointestinal: Negative for abdominal distention, abdominal pain, nausea and vomiting.   Genitourinary: Negative for dysuria.   Neurological: Positive for weakness. Negative for dizziness.   Psychiatric/Behavioral: Negative for confusion.     Objective:     Vital Signs (Most Recent):  Temp: 98.5 °F (36.9 °C) (03/01/20 0345)  Pulse: 65 (03/01/20 0345)  Resp: 20 (03/01/20 0345)  BP: (!) 113/53 (03/01/20 0345)  SpO2: 99 % (03/01/20 0345) Vital Signs (24h Range):  Temp:  [97.6 °F (36.4 °C)-98.5 °F (36.9 °C)] 98.5 °F (36.9 °C)  Pulse:  [65-74] 65  Resp:  [16-20] 20  SpO2:  [95 %-99 %] 99 %  BP: (107-124)/(53-60) 113/53     Weight: 50.5 kg (111 lb 5.3 oz)  Body mass index is 17.97 kg/m².    Intake/Output Summary (Last 24 hours) at 3/1/2020 0823  Last data filed at 3/1/2020 0500  Gross per 24 hour   Intake 280 ml   Output --   Net 280 ml      Physical Exam   Constitutional: She is  cooperative. No distress.   Eyes: Conjunctivae and lids are normal. No scleral icterus.   Cardiovascular: Normal rate, regular rhythm, S1 normal and S2 normal.   Pulmonary/Chest: No respiratory distress. She has no wheezes. She has no rhonchi.   Abdominal: Soft. Normal appearance and bowel sounds are normal. There is no tenderness.   Musculoskeletal: Normal range of motion. She exhibits edema (minimal).   Neurological: She is alert. She is not disoriented.   Skin: Skin is warm and dry. No cyanosis. No pallor.       Significant Labs:   A1C:   Recent Labs   Lab 12/19/19  2220 01/30/20  0421   HGBA1C 6.4* 5.4     CBC:   Recent Labs   Lab 02/29/20  0354   WBC 6.47   HGB 11.2*   HCT 36.9*        CMP:   Recent Labs   Lab 02/28/20  1605 02/29/20  0354    139   K 3.1* 4.2   CL 98 98   CO2 32* 32*   * 98   BUN 11 12   CREATININE 0.7 0.8   CALCIUM 7.3* 8.2*   PROT  --  5.5*   ALBUMIN  --  1.7*   BILITOT  --  0.6   ALKPHOS  --  257*   AST  --  51*   ALT  --  17   ANIONGAP 8 9   EGFRNONAA >60.0 >60.0     Coagulation:   Recent Labs   Lab 02/29/20  0354   INR 1.9*     Magnesium:   Recent Labs   Lab 02/29/20  0353   MG 1.8     POCT Glucose:   Recent Labs   Lab 02/29/20  1052 02/29/20  1613 02/29/20  1940   POCTGLUCOSE 101 111* 135*     TSH:   Recent Labs   Lab 02/07/20  1757   TSH 1.967       Assessment/Plan:      Active Diagnoses:    Diagnosis Date Noted POA    PRINCIPAL PROBLEM:  Septic shock [A41.9, R65.21] 02/01/2020 Yes    Anorexia [R63.0]  Unknown    Bilateral hearing loss due to cerumen impaction [H61.23] 02/24/2020 Yes    Urinary retention [R33.9] 02/19/2020 Yes    Dyspnea [R06.00]  Yes    Severe malnutrition [E43] 02/14/2020 Yes    Palliative care encounter [Z51.5] 02/11/2020 Not Applicable    Acute hypoxemic respiratory failure [J96.01] 02/11/2020 Yes    Head lice [B85.0] 02/11/2020 Yes    Advance care planning [Z71.89]  Not Applicable    Goals of care, counseling/discussion [Z71.89]  Not  Applicable    Aspiration pneumonia [J69.0] 02/09/2020 No    Hypovolemia [E86.1] 02/07/2020 Yes    Bilious vomiting [R11.14] 02/06/2020 Yes    Thrombocytopenia [D69.6] 02/06/2020 No    Anemia [D64.9] 02/04/2020 Yes    Liver lesion, right lobe [K76.9] 01/31/2020 Yes    Hypotension [I95.9] 01/30/2020 Yes    Diverticulitis [K57.92] 01/30/2020 Yes    Hypocalcemia [E83.51] 01/29/2020 Yes    GAVIN (acute kidney injury) [N17.9] 01/29/2020 Yes    Bifascicular block [I45.2] 01/02/2020 Yes    Hypomagnesemia [E83.42] 12/20/2019 Yes    COPD (chronic obstructive pulmonary disease) [J44.9] 12/19/2019 Yes    Chronic heart failure with preserved ejection fraction [I50.32] 12/19/2019 Yes    Paroxysmal atrial fibrillation [I48.0] 12/19/2019 Yes    Type 2 diabetes mellitus with hyperglycemia, without long-term current use of insulin [E11.65] 12/19/2019 Yes      Problems Resolved During this Admission:    Diagnosis Date Noted Date Resolved POA    Delirium [R41.0] 02/07/2020 02/20/2020 No    Visual hallucinations [R44.1] 02/06/2020 02/20/2020 No    Hematemesis [K92.0] 02/06/2020 02/20/2020 No    Acute cystitis without hematuria [N30.00] 01/29/2020 02/20/2020 Yes       Overview / ICU Course:    Elizabeth Cano is a 69 y.o. female with medical history significant for A-fib (on coumadin), COPD, and HFpEF (EF 45%)  admitted for Septic shock.    Inpatient Medications Prescribed for Management of Current Problems:     Scheduled Meds:    calcium-vitamin D3  1 tablet Oral BID    carbamide peroxide  5 drop Both Ears BID    dextromethorphan-guaifenesin  mg  1 tablet Oral BID    digoxin  0.125 mg Oral Daily    furosemide  40 mg Intravenous BID    Lactobacillus rhamnosus GG  1 capsule Oral BID    magnesium oxide  400 mg Oral BID    miconazole nitrate 2%   Topical (Top) BID    midodrine  10 mg Oral QHS    mirtazapine  15 mg Oral QHS    pantoprazole  40 mg Oral Daily    potassium chloride  30 mEq Oral Daily     warfarin  1 mg Oral Daily     Continuous Infusions:   As Needed: acetaminophen, albuterol-ipratropium, benzonatate, bisacodyL, Dextrose 10% Bolus, Dextrose 10% Bolus, glucagon (human recombinant), glucose, glucose, melatonin, morphine, ondansetron, phenyleph-min oil-petrolatum, polyethylene glycol, promethazine (PHENERGAN) IVPB, senna, simethicone, sodium chloride 0.9%, sodium chloride 0.9%    Assessment and Plan:    Acute hypoxemic respiratory failure  Pneumonia   Pulmonary edema  - Likely secondary to decompensated HF that has responded well with diureses.  -CXR 02/121 with Multifocal patchy pulmonary parenchymal disease is present throughout both lungs, worse in the right lung than the left. Unchanged from prior.   - on Lasix IV 40 mg BID   - strict I/Os.  - Chest CT non contrast (2/13) did not show significant pulmonary edema but did show evidence of interstitial lung disease and pulmonary hypertension   - s/p IV lasix 40 mg BID, switching to po lasix 40 mg BID   - completed 7-day course of Zosyn  - Weaned Oxygen from comfort flow down to 2L via NC.       Hypotension  Septic shock, unclear etiology but likely diverticulitis - resolved    UTI due to Candida albicans   - Patient with acute hypotension and worsening O2 requirements over the course of 2/9/2020. Started the day on 3L NC and by the end was on Comfort flow 100% FiO2 and 30L. Concern for PE. CTA negative for PE, CXR also with severe pleural edema. Patient had been on Zosyn for possible diverticulitis +/- urosepsis.   - Vancomycin discontinued; completed Zosyn x7 day   - Patient on minimal levophed for MAP < 65. Patient appears to have baseline low BP's, mentates well with a MAP > 60. Possible infectious etiology given worsening hypoxemia / shock, CT showing compressive atelectasis vs. Developing consolidation in the left lower lobe.  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 purewick   - switched to po lasix 80 mg BID  - Patient hypervolemic, significant pulmonary edema on CXR  - Monitor daily electrolytes, Continuing current management with diuresis.  - repeat BNP 2/24 significantly elevated  - alkalosis persisting; trial of Diamox 2/25  - Diamox dose repeated 2/26  - Bicarb improved; continuing diuresis  - blood pressure stable on midodrine.  Patient appears near euvolemic.  Changed Lasix 40 mg IV to BID 2/28  - Problem is gradually improving; continuing to monitor clinical status 3/1/2020.      Decreased po intake  - nutrition consulted.  - Boost Plus TID   - Re-consulted Palliative Care due to failure to thrive.  - Consulted Psychiatry. Patient already on mirtazapine and cannot take Megace due to CHF.   - Psychiatry recommends discontinuation of Seroquel and duloxetine 2/28     Paroxysmal atrial fibrillation  - Patient in RVR presumed due to acute respiratory failure  - PRN metoprolol as needed for rate control but would not aggressively treat in setting of severe hypoxemia and hypotension  - INR initially subtherapeutic and was on heparin bridge with warfarin.   - Previously INR supratherapeutic. Resumed warfarin on 2/18 since INR near therapeutic. PharmD consulted for management.  - Metoprolol 50mg BID discontinued given nightly hypotensive episdoes requiring transient levophed  - on digoxin 125 mcg for rate control  - Maintain Mg >2, K>4.      Type 2 diabetes mellitus with  hyperglycemia, without long-term current use of insulin  - AIC 5.4%  - Glucose checks QACHS  - Goal Glucose 140-180 mg/dL  - Diabetic diet     Head lice  - last permethrin treatment on 2/17 and lindane treatment on 2/20.   - Actively considering other options to help eradicate head lice infestation, though patient will require aggressive comb-niting   -  lindane shampoo and permethrin as needed   - discontinued contact isolation on 3/1     Hypocalcemia - resolved   -Continue with home calcium-Vit D     Hypomagnesemia  -Replete PRN  -Continue home magnesium oxide.      Liver lesion, right lobe  - possible concern of abscess vs malignancy per CT scan  - h/o weight loss and possible colonic lesion  - RUQ US showing focal fatty infiltration  - The gallbladder contains sludge and stones      Diverticulitis - resolved   - CT consistent with recurrent diverticulitis  - CRS consulted during this hospitalization - recommend management with antibiotics.   - Continued Zosyn for 7 days  - diarrhea reported 2/28; Gunjan-Colace discontinued     Hearing loss due to cerumen impaction  - No improvement with carbamide peroxide  - Consulted ENT; confirmed cerumen impaction. Follow up with ENT as OP.    Discharge plan and follow up  group home Facility  [facility] 3/3/2020  Previous admission:  12/19/19  Goals of Care:  General Prognosis: fair  Goals: Curative  Comfort Only: No  Hospice: No  Code Status: Full Code    Diet: Diet Low Sodium, 2gm [facility]; Fluid - 1500mL  GI Prophylaxis: Indicated due to multiple minor risk factors  Significant LDAs:   IV Access Type: Mid-line  Urinary Catheter Indication if present: Urinary Retention  Other Lines/Tubes/Drains:    VTE Risk Mitigation (From admission, onward)         Ordered     warfarin (COUMADIN) tablet 1 mg  Daily      02/29/20 0920     IP VTE HIGH RISK PATIENT  Once      01/29/20 1916     Reason for No Pharmacological VTE Prophylaxis  Once     Question:  Reasons:  Answer:  Already  adequately anticoagulated on oral Anticoagulants    01/29/20 1916              Time spent in care of patient: > 35 minutes     Mendoza Caldwell MD  Department of Hospital Medicine   [facility]

## 2020-03-01 NOTE — PLAN OF CARE
Pt stable. Refuses all meals. Blood sugars WNL. Denies pain or discomfort. Bed locked and placed within lowest position. Call bell within reach. Instructed to call staff for assistance. Will cont to monitor

## 2020-03-02 PROBLEM — R65.21 SEPTIC SHOCK: Status: RESOLVED | Noted: 2020-01-01 | Resolved: 2020-01-01

## 2020-03-02 PROBLEM — R33.9 URINARY RETENTION: Status: RESOLVED | Noted: 2020-01-01 | Resolved: 2020-01-01

## 2020-03-02 PROBLEM — E86.1 HYPOVOLEMIA: Status: RESOLVED | Noted: 2020-01-01 | Resolved: 2020-01-01

## 2020-03-02 PROBLEM — J96.01 ACUTE HYPOXEMIC RESPIRATORY FAILURE: Status: RESOLVED | Noted: 2020-01-01 | Resolved: 2020-01-01

## 2020-03-02 PROBLEM — A41.9 SEPTIC SHOCK: Status: RESOLVED | Noted: 2020-01-01 | Resolved: 2020-01-01

## 2020-03-02 PROBLEM — J69.0 ASPIRATION PNEUMONIA: Status: RESOLVED | Noted: 2020-01-01 | Resolved: 2020-01-01

## 2020-03-02 PROBLEM — B85.0 HEAD LICE: Status: RESOLVED | Noted: 2020-01-01 | Resolved: 2020-01-01

## 2020-03-02 NOTE — ASSESSMENT & PLAN NOTE
Ms. Elizabeth Cano is a 69 years old female with afib (on coumadin), COPD, and HFpEF (EF 45% on 1/29/20) admitted to hospital medicine for presumed urinary tract infection. She was diagnosed with a UTI last week at her Nursing Home, [facility], which she was prescribed Macrobid. She completed her course of Macrobid on 01/29/20 and she was noted to be hypotensive rendering her transfer to the hospital. Later found to have diverticulitis.  Psychiatry was consulted for query of depression in context of poor PO intake and failure to thrive.  On face to face evaluation the patient denies any mood symptoms or psychiatric history.  She continues to report nausea and poor appetite that is impacting her PO intake.  Reports decreased energy and excessive fatigue, but reports motivation to get better and return home.  Appears stable on simplified regimen consisting of Remeron only.      Rec:   - Continue Remeron 15mg PO qHS foor mood/appetite/sleep    Psychiatry will sign off.  Please contact psych on-call if future assistance is needed.

## 2020-03-02 NOTE — NURSING
Pt discharged to nursing home. No apparent distress or discomfort present. All belongings gathered. Will cont to monitor

## 2020-03-02 NOTE — HOSPITAL COURSE
03/02/2020  Patient seen in room, says she has tolerated medication solano well with no aggrivation of depression or anxiety.  Sleep good, but perceives no stimulation of appetite.  AFUA Ortiz SI, KELVIN.  Passes CAM-ICU without error.

## 2020-03-02 NOTE — SUBJECTIVE & OBJECTIVE
"Interval History: see hospital course    Family History     Problem Relation (Age of Onset)    Heart disease Mother        Tobacco Use    Smoking status: Former Smoker     Packs/day: 1.00     Years: 54.00     Pack years: 54.00     Types: Cigarettes     Last attempt to quit: 2019     Years since quittin.3    Smokeless tobacco: Never Used   Substance and Sexual Activity    Alcohol use: Not Currently    Drug use: Not Currently    Sexual activity: Not Currently     Partners: Male     Psychotherapeutics (From admission, onward)    Start     Stop Route Frequency Ordered    20  mirtazapine tablet 15 mg      -- Oral Nightly 20 1402           Review of Systems  Objective:     Vital Signs (Most Recent):  Temp: 97.9 °F (36.6 °C) (20 075)  Pulse: 78 (20 075)  Resp: 18 (20 075)  BP: (!) 122/57 (20 075)  SpO2: 100 % (20) Vital Signs (24h Range):  Temp:  [97.5 °F (36.4 °C)-98.8 °F (37.1 °C)] 97.9 °F (36.6 °C)  Pulse:  [66-78] 78  Resp:  [16-20] 18  SpO2:  [94 %-100 %] 100 %  BP: (114-126)/(57-76) 122/57     Height: 5' 6" (167.6 cm)  Weight: 50.5 kg (111 lb 5.3 oz)  Body mass index is 17.97 kg/m².      Intake/Output Summary (Last 24 hours) at 3/2/2020 1020  Last data filed at 3/2/2020 0500  Gross per 24 hour   Intake 340 ml   Output --   Net 340 ml       Physical Exam   Psychiatric:   Mental Status Exam:  Appearance: unremarkable, age appropriate, lying in bed  Behavior/Cooperation: friendly and cooperative  Speech: normal tone, normal rate, soft  Mood: "ok"  Affect: reactive, full range  Thought Process: linear  Thought Content: normal, no suicidality, no homicidality, delusions, or paranoia   Orientation: grossly intact, person, place, situation  Memory: Grossly intact  Attention Span/Concentration: in tact, completes CAM-ICU without error  Insight: fair  Judgment: fair          Significant Labs: None    Significant Imaging: None  "

## 2020-03-02 NOTE — PLAN OF CARE
Patient with limited progression in PT since evaluation on 2020. Patient continues to require increased encouragement to participate in sessions and sometimes she still refuses. Due to lack of progress, PT will sign off at this time.     Problem: Physical Therapy Goal  Goal: Physical Therapy Goal  Description  Goals to be met by: 2020     Patient will increase functional independence with mobility by performin. Supine to sit with Contact Guard Assistance - met 2020   2. Sit to supine with Contact Guard Assistance - GOAL MET  3. Sit to stand transfer with Contact Guard Assistance using LRAD  4. Bed to chair transfer with Contact Guard Assistance using Rolling Walker  5. Gait  x 25 feet with Contact Guard Assistance using Rolling Walker.   6. Lower extremity exercise program x20 reps per handout, with independence        Outcome: Unable to Meet, Plan Revised

## 2020-03-02 NOTE — PT/OT/SLP PROGRESS
Physical Therapy Treatment and Discharge     Patient Name:  Elizabeth Cano   MRN:  [id number]    Recommendations:     Discharge Recommendations:  nursing facility, basic   Discharge Equipment Recommendations: (TBD)   Barriers to discharge: None    Assessment:     Elizabeth Cano is a 69 y.o. female admitted with a medical diagnosis of Septic shock.  She presents with the following impairments/functional limitations:  weakness, impaired endurance, gait instability, impaired balance, impaired functional mobilty, decreased safety awareness, impaired cardiopulmonary response to activity, decreased lower extremity function. Patient with limited progression in PT since evaluation on 2/5/2020. Patient continues to require increased encouragement to participate in sessions and sometimes she still refuses. Due to lack of progress, PT will sign off at this time.     Rehab Prognosis: Fair; patient would benefit from acute skilled PT services to address these deficits and reach maximum level of function.    Recent Surgery: Procedure(s) (LRB):  EGD (ESOPHAGOGASTRODUODENOSCOPY) (N/A) 24 Days Post-Op    Plan:     During this hospitalization, patient to be seen 4 x/week to address the identified rehab impairments via gait training, therapeutic activities, therapeutic exercises, neuromuscular re-education and progress toward the following goals:    · Plan of Care Expires:  03/12/20    Subjective     Chief Complaint: Start dry heaving when she stands up.   Patient/Family Comments/goals: Did not verbalize.   Pain/Comfort:  Pain Rating 1: 0/10      Objective:     Patient found left sidelying with oxygen, PureWick upon PT entry to room.     General Precautions: Standard, fall   Orthopedic Precautions:N/A   Braces: N/A     Functional Mobility:  · Bed Mobility:     · Rolling Left:  contact guard assistance  · Rolling Right: contact guard assistance  · Supine to Sit: contact guard assistance  · Sit to Supine: contact guard assistance.  Patient dry heaving once back in bed.   · Transfers:     · Sit to Stand:  maximal assistance of 2 persons with hand-held assist x1 trial from edge of bed. Patient only able to tolerate standing ~10 seconds due to fear of falling and starting to feel sick.       AM-PAC 6 CLICK MOBILITY  Turning over in bed (including adjusting bedclothes, sheets and blankets)?: 3  Sitting down on and standing up from a chair with arms (e.g., wheelchair, bedside commode, etc.): 3  Moving from lying on back to sitting on the side of the bed?: 3  Moving to and from a bed to a chair (including a wheelchair)?: 2  Need to walk in hospital room?: 1  Climbing 3-5 steps with a railing?: 1  Basic Mobility Total Score: 13       Therapeutic Activities and Exercises:  Prior to sitting on edge of bed, patient performed B LE therex x10 reps for A/P, SLR, and heel slides. She tolerated sitting on edge of bed ~8 minutes with supervision. Patient with limited participation in standing trials due to fear of falling and starting to feel sick.     Patient left in right sidelying with all lines intact and call button in reach.    GOALS:   Multidisciplinary Problems     Physical Therapy Goals        Problem: Physical Therapy Goal    Goal Priority Disciplines Outcome Goal Variances Interventions   Physical Therapy Goal     PT, PT/OT Unable to Meet, Plan Revised     Description:  Goals to be met by: 2020     Patient will increase functional independence with mobility by performin. Supine to sit with Contact Guard Assistance - met 2020   2. Sit to supine with Contact Guard Assistance - GOAL MET  3. Sit to stand transfer with Contact Guard Assistance using LRAD  4. Bed to chair transfer with Contact Guard Assistance using Rolling Walker  5. Gait  x 25 feet with Contact Guard Assistance using Rolling Walker.   6. Lower extremity exercise program x20 reps per handout, with independence                         Time Tracking:     PT Received On:  03/02/20  PT Start Time: 1027     PT Stop Time: 1042  PT Total Time (min): 15 min     Billable Minutes: Therapeutic Exercise  15    Treatment Type: Treatment  PT/PTA: PT     PTA Visit Number: 0     Verona Meredith, PT  03/02/2020

## 2020-03-02 NOTE — PLAN OF CARE
Problem: Occupational Therapy Goal  Goal: Occupational Therapy Goal  Description  Goals to be met by: 2/26    Patient will increase functional independence with ADLs by performing:    UE Dressing while seated EOB with Set-up Assistance.  LE Dressing (socks, brief) with min(A).  Grooming while seated at sink with set up.  Toileting from BSC with CGA.  Toilet transfer to BSC with CGA.  Functional mobility at household distance for ADL task with AD and min(A).  Pt d/c from acute care OT at this time.   Bruna Noriega OT  3/2/2020     Outcome: Not Met

## 2020-03-02 NOTE — PLAN OF CARE
TAMIKO scheduled d/c transportation to [facility] through [facility]. Patient is scheduled to be picked up at 4:00 pm. TAMIKO provided patient's nurse with report number [phone]; ask for the nurse for room 226A. TAMIKO is in communication with patient's CM and patient's Care Team.      03/02/20 1437   Post-Acute Status   Post-Acute Authorization Placement   Post-Acute Placement Status Set-up Complete     Mila Friedman LMSW   - [facility]  Ext. [phone]

## 2020-03-02 NOTE — PLAN OF CARE
[facility]     Department of Hospital Medicine     [facility] [place] [address]     [phone] after hours  [phone] fax       NURSING HOME ORDERS    03/02/2020    Admit to Nursing Home:  Regular Bed         Diagnoses:  Active Hospital Problems    Diagnosis  POA    Adjustment disorder with depressed mood [F43.21]  Yes    Anorexia [R63.0]  Yes    Bilateral hearing loss due to cerumen impaction [H61.23]  Yes    Dyspnea [R06.00]  Yes    Severe malnutrition [E43]  Yes    Palliative care encounter [Z51.5]  Not Applicable    Advance care planning [Z71.89]  Not Applicable    Goals of care, counseling/discussion [Z71.89]  Not Applicable    Bilious vomiting [R11.14]  Yes    Thrombocytopenia [D69.6]  No    Anemia [D64.9]  Yes    Liver lesion, right lobe [K76.9]  Yes    Hypotension [I95.9]  Yes    Diverticulitis [K57.92]  Yes    Hypocalcemia [E83.51]  Yes    GAVIN (acute kidney injury) [N17.9]  Yes    Bifascicular block [I45.2]  Yes    Hypomagnesemia [E83.42]  Yes    COPD (chronic obstructive pulmonary disease) [J44.9]  Yes    Chronic heart failure with preserved ejection fraction [I50.32]  Yes    Paroxysmal atrial fibrillation [I48.0]  Yes    Type 2 diabetes mellitus with hyperglycemia, without long-term current use of insulin [E11.65]  Yes      Resolved Hospital Problems    Diagnosis Date Resolved POA    *Septic shock [A41.9, R65.21] 03/02/2020 Yes    Urinary retention [R33.9] 03/02/2020 Yes    Acute hypoxemic respiratory failure [J96.01] 03/02/2020 Yes    Head lice [B85.0] 03/02/2020 Yes    Aspiration pneumonia [J69.0] 03/02/2020 No    Hypovolemia [E86.1] 03/02/2020 Yes    Delirium [R41.0] 02/20/2020 No    Visual hallucinations [R44.1] 02/20/2020 No    Hematemesis [K92.0] 02/20/2020 No    Acute cystitis without hematuria [N30.00] 02/20/2020 Yes       Patient is homebound due to:  Septic shock    Allergies:  Review of patient's  allergies indicates:   Allergen Reactions    Levsin [hyoscyamine sulfate] Hallucinations       Vitals: Per facility protocol     Diet: low sodium (2gm/day), fluid restriction 1500 mL/day   Supplement:  1 can every three times a day with meals                         Type:  Ensure        Acitivities:     - Up in a chair each morning as tolerated   - Ambulate with assistance to bathroom   - Scheduled walks once each shift (every 8 hours)   - May use walker, cane, or self-propelled wheelchair    LABS:  Per facility protocol   CMP, CBC each month for 3 months   PT-INR each week for 1 month then monthly   Pre-albumin each month for 3 months   Digoxin level in 1 month and every 6 months   TSH every year     Nursing Precautions:    - Aspiration precautions:             - Total assistance with meals            -  Upright 90 degrees befor during and after meals             -  Suction at bedside          - Fall precautions per nursing home protocol   - Decubitus precautions:        -  for positioning   - Pressure reducing foam mattress   - Turn patient every two hours. Use wedge pillows to anchor patient    CONSULTS:      Outpatient Physical Therapy to evaluate and treat     Outpatient Occupational Therapy to evaluate and treat     Nutrition to evaluate and recommend diet     Psychiatry to evaluate and follow patients for delirium    MISCELLANEOUS CARE:         Routine Skin for Bedridden Patients:  Apply moisture barrier cream to all    skin folds and wet areas in perineal area daily and after baths and                           all bowel movements.     Wound Care:    - Nursing to apply Miconazole ointment to the bilateral groins/perineal area BID                 DIABETES CARE:      Check blood sugar:      Fingerstick blood sugar a.m and p.m.         Report CBG < 60 or > 400 to physician.                                          Insulin Sliding Scale          Glucose  Novolog Insulin Subcutaneous        0 -  60   Orange juice or glucose tablet, hold insulin      No insulin   201-250  2 units   251-300  4 units   301-350  6 units   351-400  8 units   >400   10 units then call physician      Medications: Discontinue all previous medication orders, if any. See new list below.     Jerome Elizabeth Augusto   Home Medication Instructions BRIAN:[id number]    Printed on:03/02/20 1786   Medication Information                      albuterol (PROVENTIL) 2.5 mg /3 mL (0.083 %) nebulizer solution  Take 0.5 mLs by nebulization every 6 (six) hours as needed (wheezing). Rescue              benzonatate (TESSALON) 100 MG capsule  Take 1 capsule (100 mg total) by mouth 3 (three) times daily as needed for Cough.             budesonide (PULMICORT) 0.25 mg/2 mL nebulizer solution  Take 2 ml by nebulization 2 times daily Controller             carbamide peroxide (DEBROX) 6.5 % otic solution  Place 5 drops into both ears 2 (two) times daily. for 7 days             digoxin (LANOXIN) 125 mcg tablet  Take 1 tablet (0.125 mg total) by mouth once daily.             furosemide (LASIX) 20 MG tablet  Take 2 tablets (40 mg total) by mouth 2 (two) times daily.             L. acidophilus/pectin, citrus (ACIDOPHILUS PROBIOTIC ORAL)  Take 1 tablet by mouth once daily.             lidocaine (LIDODERM) 5 %  Apply topically to right leg as needed for pain             magnesium oxide (MAG-OX) 400 mg (241.3 mg magnesium) tablet  Take 1 tablet (400 mg total) by mouth once daily.             metFORMIN (GLUCOPHAGE) 1000 MG tablet  Take 1,000 mg by mouth 2 (two) times daily with meals.             miconazole nitrate 2% (MICOTIN) 2 % Oint  Apply topically 2 (two) times daily. Apply to the bilateral groins/perineal area BID for 7 days             midodrine (PROAMATINE) 10 MG tablet  Take 1 tablet (10 mg total) by mouth every evening.             mirtazapine (REMERON) 15 MG tablet  Take 15 mg by mouth every evening.             nystatin (MYCOSTATIN) powder  Apply  twice daily under breast folds for redness and irritation until resolved             ondansetron (ZOFRAN) 4 MG tablet  Take 4 mg by mouth every 6 (six) hours as needed for Nausea.             pantoprazole (PROTONIX) 40 MG tablet  Take 1 tablet (40 mg total) by mouth once daily.             phenyleph-min oil-petrolatum 0.25-14-74.9 % Oint  Place 1 applicator rectally 4 (four) times daily as needed.             potassium chloride (MICRO-K) 10 MEQ CpSR  Take 3 capsules (30 mEq total) by mouth once daily.             senna (SENOKOT) 8.6 mg tablet  Take 1 tablet by mouth once daily.             simethicone (MYLICON) 80 MG chewable tablet  Take 1 tablet (80 mg total) by mouth 3 (three) times daily as needed.             vitamin D (VITAMIN D3) 1000 units Tab  Take 1,000 Units by mouth once daily.             warfarin (COUMADIN) 1 MG tablet  Take 1 mg by mouth every Mon, Wed, Fri. At 5:00pm                     _____________e-sig____________________  Mendoza Caldwell MD  03/02/2020

## 2020-03-02 NOTE — PROGRESS NOTES
[facility]  Psychiatry  Progress Note    Patient Name: Elizabeth Cano  MRN: [id number]   Code Status: Full Code  Admission Date: 1/29/2020  Hospital Length of Stay: 33 days  Expected Discharge Date: 3/2/2020  Attending Physician: Mendoza Caldwell MD  Primary Care Provider: Cesar Reeves MD    Current Legal Status: N/A    Patient information was obtained from patient and medical record.     Subjective:     Principal Problem:Septic shock    Chief Complaint: Anorexia/depression    HPI: History of Present Illness:   Ms. Elizabeth Cano is a 69 years old female with afib (on coumadin), COPD, and HFpEF (EF 45% on 1/29/20) admitted to hospital medicine for presumed urinary tract infection. She was diagnosed with a UTI last week at her Nursing Home, [facility], which she was prescribed Macrobid. She completed her course of Macrobid on 01/29/20 and she was noted to be hypotensive rendering her transfer to the hospital.      She reports a decreased appetite for the last month since her hospitalization over the holidays due to uncomplicated diverticulitis which she was treated with Augmentin. She reports feeling nauseated when taking antibiotics. She denies palpitations, dizziness, lightheadedness, chest pain, SOB, change in urinary/bowel habits.      On 01/29/20, she was found to be hypotensive in the 80s/50s in the ED with lactic acid of 4.8. CXR showed no signs for PNA, UA showed mild leukocytosis with WBC 17, nitrite negative, with urine cultures and blood cultures showing NG. She was admitted to  and started on CTX. She has been resuscitated with IVFs since admit with 3L. BP responded appropriately to IVF resuscitation with subsequent downtrend in lactic acid to 2.7. However, overnight on 01/30/20, BP remained 63-67 mmHg MAP goal. Repeat lactic acid uptrend to 3.3. Of note, she received a night time dose of Lopressor 50 mg on 01/30/20, which has since been discontinued.     ICU consulted in am  of 01/31/20 for hypotension despite being resuscitated with 3.5 L IV fluids and persistant lactic acidosis in 3-4 range. She improved with IVF hydration and patient was stepped down. Rapid response evaluated patient early AM on 2/10 due to acute increase in oxygen requiriments. Patient began the day on 3L NC and progressed to requiring Comfort flow of 30L and 100% FiO2. Patient denies chest pains, only complaining of minor increased difficulty breathing.     Overview/Hospital Course:    Acute hypoxemic respiratory failure  Pneumonia   Pulmonary edema  - Likely secondary to decompensated HF that has responded well with diureses.  - Weaned Oxygen from comfort flow down to 2L via NC.   -CXR 02/121 with Multifocal patchy pulmonary parenchymal disease is present throughout both lungs, worse in the right lung than the left. Unchanged from prior.   - on Lasix IV 40 mg BID   - strict I/Os.  - Chest CT non contrast (2/13) did not show significant pulmonary edema but did show evidence of interstitial lung disease and pulmonary hypertension   - On O2. Goal SpO2 88-92%. Wean as tolerated  - if unable to wean oxygen after adequate diuresis, consider Pulmonology consult and 6MWT  - completed 7-day course of Zosyn  - Inadequate diuresis; issues with fluid restriction and I/Os. Increased Lasix to TID 2/24  - continuing diuresis 2/27/2020     Hypotension  Septic shock, unclear etiology but likely diverticulitis - resolved    UTI due to Candida albicans   - Patient with acute hypotension and worsening O2 requirements over the course of 2/9/2020. Started the day on 3L NC and by the end was on Comfort flow 100% FiO2 and 30L. Concern for PE. CTA negative for PE, CXR also with severe pleural edema. Patient had been on Zosyn for possible diverticulitis +/- urosepsis.   - Vancomycin discontinued; completed Zosyn x7 day   - Patient on minimal levophed for MAP < 65. Patient appears to have baseline low BP's, mentates well with a MAP > 60.  Possible infectious etiology given worsening hypoxemia / shock, CT showing compressive atelectasis vs. Developing consolidation in the left lower lobe.  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 Ordered purewick since patient incontinent   - Patient hypervolemic, significant pulmonary edema on CXR  - Monitor daily electrolytes, Continuing current management with diuresis.  - repeat BNP 2/24 significantly elevated; Changed Lasix 40 mg to TID  - alkalosis persisting; trial of Diamox 2/25  - Diamox dose repeated 2/26  - Bicarb improved; continuing diuresis  - Problem is gradually improving; continuing to monitor clinical status 2/27/2020.      Decreased po intake  - nutrition consulted.  - Boost Plus TID   - Re-consulted Palliative Care due to failure to thrive.  - Consulting Psychiatry. Patient already on mirtazapine and cannot take Megace due to CHF. Psych medications may need adjusting, patient agrees.      Paroxysmal atrial fibrillation  - Patient in RVR presumed due to acute respiratory failure  - PRN metoprolol as needed for rate control but would not aggressively treat in setting of severe hypoxemia and hypotension  - INR initially subtherapeutic and was on heparin bridge with warfarin.   - Previously INR supratherapeutic. Resumed warfarin on 2/18 since INR near therapeutic. PharmD consulted for management.  - Metoprolol 50mg  BID discontinued given nightly hypotensive episdoes requiring transient levophed, also received digoxin 250mcg for rate control  - Maintain Mg >2, K>4.      Type 2 diabetes mellitus with hyperglycemia, without long-term current use of insulin  - AIC 5.4%  - Glucose checks QACHS  - Goal Glucose 140-180 mg/dL  - Diabetic diet     Head lice  -S/p 1 permethrin lotion treatment.   - Active lice visualized today despite permethrin treatment and reported comb-niting.   - Actively considering other options to help eradicate head lice infestation, though patient will require aggressive comb-niting   -  lindane shampoo and permethrin as needed   - continue contact isolation      Hypocalcemia - resolved   -Continue with home calcium-Vit D     Hypomagnesemia  -Replete PRN  -Continue home magnesium oxide.      Liver lesion, right lobe  - possible concern of abscess vs malignancy per CT scan  - h/o weight loss and possible colonic lesion  - RUQ US showing focal fatty infiltration  - The gallbladder contains sludge and stones      Diverticulitis - resolved   - CT consistent with recurrent diverticulitis  - CRS consulted during this hospitalization - recommend management with antibiotics.   - Continued Zosyn for 7 days     Hearing loss due to cerumen impaction  - No improvement with carbamide peroxide  - Consulted ENT; confirmed cerumen impaction. Follow up with ENT as OP.     Discharge plan and follow up  halfway Facility  LI 2/28/2020  Previous admission:  12/19/19  Goals of Care:  General Prognosis: fair  Goals: Curative  Comfort Only: No  Hospice: No  Code Status: Full Code     Diet: Diet Low Sodium, 2gm [facility]; Fluid - 1500mL  GI Prophylaxis: Indicated due to multiple minor risk factors  Significant LDAs:   IV Access Type: Mid-line  Urinary Catheter Indication if present: Urinary Retention  Other Lines/Tubes/Drains:    Subjective:   Patient is calm and cooperative on exam.  She denies depression, guilt,  problems with concentration, anhedonia, or suicidality.  Reports that she does have low energy with excessive sleeping and decreased appetite.  Reports feeling weak and run down secondary to inability to eat.  Reports nausea/vomiting interferes with her appetite. States that she has about a 30 lb associated weight loss over the past month with nausea, vomiting and decreased appetite.  Denies any feelings of anxiety.  Reports one panic attack lifetime.  Denies any acute stressors.  She is hoping to get physically well and return home with her .  Denies SI/HI/AVH.  A&Ox4.         Psychiatric Review Of Systems - Is patient experiencing or having changes in:  Negative except as noted above.      Psychiatric History:  Diagnose(s):  Denies   Previous Medication Trials:  Currently on Duloxetine 30, Seroquel 25, Mirtazipine 15  Previous Psychiatric Hospitalizations:  Denies   Previous Suicide Attempts:  Denies   History of Violence:  Denies   Outpatient Psychiatrist: Denies      Social History:  Has a 6th grade education.  Did not work.  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 One child currently incarcerated for the past 10 years, with 10 more years left.  Another child currently in substance abuse treatment.  Denies any strains in her relationship with her . They live together in a assisted living facility.  Reports that she feels comfortable and safe in the assisted living facility, but she does not enjoy living in assisted living.  They moved into their current living situation about 3-4 months ago.       Substance Abuse History:  Denies      Legal History:  Denies      Family Psychiatric History:   Daughter with substance use history and substance induced mood disorder      Hospital Course: 03/02/2020  Patient seen in room, says she has tolerated medication solano well with no aggrivation of depression or anxiety.  Sleep good, but perceives no stimulation of appetite.  Deneis SI, HI, AVH.   "Passes CAM-ICU without error.    Interval History: see hospital course    Family History     Problem Relation (Age of Onset)    Heart disease Mother        Tobacco Use    Smoking status: Former Smoker     Packs/day: 1.00     Years: 54.00     Pack years: 54.00     Types: Cigarettes     Last attempt to quit: 2019     Years since quittin.3    Smokeless tobacco: Never Used   Substance and Sexual Activity    Alcohol use: Not Currently    Drug use: Not Currently    Sexual activity: Not Currently     Partners: Male     Psychotherapeutics (From admission, onward)    Start     Stop Route Frequency Ordered    20 2100  mirtazapine tablet 15 mg      -- Oral Nightly 20 1402           Review of Systems  Objective:     Vital Signs (Most Recent):  Temp: 97.9 °F (36.6 °C) (20 0759)  Pulse: 78 (20 0759)  Resp: 18 (20 075)  BP: (!) 122/57 (20 0759)  SpO2: 100 % (20 0759) Vital Signs (24h Range):  Temp:  [97.5 °F (36.4 °C)-98.8 °F (37.1 °C)] 97.9 °F (36.6 °C)  Pulse:  [66-78] 78  Resp:  [16-20] 18  SpO2:  [94 %-100 %] 100 %  BP: (114-126)/(57-76) 122/57     Height: 5' 6" (167.6 cm)  Weight: 50.5 kg (111 lb 5.3 oz)  Body mass index is 17.97 kg/m².      Intake/Output Summary (Last 24 hours) at 3/2/2020 1020  Last data filed at 3/2/2020 0500  Gross per 24 hour   Intake 340 ml   Output --   Net 340 ml       Physical Exam   Psychiatric:   Mental Status Exam:  Appearance: unremarkable, age appropriate, lying in bed  Behavior/Cooperation: friendly and cooperative  Speech: normal tone, normal rate, soft  Mood: "ok"  Affect: reactive, full range  Thought Process: linear  Thought Content: normal, no suicidality, no homicidality, delusions, or paranoia   Orientation: grossly intact, person, place, situation  Memory: Grossly intact  Attention Span/Concentration: in tact, completes CAM-ICU without error  Insight: fair  Judgment: fair          Significant Labs: None    Significant Imaging: " None    Assessment/Plan:     Anorexia  Ms. Elizabeth Cano is a 69 years old female with afib (on coumadin), COPD, and HFpEF (EF 45% on 1/29/20) admitted to hospital medicine for presumed urinary tract infection. She was diagnosed with a UTI last week at her Nursing Home, [facility], which she was prescribed Macrobid. She completed her course of Macrobid on 01/29/20 and she was noted to be hypotensive rendering her transfer to the hospital. Later found to have diverticulitis.  Psychiatry was consulted for query of depression in context of poor PO intake and failure to thrive.  On face to face evaluation the patient denies any mood symptoms or psychiatric history.  She continues to report nausea and poor appetite that is impacting her PO intake.  Reports decreased energy and excessive fatigue, but reports motivation to get better and return home.  Appears stable on simplified regimen consisting of Remeron only.      Rec:   - Continue Remeron 15mg PO qHS foor mood/appetite/sleep    Psychiatry will sign off.  Please contact psych on-call if future assistance is needed.             Need for Continued Hospitalization:   No need for inpatient psychiatric hospitalization. Continue medical care as per the primary team.      Total time:  15 with greater than 50% of this time spent in counseling and/or coordination of care.     Assessment & plan discussed with [facility] Staff, Dr. Billy.    Jatin Abbasi MD  [facility]  PGY-2  [facility]

## 2020-03-02 NOTE — PLAN OF CARE
Patient returning to [facility] nursing home [facility].    Future Appointments   Date Time Provider Department Center   3/11/2020  3:00 PM Parth Bell MD [facility] CARDIO [facility]        03/02/20 1546   Final Note   Assessment Type Final Discharge Note   Anticipated Discharge Disposition nursing home [facility]   Right Care Referral Info   Post Acute Recommendation Other   Referral Type nursing home Nursing Home   Facility Name [facility]

## 2020-03-02 NOTE — PROGRESS NOTES
PHARMACY CONSULT NOTE: WARFARIN    Elizabeth Cano is a 69 y.o. female on warfarin therapy for Atrial Fibrilation PharmD has been consulted for warfarin dosing.    Current order: warfarin 1 mg daily  Home dose: 1mg every Mon, Wed, Fri   INR goal: 2-3   Coumadin clinic enrollment: not currently enrolled, unsure who follows patient's INR outpatient    Lab Results   Component Value Date    INR 1.9 (H) 02/29/2020    INR 1.9 (H) 02/28/2020    INR 1.9 (H) 02/27/2020     Diet: Low Sodium     Recommendation(s):    Patient refusing labs this AM; continue warfarin 1 mg PO daily   Pharmacy will continue to follow and monitor warfarin    Thank you for the consult,  Jada Gonsalez, PharmD, BCPS  [id number]       **Note: Consults are reviewed Monday-Friday 7:00am-3:30pm. THE ABOVE RECOMMENDATIONS ARE ONLY SUGGESTED.THE RECOMMENDATIONS SHOULD BE CONSIDERED IN CONJUNCTION WITH ALL PATIENT FACTORS. **

## 2020-03-02 NOTE — PT/OT/SLP PROGRESS
Occupational Therapy   Treatment and Discharge     Name: Elizabeth Cano  MRN: [id number]  Admitting Diagnosis:  Septic shock  24 Days Post-Op    Recommendations:     Discharge Recommendations: nursing facility, basic  Discharge Equipment Recommendations:  other (see comments)(TBD at higher level of care)  Barriers to discharge:  Inaccessible home environment, Decreased caregiver support    Assessment:     Elizabeth Cano is a 69 y.o. female with a medical diagnosis of Septic shock.  She presents with impaired ADL and mobility performance deficits. Pt requiring max encouragement to participate in therapy this date with session focus on bed mobility, EOB sitting tolerance for ADL tasks, sit<stand t/f trials, and safe return to bed. Pt deferred OOB mobility this date despite gentle education. Pt has met plateau in therapy progression this date with goals not met 2/2 self limiting behaviors throughout hospitalization. Per chart review and familiarity with pt progression, pt refusing most ADL participation and further mobility longer than several steps in bedroom. Pt educated on importance of OOB mobility and repositioning in bed to promote skin integrity and prevent fx'l decline. Pt verbalized understanding. Per chart review, pt to d/c to basic NH upon d/c. At this time, pt not appropriate for continued skilled therapy services.     Performance deficits affecting function are weakness, impaired endurance, impaired self care skills, impaired balance, gait instability, impaired functional mobilty, impaired cardiopulmonary response to activity.     Subjective     Pain/Comfort:  · Pain Rating 1: 0/10  · Pain Rating Post-Intervention 1: 0/10    Objective:     Communicated with: RN prior to session.  Patient found HOB elevated with Pure[employer], oxygen upon OT entry to room.    General Precautions: Standard, fall   Orthopedic Precautions:N/A   Braces: N/A     Occupational Performance:     Bed Mobility:    · Patient completed  Rolling/Turning to Left with  contact guard assistance  · Patient completed Scooting/Bridging with contact guard assistance  · Patient completed Supine to Sit with contact guard assistance  · Patient completed Sit to Supine with contact guard assistance     Functional Mobility/Transfers:  · Patient completed Sit <> Stand Transfer with maximal assistance and of 2 persons  with  hand-held assist   · Functional Mobility: Pt stood ~40 seconds however voicing need to sit 2/2 feeling dizziness/lightheaded. Pt safely returned to EOB.    Activities of Daily Living:  · Grooming: setup at EOB  for washing face   · Lower Body Dressing: stand by assistance donning  socks at EOB       [facility] 6 Click ADL: 17    Treatment & Education:  Pt educated on role of occupational therapy, POC, and safety during ADLs and functional mobility. Pt and OT discussed importance of safe, continued mobility to optimize daily living skills. Pt verbalized understanding.   Pt completed the following during session: bed mobility, EOB sitting ADL tasks, sit<stand t/f trials, education on skin integrity and importance of OOB mobility.see above for mobility performance. White board updated during session. Pt given instruction to call for medical staff/nurse for assistance.       Patient left HOB elevated with all lines intact, call button in reach and RN  notifiedEducation:      GOALS:   Multidisciplinary Problems     Occupational Therapy Goals     Not on file          Multidisciplinary Problems (Resolved)        Problem: Occupational Therapy Goal    Goal Priority Disciplines Outcome Interventions   Occupational Therapy Goal   (Resolved)     OT, PT/OT Met    Description:  Goals to be met by: 2/26    Patient will increase functional independence with ADLs by performing:    UE Dressing while seated EOB with Set-up Assistance.  LE Dressing (socks, brief) with min(A).  Grooming while seated at sink with set up.  Toileting from [facility] [place] with CGA.  Toilet  transfer to [facility] with [facility].  Functional mobility at household distance for ADL task with AD and min(A).                    Time Tracking:     OT Date of Treatment: 03/02/20  OT Start Time: 1026  OT Stop Time: 1042  OT Total Time (min): 16 min    Billable Minutes:Self Care/Home Management 16 min    Bruna Noriega OT  3/2/2020

## 2020-03-03 NOTE — DISCHARGE SUMMARY
[facility] Hospital Medicine  Discharge Summary      Patient Name: Elizabeth Cano  MRN: [id number]  Admission Date: 1/29/2020  Hospital Length of Stay: 33 days  Discharge Date and Time: 3/2/2020  4:35 PM  Attending Physician: Alyse att. providers found   Discharging Provider: Mendoza Caldwell MD  Primary Care Provider: Cesar Reeves MD    [facility] Team: [facility] [place] Mendoza Caldwell MD    HPI:     Ms. Elizabeth Cano is a 69 years old female with afib (on coumadin), COPD, and HFpEF (EF 45% on 1/29/20) admitted to hospital medicine for presumed urinary tract infection. She was diagnosed with a UTI last week at her Nursing Home, [facility], which she was prescribed Macrobid. She completed her course of Macrobid on 01/29/20 and she was noted to be hypotensive rendering her transfer to the hospital.      She reports a decreased appetite for the last month since her hospitalization over the holidays due to uncomplicated diverticulitis which she was treated with Augmentin. She reports feeling nauseated when taking antibiotics. She denies palpitations, dizziness, lightheadedness, chest pain, SOB, change in urinary/bowel habits.      On 01/29/20, she was found to be hypotensive in the 80s/50s in the ED with lactic acid of 4.8. CXR showed no signs for PNA, UA showed mild leukocytosis with WBC 17, nitrite negative, with urine cultures and blood cultures showing NG. She was admitted to  and started on CTX. She has been resuscitated with IVFs since admit with 3L. BP responded appropriately to IVF resuscitation with subsequent downtrend in lactic acid to 2.7. However, overnight on 01/30/20, BP remained 63-67 mmHg MAP goal. Repeat lactic acid uptrend to 3.3. Of note, she received a night time dose of Lopressor 50 mg on 01/30/20, which has since been discontinued.     ICU consulted in am of 01/31/20 for hypotension despite being resuscitated with 3.5 L IV fluids and persistant lactic acidosis in  3-4 range. She improved with IVF hydration and patient was stepped down. Rapid response evaluated patient early AM on 2/10 due to acute increase in oxygen requiriments. Patient began the day on 3L NC and progressed to requiring Comfort flow of 30L and 100% FiO2. Patient denies chest pains, only complaining of minor increased difficulty breathing.       Procedure(s) (LRB):  EGD (ESOPHAGOGASTRODUODENOSCOPY) (N/A)      Hospital Course:        Acute hypoxemic respiratory failure  Pneumonia   Pulmonary edema  - Likely secondary to decompensated HF that has responded well with diureses.  -CXR 02/121 with Multifocal patchy pulmonary parenchymal disease is present throughout both lungs, worse in the right lung than the left. Unchanged from prior.   - on Lasix IV 40 mg BID   - strict I/Os.  - Chest CT non contrast (2/13) did not show significant pulmonary edema but did show evidence of interstitial lung disease and pulmonary hypertension   - s/p IV lasix 40 mg BID, switched to po lasix 40 mg BID   - completed 7-day course of Zosyn  - Weaned Oxygen from comfort flow down to 2L via NC.         Hypotension  Septic shock, unclear etiology but likely diverticulitis - resolved    UTI due to Candida albicans   - Patient with acute hypotension and worsening O2 requirements over the course of 2/9/2020. Started the day on 3L NC and by the end was on Comfort flow 100% FiO2 and 30L. Concern for PE. CTA negative for PE, CXR also with severe pleural edema. Patient had been on Zosyn for possible diverticulitis +/- urosepsis.   - Vancomycin discontinued; completed Zosyn x7 day   - Patient on minimal levophed for MAP < 65. Patient appears to have baseline low BP's, mentates well with a MAP > 60. Possible infectious etiology given worsening hypoxemia / shock, CT showing compressive atelectasis vs. Developing consolidation in the left lower lobe.  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 purewick   - switched to po lasix 40 mg BID  - Patient hypervolemic, significant pulmonary edema on CXR  - Monitor daily electrolytes, Continuing current management with diuresis.  - repeat BNP 2/24 significantly elevated  - alkalosis persisting; trial of Diamox 2/25  - Diamox dose repeated 2/26  - Bicarb improved; continuing diuresis  - blood pressure stable on midodrine.  Patient appears near euvolemic.  Changed Lasix 40 mg IV to BID 2/28  - Problem is gradually improving; continuing to monitor clinical status 3/1/2020.      Decreased po intake  - nutrition consulted.  - Boost Plus TID   - Re-consulted Palliative Care due to failure to thrive.  - Consulted Psychiatry. Patient already on mirtazapine and cannot take Megace due to CHF.   - Psychiatry recommends discontinuation of Seroquel and duloxetine 2/28     Paroxysmal atrial fibrillation  - rate controlled  - Patient in RVR presumed due to acute respiratory failure  - PRN metoprolol as needed for rate control but would not aggressively treat in setting of severe hypoxemia and hypotension  - INR initially subtherapeutic and was on heparin bridge with warfarin.   - Previously INR supratherapeutic. Resumed warfarin on 2/18 since INR near therapeutic. PharmD consulted for management.  - Metoprolol 50mg BID discontinued given nightly hypotensive episdoes requiring transient levophed  - on digoxin 125 mcg for rate control  - Maintain Mg >2,  K>4.      Type 2 diabetes mellitus with hyperglycemia, without long-term current use of insulin  - AIC 5.4%  - Glucose checks QACHS  - Goal Glucose 140-180 mg/dL  - Diabetic diet     Head lice- resolved   - last permethrin treatment on 2/17 and lindane treatment on 2/20.   - Actively considering other options to help eradicate head lice infestation, though patient will require aggressive comb-niting   -  lindane shampoo and permethrin as needed   - discontinued contact isolation on 3/1     Hypocalcemia - resolved   -Continue with home calcium-Vit D     Hypomagnesemia  -Replete PRN  -Continue home magnesium oxide.      Liver lesion, right lobe  - possible concern of abscess vs malignancy per CT scan  - h/o weight loss and possible colonic lesion  - RUQ US showing focal fatty infiltration  - The gallbladder contains sludge and stones      Diverticulitis - resolved   - CT consistent with recurrent diverticulitis  - CRS consulted during this hospitalization - recommend management with antibiotics.   - Continued Zosyn for 7 days  - diarrhea reported 2/28; Gunjan-Colace discontinued     Hearing loss due to cerumen impaction  - No improvement with carbamide peroxide  - Consulted ENT; confirmed cerumen impaction. Follow up with ENT as OP.    Consults:   Consults (From admission, onward)        Status Ordering Provider     Inpatient consult to Cardiology  Once     Provider:  (Not yet assigned)    Completed KHAN, BEHRAM     Inpatient consult to Colorectal Surgery  Once     Provider:  (Not yet assigned)    Completed KHAN, BEHRAM     Inpatient consult to Critical Care Medicine  Once     Provider:  (Not yet assigned)    Completed STEVEN BEHRAM     Inpatient consult to Critical Care Medicine  Once     Provider:  (Not yet assigned)    Completed STEVEN BEHRAM     Inpatient consult to Critical Care Medicine  Once     Provider:  (Not yet assigned)    Completed HARRIS BEHRAM     Inpatient consult to Critical Care Medicine  Once      Provider:  (Not yet assigned)    Completed CHINMAY GARCIA     Inpatient consult to ENT  Once     Provider:  (Not yet assigned)    Completed KAYLEE ABDULLAHI     Inpatient consult to Gastroenterology  Once     Provider:  (Not yet assigned)    Completed BRITTANY MIJARES     Inpatient consult to Midline team  Once     Provider:  (Not yet assigned)    Completed DENNY ESPINOZA     Inpatient consult to Midline team  Once     Provider:  (Not yet assigned)    Completed SADAF WARE     Inpatient consult to Palliative Care  Once     Provider:  (Not yet assigned)    Completed OBNORIS BRICEÑO     Inpatient consult to Palliative Care  Once     Provider:  (Not yet assigned)    Completed KAYLEE ABDULLAHI     Inpatient consult to PICC team ([facility])  Once     Provider:  (Not yet assigned)    Completed ROMIE TEJEDA     Inpatient consult to PICC team ([facility])  Once     Provider:  (Not yet assigned)    Completed ROMIE TEJEDA     Inpatient consult to PICC team ([facility])  Once     Provider:  (Not yet assigned)    Completed DENNY ESPINOZA     Inpatient consult to Psychiatry  Once     Provider:  (Not yet assigned)    Completed KAYLEE ABDULLAHI     Inpatient consult to Registered Dietitian/Nutritionist  Once     Provider:  (Not yet assigned)    Completed JOSE ROOT     Inpatient consult to Urology  Once     Provider:  (Not yet assigned)    Completed JOSE ROOT          Final Active Diagnoses:    Diagnosis Date Noted POA    Adjustment disorder with depressed mood [F43.21] 03/01/2020 Yes    Anorexia [R63.0]  Yes    Bilateral hearing loss due to cerumen impaction [H61.23] 02/24/2020 Yes    Dyspnea [R06.00]  Yes    Severe malnutrition [E43] 02/14/2020 Yes    Palliative care encounter [Z51.5] 02/11/2020 Not Applicable    Advance care planning [Z71.89]  Not Applicable    Goals of care, counseling/discussion [Z71.89]  Not Applicable    Bilious vomiting [R11.14] 02/06/2020 Yes    Thrombocytopenia  [D69.6] 02/06/2020 No    Anemia [D64.9] 02/04/2020 Yes    Liver lesion, right lobe [K76.9] 01/31/2020 Yes    Hypotension [I95.9] 01/30/2020 Yes    Diverticulitis [K57.92] 01/30/2020 Yes    Hypocalcemia [E83.51] 01/29/2020 Yes    GAVIN (acute kidney injury) [N17.9] 01/29/2020 Yes    Bifascicular block [I45.2] 01/02/2020 Yes    Hypomagnesemia [E83.42] 12/20/2019 Yes    COPD (chronic obstructive pulmonary disease) [J44.9] 12/19/2019 Yes    Chronic heart failure with preserved ejection fraction [I50.32] 12/19/2019 Yes    Paroxysmal atrial fibrillation [I48.0] 12/19/2019 Yes    Type 2 diabetes mellitus with hyperglycemia, without long-term current use of insulin [E11.65] 12/19/2019 Yes      Problems Resolved During this Admission:    Diagnosis Date Noted Date Resolved POA    PRINCIPAL PROBLEM:  Septic shock [A41.9, R65.21] 02/01/2020 03/02/2020 Yes    Urinary retention [R33.9] 02/19/2020 03/02/2020 Yes    Acute hypoxemic respiratory failure [J96.01] 02/11/2020 03/02/2020 Yes    Head lice [B85.0] 02/11/2020 03/02/2020 Yes    Aspiration pneumonia [J69.0] 02/09/2020 03/02/2020 No    Hypovolemia [E86.1] 02/07/2020 03/02/2020 Yes    Delirium [R41.0] 02/07/2020 02/20/2020 No    Visual hallucinations [R44.1] 02/06/2020 02/20/2020 No    Hematemesis [K92.0] 02/06/2020 02/20/2020 No    Acute cystitis without hematuria [N30.00] 01/29/2020 02/20/2020 Yes      Discharged Condition: good    Disposition: Home or Self Care    Follow Up:  Follow-up Information     Cesar Reeves MD. Schedule an appointment as soon as possible for a visit in 1 week.    Specialty:  Internal Medicine  Why:  Hospital follow up  Contact information:  [facility] [place] [address] [phone]                 Patient Instructions:      Ambulatory referral/consult to ENT   Standing Status: Future   Referral Priority: Routine Referral Type: Consultation   Referral Reason: Specialty Services Required   Requested  Specialty: Otolaryngology   Number of Visits Requested: 1     Ambulatory referral/consult to Neuropsychology   Standing Status: Future   Referral Priority: Routine Referral Type: Psychiatric   Referral Reason: Specialty Services Required   Requested Specialty: Psychology   Number of Visits Requested: 1     Ambulatory referral/consult to Palliative Care   Standing Status: Future   Referral Priority: Routine Referral Type: Consultation   Requested Specialty: Hospice and Palliative Medicine   Number of Visits Requested: 1     Medications:  Reconciled Home Medications:      Medication List      START taking these medications    benzonatate 100 MG capsule  Commonly known as:  TESSALON  Take 1 capsule (100 mg total) by mouth 3 (three) times daily as needed for Cough.     carbamide peroxide 6.5 % otic solution  Commonly known as:  DEBROX  Place 5 drops into both ears 2 (two) times daily. for 7 days     digoxin 125 mcg tablet  Commonly known as:  LANOXIN  Take 1 tablet (0.125 mg total) by mouth once daily.     miconazole nitrate 2% 2 % Oint  Commonly known as:  MICOTIN  Apply topically 2 (two) times daily. Apply to the bilateral groins/perineal area BID for 7 days     midodrine 10 MG tablet  Commonly known as:  PROAMATINE  Take 1 tablet (10 mg total) by mouth every evening.     pantoprazole 40 MG tablet  Commonly known as:  PROTONIX  Take 1 tablet (40 mg total) by mouth once daily.     phenyleph-min oil-petrolatum 0.25-14-74.9 % Oint  Place 1 applicator rectally 4 (four) times daily as needed.     simethicone 80 MG chewable tablet  Commonly known as:  MYLICON  Take 1 tablet (80 mg total) by mouth 3 (three) times daily as needed.        CHANGE how you take these medications    furosemide 20 MG tablet  Commonly known as:  LASIX  Take 2 tablets (40 mg total) by mouth 2 (two) times daily.  What changed:    · how much to take  · when to take this     potassium chloride 10 MEQ Cpsr  Commonly known as:  MICRO-K  Take 3 capsules  (30 mEq total) by mouth once daily.  What changed:  how much to take        CONTINUE taking these medications    ACIDOPHILUS PROBIOTIC ORAL  Take 1 tablet by mouth once daily.     albuterol 2.5 mg /3 mL (0.083 %) nebulizer solution  Commonly known as:  PROVENTIL  Take 0.5 mLs by nebulization every 6 (six) hours as needed (wheezing). Rescue     budesonide 0.25 mg/2 mL nebulizer solution  Commonly known as:  PULMICORT  Take 2 ml by nebulization 2 times daily Controller     lidocaine 5 %  Commonly known as:  LIDODERM  Apply topically to right leg as needed for pain     magnesium oxide 400 mg (241.3 mg magnesium) tablet  Commonly known as:  MAG-OX  Take 1 tablet (400 mg total) by mouth once daily.     metFORMIN 1000 MG tablet  Commonly known as:  GLUCOPHAGE  Take 1,000 mg by mouth 2 (two) times daily with meals.     mirtazapine 15 MG tablet  Commonly known as:  REMERON  Take 15 mg by mouth every evening.     nystatin powder  Commonly known as:  MYCOSTATIN  Apply twice daily under breast folds for redness and irritation until resolved     ondansetron 4 MG tablet  Commonly known as:  ZOFRAN  Take 4 mg by mouth every 6 (six) hours as needed for Nausea.     senna 8.6 mg tablet  Commonly known as:  SENOKOT  Take 1 tablet by mouth once daily.     vitamin D 1000 units Tab  Commonly known as:  VITAMIN D3  Take 1,000 Units by mouth once daily.     warfarin 1 MG tablet  Commonly known as:  COUMADIN  Take 1 mg by mouth every Mon, Wed, Fri. At 5:00pm        STOP taking these medications    aspirin 81 MG Chew     diltiaZEM 60 MG Cp12  Commonly known as:  CARDIZEM SR     DULoxetine 30 MG capsule  Commonly known as:  CYMBALTA     metoprolol succinate 50 MG 24 hr tablet  Commonly known as:  TOPROL-XL            Significant Diagnostic Studies: Labs:   Results for CALIXTO MEZA (MRN [id number]) as of 3/3/2020 00:39   Ref. Range 2/29/2020 03:54   WBC Latest Ref Range: 3.90 - 12.70 K/uL 6.47   RBC Latest Ref Range: 4.00 - 5.40 M/uL 3.89  (L)   Hemoglobin Latest Ref Range: 12.0 - 16.0 g/dL 11.2 (L)   Hematocrit Latest Ref Range: 37.0 - 48.5 % 36.9 (L)   MCV Latest Ref Range: 82 - 98 fL 95   MCH Latest Ref Range: 27.0 - 31.0 pg 28.8   MCHC Latest Ref Range: 32.0 - 36.0 g/dL 30.4 (L)   RDW Latest Ref Range: 11.5 - 14.5 % 14.2   Platelets Latest Ref Range: 150 - 350 K/uL 265   MPV Latest Ref Range: 9.2 - 12.9 fL 9.7   Gran% Latest Ref Range: 38.0 - 73.0 % 72.1   Gran # (ANC) Latest Ref Range: 1.8 - 7.7 K/uL 4.7   Lymph% Latest Ref Range: 18.0 - 48.0 % 15.5 (L)   Lymph # Latest Ref Range: 1.0 - 4.8 K/uL 1.0   Mono% Latest Ref Range: 4.0 - 15.0 % 9.0   Mono # Latest Ref Range: 0.3 - 1.0 K/uL 0.6   Eosinophil% Latest Ref Range: 0.0 - 8.0 % 2.6   Eos # Latest Ref Range: 0.0 - 0.5 K/uL 0.2   Basophil% Latest Ref Range: 0.0 - 1.9 % 0.5   Baso # Latest Ref Range: 0.00 - 0.20 K/uL 0.03   nRBC Latest Ref Range: 0 /100 WBC 0   Differential Method Unknown Automated   Immature Grans (Abs) Latest Ref Range: 0.00 - 0.04 K/uL 0.02   Immature Granulocytes Latest Ref Range: 0.0 - 0.5 % 0.3   Protime Latest Ref Range: 9.0 - 12.5 sec 18.5 (H)   INR Latest Ref Range: 0.8 - 1.2  1.9 (H)       Results for CALIXTO MEZA (MRN [id number]) as of 3/3/2020 00:39   Ref. Range 2/29/2020 03:54   Sodium Latest Ref Range: 136 - 145 mmol/L 139   Potassium Latest Ref Range: 3.5 - 5.1 mmol/L 4.2   Chloride Latest Ref Range: 95 - 110 mmol/L 98   CO2 Latest Ref Range: 23 - 29 mmol/L 32 (H)   Anion Gap Latest Ref Range: 8 - 16 mmol/L 9   BUN, Bld Latest Ref Range: 8 - 23 mg/dL 12   Creatinine Latest Ref Range: 0.5 - 1.4 mg/dL 0.8   eGFR if non African American Latest Ref Range: >60 mL/min/1.73 m^2 >60.0   eGFR if African American Latest Ref Range: >60 mL/min/1.73 m^2 >60.0   Glucose Latest Ref Range: 70 - 110 mg/dL 98   Calcium Latest Ref Range: 8.7 - 10.5 mg/dL 8.2 (L)   Alkaline Phosphatase Latest Ref Range: 55 - 135 U/L 257 (H)   PROTEIN TOTAL Latest Ref Range: 6.0 - 8.4 g/dL 5.5 (L)  "  Albumin Latest Ref Range: 3.5 - 5.2 g/dL 1.7 (L)   BILIRUBIN TOTAL Latest Ref Range: 0.1 - 1.0 mg/dL 0.6   AST Latest Ref Range: 10 - 40 U/L 51 (H)   ALT Latest Ref Range: 10 - 44 U/L 17       Lab Results   Component Value Date    INR 1.9 (H) 02/29/2020    INR 1.9 (H) 02/28/2020    INR 1.9 (H) 02/27/2020   , Troponin No results for input(s): TROPONINI in the last 168 hours. and A1C:   Recent Labs   Lab 12/19/19  2220 01/30/20  0421   HGBA1C 6.4* 5.4       US Lower extremities (2/19):  Impression       No evidence of deep venous thrombosis in either lower extremity.     Renal US (2/18):    Impression       Findings above consistent with medical renal disease.  No evidence of hydronephrosis.    Simple appearing subcentimeter right renal cyst.     CT chest without contrast (2/13):  Impression       1.  The patient has widespread pulmonary parenchymal disease that has developed since 12/19/2019.  The radiographic features favor interstitial disease over airspace disease.  I doubt that the patient has pulmonary edema.  Differential diagnosis includes pneumonia due to an unusual infectious agent or reaction to widespread aspiration.  However also consider the possibility of drug toxicity in this patient with UTI treated with nitrofurantoin; interstitial lung disease has been described in patients exposed to nitrofurantoin.    2.  In this patient with a history of "cardiac surgery at age 10" , scar under the left breast and no sternotomy, consider the possibility of remote repair of coarctation.  Aortic caliber, contour and course appear unremarkable on the current study obtained without intravenous contrast medium.  No aortic aneurysm or dissection identified on contrast enhanced study dated 02/10/2020.    3.  Radiopaque material is present in the interatrial septum at the level of the foramen ovale raising the question of a percutaneous closure of a small secundum ASD or PFO.    4.  And pulmonary arteries are large. "  Size of the arteries plus enlarged right heart chambers suggest pulmonary arterial hypertension, possibly long-standing. Of interest, the patient has radiopaque material at the expected location of the fossa ovalis suggesting percutaneous closure.  Therefore pulmonary arterial hypertension could be the result of pulmonary arterial intimal hyperplasia due to longstanding overcirculation in childhood or young adulthood.  There is also radiopaque material at the distal aspect of the right atrial appendage, possible surgical access point to the right atrium.  In the lungs the arteries are particularly large in the right upper lobe.  In old literature of radiology there are numerous descriptions of asymmetric distribution of pulmonary blood flow in the setting of ASD, with right pulmonary arteries larger than left pulmonary arteries.     CTA chest (2/10):  Impression       No pulmonary thromboembolism or pulmonary infarct.    Cardiomegaly with diffuse subsegmental ground-glass opacities throughout both lungs most prominent in the right upper lobe with diffuse interlobular septal thickening.  Findings are most likely secondary to asymmetric pulmonary edema, with other considerations including infection (including atypical organisms such as fungal or mycobacterial), non infectious inflammation, or aspiration.    Small right and trace left pleural effusion with associated compressive atelectasis of the left lower lobe.    Diffuse body wall edema, worse when compared with the prior study.    Moderate calcific atherosclerosis of the visualized aorta.     Liver US (1/31):  Impression       With respect to the lesion of concern adjacent to the falciform ligament this is believed to represent focal fatty infiltration in this patient with hepatic steatosis.     CT A/P (1/30):  Impression       1. Several diverticula in the sigmoid colon with associated wall thickening, minimal adjacent fat stranding, and prominence of the vasa  recta.  Findings appear similar when compared to prior examination dated 12/19/2019 and may represent ongoing diverticulitis or colitis in the correct clinical setting.  Given the persistence of findings, sigmoidoscopy follow-up to exclude colon carcinoma should also be considered.  No complication such as perforation or abscess.  2. Area of hypoattenuation within the right hepatic lobe adjacent to the gallbladder fossa, not definitively seen on prior examination and may represent area of fatty liver infiltration or transit hepatic attenuation differences however, in the setting of liver disease, neoplasm is not excluded.  Recommend further characterization with nonemergent contrast enhanced MRI abdomen.  3. Cholelithiasis without CT evidence for acute cholecystitis.  4. Cardiomegaly.  5. Few additional stable findings as above.         Pending Diagnostic Studies:     Procedure Component Value Units Date/Time    APTT [[id number]] Collected:  02/11/20 0227    Order Status:  Sent Lab Status:  In process Updated:  02/11/20 0227    Specimen:  Blood     Lactic acid, plasma [[id number]] Collected:  01/30/20 0820    Order Status:  Sent Lab Status:  In process Updated:  01/30/20 0821    Specimen:  Blood         Indwelling Lines/Drains at time of discharge:   Lines/Drains/Airways     None                 Time spent on the discharge of patient: 45 minutes  Patient was seen and examined on the date of discharge and determined to be suitable for discharge.         Mendoza Caldwell MD  Department of Hospital Medicine  [facility]

## 2020-03-11 PROBLEM — R11.14 BILIOUS VOMITING: Status: RESOLVED | Noted: 2020-01-01 | Resolved: 2020-01-01

## 2020-03-11 PROBLEM — K59.00 CONSTIPATION: Status: ACTIVE | Noted: 2020-01-01

## 2020-03-11 PROBLEM — I95.9 HYPOTENSION: Status: RESOLVED | Noted: 2020-01-01 | Resolved: 2020-01-01

## 2020-03-11 PROBLEM — R00.0 SINUS TACHYCARDIA: Status: RESOLVED | Noted: 2019-01-01 | Resolved: 2020-01-01

## 2020-03-11 PROBLEM — E87.1 HYPONATREMIA: Status: ACTIVE | Noted: 2020-01-01

## 2020-03-11 NOTE — ED PROVIDER NOTES
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    Patient was recently hospitalized at [facility] x 1 month for septic shock 2/2 UTI requiring ICU care. Course was complicated by acute hypoxic resp failure 2/2 aggressive IVF resuscitation in patient with HF that improved with diuresis. She also was Dx with diverticulitis which was managed with IV ABx. Of note, her hospital stay included EGD/colonoscopy, both of which were normal. CT abdo/pelvis during admission showed cholelithiasis w/o inflammation, several sigmoid diverticula w/ colonic wall thickening/subtle fat stranding, stable ventral hernia.    Since discharge, patient has had decreased PO intake, nausea, vomiting, and choking on food. Patient is NH resident. Physician at NH was concerned for abdominal tenderness on exam and possible hernia so patient was transferred to [facility] for further evaluation. Patient denies fevers/chills, cough/congestion/sore throat. States that she has constipation but may have had some diarrhea during this period.        Review of patient's allergies indicates:   Allergen Reactions    Levsin [hyoscyamine sulfate] Hallucinations     Past Medical History:   Diagnosis Date    A-fib     GAVIN (acute kidney injury)     CHF (congestive heart failure)     COPD (chronic obstructive pulmonary disease)     Diabetes mellitus     Hypertension      Past Surgical History:   Procedure Laterality Date    CARDIAC SURGERY      COLONOSCOPY N/A 2/4/2020    Procedure: COLONOSCOPY;  Surgeon: Gilberto Laguna MD;  Location: [address];  Service: Endoscopy;  Laterality: N/A;    ESOPHAGOGASTRODUODENOSCOPY N/A 2/7/2020    Procedure: EGD (ESOPHAGOGASTRODUODENOSCOPY);  Surgeon:  Aleksey Gomez MD;  Location: [facility] ([address] [place] [address]);  Service: Endoscopy;  Laterality: N/A;     Family History   Problem Relation Age of Onset    Heart disease Mother      Social History     Tobacco Use    Smoking status: Former Smoker     Packs/day: 1.00     Years: 54.00     Pack years: 54.00     Types: Cigarettes     Last attempt to quit: 2019     Years since quittin.3    Smokeless tobacco: Never Used   Substance Use Topics    Alcohol use: Not Currently    Drug use: Not Currently     Review of Systems   Constitutional: Positive for activity change (decreased) and appetite change (decreased). Negative for chills and fever.   HENT: Negative for congestion, sore throat and trouble swallowing.    Eyes: Negative for visual disturbance.   Respiratory: Positive for cough (chronic, nonproductive). Negative for shortness of breath and wheezing.    Cardiovascular: Negative for chest pain, palpitations and leg swelling.   Gastrointestinal: Positive for abdominal pain, constipation, diarrhea, nausea and vomiting. Negative for blood in stool.   Genitourinary: Negative for dysuria and hematuria.   Musculoskeletal: Negative for arthralgias and myalgias.   Skin: Negative for rash.   Neurological: Negative for dizziness, light-headedness, numbness and headaches.   Psychiatric/Behavioral: Negative for agitation and confusion.       Physical Exam     Initial Vitals [20 1502]   BP Pulse Resp Temp SpO2   116/66 81 16 98.3 °F (36.8 °C) 97 %      MAP       --         Physical Exam    Constitutional: No distress.   HENT:   Head: Normocephalic and atraumatic.   Eyes: Conjunctivae are normal. Pupils are equal, round, and reactive to light.   Neck: No stridor present. No tracheal deviation present.   Cardiovascular: Normal rate, regular rhythm, S1 normal, S2 normal and normal heart sounds.   No murmur heard.  Pulmonary/Chest: Breath sounds normal. No respiratory distress. She has no wheezes. She has no rales.   Abdominal:  Soft. Bowel sounds are normal. She exhibits no distension. There is tenderness (generalized, worse in periumblic/suprapubic region). There is guarding (voluntary). There is no rebound.   Musculoskeletal: She exhibits no edema.   Neurological: She is alert and oriented to person, place, and time. She has normal strength. No cranial nerve deficit. She exhibits normal muscle tone.   Skin: Skin is warm, dry and intact. No rash noted.   Psychiatric: She has a normal mood and affect. Her speech is normal and behavior is normal.         ED Course   Procedures  Labs Reviewed   CBC W/ AUTO DIFFERENTIAL - Abnormal; Notable for the following components:       Result Value    Platelets 400 (*)     Lymph # 0.9 (*)     Gran% 82.0 (*)     Lymph% 12.3 (*)     All other components within normal limits   COMPREHENSIVE METABOLIC PANEL - Abnormal; Notable for the following components:    Sodium 129 (*)     Chloride 87 (*)     Glucose 115 (*)     Calcium 7.4 (*)     Albumin 2.0 (*)     Total Bilirubin 1.3 (*)     Alkaline Phosphatase 280 (*)     AST 46 (*)     All other components within normal limits   LACTIC ACID, PLASMA - Abnormal; Notable for the following components:    Lactate (Lactic Acid) 2.5 (*)     All other components within normal limits   LIPASE   PROTIME-INR   LACTIC ACID, PLASMA   URINALYSIS, REFLEX TO URINE CULTURE          Imaging Results          CT Abdomen Pelvis With Contrast (Final result)  Result time 03/11/20 20:15:30    Final result by Corbin Kaur MD (03/11/20 20:15:30)                 Impression:      Sigmoid diverticulosis with persistent mild wall thickening and subtle submucosal enhancement similar to prior examination dated 01/30/2020.  No significant surrounding inflammatory changes or free fluid. Findings could be related to ongoing mild diverticulitis versus chronic changes.    Significant volume of fecal material within the rectum which could be related to fecal impaction.    Cholelithiasis.   Cardiomegaly.    Unchanged appearance of fat containing umbilical hernia without adjacent inflammatory changes.    Few additional findings as above.    Electronically signed by resident: Sherine Knight MD  Date:    03/11/2020  Time:    19:44    Electronically signed by: Corbin Kaur MD  Date:    03/11/2020  Time:    20:15             Narrative:    EXAMINATION:  CT ABDOMEN PELVIS WITH CONTRAST    CLINICAL HISTORY:  generalized abd pain;    TECHNIQUE:  Low dose axial images, sagittal and coronal reformations were obtained from the lung bases to the pubic symphysis following the IV administration of 75 mL of Omnipaque 350 .  Oral contrast was not administered.    COMPARISON:  Abdominal radiograph 02/19/2020.  CT abdomen pelvis with contrast 01/30/2020.  CT abdomen pelvis 12/19/2019.    FINDINGS:  The heart size is enlarged.  No significant pericardial effusion.  Coronary artery calcifications.    - Lung bases: No focal consolidation, pleural effusion or pneumothorax.    - Liver: No focal lesions.    - Gallbladder: The gallbladder is filled with slightly hyperdense material and multiple small calcified gallstones.  No adjacent fat stranding cyst suggests inflammation.    - Bile Ducts: No evidence of intra or extra hepatic biliary ductal dilation.    - Spleen: Negative.    - Kidneys: Bilateral subcentimeter hypodensities which are too small to characterize, favored to represent cysts.  No hydronephrosis or hydroureter.  No nephrolithiasis.  The bladder is distended and appear unremarkable.    - Adrenals: Unremarkable.    - Pancreas: Fatty atrophy of the pancreas.  No focal masses.  No peripancreatic fat stranding.    - Retroperitoneum:  No significant adenopathy.    - Vascular: Extensive calcific atherosclerosis of the aorta its branches.  No aneurysmal dilatation.    - Abdominal wall:  Small bilateral fat containing inguinal hernias.  Stable appearance of fat containing umbilical hernia.  No adjacent inflammatory changes  to suggest strangulation.    Pelvis:    No pelvic mass, adenopathy, or free fluid.    Bowel/Mesentery: Small hiatal hernia.    The stomach demonstrate mild mucosal thickening without adjacent inflammatory stranding, likely related to non-distention.  Sigmoid diverticulosis with persistent mild wall thickening and subtle submucosal enhancement similar to prior examination dated 01/30/2020.  No significant surrounding inflammatory changes or free fluid.  Findings could be related to mild diverticulitis versus chronic changes.  Prominence of the submucosal fat in the right colon which could be related to chronic colitis.  No evidence of free intraperitoneal air or free fluid.  Significant volume of fecal material within the rectum which could be related to fecal impaction.    Bones:  No acute osseous abnormality and no suspicious lytic or blastic lesion.  Mild degenerative changes of the spine.                                 Medical Decision Making:   Initial Assessment:   70 yo F with nausea/vomiting and midline abdo pain. Afebrile, VSS and WNL on initial assessment. Generalized abdominal tenderness on exam, worst in periumbilical and suprapubic area.   Differential Diagnosis:   UTI, pancreatitis, PUD, diverticulitis, incarcerated hernia, volvulus, ischemic bowel, constipation, gastritis, enteritis  Clinical Tests:   Lab Tests: Ordered and Reviewed  Radiological Study: Ordered and Reviewed  ED Management:  Ordered CBC, CMP, UA, lipase, lactate, INR. Also ordered CT abdo/pelvis. IV zofran + IVF given.    5:23 PM  Lactate 2.5, repeat ordered for 20:30.  INR subtherapeutic at 1.2    5:56 PM  CMP showed hypoNa (129), BUN and Cr wnl  Lipase wnl  CBC with no leukocytosis    9:18 PM  CT showed ongoing diverticulitis + heavy stool burden +/- impaction. Zosyn given. Discussed case with CM, will admit to Hospital Medicine.                                 Clinical Impression:       ICD-10-CM ICD-9-CM   1. Diverticulitis K57.92  562.11             ED Disposition Condition    Admit                           Gilberto Lubin MD  Resident  03/11/20 [id number]

## 2020-03-11 NOTE — ED TRIAGE NOTES
"Patient comes into ER by EMS from [facility] with complaints of N&V. Patient states that she "hasnt been able to eat in over a month". Patient also states she was sent here because she has a hernia per the MD at [facility].    "

## 2020-03-12 NOTE — CONSULTS
[facility]  General Surgery  Consult Note    Consults  Subjective:     Chief Complaint/Reason for Admission: Failure to thrive    History of Present Illness: This is a 70yo female with a past medical history of Afib (on coumadin), COPD, hypotension (on midodrine), diverticulitis, anxiety, and HFpEF (EF 45% on 1/29/20) who has been sent to the ED from nursing home facility with chief complaint of abdominal pain. Patient recently had lengthy complicated admission 01/29/20 to 03/02/20. She was initially admitted for management of UTI sepsis, later developed acute respiratory distress secondary to hypervolemia and acute heart failure exacerbation secondary to aggressive iv fluids with sepsis. Respiratory status improved with diuresis. She also developed diverticulitis treated with abx. She was discharged in stable condition on 03/02/20. Patient states that since discharge she has had very poor appetite with some nausea. Today patient reports episode of dry-heaving and complains of generalized abdominal pain with significant tenderness to palpation in periumbilical area. Nursing home concerned for incarcerated hernia and patient subsequently brought to the ED for further evaluation.    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  She has been bed bound for the past few months.  Complains of chronic abdominal pain, mostly centered around the hernia.  She also is complaining of nausea with any meal, spitting up food, and mostly pharyngeal dysphagia symptoms.     Surgery was consulted for possible umbilical hernia repair    No current facility-administered medications on file prior to encounter.      Current Outpatient Medications on File Prior to Encounter   Medication Sig    digoxin (LANOXIN) 125 mcg tablet Take 1 tablet (0.125 mg total) by mouth once daily.    albuterol (PROVENTIL) 2.5 mg /3 mL (0.083 %) nebulizer solution Take 0.5 mLs by nebulization every 6  (six) hours as needed (wheezing). Rescue     benzonatate (TESSALON) 100 MG capsule Take 1 capsule (100 mg total) by mouth 3 (three) times daily as needed for Cough.    budesonide (PULMICORT) 0.25 mg/2 mL nebulizer solution Take 2 ml by nebulization 2 times daily Controller    furosemide (LASIX) 20 MG tablet Take 2 tablets (40 mg total) by mouth 2 (two) times daily.    L. acidophilus/pectin, citrus (ACIDOPHILUS PROBIOTIC ORAL) Take 1 tablet by mouth once daily.    lidocaine (LIDODERM) 5 % Apply topically to right leg as needed for pain    magnesium oxide (MAG-OX) 400 mg (241.3 mg magnesium) tablet Take 1 tablet (400 mg total) by mouth once daily.    metFORMIN (GLUCOPHAGE) 1000 MG tablet Take 1,000 mg by mouth 2 (two) times daily with meals.    miconazole nitrate 2% (MICOTIN) 2 % Oint Apply topically 2 (two) times daily. Apply to the bilateral groins/perineal area BID for 7 days    midodrine (PROAMATINE) 10 MG tablet Take 1 tablet (10 mg total) by mouth every evening.    mirtazapine (REMERON) 15 MG tablet Take 15 mg by mouth every evening.    nystatin (MYCOSTATIN) powder Apply twice daily under breast folds for redness and irritation until resolved    ondansetron (ZOFRAN) 4 MG tablet Take 4 mg by mouth every 6 (six) hours as needed for Nausea.    pantoprazole (PROTONIX) 40 MG tablet Take 1 tablet (40 mg total) by mouth once daily.    phenyleph-min oil-petrolatum 0.25-14-74.9 % Oint Place 1 applicator rectally 4 (four) times daily as needed.    potassium chloride (MICRO-K) 10 MEQ CpSR Take 3 capsules (30 mEq total) by mouth once daily.    senna (SENOKOT) 8.6 mg tablet Take 1 tablet by mouth once daily.    simethicone (MYLICON) 80 MG chewable tablet Take 1 tablet (80 mg total) by mouth 3 (three) times daily as needed.    vitamin D (VITAMIN D3) 1000 units Tab Take 1,000 Units by mouth once daily.    warfarin (COUMADIN) 1 MG tablet Take 1 mg by mouth every Mon, Wed, Fri. At 5:00pm       Review of  patient's allergies indicates:   Allergen Reactions    Levsin [hyoscyamine sulfate] Hallucinations       Past Medical History:   Diagnosis Date    A-fib     GAVIN (acute kidney injury)     CHF (congestive heart failure)     COPD (chronic obstructive pulmonary disease)     Diabetes mellitus     Hypertension      Past Surgical History:   Procedure Laterality Date    CARDIAC SURGERY      COLONOSCOPY N/A 2020    Procedure: COLONOSCOPY;  Surgeon: Gilberto Laguna MD;  Location: [address]);  Service: Endoscopy;  Laterality: N/A;    ESOPHAGOGASTRODUODENOSCOPY N/A 2020    Procedure: EGD (ESOPHAGOGASTRODUODENOSCOPY);  Surgeon: Aleksey Gomez MD;  Location: [address]);  Service: Endoscopy;  Laterality: N/A;     Family History     Problem Relation (Age of Onset)    Heart disease Mother        Tobacco Use    Smoking status: Former Smoker     Packs/day: 1.00     Years: 54.00     Pack years: 54.00     Types: Cigarettes     Last attempt to quit: 2019     Years since quittin.3    Smokeless tobacco: Never Used   Substance and Sexual Activity    Alcohol use: Not Currently    Drug use: Not Currently    Sexual activity: Not Currently     Partners: Male     Review of Systems   Constitutional: Positive for fatigue.   HENT: Positive for trouble swallowing.    Eyes: Negative.    Respiratory: Negative.    Cardiovascular: Negative.    Gastrointestinal: Positive for abdominal pain, constipation and nausea.   Endocrine: Negative.    Genitourinary: Negative.    Musculoskeletal: Negative.    Skin: Negative.    Allergic/Immunologic: Negative.    Neurological: Negative.    Hematological: Negative.    Psychiatric/Behavioral: The patient is nervous/anxious.      Objective:     Vital Signs (Most Recent):  Temp: 98.2 °F (36.8 °C) (20 1118)  Pulse: 68 (20 1118)  Resp: 18 (20 0823)  BP: (!) 99/50 (20 1118)  SpO2: 96 % (20 1118) Vital Signs (24h Range):  Temp:  [97.6 °F  (36.4 °C)-98.3 °F (36.8 °C)] 98.2 °F (36.8 °C)  Pulse:  [68-84] 68  Resp:  [16-20] 18  SpO2:  [96 %-98 %] 96 %  BP: ()/(50-66) 99/50        There is no height or weight on file to calculate BMI.      Intake/Output Summary (Last 24 hours) at 3/12/2020 1408  Last data filed at 3/12/2020 0745  Gross per 24 hour   Intake 1400 ml   Output --   Net 1400 ml       Physical Exam   Constitutional: She is oriented to person, place, and time. She appears well-developed. No distress.   HENT:   Head: Normocephalic.   Eyes: Conjunctivae are normal.   Neck: Normal range of motion.   Cardiovascular: Normal rate.   Pulmonary/Chest: Effort normal.   Abdominal: Soft. There is tenderness. A hernia is present.   Small fat containing umbilical hernia that is not reducible.     Musculoskeletal: Normal range of motion.   Neurological: She is alert and oriented to person, place, and time.   Skin: Skin is warm.   Psychiatric: She has a normal mood and affect.       Significant Labs:  CBC:   Recent Labs   Lab 03/12/20  0504   WBC 8.50   RBC 3.84*   HGB 10.9*   HCT 33.8*      MCV 88   MCH 28.4   MCHC 32.2     CMP:   Recent Labs   Lab 03/12/20  0504   GLU 80   CALCIUM 7.0*   ALBUMIN 1.6*   PROT 5.0*   *   K 3.0*   CO2 31*   CL 91*   BUN 18   CREATININE 0.7   ALKPHOS 231*   ALT 17   AST 38   BILITOT 1.3*     Coagulation:   Recent Labs   Lab 03/12/20  0504   INR 1.2     Lactic Acid:   Recent Labs   Lab 03/11/20  2031   LACTATE 1.8       Significant Diagnostics:  CT reviewed: Small fat containing umbilical hernia.  Stable dating back a couple months.  She has a nodular contour suggestive of cirrhosis.  Enlarged gallbladder with sludge    Assessment/Plan:     Active Diagnoses:    Diagnosis Date Noted POA    PRINCIPAL PROBLEM:  Diverticulitis [K57.92] 01/30/2020 Yes    Hyponatremia [E87.1] 03/11/2020 Unknown    Constipation [K59.00] 03/11/2020 Unknown    Chronic heart failure with preserved ejection fraction [I50.32] 12/19/2019  Yes    Paroxysmal atrial fibrillation [I48.0] 12/19/2019 Yes    COPD (chronic obstructive pulmonary disease) [J44.9] 12/19/2019 Yes    Type 2 diabetes mellitus with hyperglycemia, without long-term current use of insulin [E11.65] 12/19/2019 Yes      Problems Resolved During this Admission:     70 y/o F with COPD, CHF with EF of 45% with biatrial enlargement, hx of cardiac surgery as a child, A fib on blood thinners at home, who has been bed bound for the past few months, complaining of dysphagia symptoms and weight loss mostly and abdominal pain at her chronically incarcerated umbilical hernia.  Imaging with chronically incarcerated fat containing umbilical hernia, cirrhotic appearing live and enlarged GB.      Operative fixation will likely treat to some extent her abdominal pain, especially at the hernia site.  She is high risk for perioperative complications, especially cardiac, given her underlying medical risk factors.  I believe fixing her hernia will have no impact on her dysphagia symptoms.  Her nausea and weight loss do not sound like an obstructive process and the CT does not have dilated small bowel.  These symptoms sound more like a motility issue.    Would recommend proceeding with GI/ speech workup of dysphagia.      Can proceed with operative intervention of the hernia but this is felt to be the most invasive attempt to relieve some of her symptoms in a pt who has higher than average risk of complications. Would proceed with less invasive diagnostics first. Prior to fixation, she should be cleared medically and from a cardiac standpoint, especially given her unknown tolerance to exertion.      Thank you for your consult. I will sign off. Please contact us if you have any additional questions.     Would consider inpatient fixation if it is logistically challenging given that she resides in a nursing home.  She can also follow up outpatient for elective repair.  In either scenario, the above criteria  should be met.      Family is in agreement and understand the risks/benefits    Haja Garcia MD  General Surgery  [facility]        I have personally performed a detailed history and physical examination on this patient. My findings are summarized in the resident's note included in the record.  Can plan on hernia repair at a latter date  She needs attention to evaluation of her dysphagia

## 2020-03-12 NOTE — ASSESSMENT & PLAN NOTE
- Na 129  ;  Was 139 at DC on 03/02  - suspect hypovolemic hyponatremia due to over diuresis in setting of poor po intake  - hold home lasix  - Urine Na and osmo pending  - caution with IVF given recent acute respiratory failure due to hypervolemia  - repeat labs in am

## 2020-03-12 NOTE — ASSESSMENT & PLAN NOTE
Patient presenting with recurrent diverticulitis most recently treated in Feb 2020 with seven days of zosyn. Underwent Colonoscopy 02/04/20 that showed sigmoid diverticulosis.  - afebrile and hemodynamically stable  - CT of the Abdomen showing Sigmoid diverticulosis with persistent mild wall thickening and subtle submucosal enhancement similar to prior examination dated 01/30/2020.  - Start Zosyn  - Patient has previously followed with colorectal surgery as OP for recurrent diverticulitis and should have follow up arranged at discharge.  - low sodium fluid restricted diet

## 2020-03-12 NOTE — CONSULTS
PharmD Consult received for:  Warfarin dosing      PHARMACY CONSULT NOTE: WARFARIN  Elizabeth Cano is a 69 y.o. female on warfarin therapy for Atrial Fibrillation. PharmD has been consulted for warfarin dosing.    Current order: Warfarin 1 mg daily   Home dose: 1 mg every Mon, Wed, Fri   INR goal: 2-3   Coumadin clinic enrollment: Requires enrollment - Unsure who follows patient's warfarin outpatient      Lab Results   Component Value Date    INR 1.2 03/12/2020    INR 1.2 03/11/2020    INR 1.9 (H) 02/29/2020     Significant drug interactions: N/A  Diet: Low sodium diet with Boost supplements (note that Boost contains     Recommendation(s):    Continue warfarin 1 mg every MWF (home dose)  · Warfarin 1 mg daily dosing would be reasonable if INr is not responding.    Anticipate INR to respond to this dose in 48-72 hrs.   Pharmacy will continue to follow and monitor warfarin      Thank you for the consult,  Narciso Maddox   Ext [phone]    **Note: Consults are reviewed Monday-Friday 7:00am-3:30pm. The above recommendations are only suggested. The recommendations should be considered in conjunction with all patient factors.**

## 2020-03-12 NOTE — PLAN OF CARE
Plan of care discussed with pt. Pt AOx4. Repositions in bed independently. Episode of nausea/vomiting this a.m relieved w/ IV zofran. Tap enema given with good relief.  VSS. Pt denies CP, SOB, or pain/discomfort at this time.Pt free of injuries this shift.  All questions addressed.  Will continue to monitor.

## 2020-03-12 NOTE — PLAN OF CARE
Cesar Reeves MD     [facility]  [address]  [facility] [address]  Phone: [phone] Fax: [phone]    Payor: [employer] MEDICARE / Plan: [employer] MEDICARE HMO / Product Type: Capitation /       03/12/20 1030   Discharge Assessment   Assessment Type Discharge Planning Assessment   Confirmed/corrected address and phone number on facesheet? Yes   Assessment information obtained from? Patient   Expected Length of Stay (days) 4   Communicated expected length of stay with patient/caregiver yes   Prior to hospitilization cognitive status: Alert/Oriented   Prior to hospitalization functional status: Wheelchair Bound   Current cognitive status: Alert/Oriented   Current Functional Status: Needs Assistance;Partially Dependent   Lives With facility resident   Able to Return to Prior Arrangements yes   Is patient able to care for self after discharge?   (Patient is a resident at [facility].)   Who are your caregiver(s) and their phone number(s)? Tuan Cano-Spouse    [phone]     Tia Velasquez-Daughter    [phone]   Patient's perception of discharge disposition nursing home   Readmission Within the Last 30 Days current reason for admission unrelated to previous admission   If yes, most recent facility name: [facility]   Patient currently being followed by outpatient case management? No   Patient currently receives any other outside agency services? No   Equipment Currently Used at Home wheelchair   Do you have any problems affording any of your prescribed medications? No   Is the patient taking medications as prescribed? yes   Does the patient have transportation home? No  (Patient will need transportation set up by [facility] to return back to [facility].)   Dialysis Name and Scheduled days N/A   Does the patient receive services at the [facility]? Yes   Discharge Plan A Return to nursing home   DME Needed Upon Discharge    (TBD)    Patient/Family in Agreement with Plan yes

## 2020-03-12 NOTE — SUBJECTIVE & OBJECTIVE
Past Medical History:   Diagnosis Date    A-fib     GAVIN (acute kidney injury)     CHF (congestive heart failure)     COPD (chronic obstructive pulmonary disease)     Diabetes mellitus     Hypertension        Past Surgical History:   Procedure Laterality Date    CARDIAC SURGERY      COLONOSCOPY N/A 2/4/2020    Procedure: COLONOSCOPY;  Surgeon: Gilberto Laguna MD;  Location: [facility] ([address] [place] [address]);  Service: Endoscopy;  Laterality: N/A;    ESOPHAGOGASTRODUODENOSCOPY N/A 2/7/2020    Procedure: EGD (ESOPHAGOGASTRODUODENOSCOPY);  Surgeon: Aleksey Gomez MD;  Location: [facility] ([address] [place] [address]);  Service: Endoscopy;  Laterality: N/A;       Review of patient's allergies indicates:   Allergen Reactions    Levsin [hyoscyamine sulfate] Hallucinations       No current facility-administered medications on file prior to encounter.      Current Outpatient Medications on File Prior to Encounter   Medication Sig    albuterol (PROVENTIL) 2.5 mg /3 mL (0.083 %) nebulizer solution Take 0.5 mLs by nebulization every 6 (six) hours as needed (wheezing). Rescue     benzonatate (TESSALON) 100 MG capsule Take 1 capsule (100 mg total) by mouth 3 (three) times daily as needed for Cough.    budesonide (PULMICORT) 0.25 mg/2 mL nebulizer solution Take 2 ml by nebulization 2 times daily Controller    digoxin (LANOXIN) 125 mcg tablet Take 1 tablet (0.125 mg total) by mouth once daily.    furosemide (LASIX) 20 MG tablet Take 2 tablets (40 mg total) by mouth 2 (two) times daily.    L. acidophilus/pectin, citrus (ACIDOPHILUS PROBIOTIC ORAL) Take 1 tablet by mouth once daily.    lidocaine (LIDODERM) 5 % Apply topically to right leg as needed for pain    magnesium oxide (MAG-OX) 400 mg (241.3 mg magnesium) tablet Take 1 tablet (400 mg total) by mouth once daily.    metFORMIN (GLUCOPHAGE) 1000 MG tablet Take 1,000 mg by mouth 2 (two) times daily with meals.    miconazole nitrate 2% (MICOTIN) 2 % Oint Apply topically 2 (two) times daily.  Apply to the bilateral groins/perineal area BID for 7 days    midodrine (PROAMATINE) 10 MG tablet Take 1 tablet (10 mg total) by mouth every evening.    mirtazapine (REMERON) 15 MG tablet Take 15 mg by mouth every evening.    nystatin (MYCOSTATIN) powder Apply twice daily under breast folds for redness and irritation until resolved    ondansetron (ZOFRAN) 4 MG tablet Take 4 mg by mouth every 6 (six) hours as needed for Nausea.    pantoprazole (PROTONIX) 40 MG tablet Take 1 tablet (40 mg total) by mouth once daily.    phenyleph-min oil-petrolatum 0.25-14-74.9 % Oint Place 1 applicator rectally 4 (four) times daily as needed.    potassium chloride (MICRO-K) 10 MEQ CpSR Take 3 capsules (30 mEq total) by mouth once daily.    senna (SENOKOT) 8.6 mg tablet Take 1 tablet by mouth once daily.    simethicone (MYLICON) 80 MG chewable tablet Take 1 tablet (80 mg total) by mouth 3 (three) times daily as needed.    vitamin D (VITAMIN D3) 1000 units Tab Take 1,000 Units by mouth once daily.    warfarin (COUMADIN) 1 MG tablet Take 1 mg by mouth every Mon, Wed, Fri. At 5:00pm     Family History     Problem Relation (Age of Onset)    Heart disease Mother        Tobacco Use    Smoking status: Former Smoker     Packs/day: 1.00     Years: 54.00     Pack years: 54.00     Types: Cigarettes     Last attempt to quit: 2019     Years since quittin.3    Smokeless tobacco: Never Used   Substance and Sexual Activity    Alcohol use: Not Currently    Drug use: Not Currently    Sexual activity: Not Currently     Partners: Male     Review of Systems   Constitutional: Positive for appetite change. Negative for chills, fever and unexpected weight change.   HENT: Negative for sore throat and trouble swallowing.    Eyes: Negative for photophobia and visual disturbance.   Respiratory: Negative for cough and shortness of breath.    Cardiovascular: Negative for chest pain and leg swelling.   Gastrointestinal: Positive for  abdominal pain, constipation, nausea and vomiting. Negative for abdominal distention and diarrhea.   Genitourinary: Negative for dysuria and hematuria.   Musculoskeletal: Negative for arthralgias and back pain.   Skin: Negative for rash and wound.   Neurological: Negative for syncope, weakness and headaches.   Psychiatric/Behavioral: Negative for confusion and decreased concentration.     Objective:     Vital Signs (Most Recent):  Temp: 98.2 °F (36.8 °C) (03/11/20 1747)  Pulse: 68 (03/11/20 1952)  Resp: 16 (03/11/20 1502)  BP: (!) 108/54 (03/11/20 1952)  SpO2: 97 % (03/11/20 1747) Vital Signs (24h Range):  Temp:  [98.2 °F (36.8 °C)-98.3 °F (36.8 °C)] 98.2 °F (36.8 °C)  Pulse:  [68-82] 68  Resp:  [16] 16  SpO2:  [97 %] 97 %  BP: (105-116)/(50-66) 108/54        There is no height or weight on file to calculate BMI.    Physical Exam   Constitutional: She is oriented to person, place, and time. No distress.   HENT:   Head: Normocephalic and atraumatic.   missing teeth   Eyes: Pupils are equal, round, and reactive to light. EOM are normal.   Neck: Normal range of motion. Neck supple. No JVD present.   Cardiovascular: Normal rate.   Murmur heard.  Irregular rhythm   Pulmonary/Chest: Effort normal. No respiratory distress. She has no wheezes. She has no rales.   Abdominal: Soft. She exhibits no distension. There is tenderness (diffuse with greatest intensity periumbilical). There is no rebound and no guarding.   Musculoskeletal: Normal range of motion. She exhibits no edema.   Neurological: She is alert and oriented to person, place, and time.   Skin: Skin is warm and dry. She is not diaphoretic.   Psychiatric: She has a normal mood and affect.         CRANIAL NERVES     CN III, IV, VI   Pupils are equal, round, and reactive to light.  Extraocular motions are normal.        Significant Labs:   CBC:   Recent Labs   Lab 03/11/20  1635   WBC 6.98   HGB 13.0   HCT 39.0   *     CMP:   Recent Labs   Lab 03/11/20  1635    *   K 3.6   CL 87*   CO2 28   *   BUN 21   CREATININE 0.8   CALCIUM 7.4*   PROT 6.2   ALBUMIN 2.0*   BILITOT 1.3*   ALKPHOS 280*   AST 46*   ALT 18   ANIONGAP 14   EGFRNONAA >60.0     All pertinent labs within the past 24 hours have been reviewed.    Significant Imaging: I have reviewed all pertinent imaging results/findings within the past 24 hours.

## 2020-03-12 NOTE — PROGRESS NOTES
"      [facility] Hospital Medicine  Progress Note    Patient Name: Elizabeth Cano  MRN: [id number]  Patient Class: IP- Inpatient   Admission Date: 3/11/2020  Length of Stay: 1 days  Attending Physician: Mendoza Caldwell MD  Primary Care Provider: Cesar Reeves MD    [facility] Medicine Team: [facility] [place] Mendoza Caldwell MD    HPI: This is a 68yo female with a past medical history of Afib (on coumadin), COPD, hypotension (on midodrine), diverticulitis, anxiety, and HFpEF (EF 45% on 1/29/20) who has been sent to the ED from nursing home facility with chief complaint of abdominal pain. Patient recently had lengthy complicated admission 01/29/20 to 03/02/20. She was initially admitted for management of UTI sepsis, later developed acute respiratory distress secondary to hypervolemia and acute heart failure exacerbation secondary to aggressive iv fluids with sepsis. Respiratory status improved with diuresis. She also developed diverticulitis treated with abx. She was discharged in stable condition on 03/02/20. Patient states that since discharge she has had very poor appetite with some nausea. Today patient reports episode of dry-heaving and complains of generalized abdominal pain with significant tenderness to palpation in periumbilical area. Nursing home concerned for incarcerated hernia and patient subsequently brought to the ED for further evaluation.     In the ED patient afebrile and hemodynamically stable. Initial LA 2.5. Na 129, Cl 87. CBC unremarkable. CT of the Abdomen performed showing "Sigmoid diverticulosis with persistent mild wall thickening and subtle submucosal enhancement similar to prior examination dated 01/30/2020.  No significant surrounding inflammatory changes or free fluid. Findings could be related to ongoing mild diverticulitis versus chronic changes. Significant volume of fecal material within the rectum which could be related to fecal impaction. Cholelithiasis. " " Cardiomegaly. Unchanged appearance of fat containing umbilical hernia without adjacent inflammatory changes." Patient denies fevers, chills, chest pain, shortness of breath, dysuria, or confusion. She has been admitted to the [facility] of medicine for further evaluation and management.    Subjective:     Principal Problem:Diverticulitis    Interval History: Patient lying in bed, no acute distress. Lethargic. Alert and oriented x3. Reports generalized weakness, abdominal pain, constipation, nausea and vomiting. Denies fever, chills, chest pain, shortness of breath.     Review of Systems   Constitutional: Positive for activity change, appetite change and unexpected weight change. Negative for chills and fever.   HENT: Negative for congestion.    Eyes: Negative for visual disturbance.   Respiratory: Negative for cough and shortness of breath.    Cardiovascular: Negative for chest pain and leg swelling.   Gastrointestinal: Positive for abdominal pain, constipation, nausea and vomiting. Negative for abdominal distention.   Genitourinary: Negative for dysuria.   Neurological: Positive for weakness. Negative for dizziness.   Psychiatric/Behavioral: Negative for confusion.     Objective:     Vital Signs (Most Recent):  Temp: 97.6 °F (36.4 °C) (03/12/20 0823)  Pulse: 74 (03/12/20 0823)  Resp: 18 (03/12/20 0823)  BP: (!) 105/52 (03/12/20 0823)  SpO2: 98 % (03/12/20 0823) Vital Signs (24h Range):  Temp:  [97.6 °F (36.4 °C)-98.3 °F (36.8 °C)] 97.6 °F (36.4 °C)  Pulse:  [68-84] 74  Resp:  [16-20] 18  SpO2:  [97 %-98 %] 98 %  BP: (104-116)/(50-66) 105/52        There is no height or weight on file to calculate BMI.    Intake/Output Summary (Last 24 hours) at 3/12/2020 0900  Last data filed at 3/12/2020 0700  Gross per 24 hour   Intake 1400 ml   Output --   Net 1400 ml      Physical Exam   Constitutional: She is oriented to person, place, and time. She appears well-developed.   HENT:   Head: Normocephalic and atraumatic.   Eyes: " Pupils are equal, round, and reactive to light. Conjunctivae and EOM are normal.   Neck: Neck supple.   Cardiovascular: Normal rate and regular rhythm.   Pulmonary/Chest: Effort normal and breath sounds normal. No respiratory distress. She has no wheezes.   Abdominal: Soft. Bowel sounds are normal. She exhibits mass (umbilical hernia). She exhibits no distension. There is tenderness.   Musculoskeletal: Normal range of motion. She exhibits edema (trace).   Neurological: She is alert and oriented to person, place, and time. No cranial nerve deficit.   Skin: Skin is warm.   Psychiatric: She has a normal mood and affect.       Significant Labs:   A1C:   Recent Labs   Lab 12/19/19  2220 01/30/20  0421   HGBA1C 6.4* 5.4     CBC:   Recent Labs   Lab 03/11/20  1635 03/12/20  0504   WBC 6.98 8.50   HGB 13.0 10.9*   HCT 39.0 33.8*   * 336     CMP:   Recent Labs   Lab 03/11/20  1635 03/12/20  0504   * 132*   K 3.6 3.0*   CL 87* 91*   CO2 28 31*   * 80   BUN 21 18   CREATININE 0.8 0.7   CALCIUM 7.4* 7.0*   PROT 6.2 5.0*   ALBUMIN 2.0* 1.6*   BILITOT 1.3* 1.3*   ALKPHOS 280* 231*   AST 46* 38   ALT 18 17   ANIONGAP 14 10   EGFRNONAA >60.0 >60.0     Magnesium:   Recent Labs   Lab 03/12/20  0504   MG 1.0*     POCT Glucose:   Recent Labs   Lab 03/12/20  2142   POCTGLUCOSE 235*     Troponin: No results for input(s): TROPONINI in the last 48 hours.  Urine Studies:   Recent Labs   Lab 03/12/20  0117   COLORU Yellow   APPEARANCEUA Clear   PHUR 5.0   SPECGRAV >=1.030*   PROTEINUA Negative   GLUCUA Negative   KETONESU Negative   BILIRUBINUA Negative   OCCULTUA 1+*   NITRITE Negative   LEUKOCYTESUR 2+*   RBCUA 3   WBCUA 27*   BACTERIA Rare   SQUAMEPITHEL 1       Significant Imaging: I have reviewed and interpreted all pertinent imaging results/findings within the past 24 hours.    Assessment/Plan:      Active Diagnoses:    Diagnosis Date Noted POA    PRINCIPAL PROBLEM:  Diverticulitis [K57.92] 01/30/2020 Yes     Hyponatremia [E87.1] 03/11/2020 Unknown    Constipation [K59.00] 03/11/2020 Unknown    Chronic heart failure with preserved ejection fraction [I50.32] 12/19/2019 Yes    Paroxysmal atrial fibrillation [I48.0] 12/19/2019 Yes    COPD (chronic obstructive pulmonary disease) [J44.9] 12/19/2019 Yes    Type 2 diabetes mellitus with hyperglycemia, without long-term current use of insulin [E11.65] 12/19/2019 Yes      Problems Resolved During this Admission:     PLAN:    Abdominal pain  Diverticulitis  Umbilical hernia   - Patient presenting with recurrent diverticulitis most recently treated in Feb 2020 with seven days of zosyn. Underwent Colonoscopy 02/04/20 that showed sigmoid diverticulosis.  - afebrile and hemodynamically stable  - CT of the Abdomen showing Sigmoid diverticulosis with persistent mild wall thickening and subtle submucosal enhancement similar to prior examination dated 01/30/2020. Also showed fecal impaction.   - On Zosyn (D1- 3/12)  - Patient has previously followed with colorectal surgery as OP for recurrent diverticulitis and should have follow up arranged at discharge.  - low sodium fluid restricted diet  - continue bowel regimen   - KUB negative for ileus or obstruction   - if GI symptoms persist after constipation resolved, will consult GI  - General sx consulted. apprec recs  -- Operative fixation will likely treat to some extent her abdominal pain, especially at the hernia site.    -- She is high risk for perioperative complications, given her underlying medical risk factors  -- nausea and weight loss do not sound like an obstructive process and the CT does not have dilated small bowel.  These symptoms sound more like a motility issue.  -- recommend proceeding with GI/ speech workup of dysphagia.    -- Can proceed with operative intervention but patient has higher than average risk of complications so would proceed with less invasive diagnostics first.   -- Prior to fixation, she should be  cleared medically and from a cardiac standpoint, especially given her unknown tolerance to exertion.      Constipation  - CT of the Abdomen with Significant volume of fecal material within the rectum which could be related to fecal impaction  - Tap water enema once on admit  - senna-dulc 2tabs BID  - miralax daily  - if GI symptoms persist after constipation resolved, will consult GI  - monitor I/Os        Hyponatremia- improving   - Na 129 --> 132    - suspect hypovolemic hyponatremia due to over diuresis in setting of poor po intake  - hold home lasix  - Urine Na and osmo pending  - caution with IVF given recent acute respiratory failure due to hypervolemia  - BMP daily           Type 2 diabetes mellitus with hyperglycemia, without long-term current use of insulin  - hold home meds  - SSI  - hypoglycemic protocol        Paroxysmal atrial fibrillation  - continue home digoxin and warfarin  - INR goal 2-3  - pharmacy consulted to assist with warfarin management  - daily INRs  - monitor tele        Chronic heart failure with preserved ejection fraction  - ECHO 01/29/2020  With EF 45% with LVDD  - she was discharged 03/02/20 on Lasix 40mg po BID. Clinically the patient appears slightly hypovolemic. She is also with new hyponatremia. Suspect overdiuresis in setting of poor po intake.  - Hold home lasix at this time  - caution with IVF as patient had acute respiratory failure during recent admit for volume overload.  - monitor I/Os  - daily weights        COPD (chronic obstructive pulmonary disease)  - duonebs prn    VTE Risk Mitigation (From admission, onward)         Ordered     warfarin (COUMADIN) tablet 1 mg  Every Mon, Wed, Fri      03/12/20 0039     Place BRONWYN hose  Until discontinued      03/11/20 2228     IP VTE HIGH RISK PATIENT  Once      03/11/20 2228              Time spent in care of patient: > 35 minutes    Mendoza Caldwell MD  Department of Hospital Medicine   [facility]

## 2020-03-12 NOTE — ASSESSMENT & PLAN NOTE
- ECHO 01/29/2020  With EF 45% with LVDD  - she was discharged 03/02/20 on Lasix 40mg po BID. Clinically the patient appears slightly hypovolemic. She is also with new hyponatremia. Suspect overdiuresis in setting of poor po intake.  - Hold home lasix at this time  - caution with IVF as patient had acute respiratory failure during recent admit for volume overload.  - monitor I/Os  - daily weights

## 2020-03-12 NOTE — ASSESSMENT & PLAN NOTE
- continue home digoxin and warfarin  - INR goal 2-3  - pharmacy consulted to assist with warfarin management  - daily INRs  - monitor tele

## 2020-03-12 NOTE — ASSESSMENT & PLAN NOTE
- CT of the Abdomen with Significant volume of fecal material within the rectum which could be related to fecal impaction  - Tap water enema once now  ;  Repeat in 2hrs if no bowel movement  - senna-dulc 2tabs BID  - miralax daily  - monitor I/Os

## 2020-03-12 NOTE — HPI
"This is a 68yo female with a past medical history of Afib (on coumadin), COPD, hypotension (on midodrine), diverticulitis, anxiety, and HFpEF (EF 45% on 1/29/20) who has been sent to the ED from nursing home facility with chief complaint of abdominal pain. Patient recently had lengthy complicated admission 01/29/20 to 03/02/20. She was initially admitted for management of UTI sepsis, later developed acute respiratory distress secondary to hypervolemia and acute heart failure exacerbation secondary to aggressive iv fluids with sepsis. Respiratory status improved with diuresis. She also developed diverticulitis treated with abx. She was discharged in stable condition on 03/02/20. Patient states that since discharge she has had very poor appetite with some nausea. Today patient reports episode of dry-heaving and complains of generalized abdominal pain with significant tenderness to palpation in periumbilical area. Nursing home concerned for incarcerated hernia and patient subsequently brought to the ED for further evaluation.    In the ED patient afebrile and hemodynamically stable. Initial LA 2.5. Na 129, Cl 87. CBC unremarkable. CT of the Abdomen performed showing "Sigmoid diverticulosis with persistent mild wall thickening and subtle submucosal enhancement similar to prior examination dated 01/30/2020.  No significant surrounding inflammatory changes or free fluid. Findings could be related to ongoing mild diverticulitis versus chronic changes. Significant volume of fecal material within the rectum which could be related to fecal impaction. Cholelithiasis.  Cardiomegaly. Unchanged appearance of fat containing umbilical hernia without adjacent inflammatory changes." Patient denies fevers, chills, chest pain, shortness of breath, dysuria, or confusion. She has been admitted to the care of medicine for further evaluation and management.  "

## 2020-03-12 NOTE — PROGRESS NOTES
Patient arrived to floor via stretcher with transport, no evidence of distress.  Vital signs obtained and stable. Patient voices no complaints at this time. Plan of care initiated with patient and  pt daughter at bedside. Bed in lowest position, locked, SR up x2, call bell in reach. Will continue to monitor patient.     Pt states she didn't take her scheuled coumadin on Wednesday. MD paged and placed order.

## 2020-03-13 NOTE — NURSING
Patient alert and oriented X4. Assessment and ordered interventions completed.  VS remain within pt normal range, no fevers.  Patient has had a total of 4 BM and 3 episodes of emesis.  Provided prescribes intervention to help control N/V, pt verbalize moderated relief.  No falls or injuries during shift.

## 2020-03-13 NOTE — CONSULTS
PHARMACY CONSULT NOTE: WARFARIN  Elizabeth Cano is a 69 y.o. female on warfarin therapy for Atrial Fibrillation. PharmD has been consulted for warfarin dosing.     Current order: Warfarin 1 mg daily   Home dose: 1 mg every Mon, Wed, Fri   INR goal: 2-3   Coumadin clinic enrollment: Requires enrollment - Unsure who follows patient's warfarin outpatient    Lab Results   Component Value Date    INR 1.3 (H) 03/13/2020    INR 1.2 03/12/2020    INR 1.2 03/11/2020     Significant drug interactions: N/A  Diet: Low sodium diet with Boost supplements      Recommendation(s):   · Continue warfarin 1 mg every MWF (home dose)  · Warfarin 1 mg daily dosing would be reasonable if INR is not responding.   · Anticipate INR to fully respond to this dose in 24-48 hrs.  · Pharmacy will continue to follow and monitor warfarin    Thank you for the consult,  Narciso Maddox  Extension [phone]    **Note: Consults are reviewed Monday-Friday 7:00am-3:30pm. THE ABOVE RECOMMENDATIONS ARE ONLY SUGGESTED.THE RECOMMENDATIONS SHOULD BE CONSIDERED IN CONJUNCTION WITH ALL PATIENT FACTORS. **

## 2020-03-13 NOTE — CONSULTS
"  [facility]  Adult Nutrition  Consult Note    SUMMARY     Recommendations    Recommendation:   1. Continue CL diet, ADAT to full liquids and Low na diet with texture per team   2. Change ONS to optisource (compliant with Vitamin K )   3. Suggest appetite stimulant when diet advanced    RD to monitor and follow up     Goals: pt to tolerate >85% of EEN/EPN by RD follow up   Nutrition Goal Status: new  Communication of RD Recs: other (comment)(POC)    Reason for Assessment    Reason For Assessment: consult  Diagnosis: (diverticulitis)  Relevant Medical History: DM; HTN; Afib; COPD; CHF; GAVIN  Interdisciplinary Rounds: did not attend  General Information Comments: Pt resting in bed, reported decreased PO intake x 4 months. Unable to tolerate meals at home and only able to tolearte broth currently. Pt dislikes ONS and refuses boost breeze, agrees to try Optisource as diet is advanced. Wt loss of ~50 lbs from UBW ~170 lbs over past 4 months. NFPE completed today, pt with mild-moderate muscle and fat loss in calvicle, shoulder, hands and lower exteremies, meets criteria for moderate malnutrition.   Nutrition Discharge Planning: adequate PO intake     Nutrition Risk Screen    Nutrition Risk Screen: no indicators present    Nutrition/Diet History    Spiritual, Cultural Beliefs, Yarsanism Practices, Values that Affect Care: no  Food Allergies: NKFA  Factors Affecting Nutritional Intake: clear liquid diet    Anthropometrics    Temp: 98 °F (36.7 °C)  Height: 5' 6" (167.6 cm)  Height (inches): 66 in  Weight Method: Bed Scale  Weight: 55.7 kg (122 lb 12.7 oz)  Weight (lb): 122.8 lb  Ideal Body Weight (IBW), Female: 130 lb  % Ideal Body Weight, Female (lb): 94.46 %  BMI (Calculated): 19.8  BMI Grade: 18.5-24.9 - normal       Lab/Procedures/Meds    Pertinent Labs Reviewed: reviewed  Pertinent Labs Comments: Na 132; K 2.6; Ca 6.7; Mg 1.5  Pertinent Medications Reviewed: reviewed  Pertinent Medications Comments: " warfarin; senna-docusate; magnesium oxide; furosemide     Estimated/Assessed Needs    Weight Used For Calorie Calculations: 55.7 kg (122 lb 12.7 oz)  Energy Calorie Requirements (kcal): 7573-7702 kcal/d  Energy Need Method: Kcal/kg  Protein Requirements: 66-78 g/day   Weight Used For Protein Calculations: 55.7 kg (122 lb 12.7 oz)  Fluid Requirements (mL): 1 mL/kcal or per MD   RDA Method (mL): 1671  CHO Requirement: 208    Nutrition Prescription Ordered    Current Diet Order: Clear liquid  Nutrition Order Comments: 1500 mL fluid restriction   Oral Nutrition Supplement: boost breeze     Evaluation of Received Nutrient/Fluid Intake    I/O: +1.8 L since admit  Energy Calories Required: not meeting needs  Protein Required: not meeting needs  Fluid Required: not meeting needs  Comments: LBM 3/13  Tolerance: tolerating  % Intake of Estimated Energy Needs: 0 - 25 %  % Meal Intake: 0 - 25 %    Nutrition Risk    Level of Risk/Frequency of Follow-up: low     Assessment and Plan    Nutrition Problem:   Moderate Protein-Calorie Malnutrition  Malnutrition in the context of Chronic Illness/Injury    Related to (etiology):  Decreased intake     Signs and Symptoms (as evidenced by):  Energy Intake: <75% of estimated energy requirement for x4 months   Body Fat Depletion: mild and moderate depletion of orbitals and triceps   Muscle Mass Depletion: mild and moderate depletion of temples, clavicle region, scapular region, interosseous muscle and lower extremities   Weight Loss: 28% x 4 months (per pt)     Interventions(treatment strategy):  Collaboration of care with other providers  Commercial beverage  Modified diet    Nutrition Diagnosis Status:  New    Monitor and Evaluation    Food and Nutrient Intake: energy intake, food and beverage intake  Food and Nutrient Adminstration: diet order  Anthropometric Measurements: weight, weight change  Biochemical Data, Medical Tests and Procedures: electrolyte and renal panel, lipid profile,  glucose/endocrine profile, inflammatory profile, gastrointestinal profile  Nutrition-Focused Physical Findings: overall appearance     Malnutrition Assessment  Malnutrition Type: chronic illness  Weight Loss (Malnutrition): (28 % x 4 months )  Energy Intake (Malnutrition): less than 75% for greater than or equal to 3 months  Subcutaneous Fat (Malnutrition): moderate depletion  Muscle Mass (Malnutrition): moderate depletion   Orbital Region (Subcutaneous Fat Loss): mild depletion  Upper Arm Region (Subcutaneous Fat Loss): well nourished   Orthodox Region (Muscle Loss): mild depletion  Clavicle Bone Region (Muscle Loss): moderate depletion  Clavicle and Acromion Bone Region (Muscle Loss): moderate depletion  Scapular Bone Region (Muscle Loss): moderate depletion  Dorsal Hand (Muscle Loss): mild depletion  Anterior Thigh Region (Muscle Loss): mild depletion  Posterior Calf Region (Muscle Loss): mild depletion     Nutrition Follow-Up    RD Follow-up?: Yes

## 2020-03-13 NOTE — PROGRESS NOTES
"      [facility] Hospital Medicine  Progress Note    Patient Name: Elizabeth Cano  MRN: [id number]  Patient Class: IP- Inpatient   Admission Date: 3/11/2020  Length of Stay: 2 days  Attending Physician: Mendoza Caldwell MD  Primary Care Provider: Cesar Reeves MD    [facility] Medicine Team: [facility] D Mendoza Caldwell MD    HPI: This is a 68yo female with a past medical history of Afib (on coumadin), COPD, hypotension (on midodrine), diverticulitis, anxiety, and HFpEF (EF 45% on 1/29/20) who has been sent to the ED from nursing home facility with chief complaint of abdominal pain. Patient recently had lengthy complicated admission 01/29/20 to 03/02/20. She was initially admitted for management of UTI sepsis, later developed acute respiratory distress secondary to hypervolemia and acute heart failure exacerbation secondary to aggressive iv fluids with sepsis. Respiratory status improved with diuresis. She also developed diverticulitis treated with abx. She was discharged in stable condition on 03/02/20. Patient states that since discharge she has had very poor appetite with some nausea. Today patient reports episode of dry-heaving and complains of generalized abdominal pain with significant tenderness to palpation in periumbilical area. Nursing home concerned for incarcerated hernia and patient subsequently brought to the ED for further evaluation.     In the ED patient afebrile and hemodynamically stable. Initial LA 2.5. Na 129, Cl 87. CBC unremarkable. CT of the Abdomen performed showing "Sigmoid diverticulosis with persistent mild wall thickening and subtle submucosal enhancement similar to prior examination dated 01/30/2020.  No significant surrounding inflammatory changes or free fluid. Findings could be related to ongoing mild diverticulitis versus chronic changes. Significant volume of fecal material within the rectum which could be related to fecal impaction. Cholelithiasis. " " Cardiomegaly. Unchanged appearance of fat containing umbilical hernia without adjacent inflammatory changes." Patient denies fevers, chills, chest pain, shortness of breath, dysuria, or confusion. She has been admitted to the [facility] of medicine for further evaluation and management.    Subjective:     Principal Problem:Diverticulitis    Interval History: Patient lying in bed, no acute distress. Alert and oriented x3. Reports generalized weakness and slightly improved abdominal pain. She reports multiple bowel movements overnight. Continues to report intractable nausea and vomiting. Denies fever, chills, chest pain, shortness of breath.     Review of Systems   Constitutional: Positive for activity change, appetite change and unexpected weight change. Negative for chills and fever.   HENT: Negative for congestion.    Eyes: Negative for visual disturbance.   Respiratory: Negative for cough and shortness of breath.    Cardiovascular: Negative for chest pain and leg swelling.   Gastrointestinal: Positive for abdominal pain, constipation, nausea and vomiting. Negative for abdominal distention.   Genitourinary: Negative for dysuria.   Neurological: Positive for weakness. Negative for dizziness.   Psychiatric/Behavioral: Negative for confusion.     Objective:     Vital Signs (Most Recent):  Temp: 96.1 °F (35.6 °C) (03/13/20 0430)  Pulse: 70 (03/13/20 0430)  Resp: 20 (03/13/20 0430)  BP: (!) 102/50 (03/13/20 0430)  SpO2: 96 % (03/13/20 0430) Vital Signs (24h Range):  Temp:  [96.1 °F (35.6 °C)-98.2 °F (36.8 °C)] 96.1 °F (35.6 °C)  Pulse:  [66-74] 70  Resp:  [18-20] 20  SpO2:  [96 %-98 %] 96 %  BP: ()/(50-57) 102/50     Weight: 55.7 kg (122 lb 12.7 oz)  Body mass index is 19.82 kg/m².    Intake/Output Summary (Last 24 hours) at 3/13/2020 0636  Last data filed at 3/12/2020 1730  Gross per 24 hour   Intake 540 ml   Output 180 ml   Net 360 ml      Physical Exam   Constitutional: She is oriented to person, place, and time. " She appears well-developed.   HENT:   Head: Normocephalic and atraumatic.   Eyes: Pupils are equal, round, and reactive to light. Conjunctivae and EOM are normal.   Neck: Neck supple.   Cardiovascular: Normal rate and regular rhythm.   Pulmonary/Chest: Effort normal and breath sounds normal. No respiratory distress. She has no wheezes.   Abdominal: Soft. Bowel sounds are normal. She exhibits mass (umbilical hernia). She exhibits no distension. There is tenderness.   Musculoskeletal: Normal range of motion. She exhibits edema (trace).   Neurological: She is alert and oriented to person, place, and time. No cranial nerve deficit.   Skin: Skin is warm.   Psychiatric: She has a normal mood and affect.       Significant Labs:   A1C:   Recent Labs   Lab 12/19/19  2220 01/30/20  0421   HGBA1C 6.4* 5.4     CBC:   Recent Labs   Lab 03/11/20  1635 03/12/20  0504 03/13/20  0502   WBC 6.98 8.50 6.22   HGB 13.0 10.9* 10.5*   HCT 39.0 33.8* 32.0*   * 336 270     CMP:   Recent Labs   Lab 03/11/20  1635 03/12/20  0504   * 132*   K 3.6 3.0*   CL 87* 91*   CO2 28 31*   * 80   BUN 21 18   CREATININE 0.8 0.7   CALCIUM 7.4* 7.0*   PROT 6.2 5.0*   ALBUMIN 2.0* 1.6*   BILITOT 1.3* 1.3*   ALKPHOS 280* 231*   AST 46* 38   ALT 18 17   ANIONGAP 14 10   EGFRNONAA >60.0 >60.0     Magnesium:   Recent Labs   Lab 03/12/20  0504   MG 1.0*     POCT Glucose:   Recent Labs   Lab 03/12/20  2142   POCTGLUCOSE 235*     Troponin: No results for input(s): TROPONINI in the last 48 hours.  Urine Studies:   Recent Labs   Lab 03/12/20  0117   COLORU Yellow   APPEARANCEUA Clear   PHUR 5.0   SPECGRAV >=1.030*   PROTEINUA Negative   GLUCUA Negative   KETONESU Negative   BILIRUBINUA Negative   OCCULTUA 1+*   NITRITE Negative   LEUKOCYTESUR 2+*   RBCUA 3   WBCUA 27*   BACTERIA Rare   SQUAMEPITHEL 1       Significant Imaging: I have reviewed and interpreted all pertinent imaging results/findings within the past 24 hours.    Assessment/Plan:       Active Diagnoses:    Diagnosis Date Noted POA    PRINCIPAL PROBLEM:  Diverticulitis [K57.92] 01/30/2020 Yes    Hyponatremia [E87.1] 03/11/2020 Unknown    Constipation [K59.00] 03/11/2020 Unknown    Chronic heart failure with preserved ejection fraction [I50.32] 12/19/2019 Yes    Paroxysmal atrial fibrillation [I48.0] 12/19/2019 Yes    COPD (chronic obstructive pulmonary disease) [J44.9] 12/19/2019 Yes    Type 2 diabetes mellitus with hyperglycemia, without long-term current use of insulin [E11.65] 12/19/2019 Yes      Problems Resolved During this Admission:     PLAN:    Abdominal pain, Nausea, Vomiting   Diverticulitis  Umbilical hernia   - Patient presenting with recurrent diverticulitis most recently treated in Feb 2020 with seven days of zosyn. Underwent Colonoscopy 02/04/20 that showed sigmoid diverticulosis.  - afebrile and hemodynamically stable  - CT of the Abdomen showing Sigmoid diverticulosis with persistent mild wall thickening and subtle submucosal enhancement similar to prior examination dated 01/30/2020. Also showed fecal impaction.   - On Zosyn (D1- 3/12)  - holding antiemetics due to QTc > 500  - Patient has previously followed with colorectal surgery as OP for recurrent diverticulitis and should have follow up arranged at discharge.  - low sodium fluid restricted diet   - continue bowel regimen   - KUB negative for ileus or obstruction   - GI consulted. followup recs   - General sx consulted for evaluation of hernia. apprec recs  -- Operative fixation will likely treat to some extent her abdominal pain, especially at the hernia site.    -- She is high risk for perioperative complications, given her underlying medical risk factors so proceed with less invasive diagnostics first with GI  -- nausea and weight loss do not sound like an obstructive process and the CT does not have dilated small bowel.  These symptoms sound more like a motility issue.  -- Prior to fixation, she should be cleared  medically and from a cardiac standpoint, especially given her unknown tolerance to exertion.      Constipation  - CT of the Abdomen with Significant volume of fecal material within the rectum which could be related to fecal impaction  - Tap water enema once on admit  - senna-docusate 2 tabs BID  - discontinued oxycodone and ordered IV toradol   - miralax daily  - monitor I/Os    Hyponatremia- improving   - Na 129 --> 132    - suspect hypovolemic hyponatremia due to over diuresis in setting of poor po intake  - hold home lasix  - caution with IVF given recent acute respiratory failure due to hypervolemia  - BMP daily        Type 2 diabetes mellitus with hyperglycemia, without long-term current use of insulin  - hold home meds  - SSI  - hypoglycemic protocol      Paroxysmal atrial fibrillation  - continue home digoxin and warfarin  - INR goal 2-3  - pharmacy consulted to assist with warfarin management  - daily INRs  - monitor tele     Chronic heart failure with preserved ejection fraction  - ECHO 01/29/2020  With EF 45% with LVDD  - she was discharged 03/02/20 on Lasix 40mg po BID. Clinically the patient appears slightly hypovolemic. She is also with new hyponatremia. Suspect overdiuresis in setting of poor po intake.  - Hold home lasix at this time  - caution with IVF as patient had acute respiratory failure during recent admit for volume overload.  - monitor I/Os  - daily weights        COPD (chronic obstructive pulmonary disease)  - duonebs prn    VTE Risk Mitigation (From admission, onward)         Ordered     warfarin (COUMADIN) tablet 1 mg  Every Mon, Wed, Fri      03/12/20 0039     Place BRONWYN hose  Until discontinued      03/11/20 2228     IP VTE HIGH RISK PATIENT  Once      03/11/20 2228              Time spent in care of patient: > 35 minutes    Mendoza Caldwell MD  Department of Hospital Medicine   [facility]

## 2020-03-13 NOTE — PLAN OF CARE
Problem: Wound  Goal: Optimal Wound Healing  Outcome: Ongoing, Progressing     Problem: Fall Injury Risk  Goal: Absence of Fall and Fall-Related Injury  Outcome: Ongoing, Progressing     Problem: Adult Inpatient Plan of Care  Goal: Plan of Care Review  Outcome: Ongoing, Progressing  Goal: Patient-Specific Goal (Individualization)  Outcome: Ongoing, Progressing  Goal: Absence of Hospital-Acquired Illness or Injury  Outcome: Ongoing, Progressing  Goal: Optimal Comfort and Wellbeing  Outcome: Ongoing, Progressing  Goal: Readiness for Transition of Care  Outcome: Ongoing, Progressing  Goal: Rounds/Family Conference  Outcome: Ongoing, Progressing     Problem: Diabetes Comorbidity  Goal: Blood Glucose Level Within Desired Range  Outcome: Ongoing, Progressing     Problem: Diabetes Comorbidity  Goal: Blood Glucose Level Within Desired Range  Outcome: Ongoing, Progressing     Problem: Skin Injury Risk Increased  Goal: Skin Health and Integrity  Outcome: Ongoing, Progressing

## 2020-03-13 NOTE — PLAN OF CARE
Problem: Malnutrition  Goal: Improved Nutritional Intake  Outcome: Ongoing, Progressing   Recommendations    Recommendation:   1. Continue CL diet, ADAT to full liquids and Low na diet with texture per team   2. Change ONS to optisource (compliant with Vitamin K )   3. Suggest appetite stimulant when diet advanced    RD to monitor and follow up     Goals: pt to tolerate >85% of EEN/EPN by RD follow up   Nutrition Goal Status: new  Communication of RD Recs: other (comment)(POC)

## 2020-03-13 NOTE — CONSULTS
[facility]  Gastroenterology Service  Consult Note    Patient Name: Elizabeth Cano  MRN: [id number]  Admission Date: 3/11/2020  Hospital Length of Stay: 2 days  Code Status: Full Code   Attending Provider: Mendoza Caldwell MD   Consulting Provider: Gino Robert MD  Primary Care Physician: Cesar Reeves MD  Principal Problem:Diverticulitis    Inpatient consult to Gastroenterology  Consult performed by: Gino Robert MD  Consult ordered by: Mendoza Caldwell MD        Subjective:     HPI: Elizabeth Cano is a 69 y.o. female with history of AFib on Coumadin, COPD, heart failure, recent admission for septic shock, UTI, acute respiratory failure and heart failure exacerbation who presents with dry heaving and abdominal pain.    Patient was recently discharged after prolonged admission for above on 3/3. Returned to ED from nursing home with abdominal pain and nausea with concern for incarcerated hernia. CT 3/11 with mild sigmoid diverticulitis, cholelithiasis, rectal stool impaction, but no acute findings of incarcerated hernia.  On history, patient notes nausea and dry heaving intermittently for the last few months. She has diffuse abdominal cramping / pain that comes and goes. Symptoms are not associated with food or BMs, but she has a poor appetite. No dysphagia, no vomiting, no early satiety, no blating. CT as above. Denies constipation or rectal pain. Has been getting a bowel regimen here with good stool output. U/S with no biliary dilatation. Had an EGD 2/7 with only small hiatal hernia, otherwise unremarkable and biopsies negative for H pylori. No abdominal surgeries.      Past Medical History:   Diagnosis Date    A-fib     GAVIN (acute kidney injury)     CHF (congestive heart failure)     COPD (chronic obstructive pulmonary disease)     Diabetes mellitus     Hypertension        Past Surgical History:   Procedure Laterality Date    CARDIAC SURGERY      COLONOSCOPY N/A  2020    Procedure: COLONOSCOPY;  Surgeon: Gilberto Laguna MD;  Location: [address]);  Service: Endoscopy;  Laterality: N/A;    ESOPHAGOGASTRODUODENOSCOPY N/A 2020    Procedure: EGD (ESOPHAGOGASTRODUODENOSCOPY);  Surgeon: Aleksey Gomez MD;  Location: [address]);  Service: Endoscopy;  Laterality: N/A;       Family History   Problem Relation Age of Onset    Heart disease Mother        Social History     Socioeconomic History    Marital status:      Spouse name: Not on file    Number of children: Not on file    Years of education: Not on file    Highest education level: Not on file   Occupational History    Not on file   Social Needs    Financial resource strain: Not on file    Food insecurity:     Worry: Not on file     Inability: Not on file    Transportation needs:     Medical: Not on file     Non-medical: Not on file   Tobacco Use    Smoking status: Former Smoker     Packs/day: 1.00     Years: 54.00     Pack years: 54.00     Types: Cigarettes     Last attempt to quit: 2019     Years since quittin.3    Smokeless tobacco: Never Used   Substance and Sexual Activity    Alcohol use: Not Currently    Drug use: Not Currently    Sexual activity: Not Currently     Partners: Male   Lifestyle    Physical activity:     Days per week: Not on file     Minutes per session: Not on file    Stress: Not on file   Relationships    Social connections:     Talks on phone: Not on file     Gets together: Not on file     Attends Muslim service: Not on file     Active member of club or organization: Not on file     Attends meetings of clubs or organizations: Not on file     Relationship status: Not on file   Other Topics Concern    Not on file   Social History Narrative    Not on file       No current facility-administered medications on file prior to encounter.      Current Outpatient Medications on File Prior to Encounter   Medication Sig Dispense Refill    digoxin  (LANOXIN) 125 mcg tablet Take 1 tablet (0.125 mg total) by mouth once daily. 30 tablet 11    albuterol (PROVENTIL) 2.5 mg /3 mL (0.083 %) nebulizer solution Take 0.5 mLs by nebulization every 6 (six) hours as needed (wheezing). Rescue       benzonatate (TESSALON) 100 MG capsule Take 1 capsule (100 mg total) by mouth 3 (three) times daily as needed for Cough.      budesonide (PULMICORT) 0.25 mg/2 mL nebulizer solution Take 2 ml by nebulization 2 times daily Controller      furosemide (LASIX) 20 MG tablet Take 2 tablets (40 mg total) by mouth 2 (two) times daily.      L. acidophilus/pectin, citrus (ACIDOPHILUS PROBIOTIC ORAL) Take 1 tablet by mouth once daily.      lidocaine (LIDODERM) 5 % Apply topically to right leg as needed for pain      magnesium oxide (MAG-OX) 400 mg (241.3 mg magnesium) tablet Take 1 tablet (400 mg total) by mouth once daily.  0    metFORMIN (GLUCOPHAGE) 1000 MG tablet Take 1,000 mg by mouth 2 (two) times daily with meals.      miconazole nitrate 2% (MICOTIN) 2 % Oint Apply topically 2 (two) times daily. Apply to the bilateral groins/perineal area BID for 7 days  0    midodrine (PROAMATINE) 10 MG tablet Take 1 tablet (10 mg total) by mouth every evening. 30 tablet 11    mirtazapine (REMERON) 15 MG tablet Take 15 mg by mouth every evening.      nystatin (MYCOSTATIN) powder Apply twice daily under breast folds for redness and irritation until resolved      ondansetron (ZOFRAN) 4 MG tablet Take 4 mg by mouth every 6 (six) hours as needed for Nausea.      pantoprazole (PROTONIX) 40 MG tablet Take 1 tablet (40 mg total) by mouth once daily. 30 tablet 11    phenyleph-min oil-petrolatum 0.25-14-74.9 % Oint Place 1 applicator rectally 4 (four) times daily as needed.      potassium chloride (MICRO-K) 10 MEQ CpSR Take 3 capsules (30 mEq total) by mouth once daily.      senna (SENOKOT) 8.6 mg tablet Take 1 tablet by mouth once daily.      simethicone (MYLICON) 80 MG chewable tablet Take  1 tablet (80 mg total) by mouth 3 (three) times daily as needed.  0    vitamin D (VITAMIN D3) 1000 units Tab Take 1,000 Units by mouth once daily.      warfarin (COUMADIN) 1 MG tablet Take 1 mg by mouth every Mon, Wed, Fri. At 5:00pm         Review of patient's allergies indicates:   Allergen Reactions    Levsin [hyoscyamine sulfate] Hallucinations       Review of Systems:   Constitutional: no fever, chills or change in weight   Eyes: no visual changes   ENT: no sore throat or dysphagia  Respiratory: no cough or shortness of breath   Cardiovascular: no chest pain or palpitations   Gastrointestinal: as per HPI  Hematologic/Lymphatic: no easy bruising or lymphadenopathy   Musculoskeletal: no arthralgias or myalgias   Neurological: no change in mental status  Behavioral/Psych: no change in mood    Objective:     Vitals:    03/13/20 1110   BP: (!) 109/59   Pulse: 71   Resp: 18   Temp: 98.1 °F (36.7 °C)       General: Alert and Oriented, no distress  HEENT: Normocephalic, Atraumatic. No scleral icterus.  Resp: Good air entry bilaterally, no adventitious sounds  Cardiac: S1 and S2 normal  Abdomen: Normoactive bowel sounds. Non-distended. Normal tympany. Soft. Tender to palpation periumbilical. No peritoneal signs. Reducible periumbilical hernia.  Extremities: No peripheral edema.   Neurologic: No gross neurological Deficits  Psych: Calm, cooperative. Normal mood and affect.    Significant Labs:  Recent Labs   Lab 03/11/20  1635 03/12/20  0504 03/13/20  0502   HGB 13.0 10.9* 10.5*       Lab Results   Component Value Date    WBC 6.22 03/13/2020    HGB 10.5 (L) 03/13/2020    HCT 32.0 (L) 03/13/2020    MCV 88 03/13/2020     03/13/2020       Lab Results   Component Value Date     (L) 03/13/2020    K 2.6 (LL) 03/13/2020    CL 91 (L) 03/13/2020    CO2 27 03/13/2020    BUN 13 03/13/2020    CREATININE 0.7 03/13/2020    CALCIUM 6.7 (LL) 03/13/2020    ANIONGAP 14 03/13/2020    ESTGFRAFRICA >60.0 03/13/2020     EGFRNONAA >60.0 03/13/2020       Lab Results   Component Value Date    ALT 16 03/13/2020    AST 30 03/13/2020    ALKPHOS 226 (H) 03/13/2020    BILITOT 0.9 03/13/2020       Lab Results   Component Value Date    INR 1.3 (H) 03/13/2020    INR 1.2 03/12/2020    INR 1.2 03/11/2020       Significant Imaging:  Reviewed pertinent radiology findings.       Assessment/Plan:     Elizabeth Cano is a 69 y.o. female with history of AFib on Coumadin, COPD, heart failure, recent admission for septic shock, UTI, acute respiratory failure and heart failure exacerbation who presents with dry heaving and abdominal pain.    Her abdominal pain appears to be localized around her hernia.  She has had a recent unremarkable endoscopic evaluation including EGD and colonoscopy.  H pylori was negative on biopsy.  There is likely a component of functional disease. Recent septic shock, respiratory failure, heart failure may have exacerbated her symptoms.      Problem List:  1. Nausea  2. Abdominal pain  3. Umbilical hernia    Plan:  1. No plan to repeat endoscopic intervention as above  2. Trial of bentyl 10mg tid  3. Defer further management of hernia to surgery  4. Diet as tolerated  5. Bowel regimen as needed      Thank you for involving us in the care of Elizabeth Cano. Please call with any additional questions, concerns or changes in the patient's clinical status.    Gino Robert MD  Gastroenterology Fellow PGY IV   [facility]

## 2020-03-14 NOTE — PLAN OF CARE
Pt remains free from falls and injury. Pt denies need for analgesic. IV to LANI placed this shift. Bed low and locked, Call light within reach.

## 2020-03-14 NOTE — NURSING
I called central supply to obtain the walker that was ordered on 03/11/20 and still not in the patients room for ambulation assistance. I was told to call #[phone]. I did not receive an answer.

## 2020-03-14 NOTE — PROGRESS NOTES
"      [facility] Hospital Medicine  Progress Note    Patient Name: Elizabeth Cano  MRN: [id number]  Patient Class: IP- Inpatient   Admission Date: 3/11/2020  Length of Stay: 3 days  Attending Physician: Mendoza Caldwell MD  Primary Care Provider: Cesar Revees MD    [facility] Medicine Team: [facility] HOSP MED D Mendoza Caldwell MD    HPI: This is a 70yo female with a past medical history of Afib (on coumadin), COPD, hypotension (on midodrine), diverticulitis, anxiety, and HFpEF (EF 45% on 1/29/20) who has been sent to the ED from nursing home facility with chief complaint of abdominal pain. Patient recently had lengthy complicated admission 01/29/20 to 03/02/20. She was initially admitted for management of UTI sepsis, later developed acute respiratory distress secondary to hypervolemia and acute heart failure exacerbation secondary to aggressive iv fluids with sepsis. Respiratory status improved with diuresis. She also developed diverticulitis treated with abx. She was discharged in stable condition on 03/02/20. Patient states that since discharge she has had very poor appetite with some nausea. Today patient reports episode of dry-heaving and complains of generalized abdominal pain with significant tenderness to palpation in periumbilical area. Nursing home concerned for incarcerated hernia and patient subsequently brought to the ED for further evaluation.     In the ED patient afebrile and hemodynamically stable. Initial LA 2.5. Na 129, Cl 87. CBC unremarkable. CT of the Abdomen performed showing "Sigmoid diverticulosis with persistent mild wall thickening and subtle submucosal enhancement similar to prior examination dated 01/30/2020.  No significant surrounding inflammatory changes or free fluid. Findings could be related to ongoing mild diverticulitis versus chronic changes. Significant volume of fecal material within the rectum which could be related to fecal impaction. Cholelithiasis. " " Cardiomegaly. Unchanged appearance of fat containing umbilical hernia without adjacent inflammatory changes." Patient denies fevers, chills, chest pain, shortness of breath, dysuria, or confusion. She has been admitted to the care of medicine for further evaluation and management.    Subjective:     Principal Problem:Diverticulitis    Interval History: Patient lying in bed, no acute distress. Alert and oriented x3. Reports generalized weakness. Nausea, vomiting and abdominal pain starting to improve. Continues to have regular bowel movements.     Review of Systems   Constitutional: Positive for activity change, appetite change and unexpected weight change. Negative for chills and fever.   HENT: Negative for congestion.    Eyes: Negative for visual disturbance.   Respiratory: Negative for cough and shortness of breath.    Cardiovascular: Negative for chest pain and leg swelling.   Gastrointestinal: Positive for abdominal pain, constipation, nausea and vomiting. Negative for abdominal distention.   Genitourinary: Negative for dysuria.   Neurological: Positive for weakness. Negative for dizziness.   Psychiatric/Behavioral: Negative for confusion.     Objective:     Vital Signs (Most Recent):  Temp: 98 °F (36.7 °C) (03/14/20 0739)  Pulse: 70 (03/14/20 0739)  Resp: 16 (03/14/20 0739)  BP: 114/66 (03/14/20 0739)  SpO2: 98 % (03/14/20 0739) Vital Signs (24h Range):  Temp:  [97.9 °F (36.6 °C)-98.5 °F (36.9 °C)] 98 °F (36.7 °C)  Pulse:  [69-78] 70  Resp:  [16-18] 16  SpO2:  [94 %-99 %] 98 %  BP: ()/(52-66) 114/66     Weight: 60 kg (132 lb 4.4 oz)  Body mass index is 21.35 kg/m².    Intake/Output Summary (Last 24 hours) at 3/14/2020 0933  Last data filed at 3/13/2020 1200  Gross per 24 hour   Intake 270 ml   Output --   Net 270 ml      Physical Exam   Constitutional: She is oriented to person, place, and time. She appears well-developed.   HENT:   Head: Normocephalic and atraumatic.   Eyes: Pupils are equal, round, and " reactive to light. Conjunctivae and EOM are normal.   Neck: Neck supple.   Cardiovascular: Normal rate and regular rhythm.   Pulmonary/Chest: Effort normal and breath sounds normal. No respiratory distress. She has no wheezes.   Abdominal: Soft. Bowel sounds are normal. She exhibits mass (umbilical hernia). She exhibits no distension. There is tenderness.   Musculoskeletal: Normal range of motion. She exhibits edema (trace).   Neurological: She is alert and oriented to person, place, and time. No cranial nerve deficit.   Skin: Skin is warm.   Psychiatric: She has a normal mood and affect.       Significant Labs:   A1C:   Recent Labs   Lab 12/19/19  2220 01/30/20  0421   HGBA1C 6.4* 5.4     CBC:   Recent Labs   Lab 03/13/20  0502 03/14/20  0347   WBC 6.22 7.95   HGB 10.5* 10.9*   HCT 32.0* 34.8*    246     CMP:   Recent Labs   Lab 03/13/20  0502 03/14/20  0347   * 129*   K 2.6* 3.4*   CL 91* 92*   CO2 27 24   GLU 84 114*   BUN 13 10   CREATININE 0.7 0.7   CALCIUM 6.7* 6.5*   PROT 5.0* 4.8*   ALBUMIN 1.6* 1.5*   BILITOT 0.9 0.8   ALKPHOS 226* 220*   AST 30 30   ALT 16 16   ANIONGAP 14 13   EGFRNONAA >60.0 >60.0     Magnesium:   Recent Labs   Lab 03/13/20  0502 03/14/20  0347   MG 1.5* 1.7     POCT Glucose:   Recent Labs   Lab 03/13/20  1642 03/13/20  2155 03/14/20  0739   POCTGLUCOSE 180* 170* 119*     Microbiology Results (last 7 days)     Procedure Component Value Units Date/Time    Urine culture [[id number]]  (Abnormal) Collected:  03/12/20 0117    Order Status:  Completed Specimen:  Urine Updated:  03/14/20 0228     Urine Culture, Routine ENTEROBACTER CLOACAE COMPLEX  >100,000 cfu/ml  Susceptibility pending      Narrative:       Preferred Collection Type->Urine, Clean Catch          Significant Imaging: I have reviewed and interpreted all pertinent imaging results/findings within the past 24 hours.    Assessment/Plan:      Active Diagnoses:    Diagnosis Date Noted POA    PRINCIPAL PROBLEM:   Diverticulitis [K57.92] 01/30/2020 Yes    Periumbilical abdominal pain [R10.33]  Yes    Ventral hernia without obstruction or gangrene [K43.9]  Yes    Hyponatremia [E87.1] 03/11/2020 Unknown    Constipation [K59.00] 03/11/2020 Unknown    Chronic heart failure with preserved ejection fraction [I50.32] 12/19/2019 Yes    Paroxysmal atrial fibrillation [I48.0] 12/19/2019 Yes    COPD (chronic obstructive pulmonary disease) [J44.9] 12/19/2019 Yes    Type 2 diabetes mellitus with hyperglycemia, without long-term current use of insulin [E11.65] 12/19/2019 Yes      Problems Resolved During this Admission:     PLAN:    Abdominal pain, Nausea, Vomiting   Diverticulitis  Umbilical hernia   - Patient presenting with recurrent diverticulitis most recently treated in Feb 2020 with seven days of zosyn. Underwent Colonoscopy 02/04/20 that showed sigmoid diverticulosis.  - afebrile and hemodynamically stable  - CT of the Abdomen showing Sigmoid diverticulosis with persistent mild wall thickening and subtle submucosal enhancement similar to prior examination dated 01/30/2020. Also showed fecal impaction.   - On Zosyn (D1- 3/12)  - holding antiemetics due to QTc > 500  - Patient has previously followed with colorectal surgery as OP for recurrent diverticulitis and should have follow up arranged at discharge.  - low sodium fluid restricted diet   - continue bowel regimen   - KUB negative for ileus or obstruction   - GI consulted. apprec recs  --  EGD 2/7 with only small hiatal hernia, otherwise unremarkable and biopsies negative for H pylori. No abdominal surgeries.  -- U/S with no biliary dilatation  -- No plan to repeat endoscopic intervention as above  -- Trial of bentyl 10mg tid  -- Defer further management of hernia to surgery  - General sx consulted for evaluation of hernia. apprec recs  -- Operative fixation will likely treat to some extent her abdominal pain, especially at the hernia site.     -- She is high risk for  perioperative complications, given her underlying medical risk factors so proceed with less invasive diagnostics first with GI  -- nausea and weight loss do not sound like an obstructive process and the CT does not have dilated small bowel.  These symptoms sound more like a motility issue.  -- will need pre-op evaluation/risk stratification from a cardiac standpoint prior to fixation, especially given her unknown tolerance to exertion.    - nutrition following. apprec recs  -- Continue CL diet, ADAT to full liquids and Low na diet with texture per team   -- Change ONS to optisource (compliant with Vitamin K )   -- Suggest appetite stimulant when diet advanced. Ordered dronabinol     Constipation  - CT of the Abdomen with Significant volume of fecal material within the rectum which could be related to fecal impaction  - Tap water enema once on admit  - senna-docusate 2 tabs BID  - off oxycodone and ordered IV toradol   - miralax daily  - monitor I/Os    UTI  - Ucx (3/12) growing Enterobacter Cloacae. followup sensitivities  - continue zosyn (D1- 3/12)    Hyponatremia- improving   - Na 129 --> 132    - suspect hypovolemic hyponatremia due to over diuresis in setting of poor po intake  - hold home lasix  - caution with IVF given recent acute respiratory failure due to hypervolemia  - BMP daily        Type 2 diabetes mellitus with hyperglycemia, without long-term current use of insulin  - hold home meds  - SSI  - hypoglycemic protocol      Paroxysmal atrial fibrillation  - continue home digoxin and warfarin  - INR goal 2-3  - pharmacy consulted to assist with warfarin management  - daily INRs  - monitor tele     Chronic heart failure with preserved ejection fraction  - ECHO 01/29/2020  With EF 45% with LVDD  - she was discharged 03/02/20 on Lasix 40mg po BID. Clinically the patient appears slightly hypovolemic. She is also with new hyponatremia. Suspect overdiuresis in setting of poor po intake.  - Hold home lasix at  this time  - caution with IVF as patient had acute respiratory failure during recent admit for volume overload.  - monitor I/Os  - daily weights        COPD (chronic obstructive pulmonary disease)  - duonebs prn    VTE Risk Mitigation (From admission, onward)         Ordered     warfarin (COUMADIN) tablet 1 mg  Every Mon, Wed, Fri 03/12/20 0039     Place BRONWYN hose  Until discontinued      03/11/20 2228     IP VTE HIGH RISK PATIENT  Once      03/11/20 2228              Time spent in care of patient: > 35 minutes    Mendoza Caldwell MD  Department of Hospital Medicine   [facility] [address]

## 2020-03-15 NOTE — PROGRESS NOTES
"      [facility] Hospital Medicine  Progress Note    Patient Name: Elizabeth Cano  MRN: [id number]  Patient Class: IP- Inpatient   Admission Date: 3/11/2020  Length of Stay: 4 days  Attending Physician: Mendoza Caldwell MD  Primary Care Provider: Cesar Reeves MD    [facility] Medicine Team: [facility] MED D Mendoza Caldwell MD    HPI: This is a 68yo female with a past medical history of Afib (on coumadin), COPD, hypotension (on midodrine), diverticulitis, anxiety, and HFpEF (EF 45% on 1/29/20) who has been sent to the ED from nursing home facility with chief complaint of abdominal pain. Patient recently had lengthy complicated admission 01/29/20 to 03/02/20. She was initially admitted for management of UTI sepsis, later developed acute respiratory distress secondary to hypervolemia and acute heart failure exacerbation secondary to aggressive iv fluids with sepsis. Respiratory status improved with diuresis. She also developed diverticulitis treated with abx. She was discharged in stable condition on 03/02/20. Patient states that since discharge she has had very poor appetite with some nausea. Today patient reports episode of dry-heaving and complains of generalized abdominal pain with significant tenderness to palpation in periumbilical area. Nursing home concerned for incarcerated hernia and patient subsequently brought to the ED for further evaluation.     In the ED patient afebrile and hemodynamically stable. Initial LA 2.5. Na 129, Cl 87. CBC unremarkable. CT of the Abdomen performed showing "Sigmoid diverticulosis with persistent mild wall thickening and subtle submucosal enhancement similar to prior examination dated 01/30/2020.  No significant surrounding inflammatory changes or free fluid. Findings could be related to ongoing mild diverticulitis versus chronic changes. Significant volume of fecal material within the rectum which could be related to fecal impaction. Cholelithiasis. " " Cardiomegaly. Unchanged appearance of fat containing umbilical hernia without adjacent inflammatory changes." Patient denies fevers, chills, chest pain, shortness of breath, dysuria, or confusion. She has been admitted to the [facility] of medicine for further evaluation and management.    Subjective:     Principal Problem:Diverticulitis    Interval History: Patient lying in bed, lethargic, alert and oriented x 3. Reports improved nausea and emesis but did have dry heaving after taking morning medications. She also reports continued abdominal pain that is less severe compared to admission. Appetite is slightly improved and continues to have regular bowel movements.     Review of Systems   Constitutional: Positive for activity change, appetite change and unexpected weight change. Negative for chills and fever.   HENT: Negative for congestion.    Eyes: Negative for visual disturbance.   Respiratory: Negative for cough and shortness of breath.    Cardiovascular: Negative for chest pain and leg swelling.   Gastrointestinal: Positive for abdominal pain, constipation, nausea and vomiting. Negative for abdominal distention.   Genitourinary: Negative for dysuria.   Neurological: Positive for weakness. Negative for dizziness.   Psychiatric/Behavioral: Negative for confusion.     Objective:     Vital Signs (Most Recent):  Temp: 97.4 °F (36.3 °C) (03/15/20 0653)  Pulse: 70 (03/15/20 0653)  Resp: 18 (03/15/20 0653)  BP: (!) 105/53 (03/15/20 0653)  SpO2: 97 % (03/15/20 0653) Vital Signs (24h Range):  Temp:  [97.4 °F (36.3 °C)-99 °F (37.2 °C)] 97.4 °F (36.3 °C)  Pulse:  [64-79] 70  Resp:  [18-20] 18  SpO2:  [92 %-99 %] 97 %  BP: ()/(51-62) 105/53     Weight: 60 kg (132 lb 4.4 oz)  Body mass index is 21.35 kg/m².  No intake or output data in the 24 hours ending 03/15/20 0843     Physical Exam   Constitutional: She is oriented to person, place, and time. She appears well-developed.   HENT:   Head: Normocephalic and atraumatic. "   Eyes: Pupils are equal, round, and reactive to light. Conjunctivae and EOM are normal.   Neck: Neck supple.   Cardiovascular: Normal rate and regular rhythm.   Pulmonary/Chest: Effort normal and breath sounds normal. No respiratory distress. She has no wheezes.   Abdominal: Soft. Bowel sounds are normal. She exhibits mass (umbilical hernia). She exhibits no distension. There is tenderness.   Musculoskeletal: Normal range of motion. She exhibits edema (trace).   Neurological: She is alert and oriented to person, place, and time. No cranial nerve deficit.   Skin: Skin is warm.   Psychiatric: She has a normal mood and affect.       Significant Labs:   A1C:   Recent Labs   Lab 12/19/19  2220 01/30/20  0421   HGBA1C 6.4* 5.4     CBC:   Recent Labs   Lab 03/14/20  0347 03/15/20  0609   WBC 7.95 4.22   HGB 10.9* 8.8*   HCT 34.8* 27.4*    194     CMP:   Recent Labs   Lab 03/14/20  0347   *   K 3.4*   CL 92*   CO2 24   *   BUN 10   CREATININE 0.7   CALCIUM 6.5*   PROT 4.8*   ALBUMIN 1.5*   BILITOT 0.8   ALKPHOS 220*   AST 30   ALT 16   ANIONGAP 13   EGFRNONAA >60.0     Magnesium:   Recent Labs   Lab 03/14/20  0347   MG 1.7     POCT Glucose:   Recent Labs   Lab 03/13/20  2155 03/14/20  0739 03/14/20  1643   POCTGLUCOSE 170* 119* 208*     Microbiology Results (last 7 days)     Procedure Component Value Units Date/Time    Urine culture [[id number]]  (Abnormal) Collected:  03/12/20 0117    Order Status:  Completed Specimen:  Urine Updated:  03/14/20 0228     Urine Culture, Routine ENTEROBACTER CLOACAE COMPLEX  >100,000 cfu/ml  Susceptibility pending      Narrative:       Preferred Collection Type->Urine, Clean Catch          Significant Imaging: I have reviewed and interpreted all pertinent imaging results/findings within the past 24 hours.    Assessment/Plan:      Active Diagnoses:    Diagnosis Date Noted POA    PRINCIPAL PROBLEM:  Diverticulitis [K57.92] 01/30/2020 Yes    Periumbilical abdominal pain  [R10.33]  Yes    Ventral hernia without obstruction or gangrene [K43.9]  Yes    Hyponatremia [E87.1] 03/11/2020 Unknown    Constipation [K59.00] 03/11/2020 Unknown    Chronic heart failure with preserved ejection fraction [I50.32] 12/19/2019 Yes    Paroxysmal atrial fibrillation [I48.0] 12/19/2019 Yes    COPD (chronic obstructive pulmonary disease) [J44.9] 12/19/2019 Yes    Type 2 diabetes mellitus with hyperglycemia, without long-term current use of insulin [E11.65] 12/19/2019 Yes      Problems Resolved During this Admission:     Scheduled Meds:   calcium carbonate  500 mg Oral Once    dicyclomine  10 mg Oral TID    dronabinoL  2.5 mg Oral BID    magnesium oxide  400 mg Oral BID    miconazole   Topical (Top) BID    midodrine  10 mg Oral QHS    mirtazapine  15 mg Oral QHS    piperacillin-tazobactam (ZOSYN) IVPB  4.5 g Intravenous Q8H    potassium, sodium phosphates  1 packet Oral QID (AC & HS)    senna-docusate 8.6-50 mg  2 tablet Oral BID    warfarin  1 mg Oral Every Mon, Wed, Fri     Continuous Infusions:  PRN Meds:.acetaminophen, albuterol-ipratropium, benzonatate, Dextrose 10% Bolus, Dextrose 10% Bolus, glucagon (human recombinant), glucose, glucose, insulin aspart U-100, ketorolac, polyethylene glycol, sodium chloride 0.9%      PLAN:    Abdominal pain, Nausea, Vomiting   Diverticulitis  Umbilical hernia   - Patient presenting with recurrent diverticulitis most recently treated in Feb 2020 with seven days of zosyn. Underwent Colonoscopy 02/04/20 that showed sigmoid diverticulosis.  - afebrile and hemodynamically stable  - CT of the Abdomen showing Sigmoid diverticulosis with persistent mild wall thickening and subtle submucosal enhancement similar to prior examination dated 01/30/2020. Also showed fecal impaction.   - Zosyn (D1- 3/12) --> cipro and flagyl on 3/15 for total of 7 days (end date: 3/18)   - holding antiemetics due to QTc > 500  - Patient has previously followed with colorectal  surgery as OP for recurrent diverticulitis and should have follow up arranged at discharge.  - low sodium fluid restricted diet   - continue bowel regimen   - KUB negative for ileus or obstruction   - GI consulted. apprec recs  --  EGD 2/7 with only small hiatal hernia, otherwise unremarkable and biopsies negative for H pylori. No abdominal surgeries.  -- U/S with no biliary dilatation  -- No plan to repeat endoscopic intervention as above  -- Trial of bentyl 10mg tid  -- Defer further management of hernia to surgery  - General sx consulted for evaluation of hernia. apprec recs  -- Operative fixation will likely treat to some extent her abdominal pain, especially at the hernia site.     -- She is high risk for perioperative complications, given her underlying medical risk factors so proceed with less invasive diagnostics first with GI  -- nausea and weight loss do not sound like an obstructive process and the CT does not have dilated small bowel.  These symptoms sound more like a motility issue.  -- will need pre-op evaluation/risk stratification from a cardiac standpoint prior to fixation, especially given her unknown tolerance to exertion.    - nutrition following. apprec recs  -- Continue CL diet, ADAT to full liquids and Low na diet with texture per team   -- Change ONS to optisource (compliant with Vitamin K )   -- Suggest appetite stimulant when diet advanced. Ordered dronabinol     Constipation- improving   - CT of the Abdomen with Significant volume of fecal material within the rectum which could be related to fecal impaction  - Tap water enema once on admit  - senna-docusate 2 tabs BID  - off oxycodone and ordered IV toradol   - miralax daily  - monitor I/Os    UTI  - Ucx (3/12) growing Enterobacter Cloacae. Resistant to zosyn  - switched zosyn to ciprofloxacin (D1- 3/15) x 3 days     Hyponatremia- improving   - Na 129 --> 132    - suspect hypovolemic hyponatremia due to over diuresis in setting of poor po  intake  - hold home lasix  - caution with IVF given h/o CHF and recent acute respiratory failure due to hypervolemia   - BMP daily        Type 2 diabetes mellitus with hyperglycemia, without long-term current use of insulin  - hold home meds  - SSI  - hypoglycemic protocol      Paroxysmal atrial fibrillation  - continue home digoxin and warfarin  - INR goal 2-3  - pharmacy consulted to assist with warfarin management  - daily INRs  - monitor tele     Chronic heart failure with preserved ejection fraction  - ECHO 01/29/2020  With EF 45% with LVDD  - she was discharged 03/02/20 on Lasix 40mg po BID. Clinically the patient appears slightly hypovolemic. She is also with new hyponatremia. Suspect overdiuresis in setting of poor po intake.  - Hold home lasix at this time  - caution with IVF as patient had acute respiratory failure during recent admit for volume overload.  - monitor I/Os  - daily weights        COPD (chronic obstructive pulmonary disease)  - duonebs prn    VTE Risk Mitigation (From admission, onward)         Ordered     warfarin (COUMADIN) tablet 1 mg  Every Mon, Wed, Fri      03/12/20 0039     Place BRONWYN hose  Until discontinued      03/11/20 2228     IP VTE HIGH RISK PATIENT  Once      03/11/20 2228              Time spent in care of patient: > 35 minutes    Mendoza Caldwell MD  Department of Hospital Medicine   [facility]

## 2020-03-16 NOTE — PLAN OF CARE
03/16/20 1215   Post-Acute Status   Post-Acute Authorization Placement   Post-Acute Placement Status Referrals Sent     SW sent referral to  [facility] for pt to return possibly tomorrow (per Dr Caldwell).    Apolonia Ramirez, BRYCE  [facility]  [phone]

## 2020-03-16 NOTE — PLAN OF CARE
Pt expected to return to [facility]. CM assisted w/ outpt appts and noted them in the Follow Up tab.     03/16/20 2307   Discharge Reassessment   Assessment Type Discharge Planning Reassessment   Discharge Plan A Return to nursing home   Anticipated Discharge Disposition [facility]   Post-Acute Status   Post-Acute Authorization Placement   Post-Acute Placement Status Awaiting Internal Medical Clearance   Discharge Delays None known at this time

## 2020-03-16 NOTE — PLAN OF CARE
03/16/20 1243   Post-Acute Status   Post-Acute Authorization Placement   Post-Acute Placement Status Awaiting Internal Medical Clearance     Pt accepted back to [facility] pending medical clearance.    Apolonia Ramirez LMSW  [facility]  [phone]

## 2020-03-16 NOTE — PLAN OF CARE
Problem: Occupational Therapy Goal  Goal: Occupational Therapy Goal  Description  Goals to be met by: 3/30/20     Patient will increase functional independence with ADLs by performing:    UE Dressing with Moderate Assistance.  LE Dressing with Moderate Assistance.  Grooming while seated with Set-up Assistance.  Toileting from bedside commode with Moderate Assistance for hygiene and clothing management.   Bathing from  shower chair/bench with Moderate Assistance.  Toilet transfer to bedside commode with Moderate Assistance.  Increased functional strength to WFL for B UE.  Upper extremity exercise program x15 reps per handout, with assistance as needed.     Outcome: Ongoing, Progressing

## 2020-03-16 NOTE — PLAN OF CARE
Problem: Wound  Goal: Optimal Wound Healing  Outcome: Ongoing, Progressing     Problem: Fall Injury Risk  Goal: Absence of Fall and Fall-Related Injury  Outcome: Ongoing, Progressing     Problem: Adult Inpatient Plan of Care  Goal: Plan of Care Review  Outcome: Ongoing, Progressing  Goal: Patient-Specific Goal (Individualization)  Outcome: Ongoing, Progressing  Goal: Absence of Hospital-Acquired Illness or Injury  Outcome: Ongoing, Progressing  Goal: Optimal Comfort and Wellbeing  Outcome: Ongoing, Progressing  Goal: Readiness for Transition of Care  Outcome: Ongoing, Progressing  Goal: Rounds/Family Conference  Outcome: Ongoing, Progressing     Problem: Diabetes Comorbidity  Goal: Blood Glucose Level Within Desired Range  Outcome: Ongoing, Progressing     Problem: Skin Injury Risk Increased  Goal: Skin Health and Integrity  Outcome: Ongoing, Progressing     Problem: Malnutrition  Goal: Improved Nutritional Intake  Outcome: Ongoing, Progressing   Received patient resting in bed. A/O x4. Nad noted. VSS. Denies any immediate c/o pain/ discomfort. RR even, unlabored. Safety/fall measures in place. SR up x2. Bed low & locked. Pt encouraged to call for assistance when needed. No other concerns noted. Will continue to monitor pt t/o shift. POC reviewed.

## 2020-03-16 NOTE — PROGRESS NOTES
Pt IV site leaking, swelling to the arm, elevated on pillows, IV access discontinued, Attempt made to place new access to the left arm without success. Will ask for assistance to restart IV.

## 2020-03-16 NOTE — TELEPHONE ENCOUNTER
Spoke with Mary to explain we are not scheduling before April 13 and Dr. Danielle's schedule does not go out further than that at this time. Mary stated pt wanted Dr. Danielle to do hernia repair and I advised her Dr. Danielle does not do hernia repair and she stated pt may have been confused. No appointment was scheduled.

## 2020-03-16 NOTE — CONSULTS
PharmD Consult received for:  Warfarin dosing     PHARMACY CONSULT NOTE: WARFARIN  Elizabeth Cano is a 69 y.o. female on warfarin therapy for Paroxysmal Atrial Fibrillation. PharmD has been consulted for warfarin dosing.    Current order: Warfarin 1 mg every Mon, Wed, Fri  Home dose: 1 mg every Mon, Wed, Fri   INR goal: 2-3   Coumadin clinic enrollment: Requires enrollment - Unclear who follows patient's warfarin outpatient    Lab Results   Component Value Date    INR 1.3 (H) 03/16/2020    INR 1.4 (H) 03/15/2020    INR 1.3 (H) 03/14/2020     Significant drug interactions: N/A  Diet:  Low sodium diet, now on Optisource Protein drink (was on Boost)    Recommendation(s):    Changing warfarin to 1 mg daily   INR did not budge over the weekend    Pharmacy will continue to follow and monitor warfarin    Thank you for the consult,  Narciso Maddox, PharmD  Extension [phone]    **Note: Consults are reviewed Monday-Friday 7:00am-3:30pm. THE ABOVE RECOMMENDATIONS ARE ONLY SUGGESTED.THE RECOMMENDATIONS SHOULD BE CONSIDERED IN CONJUNCTION WITH ALL PATIENT FACTORS. **

## 2020-03-16 NOTE — PROGRESS NOTES
[facility] supervisor assessed pts extremities for appropriate area to start IV. She could not identify sufficient area for venous access. Placed a request for midline team to place access. Med team made aware.

## 2020-03-16 NOTE — PROGRESS NOTES
"      [facility] Hospital Medicine  Progress Note    Patient Name: Elizabeth Cano  MRN: [id number]  Patient Class: IP- Inpatient   Admission Date: 3/11/2020  Length of Stay: 5 days  Attending Physician: Mendoza Caldwell MD  Primary Care Provider: Cesar Reeves MD    [facility] Medicine Team: [facility] D Mendoza Caldwell MD    HPI: This is a 68yo female with a past medical history of Afib (on coumadin), COPD, hypotension (on midodrine), diverticulitis, anxiety, and HFpEF (EF 45% on 1/29/20) who has been sent to the ED from nursing home facility with chief complaint of abdominal pain. Patient recently had lengthy complicated admission 01/29/20 to 03/02/20. She was initially admitted for management of UTI sepsis, later developed acute respiratory distress secondary to hypervolemia and acute heart failure exacerbation secondary to aggressive iv fluids with sepsis. Respiratory status improved with diuresis. She also developed diverticulitis treated with abx. She was discharged in stable condition on 03/02/20. Patient states that since discharge she has had very poor appetite with some nausea. Today patient reports episode of dry-heaving and complains of generalized abdominal pain with significant tenderness to palpation in periumbilical area. Nursing home concerned for incarcerated hernia and patient subsequently brought to the ED for further evaluation.     In the ED patient afebrile and hemodynamically stable. Initial LA 2.5. Na 129, Cl 87. CBC unremarkable. CT of the Abdomen performed showing "Sigmoid diverticulosis with persistent mild wall thickening and subtle submucosal enhancement similar to prior examination dated 01/30/2020.  No significant surrounding inflammatory changes or free fluid. Findings could be related to ongoing mild diverticulitis versus chronic changes. Significant volume of fecal material within the rectum which could be related to fecal impaction. Cholelithiasis. " " Cardiomegaly. Unchanged appearance of fat containing umbilical hernia without adjacent inflammatory changes." Patient denies fevers, chills, chest pain, shortness of breath, dysuria, or confusion. She has been admitted to the care of medicine for further evaluation and management.    Subjective:     Principal Problem:Diverticulitis    Interval History: Patient lying in bed, lethargic, alert and oriented x 3. Reports nausea, vomiting and constipation improved. Reports continued abdominal pain which is back to baseline in the setting of hernia. No other complaints.     Review of Systems   Constitutional: Positive for activity change, appetite change and unexpected weight change. Negative for chills and fever.   HENT: Negative for congestion.    Eyes: Negative for visual disturbance.   Respiratory: Negative for cough and shortness of breath.    Cardiovascular: Negative for chest pain and leg swelling.   Gastrointestinal: Positive for abdominal pain, constipation, nausea and vomiting. Negative for abdominal distention.   Genitourinary: Negative for dysuria.   Neurological: Positive for weakness. Negative for dizziness.   Psychiatric/Behavioral: Negative for confusion.     Objective:     Vital Signs (Most Recent):  Temp: 98.3 °F (36.8 °C) (03/16/20 0936)  Pulse: 90 (03/16/20 0936)  Resp: 16 (03/16/20 0936)  BP: 126/64 (03/16/20 0936)  SpO2: 100 % (03/16/20 0936) Vital Signs (24h Range):  Temp:  [97.6 °F (36.4 °C)-98.7 °F (37.1 °C)] 98.3 °F (36.8 °C)  Pulse:  [65-99] 90  Resp:  [12-18] 16  SpO2:  [93 %-100 %] 100 %  BP: ()/(51-64) 126/64     Weight: 60 kg (132 lb 4.4 oz)  Body mass index is 21.35 kg/m².    Intake/Output Summary (Last 24 hours) at 3/16/2020 1015  Last data filed at 3/15/2020 1730  Gross per 24 hour   Intake 540 ml   Output --   Net 540 ml        Physical Exam   Constitutional: She is oriented to person, place, and time. She appears well-developed.   HENT:   Head: Normocephalic and atraumatic. "   Eyes: Pupils are equal, round, and reactive to light. Conjunctivae and EOM are normal.   Neck: Neck supple.   Cardiovascular: Normal rate and regular rhythm.   Pulmonary/Chest: Effort normal and breath sounds normal. No respiratory distress. She has no wheezes.   Abdominal: Soft. Bowel sounds are normal. She exhibits mass (umbilical hernia). She exhibits no distension. There is tenderness.   Musculoskeletal: Normal range of motion. She exhibits edema (trace).   Neurological: She is alert and oriented to person, place, and time. No cranial nerve deficit.   Skin: Skin is warm.   Psychiatric: She has a normal mood and affect.       Significant Labs:   A1C:   Recent Labs   Lab 12/19/19  2220 01/30/20  0421   HGBA1C 6.4* 5.4     CBC:   Recent Labs   Lab 03/15/20  0609 03/15/20  1139 03/16/20  0431   WBC 4.22 6.26 5.13   HGB 8.8* 10.1* 9.5*   HCT 27.4* 31.9* 29.6*    189 196     CMP:   Recent Labs   Lab 03/15/20  0609 03/15/20  1924 03/16/20  0431   * 127* 131*   K 2.5* 3.0* 3.9   CL 91* 91* 91*   CO2 30* 23 31*   GLU 69* 242* 79   BUN 9 8 7*   CREATININE 0.6 0.7 0.6   CALCIUM 6.4* 6.7* 7.0*   PROT 4.1*  --  4.9*   ALBUMIN 1.3*  --  1.5*   BILITOT 0.8  --  0.6   ALKPHOS 192*  --  226*   AST 23  --  30   ALT 14  --  17   ANIONGAP 9 13 9   EGFRNONAA >60.0 >60.0 >60.0     Magnesium:   Recent Labs   Lab 03/15/20  0609 03/16/20  0431   MG 1.4* 2.0     POCT Glucose:   Recent Labs   Lab 03/15/20  1749 03/15/20  2135 03/16/20  0933   POCTGLUCOSE 289* 171* 217*     Microbiology Results (last 7 days)     Procedure Component Value Units Date/Time    Urine culture [[id number]]  (Abnormal)  (Susceptibility) Collected:  03/12/20 0117    Order Status:  Completed Specimen:  Urine Updated:  03/15/20 1019     Urine Culture, Routine ENTEROBACTER CLOACAE COMPLEX  >100,000 cfu/ml      Narrative:       Preferred Collection Type->Urine, Clean Catch          Significant Imaging: I have reviewed and interpreted all pertinent imaging  results/findings within the past 24 hours.    Assessment/Plan:      Active Diagnoses:    Diagnosis Date Noted POA    PRINCIPAL PROBLEM:  Diverticulitis [K57.92] 01/30/2020 Yes    Periumbilical abdominal pain [R10.33]  Yes    Ventral hernia without obstruction or gangrene [K43.9]  Yes    Hyponatremia [E87.1] 03/11/2020 Unknown    Constipation [K59.00] 03/11/2020 Unknown    Chronic heart failure with preserved ejection fraction [I50.32] 12/19/2019 Yes    Paroxysmal atrial fibrillation [I48.0] 12/19/2019 Yes    COPD (chronic obstructive pulmonary disease) [J44.9] 12/19/2019 Yes    Type 2 diabetes mellitus with hyperglycemia, without long-term current use of insulin [E11.65] 12/19/2019 Yes      Problems Resolved During this Admission:     Scheduled Meds:   calcium carbonate  500 mg Oral Once    ciprofloxacin HCl  500 mg Oral Q12H    dicyclomine  10 mg Oral TID    dronabinoL  2.5 mg Oral BID    magnesium oxide  400 mg Oral BID    metroNIDAZOLE  500 mg Oral Q8H    miconazole   Topical (Top) BID    midodrine  10 mg Oral QHS    mirtazapine  15 mg Oral QHS    potassium, sodium phosphates  1 packet Oral QID (AC & HS)    senna-docusate 8.6-50 mg  2 tablet Oral BID    warfarin  1 mg Oral Daily     Continuous Infusions:  PRN Meds:.acetaminophen, albuterol-ipratropium, benzonatate, Dextrose 10% Bolus, Dextrose 10% Bolus, glucagon (human recombinant), glucose, glucose, insulin aspart U-100, polyethylene glycol, sodium chloride 0.9%, traMADoL      PLAN:    Abdominal pain, Nausea, Vomiting   Diverticulitis  Umbilical hernia   - Patient presenting with recurrent diverticulitis most recently treated in Feb 2020 with seven days of zosyn. Underwent Colonoscopy 02/04/20 that showed sigmoid diverticulosis.  - afebrile and hemodynamically stable  - CT of the Abdomen showing Sigmoid diverticulosis with persistent mild wall thickening and subtle submucosal enhancement similar to prior examination dated 01/30/2020. Also  showed fecal impaction.   - Zosyn (D1- 3/12) --> cipro and flagyl on 3/15 for total of 7 days (end date: 3/18)   - holding antiemetics due to QTc > 500  - Patient has previously followed with colorectal surgery as OP for recurrent diverticulitis and should have follow up arranged at discharge.  - low sodium fluid restricted diet   - continue bowel regimen   - KUB negative for ileus or obstruction   - GI consulted. apprec recs  --  EGD 2/7 with only small hiatal hernia, otherwise unremarkable and biopsies negative for H pylori. No abdominal surgeries.  -- U/S with no biliary dilatation  -- No plan to repeat endoscopic intervention as above  -- Trial of bentyl 10mg tid  -- Defer further management of hernia to surgery  - General sx consulted for evaluation of hernia. apprec recs  -- patient has had chronic abd pain due to hernia for about one year. Hernia is stable and repair would be an elective procedure.   -- Operative fixation will likely treat to some extent her abdominal pain, especially at the hernia site.   -- She is high risk for perioperative complications, given her underlying medical risk factors so proceed with less invasive diagnostics first with GI  -- nausea and weight loss do not sound like an obstructive process and the CT does not have dilated small bowel.  These symptoms sound more like a motility issue.  -- will need pre-op evaluation/risk stratification from a cardiac standpoint prior to fixation, especially given her unknown tolerance to exertion. This can be done in the outpatient setting.  - nutrition following. apprec recs  -- Continue CL diet, ADAT to full liquids and Low na diet with texture per team   -- Change ONS to optisource (compliant with Vitamin K )   -- Suggest appetite stimulant when diet advanced. Ordered dronabinol     Constipation- improving   - CT of the Abdomen with Significant volume of fecal material within the rectum which could be related to fecal impaction  - Tap water  enema once on admit  - senna-docusate 2 tabs BID  - off oxycodone and ordered IV toradol   - miralax daily  - monitor I/Os    UTI  - Ucx (3/12) growing Enterobacter Cloacae. Resistant to zosyn  - switched zosyn to ciprofloxacin (D1- 3/15) x 3 days     Hyponatremia- improving   - Na 129 --> 132    - suspect hypovolemic hyponatremia due to over diuresis in setting of poor po intake  - hold home lasix  - caution with IVF given h/o CHF and recent acute respiratory failure due to hypervolemia   - BMP daily        Type 2 diabetes mellitus with hyperglycemia, without long-term current use of insulin  - hold home meds  - SSI  - hypoglycemic protocol      Paroxysmal atrial fibrillation  - continue home digoxin and warfarin  - INR goal 2-3  - pharmacy consulted to assist with warfarin management  - daily INRs  - monitor tele     Chronic heart failure with preserved ejection fraction  - ECHO 01/29/2020  With EF 45% with LVDD  - she was discharged 03/02/20 on Lasix 40mg po BID. Clinically the patient appears slightly hypovolemic. She is also with new hyponatremia. Suspect overdiuresis in setting of poor po intake.  - Hold home lasix at this time  - caution with IVF as patient had acute respiratory failure during recent admit for volume overload.  - monitor I/Os  - daily weights        COPD (chronic obstructive pulmonary disease)  - duonebs prn    VTE Risk Mitigation (From admission, onward)         Ordered     warfarin (COUMADIN) tablet 1 mg  Daily      03/16/20 0746     Place BRONWYN hose  Until discontinued      03/11/20 2228     IP VTE HIGH RISK PATIENT  Once      03/11/20 2228              Time spent in care of patient: > 35 minutes    Mendoza Caldwell MD  Department of Hospital Medicine   [facility]

## 2020-03-16 NOTE — PT/OT/SLP EVAL
Physical Therapy Evaluation    Patient Name:  Elizabeth Cano   MRN:  [id number]    Recommendations:     Discharge Recommendations:  (OT/PT upon return to NH)   Discharge Equipment Recommendations: none   Barriers to discharge: None    Assessment:     Elizabeth Cano is a 69 y.o. female admitted with a medical diagnosis of Diverticulitis.  She presents with the following impairments/functional limitations:  weakness, impaired functional mobilty, impaired balance, decreased safety awareness, impaired cardiopulmonary response to activity, impaired endurance, impaired self care skills, gait instability, decreased lower extremity function . Patient tolerated assessment well.  Patient will benefit from PT services to address the mentioned deficits in order to promote an improve functional mobility status. Upon d/c, she is an appropriate candidate for PT services within NH.    Rehab Prognosis: Fair; patient would benefit from acute skilled PT services to address these deficits and reach maximum level of function.    Recent Surgery: * No surgery found *      Plan:     During this hospitalization, patient to be seen 3 x/week to address the identified rehab impairments via gait training, therapeutic activities, therapeutic exercises, neuromuscular re-education, wheelchair management/training and progress toward the following goals:    · Plan of Care Expires:  04/15/20    History:     Past Medical History:   Diagnosis Date    A-fib     GAVIN (acute kidney injury)     CHF (congestive heart failure)     COPD (chronic obstructive pulmonary disease)     Diabetes mellitus     Hypertension        Past Surgical History:   Procedure Laterality Date    CARDIAC SURGERY      COLONOSCOPY N/A 2/4/2020    Procedure: COLONOSCOPY;  Surgeon: Gilberto Laguna MD;  Location: [address];  Service: Endoscopy;  Laterality: N/A;    ESOPHAGOGASTRODUODENOSCOPY N/A 2/7/2020    Procedure: EGD (ESOPHAGOGASTRODUODENOSCOPY);   "Surgeon: Aleksey Gomez MD;  Location: [facility] (2ND FLR);  Service: Endoscopy;  Laterality: N/A;       Subjective     Chief Complaint: none  Patient/Family Comments/goals: to feel better  Pain/Comfort:  · Pain Rating 1: 0/10  · Pain Rating Post-Intervention 1: 0/10    Patients cultural, spiritual, Mu-ism conflicts given the current situation: no    Living Environment: Patient lives at [facility].   Prior to admission, patients level of function was (A) for ADLs and w/c for mobility with PRN self-propulsion. She reports she would seldom get out of the bed.  Equipment used at home: wheelchair, bedside commode, walker, rolling.  DME owned (not currently used): none.  Upon discharge, patient will have assistance from NH staff .    Objective:     Communicated with RN prior to session.  Patient found supine with (no line attached)  upon PT entry to room. See below for detailed functional assessment. Patient agreeable to participate in initial evaluation.    General Precautions: Standard, fall   Orthopedic Precautions:N/A   Braces: N/A     Exams:  · Cognitive Exam:  Patient is oriented to Person, Place, Time and Situation  · Postural Exam:  Patient presented with the following abnormalities:    · -       Rounded shoulders  · -       Forward head  · Sensation:    LEFT LE: -       Intact  light/touch LE RIGHT LE:-       Intact  light/touch LE      ROM and Strength   Right Lower Extremity  Left Lower Extremity    Hip Flexion WFL WFL   Knee Extension  WFL WFL   Knee Flexion  WFL WFL   Ankle DF WFL WFL   Ankle PF  WFL WFL     Functional Mobility:  · Bed Mobility:     · Rolling Right: contact guard assistance  · Scooting: contact guard assistance  · Supine to Sit: moderate assistance  · Sit to Supine: minimum assistance  · Balance: sitting EOB - SBA; posterior pelvic tilt, slouched posture     OOB NT 2* to patient refusal, "I'm so sorry I just can't do any more today."    Therapeutic Activities and " Exercises:  -Patient educated on the role and goal of PT services during acute care LOS. Question and concerns acknowledged and answers as appropriate.   -Will continue to educated as needed.    -White board updated in patients room to reflect level of assistance needed with nursing.     AM-PAC 6 CLICK MOBILITY  Total Score:11     Patient left supine with all lines intact, call button in reach, bed alarm on and RN notified.  White board updated in patient room to reflect level of function with nursing.     GOALS:   Multidisciplinary Problems     Physical Therapy Goals        Problem: Physical Therapy Goal    Goal Priority Disciplines Outcome Goal Variances Interventions   Physical Therapy Goal     PT, PT/OT Ongoing, Progressing     Description:  Goals to be met by: 3/26/20     Patient will increase functional independence with mobility by performin. Supine to sit with Contact Guard Assistance  2. Sit to supine with Contact Guard Assistance  3. Sit to stand transfer with Moderate Assistance  4. Bed to chair transfer with Moderate Assistance using LRD, if needed                       Time Tracking:     PT Received On: 20  PT Start Time: 1104     PT Stop Time: 1118  PT Total Time (min): 14 min     Billable Minutes: Evaluation 14min      Lashawn Villalobos, PT  2020

## 2020-03-16 NOTE — PT/OT/SLP EVAL
Occupational Therapy   Evaluation    Name: Elizabeth Cano  MRN: [id number]  Admitting Diagnosis:  Diverticulitis      Recommendations:     Discharge Recommendations: (OT/PT at nursing home upon return.)  Discharge Equipment Recommendations:  none  Barriers to discharge:  None    Assessment:     Elizabeth Cano is a 69 y.o. female with a medical diagnosis of Diverticulitis.  She was able to perform supine/sit T/F c mod A and sat up on EOB for approx. 10 minutes c CGA  Pt c noted redness and irritation in L forearm.  Has 2/5 L shoulder strength and 3-/5 strength throughout rest of B UE.  Able to perform LB dressing c total assist and UB dressing c max A.  Unable to stand at this time 2* poor endurance.  Pt states that she has been receiving very little therapy at nursing home. Performance deficits affecting function: weakness, impaired endurance, impaired self care skills, impaired functional mobilty, impaired balance, decreased ROM, decreased upper extremity function, edema, impaired skin.      Rehab Prognosis: Good; patient would benefit from acute skilled OT services to address these deficits and reach maximum level of function.       Plan:     Patient to be seen 4 x/week to address the above listed problems via self-care/home management, therapeutic activities, therapeutic exercises  · Plan of Care Expires: 03/30/20  · Plan of Care Reviewed with: patient    Subjective     Chief Complaint: Pt admitted c abdominal pain, hx of diverticulitis, and hypervolemia  Patient/Family Comments/goals: To get better.    Occupational Profile:  Living Environment: Pt is a NH resident  Previous level of function: Assistance c all ADL's.  Set-up for feeding and grooming and wears a diaper.    Equipment Used at Home:  wheelchair, bedside commode, walker, rolling  Assistance upon Discharge: NH staff    Pain/Comfort:  · Pain Rating 1: 0/10    Patients cultural, spiritual, Pentecostal conflicts given the current situation:  no    Objective:     Communicated with: RN prior to session.  Patient found supine with (No lines) upon OT entry to room.    General Precautions: Standard, fall   Orthopedic Precautions:N/A   Braces: N/A     Occupational Performance:    Bed Mobility:    · Patient completed Supine to Sit with moderate assistance  · Patient completed Sit to Supine with minimum assistance    Functional Mobility/Transfers:    · Functional Mobility: Unable to attempt stand 2* poor endurance.    Activities of Daily Living:  · Grooming: minimum assistance to wash off face.  · Upper Body Dressing: total assistance to don hospital gown.  · Lower Body Dressing: total assistance to don socks.      Physical Exam:  Upper Extremity Range of Motion:     -       Right Upper Extremity: WFL  -       Left Upper Extremity: WFL  Upper Extremity Strength:    -       Right Upper Extremity: WFL  -       Left Upper Extremity: WFL    AMPAC 6 Click ADL:  AMPAC Total Score: 13      Education:    Patient left supine with all lines intact, call button in reach and RN notified    GOALS:   Multidisciplinary Problems     Occupational Therapy Goals        Problem: Occupational Therapy Goal    Goal Priority Disciplines Outcome Interventions   Occupational Therapy Goal     OT, PT/OT Ongoing, Progressing    Description:  Goals to be met by: 3/30/20     Patient will increase functional independence with ADLs by performing:    UE Dressing with Moderate Assistance.  LE Dressing with Moderate Assistance.  Grooming while seated with Set-up Assistance.  Toileting from bedside commode with Moderate Assistance for hygiene and clothing management.   Bathing from  shower chair/bench with Moderate Assistance.  Toilet transfer to bedside commode with Moderate Assistance.  Increased functional strength to WFL for B UE.  Upper extremity exercise program x15 reps per handout, with assistance as needed.                      History:     Past Medical History:   Diagnosis Date    A-fib      AGVIN (acute kidney injury)     CHF (congestive heart failure)     COPD (chronic obstructive pulmonary disease)     Diabetes mellitus     Hypertension        Past Surgical History:   Procedure Laterality Date    CARDIAC SURGERY      COLONOSCOPY N/A 2/4/2020    Procedure: COLONOSCOPY;  Surgeon: Gilberto Laguna MD;  Location: [facility] ([address] [place] [address]);  Service: Endoscopy;  Laterality: N/A;    ESOPHAGOGASTRODUODENOSCOPY N/A 2/7/2020    Procedure: EGD (ESOPHAGOGASTRODUODENOSCOPY);  Surgeon: Aleksey Gomez MD;  Location: [facility] ([address] [place] [address]);  Service: Endoscopy;  Laterality: N/A;       Time Tracking:     OT Date of Treatment: 03/16/20  OT Start Time: 1100  OT Stop Time: 1117  OT Total Time (min): 17 min    Billable Minutes:Evaluation 9  Self Care/Home Management 8    VIKY Cohen  3/16/2020

## 2020-03-16 NOTE — PLAN OF CARE
Problem: Physical Therapy Goal  Goal: Physical Therapy Goal  Description  Goals to be met by: 3/26/20     Patient will increase functional independence with mobility by performin. Supine to sit with Contact Guard Assistance  2. Sit to supine with Contact Guard Assistance  3. Sit to stand transfer with Moderate Assistance  4. Bed to chair transfer with Moderate Assistance using LRD, if needed      Outcome: Ongoing, Progressing   Initial evaluation completed. Patient tolerated assessment well. Established POC and goals. Patient would continue to benefit from skilled PT services in order to improve functional mobility independence.       Lashawn Villalobos, PT, DPT  3/16/2020

## 2020-03-16 NOTE — TELEPHONE ENCOUNTER
----- Message from Larissa Vasquez sent at 3/16/2020  1:18 PM CDT -----  Contact: Mary    Case Management     [id number]  Calling to schedule a hospital f/u appt for hernia.  Pls call with an appt.

## 2020-03-17 NOTE — PROGRESS NOTES
Accuchecks continue fluid restriction continues. Pt is incontinent of bowel and bladder turned and repositioned as she allows. Incontinent care given. Left arm edema improving elevated on pillows to support drainage. Has had 2 loose stools today denies abdominal pain at this time. IV patent to the right upper arm.

## 2020-03-17 NOTE — PROGRESS NOTES
"      [facility] Hospital Medicine  Progress Note    Patient Name: Elizabeth Cano  MRN: [id number]  Patient Class: IP- Inpatient   Admission Date: 3/11/2020  Length of Stay: 6 days  Attending Physician: Mendoza Caldwell MD  Primary Care Provider: Cesar Reeves MD    [facility] Medicine Team: [facility] HOSP MED D Mendoza Caldwell MD    HPI: This is a 70yo female with a past medical history of Afib (on coumadin), COPD, hypotension (on midodrine), diverticulitis, anxiety, and HFpEF (EF 45% on 1/29/20) who has been sent to the ED from nursing home facility with chief complaint of abdominal pain. Patient recently had lengthy complicated admission 01/29/20 to 03/02/20. She was initially admitted for management of UTI sepsis, later developed acute respiratory distress secondary to hypervolemia and acute heart failure exacerbation secondary to aggressive iv fluids with sepsis. Respiratory status improved with diuresis. She also developed diverticulitis treated with abx. She was discharged in stable condition on 03/02/20. Patient states that since discharge she has had very poor appetite with some nausea. Today patient reports episode of dry-heaving and complains of generalized abdominal pain with significant tenderness to palpation in periumbilical area. Nursing home concerned for incarcerated hernia and patient subsequently brought to the ED for further evaluation.     In the ED patient afebrile and hemodynamically stable. Initial LA 2.5. Na 129, Cl 87. CBC unremarkable. CT of the Abdomen performed showing "Sigmoid diverticulosis with persistent mild wall thickening and subtle submucosal enhancement similar to prior examination dated 01/30/2020.  No significant surrounding inflammatory changes or free fluid. Findings could be related to ongoing mild diverticulitis versus chronic changes. Significant volume of fecal material within the rectum which could be related to fecal impaction. Cholelithiasis. " " Cardiomegaly. Unchanged appearance of fat containing umbilical hernia without adjacent inflammatory changes." Patient denies fevers, chills, chest pain, shortness of breath, dysuria, or confusion. She has been admitted to the care of medicine for further evaluation and management.    Subjective:     Principal Problem:Diverticulitis    Interval History: Patient lying in bed, lethargic, alert and oriented x 3. Reports continued improvement in nausea, vomiting and constipation. Appetite is improving. Abdominal pain is present but less severe. Reports continued fatigue and weakness but was able to work a little with therapy.    Review of Systems   Constitutional: Positive for activity change, appetite change and unexpected weight change. Negative for chills and fever.   HENT: Negative for congestion.    Eyes: Negative for visual disturbance.   Respiratory: Negative for cough and shortness of breath.    Cardiovascular: Negative for chest pain and leg swelling.   Gastrointestinal: Positive for abdominal pain and nausea (improving). Negative for abdominal distention, constipation and vomiting.   Genitourinary: Negative for dysuria.   Neurological: Positive for weakness. Negative for dizziness.   Psychiatric/Behavioral: Negative for confusion.     Objective:     Vital Signs (Most Recent):  Temp: 96.6 °F (35.9 °C) (03/17/20 1128)  Pulse: 104 (03/17/20 1128)  Resp: 16 (03/17/20 1128)  BP: (!) 98/55 (03/17/20 1128)  SpO2: 99 % (03/17/20 1128) Vital Signs (24h Range):  Temp:  [96.4 °F (35.8 °C)-98.5 °F (36.9 °C)] 96.6 °F (35.9 °C)  Pulse:  [] 104  Resp:  [15-18] 16  SpO2:  [97 %-99 %] 99 %  BP: ()/(55-78) 98/55     Weight: 58.6 kg (129 lb 3 oz)  Body mass index is 20.85 kg/m².    Intake/Output Summary (Last 24 hours) at 3/17/2020 1152  Last data filed at 3/16/2020 2030  Gross per 24 hour   Intake 680 ml   Output --   Net 680 ml        Physical Exam   Constitutional: She is oriented to person, place, and time. She " appears well-developed.   HENT:   Head: Normocephalic and atraumatic.   Eyes: Pupils are equal, round, and reactive to light. Conjunctivae and EOM are normal.   Neck: Neck supple.   Cardiovascular: Normal rate and regular rhythm.   Pulmonary/Chest: Effort normal and breath sounds normal. No respiratory distress. She has no wheezes.   Abdominal: Soft. Bowel sounds are normal. She exhibits mass (umbilical hernia). She exhibits no distension. There is tenderness.   Musculoskeletal: Normal range of motion. She exhibits edema (trace).   Neurological: She is alert and oriented to person, place, and time. No cranial nerve deficit.   Skin: Skin is warm.   Psychiatric: She has a normal mood and affect.       Significant Labs:   A1C:   Recent Labs   Lab 12/19/19  2220 01/30/20  0421   HGBA1C 6.4* 5.4     CBC:   Recent Labs   Lab 03/16/20  0431 03/17/20  0453   WBC 5.13 5.34   HGB 9.5* 9.0*   HCT 29.6* 27.2*    162     CMP:   Recent Labs   Lab 03/15/20  1924 03/16/20  0431 03/17/20  0453   * 131* 131*   K 3.0* 3.9 2.9*   CL 91* 91* 92*   CO2 23 31* 33*   * 79 94   BUN 8 7* 8   CREATININE 0.7 0.6 0.6   CALCIUM 6.7* 7.0* 7.6*   PROT  --  4.9* 4.7*   ALBUMIN  --  1.5* 1.5*   BILITOT  --  0.6 0.6   ALKPHOS  --  226* 260*   AST  --  30 30   ALT  --  17 17   ANIONGAP 13 9 6*   EGFRNONAA >60.0 >60.0 >60.0     Magnesium:   Recent Labs   Lab 03/16/20  0431 03/17/20  0453   MG 2.0 1.8     POCT Glucose:   Recent Labs   Lab 03/16/20  1301 03/16/20  1649 03/16/20  2035   POCTGLUCOSE 157* 318* 159*     Microbiology Results (last 7 days)     Procedure Component Value Units Date/Time    Urine culture [[id number]]  (Abnormal)  (Susceptibility) Collected:  03/12/20 0117    Order Status:  Completed Specimen:  Urine Updated:  03/15/20 1019     Urine Culture, Routine ENTEROBACTER CLOACAE COMPLEX  >100,000 cfu/ml      Narrative:       Preferred Collection Type->Urine, Clean Catch          Significant Imaging: I have reviewed and  interpreted all pertinent imaging results/findings within the past 24 hours.    Assessment/Plan:      Active Diagnoses:    Diagnosis Date Noted POA    PRINCIPAL PROBLEM:  Diverticulitis [K57.92] 01/30/2020 Yes    Periumbilical abdominal pain [R10.33]  Yes    Ventral hernia without obstruction or gangrene [K43.9]  Yes    Hyponatremia [E87.1] 03/11/2020 Unknown    Constipation [K59.00] 03/11/2020 Unknown    Chronic heart failure with preserved ejection fraction [I50.32] 12/19/2019 Yes    Paroxysmal atrial fibrillation [I48.0] 12/19/2019 Yes    COPD (chronic obstructive pulmonary disease) [J44.9] 12/19/2019 Yes    Type 2 diabetes mellitus with hyperglycemia, without long-term current use of insulin [E11.65] 12/19/2019 Yes      Problems Resolved During this Admission:     Scheduled Meds:   calcium carbonate  500 mg Oral Once    ciprofloxacin HCl  500 mg Oral Q12H    dicyclomine  10 mg Oral TID    dronabinoL  2.5 mg Oral BID    magnesium oxide  400 mg Oral BID    metroNIDAZOLE  500 mg Oral Q8H    miconazole   Topical (Top) BID    midodrine  10 mg Oral QHS    mirtazapine  15 mg Oral QHS    multivitamin  1 tablet Oral Daily    potassium chloride 10%  40 mEq Oral Q4H    potassium, sodium phosphates  1 packet Oral QID (AC & HS)    senna-docusate 8.6-50 mg  2 tablet Oral BID    warfarin  1 mg Oral Daily    warfarin  1 mg Oral Once     Continuous Infusions:  PRN Meds:.acetaminophen, albuterol-ipratropium, benzonatate, Dextrose 10% Bolus, Dextrose 10% Bolus, glucagon (human recombinant), glucose, glucose, insulin aspart U-100, polyethylene glycol, sodium chloride 0.9%, traMADoL      PLAN:    Abdominal pain, Nausea, Vomiting   Diverticulitis  Umbilical hernia   - Patient presenting with recurrent diverticulitis most recently treated in Feb 2020 with seven days of zosyn. Underwent Colonoscopy 02/04/20 that showed sigmoid diverticulosis.  - afebrile and hemodynamically stable  - CT of the Abdomen showing  Sigmoid diverticulosis with persistent mild wall thickening and subtle submucosal enhancement similar to prior examination dated 01/30/2020. Also showed fecal impaction.   - Zosyn (D1- 3/12) --> cipro and flagyl on 3/15 for total of 7 days (end date: 3/18)   - holding antiemetics due to QTc > 500  - Patient has previously followed with colorectal surgery as OP for recurrent diverticulitis and should have follow up arranged at discharge.  - low sodium fluid restricted diet   - continue bowel regimen   - KUB negative for ileus or obstruction   - GI consulted. apprec recs  --  EGD 2/7 with only small hiatal hernia, otherwise unremarkable and biopsies negative for H pylori. No abdominal surgeries.  -- U/S with no biliary dilatation  -- No plan to repeat endoscopic intervention as above  -- Trial of bentyl 10mg tid  -- Defer further management of hernia to surgery  - General sx consulted for evaluation of hernia. apprec recs  -- patient has had chronic abd pain due to hernia for about one year. Hernia is stable and repair would be an elective procedure.   -- Operative fixation will likely treat to some extent her abdominal pain, especially at the hernia site.   -- She is high risk for perioperative complications, given her underlying medical risk factors so proceed with less invasive diagnostics first with GI  -- nausea and weight loss do not sound like an obstructive process and the CT does not have dilated small bowel.  These symptoms sound more like a motility issue.  -- will need pre-op evaluation/risk stratification from a cardiac standpoint prior to fixation, especially given her unknown tolerance to exertion. This can be done in the outpatient setting.  - nutrition following. apprec recs  -- Continue CL diet, ADAT to full liquids and Low na diet with texture per team   -- Change ONS to optisource (compliant with Vitamin K )   -- Suggest appetite stimulant when diet advanced. Ordered dronabinol     Constipation-  improving   - CT of the Abdomen with Significant volume of fecal material within the rectum which could be related to fecal impaction  - Tap water enema once on admit  - senna-docusate 2 tabs BID  - off oxycodone and ordered IV toradol   - miralax daily  - monitor I/Os    UTI  - Ucx (3/12) growing Enterobacter Cloacae. Resistant to zosyn  - switched zosyn to ciprofloxacin (D1- 3/15) x 3 days     Hyponatremia- improving   - Na on admit, 129   - suspect hypovolemic hyponatremia due to over diuresis in setting of poor po intake  - hold home lasix  - caution with IVF given h/o CHF and recent acute respiratory failure due to hypervolemia   - BMP daily     Hypokalemia  - replete prn   - plan to discharge on 20 mEq potasium daily        Type 2 diabetes mellitus with hyperglycemia, without long-term current use of insulin  - hold home meds  - SSI  - hypoglycemic protocol      Paroxysmal atrial fibrillation  - continue home digoxin and warfarin  - INR goal 2-3  - pharmacy consulted to assist with warfarin management  - daily INRs  - monitor tele     Chronic heart failure with preserved ejection fraction  - ECHO 01/29/2020  With EF 45% with LVDD  - she was discharged 03/02/20 on Lasix 40mg po BID. Clinically the patient appears slightly hypovolemic. She is also with new hyponatremia. Suspect overdiuresis in setting of poor po intake.  - resumed lasix on 3/18  - caution with IVF as patient had acute respiratory failure during recent admit for volume overload.  - monitor I/Os  - daily weights        COPD (chronic obstructive pulmonary disease)  - duonebs prn    VTE Risk Mitigation (From admission, onward)         Ordered     warfarin (COUMADIN) tablet 1 mg  Once      03/17/20 0748     warfarin (COUMADIN) tablet 1 mg  Daily      03/16/20 0746     Place BRONWYN hose  Until discontinued      03/11/20 2228     IP VTE HIGH RISK PATIENT  Once      03/11/20 2228              Time spent in care of patient: > 35 minutes    Mendoza Caldwell,  MD  Department of Hospital Medicine   [facility]

## 2020-03-17 NOTE — PT/OT/SLP PROGRESS
Occupational Therapy   Treatment    Name: Elizabeth Cano  MRN: [id number]  Admitting Diagnosis:  Diverticulitis       Recommendations:     Discharge Recommendations: (OT/PT in NH)  Discharge Equipment Recommendations:  none  Barriers to discharge:  None    Assessment:     Elizabeth Cano is a 69 y.o. female with a medical diagnosis of Diverticulitis.  She presents with impaired ADL and mobility performance deficits. Pt parrticipated in bed mobility, EOB sitting tolerance tasks, fx'l t/f training trials, and established therapeutic goals for ADLs. Pt expressing desire to mobilize again and maximize fx'l potential with mobility. Pt limited this session primarily by voiced fear of falling, lightheadedness, and global weakness. Pt familiar to writing therapist from previous admission with therapeutic listening and rapport building exercises used this date. Pt reporting goal this date was to use BS. During session, pt demo  CGA with bed mobility and sat EOB with supervision. Pt then attempted standing using tricep extension technique with total (A), then to scoot towards HOB with max (A) (x2 trials). Pt expressing gratitude for session to further improve confidence in mobility. Vital signs read at EOB once lightheadedness reported juan carlos as 98/55. Performance deficits affecting function are weakness, impaired endurance, gait instability, impaired self care skills, impaired functional mobilty, impaired balance, decreased lower extremity function, impaired cardiopulmonary response to activity. Pt would benefit from continued OT skilled services 3x/wk to improve daily living skills to optimize QOL. Pt is recommended to discharge to OT in NH at this time.      Rehab Prognosis:  Fair; patient would benefit from acute skilled OT services to address these deficits and reach maximum level of function.       Plan:     Patient to be seen 3 x/week to address the above listed problems via self-care/home management, therapeutic  activities, therapeutic exercises  · Plan of Care Expires: 03/30/20  · Plan of Care Reviewed with: patient    Subjective     Pain/Comfort:  · Pain Rating 1: 0/10  · Pain Rating Post-Intervention 1: 0/10    Objective:     Communicated with: RN prior to session.  Patient found HOB elevated with bed alarm upon OT entry to room.    General Precautions: Standard, fall   Orthopedic Precautions:N/A   Braces: N/A     Occupational Performance:     Bed Mobility:    · Patient completed Rolling/Turning to Left with  stand by assistance  · Patient completed Scooting/Bridging with max (A) to HOB; stand by assistance to EOB  · Patient completed Supine to Sit with contact guard assistance  · Patient completed Sit to Supine with contact guard assistance     Functional Mobility/Transfers:  · Functional Mobility: Pt completed sit<stand t/f trial (x3) with total (A) using tricep extension technique. Pt with little clearance of hips despite physical assistance and tactile cues.     Activities of Daily Living:  · Lower Body Dressing: total assistance adjusting sock fit at EOB       [facility] 6 Click ADL: 13    Treatment & Education:  Pt educated on role of occupational therapy, POC, and safety during ADLs and functional mobility. Pt and OT discussed importance of safe, continued mobility to optimize daily living skills. Pt verbalized understanding. Pt completed the following during session: bed mobility, EOB sitting tolerance, LB dressing, tricep extension trials (x3) in prep for fx'l mobility. Pt established goals of getting to BSC and to w/c for further improvement in QOL. White board updated during session. Pt given instruction to call for medical staff/nurse for assistance.       Patient left HOB elevated with all lines intact, call button in reach and RN notifiedEducation:      GOALS:   Multidisciplinary Problems     Occupational Therapy Goals        Problem: Occupational Therapy Goal    Goal Priority Disciplines Outcome Interventions    Occupational Therapy Goal     OT, PT/OT Ongoing, Progressing    Description:  Goals to be met by: 3/30/20     Patient will increase functional independence with ADLs by performing:    UE Dressing with Moderate Assistance.  LE Dressing with Moderate Assistance.  Grooming while seated with Set-up Assistance.  Toileting from bedside commode with Moderate Assistance for hygiene and clothing management.   Bathing from  shower chair/bench with Moderate Assistance.  Toilet transfer to bedside commode with Moderate Assistance.  Increased functional strength to WFL for B UE.  Upper extremity exercise program x15 reps per handout, with assistance as needed.                      Time Tracking:     OT Date of Treatment: 03/17/20  OT Start Time: 1119  OT Stop Time: 1135  OT Total Time (min): 16 min    Billable Minutes:Therapeutic Activity 16 min    Bruna Noriega OT  3/17/2020

## 2020-03-17 NOTE — CARE UPDATE
Rapid Response Nurse Chart Check     Chart check completed, abnormal VS noted. bedside RNNettie contacted, no concerns verbalized at this time, instructed to call [phone] for further concerns or assistance.

## 2020-03-17 NOTE — PLAN OF CARE
Pt AAOx4, VSS, Pt had mild complaints of pain but refused medication. Free of falls and injuries. Pt 2L fluid restriction maintained per orders. BS monitoring maintained per orders. Pt wound care done per orders. PT received PO potassium Per orders. Pt refused Na-Phosphate powder.  No acute changes, see previous notes, will continue to monitor.   Problem: Wound  Goal: Optimal Wound Healing  Outcome: Ongoing, Progressing  Intervention: Promote Effective Wound Healing  Flowsheets (Taken 3/17/2020 1635)  Oral Nutrition Promotion: calorie dense foods provided  Sleep/Rest Enhancement: relaxation techniques promoted  Pain Management Interventions: relaxation techniques promoted;quiet environment facilitated;care clustered;diversional activity provided;medication offered but refused     Problem: Fall Injury Risk  Goal: Absence of Fall and Fall-Related Injury  Outcome: Ongoing, Progressing  Intervention: Identify and Manage Contributors to Fall Injury Risk  Flowsheets (Taken 3/17/2020 1635)  Self-Care Promotion: independence encouraged;BADL personal objects within reach;BADL personal routines maintained;meal setup provided  Medication Review/Management: medications reviewed  Intervention: Promote Injury-Free Environment  Flowsheets (Taken 3/17/2020 1635)  Safety Promotion/Fall Prevention: side rails raised x 2;bed alarm set;assistive device/personal item within reach;diversional activities provided;Fall Risk reviewed with patient/family;Fall Risk signage in place;lighting adjusted;medications reviewed;nonskid shoes/socks when out of bed  Environmental Safety Modification: assistive device/personal items within reach;clutter free environment maintained;lighting adjusted;room organization consistent     Problem: Adult Inpatient Plan of Care  Goal: Plan of Care Review  Outcome: Ongoing, Progressing  Flowsheets (Taken 3/17/2020 1635)  Plan of Care Reviewed With: patient  Goal: Patient-Specific Goal (Individualization)  Outcome:  Ongoing, Progressing  Goal: Absence of Hospital-Acquired Illness or Injury  Outcome: Ongoing, Progressing  Intervention: Identify and Manage Fall Risk  Flowsheets (Taken 3/17/2020 1635)  Safety Promotion/Fall Prevention: side rails raised x 2;bed alarm set;assistive device/personal item within reach;diversional activities provided;Fall Risk reviewed with patient/family;Fall Risk signage in place;lighting adjusted;medications reviewed;nonskid shoes/socks when out of bed  Intervention: Prevent VTE (venous thromboembolism)  Flowsheets (Taken 3/17/2020 1635)  VTE Prevention/Management: bleeding precautions maintained;bleeding risk assessed;bleeding risk factor(s) identified, provider notified  Goal: Optimal Comfort and Wellbeing  Outcome: Ongoing, Progressing  Intervention: Provide Person-Centered Care  Flowsheets (Taken 3/17/2020 1637)  Trust Relationship/Rapport: care explained; choices provided; emotional support provided; empathic listening provided; questions answered; thoughts/feelings acknowledged; questions encouraged; reassurance provided  Goal: Readiness for Transition of Care  Outcome: Ongoing, Progressing  Goal: Rounds/Family Conference  Outcome: Ongoing, Progressing     Problem: Diabetes Comorbidity  Goal: Blood Glucose Level Within Desired Range  Outcome: Ongoing, Progressing  Intervention: Maintain Glycemic Control  Flowsheets (Taken 3/17/2020 1637)  Glycemic Management: blood glucose monitoring     Problem: Skin Injury Risk Increased  Goal: Skin Health and Integrity  Outcome: Ongoing, Progressing  Intervention: Optimize Skin Protection  Flowsheets (Taken 3/17/2020 1637)  Pressure Reduction Techniques: frequent weight shift encouraged; weight shift assistance provided  Pressure Reduction Devices: positioning supports utilized; pressure-redistributing mattress utilized  Skin Protection: adhesive use limited; tubing/devices free from skin contact  Head of Bed (HOB): HOB lowered; HOB elevated  Intervention:  Promote and Optimize Oral Intake  Flowsheets (Taken 3/17/2020 1637)  Oral Nutrition Promotion: calorie dense foods provided     Problem: Malnutrition  Goal: Improved Nutritional Intake  Outcome: Ongoing, Progressing

## 2020-03-17 NOTE — PLAN OF CARE
Problem: Occupational Therapy Goal  Goal: Occupational Therapy Goal  Description  Goals to be met by: 3/30/20     Patient will increase functional independence with ADLs by performing:    UE Dressing with Moderate Assistance.  LE Dressing with Moderate Assistance.  Grooming while seated with Set-up Assistance.  Toileting from bedside commode with Moderate Assistance for hygiene and clothing management.   Bathing from  shower chair/bench with Moderate Assistance.  Toilet transfer to bedside commode with Moderate Assistance.  Increased functional strength to WFL for B UE.  Upper extremity exercise program x15 reps per handout, with assistance as needed.   Continue OT as tolerated.  Bruna Noriega OT  3/17/2020    Outcome: Ongoing, Progressing

## 2020-03-17 NOTE — PHYSICIAN QUERY
PT Name: Elizabeth Cano  MR #: [id number]    Physician Query Form - Consultant Diagnosis Clarification     CDS Anitha Feldman, RN, BSN        Contact Information:    [email or web address]         This form is a permanent document in the medical record.     Query Date: March 17, 2020      By submitting this query, we are merely seeking further clarification of documentation.  Please utilize your independent clinical judgment when addressing the question(s) below.      The Medical record contains the following:   Diagnosis Supporting Clinical Information Location in Medical Record   moderate malnutrition  reported decreased PO intake x 4 months. Unable to tolerate meals at home and only able to tolearte broth currently.  Wt loss of ~50 lbs from UBW ~170 lbs over past 4 months       NFPE completed today, pt with mild-moderate muscle and fat loss in calvicle, shoulder, hands and lower exteremies, meets criteria for moderate malnutrition.     Weight: 55.7 kg (122 lb 12.7 oz)  BMI (Calculated): 19.8    Signs and Symptoms (as evidenced by):  Energy Intake: <75% of estimated energy requirement for x4 months   Body Fat Depletion: mild and moderate depletion of orbitals and triceps   Muscle Mass Depletion: mild and moderate depletion of temples, clavicle region, scapular region, interosseous muscle and lower extremities   Weight Loss: 28% x 4 months (per pt)          Recommendation:   1. Continue CL diet, ADAT to full liquids and Low na diet with texture per team   2. Change ONS to optisource (compliant with Vitamin K )   3. Suggest appetite stimulant when diet advanced    Principal Problem:    Diverticulitis  Umbilical hernia   Abdominal pain, Nausea, Vomiting     3/13 Registered Dietician consult                                                 3/14  PN                AND / ASPEN Clinical Characteristics (October 2011)  A minimum of two characteristics is recommended for diagnosing either moderate or severe malnutrition    Mild Malnutrition Moderate Malnutrition Severe Malnutrition   Energy Intake from p.o., TF or TPN. < 75% intake of estimated energy needs for less than 7 days < 75% intake of estimated energy needs for greater than 7 days < 50% intake of estimated energy needs for > 5 days   Weight Loss 1-2% in 1 month  5% in 3 months  7.5% in 6 months  10% in 1 year 1-2 % in 1 week  5% in 1 month  7.5% in 3 months  10% in 6 months  20% in 1 year > 2% in 1 week  > 5% in 1 month  > 7.5% in 3 months  > 10% in 6 months  > 20% in 1 year   Physical Findings     None *Mild subcutaneous fat and/or muscle loss  *Mild fluid accumulation  *Stage II decubitus  *Surgical wound or non-healing wound *Mod/severe subcutaneous fat and/or muscle loss  *Mod/severe fluid accumulation  *Stage III or IV decubitus  *Non-healing surgical wound         Do you agree with the Consultants diagnosis of     Moderate malnutrition    [ X ] Yes   [  ] No   [  ] Other/Clarification of findings:   [  ] Clinically undetermined

## 2020-03-17 NOTE — CONSULTS
PharmD Consult received for:  Warfarin dosing      PHARMACY CONSULT NOTE: WARFARIN  Elizabeth Cano is a 69 y.o. female on warfarin therapy for Paroxysmal Atrial Fibrillation. PharmD has been consulted for warfarin dosing.     Current order: Warfarin 1 mg every Mon, Wed, Fri  Home dose: 1 mg every Mon, Wed, Fri   INR goal: 2-3   Coumadin clinic enrollment: Patient's nursing home was managing prior to admission    Lab Results   Component Value Date    INR 1.1 03/17/2020    INR 1.3 (H) 03/16/2020    INR 1.4 (H) 03/15/2020     Significant drug interactions: N/A  Diet:  Low sodium diet, now on Optisource Protein drink (was on Boost)    Recommendation(s):    Give warfarin 2 mg tonight, continue warfarin 1 mg daily for discharge likely today.   Expect INR to reflect resumed regimen from 3/16 in 24-48 hrs.   Pharmacy will continue to follow and monitor warfarin    Thank you for the consult,  Narciso Maddox, PharmD  Extension [phone]    **Note: Consults are reviewed Monday-Friday 7:00am-3:30pm. THE ABOVE RECOMMENDATIONS ARE ONLY SUGGESTED.THE RECOMMENDATIONS SHOULD BE CONSIDERED IN CONJUNCTION WITH ALL PATIENT FACTORS. **

## 2020-03-18 PROBLEM — K59.00 CONSTIPATION: Status: RESOLVED | Noted: 2020-01-01 | Resolved: 2020-01-01

## 2020-03-18 PROBLEM — K57.92 DIVERTICULITIS: Status: RESOLVED | Noted: 2020-01-01 | Resolved: 2020-01-01

## 2020-03-18 NOTE — PLAN OF CARE
Pt remains free from falls and injuries during shift. Awake, alert, and oriented x 4. Vital signs stable. Denied pain. No signs of acute distress noted at this time. Bed in lowest position, wheels locked, and bed alarm on. Call light in reach. Will continue to monitor, safety maintained. Encouraged to call for any assistance.

## 2020-03-18 NOTE — PROGRESS NOTES
Patient needs to have surgery for a ventral hernia.  From a cardiovascular point of view, her estimated risk with the proposed surgery/procedure for an adverse perioperative cardiac outcome (MI, pulmonary edema, ventricular fibrillation or primary cardiac arrest, and complete heart block) is 0.9% (0.3%-2.1%) and for an adverse 30 day cardiac outcome (myocardial infarction, cardiac arrest, or death) is 6.0% (95% CI 4.9%-7.4%) (Revised Cardiac Risk Index [RCRI] Score = 1 - CCS Criteria - [place] J Cardiol 2017; 33:17-32) based on the following risk factors: History of congestive heart failure.  The patient will need to be off of warfarin for approximately 1 week prior to the procedure. Please contact the Coumadin Clinic for specific guidelines on discontinuing her anticoagulation.  Please note, the patient's pulmonary status may be greater risk to her than her cardiac status.

## 2020-03-18 NOTE — PLAN OF CARE
03/18/20 1220   Post-Acute Status   Post-Acute Authorization Placement   Post-Acute Placement Status Set-up Complete     Transportation scheduled via wheelchair van for 1:30pm to transfer to [facility].  GARFIELD Castro to call report to [phone], room 226A.  GARFIELD Castro was notified of the above information.    Apolonia Ramirez LMSW  [facility]  [phone]

## 2020-03-18 NOTE — PROGRESS NOTES
PHARMACY CONSULT NOTE: WARFARIN  Elizabeth Cano is a 69 y.o. female on warfarin therapy for Paroxysmal Atrial Fibrillation. PharmD has been consulted for warfarin dosing.     Current order: Warfarin 1 mg every Mon, Wed, Fri  Home dose: 1 mg every Mon, Wed, Fri   INR goal: 2-3   Coumadin clinic enrollment: Patient's nursing home was managing prior to admission    Lab Results   Component Value Date    INR 1.1 03/18/2020    INR 1.1 03/17/2020    INR 1.3 (H) 03/16/2020     Significant drug interactions: N/A  Diet:  Adult Regular, Low sodium diet, now on Optisource Protein drink (was on Boost)    Recommendation(s):   · Give warfarin 2 mg tonight (only 1 mg was given 3/17), continue warfarin 1 mg daily thereafter.  · Expect INR to reflect 3/16 dose in 24 hrs.  · Pharmacy will continue to follow and monitor warfarin    Thank you for the consult,  Narciso Maddox, PharmD  Extension [phone]    **Note: Consults are reviewed Monday-Friday 7:00am-3:30pm. THE ABOVE RECOMMENDATIONS ARE ONLY SUGGESTED.THE RECOMMENDATIONS SHOULD BE CONSIDERED IN CONJUNCTION WITH ALL PATIENT FACTORS. **

## 2020-03-18 NOTE — PLAN OF CARE
[facility]     Department of Hospital Medicine     [address] [place] [address]     [phone] after hours  [phone] fax       NURSING HOME ORDERS    03/18/2020    Admit to Nursing Home:  Regular Bed         Diagnoses:  Active Hospital Problems    Diagnosis  POA    Periumbilical abdominal pain [R10.33]  Yes    Ventral hernia without obstruction or gangrene [K43.9]  Yes    Hyponatremia [E87.1]  Yes    Chronic heart failure with preserved ejection fraction [I50.32]  Yes    Paroxysmal atrial fibrillation [I48.0]  Yes    COPD (chronic obstructive pulmonary disease) [J44.9]  Yes    Type 2 diabetes mellitus with hyperglycemia, without long-term current use of insulin [E11.65]  Yes      Resolved Hospital Problems    Diagnosis Date Resolved POA    *Diverticulitis [K57.92] 03/18/2020 Yes    Constipation [K59.00] 03/18/2020 Yes       Patient is homebound due to:  Diverticulitis    Allergies:  Review of patient's allergies indicates:   Allergen Reactions    Levsin [hyoscyamine sulfate] Hallucinations       Vitals: Per facility protocol     Diet: Cardiac, fluid restriction 2L/day    Acitivities: Per facility protocol    - Up in a chair each morning as tolerated   - Ambulate with assistance to bathroom   - Scheduled walks once each shift (every 8 hours)   - May use walker, cane, or self-propelled wheelchair    LABS:  Per facility protocol  - CMP, CBC each month for 3 months  - PT-INR each week for 1 month then monthly     Nursing Precautions:     - Fall precautions per nursing home protocol   - Decubitus precautions:        -  for positioning   - Pressure reducing foam mattress   - Turn patient every two hours. Use wedge pillows to anchor patient    CONSULTS:     Physical Therapy to evaluate and treat     Occupational Therapy to evaluate and treat     Nutrition to evaluate and recommend diet     Psychiatry to evaluate and follow patients for depression  and dementia    MISCELLANEOUS CARE:       Routine Skin for Bedridden Patients:  Apply moisture barrier cream to all    skin folds and wet areas in perineal area daily and after baths and                           all bowel movements.        DIABETES CARE:      Check blood sugar:    Fingerstick blood sugar a.m and p.m.      Report CBG < 60 or > 400 to physician.                                          Insulin Sliding Scale          Glucose  Novolog Insulin Subcutaneous        0 - 60   Orange juice or glucose tablet, hold insulin      No insulin   201-250  2 units   251-300  4 units   301-350  6 units   351-400  8 units   >400   10 units then call physician      Medications: Discontinue all previous medication orders, if any. See new list below.     Elizabeth Cano   Home Medication Instructions BRIAN:[id number]    Printed on:03/18/20 1001   Medication Information                      albuterol (PROVENTIL) 2.5 mg /3 mL (0.083 %) nebulizer solution  Take 0.5 mLs by nebulization every 6 (six) hours as needed (wheezing). Rescue              benzonatate (TESSALON) 100 MG capsule  Take 1 capsule (100 mg total) by mouth 3 (three) times daily as needed for Cough.             budesonide (PULMICORT) 0.25 mg/2 mL nebulizer solution  Take 2 ml by nebulization 2 times daily Controller             dicyclomine (BENTYL) 10 MG capsule  Take 1 capsule (10 mg total) by mouth 3 (three) times daily.             dronabinoL (MARINOL) 2.5 MG capsule  Take 1 capsule (2.5 mg total) by mouth 2 (two) times daily.             furosemide (LASIX) 20 MG tablet  Take 2 tablets (40 mg total) by mouth 2 (two) times daily.             L. acidophilus/pectin, citrus (ACIDOPHILUS PROBIOTIC ORAL)  Take 1 tablet by mouth once daily.             lidocaine (LIDODERM) 5 %  Apply topically to right leg as needed for pain             magnesium oxide (MAG-OX) 400 mg (241.3 mg magnesium) tablet  Take 1 tablet (400 mg total) by mouth once daily.              metFORMIN (GLUCOPHAGE) 1000 MG tablet  Take 1,000 mg by mouth 2 (two) times daily with meals.             miconazole nitrate 2% (MICOTIN) 2 % Oint  Apply topically 2 (two) times daily. Apply to the bilateral groins/perineal area BID for 7 days             midodrine (PROAMATINE) 10 MG tablet  Take 1 tablet (10 mg total) by mouth every evening.             mirtazapine (REMERON) 15 MG tablet  Take 15 mg by mouth every evening.             nystatin (MYCOSTATIN) powder  Apply twice daily under breast folds for redness and irritation until resolved             ondansetron (ZOFRAN) 4 MG tablet  Take 4 mg by mouth every 6 (six) hours as needed for Nausea.             pantoprazole (PROTONIX) 40 MG tablet  Take 1 tablet (40 mg total) by mouth once daily.             phenyleph-min oil-petrolatum 0.25-14-74.9 % Oint  Place 1 applicator rectally 4 (four) times daily as needed.             polyethylene glycol (GLYCOLAX) 17 gram PwPk  Take 17 g by mouth once daily.             potassium chloride (MICRO-K) 10 MEQ CpSR  Take 3 capsules (30 mEq total) by mouth once daily.             senna (SENOKOT) 8.6 mg tablet  Take 1 tablet by mouth once daily.             simethicone (MYLICON) 80 MG chewable tablet  Take 1 tablet (80 mg total) by mouth 3 (three) times daily as needed.             vitamin D (VITAMIN D3) 1000 units Tab  Take 1,000 Units by mouth once daily.             warfarin (COUMADIN) 1 MG tablet  Take 1 tablet (1 mg total) by mouth Daily. At 5:00pm                       _____________e-sig____________________  Mendoza Caldwell MD  03/18/2020

## 2020-03-18 NOTE — PLAN OF CARE
03/18/20 1040   Discharge Reassessment   Assessment Type Discharge Planning Reassessment   Do you have any problems affording any of your prescribed medications? No   Discharge Plan A Return to nursing home   Discharge Plan B New Nursing Home placement - nursing home care facility   DME Needed Upon Discharge  none   Anticipated Discharge Disposition Int Care Fac   Can the patient/caregiver answer the patient profile reliably? Yes, cognitively intact   How does the patient rate their overall health at the present time? Fair   How often would a person be available to care for the patient? Whenever needed   Number of comorbid conditions (as recorded on the chart) Five or more   Post-Acute Status   Post-Acute Authorization Placement   Post-Acute Placement Status Set-up Complete     [facility] stepdown from  [facility] & was admitted with hyponatremia. Patient is a resident at [facility] & will return today. CM informed the patient via phone of discharge plan. Patient verbalized understanding & agreement with discharge plan. NH orders noted. Will continue to follow.

## 2020-03-18 NOTE — PT/OT/SLP PROGRESS
Physical Therapy Treatment    Patient Name:  Elizabeth Cano   MRN:  [id number]    Recommendations:     Discharge Recommendations:  other (see comments)(OT/PT in NH )   Discharge Equipment Recommendations: none   Barriers to discharge: None    Assessment:     Elizabeth Cano is a 69 y.o. female admitted with a medical diagnosis of Diverticulitis.  She presents with the following impairments/functional limitations:  weakness, impaired functional mobilty, impaired balance, decreased upper extremity function, impaired endurance, impaired self care skills, gait instability, decreased lower extremity function, impaired cardiopulmonary response to activity. Patient tolerated session well. Patient would continue to benefit from skilled PT services while in the hospital. At this time, upon d/c she is an appropriate candidate to return to NH with OT/PT.    Rehab Prognosis: Fair; patient would benefit from acute skilled PT services to address these deficits and reach maximum level of function.    Recent Surgery: * No surgery found *      Plan:     During this hospitalization, patient to be seen 3 x/week to address the identified rehab impairments via gait training, therapeutic activities, therapeutic exercises, neuromuscular re-education, wheelchair management/training and progress toward the following goals:    · Plan of Care Expires:  04/15/20    Subjective     Chief Complaint: nausea  Patient/Family Comments/goals: to get stronger  Pain/Comfort:  · Pain Rating 1: 0/10  · Pain Rating Post-Intervention 1: 0/10      Objective:     Communicated with RN prior to session.  Patient found supine with bed alarm upon PT entry to room.     General Precautions: Standard, fall   Orthopedic Precautions:N/A   Braces: N/A     Functional Mobility:  · Bed Mobility:     · Rolling Left:  contact guard assistance  · Scooting: maximal assistance  · Supine to Sit: minimum assistance  · Sit to Supine: contact guard assistance  · Balance:  sitting EOB ~5min with SBA; patient returned to supine unprompted 2* nausea, encouraged to attempt EOB again for increased time OOB - patient refused     Supine exercises performed - x8 each; SLR, bridges, hip abd/add, UE shoulder flexion     AM-PAC 6 CLICK MOBILITY  Turning over in bed (including adjusting bedclothes, sheets and blankets)?: 4  Sitting down on and standing up from a chair with arms (e.g., wheelchair, bedside commode, etc.): 2  Moving from lying on back to sitting on the side of the bed?: 3  Moving to and from a bed to a chair (including a wheelchair)?: 2  Need to walk in hospital room?: 1  Climbing 3-5 steps with a railing?: 1  Basic Mobility Total Score: 13       Therapeutic Activities and Exercises:  -Patient educated on the continued role and goal of PT  -Questions and concerns answered within the the PT scope of practice.   -White board updated in patient room to reflect level of assistance needed with nursing.     Patient left supine with all lines intact, call button in reach, bed alarm on and RN notified..    GOALS:   Multidisciplinary Problems     Physical Therapy Goals        Problem: Physical Therapy Goal    Goal Priority Disciplines Outcome Goal Variances Interventions   Physical Therapy Goal     PT, PT/OT Ongoing, Progressing     Description:  Goals to be met by: 3/26/20     Patient will increase functional independence with mobility by performin. Supine to sit with Contact Guard Assistance  2. Sit to supine with Contact Guard Assistance - MET  3. Sit to stand transfer with Moderate Assistance  4. Bed to chair transfer with Moderate Assistance using LRD, if needed                        Time Tracking:     PT Received On: 20  PT Start Time: 1115     PT Stop Time: 1127  PT Total Time (min): 12 min     Billable Minutes: Therapeutic Activity 12min    Treatment Type: Treatment  PT/PTA: PT     PTA Visit Number: 0     Lashawn Villalobos, PT  2020

## 2020-03-18 NOTE — DISCHARGE SUMMARY
"[facility] Hospital Medicine  Discharge Summary      Patient Name: Elizabeth Cano  MRN: [id number]  Admission Date: 3/11/2020  Hospital Length of Stay: 7 days  Discharge Date and Time: 3/18/2020  2:30 PM  Attending Physician: Mendoza Caldwell MD   Discharging Provider: Mendoza Caldwell MD  Primary Care Provider: Cesar Reeves MD    [facility] Medicine Team: [facility] HOSP MED D Mendoza Caldwell MD    HPI:   Ms. Cano is a 68yo female with a past medical history of Afib (on coumadin), COPD, hypotension (on midodrine), diverticulitis, anxiety, and HFpEF (EF 45% on 1/29/20) who has been sent to the ED from nursing home facility with chief complaint of abdominal pain. Patient recently had lengthy complicated admission 01/29/20 to 03/02/20. She was initially admitted for management of UTI sepsis, later developed acute respiratory distress secondary to hypervolemia and acute heart failure exacerbation secondary to aggressive iv fluids with sepsis. Respiratory status improved with diuresis. She also developed diverticulitis treated with abx. She was discharged in stable condition on 03/02/20. Patient states that since discharge she has had very poor appetite with some nausea. Today patient reports episode of dry-heaving and complains of generalized abdominal pain with significant tenderness to palpation in periumbilical area. Nursing home concerned for incarcerated hernia and patient subsequently brought to the ED for further evaluation.     In the ED patient afebrile and hemodynamically stable. Initial LA 2.5. Na 129, Cl 87. CBC unremarkable. CT of the Abdomen performed showing "Sigmoid diverticulosis with persistent mild wall thickening and subtle submucosal enhancement similar to prior examination dated 01/30/2020.  No significant surrounding inflammatory changes or free fluid. Findings could be related to ongoing mild diverticulitis versus chronic changes. Significant volume of fecal material " "within the rectum which could be related to fecal impaction. Cholelithiasis.  Cardiomegaly. Unchanged appearance of fat containing umbilical hernia without adjacent inflammatory changes." Patient denies fevers, chills, chest pain, shortness of breath, dysuria, or confusion. She has been admitted to the care of medicine for further evaluation and management.    * No surgery found *      Hospital Course:     Abdominal pain, Nausea, Vomiting   Diverticulitis  Umbilical hernia   - Patient presenting with recurrent diverticulitis most recently treated in Feb 2020 with seven days of zosyn. Underwent Colonoscopy 02/04/20 that showed sigmoid diverticulosis.  - afebrile and hemodynamically stable  - CT of the Abdomen showing Sigmoid diverticulosis with persistent mild wall thickening and subtle submucosal enhancement similar to prior examination dated 01/30/2020. Also showed fecal impaction.   - Zosyn (D1- 3/12) --> cipro and flagyl on 3/15 for total of 7 days (end date: 3/18)   - holding antiemetics due to QTc > 500  - Patient has previously followed with colorectal surgery as OP for recurrent diverticulitis and should have follow up arranged at discharge.  - low sodium fluid restricted diet   - continue bowel regimen   - KUB negative for ileus or obstruction   - GI consulted. apprec recs  --  EGD 2/7 with only small hiatal hernia, otherwise unremarkable and biopsies negative for H pylori. No abdominal surgeries.  -- U/S with no biliary dilatation  -- No plan to repeat endoscopic intervention as above  -- Trial of bentyl 10mg tid  -- Defer further management of hernia to surgery  - General sx consulted for evaluation of hernia. apprec recs  -- patient has had chronic abd pain due to hernia for about one year. Hernia is stable and repair would be an elective procedure.   -- Operative fixation will likely treat to some extent her abdominal pain, especially at the hernia site.   -- She is high risk for perioperative " complications, given her underlying medical risk factors so proceed with less invasive diagnostics first with GI  -- nausea and weight loss do not sound like an obstructive process and the CT does not have dilated small bowel.  These symptoms sound more like a motility issue.  -- will need pre-op evaluation/risk stratification from a cardiac standpoint prior to fixation, especially given her unknown tolerance to exertion. This can be done in the outpatient setting.  - nutrition following. apprec recs  -- Continue CL diet, ADAT to full liquids and Low na diet with texture per team   -- Change ONS to optisource (compliant with Vitamin K )   -- Suggest appetite stimulant when diet advanced. Ordered dronabinol      Constipation- resolved    - CT of the Abdomen with Significant volume of fecal material within the rectum which could be related to fecal impaction  - Tap water enema once on admit  - senna-docusate 2 tabs BID  - off oxycodone and ordered IV toradol   - miralax daily  - monitor I/Os     UTI  - Ucx (3/12) growing Enterobacter Cloacae. Resistant to zosyn  - switched zosyn to ciprofloxacin (D1- 3/15) x 3 days      Hyponatremia- improving   - Na on admit, 129   - suspect hypovolemic hyponatremia due to over diuresis in setting of poor po intake  - hold home lasix  - caution with IVF given h/o CHF and recent acute respiratory failure due to hypervolemia   - BMP daily      Hypokalemia  - replete prn   - plan to discharge on 20 mEq potasium daily        Type 2 diabetes mellitus with hyperglycemia, without long-term current use of insulin  - hold home meds  - SSI  - hypoglycemic protocol      Paroxysmal atrial fibrillation  - continue home warfarin  - holding home digoxin  - INR goal 2-3  - pharmacy consulted to assist with warfarin management  - daily INRs  - monitor tele     Chronic heart failure with preserved ejection fraction  - ECHO 01/29/2020  With EF 45% with LVDD  - she was discharged 03/02/20 on Lasix 40mg  po BID. Clinically the patient appears slightly hypovolemic. She is also with new hyponatremia. Suspect overdiuresis in setting of poor po intake.  - resumed lasix   - caution with IVF as patient had acute respiratory failure during recent admit for volume overload.  - monitor I/Os  - daily weights        COPD (chronic obstructive pulmonary disease)  - scott prn    Consults:   Consults (From admission, onward)        Status Ordering Provider     Hospital Medicine PharmD Consult  Once     Provider:  (Not yet assigned)    JOLEEN Alonso     Inpatient consult to Gastroenterology  Once     Provider:  (Not yet assigned)    Completed JOSE ROOT     Inpatient consult to General Surgery  Once     Provider:  (Not yet assigned)    Completed JOSE ROOT     Inpatient consult to Midline team  Once     Provider:  (Not yet assigned)    Completed JOSE ROOT     Inpatient consult to PICC team ([facility])  Once     Provider:  (Not yet assigned)    Completed JOSE ROOT     Inpatient consult to Registered Dietitian/Nutritionist  Once     Provider:  (Not yet assigned)    Completed JOSE ROOT          Final Active Diagnoses:    Diagnosis Date Noted POA    PRINCIPAL PROBLEM:  Diverticulitis [K57.92] 01/30/2020 Yes    Periumbilical abdominal pain [R10.33]  Yes    Ventral hernia without obstruction or gangrene [K43.9]  Yes    Hyponatremia [E87.1] 03/11/2020 Unknown    Constipation [K59.00] 03/11/2020 Unknown    Chronic heart failure with preserved ejection fraction [I50.32] 12/19/2019 Yes    Paroxysmal atrial fibrillation [I48.0] 12/19/2019 Yes    COPD (chronic obstructive pulmonary disease) [J44.9] 12/19/2019 Yes    Type 2 diabetes mellitus with hyperglycemia, without long-term current use of insulin [E11.65] 12/19/2019 Yes      Problems Resolved During this Admission:      Discharged Condition: good    Disposition: Home or Self Care    Follow Up:  Follow-up Information     Jitendra Garcia  - General Surgery On 3/26/2020.    Specialty:  Surgery  Why:  Thursday 3/26 @ 10:30am.   Contact information:  [address]  [facility] [address] [phone]  Additional information:  [facility], 2nd Floor           Parth Bell MD.    Specialty:  Cardiology  Why:  Dr Bell's nurse will notify MD of upcoming appt w/ Dr Estrella and provide documenation in chart. If an appt is needed for pre op eval/clearance then family will be contacted.  Contact information:  [address]  [facility] [address]  [phone]                 Patient Instructions:   No discharge procedures on file.  Medications:  Reconciled Home Medications:      Medication List      START taking these medications    dicyclomine 10 MG capsule  Commonly known as:  BENTYL  Take 1 capsule (10 mg total) by mouth 3 (three) times daily.     dronabinoL 2.5 MG capsule  Commonly known as:  MARINOL  Take 1 capsule (2.5 mg total) by mouth 2 (two) times daily.     polyethylene glycol 17 gram Pwpk  Commonly known as:  GLYCOLAX  Take 17 g by mouth once daily.        CHANGE how you take these medications    warfarin 1 MG tablet  Commonly known as:  COUMADIN  Take 1 tablet (1 mg total) by mouth Daily. At 5:00pm  What changed:  when to take this        CONTINUE taking these medications    ACIDOPHILUS PROBIOTIC ORAL  Take 1 tablet by mouth once daily.     albuterol 2.5 mg /3 mL (0.083 %) nebulizer solution  Commonly known as:  PROVENTIL  Take 0.5 mLs by nebulization every 6 (six) hours as needed (wheezing). Rescue     benzonatate 100 MG capsule  Commonly known as:  TESSALON  Take 1 capsule (100 mg total) by mouth 3 (three) times daily as needed for Cough.     budesonide 0.25 mg/2 mL nebulizer solution  Commonly known as:  PULMICORT  Take 2 ml by nebulization 2 times daily Controller     furosemide 20 MG tablet  Commonly known as:  LASIX  Take 2 tablets (40 mg total) by mouth 2 (two) times daily.     lidocaine 5  %  Commonly known as:  LIDODERM  Apply topically to right leg as needed for pain     magnesium oxide 400 mg (241.3 mg magnesium) tablet  Commonly known as:  MAG-OX  Take 1 tablet (400 mg total) by mouth once daily.     metFORMIN 1000 MG tablet  Commonly known as:  GLUCOPHAGE  Take 1,000 mg by mouth 2 (two) times daily with meals.     miconazole nitrate 2% 2 % Oint  Commonly known as:  MICOTIN  Apply topically 2 (two) times daily. Apply to the bilateral groins/perineal area BID for 7 days     midodrine 10 MG tablet  Commonly known as:  PROAMATINE  Take 1 tablet (10 mg total) by mouth every evening.     mirtazapine 15 MG tablet  Commonly known as:  REMERON  Take 15 mg by mouth every evening.     nystatin powder  Commonly known as:  MYCOSTATIN  Apply twice daily under breast folds for redness and irritation until resolved     ondansetron 4 MG tablet  Commonly known as:  ZOFRAN  Take 4 mg by mouth every 6 (six) hours as needed for Nausea.     pantoprazole 40 MG tablet  Commonly known as:  PROTONIX  Take 1 tablet (40 mg total) by mouth once daily.     phenyleph-min oil-petrolatum 0.25-14-74.9 % Oint  Place 1 applicator rectally 4 (four) times daily as needed.     potassium chloride 10 MEQ Cpsr  Commonly known as:  MICRO-K  Take 3 capsules (30 mEq total) by mouth once daily.     senna 8.6 mg tablet  Commonly known as:  SENOKOT  Take 1 tablet by mouth once daily.     simethicone 80 MG chewable tablet  Commonly known as:  MYLICON  Take 1 tablet (80 mg total) by mouth 3 (three) times daily as needed.     vitamin D 1000 units Tab  Commonly known as:  VITAMIN D3  Take 1,000 Units by mouth once daily.        STOP taking these medications    digoxin 125 mcg tablet  Commonly known as:  LANOXIN            Significant Diagnostic Studies: Labs:   CMP   Recent Labs   Lab 03/17/20  0453 03/18/20  0859   * 134*   K 2.9* 5.1   CL 92* 98   CO2 33* 31*   GLU 94 87   BUN 8 8   CREATININE 0.6 0.6   CALCIUM 7.6* 7.7*   PROT 4.7* 4.4*    ALBUMIN 1.5* 1.4*   BILITOT 0.6 0.6   ALKPHOS 260* 254*   AST 30 32   ALT 17 16   ANIONGAP 6* 5*   ESTGFRAFRICA >60.0 >60.0   EGFRNONAA >60.0 >60.0   , CBC   Recent Labs   Lab 03/17/20  0453   WBC 5.34   HGB 9.0*   HCT 27.2*      , INR   Lab Results   Component Value Date    INR 1.1 03/18/2020    INR 1.1 03/17/2020    INR 1.3 (H) 03/16/2020   , Troponin No results for input(s): TROPONINI in the last 168 hours. and A1C:   Recent Labs   Lab 12/19/19  2220 01/30/20  0421 03/18/20  0859   HGBA1C 6.4* 5.4 4.8       Pending Diagnostic Studies:     Procedure Component Value Units Date/Time    Comprehensive metabolic panel [[id number]] Collected:  03/18/20 0859    Order Status:  Sent Lab Status:  In process Updated:  03/18/20 0907    Specimen:  Blood     Narrative:       Collection has been rescheduled by DW1 at 03/18/2020 08:01 Reason:   patient refused, call rn #[id number] no answer    Hemoglobin A1c [[id number]] Collected:  03/18/20 0859    Order Status:  Sent Lab Status:  In process Updated:  03/18/20 0907    Specimen:  Blood     Narrative:       Collection has been rescheduled by DW1 at 03/18/2020 08:01 Reason:   patient refused, call rn #[id number] no answer    Magnesium [[id number]] Collected:  03/18/20 0859    Order Status:  Sent Lab Status:  In process Updated:  03/18/20 0907    Specimen:  Blood     Narrative:       Collection has been rescheduled by DW1 at 03/18/2020 08:01 Reason:   patient refused, call rn #[id number] no answer    Phosphorus [[id number]] Collected:  03/18/20 0859    Order Status:  Sent Lab Status:  In process Updated:  03/18/20 0907    Specimen:  Blood     Narrative:       Collection has been rescheduled by DW1 at 03/18/2020 08:01 Reason:   patient refused, call rn #[id number] no answer    Protime-INR [[id number]] Collected:  03/18/20 0859    Order Status:  Sent Lab Status:  In process Updated:  03/18/20 0907    Specimen:  Blood     Narrative:       Collection has been rescheduled by DWVenkatesh at 03/18/2020 08:01  Reason:   patient refused, call rn #[id number] no answer        Abd US (3/12/20):    Impression       1.  Cholelithiasis without evidence of acute cholecystitis.    2.  Hepatic steatosis without focal lesion.    3.  Subcentimeter in complicated right renal cyst.     CT Abd Pelvis (3/1//20):  spine.      Impression       Sigmoid diverticulosis with persistent mild wall thickening and subtle submucosal enhancement similar to prior examination dated 01/30/2020.  No significant surrounding inflammatory changes or free fluid. Findings could be related to ongoing mild diverticulitis versus chronic changes.    Significant volume of fecal material within the rectum which could be related to fecal impaction.    Cholelithiasis.  Cardiomegaly.    Unchanged appearance of fat containing umbilical hernia without adjacent inflammatory changes.         Indwelling Lines/Drains at time of discharge:   Lines/Drains/Airways     None                 Time spent on the discharge of patient: 45 minutes  Patient was seen and examined on the date of discharge and determined to be suitable for discharge.         Mendoza Caldwell MD  Department of Hospital Medicine  [facility]

## 2020-03-18 NOTE — PLAN OF CARE
Problem: Physical Therapy Goal  Goal: Physical Therapy Goal  Description  Goals to be met by: 3/26/20     Patient will increase functional independence with mobility by performin. Supine to sit with Contact Guard Assistance  2. Sit to supine with Contact Guard Assistance - MET  3. Sit to stand transfer with Moderate Assistance  4. Bed to chair transfer with Moderate Assistance using LRD, if needed       Outcome: Ongoing, Progressing  Established POC and goals reviewed and remain appropriate. Plan is to continue to improve patient's functional mobility capabilities.    Lashawn Villalobos, PT, DPT  3/18/2020

## 2020-03-19 NOTE — PLAN OF CARE
03/19/20 0743   Final Note   Assessment Type Final Discharge Note     Patient discharged to [facility] on 3/18/2020. Discharge summary faxed to Dr. Cesar Reeves (PCP) f [phone] & [employer].

## 2020-03-21 NOTE — PT/OT/SLP DISCHARGE
Occupational Therapy Discharge Summary    Elizabeth Cano  MRN: [id number]   Principal Problem: Diverticulitis      Patient Discharged from acute Occupational Therapy on 3/19/2020.  Please refer to prior OT note dated 3/17/2020 for functional status.    Assessment:      Goals partially met.    Objective:     GOALS:   Multidisciplinary Problems     Occupational Therapy Goals        Problem: Occupational Therapy Goal    Goal Priority Disciplines Outcome Interventions   Occupational Therapy Goal     OT, PT/OT Ongoing, Progressing    Description:  Goals to be met by: 3/30/20     Patient will increase functional independence with ADLs by performing:    UE Dressing with Moderate Assistance.  LE Dressing with Moderate Assistance.  Grooming while seated with Set-up Assistance.  Toileting from bedside commode with Moderate Assistance for hygiene and clothing management.   Bathing from  shower chair/bench with Moderate Assistance.  Toilet transfer to bedside commode with Moderate Assistance.  Increased functional strength to WFL for B UE.  Upper extremity exercise program x15 reps per handout, with assistance as needed.                      Reasons for Discontinuation of Therapy Services  Transfer to alternate level of care.      Plan:     Patient Discharged to: Skilled Nursing Facility    Bruna Noriega OT  3/21/2020

## 2020-04-08 PROBLEM — U07.1 COVID-19 VIRUS INFECTION: Status: ACTIVE | Noted: 2020-01-01

## 2020-04-08 PROBLEM — I48.91 ATRIAL FIBRILLATION WITH RVR: Status: ACTIVE | Noted: 2020-01-01

## 2020-04-08 PROBLEM — R79.89 ELEVATED LACTIC ACID LEVEL: Status: ACTIVE | Noted: 2020-01-01

## 2020-04-08 NOTE — CONSULTS
PHARMACY CONSULT NOTE: WARFARIN  Elizabeth Cano is a 69 y.o. female on warfarin therapy for Paroxysmal Atrial Fibrillation. PharmD has been consulted for warfarin dosing.     Current order: Warfarin 1 mg daily  Home dose: 1 mg every Mon, Wed, Fri   INR goal: 2-3   Coumadin clinic enrollment: Patient's nursing home was managing prior to admission    Lab Results   Component Value Date    INR 1.5 (H) 04/08/2020    INR 1.1 03/18/2020    INR 1.1 03/17/2020     Significant drug interactions: Azithromycin and amiodarone may raise INR  Diet: Diabetic Diet; 2000 Calorie; Cardiac (Low Na/Chol)    Recommendation(s):    INR upon admission of 1.5. Appropriate to initiate warfarin 1 mg daily.   Trend PT-INR daily.   Pharmacy will continue to follow and monitor warfarin    Thank you for the consult,  Narciso Maddox, PharmD  Extension [phone]    **Note: Consults are reviewed Monday-Friday 7:00am-3:30pm. THE ABOVE RECOMMENDATIONS ARE ONLY SUGGESTED.THE RECOMMENDATIONS SHOULD BE CONSIDERED IN CONJUNCTION WITH ALL PATIENT FACTORS. **

## 2020-04-08 NOTE — HPI
Elizabeth Cano is a 70yo female with a PMHx of Afib on warfarin, CHF, COPD, DM2, recent multiple hospitalizations who presented from [facility] to the ED for worsening SOB, cough and intermittent low grade fever. She was noted to be saturating at 90s on room air which improved with oxygen. She was also in Afib with RVR in 200s, was administered amiodarone load and infusion in the ED with improvement of HR to 120s. Patient is COVID positive. Cardiology was consulted for Afib w/ RVR.     Of note, patient has chronic hypotension on 10mg of midodrine qd. She was hospitalized for acute hypoxemic resp failure 2/2 to CHF jam in February 2019 at which time she was on a short course of pressors. Her metoprolol was discontinued and she was started on digoxin to continue upon discharge. The patient was subsequently hospitalized in March for nausea and abdominal pain in the setting of ventral hernia, at which time her digoxin was discontinued.

## 2020-04-08 NOTE — ED TRIAGE NOTES
69 year old female presents to the ED via EMS from [facility] for evaluation of shortness of breath, cough and tachycardia. Per EMS patient has been having non productive for several days. Intermittent fevers. Upon arrival found to be in atrial fibrillation with RVR

## 2020-04-08 NOTE — ASSESSMENT & PLAN NOTE
Patient with a history of Afib with RVR (on coumadin) had her digoxin therapy discontinued during past hospitalization in March. Not on BB/CCB either due to chronic hypotension. She presented from her NH for worsening SOB, fever, noted to be COVID positive.   On presentation, patient was noted to be in Afib w/ RVR, HR of 200s improved with amiodarone load and infusion.     Recommendations  - Continue with current amiodarone gtt.   - Reviewed today's CXR in comparison with prior chest images that have occassionally shown pulmonary edema. Patient currently possibly has pulmonary edema from underlying CHF and could benefit from diureses with IV lasix considering normal renal function.   - F/u with BNP  - Optimize electrolytes, K>4, Mg >2  - Continue with warfarin for anti-coagulation

## 2020-04-08 NOTE — CONSULTS
[facility]  Cardiology  Consult Note    Patient Name: Elizabeth Cano  MRN: [id number]  Admission Date: 4/8/2020  Hospital Length of Stay: 0 days  Code Status: DNR   Attending Provider: Gabo Fonseca DO   Consulting Provider: Chuck Metzger MD  Primary Care Physician: Cesar Reeves MD  Principal Problem:COVID-19 virus infection    Patient information was obtained from past medical records and ER records.     Inpatient consult to Cardiology  Consult performed by: Chuck Metzger MD  Consult ordered by: Reza Alves Jr., PA-C        Subjective:     Chief Complaint:  Dyspnea     HPI:   Elizabeth Cano is a 68yo female with a PMHx of Afib on warfarin, CHF, COPD, DM2, recent multiple hospitalizations who presented from [facility] to the ED for worsening SOB, cough and intermittent low grade fever. She was noted to be saturating at 90s on room air which improved with oxygen. She was also in Afib with RVR in 200s, was administered amiodarone load and infusion in the ED with improvement of HR to 120s. Patient is COVID positive. Cardiology was consulted for Afib w/ RVR.     Of note, patient has chronic hypotension on 10mg of midodrine qd. She was hospitalized for acute hypoxemic resp failure 2/2 to CHF jam in February 2019 at which time she was on a short course of pressors. Her metoprolol was discontinued and she was started on digoxin to continue upon discharge. The patient was subsequently hospitalized in March for nausea and abdominal pain in the setting of ventral hernia, at which time her digoxin was discontinued.     Past Medical History:   Diagnosis Date    A-fib     GAVIN (acute kidney injury)     CHF (congestive heart failure)     COPD (chronic obstructive pulmonary disease)     Diabetes mellitus     Hypertension        Past Surgical History:   Procedure Laterality Date    CARDIAC SURGERY      COLONOSCOPY N/A 2/4/2020    Procedure: COLONOSCOPY;  Surgeon: Gilberto Laguna,  MD;  Location: [facility] ([address] [place] [address]);  Service: Endoscopy;  Laterality: N/A;    ESOPHAGOGASTRODUODENOSCOPY N/A 2/7/2020    Procedure: EGD (ESOPHAGOGASTRODUODENOSCOPY);  Surgeon: Aleksey Gomez MD;  Location: [facility] ([address] [place] [address]);  Service: Endoscopy;  Laterality: N/A;       Review of patient's allergies indicates:   Allergen Reactions    Levsin [hyoscyamine sulfate] Hallucinations       No current facility-administered medications on file prior to encounter.      Current Outpatient Medications on File Prior to Encounter   Medication Sig    albuterol (PROVENTIL) 2.5 mg /3 mL (0.083 %) nebulizer solution Take 0.5 mLs by nebulization every 6 (six) hours as needed (wheezing). Rescue     benzonatate (TESSALON) 100 MG capsule Take 1 capsule (100 mg total) by mouth 3 (three) times daily as needed for Cough.    budesonide (PULMICORT) 0.25 mg/2 mL nebulizer solution Take 2 ml by nebulization 2 times daily Controller    dicyclomine (BENTYL) 10 MG capsule Take 1 capsule (10 mg total) by mouth 3 (three) times daily.    dronabinoL (MARINOL) 2.5 MG capsule Take 1 capsule (2.5 mg total) by mouth 2 (two) times daily.    furosemide (LASIX) 20 MG tablet Take 2 tablets (40 mg total) by mouth 2 (two) times daily.    L. acidophilus/pectin, citrus (ACIDOPHILUS PROBIOTIC ORAL) Take 1 tablet by mouth once daily.    lidocaine (LIDODERM) 5 % Apply topically to right leg as needed for pain    magnesium oxide (MAG-OX) 400 mg (241.3 mg magnesium) tablet Take 1 tablet (400 mg total) by mouth once daily.    metFORMIN (GLUCOPHAGE) 1000 MG tablet Take 1,000 mg by mouth 2 (two) times daily with meals.    miconazole nitrate 2% (MICOTIN) 2 % Oint Apply topically 2 (two) times daily. Apply to the bilateral groins/perineal area BID for 7 days    midodrine (PROAMATINE) 10 MG tablet Take 1 tablet (10 mg total) by mouth every evening.    mirtazapine (REMERON) 15 MG tablet Take 15 mg by mouth every evening.    nystatin (MYCOSTATIN) powder Apply  twice daily under breast folds for redness and irritation until resolved    ondansetron (ZOFRAN) 4 MG tablet Take 4 mg by mouth every 6 (six) hours as needed for Nausea.    pantoprazole (PROTONIX) 40 MG tablet Take 1 tablet (40 mg total) by mouth once daily.    phenyleph-min oil-petrolatum 0.25-14-74.9 % Oint Place 1 applicator rectally 4 (four) times daily as needed.    polyethylene glycol (GLYCOLAX) 17 gram PwPk Take 17 g by mouth once daily.    potassium chloride (MICRO-K) 10 MEQ CpSR Take 3 capsules (30 mEq total) by mouth once daily.    senna (SENOKOT) 8.6 mg tablet Take 1 tablet by mouth once daily.    simethicone (MYLICON) 80 MG chewable tablet Take 1 tablet (80 mg total) by mouth 3 (three) times daily as needed.    vitamin D (VITAMIN D3) 1000 units Tab Take 1,000 Units by mouth once daily.    warfarin (COUMADIN) 1 MG tablet Take 1 tablet (1 mg total) by mouth Daily. At 5:00pm     Family History     Problem Relation (Age of Onset)    Heart disease Mother        Tobacco Use    Smoking status: Former Smoker     Packs/day: 1.00     Years: 54.00     Pack years: 54.00     Types: Cigarettes     Last attempt to quit: 2019     Years since quittin.4    Smokeless tobacco: Never Used   Substance and Sexual Activity    Alcohol use: Not Currently    Drug use: Not Currently    Sexual activity: Not Currently     Partners: Male     ROS  Objective:     Vital Signs (Most Recent):  Temp: 98.9 °F (37.2 °C) (20 1100)  Pulse: (!) 129 (20 1152)  Resp: (!) 23 (20 1152)  BP: 113/67 (20 1152)  SpO2: (!) 92 % (20 1152) Vital Signs (24h Range):  Temp:  [98.9 °F (37.2 °C)-99.3 °F (37.4 °C)] 98.9 °F (37.2 °C)  Pulse:  [129-200] 129  Resp:  [18-26] 23  SpO2:  [92 %-100 %] 92 %  BP: ()/(54-77) 113/67     Weight: 60.5 kg (133 lb 6.1 oz)  Body mass index is 21.53 kg/m².    SpO2: (!) 92 %  O2 Device (Oxygen Therapy): room air      Intake/Output Summary (Last 24 hours) at 2020  1354  Last data filed at 4/8/2020 0800  Gross per 24 hour   Intake 250 ml   Output --   Net 250 ml       Lines/Drains/Airways     Peripheral Intravenous Line                 Peripheral IV - Single Lumen 04/08/20 0642 20 G Left Hand less than 1 day         Peripheral IV - Single Lumen 04/08/20 0936 24 G Left Wrist less than 1 day         Peripheral IV - Single Lumen 04/08/20 0955 24 G Right Other less than 1 day                Physical Exam and ROS not attained in order to limit exposure to active coronavirus infection    Significant Labs:   BMP:   Recent Labs   Lab 04/08/20  0622   *   *   K 3.1*   CL 98   CO2 24   BUN 11   CREATININE 0.7   CALCIUM 6.3*   , CMP   Recent Labs   Lab 04/08/20 0622   *   K 3.1*   CL 98   CO2 24   *   BUN 11   CREATININE 0.7   CALCIUM 6.3*   PROT 4.8*   ALBUMIN 1.5*   BILITOT 0.7   ALKPHOS 245*   AST 87*   ALT 27   ANIONGAP 13   ESTGFRAFRICA >60.0   EGFRNONAA >60.0    and CBC   Recent Labs   Lab 04/08/20 0622   WBC 7.09   HGB 11.2*   HCT 35.4*          Significant Imaging:  Imaging Results          X-Ray Chest AP Portable (Final result)  Result time 04/08/20 07:48:27    Final result by Tran Lopez MD (04/08/20 07:48:27)                 Impression:      Please see above.      Electronically signed by: Tran Lopez MD  Date:    04/08/2020  Time:    07:48             Narrative:    EXAMINATION:  XR CHEST AP PORTABLE    CLINICAL HISTORY:  Suspected Covid-19 Virus Infection;    TECHNIQUE:  Single frontal view of the chest was performed.    COMPARISON:  Chest radiograph 02/12/2020 and chest CT 02/13/2020    FINDINGS:  Cardiac monitoring leads overlie the chest.  There is unchanged enlargement of the cardiomediastinal silhouette and atherosclerosis of the thoracic aorta.  The lungs demonstrate abnormal multifocal patchy airspace opacities, right greater than left, which appears similar to slightly increased in conspicuity from prior chest radiograph of  02/12/2020.  Findings likely relate to previously demonstrated interstitial lung disease with possible superimposed component of, infectious or non infectious inflammatory process not definitively excluded.  There is a probable left pleural effusion.  No evidence of pneumothorax.  Visualized osseous structures are intact.                                  Assessment and Plan:     Atrial fibrillation with RVR  Patient with a history of Afib with RVR (on coumadin) had her digoxin therapy discontinued during past hospitalization in March. Not on BB/CCB either due to chronic hypotension. She presented from her NH for worsening SOB, fever, noted to be COVID positive.   On presentation, patient was noted to be in Afib w/ RVR, HR of 200s improved with amiodarone load and infusion.     Recommendations  - Continue with current amiodarone gtt.   - Reviewed today's CXR in comparison with prior chest images that have occassionally shown pulmonary edema. Patient currently possibly has pulmonary edema from underlying CHF and could benefit from diureses with IV lasix considering normal renal function.   - F/u with BNP  - Optimize electrolytes, K>4, Mg >2  - Continue with warfarin for anti-coagulation          VTE Risk Mitigation (From admission, onward)         Ordered     warfarin (COUMADIN) tablet 1 mg  Daily      04/08/20 1146     Reason for No Pharmacological VTE Prophylaxis  Once     Question:  Reasons:  Answer:  Already adequately anticoagulated on oral Anticoagulants    04/08/20 1129     IP VTE HIGH RISK PATIENT  Once      04/08/20 1129                Thank you for your consult. I will follow-up with patient. Please contact us if you have any additional questions.    Chuck Metzger MD  Cardiology   [facility]

## 2020-04-08 NOTE — ED NOTES
Pt very hard stick. Multiple nurses unable to draw lactic and VBG. RT used arterial stick for ABG and to draw lactic for nursing collection.

## 2020-04-08 NOTE — SUBJECTIVE & OBJECTIVE
Past Medical History:   Diagnosis Date    A-fib     GAVIN (acute kidney injury)     CHF (congestive heart failure)     COPD (chronic obstructive pulmonary disease)     Diabetes mellitus     Hypertension        Past Surgical History:   Procedure Laterality Date    CARDIAC SURGERY      COLONOSCOPY N/A 2/4/2020    Procedure: COLONOSCOPY;  Surgeon: Gilberto Laguna MD;  Location: [facility] ([address] [place] [address]);  Service: Endoscopy;  Laterality: N/A;    ESOPHAGOGASTRODUODENOSCOPY N/A 2/7/2020    Procedure: EGD (ESOPHAGOGASTRODUODENOSCOPY);  Surgeon: Aleksey Gomez MD;  Location: [facility] ([address] [place] [address]);  Service: Endoscopy;  Laterality: N/A;       Review of patient's allergies indicates:   Allergen Reactions    Levsin [hyoscyamine sulfate] Hallucinations       No current facility-administered medications on file prior to encounter.      Current Outpatient Medications on File Prior to Encounter   Medication Sig    albuterol (PROVENTIL) 2.5 mg /3 mL (0.083 %) nebulizer solution Take 0.5 mLs by nebulization every 6 (six) hours as needed (wheezing). Rescue     benzonatate (TESSALON) 100 MG capsule Take 1 capsule (100 mg total) by mouth 3 (three) times daily as needed for Cough.    budesonide (PULMICORT) 0.25 mg/2 mL nebulizer solution Take 2 ml by nebulization 2 times daily Controller    dicyclomine (BENTYL) 10 MG capsule Take 1 capsule (10 mg total) by mouth 3 (three) times daily.    dronabinoL (MARINOL) 2.5 MG capsule Take 1 capsule (2.5 mg total) by mouth 2 (two) times daily.    furosemide (LASIX) 20 MG tablet Take 2 tablets (40 mg total) by mouth 2 (two) times daily.    L. acidophilus/pectin, citrus (ACIDOPHILUS PROBIOTIC ORAL) Take 1 tablet by mouth once daily.    lidocaine (LIDODERM) 5 % Apply topically to right leg as needed for pain    magnesium oxide (MAG-OX) 400 mg (241.3 mg magnesium) tablet Take 1 tablet (400 mg total) by mouth once daily.    metFORMIN (GLUCOPHAGE) 1000 MG tablet Take 1,000 mg by mouth 2  (two) times daily with meals.    miconazole nitrate 2% (MICOTIN) 2 % Oint Apply topically 2 (two) times daily. Apply to the bilateral groins/perineal area BID for 7 days    midodrine (PROAMATINE) 10 MG tablet Take 1 tablet (10 mg total) by mouth every evening.    mirtazapine (REMERON) 15 MG tablet Take 15 mg by mouth every evening.    nystatin (MYCOSTATIN) powder Apply twice daily under breast folds for redness and irritation until resolved    ondansetron (ZOFRAN) 4 MG tablet Take 4 mg by mouth every 6 (six) hours as needed for Nausea.    pantoprazole (PROTONIX) 40 MG tablet Take 1 tablet (40 mg total) by mouth once daily.    phenyleph-min oil-petrolatum 0.25-14-74.9 % Oint Place 1 applicator rectally 4 (four) times daily as needed.    polyethylene glycol (GLYCOLAX) 17 gram PwPk Take 17 g by mouth once daily.    potassium chloride (MICRO-K) 10 MEQ CpSR Take 3 capsules (30 mEq total) by mouth once daily.    senna (SENOKOT) 8.6 mg tablet Take 1 tablet by mouth once daily.    simethicone (MYLICON) 80 MG chewable tablet Take 1 tablet (80 mg total) by mouth 3 (three) times daily as needed.    vitamin D (VITAMIN D3) 1000 units Tab Take 1,000 Units by mouth once daily.    warfarin (COUMADIN) 1 MG tablet Take 1 tablet (1 mg total) by mouth Daily. At 5:00pm     Family History     Problem Relation (Age of Onset)    Heart disease Mother        Tobacco Use    Smoking status: Former Smoker     Packs/day: 1.00     Years: 54.00     Pack years: 54.00     Types: Cigarettes     Last attempt to quit: 2019     Years since quittin.4    Smokeless tobacco: Never Used   Substance and Sexual Activity    Alcohol use: Not Currently    Drug use: Not Currently    Sexual activity: Not Currently     Partners: Male     ROS  Objective:     Vital Signs (Most Recent):  Temp: 98.9 °F (37.2 °C) (20 1100)  Pulse: (!) 129 (20 1152)  Resp: (!) 23 (20 1152)  BP: 113/67 (20 1152)  SpO2: (!) 92 % (20  1152) Vital Signs (24h Range):  Temp:  [98.9 °F (37.2 °C)-99.3 °F (37.4 °C)] 98.9 °F (37.2 °C)  Pulse:  [129-200] 129  Resp:  [18-26] 23  SpO2:  [92 %-100 %] 92 %  BP: ()/(54-77) 113/67     Weight: 60.5 kg (133 lb 6.1 oz)  Body mass index is 21.53 kg/m².    SpO2: (!) 92 %  O2 Device (Oxygen Therapy): room air      Intake/Output Summary (Last 24 hours) at 4/8/2020 1354  Last data filed at 4/8/2020 0800  Gross per 24 hour   Intake 250 ml   Output --   Net 250 ml       Lines/Drains/Airways     Peripheral Intravenous Line                 Peripheral IV - Single Lumen 04/08/20 0642 20 G Left Hand less than 1 day         Peripheral IV - Single Lumen 04/08/20 0936 24 G Left Wrist less than 1 day         Peripheral IV - Single Lumen 04/08/20 0955 24 G Right Other less than 1 day                Physical Exam and ROS not attained in order to limit exposure to active coronavirus infection    Significant Labs:   BMP:   Recent Labs   Lab 04/08/20 0622   *   *   K 3.1*   CL 98   CO2 24   BUN 11   CREATININE 0.7   CALCIUM 6.3*   , CMP   Recent Labs   Lab 04/08/20  0622   *   K 3.1*   CL 98   CO2 24   *   BUN 11   CREATININE 0.7   CALCIUM 6.3*   PROT 4.8*   ALBUMIN 1.5*   BILITOT 0.7   ALKPHOS 245*   AST 87*   ALT 27   ANIONGAP 13   ESTGFRAFRICA >60.0   EGFRNONAA >60.0    and CBC   Recent Labs   Lab 04/08/20 0622   WBC 7.09   HGB 11.2*   HCT 35.4*          Significant Imaging:  Imaging Results          X-Ray Chest AP Portable (Final result)  Result time 04/08/20 07:48:27    Final result by Tran Lopez MD (04/08/20 07:48:27)                 Impression:      Please see above.      Electronically signed by: Tran Lopez MD  Date:    04/08/2020  Time:    07:48             Narrative:    EXAMINATION:  XR CHEST AP PORTABLE    CLINICAL HISTORY:  Suspected Covid-19 Virus Infection;    TECHNIQUE:  Single frontal view of the chest was performed.    COMPARISON:  Chest radiograph 02/12/2020 and chest  CT 02/13/2020    FINDINGS:  Cardiac monitoring leads overlie the chest.  There is unchanged enlargement of the cardiomediastinal silhouette and atherosclerosis of the thoracic aorta.  The lungs demonstrate abnormal multifocal patchy airspace opacities, right greater than left, which appears similar to slightly increased in conspicuity from prior chest radiograph of 02/12/2020.  Findings likely relate to previously demonstrated interstitial lung disease with possible superimposed component of, infectious or non infectious inflammatory process not definitively excluded.  There is a probable left pleural effusion.  No evidence of pneumothorax.  Visualized osseous structures are intact.

## 2020-04-08 NOTE — CARE UPDATE
Rapid Response Nurse Chart Check     Chart check completed, abnormal VS noted. bedside RNGisella contacted, no concerns verbalized at this time, instructed to call [phone] for further concerns or assistance.

## 2020-04-08 NOTE — H&P
Hospital Medicine  History and Physical  [facility]      Patient Name: Elizabeth Cano  MRN:  [id number]  Hospital Medicine Team: [facility] Reza Alves PA-C  Date of Admission:  4/8/2020     Length of Stay:  LOS: 0 days     Principal Problem: Covid-19 Virus Infection    Chief complaint    Covid-19 Viral Infection    HPI    Patient is a 70yo female with a PMHx of Afib, CHF, COPD, and DM2 being admitted to medicine for Covid-19 infection. Patient reports onset of SOB a few days ago. She was noted to be hypoxemic this morning on evaluation. Her oxygen saturation 90% on room air, as well as atrial fibrillation with RVR in the low 200s.  For the past few days she has had worsening shortness of breath, cough, low-grade fever, and weakness. She lives in a NH that has had a Covid outbreak. She is on midodrine for chronic hypotension. Of note, she used to be on digoxin for afib.    Review of Systems    Constitutional: Positive for fever, chills, fatigue, poor appetite   HENT: Negative for sore throat, negative for trouble swallowing.    Eyes: Negative for photophobia, visual disturbance.   Respiratory: Positive for cough, shortness of breath  Cardiovascular: Negative for chest pain, palpitations, leg swelling.   Gastrointestinal: Negative for diarrhea. Negative for abdominal pain, constipation, nausea, vomiting.   Endocrine: Negative for cold intolerance, heat intolerance.   Genitourinary: Negative for dysuria, frequency.   Musculoskeletal: Negative for arthralgias, myalgias.   Skin: Negative for rash  Neurological: Negative for dizziness, syncope, light-headedness.   Psychiatric/Behavioral: Negative for confusion, hallucinations, anxiety    Past Medical History:   Diagnosis Date    A-fib     GAVIN (acute kidney injury)     CHF (congestive heart failure)     COPD (chronic obstructive pulmonary disease)     Diabetes mellitus     Hypertension      Past Surgical History:   Procedure  Laterality Date    CARDIAC SURGERY      COLONOSCOPY N/A 2020    Procedure: COLONOSCOPY;  Surgeon: Gilberto Laguna MD;  Location: [address]);  Service: Endoscopy;  Laterality: N/A;    ESOPHAGOGASTRODUODENOSCOPY N/A 2020    Procedure: EGD (ESOPHAGOGASTRODUODENOSCOPY);  Surgeon: Aleksey Gomez MD;  Location: [address]);  Service: Endoscopy;  Laterality: N/A;     Family History   Problem Relation Age of Onset    Heart disease Mother      Social History     Socioeconomic History    Marital status:      Spouse name: Not on file    Number of children: Not on file    Years of education: Not on file    Highest education level: Not on file   Occupational History    Not on file   Social Needs    Financial resource strain: Not on file    Food insecurity:     Worry: Not on file     Inability: Not on file    Transportation needs:     Medical: Not on file     Non-medical: Not on file   Tobacco Use    Smoking status: Former Smoker     Packs/day: 1.00     Years: 54.00     Pack years: 54.00     Types: Cigarettes     Last attempt to quit: 2019     Years since quittin.4    Smokeless tobacco: Never Used   Substance and Sexual Activity    Alcohol use: Not Currently    Drug use: Not Currently    Sexual activity: Not Currently     Partners: Male   Lifestyle    Physical activity:     Days per week: Not on file     Minutes per session: Not on file    Stress: Not on file   Relationships    Social connections:     Talks on phone: Not on file     Gets together: Not on file     Attends Synagogue service: Not on file     Active member of club or organization: Not on file     Attends meetings of clubs or organizations: Not on file     Relationship status: Not on file   Other Topics Concern    Not on file   Social History Narrative    Not on file       Medications  No current facility-administered medications on file prior to encounter.      Current Outpatient Medications on File  Prior to Encounter   Medication Sig Dispense Refill    albuterol (PROVENTIL) 2.5 mg /3 mL (0.083 %) nebulizer solution Take 0.5 mLs by nebulization every 6 (six) hours as needed (wheezing). Rescue       benzonatate (TESSALON) 100 MG capsule Take 1 capsule (100 mg total) by mouth 3 (three) times daily as needed for Cough.      budesonide (PULMICORT) 0.25 mg/2 mL nebulizer solution Take 2 ml by nebulization 2 times daily Controller      dicyclomine (BENTYL) 10 MG capsule Take 1 capsule (10 mg total) by mouth 3 (three) times daily.      dronabinoL (MARINOL) 2.5 MG capsule Take 1 capsule (2.5 mg total) by mouth 2 (two) times daily.      furosemide (LASIX) 20 MG tablet Take 2 tablets (40 mg total) by mouth 2 (two) times daily.      L. acidophilus/pectin, citrus (ACIDOPHILUS PROBIOTIC ORAL) Take 1 tablet by mouth once daily.      lidocaine (LIDODERM) 5 % Apply topically to right leg as needed for pain      magnesium oxide (MAG-OX) 400 mg (241.3 mg magnesium) tablet Take 1 tablet (400 mg total) by mouth once daily.  0    metFORMIN (GLUCOPHAGE) 1000 MG tablet Take 1,000 mg by mouth 2 (two) times daily with meals.      miconazole nitrate 2% (MICOTIN) 2 % Oint Apply topically 2 (two) times daily. Apply to the bilateral groins/perineal area BID for 7 days  0    midodrine (PROAMATINE) 10 MG tablet Take 1 tablet (10 mg total) by mouth every evening. 30 tablet 11    mirtazapine (REMERON) 15 MG tablet Take 15 mg by mouth every evening.      nystatin (MYCOSTATIN) powder Apply twice daily under breast folds for redness and irritation until resolved      ondansetron (ZOFRAN) 4 MG tablet Take 4 mg by mouth every 6 (six) hours as needed for Nausea.      pantoprazole (PROTONIX) 40 MG tablet Take 1 tablet (40 mg total) by mouth once daily. 30 tablet 11    phenyleph-min oil-petrolatum 0.25-14-74.9 % Oint Place 1 applicator rectally 4 (four) times daily as needed.      polyethylene glycol (GLYCOLAX) 17 gram PwPk Take 17 g  by mouth once daily.      potassium chloride (MICRO-K) 10 MEQ CpSR Take 3 capsules (30 mEq total) by mouth once daily.      senna (SENOKOT) 8.6 mg tablet Take 1 tablet by mouth once daily.      simethicone (MYLICON) 80 MG chewable tablet Take 1 tablet (80 mg total) by mouth 3 (three) times daily as needed.  0    vitamin D (VITAMIN D3) 1000 units Tab Take 1,000 Units by mouth once daily.      warfarin (COUMADIN) 1 MG tablet Take 1 tablet (1 mg total) by mouth Daily. At 5:00pm         Allergies  Levsin [hyoscyamine sulfate]    Physical Examination  Temp:  [98.9 °F (37.2 °C)-99.3 °F (37.4 °C)]   Pulse:  [129-200]   Resp:  [18-26]   BP: ()/(54-77)   SpO2:  [92 %-100 %]     Gen: NAD, conversant  Head: NC, AT  Eyes: PERRLA, EOMI  Throat: MMM, OP clear  CV: RRR, no M/R/G, no peripheral edema, no JVD  Resp: coarse bilateral breath sounds, no increased work of breathing on room air  GI: Soft, NT, ND, +BS  Ext: MAEW, no c/c/e  Neuro: AAOx3, CN grossly intact, no focal neurologic deficits  Psychiatry: Normal mood, normal affect    Laboratory:  Recent Labs   Lab 04/08/20 0622   WBC 7.09   LYMPH 21.4  1.5   HGB 11.2*   HCT 35.4*        Recent Labs   Lab 04/08/20  0622   *   K 3.1*   CL 98   CO2 24   BUN 11   CREATININE 0.7   *   CALCIUM 6.3*     Recent Labs   Lab 04/08/20 0622 04/08/20  0636   ALKPHOS 245*  --    ALT 27  --    AST 87*  --    ALBUMIN 1.5*  --    PROT 4.8*  --    BILITOT 0.7  --    INR  --  1.5*      Recent Labs     04/08/20 0622 04/08/20  1030   FERRITIN 892*  --    CRP 46.8*  --    *  --    TROPONINI 0.180*  --    LACTATE 4.5* 2.8*       All labs within the last 24 hours were reviewed.     Microbiology:  Lab Results   Component Value Date    [id number] Positive (A) 04/08/2020       Microbiology Results (last 7 days)     Procedure Component Value Units Date/Time    Culture, Respiratory with Gram Stain [[id number]]     Order Status:  No result Specimen:  Sputum,  Expectorated     Influenza A & B by Molecular [[id number]]     Order Status:  No result Specimen:  Nasopharyngeal Swab     Blood culture x two cultures. Draw prior to antibiotics. [[id number]] Collected:  04/08/20 0623    Order Status:  Sent Specimen:  Blood from Peripheral, Upper Arm, Right Updated:  04/08/20 0652    Blood culture x two cultures. Draw prior to antibiotics. [[id number]] Collected:  04/08/20 0622    Order Status:  Sent Specimen:  Blood from Peripheral, Antecubital, Right Updated:  04/08/20 0647            Imaging  ECG Results          EKG 12-lead (In process)  Result time 04/08/20 10:01:06    In process by Interface, Lab In [facility] (04/08/20 10:01:06)                 Narrative:    Test Reason : R00.0,    Vent. Rate : 181 BPM     Atrial Rate : 127 BPM     P-R Int : 000 ms          QRS Dur : 184 ms      QT Int : 300 ms       P-R-T Axes : 000 -74 133 degrees     QTc Int : 521 ms    Atrial fibrillation with rapid ventricular response with premature  ventricular or aberrantly conducted complexes  Left axis deviation  Right bundle branch block  Inferior infarct ,age undetermined  T wave abnormality, consider lateral ischemia  Abnormal ECG  When compared with ECG of 15-MAR-2020 17:03,  Significant changes have occurred    Referred By: [email or web address]   SELF           Confirmed By:                               Results for orders placed during the hospital encounter of 01/29/20   Echo    Narrative · Patient does not have transposition of the great arteries. Based on what   is seen, it is possible patient had an ASD closure.  · Mildly decreased left ventricular systolic function. The estimated   ejection fraction is 45%.  · Mildly reduced right ventricular systolic function. Mild right   ventricular enlargement.  · Severe left atrial enlargement.  · Severe right atrial enlargement.  · Normal central venous pressure (3 mmHg).       X-Ray Chest AP Portable  Narrative: EXAMINATION:  XR CHEST AP PORTABLE    CLINICAL  HISTORY:  Suspected Covid-19 Virus Infection;    TECHNIQUE:  Single frontal view of the chest was performed.    COMPARISON:  Chest radiograph 02/12/2020 and chest CT 02/13/2020    FINDINGS:  Cardiac monitoring leads overlie the chest.  There is unchanged enlargement of the cardiomediastinal silhouette and atherosclerosis of the thoracic aorta.  The lungs demonstrate abnormal multifocal patchy airspace opacities, right greater than left, which appears similar to slightly increased in conspicuity from prior chest radiograph of 02/12/2020.  Findings likely relate to previously demonstrated interstitial lung disease with possible superimposed component of, infectious or non infectious inflammatory process not definitively excluded.  There is a probable left pleural effusion.  No evidence of pneumothorax.  Visualized osseous structures are intact.  Impression: Please see above.    Electronically signed by: Tran Lopez MD  Date:    04/08/2020  Time:    07:48      All imaging within the last 24 hours was reviewed.       Assessment and Plan:    Active Hospital Problems    Diagnosis  POA    *COVID-19 virus infection [U07.1]  Yes    Atrial fibrillation with RVR [I48.91]  Yes    Elevated lactic acid level [R79.89]  Yes    Type 2 diabetes mellitus with hyperglycemia, without long-term current use of insulin [E11.65]  Yes    COPD (chronic obstructive pulmonary disease) [J44.9]  Yes    Chronic heart failure with preserved ejection fraction [I50.32]  Yes      Resolved Hospital Problems   No resolved problems to display.       Covid-19 Virus Infection  - COVID-19 testing: rapid PCR + in the ED  - Infection Control notified    - Isolation:   - Airborne and Droplet Precautions  - Surgical mask on patient   - N95 masks must be fit tested, wear eye protection  - 20 second hand hygiene   - Limit visitors per hospital policy   - Consolidate lab draws, nursing care, and interventions    - Diagnostics: (Lymphopenia, hyponatremia,  hyperferritinemia, elevated troponin, elevated d-dimer, age, and comorbidities are significant predictors of poor clinical outcome)   - CBC: WNL  trend Q48hrs  - CMP: mild hyponatremia       trend Q48hrs  - Procalcitonin: 0.08  - D-dimer: pending  repeat prior to discharge  - Ferritin: 892  repeat prior to discharge   - CRP: 46.8       trend Q48hrs  - LDH: 326   repeat prior to discharge  - BNP: pending  - Troponin: 0.180, trend    - ECG: Afib with RVR, TWA   - rapid Flu: pending   - RIP only if BMT/solid transplant: deferred   - Legionella antigen: pending   - Blood culture x2: pending   - Sputum culture: pending   - CXR: multifocal airspace disease   - UA and culture: pending   - CPK:26    - Management:   Bundle care as able to maintain isolation & minimize in/out of room   - Supplemental O2 to maintain SpO2 92%-96%   if requiring 6L NC or higher, place on nonrebreather and discuss case with MICU   - Telemetry & continuous pulse oximetry    - If wheezing   - albuterol inhaler 2-4 puff Q6hr PRN    - ipratropium daily    - acetaminophen 650mg PO Q6hr PRN fever   - loperamide PRN for viral diarrhea  - Empiric antibiotics per likely source & patient allergies    - CAP: x 5 day course (d/c early if low concern for bacterial co-infection)  Ceftriaxone 1g IV Q24hrs            Azithromycin 500mg IV day #1, then 250mg PO daily x4 days     If azithromycin is not available, start doxycycline                 If MRSA risk factors, add Vancomycin IV (PharmD consult)   - Investigational Treatment Protocol: (if patient meets criteria)   [email or web address]     - statin: atorvastatin 40mg po daily (if CPK WNL)    - start HCQ 400mg PO BID x1 day, then 400mg PO daily x 4 days   (check glucose 6 phosphate dehydrogenase (NOT G6PD Quant), ECG at start & 48hrs, and start Qshift POCT glucose)    Safety notes:   - Avoid NIPPV (CPAP/BiPAP) to  "prevent aerosolization, use on a case-by-case basis if in neg pressure room   - Cautious use of NSAIDS for fever per WHO recommendations (3/16/2020)   - No new ACEi/ARB start or discontinuation of chronic med unless hypotensive (Esler et al. Journal of Hypertension 2020, 38:000-000)   - Careful use of steroids in the absence of other indications (shock, ARDS)   - Fluid sparing resuscitation, avoid maintenance fluids     Lactic acidosis  - lactic 4.5->2.8  - after small 500 cc bolus  - monitor    Chronic diastolic heart failure  - strict Is/Os, daily weights  - given small bolus in ED  - resume Lasix 40mg BID in AM    Afib with RVR  - HR 200s in the ED  - unclear if patient taking her digoxin at home  - due to active infection, BB/CCB avoided in setting of COPD  - given amiodarone bolus and infusion with improvement in HR to 120s  - continue telemetry  - Cardiology consulted  - daily INR  - continue coumadin, consult PharmD    DM2  - low dose SSI, ACHS  - cardiac diabetic diet    Advance Care Planning  Goals of care, counseling/discussion  - discussed with patient, she wishes to be DNR/DNI  - 15 minutes spent discussing goals of care  - will update chart    If patient transitions to Comfort-Focused Care, please place "Nurse Communication: End of Life Care, family members allowed to visit, including spouse/partner and adult children [please list names]. Please ask family to visit as a group and leave as a group.         VTE High Risk Prophylaxis:   VTE Risk Mitigation (From admission, onward)         Ordered     Reason for No Pharmacological VTE Prophylaxis  Once     Question:  Reasons:  Answer:  Already adequately anticoagulated on oral Anticoagulants    04/08/20 1129     IP VTE HIGH RISK PATIENT  Once      04/08/20 1129              Patient's chronic/stable medical conditions noted in the assessment above will be managed with the patient's home medications as tolerated.         Initial Hospital Care   Level 3 25199 " Total visit time was 70 minutes or greater with greater than 50% of time spent in counseling and coordination of care.         Reza Alves PA-C  Department of [facility] Medicine

## 2020-04-08 NOTE — ED PROVIDER NOTES
Encounter Date: 4/8/2020       History     Chief Complaint   Patient presents with    Shortness of Breath     Pt to ER via EMS from [facility]. Pt has been c/o worsening SOB, cough and low grade fever x a few days. Pt's  bpm. Hx of a-fib. Initital sat by EMS was 90% on RA; she is now 100% on 6 liters.     Tachycardia     HPI     This is a 69-year-old woman with a history of COPD, not on home oxygen, CHF on Lasix, recent multiple past  Hospitalizations for diverticulitis and hernia complications, who is brought in by EMS with report of hypoxemia, that oxygen saturation 90% on room air, as well as atrial fibrillation with RVR in the low 200s.  For the past few days she has had worsening shortness of breath, cough, low-grade fever, and lives in a nursing home but has had a COVID outbreak.  She is on midodrine for chronic hypotension, and digoxin for her Afib, that is not clear if she has been taking the dig.   Review of patient's allergies indicates:   Allergen Reactions    Levsin [hyoscyamine sulfate] Hallucinations     Past Medical History:   Diagnosis Date    A-fib     GAVIN (acute kidney injury)     CHF (congestive heart failure)     COPD (chronic obstructive pulmonary disease)     Diabetes mellitus     Hypertension      Past Surgical History:   Procedure Laterality Date    CARDIAC SURGERY      COLONOSCOPY N/A 2/4/2020    Procedure: COLONOSCOPY;  Surgeon: Gilberto Laguna MD;  Location: [address];  Service: Endoscopy;  Laterality: N/A;    ESOPHAGOGASTRODUODENOSCOPY N/A 2/7/2020    Procedure: EGD (ESOPHAGOGASTRODUODENOSCOPY);  Surgeon: Aleksey Gomez MD;  Location: [address];  Service: Endoscopy;  Laterality: N/A;     Family History   Problem Relation Age of Onset    Heart disease Mother      Social History     Tobacco Use    Smoking status: Former Smoker     Packs/day: 1.00     Years: 54.00     Pack years: 54.00     Types: Cigarettes     Last attempt to quit:  2019     Years since quittin.4    Smokeless tobacco: Never Used   Substance Use Topics    Alcohol use: Not Currently    Drug use: Not Currently     Review of Systems   Constitutional: Positive for fatigue. Negative for chills, diaphoresis and fever.   HENT: Negative for congestion, rhinorrhea and sore throat.    Eyes: Negative for visual disturbance.   Respiratory: Positive for cough and shortness of breath. Negative for chest tightness.    Cardiovascular: Positive for palpitations. Negative for chest pain and leg swelling.   Gastrointestinal: Negative for abdominal pain, blood in stool, constipation, diarrhea and vomiting.   Genitourinary: Negative for dysuria, hematuria and urgency.   Musculoskeletal: Negative for back pain.   Skin: Negative for rash.   Neurological: Negative for seizures and syncope.   Hematological: Does not bruise/bleed easily.   Psychiatric/Behavioral: Negative for agitation and hallucinations.       Physical Exam     Initial Vitals [20 0549]   BP Pulse Resp Temp SpO2   110/68 (!) 200 20 99.3 °F (37.4 °C) 100 %      MAP       --         Physical Exam    Gen: AxOx3, NAD, well nourished  Eye: sclera anicteric, EOMI, no conjunctivitis, no periorbital edema  ENT: NCAT, OP clear, neck supple with FROM, no stridor  CVS:   Regularly irregular tachycardia, no m/r/g, distal pulses intact/symmetric  Pulm:  Unable to auscultate lung secondary to poor quality contact precautions status go, no wheezes, rales or rhonchi, no increased work of breathing  Abd: soft, nontender, nondistended, no organomegaly, no CVAT  Ext: no edema, lesions, rashes, or deformity  Neuro: GCS15, moving all extremities, gait intact  Psych: normal affect, cooperative  ED Course   Procedures  Labs Reviewed   CBC W/ AUTO DIFFERENTIAL - Abnormal; Notable for the following components:       Result Value    RBC 3.83 (*)     Hemoglobin 11.2 (*)     Hematocrit 35.4 (*)     Mean Corpuscular Hemoglobin Conc 31.6 (*)      RDW 16.6 (*)     Immature Granulocytes 0.6 (*)     All other components within normal limits   COMPREHENSIVE METABOLIC PANEL - Abnormal; Notable for the following components:    Sodium 135 (*)     Potassium 3.1 (*)     Glucose 130 (*)     Calcium 6.3 (*)     Total Protein 4.8 (*)     Albumin 1.5 (*)     Alkaline Phosphatase 245 (*)     AST 87 (*)     All other components within normal limits   C-REACTIVE PROTEIN - Abnormal; Notable for the following components:    CRP 46.8 (*)     All other components within normal limits   FERRITIN - Abnormal; Notable for the following components:    Ferritin 892 (*)     All other components within normal limits   LACTATE DEHYDROGENASE - Abnormal; Notable for the following components:     (*)     All other components within normal limits   LACTIC ACID, PLASMA - Abnormal; Notable for the following components:    Lactate (Lactic Acid) 4.5 (*)     All other components within normal limits   TROPONIN I - Abnormal; Notable for the following components:    Troponin I 0.180 (*)     All other components within normal limits   SARS-COV-2 RNA AMPLIFICATION, QUAL - Abnormal; Notable for the following components:    SARS-CoV-2 RNA, Amplification, Qual Positive (*)     All other components within normal limits   DIGOXIN LEVEL - Abnormal; Notable for the following components:    Digoxin Lvl <0.1 (*)     All other components within normal limits   PROTIME-INR - Abnormal; Notable for the following components:    Prothrombin Time 15.1 (*)     INR 1.5 (*)     All other components within normal limits   CULTURE, BLOOD   CULTURE, BLOOD   CK   PROCALCITONIN     EKG Readings: (Independently Interpreted)     Atrial fibrillation with rapid ventricular response at a rate of 181, bifascicular block, no obvious ST segment changes, though limited due to  rate       Imaging Results          X-Ray Chest AP Portable (Final result)  Result time 04/08/20 07:48:27    Final result by Tran Lopez MD  (04/08/20 07:48:27)                 Impression:      Please see above.      Electronically signed by: Tran Lopez MD  Date:    04/08/2020  Time:    07:48             Narrative:    EXAMINATION:  XR CHEST AP PORTABLE    CLINICAL HISTORY:  Suspected Covid-19 Virus Infection;    TECHNIQUE:  Single frontal view of the chest was performed.    COMPARISON:  Chest radiograph 02/12/2020 and chest CT 02/13/2020    FINDINGS:  Cardiac monitoring leads overlie the chest.  There is unchanged enlargement of the cardiomediastinal silhouette and atherosclerosis of the thoracic aorta.  The lungs demonstrate abnormal multifocal patchy airspace opacities, right greater than left, which appears similar to slightly increased in conspicuity from prior chest radiograph of 02/12/2020.  Findings likely relate to previously demonstrated interstitial lung disease with possible superimposed component of, infectious or non infectious inflammatory process not definitively excluded.  There is a probable left pleural effusion.  No evidence of pneumothorax.  Visualized osseous structures are intact.                                 Medical Decision Making:   History:   I obtained history from: EMS provider.  Old Medical Records: I decided to obtain old medical records.  Old Records Summarized: records from clinic visits and records from another hospital.  Initial Assessment:    This is a 69-year-old woman with a history of paroxysmal Afib, on Coumadin COPD not on home oxygen who presents with cough, congestion for the past 3 days, and today was noted to have a rapid heart rate.  She is in atrial fibrillation with rapid ventricular response on my assessment.  She is tachypneic, but is not hypoxic, we were able to wean her oxygen off.  Given that she is in a nursing home, am concerned about possible corona virus, pneumonia, COPD exacerbation, versus less likely PE.  Plan for chest x-ray, COVID swab, broad labs, and cardiac monitoring.  She is on  midodrine at baseline, and her blood pressure is borderline, and I am concerned about side effects from metoprolol or calcium channel blocker, so we will give her an amiodarone bolus to try to control her rate little bit more.  Clinical Tests:   Lab Tests: Ordered and Reviewed  Radiological Study: Ordered and Reviewed  ED Management:    Patient's rate improved to the 130s with amiodarone.  I am reluctant to more aggressively rate control her given the setting of active infection.  Her chest x-ray by my independent her petition is notable for multifocal pneumonia.  Her labs are notable for a  Positive COVID test.  She also has hypocalcemia, hypokalemia and elevated lactate.  She has gotten 500 cc of fluid, and I am reluctant to give her more given her history of heart failure and multifocal pneumonia.  Plan for admission to the hospital for further management.  I discussed the plan with the patient and her daughter via telephone, and they are amenable to it.              Attending Attestation:         Attending Critical Care:   Critical Care Times:   Direct Patient Care (initial evaluation, reassessments, and time considering the case)................................................................15 minutes.   Additional History from reviewing old medical records or taking additional history from the family, EMS, PCP, etc.......................5 minutes.   Ordering, Reviewing, and Interpreting Diagnostic Studies...............................................................................................................5 minutes.   Documentation..................................................................................................................................................................................5 minutes.   ==============================================================  · Total Critical Care Time - exclusive of procedural time: 30  minutes.  ==============================================================  Critical care was necessary to treat or prevent imminent or life-threatening deterioration of the following conditions: cardiac arrhythmia and sepsis.   The following critical care procedures were done by me (see procedure notes): pulse oximetry.   Critical care was time spent personally by me on the following activities: obtaining history from patient or relative, examination of patient, review of old charts, ordering lab, x-rays, and/or EKG, development of treatment plan with patient or relative, ordering and performing treatments and interventions, evaluation of patient's response to treatment, interpretation of cardiac measurements and re-evaluation of patient's conition.   Critical Care Condition: life-threatening                             Clinical Impression:       ICD-10-CM ICD-9-CM   1. Atrial fibrillation with RVR I48.91 427.31   2. Tachycardia R00.0 785.0   3. COVID-19 virus infection U07.1    4. Multifocal pneumonia J18.9 486   5. Demand ischemia of myocardium I24.8 411.89             ED Disposition Condition    Admit                           Jasmine Oneill MD  04/08/20 0851

## 2020-04-09 NOTE — PROGRESS NOTES
PT HR in the 120-130s after amio bolus given. Hospital Med team X notified. No new orders at this time; will continue to monitor.

## 2020-04-09 NOTE — PLAN OF CARE
Pt remains free of falls and injury this shift; patient bedbound Q turns. Vital signs stable; pt remains Afib on the monitor. Plan of care reviewed with patient, verbalized understanding. Pt on ammio gtt for Afib at 0.5cc/hr. Pt seen by wound care, wale mattress ordered for bed. No signs of acute distress noted. Will continue to monitor.

## 2020-04-09 NOTE — MEDICAL/APP STUDENT
I spoke with Tia (daughter) at 1417 today. I informed her that, per Laina Washington NP, Elizabeth is doing well and appears comfortable. Her spirits are good and she is stable. Elizabeth has an elevated heart rate likely due to her Afib and the plan is to get this managed. I informed her that she is currently on 2L of oxygen via NC. Tia had no further questions at this time. Tia reported that she will update Tuan (), as I was unable to reach him. I will call tomorrow to update.

## 2020-04-09 NOTE — PLAN OF CARE
AAOX4, NAD. HR sustaining 120-130s. Amio drip initiated at 0.5mg on 4/8/2020. Fall protocol implemented. Call light, bedside table and personal item in reach. Excoriation to bilateral buttocks and lower back noted. Barrier cream used. Pillow support used, pt turned during rounds.One time dose of Hydroxyzine ordered for pt anxiety. POC reviewed with pt. Will continue to monitor.

## 2020-04-09 NOTE — PROGRESS NOTES
[facility]  Cardiology  Progress Note    Patient Name: Elizabeth Cano  MRN: [id number]  Admission Date: 4/8/2020  Hospital Length of Stay: 1 days  Code Status: DNR   Attending Physician: Jose Kirby MD   Primary Care Physician: Cesar Reeves MD  Expected Discharge Date:   Principal Problem:COVID-19 virus infection    Subjective:     Hospital Course:   No notes on file    Interval History: HR noted to be 120s-130s overnight. Continues to be on amiodarone ggt.     ROS  Objective:     Vital Signs (Most Recent):  Temp: 99.1 °F (37.3 °C) (04/09/20 1520)  Pulse: (!) 133 (04/09/20 1520)  Resp: 17 (04/09/20 1520)  BP: 94/64 (04/09/20 1520)  SpO2: 97 % (04/09/20 1520) Vital Signs (24h Range):  Temp:  [98.2 °F (36.8 °C)-99.6 °F (37.6 °C)] 99.1 °F (37.3 °C)  Pulse:  [120-136] 133  Resp:  [17-20] 17  SpO2:  [86 %-97 %] 97 %  BP: ()/(56-71) 94/64     Weight: 60.5 kg (133 lb 6.1 oz)  Body mass index is 21.53 kg/m².     SpO2: 97 %  O2 Device (Oxygen Therapy): nasal cannula      Intake/Output Summary (Last 24 hours) at 4/9/2020 1541  Last data filed at 4/9/2020 1116  Gross per 24 hour   Intake 630.28 ml   Output --   Net 630.28 ml       Lines/Drains/Airways     Peripheral Intravenous Line                 Peripheral IV - Single Lumen 04/09/20 1115 22 G Other (Comment) less than 1 day         Peripheral IV - Single Lumen 04/09/20 1245 20 G;1 3/4 in Left;Anterior Forearm less than 1 day                Physical Exam and ROS not attained by me to limit exposure of active COVID-19 infection    Significant Labs:   BMP:   Recent Labs   Lab 04/08/20  0622 04/09/20  0330   * 161*   * 129*   K 3.1* 4.2   CL 98 93*   CO2 24 23   BUN 11 12   CREATININE 0.7 0.7   CALCIUM 6.3* 6.1*   , CMP   Recent Labs   Lab 04/08/20  0622 04/09/20  0330   * 129*   K 3.1* 4.2   CL 98 93*   CO2 24 23   * 161*   BUN 11 12   CREATININE 0.7 0.7   CALCIUM 6.3* 6.1*   PROT 4.8* 4.9*   ALBUMIN 1.5* 1.4*    BILITOT 0.7 0.5   ALKPHOS 245* 235*   AST 87* 74*   ALT 27 25   ANIONGAP 13 13   ESTGFRAFRICA >60.0 >60.0   EGFRNONAA >60.0 >60.0    and CBC   Recent Labs   Lab 04/08/20  0622   WBC 7.09   HGB 11.2*   HCT 35.4*            Assessment and Plan:     Atrial fibrillation with RVR  Patient with a history of Afib with RVR (on coumadin) had her digoxin therapy discontinued during past hospitalization in March. Not on BB/CCB either due to chronic hypotension. She presented from her NH for worsening SOB, fever, noted to be COVID positive.   On presentation, patient was noted to be in Afib w/ RVR, HR of 200s improved with amiodarone load and infusion.     Recommendations  - Continue with current amiodarone gtt.   - Agree with digoxin load  - Optimize electrolytes, K>4, Mg >2  - Continue with warfarin for anti-coagulation  - Consider nutrition consult as patient appears to be hypoalbuminemic           VTE Risk Mitigation (From admission, onward)         Ordered     warfarin (COUMADIN) split tablet 0.5 mg  Daily      04/09/20 1003     Reason for No Pharmacological VTE Prophylaxis  Once     Question:  Reasons:  Answer:  Already adequately anticoagulated on oral Anticoagulants    04/08/20 1129     IP VTE HIGH RISK PATIENT  Once      04/08/20 1129                Chuck Metzger MD  Cardiology  [facility]

## 2020-04-09 NOTE — PROGRESS NOTES
Patient seen for wound care consult. Skin tears noted to bilateral arms with serous drainage noted from edema. MASD and IAD noted scattered to buttocks and perineal area due to incontinence.   Recommend triad twice daily and as needed for incontinence care. Recommend to remove and not replace foam dressings to arms as they become saturated and to instead wrap with blue incontinence pad to manage moisture. Recommend waffle overlay to f urther reduce risk of pressure injury.  Nursing to continue care. Wound care will sign off at this time. Please re consult as needed.

## 2020-04-09 NOTE — PLAN OF CARE
04/09/20 1046   Post-Acute Status   Post-Acute Authorization Placement   Post-Acute Placement Status Awaiting Internal Medical Clearance

## 2020-04-09 NOTE — SUBJECTIVE & OBJECTIVE
Interval History: HR noted to be 120s-130s overnight. Continues to be on amiodarone ggt.     ROS  Objective:     Vital Signs (Most Recent):  Temp: 99.1 °F (37.3 °C) (04/09/20 1520)  Pulse: (!) 133 (04/09/20 1520)  Resp: 17 (04/09/20 1520)  BP: 94/64 (04/09/20 1520)  SpO2: 97 % (04/09/20 1520) Vital Signs (24h Range):  Temp:  [98.2 °F (36.8 °C)-99.6 °F (37.6 °C)] 99.1 °F (37.3 °C)  Pulse:  [120-136] 133  Resp:  [17-20] 17  SpO2:  [86 %-97 %] 97 %  BP: ()/(56-71) 94/64     Weight: 60.5 kg (133 lb 6.1 oz)  Body mass index is 21.53 kg/m².     SpO2: 97 %  O2 Device (Oxygen Therapy): nasal cannula      Intake/Output Summary (Last 24 hours) at 4/9/2020 1541  Last data filed at 4/9/2020 1116  Gross per 24 hour   Intake 630.28 ml   Output --   Net 630.28 ml       Lines/Drains/Airways     Peripheral Intravenous Line                 Peripheral IV - Single Lumen 04/09/20 1115 22 G Other (Comment) less than 1 day         Peripheral IV - Single Lumen 04/09/20 1245 20 G;1 3/4 in Left;Anterior Forearm less than 1 day                Physical Exam and ROS not attained by me to limit exposure of active COVID-19 infection    Significant Labs:   BMP:   Recent Labs   Lab 04/08/20 0622 04/09/20  0330   * 161*   * 129*   K 3.1* 4.2   CL 98 93*   CO2 24 23   BUN 11 12   CREATININE 0.7 0.7   CALCIUM 6.3* 6.1*   , CMP   Recent Labs   Lab 04/08/20  0622 04/09/20  0330   * 129*   K 3.1* 4.2   CL 98 93*   CO2 24 23   * 161*   BUN 11 12   CREATININE 0.7 0.7   CALCIUM 6.3* 6.1*   PROT 4.8* 4.9*   ALBUMIN 1.5* 1.4*   BILITOT 0.7 0.5   ALKPHOS 245* 235*   AST 87* 74*   ALT 27 25   ANIONGAP 13 13   ESTGFRAFRICA >60.0 >60.0   EGFRNONAA >60.0 >60.0    and CBC   Recent Labs   Lab 04/08/20  0622   WBC 7.09   HGB 11.2*   HCT 35.4*

## 2020-04-09 NOTE — PROGRESS NOTES
PHARMACY CONSULT NOTE: WARFARIN  Elizabeth Cano is a 69 y.o. female on warfarin therapy for Paroxysmal Atrial Fibrillation. PharmD has been consulted for warfarin dosing.     Current order: Warfarin 1 mg daily  Home dose: 1 mg every Mon, Wed, Fri   INR goal: 2-3   Coumadin clinic enrollment: Patient's nursing home was managing prior to admission    Lab Results   Component Value Date    INR 1.6 (H) 04/09/2020    INR 1.5 (H) 04/08/2020    INR 1.1 03/18/2020     Significant drug interactions: Azithromycin and amiodarone may raise INR  Diet: Diabetic Diet; 2000 Calorie; Cardiac (Low Na/Chol)    Recommendation(s):    Consider adjusting Warfarin to 0.5mg today and assess tomorrow.  May have to adjust to MWF dosing as this was home regimen and DI can potentially cause increase in INR.   Trend INR daily   Pharmacy will continue to follow and monitor warfarin    Thank you for the consult,  Theresa Hernandez    **Note: Consults are reviewed Monday-Friday 7:00am-3:30pm. THE ABOVE RECOMMENDATIONS ARE ONLY SUGGESTED.THE RECOMMENDATIONS SHOULD BE CONSIDERED IN CONJUNCTION WITH ALL PATIENT FACTORS. **

## 2020-04-09 NOTE — PROGRESS NOTES
Hospital Medicine  Progress Note  [facility]      Patient Name: Elizabeth Cano  MRN:  [id number]  Hospital Medicine Team: [facility] X Laina Washington NP  Date of Admission:  4/8/2020     Length of Stay:  LOS: 1 day     Principal Problem:  COVID-19 virus infection      HPI:  Patient is a 68yo female with a PMHx of Afib, CHF, COPD, and DM2 being admitted to medicine for Covid-19 infection. Patient reports onset of SOB a few days ago. She was noted to be hypoxemic this morning on evaluation. Her oxygen saturation 90% on room air, as well as atrial fibrillation with RVR in the low 200s.  For the past few days she has had worsening shortness of breath, cough, low-grade fever, and weakness. She lives in a NH that has had a Covid outbreak. She is on midodrine for chronic hypotension. Of note, she used to be on digoxin for afib.    Hospital Course:  Admitted 04/08 due to COVID-19 complicated by Afib with RVR. Currently on doxycycline and ceftriaxone due to COVID. HR remains elevated on amio gtt, dig loading added followed by daily dosing. Continue IV diuresis at current; hyponatremia workup pending with urine and serum osmo.     Covid Specifics:  - COVID-19 testing: POSITIVE 04/08  - on doxycycline (due to QTc and other medications) and ceftriaxone  - no indication for plaquenil at this time  - Ferritin 892  - CRP 47    Interval History/Todays Plans:     - afebrile, stable on 2-3 liters via nasal cannula; reports feeling ok and is in good spirits, no conversational dyspnea, dry cough noted during interview   - continue doxy and ceftriaxone  - remains in RVR, amio gtt held earlier due to lack of IV access >> resumed, see MAR  - dig loading added, followed by daily dosing  - remains edematous in upper extremities, continue IV lasix  - hyponatremia workup ongoing    Review of Systems:  Respiratory: + cough; - SOB, - SINGH  Cardiovascular: - CP, - palpitations  GI: + diarrhea; -N/V, constipation      Inpatient Medications:    Current Facility-Administered Medications:     acetaminophen tablet 650 mg, 650 mg, Oral, Q4H PRN, Reza Alves Jr., BABAR    amiodarone 360 mg/200 mL (1.8 mg/mL) infusion, 0.5 mg/min, Intravenous, Continuous, Gabo Fonseca DO, Last Rate: 16.7 mL/hr at 04/09/20 1116, 0.5 mg/min at 04/09/20 1116    cefTRIAXone injection 1 g, 1 g, Intravenous, Daily, Laina Washington NP, 1 g at 04/09/20 1116    dextrose 10% (D10W) Bolus, 12.5 g, Intravenous, PRN, Reza Alves Jr., BABAR    dextrose 10% (D10W) Bolus, 25 g, Intravenous, PRN, Reza Alves Jr., BABAR    digoxin injection 500 mcg, 500 mcg, Intravenous, Once **FOLLOWED BY** digoxin injection 250 mcg, 250 mcg, Intravenous, Once **FOLLOWED BY** [START ON 4/10/2020] digoxin injection 250 mcg, 250 mcg, Intravenous, Once, Laina Washington NP    [START ON 4/10/2020] digoxin tablet 0.125 mg, 0.125 mg, Oral, Daily, Laina Washington NP    doxycycline tablet 100 mg, 100 mg, Oral, Q12H, Laina Washington NP    furosemide injection 40 mg, 40 mg, Intravenous, Daily, Laina Washington NP    glucagon (human recombinant) injection 1 mg, 1 mg, Intramuscular, PRN, Reza Alves Jr., BABAR    glucose chewable tablet 16 g, 16 g, Oral, PRN, Reza Alves Jr., BABAR    glucose chewable tablet 24 g, 24 g, Oral, PRN, Reza Alves Jr., BABAR    hydroxyzine HCL tablet 25 mg, 25 mg, Oral, TID PRN, Laina Washington NP, 25 mg at 04/09/20 1313    insulin aspart U-100 pen 0-5 Units, 0-5 Units, Subcutaneous, QID (AC + HS) PRN, Reza Alves Jr., BABAR    loperamide capsule 2 mg, 2 mg, Oral, QID PRN, Laina Washington, NP, 2 mg at 04/09/20 1313    melatonin tablet 6 mg, 6 mg, Oral, Nightly PRN, Gabo Fonseca DO, 6 mg at 04/08/20 2323    midodrine tablet 10 mg, 10 mg, Oral, QHS, Reza Alves Jr., BABAR, 10 mg at 04/08/20 2014    mirtazapine tablet 15 mg, 15 mg, Oral, QHS, Reza WALLS  "[place] PA-C, 15 mg at 04/08/20 2016    ondansetron injection 8 mg, 8 mg, Intravenous, Q8H PRN, Reza Alves Jr., PA-C, 8 mg at 04/09/20 1313    pantoprazole EC tablet 40 mg, 40 mg, Oral, Daily, Reza Alves Jr., PA-C, 40 mg at 04/09/20 0920    polyethylene glycol packet 17 g, 17 g, Oral, Daily, Reza Alves Jr., PA-C    potassium chloride CR capsule 30 mEq, 30 mEq, Oral, Daily, Gabo Fonseca, DO, 30 mEq at 04/09/20 0919    promethazine (PHENERGAN) 12.5 mg in dextrose 5 % 50 mL IVPB, 12.5 mg, Intravenous, Q6H PRN, Reza Alves Jr., PA-C    senna tablet 1 tablet, 1 tablet, Oral, Daily, Reza Alves Jr., PA-C    sodium chloride 0.9% flush 10 mL, 10 mL, Intravenous, PRN, Reza Alves Jr., PA-C    warfarin (COUMADIN) split tablet 0.5 mg, 0.5 mg, Oral, Daily, Laina Washington, IMER    Physical Exam:      Intake/Output Summary (Last 24 hours) at 4/9/2020 1506  Last data filed at 4/9/2020 1116  Gross per 24 hour   Intake 630.28 ml   Output --   Net 630.28 ml     Wt Readings from Last 3 Encounters:   04/08/20 60.5 kg (133 lb 6.1 oz)   03/18/20 60.5 kg (133 lb 6.1 oz)   03/01/20 50.5 kg (111 lb 5.3 oz)       BP (!) 98/58 (BP Location: Left arm, Patient Position: Lying)   Pulse (!) 130   Temp 98.2 °F (36.8 °C) (Oral)   Resp 18   Ht 5' 6" (1.676 m)   Wt 60.5 kg (133 lb 6.1 oz)   SpO2 96%   Breastfeeding? No   BMI 21.53 kg/m²     GEN: NAD, conversant  Neuro: intact and oriented  Resp: coarse bilateral breath sounds, no wheezes or rales, normal work of breathing, dry cough  CV: RRR, edema 3+ and weeping in bilateral upper extremities   GI: soft, NTND  Skin: no rash    Laboratory:  Lab Results   Component Value Date    [id number] Positive (A) 04/08/2020     Recent Labs   Lab 04/08/20  0622   WBC 7.09   LYMPH 21.4  1.5   HGB 11.2*   HCT 35.4*        Recent Labs   Lab 04/08/20  0622 04/09/20  0330   * 129*   K 3.1* 4.2   CL 98 93*   CO2 24 23   BUN " 11 12   CREATININE 0.7 0.7   * 161*   CALCIUM 6.3* 6.1*     Recent Labs   Lab 04/08/20  0622 04/08/20  0636 04/09/20  0330 04/09/20  0608   ALKPHOS 245*  --  235*  --    ALT 27  --  25  --    AST 87*  --  74*  --    ALBUMIN 1.5*  --  1.4*  --    PROT 4.8*  --  4.9*  --    BILITOT 0.7  --  0.5  --    INR  --  1.5*  --  1.6*        Recent Labs     04/08/20 0622   FERRITIN 892*   CRP 46.8*   *   TROPONINI 0.180*   CPK 26     All labs within the last 24 hours were reviewed.     Microbiology:  Microbiology Results (last 7 days)     Procedure Component Value Units Date/Time    Influenza A & B by Molecular [[id number]] Collected:  04/09/20 1334    Order Status:  Sent Specimen:  Nasopharyngeal Swab Updated:  04/09/20 1453    Blood culture x two cultures. Draw prior to antibiotics. [[id number]] Collected:  04/08/20 0623    Order Status:  Completed Specimen:  Blood from Peripheral, Upper Arm, Right Updated:  04/09/20 0812     Blood Culture, Routine No Growth to date      No Growth to date    Narrative:       Aerobic and anaerobic    Blood culture x two cultures. Draw prior to antibiotics. [[id number]] Collected:  04/08/20 0622    Order Status:  Completed Specimen:  Blood from Peripheral, Antecubital, Right Updated:  04/09/20 0812     Blood Culture, Routine No Growth to date      No Growth to date    Narrative:       Aerobic and anaerobic    Culture, Respiratory with Gram Stain [[id number]]     Order Status:  No result Specimen:  Sputum, Expectorated         Imaging  ECG Results          EKG 12-lead (Final result)  Result time 04/08/20 16:29:46    Final result by Interface, Lab In [facility] (04/08/20 16:29:46)                 Narrative:    Test Reason : R00.0,    Vent. Rate : 181 BPM     Atrial Rate : 127 BPM     P-R Int : 000 ms          QRS Dur : 184 ms      QT Int : 300 ms       P-R-T Axes : 000 -74 133 degrees     QTc Int : 521 ms    Atrial fibrillation with rapid ventricular response  Left axis deviation  Right  bundle branch block      Abnormal ECG  When compared with ECG of 15-MAR-2020 17:03,  Atrial fibrillation with rapid ventricular response Now present  Confirmed by JAIRO METZGER MD ([id number]) on 4/8/2020 4:29:40 PM    Referred By: [email or web address]   SELF           Confirmed By:JAIRO METZGER MD                            Results for orders placed during the hospital encounter of 01/29/20   Echo    Narrative · Patient does not have transposition of the great arteries. Based on what   is seen, it is possible patient had an ASD closure.  · Mildly decreased left ventricular systolic function. The estimated   ejection fraction is 45%.  · Mildly reduced right ventricular systolic function. Mild right   ventricular enlargement.  · Severe left atrial enlargement.  · Severe right atrial enlargement.  · Normal central venous pressure (3 mmHg).     X-Ray Chest AP Portable  Narrative: EXAMINATION:  XR CHEST AP PORTABLE    CLINICAL HISTORY:  Suspected Covid-19 Virus Infection;    TECHNIQUE:  Single frontal view of the chest was performed.    COMPARISON:  Chest radiograph 02/12/2020 and chest CT 02/13/2020    FINDINGS:  Cardiac monitoring leads overlie the chest.  There is unchanged enlargement of the cardiomediastinal silhouette and atherosclerosis of the thoracic aorta.  The lungs demonstrate abnormal multifocal patchy airspace opacities, right greater than left, which appears similar to slightly increased in conspicuity from prior chest radiograph of 02/12/2020.  Findings likely relate to previously demonstrated interstitial lung disease with possible superimposed component of, infectious or non infectious inflammatory process not definitively excluded.  There is a probable left pleural effusion.  No evidence of pneumothorax.  Visualized osseous structures are intact.  Impression: Please see above.    Electronically signed by: Tran Lopez MD  Date:    04/08/2020  Time:    07:48  All imaging within the last 24 hours was reviewed.     Assessment  and Plan:    Active Hospital Problems    Diagnosis  POA    *COVID-19 virus infection [U07.1]  Yes    Atrial fibrillation with RVR [I48.91]  Yes    Elevated lactic acid level [R79.89]  Yes    Type 2 diabetes mellitus with hyperglycemia, without long-term current use of insulin [E11.65]  Yes    COPD (chronic obstructive pulmonary disease) [J44.9]  Yes    Chronic heart failure with preserved ejection fraction [I50.32]  Yes      Resolved Hospital Problems   No resolved problems to display.     Covid-19 Virus Infection  - COVID-19 testing:  POSITIVE 04/08  - Infection Control notified    - Isolation:   - Airborne and Droplet Precautions  - Surgical mask on patient   - N95 masks must be fit tested, wear eye protection  - 20 second hand hygiene   - Limit visitors per hospital policy   - Consolidate lab draws, nursing care, and interventions    - Diagnostics: (Lymphopenia, hyponatremia, hyperferritinemia, elevated troponin, elevated d-dimer, age, and comorbidities are significant predictors of poor clinical outcome)   - CBC:   trend Q48hrs  - CMP:        trend Q48hrs  - Procalcitonin:  - D-dimer:  repeat prior to discharge  - Ferritin:  repeat prior to discharge   - CRP:        trend Q48hrs  - LDH:   repeat prior to discharge  - BNP:  - Troponin:    - ECG:   - rapid Flu:   - RIP only if BMT/solid transplant:   - Legionella antigen:   - Blood culture x2:   - Sputum culture:   - CXR:   - UA and culture:   - CPK:    - Management:   Bundle care as able to maintain isolation & minimize in/out of room   - Supplemental O2 to maintain SpO2 92%-96%   if requiring 6L NC or higher, place on nonrebreather and discuss case with MICU   - Telemetry & continuous pulse oximetry    - If wheezing   - consider albuterol inhaler PRN due to Afib with RVR if needed   - acetaminophen 650mg PO Q6hr PRN fever   - loperamide PRN for viral diarrhea  - Empiric antibiotics per likely source & patient allergies    - CAP: x 5 day course (d/c early  if low concern for bacterial co-infection)  Ceftriaxone 1g IV Q24hrs            Transitioned to doxycycline 04/09 from azithromycin due to QTc                 If MRSA risk factors, add Vancomycin IV (PharmD consult)     - Investigational Treatment Protocol: (if patient meets criteria)   [email or web address]     - statin: atorvastatin 40mg po daily (if CPK WNL)      - will start HCQ 400mg PO BID x1 day, then 400mg PO daily x 4 days if appropriate   (check glucose 6 phosphate dehydrogenase (NOT G6PD Quant), ECG on start & @48hrs, and start Qshift POCT glucose)    Safety notes:   - Avoid NIPPV (CPAP/BiPAP) to prevent aerosolization, use on a case-by-case basis if in neg pressure room   - Cautious use of NSAIDS for fever per WHO recommendations (3/16/2020)   - No new ACEi/ARB start or discontinuation of chronic med unless hypotensive (Esler et al. Journal of Hypertension 2020, 38:000-000)   - Careful use of steroids in the absence of other indications (shock, ARDS)   - Fluid sparing resuscitation, avoid maintenance fluids    Lactic acidosis  - lactic 4.5->2.8 >>> repeat in AM   - IVF bolus received in ED  - monitor     Chronic diastolic heart failure  Hyponatremia  Bilateral upper extremity edema  - strict Is/Os, daily weights  - given small bolus in ED  - Cardiology recommended IV diuresis, will continue at current  - hyponatremia workup pending: urine NA, urine osmo, serum osmo  - elevate BUE due to edema       Afib with RVR  - HR 200s in the ED  - due to active infection, BB/CCB avoided in setting of COPD  - given amiodarone bolus and infusion with improvement in HR to 120s >> continue at current  - dig loaded 04/09 followed by daily dosing  - continue telemetry  - Cardiology consulted  - daily INR  - continue coumadin, consult PharmD     DM2  - low dose SSI, ACHS  - cardiac diabetic diet    Advance Care Planning  Goals of  care, counseling/discussion  - discussed with patient, she wishes to be DNR/DNI  - 15 minutes spent discussing goals of care  - will update chart    Patient's chronic/stable medical conditions noted in the problem list above will be managed with the patient's home medications as tolerated.     VTE High Risk Prophylaxis:   VTE Risk Mitigation (From admission, onward)         Ordered     warfarin (COUMADIN) split tablet 0.5 mg  Daily      04/09/20 1003     Reason for No Pharmacological VTE Prophylaxis  Once     Question:  Reasons:  Answer:  Already adequately anticoagulated on oral Anticoagulants    04/08/20 1129     IP VTE HIGH RISK PATIENT  Once      04/08/20 1129                Laina Washington, GUILLERMINA, AG-ACNP, BC  Department of Hospital Medicine  [facility]

## 2020-04-09 NOTE — PROGRESS NOTES
Unable to collect urine samples since patient is incontinent of stool and urine. Attempted pure wicc but pure wicc was contaminated with stool. NP payton informed orders for straight cath. Straight cath performed and only 10cc of urine collected. NP Payton informed not enough urine collected to test. Patient did not tolerated procedure will. Pt verbalized feeling short of breath (SpO2 98% on 2L). Pt declined to have waffle mattress placed on her bed at this time. Pt agreed to try later.

## 2020-04-09 NOTE — ASSESSMENT & PLAN NOTE
Patient with a history of Afib with RVR (on coumadin) had her digoxin therapy discontinued during past hospitalization in March. Not on BB/CCB either due to chronic hypotension. She presented from her NH for worsening SOB, fever, noted to be COVID positive.   On presentation, patient was noted to be in Afib w/ RVR, HR of 200s improved with amiodarone load and infusion.       Recommendations  - HR appears improved s/p digoxin load  - Discontinue digoxin  - Continue amiodarone po 400mg BID for 10 days, followed by 200mg daily  - Optimize electrolytes, K>4, Mg >2  - Continue with warfarin for anti-coagulation

## 2020-04-09 NOTE — ASSESSMENT & PLAN NOTE
Patient with a history of Afib with RVR (on coumadin) had her digoxin therapy discontinued during past hospitalization in March. Not on BB/CCB either due to chronic hypotension. She presented from her NH for worsening SOB, fever, noted to be COVID positive.   On presentation, patient was noted to be in Afib w/ RVR, HR of 200s improved with amiodarone load and infusion.     Recommendations  - Continue with current amiodarone gtt.   - Begin digoxin load. Give 500mcg of IV digoxin now, with repeat doses of 250mcg every 6 hours for two doses.   - Optimize electrolytes, K>4, Mg >2  - Continue with warfarin for anti-coagulation  - Consider nutrition consult as patient appears to be hypoalbuminemic

## 2020-04-09 NOTE — NURSING
"Pt complained of anxiety, feeling "uncomfortable", and requesting medication to relax. Dr. Starr paged and notified of pt status. New orders placed for hydroxyzine 25mg ONCE. Will admin and continue monitor.  "

## 2020-04-09 NOTE — PLAN OF CARE
04/09/20 1035   Discharge Assessment   Assessment information obtained from? Medical Record   Expected Length of Stay (days) 5   Prior to hospitilization cognitive status: Alert/Oriented   Prior to hospitalization functional status: Wheelchair Bound   Current cognitive status: Alert/Oriented   Current Functional Status: Wheelchair Bound   Facility Arrived From: [facility] With facility resident   Able to Return to Prior Arrangements other (see comments)  (NH may not accept 2/2 COVID)   Is patient able to care for self after discharge? No   Patient's perception of discharge disposition nursing home   Readmission Within the Last 30 Days current reason for admission unrelated to previous admission   If yes, most recent facility name: [facility] [place]   Patient currently being followed by outpatient case management? No   Patient currently receives any other outside agency services? No   Equipment Currently Used at Home wheelchair;bedside commode;walker, rolling   Do you have any problems affording any of your prescribed medications? No   Is the patient taking medications as prescribed? yes   Does the patient receive services at the [facility]? Yes   Discharge Plan A Return to nursing home   Discharge Plan B New Nursing Home placement - [facility] facility   DME Needed Upon Discharge  none   Readmission Questionnaire   At the time of your discharge, did someone talk to you about what your health problems were? Yes   At the time of discharge, did someone talk to you about what to watch out for regarding worsening of your health problem? Yes   At the time of discharge, did someone talk to you about what to do if you experienced worsening of your health problem? Yes   At the time of discharge, did someone talk to you about which medication to take when you left the hospital and which ones to stop taking? Yes   At the time of discharge, did someone talk to you about when and where to follow up with a doctor  after you left the hospital? Yes   How often do you need to have someone help you when you read instructions, pamphlets, or other written material from your doctor or pharmacy? Sometimes   Do you have problems taking your medications as prescribed? No   Do you have any problems affording any of  your prescribed medications? To be determined   Do you have problems obtaining/receiving your medications? No   Living Arrangements residential facility   Does your caregiver provide all the help you need? Yes   Are you currently feeling confused? No   Are you currently having problems thinking? No   Are you currently having memory problems? No   Readmitted with COVID 19. Resident of [facility]. Plan would be to return to NH however, [facility] may not accept her back. Will follow progress.

## 2020-04-10 PROBLEM — E44.0 MODERATE MALNUTRITION: Chronic | Status: ACTIVE | Noted: 2020-01-01

## 2020-04-10 NOTE — ASSESSMENT & PLAN NOTE
Nutrition Problem:  Moderate Protein-Calorie Malnutrition  Malnutrition in the context of Chronic Illness/Injury     Related to (etiology):  Inadequate energy intake 2/2 decreased appetite     Signs and Symptoms (as evidenced by):  Energy Intake: <75% of estimated energy requirement for > 3 months  Weight Loss: Stable recently, h/o 28% x 4 months per pt      Interventions(treatment strategy):  Collaboration with other providers  [employer] beverage     Nutrition Diagnosis Status:  New

## 2020-04-10 NOTE — PROGRESS NOTES
04/10/20 0242   Critical Value Communication   Date Result Received 04/10/20   Resulting Department of Critical Value lab   Critical Test #1 Magnesium   Critical Test #1 Result 0.9   Name of Notified Physician/Designee Leonela Clifford NP   Date Notified 04/10/20   Time Notified 0242   Physician Directive Orders for 3g IV.  CMP and Mg recollect in AM.  Will repeat after infusion complete.

## 2020-04-10 NOTE — PLAN OF CARE
Problem: Physical Therapy Goal  Goal: Physical Therapy Goal  Outcome: Ongoing, Progressing     Eval completed. Pt at baseline and NH takes care of pt's basic needs.

## 2020-04-10 NOTE — CONSULTS
"  [facility]  Adult Nutrition  Consult Note    SUMMARY     Recommendations  1. If poor PO intake continues, suggest liberalizing diet to regular to increase PO intake.   2. Pt would benefit from ONS but refuses at this time. If pt agreeable, suggest Optisource TID (0mcg vitamin K) or Boost pudding (20mcg vitamin K per serving).   3. Consider appetite stimulant as appropriate.   4. RD to monitor.    Goals: PO intake > 50% by RD follow up  Nutrition Goal Status: new  Communication of RD Recs: other (comment)(POC)    Reason for Assessment    Reason For Assessment: consult  Diagnosis: (COVID-19)  Relevant Medical History: CHF, DM2, COPD, afib  Interdisciplinary Rounds: did not attend  General Information Comments: RD working remotely, spoke with pt over the phone this AM. Pt known from previous admissions to have chronic poor appetite, pt confirms poor appetite continues. She reports she did not consume breakfast this AM, but also states she does not usually eat breakfast. Encouraged increased intake of meals. H/o of 28% wt loss x 4 months per pt report, pt denies recent wt changes. Wt has been stable since discharged from last admission 3/11-3/18 per chart. Pt would benefit from ONS but refuses at this time. Unable to complete NFPE 2/2 COVID-19 precautions & remote access. Last NFPE 3/11 revealed mild and moderate muscle and fat loss. NFPE to be re-completed at a later date. Pt meets criteria for moderate malnutrition at this time.  Nutrition Discharge Planning: Adequate PO intake on diabetic cardiac diet    Nutrition Risk Screen    Nutrition Risk Screen: no indicators present, other (see comments)(dereased apetite )    Nutrition/Diet History         Anthropometrics    Temp: 97.8 °F (36.6 °C)  Height Method: Stated  Height: 5' 6" (167.6 cm)  Height (inches): 66 in  Weight Method: Bed Scale  Weight: 60.5 kg (133 lb 6.1 oz)  Weight (lb): 133.38 lb  Ideal Body Weight (IBW), Female: 130 lb  % Ideal Body " Weight, Female (lb): 102.6 %  BMI (Calculated): 21.5    Lab/Procedures/Meds    Pertinent Labs Reviewed: reviewed  Pertinent Labs Comments: Na 129, Glu 161, Ca 6.1, P 2.6, Mg 0.9, Alb 1.4, CRP 54.2  Pertinent Medications Reviewed: reviewed  Pertinent Medications Comments: statin, lasix, insulin, mirtazapine, ondansetron, pantoprazole, phenergan, senna-docusate, warfarin    Estimated/Assessed Needs    Weight Used For Calorie Calculations: 60.5 kg (133 lb 6.1 oz)  Energy Calorie Requirements (kcal): 8716-8819 kcal/day(30-35)  Energy Need Method: Kcal/kg  Protein Requirements: 73 g/day(1.2 g/kg)  Weight Used For Protein Calculations: 60.5 kg (133 lb 6.1 oz)  Fluid Requirements (mL): per MD or 1 mL/kcal     RDA Method (mL): 1815  CHO Requirement: 227g/day or 50% total kcal    Nutrition Prescription Ordered    Current Diet Order: Diabetic cardiac    Evaluation of Received Nutrient/Fluid Intake    I/O: +1.7L since admit  Comments: LBM 4/8  % Intake of Estimated Energy Needs: 0 - 25 %  % Meal Intake: 0 - 25 %    Nutrition Risk    Level of Risk/Frequency of Follow-up: low     Assessment and Plan    Nutrition Problem:  Moderate Protein-Calorie Malnutrition  Malnutrition in the context of Chronic Illness/Injury    Related to (etiology):  Inadequate energy intake 2/2 decreased appetite    Signs and Symptoms (as evidenced by):  Energy Intake: <75% of estimated energy requirement for > 3 months  Weight Loss: Stable recently, h/o 28% x 4 months per pt     Interventions(treatment strategy):  Collaboration with other providers  Commercial beverage    Nutrition Diagnosis Status:  New     Monitor and Evaluation    Food and Nutrient Intake: energy intake, food and beverage intake  Food and Nutrient Adminstration: diet order  Anthropometric Measurements: weight, weight change, body mass index  Biochemical Data, Medical Tests and Procedures: electrolyte and renal panel, gastrointestinal profile, glucose/endocrine profile, inflammatory  profile, lipid profile  Nutrition-Focused Physical Findings: overall appearance     Malnutrition Assessment  Malnutrition Type: chronic illness  Energy Intake: moderate energy intake      Weight Loss (Malnutrition): (H/o 28% x 4 months per pt, stable recently)  Energy Intake (Malnutrition): less than 75% for greater than or equal to 3 months     Due to recent hospital wide restrictions to limit the transfer of COVID-19, we are not performing any physical exams at this time. All S/S will be observational; NFPE to be performed at a future date.    Nutrition Follow-Up    RD Follow-up?: Yes

## 2020-04-10 NOTE — PT/OT/SLP EVAL
"Occupational Therapy  Co- Evaluation and Discharge    Name: Elizabeth Cano  MRN: [id number]  Admitting Diagnosis:  COVID-19 virus infection      Recommendations:     Discharge Recommendations:  Return to NH    Assessment:     Elizabeth Cano is a 69 y.o. female with a medical diagnosis of COVID-19 virus infection.  Pt reports she does not want to participate with therapy. Pt reports she prefers to stay in bed in NH and wants to remain in bed at this time. Pt resides with NH and reports staff can assist her upon return to NH as needed.     Plan:     · D/C OT .4/10/2020     Subjective     "I don't want to get up" pt reports.     Occupational Profile:  Pt reports she has resided in NH x 6 months. Pt reports her spouse resides in NH with her and is mobile. Pt reports she does not get out of bed at NH and requires Set-up feeding and g/h skills bed level.     Pain/Comfort:  · Pain Rating 1: 0/10    Patients cultural, spiritual, Caodaism conflicts given the current situation: no    Objective:     Communicated with: nsg prior to session.   General Precautions: Standard, airborne, droplet, fall, contact     Occupational Performance:    Activities of Daily Living:  · Feeding: set-up  · G/H set-up bed level.     Cognitive/Visual Perceptual  Pt is awake, alert and following commands. Pt with pleasant mood/approrpriate affect.     Physical Exam  Pt is right hand dominant. Pt demo functional use of UE's with swelling noted in B UE's. UE positioned on pillows for adequate elevation.     AMPAC 6 Click ADL:  AMPAC Total Score: 10    Treatment & Education:  OT provided education re: therapy purpose and pt declined therapy needs at this time. Pt is at baseline for functional performance as desired including set-up feeding and g/h skills.  Education provided re: OT POC including d/c of OT services.     Education:    Patient left supine with all lines intact and call button in reach    GOALS:   Multidisciplinary Problems     " Occupational Therapy Goals     Not on file                History:     Past Medical History:   Diagnosis Date    A-fib     GAVIN (acute kidney injury)     CHF (congestive heart failure)     COPD (chronic obstructive pulmonary disease)     Diabetes mellitus     Hypertension        Past Surgical History:   Procedure Laterality Date    CARDIAC SURGERY      COLONOSCOPY N/A 2/4/2020    Procedure: COLONOSCOPY;  Surgeon: Gilberto Laguna MD;  Location: [facility] ([address] [place] [address]);  Service: Endoscopy;  Laterality: N/A;    ESOPHAGOGASTRODUODENOSCOPY N/A 2/7/2020    Procedure: EGD (ESOPHAGOGASTRODUODENOSCOPY);  Surgeon: Aleksey Gomez MD;  Location: [facility] ([address] [place] [address]);  Service: Endoscopy;  Laterality: N/A;       Time Tracking:     OT Date of Treatment: 04/10/20  OT Start Time: 0844  OT Stop Time: 0855  OT Total Time (min): 11 min    Billable Minutes:Evaluation 11    VIKY Garcia  4/10/2020

## 2020-04-10 NOTE — PROGRESS NOTES
Hospital Medicine  Progress Note  [facility]      Patient Name: Elizabeth Cano  MRN:  [id number]  Hospital Medicine Team: [facility] X Laina Washington NP  Date of Admission:  4/8/2020     Length of Stay:  LOS: 2 days     Principal Problem:  COVID-19 virus infection      HPI:  Patient is a 70yo female with a PMHx of Afib, CHF, COPD, and DM2 being admitted to medicine for Covid-19 infection. Patient reports onset of SOB a few days ago. She was noted to be hypoxemic this morning on evaluation. Her oxygen saturation 90% on room air, as well as atrial fibrillation with RVR in the low 200s.  For the past few days she has had worsening shortness of breath, cough, low-grade fever, and weakness. She lives in a NH that has had a Covid outbreak. She is on midodrine for chronic hypotension. Of note, she used to be on digoxin for afib.    Hospital Course:  Admitted 04/08 due to COVID-19 complicated by Afib with RVR. Currently on doxycycline (elongated QTc) and ceftriaxone due to COVID. Consider plaquenil addition if QTc improves.  HR improved after Dig load, IV amio transitioned to PO; remains in AFib/flutter. IV diuresis discontinued to concerns for hypovolemia and hyponatremia in setting of worsening lactic acidosis; hydrated gently with IVFs. Chemistry indicative of malnutrition, Dietary following and supplements added; electrolytes repleted as appropriate.     Covid Specifics:  - COVID-19 testing: POSITIVE 04/08  - on doxycycline (due to QTc and other chronic medications) and ceftriaxone  - no indication for plaquenil at this time; initiate if appropriate and QTc improves  - Ferritin 892 >>> 998  - CRP 47 >>> 54.2    Interval History:  Resting in bed, afebrile, stable on 2-3 liters via nasal cannula; reports feeling ok and is in good spirits, no conversational dyspnea, dry cough noted during interview. She was updated on need to increase PO intake as well as lab findings. She understands plan  of care for Covid, Afib, and malnutrition treatment.    Disposition plans:  Return to group home NH when medically ready.    Review of Systems:  Respiratory: + cough; - SOB, - SINGH  Cardiovascular: - CP, - palpitations  GI: + diarrhea (improving); -N/V, constipation     Inpatient Medications:    Current Facility-Administered Medications:     acetaminophen tablet 650 mg, 650 mg, Oral, Q4H PRN, Reza Alves Jr., PA-C    amiodarone 360 mg/200 mL (1.8 mg/mL) infusion, 0.5 mg/min, Intravenous, Continuous, Gabo Fonseca DO, Last Rate: 16.7 mL/hr at 04/10/20 0031, 0.5 mg/min at 04/10/20 0031    atorvastatin tablet 40 mg, 40 mg, Oral, QHS, Laina Washington NP, 40 mg at 04/09/20 2200    cefTRIAXone injection 1 g, 1 g, Intravenous, Daily, Laina Washington NP, 1 g at 04/09/20 1116    dextrose 10% (D10W) Bolus, 12.5 g, Intravenous, PRN, Reza Alves Jr., PA-C    dextrose 10% (D10W) Bolus, 25 g, Intravenous, PRN, Reza Alves Jr., PA-C    [COMPLETED] digoxin injection 500 mcg, 500 mcg, Intravenous, Once, 500 mcg at 04/09/20 1756 **FOLLOWED BY** [COMPLETED] digoxin injection 250 mcg, 250 mcg, Intravenous, Once, 250 mcg at 04/09/20 2205 **FOLLOWED BY** digoxin injection 250 mcg, 250 mcg, Intravenous, Once, Laina Washington NP, Stopped at 04/10/20 0400    digoxin tablet 0.125 mg, 0.125 mg, Oral, Daily, Laina Washington NP    doxycycline tablet 100 mg, 100 mg, Oral, Q12H, Laina Washington NP, 100 mg at 04/09/20 2200    furosemide tablet 40 mg, 40 mg, Oral, BID, Laina Washington NP    glucagon (human recombinant) injection 1 mg, 1 mg, Intramuscular, PRN, Reza Alves Jr., PA-C    glucose chewable tablet 16 g, 16 g, Oral, PRN, Reza Alves Jr., BABAR    glucose chewable tablet 24 g, 24 g, Oral, PRN, Reza Alves Jr., BABAR    hydroxyzine HCL tablet 25 mg, 25 mg, Oral, TID PRN, Laina Washignton, NP, 25 mg at 04/09/20 1313    insulin aspart U-100 pen 0-5  "Units, 0-5 Units, Subcutaneous, QID (AC + HS) PRN, Reza Alves Jr., PA-C    loperamide capsule 2 mg, 2 mg, Oral, QID PRN, Laina Washington NP, 2 mg at 04/09/20 1313    melatonin tablet 6 mg, 6 mg, Oral, Nightly PRN, Gabo Fonseca, DO, 6 mg at 04/09/20 2205    midodrine tablet 10 mg, 10 mg, Oral, QHS, Reza Alves Jr., PA-C, 10 mg at 04/09/20 2204    mirtazapine tablet 15 mg, 15 mg, Oral, QHS, Reza Alves Jr., PA-C, 15 mg at 04/09/20 2200    ondansetron injection 8 mg, 8 mg, Intravenous, Q8H PRN, Reza Alves Jr., PA-C, 8 mg at 04/09/20 1313    pantoprazole EC tablet 40 mg, 40 mg, Oral, Daily, Reza Alves Jr., PA-C, 40 mg at 04/09/20 0920    polyethylene glycol packet 17 g, 17 g, Oral, Daily, Reza Alves Jr., PA-C    potassium chloride CR capsule 30 mEq, 30 mEq, Oral, Daily, Gabo Fonseca, DO, 30 mEq at 04/09/20 0919    promethazine (PHENERGAN) 12.5 mg in dextrose 5 % 50 mL IVPB, 12.5 mg, Intravenous, Q6H PRN, Reza Alves Jr., PA-C    senna tablet 1 tablet, 1 tablet, Oral, Daily, Reza Alves Jr., PA-C    sodium chloride 0.9% flush 10 mL, 10 mL, Intravenous, PRN, Reza Alves Jr., PA-C    warfarin (COUMADIN) split tablet 0.5 mg, 0.5 mg, Oral, Daily, Laina Washington NP, 0.5 mg at 04/09/20 2204    Physical Exam:      Intake/Output Summary (Last 24 hours) at 4/10/2020 0826  Last data filed at 4/9/2020 2200  Gross per 24 hour   Intake 971.42 ml   Output 10 ml   Net 961.42 ml     Wt Readings from Last 3 Encounters:   04/08/20 60.5 kg (133 lb 6.1 oz)   03/18/20 60.5 kg (133 lb 6.1 oz)   03/01/20 50.5 kg (111 lb 5.3 oz)       BP 99/60 (BP Location: Right arm, Patient Position: Lying)   Pulse 95   Temp 97.8 °F (36.6 °C) (Oral)   Resp 18   Ht 5' 6" (1.676 m)   Wt 60.5 kg (133 lb 6.1 oz)   SpO2 (!) 93%   Breastfeeding? No   BMI 21.53 kg/m²     GEN: NAD, conversant  Neuro: intact and oriented  Resp: coarse bilateral breath " sounds, no wheezes or rales, normal work of breathing, dry cough  CV: RRR, edema 3+ and weeping in bilateral upper extremities   GI: soft, NTND  Skin: no rash    Laboratory:  Lab Results   Component Value Date    [id number] Positive (A) 04/08/2020     Recent Labs   Lab 04/08/20  0622 04/10/20  0045   WBC 7.09 7.57   LYMPH 21.4  1.5 13.6*  1.0   HGB 11.2* 10.4*   HCT 35.4* 31.3*    166     Recent Labs   Lab 04/08/20  0622 04/09/20  0330 04/10/20  0045   * 129*  --    K 3.1* 4.2  --    CL 98 93*  --    CO2 24 23  --    BUN 11 12  --    CREATININE 0.7 0.7  --    * 161*  --    CALCIUM 6.3* 6.1*  --    MG  --   --  0.9*   PHOS  --   --  2.6*     Recent Labs   Lab 04/08/20 0622 04/08/20 0636 04/09/20  0330 04/09/20  0608 04/10/20  0654   ALKPHOS 245*  --  235*  --   --    ALT 27  --  25  --   --    AST 87*  --  74*  --   --    ALBUMIN 1.5*  --  1.4*  --   --    PROT 4.8*  --  4.9*  --   --    BILITOT 0.7  --  0.5  --   --    INR  --  1.5*  --  1.6* 1.0      Recent Labs     04/08/20  0622 04/10/20  0045   FERRITIN 892* 998*   CRP 46.8* 54.2*   *  --    TROPONINI 0.180*  --    CPK 26  --    All labs within the last 24 hours were reviewed.     Microbiology:  Microbiology Results (last 7 days)     Procedure Component Value Units Date/Time    Blood culture x two cultures. Draw prior to antibiotics. [[id number]] Collected:  04/08/20 0622    Order Status:  Completed Specimen:  Blood from Peripheral, Antecubital, Right Updated:  04/10/20 0812     Blood Culture, Routine No Growth to date      No Growth to date      No Growth to date    Narrative:       Aerobic and anaerobic    Blood culture x two cultures. Draw prior to antibiotics. [[id number]] Collected:  04/08/20 0623    Order Status:  Completed Specimen:  Blood from Peripheral, Upper Arm, Right Updated:  04/10/20 0812     Blood Culture, Routine No Growth to date      No Growth to date      No Growth to date    Narrative:       Aerobic and  anaerobic    Influenza A & B by Molecular [[id number]] Collected:  04/09/20 1822    Order Status:  Completed Specimen:  Nasopharyngeal Swab Updated:  04/09/20 2013     Influenza A, Molecular Negative     Influenza B, Molecular Negative     Flu A & B Source NP    Influenza A & B by Molecular [[id number]] Collected:  04/09/20 1334    Order Status:  Canceled Specimen:  Nasopharyngeal Swab     Culture, Respiratory with Gram Stain [[id number]]     Order Status:  No result Specimen:  Sputum, Expectorated         Imaging  ECG Results          EKG 12-lead (Final result)  Result time 04/08/20 16:29:46    Final result by Interface, Lab In [facility] (04/08/20 16:29:46)                 Narrative:    Test Reason : R00.0,    Vent. Rate : 181 BPM     Atrial Rate : 127 BPM     P-R Int : 000 ms          QRS Dur : 184 ms      QT Int : 300 ms       P-R-T Axes : 000 -74 133 degrees     QTc Int : 521 ms    Atrial fibrillation with rapid ventricular response  Left axis deviation  Right bundle branch block      Abnormal ECG  When compared with ECG of 15-MAR-2020 17:03,  Atrial fibrillation with rapid ventricular response Now present  Confirmed by JAIRO METZGER MD ([id number]) on 4/8/2020 4:29:40 PM    Referred By: [email or web address]   SELF           Confirmed By:JAIRO METZGER MD                            Results for orders placed during the hospital encounter of 01/29/20   Echo    Narrative · Patient does not have transposition of the great arteries. Based on what   is seen, it is possible patient had an ASD closure.  · Mildly decreased left ventricular systolic function. The estimated   ejection fraction is 45%.  · Mildly reduced right ventricular systolic function. Mild right   ventricular enlargement.  · Severe left atrial enlargement.  · Severe right atrial enlargement.  · Normal central venous pressure (3 mmHg).     X-Ray Chest AP Portable  Narrative: EXAMINATION:  XR CHEST AP PORTABLE    CLINICAL HISTORY:  Suspected Covid-19 Virus  Infection;    TECHNIQUE:  Single frontal view of the chest was performed.    COMPARISON:  Chest radiograph 02/12/2020 and chest CT 02/13/2020    FINDINGS:  Cardiac monitoring leads overlie the chest.  There is unchanged enlargement of the cardiomediastinal silhouette and atherosclerosis of the thoracic aorta.  The lungs demonstrate abnormal multifocal patchy airspace opacities, right greater than left, which appears similar to slightly increased in conspicuity from prior chest radiograph of 02/12/2020.  Findings likely relate to previously demonstrated interstitial lung disease with possible superimposed component of, infectious or non infectious inflammatory process not definitively excluded.  There is a probable left pleural effusion.  No evidence of pneumothorax.  Visualized osseous structures are intact.  Impression: Please see above.    Electronically signed by: Tran Lopez MD  Date:    04/08/2020  Time:    07:48  All imaging within the last 24 hours was reviewed.     Assessment and Plan:    Active Hospital Problems    Diagnosis  POA    *COVID-19 virus infection [U07.1]  Yes    Atrial fibrillation with RVR [I48.91]  Yes    Elevated lactic acid level [R79.89]  Yes    Type 2 diabetes mellitus with hyperglycemia, without long-term current use of insulin [E11.65]  Yes    COPD (chronic obstructive pulmonary disease) [J44.9]  Yes    Chronic heart failure with preserved ejection fraction [I50.32]  Yes      Resolved Hospital Problems   No resolved problems to display.     Covid-19 Virus Infection  - COVID-19 testing:  POSITIVE 04/08  - Infection Control notified    - Isolation:   - Airborne and Droplet Precautions  - Surgical mask on patient   - N95 masks must be fit tested, wear eye protection  - 20 second hand hygiene   - Limit visitors per hospital policy   - Consolidate lab draws, nursing care, and interventions    - Diagnostics: (Lymphopenia, hyponatremia, hyperferritinemia, elevated troponin, elevated  d-dimer, age, and comorbidities are significant predictors of poor clinical outcome)   - CBC:   trend Q48hrs  - CMP:        trend Q48hrs  - Procalcitonin:  - D-dimer:  repeat prior to discharge  - Ferritin:  repeat prior to discharge   - CRP:        trend Q48hrs  - LDH:   repeat prior to discharge  - BNP:  - Troponin:    - ECG:   - rapid Flu:   - RIP only if BMT/solid transplant:   - Legionella antigen:   - Blood culture x2:   - Sputum culture:   - CXR:   - UA and culture:   - CPK:    - Management:   Bundle care as able to maintain isolation & minimize in/out of room   - Supplemental O2 to maintain SpO2 92%-96%   if requiring 6L NC or higher, place on nonrebreather and discuss case with MICU   - Telemetry & continuous pulse oximetry    - If wheezing   - consider albuterol inhaler PRN due to Afib with RVR if needed   - acetaminophen 650mg PO Q6hr PRN fever   - loperamide PRN for viral diarrhea  - Empiric antibiotics per likely source & patient allergies    - CAP: x 5 day course (d/c early if low concern for bacterial co-infection)  Ceftriaxone 1g IV Q24hrs            Transitioned to doxycycline 04/09 from azithromycin due to QTc                 If MRSA risk factors, add Vancomycin IV (PharmD consult)     - Investigational Treatment Protocol: (if patient meets criteria)   [email or web address]     - statin: atorvastatin 40mg po daily (if CPK WNL) >>> tolerating       - will start HCQ 400mg PO BID x1 day, then 400mg PO daily x 4 days if appropriate and QTc allows  (check glucose 6 phosphate dehydrogenase (NOT G6PD Quant), ECG on start & @48hrs, and start Qshift POCT glucose)    Safety notes:   - Avoid NIPPV (CPAP/BiPAP) to prevent aerosolization, use on a case-by-case basis if in neg pressure room   - Cautious use of NSAIDS for fever per WHO recommendations (3/16/2020)   - No new ACEi/ARB start or discontinuation of chronic  med unless hypotensive (Dhara et al. Journal of Hypertension 2020, 38:000-000)   - Careful use of steroids in the absence of other indications (shock, ARDS)   - Fluid sparing resuscitation, avoid maintenance fluids    Lactic acidosis  - lactic 4.5->2.8 >> 4.9  - IVF bolus received in ED  - gentle IVFs ordered for today  - monitor     Chronic diastolic heart failure  Hyponatremia  Bilateral upper extremity edema  - strict I/Os, daily weights  - given small bolus in ED  - holding further diuresis due to chemistry indicative of hypovolemia   - hyponatremia workup pending: urine NA, urine osmo  - serum osmo 268  - elevate BUE due to edema       Afib with RVR  - HR 200s in the ED  - due to active infection, BB/CCB avoided in setting of COPD  - given amiodarone bolus and infusion earlier in course >>> now transitioned to PO  - dig loaded 04/09 followed by daily dosing with toleration  - dig level pending 04/13  - continue telemetry  - daily INR  - continue coumadin with lovenox bridge, consult PharmD    Malnutrition  Hypoalbuminemia   Hypokalemia  Hyponatremia  Hypocalcemia  Hypophosphatemia   Hypomagnesemia   - Dietary following  - nutritional supplements added  - electrolytes repleted as appropriate  - corrected calcium WNL  - IVs as noted above  - multivitamin initiated   - vitamin D and B12 levels pending in AM  - monitor CMP daily      DM2  - low dose SSI, ACHS  - cardiac diabetic diet    Advance Care Planning  Goals of care, counseling/discussion  - she wishes to be DNR/DNI  - 15 minutes spent discussing goals of care    Patient's chronic/stable medical conditions noted in the problem list above will be managed with the patient's home medications as tolerated.     VTE High Risk Prophylaxis:   VTE Risk Mitigation (From admission, onward)         Ordered     warfarin (COUMADIN) split tablet 0.5 mg  Daily      04/09/20 1003     Reason for No Pharmacological VTE Prophylaxis  Once     Question:  Reasons:  Answer:   Already adequately anticoagulated on oral Anticoagulants    04/08/20 1129     IP VTE HIGH RISK PATIENT  Once      04/08/20 1129                Laina Washington, GUILLERMINA, AG-ACNP, BC  Department of Hospital Medicine  [facility]

## 2020-04-10 NOTE — PLAN OF CARE
Pt remains free of falls and injury this shift, pt bed bound Q 2 turns. Vital signs stable; Afib rate controlled on the monitor.Pt Oxygen needs increased from 2L NC to 4L NC.  Plan of care reviewed with patient, verbalized understanding. Patients ammio gtt discontinued switched to oral form. Patient started on NS @125cc/hr. Electrolytes replaced. Patients urine specimen collected and sent to lab for testing. No signs of acute distress noted. Patient stable, Will continue to monitor.

## 2020-04-10 NOTE — PT/OT/SLP EVAL
Physical Therapy Evaluation/Discharge    Patient Name:  Elizabeth Cano   MRN:  [id number]  Admit Date: 4/8/2020  Admitting Diagnosis:  COVID-19 virus infection  Length of Stay: 2 days  Recent Surgery: * No surgery found *      Recommendations:     Discharge Recommendations:  nursing facility, basic(return to)   Discharge Equipment Recommendations: none   Barriers to discharge: None    Assessment:     Elizabeth Cano is a 69 y.o. female admitted with a medical diagnosis of COVID-19 virus infection. Pt is at baseline mobility. Pt resides in a long-term NH. Pt reports that she does not get out of bed daily by choice, but if she wanted to get out of bed the nursing home staff could dependently transfer her to a . Her  also lives in the nursing home. Pt does not receive therapy services in the NH and does not need therapy services once returned to NH. Pt cognitively and intact and able to feed herself.     Problem List: weakness, impaired endurance, impaired self care skills, impaired functional mobilty, impaired cardiopulmonary response to activity  Rehab Prognosis: Fair    Plan:     · D/c from acute PT    Subjective   Communicated with RN prior to session.  Patient found HOB elevated upon PT entry to room, agreeable to evaluation. Elizabeth Cano's alone during session.    Chief Complaint: Shortness of Breath (Pt to ER via EMS from [facility]. Pt has been c/o worsening SOB, cough and low grade fever x a few days. Pt's  bpm. Hx of a-fib. Initital sat by EMS was 90% on RA; she is now 100% on 6 liters. ) and Tachycardia    Patient/Family Comments/goals: to get better   Pain/Comfort:  · Pain Rating 1: 0/10  · Pain Rating Post-Intervention 1: 0/10    Living Environment:  Pt reports she has resided in NH x 6 months. Pt reports her spouse resides in NH with her and is mobile. Pt reports she does not get out of bed at NH and requires Set-up feeding and g/h skills bed level.     Patient reports they  "will have assistance from nursing home staff upon discharge.    Objective:   Patient found with: peripheral IV, oxygen     General Precautions: Standard, Cardiac airborne, contact, droplet, fall(COVID (+))   Orthopedic Precautions:N/A   Braces: N/A   Oxygen Device: Nasal Cannula   Vitals: BP (!) 104/50 (BP Location: Right arm, Patient Position: Lying)   Pulse 101   Temp 98.2 °F (36.8 °C) (Oral)   Resp 16   Ht 5' 6" (1.676 m)   Wt 60.5 kg (133 lb 6.1 oz)   SpO2 (!) 94%   Breastfeeding? No   BMI 21.53 kg/m²     Exams:  · Cognition:   · Alert and Cooperative  · Oriented to person, place, and situation   · Command following: Follows multistep  commands  · Fluency: clear/fluent    ROM and Strength not tested 2nd to pt can receive total care in [facility]    Outcome Measures:  AM-PAC 6 CLICK MOBILITY  Turning over in bed (including adjusting bedclothes, sheets and blankets)?: 2  Sitting down on and standing up from a chair with arms (e.g., wheelchair, bedside commode, etc.): 1  Moving from lying on back to sitting on the side of the bed?: 2  Moving to and from a bed to a chair (including a wheelchair)?: 1  Need to walk in hospital room?: 1  Climbing 3-5 steps with a railing?: 1  Basic Mobility Total Score: 8     Functional Mobility:  Additional staff present: OT    Therapeutic Activities, Exercises, & Education:   Educated pt on PT role/POC  Provided daily orientation     Patient left HOB elevated with all lines intact, call button in reach and RN notified.    GOALS:   Multidisciplinary Problems     Physical Therapy Goals        Problem: Physical Therapy Goal    Goal Priority Disciplines Outcome Goal Variances Interventions   Physical Therapy Goal     PT, PT/OT Ongoing, Progressing                     History:     Past Medical History:   Diagnosis Date    A-fib     GAVIN (acute kidney injury)     CHF (congestive heart failure)     COPD (chronic obstructive pulmonary disease)     Diabetes mellitus     " Hypertension        Past Surgical History:   Procedure Laterality Date    CARDIAC SURGERY      COLONOSCOPY N/A 2/4/2020    Procedure: COLONOSCOPY;  Surgeon: Gilberto Laguna MD;  Location: [facility] ([address] [place] [address]);  Service: Endoscopy;  Laterality: N/A;    ESOPHAGOGASTRODUODENOSCOPY N/A 2/7/2020    Procedure: EGD (ESOPHAGOGASTRODUODENOSCOPY);  Surgeon: Aleksey Gomez MD;  Location: [address]);  Service: Endoscopy;  Laterality: N/A;       Time Tracking:     PT Received On: 04/10/20  PT Start Time: 0848     PT Stop Time: 0900  PT Total Time (min): 12 min     Billable Minutes: Evaluation 12    Yana Al, PT, DPT  4/10/2020  [phone]

## 2020-04-10 NOTE — PROGRESS NOTES
[facility]  Cardiology  Progress Note    Patient Name: Elizabeth Cano  MRN: [id number]  Admission Date: 4/8/2020  Hospital Length of Stay: 2 days  Code Status: DNR   Attending Physician: Jose Kirby MD   Primary Care Physician: Cesar Reeves MD  Expected Discharge Date:   Principal Problem:COVID-19 virus infection    Subjective:     Hospital Course:   No notes on file    Interval History: s/p digoxin 500mcg followed by 250mcg overnight. HR better controlled this morning <100.     ROS  Objective:     Vital Signs (Most Recent):  Temp: 98.2 °F (36.8 °C) (04/10/20 0800)  Pulse: 101 (04/10/20 1200)  Resp: 16 (04/10/20 0800)  BP: (!) 104/50 (04/10/20 0800)  SpO2: (!) 94 % (04/10/20 1200) Vital Signs (24h Range):  Temp:  [97.8 °F (36.6 °C)-99.3 °F (37.4 °C)] 98.2 °F (36.8 °C)  Pulse:  [] 101  Resp:  [16-20] 16  SpO2:  [90 %-97 %] 94 %  BP: ()/(50-64) 104/50     Weight: 60.5 kg (133 lb 6.1 oz)  Body mass index is 21.53 kg/m².     SpO2: (!) 94 %  O2 Device (Oxygen Therapy): nasal cannula      Intake/Output Summary (Last 24 hours) at 4/10/2020 1242  Last data filed at 4/9/2020 2200  Gross per 24 hour   Intake 635.75 ml   Output 10 ml   Net 625.75 ml       Lines/Drains/Airways     Peripheral Intravenous Line                 Peripheral IV - Single Lumen 04/09/20 1115 22 G Other (Comment) 1 day         Peripheral IV - Single Lumen 04/09/20 1245 20 G;1 3/4 in Left;Anterior Forearm less than 1 day                Physical Exam and ROS not attained by me to limit exposure of active COVID-19 infection    Significant Labs:   BMP:   Recent Labs   Lab 04/09/20  0330 04/10/20  0045 04/10/20  1059   *  --  162*   *  --  128*   K 4.2  --  3.4*   CL 93*  --  94*   CO2 23  --  21*   BUN 12  --  12   CREATININE 0.7  --  0.8   CALCIUM 6.1*  --  6.1*   MG  --  0.9* 1.6   , CMP   Recent Labs   Lab 04/09/20  0330 04/10/20  1059   * 128*   K 4.2 3.4*   CL 93* 94*   CO2 23 21*   GLU  161* 162*   BUN 12 12   CREATININE 0.7 0.8   CALCIUM 6.1* 6.1*   PROT 4.9* 3.9*   ALBUMIN 1.4* 1.2*   BILITOT 0.5 0.3   ALKPHOS 235* 216*   AST 74* 47*   ALT 25 17   ANIONGAP 13 13   ESTGFRAFRICA >60.0 >60.0   EGFRNONAA >60.0 >60.0    and CBC   Recent Labs   Lab 04/10/20  0045   WBC 7.57   HGB 10.4*   HCT 31.3*              Assessment and Plan:     Atrial fibrillation with RVR  Patient with a history of Afib with RVR (on coumadin) had her digoxin therapy discontinued during past hospitalization in March. Not on BB/CCB either due to chronic hypotension. She presented from her NH for worsening SOB, fever, noted to be COVID positive.   On presentation, patient was noted to be in Afib w/ RVR, HR of 200s improved with amiodarone load and infusion.       Recommendations  - HR appears improved s/p digoxin load  - Discontinue digoxin  - Continue amiodarone po 400mg BID for 10 days, followed by 200mg daily  - Optimize electrolytes, K>4, Mg >2  - Continue with warfarin for anti-coagulation              VTE Risk Mitigation (From admission, onward)         Ordered     warfarin (COUMADIN) tablet 1 mg  Daily      04/10/20 1058     enoxaparin injection 60 mg  2 times daily      04/10/20 1132     Reason for No Pharmacological VTE Prophylaxis  Once     Question:  Reasons:  Answer:  Already adequately anticoagulated on oral Anticoagulants    04/08/20 1129     IP VTE HIGH RISK PATIENT  Once      04/08/20 1129                Chuck Metgzer MD  Cardiology  [facility]

## 2020-04-10 NOTE — PROGRESS NOTES
PHARMACY CONSULT NOTE: WARFARIN  Elizabeth Cano is a 69 y.o. female on warfarin therapy for Paroxysmal Atrial Fibrillation. PharmD has been consulted for warfarin dosing.     Current order: Warfarin 0.5 mg daily  Home dose: 1 mg every Mon, Wed, Fri   INR goal: 2-3   Coumadin clinic enrollment: Patient's nursing home was managing prior to admission       Lab Results   Component Value Date    INR 1.0 04/10/2020    INR 1.6 (H) 04/09/2020    INR 1.5 (H) 04/08/2020     Significant drug interactions: Doxycycline and amiodarone may raise INR  Diet: Diabetic Diet; 2000 Calories; Cardiac (Low Na/Chol)    Recommendation(s):   · Consider adjusting Warfarin to 1 mg daily for now with Lovenox bridge until INR >/= 2.0.  · Trend INR daily.  · Pharmacy will continue to follow and monitor warfarin closely.     Thank you for the consult,  Theresa Hernandez     **Note: Consults are reviewed Monday-Friday 7:00am-3:30pm. THE ABOVE RECOMMENDATIONS ARE ONLY SUGGESTED.THE RECOMMENDATIONS SHOULD BE CONSIDERED IN CONJUNCTION WITH ALL PATIENT FACTORS. **

## 2020-04-10 NOTE — SIGNIFICANT EVENT
Received call from nurse that Mg level 0.9 but it is from finger stick and accuracy is very questionable so will give IV Mg 3gm now and get venous sample with nexi blood draw  this am

## 2020-04-10 NOTE — SUBJECTIVE & OBJECTIVE
Interval History: s/p digoxin 500mcg followed by 250mcg overnight. HR better controlled this morning <100.     ROS  Objective:     Vital Signs (Most Recent):  Temp: 98.2 °F (36.8 °C) (04/10/20 0800)  Pulse: 101 (04/10/20 1200)  Resp: 16 (04/10/20 0800)  BP: (!) 104/50 (04/10/20 0800)  SpO2: (!) 94 % (04/10/20 1200) Vital Signs (24h Range):  Temp:  [97.8 °F (36.6 °C)-99.3 °F (37.4 °C)] 98.2 °F (36.8 °C)  Pulse:  [] 101  Resp:  [16-20] 16  SpO2:  [90 %-97 %] 94 %  BP: ()/(50-64) 104/50     Weight: 60.5 kg (133 lb 6.1 oz)  Body mass index is 21.53 kg/m².     SpO2: (!) 94 %  O2 Device (Oxygen Therapy): nasal cannula      Intake/Output Summary (Last 24 hours) at 4/10/2020 1242  Last data filed at 4/9/2020 2200  Gross per 24 hour   Intake 635.75 ml   Output 10 ml   Net 625.75 ml       Lines/Drains/Airways     Peripheral Intravenous Line                 Peripheral IV - Single Lumen 04/09/20 1115 22 G Other (Comment) 1 day         Peripheral IV - Single Lumen 04/09/20 1245 20 G;1 3/4 in Left;Anterior Forearm less than 1 day                Physical Exam and ROS not attained by me to limit exposure of active COVID-19 infection    Significant Labs:   BMP:   Recent Labs   Lab 04/09/20  0330 04/10/20  0045 04/10/20  1059   *  --  162*   *  --  128*   K 4.2  --  3.4*   CL 93*  --  94*   CO2 23  --  21*   BUN 12  --  12   CREATININE 0.7  --  0.8   CALCIUM 6.1*  --  6.1*   MG  --  0.9* 1.6   , CMP   Recent Labs   Lab 04/09/20  0330 04/10/20  1059   * 128*   K 4.2 3.4*   CL 93* 94*   CO2 23 21*   * 162*   BUN 12 12   CREATININE 0.7 0.8   CALCIUM 6.1* 6.1*   PROT 4.9* 3.9*   ALBUMIN 1.4* 1.2*   BILITOT 0.5 0.3   ALKPHOS 235* 216*   AST 74* 47*   ALT 25 17   ANIONGAP 13 13   ESTGFRAFRICA >60.0 >60.0   EGFRNONAA >60.0 >60.0    and CBC   Recent Labs   Lab 04/10/20  0045   WBC 7.57   HGB 10.4*   HCT 31.3*

## 2020-04-11 NOTE — PROGRESS NOTES
04/11/20 0647   Critical Value Communication   Date Result Received 04/11/20   Time Result Received 0638   Resulting Department of Critical Value LAB   Who communicated critical value from resulting department? Jamie Ash   Critical Test #1 Calcium   Critical Test #1 Result 5.5   Name of Notified Physician/Designee Dennis Hameed MD    Date Notified 04/11/20   Time Notified 0644   Physician Directive No new orders at this time.  Will continue to monitor.  MD aupdated on albumin of 1.3.

## 2020-04-11 NOTE — PROGRESS NOTES
Hospital Medicine  Progress Note  [facility]      Patient Name: Elizabeth Cano  MRN:  [id number]  Hospital Medicine Team: [facility]OSP MED X Laina Washington NP  Date of Admission:  4/8/2020     Length of Stay:  LOS: 3 days     Principal Problem:  COVID-19 virus infection      HPI:  Patient is a 70yo female with a PMHx of Afib, CHF, COPD, and DM2 being admitted to medicine for Covid-19 infection. Patient reports onset of SOB a few days ago. She was noted to be hypoxemic this morning on evaluation. Her oxygen saturation 90% on room air, as well as atrial fibrillation with RVR in the low 200s.  For the past few days she has had worsening shortness of breath, cough, low-grade fever, and weakness. She lives in a NH that has had a Covid outbreak. She is on midodrine for chronic hypotension. Of note, she used to be on digoxin for afib.    Hospital Course:  Admitted 04/08 due to COVID-19 complicated by Afib with RVR. Currently on doxycycline, ceftriaxone, and HCQ due to COVID. HR improved after Dig load, IV amio transitioned to PO; remains in AFib/flutter. IV diuresis discontinued to concerns for hypovolemia and hyponatremia in setting of worsening lactic acidosis; hydrated gently with IVFs and lactic acidosis improved. Chemistry indicative of malnutrition, Dietary following and supplements added; electrolytes repleted as appropriate.     Covid Specifics:  - COVID-19 testing: POSITIVE 04/08  - on doxycycline, ceftriaxone, and HCQ  - Ferritin 892 >>> 998  - CRP 47 >>> 54.2    Interval History:  Resting in bed, afebrile, stable on 4 liters via nasal cannula; reports feeling ok and is in good spirits, no conversational dyspnea, dry cough noted during interview. She was updated on need to increase PO intake as well as lab findings. She agrees to midline placement. She understands plan of care for Covid, Afib, and malnutrition treatment.    Disposition plans:  Return to nursing home NH when medically  ready.    Review of Systems:  Respiratory: + cough; - SOB, - SINGH  Cardiovascular: - CP, - palpitations  GI: + diarrhea (improving); -N/V, constipation     Inpatient Medications:    Current Facility-Administered Medications:     0.9%  NaCl infusion, , Intravenous, Continuous, Laina Washington NP, Last Rate: 125 mL/hr at 04/11/20 0949    acetaminophen tablet 650 mg, 650 mg, Oral, Q4H PRN, Reza Alves Jr., PA-C, 650 mg at 04/11/20 0938    amiodarone tablet 400 mg, 400 mg, Oral, BID, 400 mg at 04/11/20 0938 **FOLLOWED BY** [START ON 4/21/2020] amiodarone tablet 200 mg, 200 mg, Oral, Daily, Laina Washington NP    ascorbic acid (vitamin C) tablet 250 mg, 250 mg, Oral, Daily, Laina Washington NP, 250 mg at 04/11/20 0944    atorvastatin tablet 40 mg, 40 mg, Oral, QHS, Laina Washington NP, 40 mg at 04/10/20 2200    calcium gluconate 1 g in dextrose 5 % 100 mL IVPB (premix), 1 g, Intravenous, Once, Laina Washington NP    cefTRIAXone injection 1 g, 1 g, Intravenous, Daily, Laina Washington NP, 1 g at 04/11/20 0938    dextrose 10% (D10W) Bolus, 12.5 g, Intravenous, PRN, TIM Hairston Jr.-DARREN    dextrose 10% (D10W) Bolus, 25 g, Intravenous, PRN, Reza Alves Jr., PA-C    doxycycline tablet 100 mg, 100 mg, Oral, Q12H, Laina Washington NP, 100 mg at 04/11/20 0939    ergocalciferol capsule 50,000 Units, 50,000 Units, Oral, Q7 Days, Laina Washington NP, 50,000 Units at 04/11/20 0939    glucagon (human recombinant) injection 1 mg, 1 mg, Intramuscular, PRN, Lefor D. Long Jr., PA-C    glucose chewable tablet 16 g, 16 g, Oral, PRN, Reza Alves Jr., PA-DARREN    glucose chewable tablet 24 g, 24 g, Oral, PRN, Reza Alves Jr., BABAR    hydroxychloroquine tablet 400 mg, 400 mg, Oral, BID, 400 mg at 04/11/20 0944 **FOLLOWED BY** [START ON 4/12/2020] hydroxychloroquine tablet 400 mg, 400 mg, Oral, Daily, Laina Washington, NP    hydroxyzine HCL tablet 25 mg, 25  mg, Oral, TID PRN, Laina Washington NP, 25 mg at 04/11/20 0301    insulin aspart U-100 pen 0-5 Units, 0-5 Units, Subcutaneous, QID (AC + HS) PRN, Reza Alves Jr., PA-C    Lactobacillus rhamnosus GG capsule 1 capsule, 1 capsule, Oral, Daily, Laina Washington NP, 1 capsule at 04/11/20 0944    loperamide capsule 2 mg, 2 mg, Oral, QID PRN, Laina Washington NP, 2 mg at 04/10/20 2344    melatonin tablet 6 mg, 6 mg, Oral, Nightly PRN, Gabo Fonseca, DO, 6 mg at 04/10/20 2200    midodrine tablet 10 mg, 10 mg, Oral, QHS, Reza Alves Jr., PA-C, 10 mg at 04/10/20 2200    mirtazapine tablet 15 mg, 15 mg, Oral, QHS, Reza Alves Jr., PA-C, 15 mg at 04/10/20 2200    multivit-iron-FA-calcium-mins 9 mg iron-400 mcg tablet Tab 1 tablet, 1 tablet, Oral, Daily, Laina Washington NP, 1 tablet at 04/11/20 0939    ondansetron injection 8 mg, 8 mg, Intravenous, Q8H PRN, Reza Alves Jr., PA-C, 8 mg at 04/10/20 0936    pantoprazole EC tablet 40 mg, 40 mg, Oral, Daily, Reza Alves Jr., PA-C, 40 mg at 04/11/20 0939    polyethylene glycol packet 17 g, 17 g, Oral, Daily, Reza Alves Jr., PA-C    potassium chloride CR capsule 30 mEq, 30 mEq, Oral, Daily, Gabo Daveydiqi, DO, 30 mEq at 04/11/20 0939    potassium, sodium phosphates 280-160-250 mg packet 2 packet, 2 packet, Oral, QID (AC & HS), Laina Washington NP, 2 packet at 04/11/20 0938    promethazine (PHENERGAN) 12.5 mg in dextrose 5 % 50 mL IVPB, 12.5 mg, Intravenous, Q6H PRN, Reza Alves Jr., PA-C    sodium chloride 0.9% flush 10 mL, 10 mL, Intravenous, PRN, Reza Alves Jr., BABAR    vitamin A capsule 20,000 Units, 20,000 Units, Oral, Daily, Laina Washington NP    warfarin (COUMADIN) tablet 1 mg, 1 mg, Oral, Daily, Laina Washington NP, 1 mg at 04/10/20 2200    Physical Exam:      Intake/Output Summary (Last 24 hours) at 4/11/2020 1158  Last data filed at 4/11/2020 0600  Gross per 24 hour  "  Intake 1244.12 ml   Output 500 ml   Net 744.12 ml     Wt Readings from Last 3 Encounters:   04/08/20 60.5 kg (133 lb 6.1 oz)   03/18/20 60.5 kg (133 lb 6.1 oz)   03/01/20 50.5 kg (111 lb 5.3 oz)       BP (!) 95/50 (BP Location: Right arm, Patient Position: Lying)   Pulse 98   Temp 97.8 °F (36.6 °C) (Oral)   Resp 20   Ht 5' 6" (1.676 m)   Wt 60.5 kg (133 lb 6.1 oz)   SpO2 (!) 90%   Breastfeeding? No   BMI 21.53 kg/m²     GEN: NAD, conversant  Neuro: intact and oriented  Resp: coarse bilateral breath sounds, no wheezes or rales, normal work of breathing, dry cough  CV: RRR, edema 3+ and weeping in bilateral upper extremities   GI: soft, NTND  Skin: no rash    Laboratory:  Lab Results   Component Value Date    [id number] Positive (A) 04/08/2020     Recent Labs   Lab 04/08/20  0622 04/10/20  0045   WBC 7.09 7.57   LYMPH 21.4  1.5 13.6*  1.0   HGB 11.2* 10.4*   HCT 35.4* 31.3*    166     Recent Labs   Lab 04/09/20  0330 04/10/20  0045 04/10/20  1059 04/11/20  0600   *  --  128* 129*   K 4.2  --  3.4* 4.1   CL 93*  --  94* 96   CO2 23  --  21* 22*   BUN 12  --  12 12   CREATININE 0.7  --  0.8 0.7   *  --  162* 84   CALCIUM 6.1*  --  6.1* 5.5*   MG  --  0.9* 1.6 2.0   PHOS  --  2.6* 2.1* 2.7     Recent Labs   Lab 04/09/20  0330 04/09/20  0608 04/10/20  0654 04/10/20  1059 04/11/20  0600   ALKPHOS 235*  --   --  216* 240*   ALT 25  --   --  17 19   AST 74*  --   --  47* 58*   ALBUMIN 1.4*  --   --  1.2* 1.3*   PROT 4.9*  --   --  3.9* 4.3*   BILITOT 0.5  --   --  0.3 0.6   INR  --  1.6* 1.0  --  2.0*      Recent Labs     04/10/20  0045   FERRITIN 998*   CRP 54.2*   All labs within the last 24 hours were reviewed.     Microbiology:  Microbiology Results (last 7 days)     Procedure Component Value Units Date/Time    Blood culture x two cultures. Draw prior to antibiotics. [[id number]]  (Abnormal) Collected:  04/08/20 0623    Order Status:  Completed Specimen:  Blood from Peripheral, Upper Arm, " Right Updated:  04/11/20 1036     Blood Culture, Routine Gram stain aer bottle: Gram positive cocci in clusters resembling Staph       Results called to and read back by: Gisella Martini RN 04/10/2020  10:12      COAGULASE-NEGATIVE STAPHYLOCOCCUS SPECIES  Organism is a probable contaminant      Narrative:       Aerobic and anaerobic    Blood culture x two cultures. Draw prior to antibiotics. [[id number]] Collected:  04/08/20 0622    Order Status:  Completed Specimen:  Blood from Peripheral, Antecubital, Right Updated:  04/11/20 0812     Blood Culture, Routine No Growth to date      No Growth to date      No Growth to date      No Growth to date    Narrative:       Aerobic and anaerobic    Urine culture [[id number]] Collected:  04/10/20 1533    Order Status:  No result Specimen:  Urine Updated:  04/10/20 1812    Influenza A & B by Molecular [[id number]] Collected:  04/09/20 1822    Order Status:  Completed Specimen:  Nasopharyngeal Swab Updated:  04/09/20 2013     Influenza A, Molecular Negative     Influenza B, Molecular Negative     Flu A & B Source NP    Influenza A & B by Molecular [[id number]] Collected:  04/09/20 1334    Order Status:  Canceled Specimen:  Nasopharyngeal Swab     Culture, Respiratory with Gram Stain [[id number]]     Order Status:  No result Specimen:  Sputum, Expectorated         Imaging  ECG Results          EKG 12-lead (Final result)  Result time 04/08/20 16:29:46    Final result by Interface, Lab In [facility] (04/08/20 16:29:46)                 Narrative:    Test Reason : R00.0,    Vent. Rate : 181 BPM     Atrial Rate : 127 BPM     P-R Int : 000 ms          QRS Dur : 184 ms      QT Int : 300 ms       P-R-T Axes : 000 -74 133 degrees     QTc Int : 521 ms    Atrial fibrillation with rapid ventricular response  Left axis deviation  Right bundle branch block      Abnormal ECG  When compared with ECG of 15-MAR-2020 17:03,  Atrial fibrillation with rapid ventricular response Now present  Confirmed by YASSINE  JAIRO SILVEIRA ([id number]) on 4/8/2020 4:29:40 PM    Referred By: [email or web address]   SELF           Confirmed By:JAIRO METZGER MD                            Results for orders placed during the hospital encounter of 01/29/20   Echo    Narrative · Patient does not have transposition of the great arteries. Based on what   is seen, it is possible patient had an ASD closure.  · Mildly decreased left ventricular systolic function. The estimated   ejection fraction is 45%.  · Mildly reduced right ventricular systolic function. Mild right   ventricular enlargement.  · Severe left atrial enlargement.  · Severe right atrial enlargement.  · Normal central venous pressure (3 mmHg).     X-Ray Chest AP Portable  Narrative: EXAMINATION:  XR CHEST AP PORTABLE    CLINICAL HISTORY:  Suspected Covid-19 Virus Infection;    TECHNIQUE:  Single frontal view of the chest was performed.    COMPARISON:  Chest radiograph 02/12/2020 and chest CT 02/13/2020    FINDINGS:  Cardiac monitoring leads overlie the chest.  There is unchanged enlargement of the cardiomediastinal silhouette and atherosclerosis of the thoracic aorta.  The lungs demonstrate abnormal multifocal patchy airspace opacities, right greater than left, which appears similar to slightly increased in conspicuity from prior chest radiograph of 02/12/2020.  Findings likely relate to previously demonstrated interstitial lung disease with possible superimposed component of, infectious or non infectious inflammatory process not definitively excluded.  There is a probable left pleural effusion.  No evidence of pneumothorax.  Visualized osseous structures are intact.  Impression: Please see above.    Electronically signed by: Tran Lopez MD  Date:    04/08/2020  Time:    07:48  All imaging within the last 24 hours was reviewed.     Assessment and Plan:    Active Hospital Problems    Diagnosis  POA    *COVID-19 virus infection [U07.1]  Yes    Moderate malnutrition [E44.0]  Yes     Chronic    Atrial  fibrillation with RVR [I48.91]  Yes    Elevated lactic acid level [R79.89]  Yes    Type 2 diabetes mellitus with hyperglycemia, without long-term current use of insulin [E11.65]  Yes    COPD (chronic obstructive pulmonary disease) [J44.9]  Yes    Chronic heart failure with preserved ejection fraction [I50.32]  Yes      Resolved Hospital Problems   No resolved problems to display.     Covid-19 Virus Infection  - COVID-19 testing:  POSITIVE 04/08  - Infection Control notified    - Isolation:   - Airborne and Droplet Precautions  - Surgical mask on patient   - N95 masks must be fit tested, wear eye protection  - 20 second hand hygiene   - Limit visitors per hospital policy   - Consolidate lab draws, nursing care, and interventions    - Diagnostics: (Lymphopenia, hyponatremia, hyperferritinemia, elevated troponin, elevated d-dimer, age, and comorbidities are significant predictors of poor clinical outcome)   - CBC: WBC WNL, H/H stable, lymph 13.6  trend Q48hrs  - CMP: multiple abnormalities         trend Q48hrs  - Procalcitonin: 0.08  - D-dimer: 0.90     repeat prior to discharge  - Ferritin: 892 >> 998     repeat prior to discharge   - CRP: 46.8 >> 54.2          trend Q48hrs  - LDH: 326      repeat prior to discharge  - BNP: pending in AM  - Troponin: 0.180    - ECG: Afib/flutter, QTc elongated in that setting   - rapid Flu: negative   - Blood culture x2: COAGULASE-NEGATIVE STAPHYLOCOCCUS SPECIES, likely contaminant in 1/2 >>> other culture with NGTD   - Sputum culture: none   - CXR: reviewed   - UA: reviewed    - CPK: 26    - Management:   Bundle care as able to maintain isolation & minimize in/out of room   - Supplemental O2 to maintain SpO2 92%-96%   if requiring 6L NC or higher, place on nonrebreather and discuss case with MICU   - Telemetry & continuous pulse oximetry    - If wheezing   - consider albuterol inhaler PRN due to Afib with RVR if needed   - acetaminophen 650mg PO Q6hr PRN fever   - loperamide PRN  for viral diarrhea  - Empiric antibiotics per likely source & patient allergies    - CAP: x 5 day course (d/c early if low concern for bacterial co-infection)  Ceftriaxone 1g IV Q24hrs            Transitioned to doxycycline 04/09 from azithromycin due to QTc                 If MRSA risk factors, add Vancomycin IV (PharmD consult)     - Investigational Treatment Protocol: (if patient meets criteria)   [email or web address]     - statin: atorvastatin 40mg po daily (if CPK WNL) >>> tolerating       - HCQ 400mg PO BID x1 day, then 400mg PO daily x 4 days >>> initiated; monitor daily EKGs  (check glucose 6 phosphate dehydrogenase (NOT G6PD Quant), ECG on start & @48hrs, and start Qshift POCT glucose)    Safety notes:   - Avoid NIPPV (CPAP/BiPAP) to prevent aerosolization, use on a case-by-case basis if in neg pressure room   - Cautious use of NSAIDS for fever per WHO recommendations (3/16/2020)   - No new ACEi/ARB start or discontinuation of chronic med unless hypotensive (Esler et al. Journal of Hypertension 2020, 38:000-000)   - Careful use of steroids in the absence of other indications (shock, ARDS)   - Fluid sparing resuscitation, avoid maintenance fluids    Lactic acidosis  - lactic 4.5->2.8 >> 4.9 >> 2.7  - IVF bolus received in ED  - gentle IVFs inpatient as appropriate, see MAR  - monitor     Chronic diastolic heart failure  Hyponatremia  Bilateral upper extremity edema  - strict I/Os, daily weights  - given small bolus in ED  - holding further diuresis due to chemistry indicative of hypovolemia   - hyponatremia workup urine NA 70, urine osmo 305, serum osmo 268  - BNP in AM  - elevate BUE due to edema       Afib with RVR  - HR 200s in the ED  - due to active infection, BB/CCB avoided in setting of COPD  - given amiodarone bolus and infusion earlier in course >>> now transitioned to PO  - dig loaded 04/09, no daily PO  dosing per Cardiology   - continue telemetry  - daily INR  - continue coumadin, PharmD following    Malnutrition  Hypoalbuminemia   Hypokalemia  Hyponatremia  Hypocalcemia  Hypophosphatemia   Hypomagnesemia   - Dietary following  - nutritional supplements added with encouragement to increase PO intake   - electrolytes repleted as appropriate  - vitamin D 14 and B12 1065   - IVs as noted above  - multivitamin initiated   - ergocalciferol initiated   - anemia profile in AM   - monitor CMP daily      DM2  - low dose SSI, ACHS  - cardiac diabetic diet    Advance Care Planning  Goals of care, counseling/discussion  - she wishes to be DNR/DNI  - 15 minutes spent discussing goals of care    Patient's chronic/stable medical conditions noted in the problem list above will be managed with the patient's home medications as tolerated.     VTE High Risk Prophylaxis:   VTE Risk Mitigation (From admission, onward)         Ordered     warfarin (COUMADIN) tablet 1 mg  Daily      04/10/20 1058     Reason for No Pharmacological VTE Prophylaxis  Once     Question:  Reasons:  Answer:  Already adequately anticoagulated on oral Anticoagulants    04/08/20 1129     IP VTE HIGH RISK PATIENT  Once      04/08/20 1129                Laina Washington, GUILLERMINA, AG-ACNP, BC  Department of Hospital Medicine  [facility]

## 2020-04-11 NOTE — ASSESSMENT & PLAN NOTE
Patient with a history of Afib with RVR (on coumadin) had her digoxin therapy discontinued during past hospitalization in March. Not on BB/CCB either due to chronic hypotension. She presented from her NH for worsening SOB, fever, noted to be COVID positive.   On presentation, patient was noted to be in Afib w/ RVR, HR of 200s improved with amiodarone load and infusion.       Recommendations  - HR appears improved s/p digoxin load  - Continue amiodarone po 400mg BID for 10 days, followed by 200mg daily  - Optimize electrolytes, K>4, Mg >2  - Continue with warfarin for anti-coagulation (INR at goal, 2-3).

## 2020-04-11 NOTE — PLAN OF CARE
Patient free from falls and injuries throughout shift.  AAO and VSS.  Patient denies chest pain and SOB.  Blood glucose managed through meals and insulin; .  4L NC.  Mg 1.6; replaced IV.  Patient continues on warfarin 1 mg daily; INR 1.0.  Lactic Acid 4.9; 1L bolus administered.  PRN medication given for relief.  Patient with diarrhea.  Airborne, contact, and droplet precautions in place.  No acute events overnight. Patient resting well at this time.  Plan of care discussed with patient.  Patient verbalizes understanding.  Will continue to monitor.

## 2020-04-11 NOTE — CONSULTS
Single lumen 18 g x 08 cm midline placed to R-BRACHIAL vein.  Max dwell date 05.10.2020, Lot# [id number]  Needle advances into vein under realtime Ultrasound guidance, image recorded and saved.

## 2020-04-11 NOTE — PLAN OF CARE
Patient free from falls and injuries throughout shift.  AAO and VSS.  Patient denies chest pain and SOB.  Blood glucose managed through meals and insulin; CBG 89.  3L NC.  Mg 0.9; replaced IV.  Airborne, contact, and droplet precautions in place.  No acute events overnight. Patient resting well at this time.  Plan of care discussed with patient.  Patient verbalizes understanding.  Will continue to monitor.

## 2020-04-11 NOTE — PROGRESS NOTES
[facility]  Cardiology  Progress Note    Patient Name: Elizabeth Cano  MRN: [id number]  Admission Date: 4/8/2020  Hospital Length of Stay: 3 days  Code Status: DNR   Attending Physician: Jose Kirby MD   Primary Care Physician: Cesar Reeves MD  Expected Discharge Date:   Principal Problem:COVID-19 virus infection    Subjective:     Interval History: Patient remains on PO amiodarone, HR < 100 and appears to be in sinus rhythm (telemetry with considerable artifact).    Objective:     Vital Signs (Most Recent):  Temp: 97.6 °F (36.4 °C) (04/11/20 0544)  Pulse: 106 (04/11/20 0744)  Resp: 16 (04/11/20 0544)  BP: (!) 93/55 (04/11/20 0544)  SpO2: (!) 82 % (04/11/20 0744) Vital Signs (24h Range):  Temp:  [97.6 °F (36.4 °C)-99 °F (37.2 °C)] 97.6 °F (36.4 °C)  Pulse:  [] 106  Resp:  [16-20] 16  SpO2:  [82 %-98 %] 82 %  BP: (91-96)/(54-55) 93/55     Weight: 60.5 kg (133 lb 6.1 oz)  Body mass index is 21.53 kg/m².     SpO2: (!) 82 %  O2 Device (Oxygen Therapy): nasal cannula      Intake/Output Summary (Last 24 hours) at 4/11/2020 0837  Last data filed at 4/11/2020 0600  Gross per 24 hour   Intake 1244.12 ml   Output 500 ml   Net 744.12 ml       Lines/Drains/Airways     Peripheral Intravenous Line                 Peripheral IV - Single Lumen 04/09/20 1115 22 G Other (Comment) 1 day         Peripheral IV - Single Lumen 04/09/20 1245 20 G;1 3/4 in Left;Anterior Forearm 1 day                Physical Exam   Deferred given COVID infection     Significant Labs:   Recent Lab Results       04/11/20  0600   04/11/20  0552   04/10/20  2213   04/10/20  1604   04/10/20  1533        Albumin 1.3             Alkaline Phosphatase 240             ALT 19             Anion Gap 11             Appearance, UA         Cloudy     AST 58             Bacteria, UA         Few     Bilirubin (UA)         Negative     BILIRUBIN TOTAL 0.6  Comment:  For infants and newborns, interpretation of results should be based  on  gestational age, weight and in agreement with clinical  observations.  Premature Infant recommended reference ranges:  Up to 24 hours.............<8.0 mg/dL  Up to 48 hours............<12.0 mg/dL  3-5 days..................<15.0 mg/dL  6-29 days.................<15.0 mg/dL               BUN, Bld 12             Calcium 5.5  Comment:  *Critical value -  Results called to and read back by:JANET PEDERSON RN             Chloride 96             CO2 22             Color, UA         Yellow     Creatinine 0.7             eGFR if  >60.0             eGFR if non  >60.0  Comment:  Calculation used to obtain the estimated glomerular filtration  rate (eGFR) is the CKD-EPI equation.                Glucose 84             Glucose, UA         Negative     INR 2.0  Comment:  Coumadin Therapy:  2.0 - 3.0 for INR for all indicators except mechanical heart valves  and antiphospholipid syndromes which should use 2.5 - 3.5.               Ketones, UA         Negative     Lactate, Jeovany   2.7  Comment:  Falsely low lactic acid results can be found in samples   containing >=13.0 mg/dL total bilirubin and/or >=3.5 mg/dL   direct bilirubin.             Leukocytes, UA         3+     Magnesium 2.0             Microscopic Comment         SEE COMMENT  Comment:  Other formed elements not mentioned in the report are not   present in the microscopic examination.        NITRITE UA         Negative     Non-Squam Epith         2     Occult Blood UA         2+     Osmolality, Ur         305  Comment:  The random urine reference ranges provided were established   for 24 hour urine collections.  No reference ranges exist for  random urine specimens.  Correlate clinically.       pH, UA         5.0     Phosphorus 2.7             POCT Glucose     107 189       Potassium 4.1             PROTEIN TOTAL 4.3             Protein, UA         Negative  Comment:  Recommend a 24 hour urine protein or a urine   protein/creatinine ratio if  globulin induced proteinuria is  clinically suspected.       Protime 18.8             RBC, UA         10     Sodium 129             Sodium Urine Random         70  Comment:  The random urine reference ranges provided were established   for 24 hour urine collections.  No reference ranges exist for  random urine specimens.  Correlate clinically.       Specific [place], UA         1.010     Specimen UA         Urine, Clean Catch     Squam Epithel, UA         2     Vit D, 25-Hydroxy 14  Comment:  Vitamin D deficiency.........<10 ng/mL                              Vitamin D insufficiency......10-29 ng/mL       Vitamin D sufficiency........> or equal to 30 ng/mL  Vitamin D toxicity............>100 ng/mL               Vitamin B-12 1065             WBC, UA         95                      04/10/20  1240   04/10/20  1059   04/10/20  1002        Albumin   1.2       Alkaline Phosphatase   216       ALT   17       Anion Gap   13       Appearance, UA           AST   47       Bacteria, UA           Bilirubin (UA)           BILIRUBIN TOTAL   0.3  Comment:  For infants and newborns, interpretation of results should be based  on gestational age, weight and in agreement with clinical  observations.  Premature Infant recommended reference ranges:  Up to 24 hours.............<8.0 mg/dL  Up to 48 hours............<12.0 mg/dL  3-5 days..................<15.0 mg/dL  6-29 days.................<15.0 mg/dL         BUN, Bld   12       Calcium   6.1  Comment:  *Critical value -  Results called to and read back by:HARSHAL BERMAN,   RN         Chloride   94       CO2   21       Color, UA           Creatinine   0.8       eGFR if    >60.0       eGFR if non    >60.0  Comment:  Calculation used to obtain the estimated glomerular filtration  rate (eGFR) is the CKD-EPI equation.          Glucose   162       Glucose, UA           INR           Ketones, UA           Lactate, Jeovany   4.9  Comment:  Falsely low lactic acid  results can be found in samples   containing >=13.0 mg/dL total bilirubin and/or >=3.5 mg/dL   direct bilirubin.  *Critical value -   Results called to and read back by:HARSHAL BERMNA RN         Leukocytes, UA           Magnesium   1.6       Microscopic Comment           NITRITE UA           Non-Squam Epith           Occult Blood UA           Osmolality, Ur           pH, UA           Phosphorus   2.1       POCT Glucose 143   165     Potassium   3.4       PROTEIN TOTAL   3.9       Protein, UA           Protime           RBC, UA           Sodium   128       Sodium Urine Random           Specific Gravity, UA           Specimen UA           Squam Epithel, UA           Vit D, 25-Hydroxy           Vitamin B-12           WBC, UA                 Significant Imaging: Echocardiogram:   2D echo with color flow doppler: No results found for this or any previous visit. and Transthoracic echo (TTE) complete (Cupid Only):   Results for orders placed or performed during the hospital encounter of 01/29/20   Echo   Result Value Ref Range    Ascending aorta 2.86 cm    STJ 2.52 cm    AV mean gradient 2 mmHg    Ao peak adam 0.92 m/s    Ao VTI 13.27 cm    IVRT 0.11 msec    IVS 0.75 0.6 - 1.1 cm    LA size 3.96 cm    Left Atrium Major Axis 6.33 cm    Left Atrium Minor Axis 6.42 cm    LVIDD 4.00 3.5 - 6.0 cm    LVIDS 2.78 2.1 - 4.0 cm    LVOT diameter 2.03 cm    LVOT peak VTI 10.57 cm    PW 0.80 0.6 - 1.1 cm    MV Peak E Adam 0.71 m/s    PV Peak D Adam 0.41 m/s    PV Peak S Adam 0.27 m/s    RA Major Axis 6.69 cm    RA Width 5.35 cm    RVDD 3.00 cm    Sinus 3.56 cm    TAPSE 0.78 cm    TR Max Adam 2.75 m/s    TDI LATERAL 0.09 m/s    TDI SEPTAL 0.07 m/s    LA WIDTH 4.27 cm    LV Diastolic Volume 69.93 mL    LV Systolic Volume 29.01 mL    RV S' 6.07 cm/s    LVOT peak adam 0.69 m/s    LV LATERAL E/E' RATIO 7.89 m/s    LV SEPTAL E/E' RATIO 10.14 m/s    FS 31 %    LA volume 91.62 cm3    LV mass 89.59 g    Left Ventricle Relative Wall Thickness 0.40  cm    AV valve area 2.58 cm2    AV Velocity Ratio 0.75     AV index (prosthetic) 0.80     Mean e' 0.08 m/s    Pulm vein S/D ratio 0.66     LVOT area 3.2 cm2    LVOT stroke volume 34.19 cm3    AV peak gradient 3 mmHg    E/E' ratio 8.88 m/s    Triscuspid Valve Regurgitation Peak Gradient 30 mmHg    BSA 1.8 m2    LV Systolic Volume Index 16.2 mL/m2    LV Diastolic Volume Index 39.07 mL/m2    LA Volume Index 51.2 mL/m2    LV Mass Index 50 g/m2    Right Atrial Pressure (from IVC) 3 mmHg    TV rest pulmonary artery pressure 33 mmHg    Narrative    · Patient does not have transposition of the great arteries. Based on what   is seen, it is possible patient had an ASD closure.  · Mildly decreased left ventricular systolic function. The estimated   ejection fraction is 45%.  · Mildly reduced right ventricular systolic function. Mild right   ventricular enlargement.  · Severe left atrial enlargement.  · Severe right atrial enlargement.  · Normal central venous pressure (3 mmHg).     Assessment and Plan:       Atrial fibrillation with RVR  Patient with a history of Afib with RVR (on coumadin) had her digoxin therapy discontinued during past hospitalization in March. Not on BB/CCB either due to chronic hypotension. She presented from her NH for worsening SOB, fever, noted to be COVID positive.   On presentation, patient was noted to be in Afib w/ RVR, HR of 200s improved with amiodarone load and infusion.       Recommendations  - HR appears improved s/p digoxin load  - Continue amiodarone po 400mg BID for 10 days, followed by 200mg daily  - Optimize electrolytes, K>4, Mg >2  - Continue with warfarin for anti-coagulation (INR at goal, 2-3).                VTE Risk Mitigation (From admission, onward)         Ordered     warfarin (COUMADIN) tablet 1 mg  Daily      04/10/20 2986     Reason for No Pharmacological VTE Prophylaxis  Once     Question:  Reasons:  Answer:  Already adequately anticoagulated on oral Anticoagulants     04/08/20 1129     IP VTE HIGH RISK PATIENT  Once      04/08/20 1129              Cardiology will sign off at this time. Please call back with questions/concerns.     Cait Bateman MD  Cardiology  [facility]

## 2020-04-11 NOTE — SUBJECTIVE & OBJECTIVE
Interval History: Patient remains on PO amiodarone, HR < 100 and appears to be in sinus rhythm (telemetry with considerable artifact).    Objective:     Vital Signs (Most Recent):  Temp: 97.6 °F (36.4 °C) (04/11/20 0544)  Pulse: 106 (04/11/20 0744)  Resp: 16 (04/11/20 0544)  BP: (!) 93/55 (04/11/20 0544)  SpO2: (!) 82 % (04/11/20 0744) Vital Signs (24h Range):  Temp:  [97.6 °F (36.4 °C)-99 °F (37.2 °C)] 97.6 °F (36.4 °C)  Pulse:  [] 106  Resp:  [16-20] 16  SpO2:  [82 %-98 %] 82 %  BP: (91-96)/(54-55) 93/55     Weight: 60.5 kg (133 lb 6.1 oz)  Body mass index is 21.53 kg/m².     SpO2: (!) 82 %  O2 Device (Oxygen Therapy): nasal cannula      Intake/Output Summary (Last 24 hours) at 4/11/2020 0837  Last data filed at 4/11/2020 0600  Gross per 24 hour   Intake 1244.12 ml   Output 500 ml   Net 744.12 ml       Lines/Drains/Airways     Peripheral Intravenous Line                 Peripheral IV - Single Lumen 04/09/20 1115 22 G Other (Comment) 1 day         Peripheral IV - Single Lumen 04/09/20 1245 20 G;1 3/4 in Left;Anterior Forearm 1 day                Physical Exam   Deferred given COVID infection     Significant Labs:   Recent Lab Results       04/11/20  0600   04/11/20  0552   04/10/20  2213   04/10/20  1604   04/10/20  1533        Albumin 1.3             Alkaline Phosphatase 240             ALT 19             Anion Gap 11             Appearance, UA         Cloudy     AST 58             Bacteria, UA         Few     Bilirubin (UA)         Negative     BILIRUBIN TOTAL 0.6  Comment:  For infants and newborns, interpretation of results should be based  on gestational age, weight and in agreement with clinical  observations.  Premature Infant recommended reference ranges:  Up to 24 hours.............<8.0 mg/dL  Up to 48 hours............<12.0 mg/dL  3-5 days..................<15.0 mg/dL  6-29 days.................<15.0 mg/dL               BUN, Bld 12             Calcium 5.5  Comment:  *Critical value -  Results called  to and read back by:JANET PEDERSON RN             Chloride 96             CO2 22             Color, UA         Yellow     Creatinine 0.7             eGFR if  >60.0             eGFR if non  >60.0  Comment:  Calculation used to obtain the estimated glomerular filtration  rate (eGFR) is the CKD-EPI equation.                Glucose 84             Glucose, UA         Negative     INR 2.0  Comment:  Coumadin Therapy:  2.0 - 3.0 for INR for all indicators except mechanical heart valves  and antiphospholipid syndromes which should use 2.5 - 3.5.               Ketones, UA         Negative     Lactate, Jeovany   2.7  Comment:  Falsely low lactic acid results can be found in samples   containing >=13.0 mg/dL total bilirubin and/or >=3.5 mg/dL   direct bilirubin.             Leukocytes, UA         3+     Magnesium 2.0             Microscopic Comment         SEE COMMENT  Comment:  Other formed elements not mentioned in the report are not   present in the microscopic examination.        NITRITE UA         Negative     Non-Squam Epith         2     Occult Blood UA         2+     Osmolality, Ur         305  Comment:  The random urine reference ranges provided were established   for 24 hour urine collections.  No reference ranges exist for  random urine specimens.  Correlate clinically.       pH, UA         5.0     Phosphorus 2.7             POCT Glucose     107 189       Potassium 4.1             PROTEIN TOTAL 4.3             Protein, UA         Negative  Comment:  Recommend a 24 hour urine protein or a urine   protein/creatinine ratio if globulin induced proteinuria is  clinically suspected.       Protime 18.8             RBC, UA         10     Sodium 129             Sodium Urine Random         70  Comment:  The random urine reference ranges provided were established   for 24 hour urine collections.  No reference ranges exist for  random urine specimens.  Correlate clinically.       Specific [place], UA          1.010     Specimen UA         Urine, Clean Catch     Squam Epithel, UA         2     Vit D, 25-Hydroxy 14  Comment:  Vitamin D deficiency.........<10 ng/mL                              Vitamin D insufficiency......10-29 ng/mL       Vitamin D sufficiency........> or equal to 30 ng/mL  Vitamin D toxicity............>100 ng/mL               Vitamin B-12 1065             WBC, UA         95                      04/10/20  1240   04/10/20  1059   04/10/20  1002        Albumin   1.2       Alkaline Phosphatase   216       ALT   17       Anion Gap   13       Appearance, UA           AST   47       Bacteria, UA           Bilirubin (UA)           BILIRUBIN TOTAL   0.3  Comment:  For infants and newborns, interpretation of results should be based  on gestational age, weight and in agreement with clinical  observations.  Premature Infant recommended reference ranges:  Up to 24 hours.............<8.0 mg/dL  Up to 48 hours............<12.0 mg/dL  3-5 days..................<15.0 mg/dL  6-29 days.................<15.0 mg/dL         BUN, Bld   12       Calcium   6.1  Comment:  *Critical value -  Results called to and read back by:HARSHAL BERMAN RN         Chloride   94       CO2   21       Color, UA           Creatinine   0.8       eGFR if    >60.0       eGFR if non    >60.0  Comment:  Calculation used to obtain the estimated glomerular filtration  rate (eGFR) is the CKD-EPI equation.          Glucose   162       Glucose, UA           INR           Ketones, UA           Lactate, Jeovany   4.9  Comment:  Falsely low lactic acid results can be found in samples   containing >=13.0 mg/dL total bilirubin and/or >=3.5 mg/dL   direct bilirubin.  *Critical value -   Results called to and read back by:HARSHAL BERMAN RN         Leukocytes, UA           Magnesium   1.6       Microscopic Comment           NITRITE UA           Non-Squam Epith           Occult Blood UA           Osmolality, Ur           pH,  UA           Phosphorus   2.1       POCT Glucose 143   165     Potassium   3.4       PROTEIN TOTAL   3.9       Protein, UA           Protime           RBC, UA           Sodium   128       Sodium Urine Random           Specific Gravity, UA           Specimen UA           Squam Epithel, UA           Vit D, 25-Hydroxy           Vitamin B-12           WBC, UA                 Significant Imaging: Echocardiogram:   2D echo with color flow doppler: No results found for this or any previous visit. and Transthoracic echo (TTE) complete (Cupid Only):   Results for orders placed or performed during the hospital encounter of 01/29/20   Echo   Result Value Ref Range    Ascending aorta 2.86 cm    STJ 2.52 cm    AV mean gradient 2 mmHg    Ao peak adam 0.92 m/s    Ao VTI 13.27 cm    IVRT 0.11 msec    IVS 0.75 0.6 - 1.1 cm    LA size 3.96 cm    Left Atrium Major Axis 6.33 cm    Left Atrium Minor Axis 6.42 cm    LVIDD 4.00 3.5 - 6.0 cm    LVIDS 2.78 2.1 - 4.0 cm    LVOT diameter 2.03 cm    LVOT peak VTI 10.57 cm    PW 0.80 0.6 - 1.1 cm    MV Peak E Adam 0.71 m/s    PV Peak D Adam 0.41 m/s    PV Peak S Adam 0.27 m/s    RA Major Axis 6.69 cm    RA Width 5.35 cm    RVDD 3.00 cm    Sinus 3.56 cm    TAPSE 0.78 cm    TR Max [place] 2.75 m/s    TDI LATERAL 0.09 m/s    TDI SEPTAL 0.07 m/s    LA WIDTH 4.27 cm    LV Diastolic Volume 69.93 mL    LV Systolic Volume 29.01 mL    RV S' 6.07 cm/s    LVOT peak adam 0.69 m/s    LV LATERAL E/E' RATIO 7.89 m/s    LV SEPTAL E/E' RATIO 10.14 m/s    FS 31 %    LA volume 91.62 cm3    LV mass 89.59 g    Left Ventricle Relative Wall Thickness 0.40 cm    AV valve area 2.58 cm2    AV Velocity Ratio 0.75     AV index (prosthetic) 0.80     Mean e' 0.08 m/s    Pulm vein S/D ratio 0.66     LVOT area 3.2 cm2    LVOT stroke volume 34.19 cm3    AV peak gradient 3 mmHg    E/E' ratio 8.88 m/s    Triscuspid Valve Regurgitation Peak Gradient 30 mmHg    BSA 1.8 m2    LV Systolic Volume Index 16.2 mL/m2    LV Diastolic Volume Index  39.07 mL/m2    LA Volume Index 51.2 mL/m2    LV Mass Index 50 g/m2    Right Atrial Pressure (from IVC) 3 mmHg    TV rest pulmonary artery pressure 33 mmHg    Narrative    · Patient does not have transposition of the great arteries. Based on what   is seen, it is possible patient had an ASD closure.  · Mildly decreased left ventricular systolic function. The estimated   ejection fraction is 45%.  · Mildly reduced right ventricular systolic function. Mild right   ventricular enlargement.  · Severe left atrial enlargement.  · Severe right atrial enlargement.  · Normal central venous pressure (3 mmHg).

## 2020-04-12 NOTE — CARE UPDATE
Rapid Response Nurse Chart Check     Chart check completed, abnormal VS noted. Please call [phone] for further concerns or assistance.

## 2020-04-12 NOTE — PROGRESS NOTES
Hospital Medicine  Progress Note  [facility]      Patient Name: Elizabeth Cano  MRN:  [id number]  Hospital Medicine Team: [facility]OSP MED X Laina Washington NP  Date of Admission:  4/8/2020     Length of Stay:  LOS: 4 days     Principal Problem:  COVID-19 virus infection      HPI:  Patient is a 70yo female with a PMHx of Afib, CHF, COPD, and DM2 being admitted to medicine for Covid-19 infection. Patient reports onset of SOB a few days ago. She was noted to be hypoxemic this morning on evaluation. Her oxygen saturation 90% on room air, as well as atrial fibrillation with RVR in the low 200s.  For the past few days she has had worsening shortness of breath, cough, low-grade fever, and weakness. She lives in a NH that has had a Covid outbreak. She is on midodrine for chronic hypotension. Of note, she used to be on digoxin for afib.    Hospital Course:  Admitted 04/08 due to COVID-19 complicated by Afib with RVR. Currently on doxycycline, ceftriaxone, and HCQ due to COVID. HR improved after Dig load, IV amio transitioned to PO; remains in AFib/flutter and rates 's. IV diuresis discontinued earlier in course due to concerns for hypovolemia and hyponatremia in setting of worsening lactic acidosis; hydrated gently with IVFs and lactic acidosis improved. Chemistry indicative of malnutrition, Dietary following and supplements added; electrolytes repleted as appropriate.     Covid Specifics:  - COVID-19 testing: POSITIVE 04/08  - on doxycycline, ceftriaxone, and HCQ  - Ferritin 892 >>> 998 >> 1052  - CRP 47 >>> 54.2 >> 96    Interval History:  Resting in bed, afebrile, stable on 4 liters via nasal cannula; reports feeling ok and is in good spirits, no conversational dyspnea, dry cough noted during interview. She was updated on need to increase PO intake as well as lab findings. She is happy to have had midline placed. She understands plan of care for Covid, Afib, and malnutrition  treatment.    Disposition plans:  Return to [facility] when medically ready.    Review of Systems:  Respiratory: + cough; - SOB, - SINGH  Cardiovascular: - CP, - palpitations  GI: + diarrhea (improving); -N/V, constipation     Inpatient Medications:    Current Facility-Administered Medications:     acetaminophen tablet 650 mg, 650 mg, Oral, Q4H PRN, Reza Alves Jr., PA-C, 650 mg at 04/11/20 2104    albuterol inhaler 2 puff, 2 puff, Inhalation, Q6H PRN, Laina Washington NP    amiodarone tablet 400 mg, 400 mg, Oral, BID, 400 mg at 04/12/20 0828 **FOLLOWED BY** [START ON 4/21/2020] amiodarone tablet 200 mg, 200 mg, Oral, Daily, Laina Washington NP    ascorbic acid (vitamin C) tablet 250 mg, 250 mg, Oral, Daily, Laina Washington NP, 250 mg at 04/12/20 0828    atorvastatin tablet 40 mg, 40 mg, Oral, QHS, Laina Washington NP, 40 mg at 04/11/20 2104    calcium gluconate 1 g in dextrose 5 % 100 mL IVPB (premix), 1 g, Intravenous, Once, Laina Washington NP, 1 g at 04/12/20 1010    dextrose 10% (D10W) Bolus, 12.5 g, Intravenous, PRN, Reza Alves Jr., PA-C    dextrose 10% (D10W) Bolus, 25 g, Intravenous, PRN, Reza lAves Jr., PA-C    dicyclomine capsule 10 mg, 10 mg, Oral, TID, Laina Washington NP, 10 mg at 04/12/20 0827    ergocalciferol capsule 50,000 Units, 50,000 Units, Oral, Q7 Days, Laina Washington NP, 50,000 Units at 04/11/20 0939    glucagon (human recombinant) injection 1 mg, 1 mg, Intramuscular, PRN, Reza Alves Jr., PA-C    glucose chewable tablet 16 g, 16 g, Oral, PRN, Reza Alves Jr., BABAR    glucose chewable tablet 24 g, 24 g, Oral, PRN, Reza Alves Jr., BABAR    [COMPLETED] hydroxychloroquine tablet 400 mg, 400 mg, Oral, BID, 400 mg at 04/11/20 2105 **FOLLOWED BY** hydroxychloroquine tablet 400 mg, 400 mg, Oral, Daily, Laina Washington, NP, 400 mg at 04/12/20 0828    hydroxyzine HCL tablet 25 mg, 25 mg, Oral, TID PRN, Laina  K. Felix, NP, 25 mg at 04/11/20 2105    insulin aspart U-100 pen 0-5 Units, 0-5 Units, Subcutaneous, QID (AC + HS) PRN, Reza Alves Jr., PA-C    Lactobacillus rhamnosus GG capsule 1 capsule, 1 capsule, Oral, Daily, Laina Washington NP, 1 capsule at 04/12/20 0828    loperamide capsule 2 mg, 2 mg, Oral, QID PRN, Laina Washington NP, 2 mg at 04/10/20 2344    magnesium oxide tablet 800 mg, 800 mg, Oral, BID, Laina Washington NP, 800 mg at 04/12/20 1011    melatonin tablet 6 mg, 6 mg, Oral, Nightly PRN, Gabo ABlanca Raymundo, DO, 6 mg at 04/11/20 2104    midodrine tablet 10 mg, 10 mg, Oral, QHS, Reza Alves Jr., BABAR, 10 mg at 04/11/20 2105    mirtazapine tablet 15 mg, 15 mg, Oral, QHS, Reza Alves Jr., PA-C, 15 mg at 04/11/20 2104    multivit-iron-FA-calcium-mins 9 mg iron-400 mcg tablet Tab 1 tablet, 1 tablet, Oral, Daily, Laina Washington NP, 1 tablet at 04/12/20 1010    ondansetron injection 8 mg, 8 mg, Intravenous, Q8H PRN, Reza Alves Jr., PA-C, 8 mg at 04/10/20 0936    pantoprazole EC tablet 40 mg, 40 mg, Oral, Daily, Reza Alves Jr., PA-C, 40 mg at 04/12/20 0828    polyethylene glycol packet 17 g, 17 g, Oral, Daily, Reza Alves Jr., PA-C    potassium chloride CR capsule 30 mEq, 30 mEq, Oral, Daily, Gabo ABlanca Raymundo, DO, 30 mEq at 04/12/20 0828    potassium, sodium phosphates 280-160-250 mg packet 2 packet, 2 packet, Oral, QID (WM & HS), Laina Washington NP, 2 packet at 04/12/20 0827    promethazine (PHENERGAN) 12.5 mg in dextrose 5 % 50 mL IVPB, 12.5 mg, Intravenous, Q6H PRN, Reza Alves Jr., BABAR    sodium chloride 0.9% flush 10 mL, 10 mL, Intravenous, PRN, Reza Alves Jr., BABAR    vitamin A capsule 20,000 Units, 20,000 Units, Oral, Daily, Laina Washington NP, 20,000 Units at 04/12/20 0829    warfarin (COUMADIN) tablet 1 mg, 1 mg, Oral, Daily, Laina Washington NP, 1 mg at 04/11/20 2104    Physical  "Exam:      Intake/Output Summary (Last 24 hours) at 4/12/2020 1031  Last data filed at 4/12/2020 0600  Gross per 24 hour   Intake 420 ml   Output --   Net 420 ml     Wt Readings from Last 3 Encounters:   04/08/20 60.5 kg (133 lb 6.1 oz)   03/18/20 60.5 kg (133 lb 6.1 oz)   03/01/20 50.5 kg (111 lb 5.3 oz)       BP 98/64 (BP Location: Right arm, Patient Position: Lying)   Pulse 104   Temp 97.6 °F (36.4 °C) (Oral)   Resp 18   Ht 5' 6" (1.676 m)   Wt 60.5 kg (133 lb 6.1 oz)   SpO2 95%   Breastfeeding? No   BMI 21.53 kg/m²     GEN: NAD, conversant  Neuro: intact and oriented  Resp: coarse bilateral breath sounds, no wheezes or rales, normal work of breathing, dry cough  CV: RRR, edema 3+ and weeping in bilateral upper extremities, 2+ pitting BLE foot to mid calf   GI: soft, NTND  Skin: no rash    Laboratory:  Lab Results   Component Value Date    [id number] Positive (A) 04/08/2020     Recent Labs   Lab 04/08/20  0622 04/10/20  0045 04/12/20  0556   WBC 7.09 7.57 10.74   LYMPH 21.4  1.5 13.6*  1.0 7.4*  0.8*   HGB 11.2* 10.4* 10.5*   HCT 35.4* 31.3* 32.3*    166 195     Recent Labs   Lab 04/10/20  1059 04/11/20  0600 04/12/20  0556   * 129* 129*   K 3.4* 4.1 4.2   CL 94* 96 96   CO2 21* 22* 22*   BUN 12 12 13   CREATININE 0.8 0.7 0.8   * 84 102   CALCIUM 6.1* 5.5* 6.0*   MG 1.6 2.0 1.9   PHOS 2.1* 2.7 2.5*     Recent Labs   Lab 04/10/20  0654 04/10/20  1059 04/11/20  0600 04/12/20  0556   ALKPHOS  --  216* 240* 314*   ALT  --  17 19 27   AST  --  47* 58* 91*   ALBUMIN  --  1.2* 1.3* 1.2*   PROT  --  3.9* 4.3* 4.3*   BILITOT  --  0.3 0.6 1.0   INR 1.0  --  2.0* 2.3*      Recent Labs     04/10/20  0045 04/11/20  2345 04/12/20  0556   FERRITIN 998* 1,052*  --    CRP 54.2* 96.0*  --    LDH  --   --  574*   BNP  --   --  472*   All labs within the last 24 hours were reviewed.     Microbiology:  Microbiology Results (last 7 days)     Procedure Component Value Units Date/Time    Blood culture x " two cultures. Draw prior to antibiotics. [[id number]] Collected:  04/08/20 0622    Order Status:  Completed Specimen:  Blood from Peripheral, Antecubital, Right Updated:  04/12/20 0812     Blood Culture, Routine No Growth after 4 days.     Narrative:       Aerobic and anaerobic    Urine culture [[id number]]  (Abnormal) Collected:  04/10/20 1533    Order Status:  Completed Specimen:  Urine Updated:  04/12/20 0043     Urine Culture, Routine ENTEROCOCCUS SPECIES  >100,000 cfu/ml  Identification and susceptibility pending      Narrative:       Preferred Collection Type->Urine, Clean Catch    Blood culture x two cultures. Draw prior to antibiotics. [[id number]]  (Abnormal) Collected:  04/08/20 0623    Order Status:  Completed Specimen:  Blood from Peripheral, Upper Arm, Right Updated:  04/11/20 1036     Blood Culture, Routine Gram stain aer bottle: Gram positive cocci in clusters resembling Staph       Results called to and read back by: Gisella Martini RN 04/10/2020  10:12      COAGULASE-NEGATIVE STAPHYLOCOCCUS SPECIES  Organism is a probable contaminant      Narrative:       Aerobic and anaerobic    Influenza A & B by Molecular [[id number]] Collected:  04/09/20 1822    Order Status:  Completed Specimen:  Nasopharyngeal Swab Updated:  04/09/20 2013     Influenza A, Molecular Negative     Influenza B, Molecular Negative     Flu A & B Source NP    Influenza A & B by Molecular [[id number]] Collected:  04/09/20 1334    Order Status:  Canceled Specimen:  Nasopharyngeal Swab     Culture, Respiratory with Gram Stain [[id number]]     Order Status:  No result Specimen:  Sputum, Expectorated         Imaging  ECG Results          EKG 12-lead (Final result)  Result time 04/08/20 16:29:46    Final result by Interface, Lab In [facility] (04/08/20 16:29:46)                 Narrative:    Test Reason : R00.0,    Vent. Rate : 181 BPM     Atrial Rate : 127 BPM     P-R Int : 000 ms          QRS Dur : 184 ms      QT Int : 300 ms       P-R-T Axes : 000  -74 133 degrees     QTc Int : 521 ms    Atrial fibrillation with rapid ventricular response  Left axis deviation  Right bundle branch block      Abnormal ECG  When compared with ECG of 15-MAR-2020 17:03,  Atrial fibrillation with rapid ventricular response Now present  Confirmed by JAIRO METZGER MD ([id number]) on 4/8/2020 4:29:40 PM    Referred By: CHELEERR   SELF           Confirmed By:JAIRO METZGER MD                            Results for orders placed during the hospital encounter of 01/29/20   Echo    Narrative · Patient does not have transposition of the great arteries. Based on what   is seen, it is possible patient had an ASD closure.  · Mildly decreased left ventricular systolic function. The estimated   ejection fraction is 45%.  · Mildly reduced right ventricular systolic function. Mild right   ventricular enlargement.  · Severe left atrial enlargement.  · Severe right atrial enlargement.  · Normal central venous pressure (3 mmHg).     X-Ray Chest AP Portable  Narrative: EXAMINATION:  XR CHEST AP PORTABLE    CLINICAL HISTORY:  Suspected Covid-19 Virus Infection;    TECHNIQUE:  Single frontal view of the chest was performed.    COMPARISON:  Chest radiograph 02/12/2020 and chest CT 02/13/2020    FINDINGS:  Cardiac monitoring leads overlie the chest.  There is unchanged enlargement of the cardiomediastinal silhouette and atherosclerosis of the thoracic aorta.  The lungs demonstrate abnormal multifocal patchy airspace opacities, right greater than left, which appears similar to slightly increased in conspicuity from prior chest radiograph of 02/12/2020.  Findings likely relate to previously demonstrated interstitial lung disease with possible superimposed component of, infectious or non infectious inflammatory process not definitively excluded.  There is a probable left pleural effusion.  No evidence of pneumothorax.  Visualized osseous structures are intact.  Impression: Please see above.    Electronically signed  by: Tran Lopez MD  Date:    04/08/2020  Time:    07:48  All imaging within the last 24 hours was reviewed.     Assessment and Plan:    Active Hospital Problems    Diagnosis  POA    *COVID-19 virus infection [U07.1]  Yes    Moderate malnutrition [E44.0]  Yes     Chronic    Atrial fibrillation with RVR [I48.91]  Yes    Elevated lactic acid level [R79.89]  Yes    Type 2 diabetes mellitus with hyperglycemia, without long-term current use of insulin [E11.65]  Yes    COPD (chronic obstructive pulmonary disease) [J44.9]  Yes    Chronic heart failure with preserved ejection fraction [I50.32]  Yes      Resolved Hospital Problems   No resolved problems to display.     Covid-19 Virus Infection  - COVID-19 testing:  POSITIVE 04/08  - Infection Control notified    - Isolation:   - Airborne and Droplet Precautions  - Surgical mask on patient   - N95 masks must be fit tested, wear eye protection  - 20 second hand hygiene   - Limit visitors per hospital policy   - Consolidate lab draws, nursing care, and interventions    - Diagnostics: (Lymphopenia, hyponatremia, hyperferritinemia, elevated troponin, elevated d-dimer, age, and comorbidities are significant predictors of poor clinical outcome)   - CBC: WBC WNL, H/H stable, lymph 13.6  trend Q48hrs  - CMP: multiple abnormalities         trend Q48hrs  - Procalcitonin: 0.08  - D-dimer: 0.90     repeat prior to discharge  - Ferritin: 892 >> 998     repeat prior to discharge   - CRP: 46.8 >> 54.2          trend Q48hrs  - LDH: 326      repeat prior to discharge  - BNP: pending in AM  - Troponin: 0.180    - ECG: Afib/flutter, QTc elongated in that setting   - rapid Flu: negative   - Blood culture x2: COAGULASE-NEGATIVE STAPHYLOCOCCUS SPECIES, likely contaminant in 1/2 >>> other culture with NGTD   - Sputum culture: none   - CXR: reviewed   - UA: reviewed    - CPK: 26    - Management:   Bundle care as able to maintain isolation & minimize in/out of room   - Supplemental O2 to  maintain SpO2 92%-96%   if requiring 6L NC or higher, place on nonrebreather and discuss case with MICU   - Telemetry & continuous pulse oximetry    - If wheezing   - consider albuterol inhaler PRN due to Afib with RVR if needed   - acetaminophen 650mg PO Q6hr PRN fever   - loperamide PRN for viral diarrhea  - Empiric antibiotics per likely source & patient allergies    - CAP: x 5 day course (d/c early if low concern for bacterial co-infection)  Ceftriaxone 1g IV Q24hrs            Transitioned to doxycycline 04/09 from azithromycin due to QTc                 If MRSA risk factors, add Vancomycin IV (PharmD consult)     - Investigational Treatment Protocol: (if patient meets criteria)   [email or web address]     - statin: atorvastatin 40mg po daily (if CPK WNL) >>> tolerating       - HCQ 400mg PO BID x1 day, then 400mg PO daily x 4 days >>> initiated; monitor daily EKGs  (check glucose 6 phosphate dehydrogenase (NOT G6PD Quant), ECG on start & @48hrs, and start Qshift POCT glucose)    Safety notes:   - Avoid NIPPV (CPAP/BiPAP) to prevent aerosolization, use on a case-by-case basis if in neg pressure room   - Cautious use of NSAIDS for fever per WHO recommendations (3/16/2020)   - No new ACEi/ARB start or discontinuation of chronic med unless hypotensive (Esler et al. Journal of Hypertension 2020, 38:000-000)   - Careful use of steroids in the absence of other indications (shock, ARDS)   - Fluid sparing resuscitation, avoid maintenance fluids    Lactic acidosis  - lactic 4.5->2.8 >> 4.9 >> 2.7  - IVF bolus received in ED  - gentle IVFs inpatient as appropriate, see MAR  - monitor     Chronic diastolic heart failure  Hyponatremia  Bilateral upper extremity edema  - strict I/Os, daily weights  - given small bolus in ED  - holding further diuresis due to chemistry indicative of hypovolemia   -  >> lowest on record  -  hyponatremia workup urine NA 70, urine osmo 305, serum osmo 268  - elevate BUE due to edema       Afib with RVR  - HR 200s in the ED >> now rates stable in 's  - due to active infection, BB/CCB avoided in setting of COPD  - given amiodarone bolus and infusion earlier in course >>> now transitioned to PO  - dig loaded 04/09, no daily PO dosing per Cardiology   - continue coumadin, PharmD following   - dose/medication adjustment as appropriate   - continue telemetry  - daily INR    Malnutrition  Anemia   Hypoalbuminemia   Hypokalemia  Hyponatremia  Hypocalcemia  Hypophosphatemia   Hypomagnesemia   - Dietary following  - nutritional supplements added with encouragement to increase PO intake   - electrolytes repleted as appropriate  - vitamin D 14 and B12 1065   - anemia profile reviewed; likely due to malnutrition, no iron supplement at this time  - IVs as noted above  - multivitamin initiated   - ergocalciferol initiated   - monitor CMP daily      DM2  - low dose SSI, ACHS  - cardiac diabetic diet    Diarrhea  - chronic per patient >> improving   - hold stool softener  - resume home dicyclomine  - PRN imodium     Advance Care Planning  Goals of care, counseling/discussion  - she wishes to be DNR/DNI  - 15 minutes spent discussing goals of care    Patient's chronic/stable medical conditions noted in the problem list above will be managed with the patient's home medications as tolerated.     VTE High Risk Prophylaxis:   VTE Risk Mitigation (From admission, onward)         Ordered     warfarin (COUMADIN) tablet 1 mg  Daily      04/10/20 1058     Reason for No Pharmacological VTE Prophylaxis  Once     Question:  Reasons:  Answer:  Already adequately anticoagulated on oral Anticoagulants    04/08/20 1129     IP VTE HIGH RISK PATIENT  Once      04/08/20 1129                Laina Washington, GUILLERMINA, AG-ACNP, BC  Department of Hospital Medicine  [facility]

## 2020-04-12 NOTE — PLAN OF CARE
Plan of care updated with patient. No falls during shift. Rash/flakiness to groin and backside. Arms weeping fluid with +3 edema. Maintained fall protocol. Chair and bed alarm in use. Pt received one time 60 mg does IVP Lasix. Magnesium, potassium, and calcium replaced. Coumadin decreased from 1mg to 0.5mg. Dronabinol added to medication regimen. Pt bed bound and requires 1-2 person assist. Addressed all issues throughout shift.

## 2020-04-13 PROBLEM — Z51.5 COMFORT MEASURES ONLY STATUS: Status: ACTIVE | Noted: 2020-01-01

## 2020-04-13 NOTE — RESPIRATORY THERAPY
RT attempted to obtain an ABG but was unsuccessful.  RT could not feel a good radial or brachial pulse in the left or right arm.  RN Coco palpated the left and right arterial sites and was also unable to feel an arterial pulse.  RT attempted a blind stick and was unsuccessful.  The RN was aware of the attempt.

## 2020-04-13 NOTE — PHYSICIAN QUERY
PT Name: Elizabeth Cano  MR #: [id number]    Physician Query Form - Emergency Medicine Diagnosis Clarification     CDS/: Zarina Cardenas RN, CCDS             Contact information: [email or web address]  This form is a permanent document in the medical record.     Query Date: April 13, 2020    By submitting this query, we are merely seeking further clarification of documentation.  Please utilize your independent clinical judgment when addressing the question(s) below.      The Medical record contains the following:     Diagnosis Supporting Clinical Information Location in Medical Record   Multifocal Pneumonia  For the past few days she has had worsening shortness of breath, cough, low-grade fever,       Her chest x-ray by my independent her petition is notable for multifocal pneumonia.  Her labs are notable for a Positive COVID test.    Azithromycin 500 mg IVPB x1    Ceftriaxone 1 gm IVPB  4/7 ed note              4/8 mar    4/9- 4/12 mar     Do you agree with the Emergency Medicine diagnosis of __Pneumonia__?    [ X  ] Yes   [   ] Yes, but it resolved prior to my assessment of the patient   [   ] No   [   ] Clinically Insignificant   [   ] Other/Clarification of Findings:   [  ] Clinically Undetermined         Please document in your progress notes daily for the duration of treatment until resolved and include in your discharge summary.

## 2020-04-13 NOTE — PROGRESS NOTES
PHARMACY CONSULT NOTE: WARFARIN  Elizabeth Cano is a 69 y.o. female on warfarin therapy for Atrial Fibrillation. PharmD has been consulted for warfarin dosing.    Current order: Warfarin 0.5mg daily  Home dose: 1 mg every Mon, Wed, Fri   Coumadin clinic enrollment: Nursing home was managing prior to admission  INR goal: 2-3    Lab Results   Component Value Date    INR 4.0 (H) 04/13/2020    INR 2.3 (H) 04/12/2020    INR 2.0 (H) 04/11/2020     Significant drug interactions: Amiodarone (may increase INR)  Diet: Adult Cardiac without supplements     Recommendation(s):    INR of 4.0 is supratherapeutic today    Recommend holding warfarin dose tonight   Trend INR daily.    Pharmacy will continue to follow and monitor warfarin    Thank you for the consult,  Adelaida Taveras, PharmD  Extension [phone]    **Note: Consults are reviewed Monday-Friday 7:00am-3:30pm. THE ABOVE RECOMMENDATIONS ARE ONLY SUGGESTED.THE RECOMMENDATIONS SHOULD BE CONSIDERED IN CONJUNCTION WITH ALL PATIENT FACTORS. **

## 2020-04-13 NOTE — PROGRESS NOTES
VANCOMYCIN DOSING BY PHARMACY DISCONTINUATION NOTE    Elizabeth Cano is a 69 y.o. female who had been consulted for vancomycin dosing.    The pharmacy consult for vancomycin dosing has been discontinued.     Vancomycin Dosing by Pharmacy Consult will sign-off. Please reconsult if necessary. Thank you for allowing us to participate in this patient's care.       Thank you for allowing pharmacy participate in this patient's care.     Luna Velasquez, Marcus  Clinical Pharmacist, [facility] Pharmacy/[facility] Team  [email or web address]  office [phone]

## 2020-04-13 NOTE — PROGRESS NOTES
Pt c/o SOB and satting in the 80s. Notified Dr. Richards. Ordered to put pt on Hi-flow nasal cannula. Will continue to monitor.

## 2020-04-13 NOTE — CARE UPDATE
Rapid Response Respiratory Therapy Note      Code Status: DNR   : 1950  Bed: [id number] A:   MRN: [id number]  Time page Received: 1424  Time Rapid Response RT at Bedside: 1428  Time Rapid Response RT left Bedside: 1609  Report given to: HELEN Easton    SITUATION     Evaluated patient for: Low O2 Saturations, Somnolence    BACKGROUND     Patient has a past medical history of A-fib, GAVIN (acute kidney injury), CHF (congestive heart failure), COPD (chronic obstructive pulmonary disease), Diabetes mellitus, and Hypertension.    ASSESSMENT/INTERVENTIONS     Upon arrival in room patient found on 10L HFNC. Patient spoke unintelligibly. Patient demonstrated somnolence with slightly labored respiratory pattern. Patient O2 increased to 15L when no good O2 saturation could be obtained. Patient WOB increased steadily and was placed on NRB 15L and found to be 98% O2 saturations. Patient COPD diagnosis was brought up and patient replaced back down to 10L for an O2 saturation of 92%. Patient comfort care status was debated as patient remained DNR/DNI. Patient comfort during further treatment evaluated [facility] Patient placed on 10L HFNC.    Pulse: 82 Respiratory rate: 24 BP: BP: (!) 100/50 SpO2:94  Level of Consciousness: Level of Consciousness (AVPU): alert  Respiratory Effort: Respiratory Effort: Moderate, Labored  Expansion/Accessory Muscle Usage: Expansion/Accessory Muscles/Retractions: accessory muscle use, abdominal muscle use  All Lung Field Breath Sounds: All Lung Fields Breath Sounds: Anterior:, Lateral:, diminished  O2 Device/Concentration: 15L HFNC  Most recent blood gas:   Recent Labs     20  0612   LACTATE 5.7*     Current Respiratory Care Orders:   Start   Ordered   20 1607  POCT ARTERIAL BLOOD GAS Blood Gas Once     Comments: Notify Physician if: see parameters below.   Question: Component: Answer: Blood Gas    20 1611   20 2315  Oxygen  Continuous Continuous     References: Oxygen Titration Protocol   Question Answer Comment   Device type: High flow    Device: High Flow Nasal Cannula (6 -15 Liters)    LPM: 15    Titrate O2 per Oxygen Titration Protocol: Yes    To maintain SpO2 goal of: >= 92%    Notify MD of: Inability to achieve desired SpO2; Sudden change in patient status and requires 20% increase in FiO2; Patient requires >60% FiO2        04/12/20 2314   04/08/20 1200  Please use the respiratory therapy protocols to escalate and de-escalate therapy: Airway Clearance Protocol, Airway Clearance Protocol, Atelectasis/Hyperinflation Protocol, Bronchodilator Protocol Every 4 hours (34 of 57 released)    Release   Comments: Assess patient q4h until patient is below 35% oxygen    04/08/20 1129   04/08/20 1123  Pulse Oximetry Continuous Continuous     Comments: Continuous if hypoxic or on >3LPM Nasal cannula           NIPPV: No Surgical airway: No Vent: No  ETCO2 monitored:    Ambu at bedside: Ambu bag with the patient?: Yes, Adult Ambu    RECOMMENDATIONS   ?  We recommend: Continual monitoring of patient oxygenation and ventilation status. Coordination and treatment of any other disease status. If patient is able to comply - IS and Flutter valve therapy with a bronchodilator would be recommended.    ESCALATION      Discussed plan of care with primary HELEN HURTADO RRT.     FOLLOW-UP     Disposition: Tx in ICU bed RICU 3079.    Please call back the Rapid Response RTVazquez, RRT at x [phone] for any questions or concerns.

## 2020-04-13 NOTE — CODE/ RAPID DOCUMENTATION
"RAPID RESPONSE NURSE NOTE     Admit Date: 2020  LOS: 5  Code Status: DNR   Date of Consult: 2020  : 1950  Age: 69 y.o.  Weight:   Wt Readings from Last 1 Encounters:   20 60.5 kg (133 lb 6.1 oz)     Sex: female  Race: White   Bed: [place] [address] A:   MRN: [id number]  Time Rapid Response Team page Received: 2:25P  Time Rapid Response Team at Bedside: 2:26P  Time Rapid Response Team left Bedside:4:15P  Was the patient discharged from an ICU this admission?   no  Was the patient discharged from a PACU within last 24 hours?  no  Did the patient receive conscious sedation/general anesthesia within last 24 hours?  no  Was the patient in the ED within the past 24 hours?  no  Was the patient started on NIPPV within the past 24 hours?  no  Did this progress into an ARC or CPA:  no  Attending Physician: Rahul King MD  Primary Service: [facility]  Consult Requested By: Rahul King MD     SITUATION     Notified by code pager.  Reason for alert: Increased O2 demand and decrease mental status.   Called to evaluate the patient for Respiratory & Neuro    BACKGROUND     Why is the patient in the hospital?: COVID-19 virus infection    Patient has a past medical history of A-fib, GAVIN (acute kidney injury), CHF (congestive heart failure), COPD (chronic obstructive pulmonary disease), Diabetes mellitus, and Hypertension.    ASSESSMENT/INTERVENTIONS     BP (!) 100/50 (BP Location: Left arm, Patient Position: Lying)   Pulse 82   Temp 98.2 °F (36.8 °C) (Oral)   Resp (!) 24   Ht 5' 6" (1.676 m)   Wt 60.5 kg (133 lb 6.1 oz)   SpO2 (!) 94%   Breastfeeding? No   BMI 21.53 kg/m²     What did you find: Pt in bed, somnolent. Unable to obtain BP or SpO2. HR in the 80's. Pt on 10L high flow. Primary team at bedside. Dopamine gtt and fluid bolus started per primary team. CCS consulted.     RECOMMENDATIONS     We recommend: Move to ICU.     FOLLOW-UP/CONTINGENCY PLAN     Call the Rapid Response Nurse, " Ana Lilia Will RN at x [phone] for additional questions or concerns.    PHYSICIAN ESCALATION     Orders received and case discussed with Dr. Pennington.    Disposition: Tx in ICU bed 3079.

## 2020-04-13 NOTE — PROGRESS NOTES
Pharmacokinetic Initial Assessment: IV Vancomycin    Assessment/Plan:    Initiate intravenous vancomycin with a maintenance dose of vancomycin 1000mg IV every 24 hours  Desired empiric serum trough concentration is 10 to 20 mcg/mL  Draw vancomycin trough level 30 min prior to third dose on 4/15 at approximately 1430  Pharmacy will continue to follow and monitor vancomycin.      Please contact pharmacy at extension [phone] with any questions regarding this assessment.     Thank you for the consult,   Adelaida Taveras       Patient brief summary:  Elizabeth Cano is a 69 y.o. female initiated on antimicrobial therapy with IV Vancomycin for treatment of suspected bacteremia    Drug Allergies:   Review of patient's allergies indicates:   Allergen Reactions    Levsin [hyoscyamine sulfate] Hallucinations       Actual Body Weight:   60.5 kg    Renal Function:   Estimated Creatinine Clearance: 54.4 mL/min (based on SCr of 0.9 mg/dL).,       CBC (last 72 hours):  Recent Labs   Lab Result Units 04/12/20  0556 04/13/20  0955   WBC K/uL 10.74 15.24*   Hemoglobin g/dL 10.5* 10.1*   Hematocrit % 32.3* 30.4*   Platelets K/uL 195 188   Gran% % 89.5* 92.1*   Lymph% % 7.4* 5.2*   Mono% % 2.3* 1.4*   Eosinophil% % 0.0 0.0   Basophil% % 0.1 0.1   Differential Method  Automated Automated       Metabolic Panel (last 72 hours):  Recent Labs   Lab Result Units 04/10/20  1533 04/11/20  0600 04/12/20  0556 04/12/20  1529 04/13/20  0612   Sodium mmol/L  --  129* 129* 126* 126*   Sodium Urine Random mmol/L 70  --   --   --   --    Potassium mmol/L  --  4.1 4.2 5.1 4.7   Chloride mmol/L  --  96 96 97 94*   CO2 mmol/L  --  22* 22* 16* 17*   Glucose mg/dL  --  84 102 147* 102   Glucose, UA  Negative  --   --   --   --    BUN, Bld mg/dL  --  12 13 13 14   Creatinine mg/dL  --  0.7 0.8 0.8 0.9   Albumin g/dL  --  1.3* 1.2*  --  1.3*   Albumin grams/dl g/dL  --   --  1.62*  --   --    Total Bilirubin mg/dL  --  0.6 1.0  --  1.7*   Alkaline  Phosphatase U/L  --  240* 314*  --  463*   AST U/L  --  58* 91*  --  195*   ALT U/L  --  19 27  --  57*   Magnesium mg/dL  --  2.0 1.9  --  1.8   Phosphorus mg/dL  --  2.7 2.5*  --  4.0       Drug levels (last 3 results):  No results for input(s): VANCOMYCINRA, VANCOMYCINPE, VANCOMYCINTR in the last 72 hours.    Microbiologic Results:  Microbiology Results (last 7 days)     Procedure Component Value Units Date/Time    Blood culture [[id number]] Collected:  04/13/20 1209    Order Status:  Sent Specimen:  Blood Updated:  04/13/20 1214    Narrative:       Collection has been rescheduled by [facility] at 04/13/2020 11:51 Reason:   Unable to collect patient has a lot of fluid spoke with GARFIELD Davis  Collection has been rescheduled by [facility] at 04/13/2020 11:51 Reason:   Unable to collect patient has a lot of fluid spoke with GARFIELD Davis    Blood culture [[id number]]     Order Status:  Sent Specimen:  Blood     Urine culture [[id number]]  (Abnormal) Collected:  04/10/20 1533    Order Status:  Completed Specimen:  Urine Updated:  04/13/20 0045     Urine Culture, Routine ENTEROCOCCUS SPECIES  >100,000 cfu/ml  Identification and susceptibility pending      Narrative:       Preferred Collection Type->Urine, Clean Catch    Blood culture x two cultures. Draw prior to antibiotics. [[id number]]  (Abnormal) Collected:  04/08/20 0623    Order Status:  Completed Specimen:  Blood from Peripheral, Upper Arm, Right Updated:  04/12/20 1100     Blood Culture, Routine Gram stain aer bottle: Gram positive cocci in clusters resembling Staph       Results called to and read back by: Gisella Martini RN 04/10/2020  10:12      COAGULASE-NEGATIVE STAPHYLOCOCCUS SPECIES  Organism is a probable contaminant      Narrative:       Aerobic and anaerobic    Blood culture x two cultures. Draw prior to antibiotics. [[id number]] Collected:  04/08/20 0622    Order Status:  Completed Specimen:  Blood from Peripheral, Antecubital, Right Updated:  04/12/20 0812     Blood Culture,  Routine No Growth after 4 days.     Narrative:       Aerobic and anaerobic    Influenza A & B by Molecular [[id number]] Collected:  04/09/20 1822    Order Status:  Completed Specimen:  Nasopharyngeal Swab Updated:  04/09/20 2013     Influenza A, Molecular Negative     Influenza B, Molecular Negative     Flu A & B Source NP    Influenza A & B by Molecular [[id number]] Collected:  04/09/20 1334    Order Status:  Canceled Specimen:  Nasopharyngeal Swab     Culture, Respiratory with Gram Stain [[id number]]     Order Status:  No result Specimen:  Sputum, Expectorated

## 2020-04-13 NOTE — PLAN OF CARE
04/13/20 0945   Discharge Reassessment   Assessment Type Discharge Planning Reassessment   Do you have any problems affording any of your prescribed medications? No   Discharge Plan A Return to nursing home   Discharge Plan B New Nursing Home placement - [facility] care facility

## 2020-04-13 NOTE — PLAN OF CARE
Pt remained free from falls/trauma/injuries. , no coverage needed. PROCEDURES. A-fib on the monitor. Rash on bottom and flakiness on lower back; cream applied. Fall bundle in place. Pt satting well with high flow NC. POC explained, no questions at this time.

## 2020-04-14 NOTE — PLAN OF CARE
20 0905   Final Note   Assessment Type Final Discharge Note   Anticipated Discharge Disposition

## 2020-04-14 NOTE — PROGRESS NOTES
Hospital Medicine  Progress Note  [facility]      Patient Name: Elizabeth Cano  MRN:  [id number]  Hospital Medicine Team: Networked reference to record PCT  Laina Washington NP  Date of Admission:  4/8/2020     Length of Stay:  LOS: 5 days     Principal Problem:  COVID-19 virus infection      HPI:  Patient is a 70yo female with a PMHx of Afib, CHF, COPD, and DM2 being admitted to medicine for Covid-19 infection. Patient reports onset of SOB a few days ago. She was noted to be hypoxemic this morning on evaluation. Her oxygen saturation 90% on room air, as well as atrial fibrillation with RVR in the low 200s.  For the past few days she has had worsening shortness of breath, cough, low-grade fever, and weakness. She lives in a NH that has had a Covid outbreak. She is on midodrine for chronic hypotension. Of note, she used to be on digoxin for afib.    Hospital Course:  Admitted 04/08 due to COVID-19 complicated by Afib with RVR. Currently on doxycycline, ceftriaxone, and HCQ due to COVID. HR improved after Dig load, IV amio transitioned to PO; remains in AFib/flutter and rates 's. IV diuresis discontinued earlier in course due to concerns for hypovolemia and hyponatremia in setting of worsening lactic acidosis; hydrated gently with IVFs. Chemistry indicative of malnutrition, Dietary following and supplements added; electrolytes repleted as appropriate. SEE notes and MAR.     Significant event: 04/13 had acute decompensation; became lethargic, hypotensive, with worsening of hypoxemia requiring high flow oxygen. Labs with WBC elevated to 15, chemistry with , Ca 6.4, mag 1.8, alk phos/AST/ALT elevated from baseline, lactic acid 5.7, INR 4, amylase WNL, and lipase WNL. Blood culture drawn (previous with NGTD). Urine culture resulted with enterococcus (similier findings in March). She was initiated on IV fluids, IV ABX, and dopamine. Rapid response called/MICU on call notified with  "decision to transfer to ICU. Code status reviewed with family via phone, Daughters Georgette and Mayte would like to continue treatment as able but wish to uphold Ms. Cano's wishes to be DNR/DNI. Granddaughter, nurse at [facility] in room, in appropriate PPE, for conversations and to be with patient prior to transfer. Patient likely to further decompensate despite ICU transfer thus Unit Director made aware to allow end of life visit by Daughters.     Covid Specifics:  - COVID-19 testing: POSITIVE 04/08  - on doxycycline, ceftriaxone, and HCQ  - Ferritin 892 >>> 998 >> 1052  - CRP 47 >>> 54.2 >> 96    Interval History:  Not responsive to verbal or tactile stimuli. Moans to painful stimuli. Acute decompensation as noted above.     Review of Systems:  Unable to obtain      Inpatient Medications:    Current Facility-Administered Medications:     haloperidol lactate injection 1 mg, 1 mg, Intravenous, Q4H PRN, Jeff Pennington MD    morphine 100 mg (1 mg/mL) in NACL 100 mL infusion (NON-TITRATING), 0.5 mg/hr, Intravenous, Continuous, Jeff Pennington MD, Last Rate: 0.5 mL/hr at 04/13/20 1832, 0.5 mg/hr at 04/13/20 1832    ondansetron injection 4 mg, 4 mg, Intravenous, Q6H PRN, Jeff Pennington MD    promethazine (PHENERGAN) 6.25 mg in dextrose 5 % 50 mL IVPB, 6.25 mg, Intravenous, Q6H PRN, Jeff Pennington MD    Physical Exam:      Intake/Output Summary (Last 24 hours) at 4/13/2020 1936  Last data filed at 4/13/2020 0600  Gross per 24 hour   Intake 240 ml   Output 240 ml   Net 0 ml     Wt Readings from Last 3 Encounters:   04/08/20 60.5 kg (133 lb 6.1 oz)   03/18/20 60.5 kg (133 lb 6.1 oz)   03/01/20 50.5 kg (111 lb 5.3 oz)       BP (!) 100/50 (BP Location: Left arm, Patient Position: Lying)   Pulse 90   Temp 98.2 °F (36.8 °C) (Oral)   Resp (!) 34   Ht 5' 6" (1.676 m)   Wt 60.5 kg (133 lb 6.1 oz)   SpO2 (!) 94%   Breastfeeding? No   BMI 21.53 kg/m²     GEN: lethargic, moans to tactile stimulation "   Neuro: lethargic  Resp: coarse/tight bilateral breath sounds, increased work of breathing, appears distressed, on high flow nasal canula   CV: RRR, edema 3+ and weeping in bilateral upper extremities, 2+ pitting BLE foot to mid calf; extremities cold   GI: soft, NTND,  Skin: no rash    Laboratory:  Lab Results   Component Value Date    [id number] Positive (A) 04/08/2020     Recent Labs   Lab 04/10/20  0045 04/12/20  0556 04/13/20  0955   WBC 7.57 10.74 15.24*   LYMPH 13.6*  1.0 7.4*  0.8* 5.2*  0.8*   HGB 10.4* 10.5* 10.1*   HCT 31.3* 32.3* 30.4*    195 188     Recent Labs   Lab 04/11/20  0600 04/12/20  0556 04/12/20  1529 04/13/20  0612 04/13/20  0955   * 129* 126* 126*  --    K 4.1 4.2 5.1 4.7  --    CL 96 96 97 94*  --    CO2 22* 22* 16* 17*  --    BUN 12 13 13 14  --    CREATININE 0.7 0.8 0.8 0.9  --    GLU 84 102 147* 102  --    CALCIUM 5.5* 6.0* 6.3* 6.4*  --    MG 2.0 1.9  --  1.8  --    PHOS 2.7 2.5*  --  4.0  --    LIPASE  --   --   --   --  8   AMYLASE  --   --   --   --  47     Recent Labs   Lab 04/11/20  0600 04/12/20  0556 04/13/20  0612 04/13/20  1150   ALKPHOS 240* 314* 463*  --    ALT 19 27 57*  --    AST 58* 91* 195*  --    ALBUMIN 1.3* 1.2* 1.3*  --    PROT 4.3* 4.3* 4.5*  --    BILITOT 0.6 1.0 1.7*  --    INR 2.0* 2.3*  --  4.0*      Recent Labs     04/11/20  2345 04/12/20  0556   FERRITIN 1,052*  --    CRP 96.0*  --    LDH  --  574*   BNP  --  472*   All labs within the last 24 hours were reviewed.     Microbiology:  Microbiology Results (last 7 days)     Procedure Component Value Units Date/Time    Blood culture [[id number]] Collected:  04/13/20 1209    Order Status:  Completed Specimen:  Blood Updated:  04/13/20 1915     Blood Culture, Routine No Growth to date    Narrative:       Collection has been rescheduled by 3 at 04/13/2020 11:51 Reason:   Unable to collect patient has a lot of fluid spoke with GARFIELD Davis  Collection has been rescheduled by 3 at 04/13/2020 11:51  Reason:   Unable to collect patient has a lot of fluid spoke with GARFIELD Davis    Blood culture [[id number]]     Order Status:  Canceled Specimen:  Blood     Urine culture [[id number]]  (Abnormal) Collected:  04/10/20 1533    Order Status:  Completed Specimen:  Urine Updated:  04/13/20 0045     Urine Culture, Routine ENTEROCOCCUS SPECIES  >100,000 cfu/ml  Identification and susceptibility pending      Narrative:       Preferred Collection Type->Urine, Clean Catch    Blood culture x two cultures. Draw prior to antibiotics. [[id number]]  (Abnormal) Collected:  04/08/20 0623    Order Status:  Completed Specimen:  Blood from Peripheral, Upper Arm, Right Updated:  04/12/20 1100     Blood Culture, Routine Gram stain aer bottle: Gram positive cocci in clusters resembling Staph       Results called to and read back by: Gisella Martini RN 04/10/2020  10:12      COAGULASE-NEGATIVE STAPHYLOCOCCUS SPECIES  Organism is a probable contaminant      Narrative:       Aerobic and anaerobic    Blood culture x two cultures. Draw prior to antibiotics. [[id number]] Collected:  04/08/20 0622    Order Status:  Completed Specimen:  Blood from Peripheral, Antecubital, Right Updated:  04/12/20 0812     Blood Culture, Routine No Growth after 4 days.     Narrative:       Aerobic and anaerobic    Influenza A & B by Molecular [[id number]] Collected:  04/09/20 1822    Order Status:  Completed Specimen:  Nasopharyngeal Swab Updated:  04/09/20 2013     Influenza A, Molecular Negative     Influenza B, Molecular Negative     Flu A & B Source NP    Influenza A & B by Molecular [[id number]] Collected:  04/09/20 1334    Order Status:  Canceled Specimen:  Nasopharyngeal Swab     Culture, Respiratory with Gram Stain [[id number]]     Order Status:  Canceled Specimen:  Sputum, Expectorated         Imaging  ECG Results          EKG 12-lead (Final result)  Result time 04/08/20 16:29:46    Final result by Interface, Lab In [facility] (04/08/20 16:29:46)                  Narrative:    Test Reason : R00.0,    Vent. Rate : 181 BPM     Atrial Rate : 127 BPM     P-R Int : 000 ms          QRS Dur : 184 ms      QT Int : 300 ms       P-R-T Axes : 000 -74 133 degrees     QTc Int : 521 ms    Atrial fibrillation with rapid ventricular response  Left axis deviation  Right bundle branch block      Abnormal ECG  When compared with ECG of 15-MAR-2020 17:03,  Atrial fibrillation with rapid ventricular response Now present  Confirmed by JAIRO METZGER MD ([id number]) on 4/8/2020 4:29:40 PM    Referred By: [email or web address]   SELF           Confirmed By:JAIRO METZGER MD                            Results for orders placed during the hospital encounter of 01/29/20   Echo    Narrative · Patient does not have transposition of the great arteries. Based on what   is seen, it is possible patient had an ASD closure.  · Mildly decreased left ventricular systolic function. The estimated   ejection fraction is 45%.  · Mildly reduced right ventricular systolic function. Mild right   ventricular enlargement.  · Severe left atrial enlargement.  · Severe right atrial enlargement.  · Normal central venous pressure (3 mmHg).     X-Ray Chest 1 View  Narrative: EXAMINATION:  XR CHEST 1 VIEW    CLINICAL HISTORY:  sepsis;    TECHNIQUE:  Single frontal view of the chest was performed.    COMPARISON:  Chest radiograph from 04/08/2020    FINDINGS:  Moderate cardiomegaly.  Atherosclerosis of the aortic arch.  Prominent interstitial lung markings bilaterally, not significantly changed when compared to prior exam which may be seen with multifocal pneumonia, pulmonary edema, or interstitial lung disease.  No pleural effusion.  No pneumothorax.  Osseous structures are unremarkable.  Impression: No significant change when compared to prior exam.    Electronically signed by: Jason Reed MD  Date:    04/13/2020  Time:    16:47  All imaging within the last 24 hours was reviewed.     Assessment and Plan:    Active Hospital Problems    Diagnosis  POA     *COVID-19 virus infection [U07.1]  Yes    Comfort measures only status [Z51.5]  Not Applicable    Moderate malnutrition [E44.0]  Yes     Chronic    Atrial fibrillation with RVR [I48.91]  Yes    Elevated lactic acid level [R79.89]  Yes    Type 2 diabetes mellitus with hyperglycemia, without long-term current use of insulin [E11.65]  Yes    COPD (chronic obstructive pulmonary disease) [J44.9]  Yes    Chronic heart failure with preserved ejection fraction [I50.32]  Yes      Resolved Hospital Problems   No resolved problems to display.     Covid-19 Virus Infection  - COVID-19 testing:  POSITIVE 04/08  - Infection Control notified    - Isolation:   - Airborne and Droplet Precautions  - Surgical mask on patient   - N95 masks must be fit tested, wear eye protection  - 20 second hand hygiene   - Limit visitors per hospital policy   - Consolidate lab draws, nursing care, and interventions    - Diagnostics: (Lymphopenia, hyponatremia, hyperferritinemia, elevated troponin, elevated d-dimer, age, and comorbidities are significant predictors of poor clinical outcome)   - CBC: WBC WNL, H/H stable, lymph 13.6  trend Q48hrs  - CMP: multiple abnormalities         trend Q48hrs  - Procalcitonin: 0.08  - D-dimer: 0.90     repeat prior to discharge  - Ferritin: 892 >> 998     repeat prior to discharge   - CRP: 46.8 >> 54.2          trend Q48hrs  - LDH: 326      repeat prior to discharge  - BNP: 472  - Troponin: 0.180    - ECG: Afib/flutter, QTc elongated in that setting   - rapid Flu: negative   - Blood culture x2: COAGULASE-NEGATIVE STAPHYLOCOCCUS SPECIES, likely contaminant in 1/2 >>> other culture with NGTD   - Sputum culture: none   - CXR: reviewed   - UA and culture: Enterococcus +   - CPK: 26    - Management:   Bundle care as able to maintain isolation & minimize in/out of room   - Supplemental O2 to maintain SpO2 92%-96%   if requiring 6L NC or higher, place on nonrebreather and discuss case with MICU   - Telemetry &  continuous pulse oximetry    - If wheezing   - consider albuterol inhaler PRN due to Afib with RVR if needed   - acetaminophen 650mg PO Q6hr PRN fever   - loperamide PRN for viral diarrhea  - Empiric antibiotics per likely source & patient allergies    - CAP: x 5 day course (d/c early if low concern for bacterial co-infection)  Ceftriaxone 1g IV Q24hrs            Transitioned to doxycycline 04/09 from azithromycin due to QTc                 If MRSA risk factors, add Vancomycin IV (PharmD consult)     - Investigational Treatment Protocol: (if patient meets criteria)   [email or web address]     - statin: atorvastatin 40mg po daily (if CPK WNL) >>> tolerating       - HCQ 400mg PO BID x1 day, then 400mg PO daily x 4 days >>> initiated; monitor daily EKGs  (check glucose 6 phosphate dehydrogenase (NOT G6PD Quant), ECG on start & @48hrs, and start Qshift POCT glucose)    Safety notes:   - Avoid NIPPV (CPAP/BiPAP) to prevent aerosolization, use on a case-by-case basis if in neg pressure room   - Cautious use of NSAIDS for fever per WHO recommendations (3/16/2020)   - No new ACEi/ARB start or discontinuation of chronic med unless hypotensive (Esler et al. Journal of Hypertension 2020, 38:000-000)   - Careful use of steroids in the absence of other indications (shock, ARDS)   - Fluid sparing resuscitation, avoid maintenance fluids    Lactic acidosis  -transferred to ICU 04/13  - lactic 4.5->2.8 >> 4.9 >> 2.7 >> 5.7  - IVF bolus received in ED  - gentle IVFs inpatient as appropriate, see MAR  - related to urosepsis and suspected cardiogenic shock   - monitor     Chronic diastolic heart failure  Hyponatremia  Bilateral upper extremity edema  - strict I/Os, daily weights  - holding further diuresis due to chemistry indicative of hypovolemia   -  >> low for patient   - hyponatremia workup urine NA 70, urine osmo 305, serum osmo  268  - elevate BUE due to edema       Afib with RVR  - HR 200s in the ED >> now rates stable in 's  - due to active infection, BB/CCB avoided in setting of COPD  - given amiodarone bolus and infusion earlier in course >>> now transitioned to PO  - dig loaded 04/09, no daily PO dosing per Cardiology   - hold coumadin due to elevated INR, PharmD following   - dose/medication adjustment as appropriate   - continue telemetry  - daily INR    Malnutrition  Anemia   Hypoalbuminemia   Hypokalemia  Hyponatremia  Hypocalcemia  Hypophosphatemia   Hypomagnesemia   - Dietary following  - nutritional supplements added with encouragement to increase PO intake   - electrolytes repleted as appropriate  - vitamin D 14 and B12 1065   - anemia profile reviewed; likely due to malnutrition, no iron supplement at this time  - IVFs as noted above  - continue home marinol   - multivitamin initiated   - ergocalciferol initiated   - monitor CMP daily   - likely will need PEG tube long term     DM2  - low dose SSI, ACHS  - cardiac diabetic diet    Diarrhea  - chronic per patient  - hold stool softener  - resume home dicyclomine  - PRN imodium     Advance Care Planning  Goals of care, counseling/discussion  - she wished to be DNR/DNI; family agrees with DNR/DNI  - 45cminutes spent discussing goals of care    Patient's chronic/stable medical conditions noted in the problem list above will be managed with the patient's home medications as tolerated.     VTE High Risk Prophylaxis:   VTE Risk Mitigation (From admission, onward)    None          Laina Washington, GUILLERMINA, AG-ACNP, BC  Department of Hospital Medicine  [facility]

## 2020-04-14 NOTE — DISCHARGE SUMMARY
[facility]  Cardiology  Discharge Summary      Patient Name: Elizabeth Cano  MRN: [id number]  Admission Date: 4/8/2020  Hospital Length of Stay: 6 days  Discharge Date and Time:  04/14/2020 5:54 AM  Attending Physician: Rahul King MD    Discharging Provider: Lucila Nelson MD  Primary Care Physician: Cesar Reeves MD    HPI:   Eliazbeth Cano is a 70yo female with a PMHx of Afib on warfarin, CHF, COPD, DM2, recent multiple hospitalizations who presented from [facility] to the ED for worsening SOB, cough and intermittent low grade fever. She was noted to be saturating at 90s on room air which improved with oxygen. She was also in Afib with RVR in 200s, was administered amiodarone load and infusion in the ED with improvement of HR to 120s. Patient is COVID positive. Cardiology was consulted for Afib w/ RVR.     Of note, patient has chronic hypotension on 10mg of midodrine qd. She was hospitalized for acute hypoxemic resp failure 2/2 to CHF jam in February 2019 at which time she was on a short course of pressors. Her metoprolol was discontinued and she was started on digoxin to continue upon discharge. The patient was subsequently hospitalized in March for nausea and abdominal pain in the setting of ventral hernia, at which time her digoxin was discontinued.     * No surgery found *     Indwelling Lines/Drains at time of discharge:  Lines/Drains/Airways     None                 Hospital Course:  This is a 69 y.o. admitted on 4/8/2020 for respiratory distress (presented with SOB) 2/2 suspected COVID-19. Now in the ICU for worsening shock likely septic superimposed on a background of chronic hypotension (takes midodrine 10 mg nightly). After discussion with the family, they do not want aggressive measures taken. No intubation, lines or labs. They want to make her comfortable during this time. They noted that she had been in and out of the hospital since November and this has been an  on-going process for them. Time of death 04:55am.     Consults:   Consults (From admission, onward)        Status Ordering Provider     Inpatient consult to Cardiology  Once     Provider:  (Not yet assigned)    Completed TINO HOLLAND JR     Inpatient consult to Midline team  Once     Provider:  (Not yet assigned)    Completed SALLY PRINCE     Inpatient consult to Midline team  Once     Provider:  (Not yet assigned)    Completed LIZ BENAVIDEZ     Inpatient consult to Registered Dietitian/Nutritionist  Once     Provider:  (Not yet assigned)    Completed LIZ BENAVIDEZ          Significant Diagnostic Studies: Labs:   CMP   Recent Labs   Lab 04/12/20  0556 04/12/20  1529 04/13/20  0612   * 126* 126*   K 4.2 5.1 4.7   CL 96 97 94*   CO2 22* 16* 17*    147* 102   BUN 13 13 14   CREATININE 0.8 0.8 0.9   CALCIUM 6.0* 6.3* 6.4*   PROT 4.3*  --  4.5*   ALBUMIN 1.2*  --  1.3*   BILITOT 1.0  --  1.7*   ALKPHOS 314*  --  463*   AST 91*  --  195*   ALT 27  --  57*   ANIONGAP 11 13 15   ESTGFRAFRICA >60.0 >60.0 >60.0   EGFRNONAA >60.0 >60.0 >60.0   , CBC   Recent Labs   Lab 04/12/20  0556 04/13/20  0955   WBC 10.74 15.24*   HGB 10.5* 10.1*   HCT 32.3* 30.4*    188   , INR   Lab Results   Component Value Date    INR 4.0 (H) 04/13/2020    INR 2.3 (H) 04/12/2020    INR 2.0 (H) 04/11/2020    and Lipid Panel No results found for: CHOL, HDL, LDLCALC, TRIG, CHOLHDL    Pending Diagnostic Studies:     Procedure Component Value Units Date/Time    Procalcitonin [[id number]] Collected:  04/13/20 1211    Order Status:  Sent Lab Status:  In process Updated:  04/13/20 1211    Specimen:  Blood     Narrative:       Collection has been rescheduled by APRYL at 04/13/2020 11:51 Reason:   Unable to collect patient has a lot of fluid spoke with GARFIELD Davis    Troponin I [[id number]] Collected:  04/08/20 1626    Order Status:  Sent Lab Status:  In process Updated:  04/08/20 1627    Specimen:  Blood           Final  Active Diagnoses:    Diagnosis Date Noted POA    PRINCIPAL PROBLEM:  COVID-19 virus infection [U07.1] 2020 Yes    Comfort measures only status [Z51.5] 2020 Not Applicable    Moderate malnutrition [E44.0] 04/10/2020 Yes     Chronic    Atrial fibrillation with RVR [I48.91] 2020 Yes    Elevated lactic acid level [R79.89] 2020 Yes    Type 2 diabetes mellitus with hyperglycemia, without long-term current use of insulin [E11.65] 2019 Yes    COPD (chronic obstructive pulmonary disease) [J44.9] 2019 Yes    Chronic heart failure with preserved ejection fraction [I50.32] 2019 Yes      Problems Resolved During this Admission:     No new Assessment & Plan notes have been filed under this hospital service since the last note was generated.  Service: Cardiology      Discharged Condition:     Disposition:     Follow Up:    Patient Instructions:   No discharge procedures on file.  Medications:  Reconciled Home Medications:      Medication List      ASK your doctor about these medications    ACIDOPHILUS PROBIOTIC ORAL  Take 1 tablet by mouth once daily.     albuterol 2.5 mg /3 mL (0.083 %) nebulizer solution  Commonly known as:  PROVENTIL  Take 0.5 mLs by nebulization every 6 (six) hours as needed (wheezing). Rescue     benzonatate 100 MG capsule  Commonly known as:  TESSALON  Take 1 capsule (100 mg total) by mouth 3 (three) times daily as needed for Cough.     budesonide 0.25 mg/2 mL nebulizer solution  Commonly known as:  PULMICORT  Take 2 ml by nebulization 2 times daily Controller     dicyclomine 10 MG capsule  Commonly known as:  BENTYL  Take 1 capsule (10 mg total) by mouth 3 (three) times daily.     dronabinoL 2.5 MG capsule  Commonly known as:  MARINOL  Take 1 capsule (2.5 mg total) by mouth 2 (two) times daily.     furosemide 20 MG tablet  Commonly known as:  LASIX  Take 2 tablets (40 mg total) by mouth 2 (two) times daily.     lidocaine 5 %  Commonly known as:   LIDODERM  Apply topically to right leg as needed for pain     magnesium oxide 400 mg (241.3 mg magnesium) tablet  Commonly known as:  MAG-OX  Take 1 tablet (400 mg total) by mouth once daily.     metFORMIN 1000 MG tablet  Commonly known as:  GLUCOPHAGE  Take 1,000 mg by mouth 2 (two) times daily with meals.     miconazole nitrate 2% 2 % Oint  Commonly known as:  MICOTIN  Apply topically 2 (two) times daily. Apply to the bilateral groins/perineal area BID for 7 days     midodrine 10 MG tablet  Commonly known as:  PROAMATINE  Take 1 tablet (10 mg total) by mouth every evening.     mirtazapine 15 MG tablet  Commonly known as:  REMERON  Take 15 mg by mouth every evening.     nystatin powder  Commonly known as:  MYCOSTATIN  Apply twice daily under breast folds for redness and irritation until resolved     ondansetron 4 MG tablet  Commonly known as:  ZOFRAN  Take 4 mg by mouth every 6 (six) hours as needed for Nausea.     pantoprazole 40 MG tablet  Commonly known as:  PROTONIX  Take 1 tablet (40 mg total) by mouth once daily.     phenyleph-min oil-petrolatum 0.25-14-74.9 % Oint  Place 1 applicator rectally 4 (four) times daily as needed.     polyethylene glycol 17 gram Pwpk  Commonly known as:  GLYCOLAX  Take 17 g by mouth once daily.     potassium chloride 10 MEQ Cpsr  Commonly known as:  MICRO-K  Take 3 capsules (30 mEq total) by mouth once daily.     senna 8.6 mg tablet  Commonly known as:  SENOKOT  Take 1 tablet by mouth once daily.     simethicone 80 MG chewable tablet  Commonly known as:  MYLICON  Take 1 tablet (80 mg total) by mouth 3 (three) times daily as needed.     vitamin D 1000 units Tab  Commonly known as:  VITAMIN D3  Take 1,000 Units by mouth once daily.     warfarin 1 MG tablet  Commonly known as:  COUMADIN  Take 1 tablet (1 mg total) by mouth Daily. At 5:00pm            Time spent on the discharge of patient: 45 minutes    Lucila Nelson MD  Cardiology  [facility]

## 2020-04-14 NOTE — NURSING
RN found pt in room unresponsive to voice and sternal rub. NP, rapid response team, family, and MD notified. IV fluids and Dopamine started per primary team. Pt transferred to ICU.

## 2020-04-15 NOTE — PHYSICIAN QUERY
PT Name: Elizabeth Cano  MR #: [id number]    Physician Query Form - Cause and Effect Relationship Clarification      CDS/: Zarina Cardenas RN, CCDS              Contact information: [email or web address]    This form is a permanent document in the medical record.     Query Date: April 15, 2020    By submitting this query, we are merely seeking further clarification of documentation. Please utilize your independent clinical judgment when addressing the question(s) below.    The Medical record contains the following:  Supporting Clinical Findings   Location in record      arrhythmia and sepsis.      COVID-19 virus infection                                                                                           04/13 had acute decompensation; became lethargic, hypotensive, with worsening of hypoxemia requiring high flow oxygen. Labs with WBC elevated to 15,   Blood culture drawn (previous with NGTD).worsening lactic acidosis   Urine culture resulted with enterococcus      urosepsis and suspected cardiogenic shock           Now in the ICU for worsening shock likely septic                  4/8 Ed note    4/8- 4/13 prog notes    4/13 prog note                        4/13 h/p, 4/14 d/c summary                                                                              SARS-CoV-2 RNA-Positive    Urine culture- Enterococcus Faecalis                                                4/10 lab             Provider, please clarify if there is any correlation between __Sepsis  and  Covid-19.           Are the conditions:      [ X ] Due to or associated with each other   [  ] Unrelated to each other   [  ] Other (Please Specify): _________________________   [  ] Clinically Undetermined

## 2020-04-15 NOTE — PHYSICIAN QUERY
PT Name: Elizabeth Cano  MR #: [id number]     Physician Query Form - Documentation Clarification      CDS/: Zarina Cardenas RN, CCDS             Contact information: [email or web address]    This form is a permanent document in the medical record.     Query Date: April 15, 2020    By submitting this query, we are merely seeking further clarification of documentation. Please utilize your independent clinical judgment when addressing the question(s) below.    The Medical record reflects the following:    Supporting Clinical Findings Location in Medical Record   ua culture- Enterococcus Faecalis    Urine culture resulted with enterococcus     urosepsis 4/10 lab    4/13 prog note     She was broadened out to Vanc and cefepime today.          4/14 h/p                                                                            Doctor, Please clarify the meaning of  urosepsis.    Provider Use Only      (  X )  UTI 2/2 Enterococcus Faecalis    (   )  Other, specify:________________                                                                                                                           [  ] Clinically Undetermined

## 2020-04-17 LAB — BACTERIA BLD CULT: NORMAL

## 2021-07-23 NOTE — PROGRESS NOTES
2nd attempt call no answer LVM and sent UTR e-mail.   Patient free from falls and injuries throughout shift.  AAO w/ intermittent confusion and hallucinations.  VSS.  Patient denies chest pain and SOB.  Blood glucose managed through meals and insulin; .  Patient restarted on quetiapine 25 mg nightly.  Patient on 30 L high flow oxygen.    PRN medication given to promote sleep.  Patient sleeping throughout the night.    No acute events overnight. Patient resting well at this time.  Plan of care discussed with patient.  Patient verbalizes understanding.  Will continue to monitor.

## 2021-12-07 NOTE — PROGRESS NOTES
[facility] Hospital Medicine  Progress Note    Patient Name: Elizabeth Cano  MRN: [id number]  Patient Class: IP- Inpatient   Admission Date: 1/29/2020  Length of Stay: 16 days  Attending Physician: Mendoza Caldwell MD  Primary Care Provider: Cesar Reeves MD    [facility] Medicine Team: Networked reference to record PCT  Mendoza Caldwell MD    HPI:  Ms. Elizabeth Cano is a 69 years old female with afib (on coumadin), COPD, and HFpEF (EF 45% on 1/29/20) admitted to hospital medicine for presumed urinary tract infection. She was diagnosed with a UTI last week at her Nursing Home, [facility], which she was prescribed Macrobid. She completed her course of Macrobid on 01/29/20 and she was noted to be hypotensive rendering her transfer to the hospital.      She reports a decreased appetite for the last month since her hospitalization over the holidays due to uncomplicated diverticulitis which she was treated with Augmentin. She reports feeling nauseated when taking antibiotics. She denies palpitations, dizziness, lightheadedness, chest pain, SOB, change in urinary/bowel habits.      On 01/29/20, she was found to be hypotensive in the 80s/50s in the ED with lactic acid of 4.8. CXR showed no signs for PNA, UA showed mild leukocytosis with WBC 17, nitrite negative, with urine cultures and blood cultures showing NG. She was admitted to  and started on CTX. She has been resuscitated with IVFs since admit with 3L. BP responded appropriately to IVF resuscitation with subsequent downtrend in lactic acid to 2.7. However, overnight on 01/30/20, BP remained 63-67 mmHg MAP goal. Repeat lactic acid uptrend to 3.3. Of note, she received a night time dose of Lopressor 50 mg on 01/30/20, which has since been discontinued.     ICU consulted in am of 01/31/20 for hypotension despite being resuscitated with 3.5 L IV fluids and persistant lactic acidosis in 3-4 range. She improved with IVF hydration and  patient was stepped down. Rapid response evaluated patient early AM on 2/10 due to acute increase in oxygen requiriments. Patient began the day on 3L NC and progressed to requiring Comfort flow of 30L and 100% FiO2. Patient denies chest pains, only complaining of minor increased difficulty breathing.    Subjective:     Principal Problem:Septic shock    Interval History: Patient lying in bed, moderate respiratory distress. Reports shortness of breath, dry cough, fatigue and lower extremity swelling.     Review of Systems   Constitutional: Positive for fatigue. Negative for chills and fever.   HENT: Negative for congestion.    Eyes: Negative for visual disturbance.   Respiratory: Positive for cough and shortness of breath.    Cardiovascular: Positive for leg swelling. Negative for chest pain.   Gastrointestinal: Negative for abdominal distention, abdominal pain, nausea and vomiting.   Genitourinary: Negative for dysuria.   Musculoskeletal: Positive for gait problem.   Skin: Negative for wound.   Neurological: Positive for weakness. Negative for dizziness and light-headedness.   Psychiatric/Behavioral: Negative for confusion.     Objective:     Vital Signs (Most Recent):  Temp: 98 °F (36.7 °C) (02/14/20 0800)  Pulse: 87 (02/14/20 0822)  Resp: 20 (02/14/20 0800)  BP: (!) 112/57 (02/14/20 0800)  SpO2: (!) 89 % (02/14/20 0800) Vital Signs (24h Range):  Temp:  [97.6 °F (36.4 °C)-98 °F (36.7 °C)] 98 °F (36.7 °C)  Pulse:  [69-96] 87  Resp:  [15-32] 20  SpO2:  [84 %-97 %] 89 %  BP: ()/(50-72) 112/57     Weight: 81.4 kg (179 lb 7.3 oz)  Body mass index is 28.96 kg/m².    Intake/Output Summary (Last 24 hours) at 2/14/2020 0915  Last data filed at 2/13/2020 1800  Gross per 24 hour   Intake 220 ml   Output --   Net 220 ml      Physical Exam   Constitutional: She is oriented to person, place, and time. She appears well-developed.   Eyes: Pupils are equal, round, and reactive to light. Conjunctivae and EOM are normal.   Neck:  Neck supple.   Cardiovascular: Normal rate and regular rhythm.   Pulmonary/Chest: She is in respiratory distress. She has rales in the right lower field and the left lower field.   Abdominal: Soft. Bowel sounds are normal. She exhibits no distension. There is no tenderness.   Musculoskeletal: Normal range of motion. She exhibits edema.   Neurological: She is alert and oriented to person, place, and time. No cranial nerve deficit.   Skin: Skin is warm.   Psychiatric: She has a normal mood and affect.         Overview/Hospital Course:  She was admitted to Hospital Medicine and treated for Urosepsis with antibiotics and IVF resuscitation. Critical Care was consulted for septic shock. She was admitted to MICU, bolused 1L LR with resulting cleared Lactic Acid. CT Abd Pelvis showed findings consistent with several diverticula in the sigmoid colon with associated wall thickening, minimal adjacent fat stranding, and prominence of the vasa recta.  Findings appear similar when compared to prior examination dated 12/19/2019 and may represent ongoing diverticulitis or colitis in the correct clinical setting. Area of hypoattenuation within the right hepatic lobe adjacent to the gallbladder fossa, not definitively seen on prior examination and may represent area of fatty liver infiltration or transit hepatic attenuation differences however, in the setting of liver disease, neoplasm is not excluded.   Following clearing of her lactic acid, she was stepped down to Hospital Medicine with midodrine 5 mg TID. Critical Care was re-consulted in the setting of Afib RVR and hypotension. While on the floor, patient went into Afib RVR, sustained HR >120, /55, requiring lopressor pushes, which subsequently lowered HR to 75s/50s.     2/10: Patient readmitted to MICU for acute hypoxemic respiratory failure. CTA negative for PE, diuresing well with IV lasix. Levophed has been discontinued. Electrolytes were repleted. Currently on zosyn;  vancomycin was discontinued today given resolution of shock. Bridging heparin with warfarin to therapeutic INR level for A-fib. Restarted oral lasix and metoprolol. Patient is stable to be transferred to Hospital Medicine.  Received topical permethrin treatment for head lice.   2/12- 2/13: Patient developed two episodes of hypotension requiring low dose levo both 02/11 and 02/12 nights. Metoprolol was discontinued and digoxin started for Afib rate control.     Significant Labs:   A1C:   Recent Labs   Lab 12/19/19  2220 01/30/20  0421   HGBA1C 6.4* 5.4     Blood Culture: No results for input(s): LABBLOO in the last 48 hours.  CBC: No results for input(s): WBC, HGB, HCT, PLT in the last 48 hours.  CMP: No results for input(s): NA, K, CL, CO2, GLU, BUN, CREATININE, CALCIUM, PROT, ALBUMIN, BILITOT, ALKPHOS, AST, ALT, ANIONGAP, EGFRNONAA in the last 48 hours.    Invalid input(s): ESTGFAFRICA  Lactic Acid: No results for input(s): LACTATE in the last 48 hours.  Magnesium: No results for input(s): MG in the last 48 hours.  POCT Glucose:   Recent Labs   Lab 02/14/20  1104 02/14/20  1642 02/14/20  2134   POCTGLUCOSE 151* 69* 102     Troponin: No results for input(s): TROPONINI in the last 48 hours.  Urine Studies: No results for input(s): COLORU, APPEARANCEUA, PHUR, SPECGRAV, PROTEINUA, GLUCUA, KETONESU, BILIRUBINUA, OCCULTUA, NITRITE, UROBILINOGEN, LEUKOCYTESUR, RBCUA, WBCUA, BACTERIA, SQUAMEPITHEL, HYALINECASTS in the last 48 hours.    Invalid input(s): WRIGHTSUR    Significant Imaging: I have reviewed and interpreted all pertinent imaging results/findings within the past 24 hours.    CT chest (2/13):  1.  The patient has widespread pulmonary parenchymal disease that has developed since 12/19/2019.  The radiographic features favor interstitial disease over airspace disease.  I doubt that the patient has pulmonary edema.  Differential diagnosis includes pneumonia due to an unusual infectious agent or reaction to widespread  "aspiration.  However also consider the possibility of drug toxicity in this patient with UTI treated with nitrofurantoin; interstitial lung disease has been described in patients exposed to nitrofurantoin.    2.  In this patient with a history of "cardiac surgery at age 10" , scar under the left breast and no sternotomy, consider the possibility of remote repair of coarctation.  Aortic caliber, contour and course appear unremarkable on the current study obtained without intravenous contrast medium.  No aortic aneurysm or dissection identified on contrast enhanced study dated 02/10/2020.    3.  Radiopaque material is present in the interatrial septum at the level of the foramen ovale raising the question of a percutaneous closure of a small secundum ASD or PFO.    4.  And pulmonary arteries are large.  Size of the arteries plus enlarged right heart chambers suggest pulmonary arterial hypertension, possibly long-standing. Of interest, the patient has radiopaque material at the expected location of the fossa ovalis suggesting percutaneous closure.  Therefore pulmonary arterial hypertension could be the result of pulmonary arterial intimal hyperplasia due to longstanding overcirculation in childhood or young adulthood.  There is also radiopaque material at the distal aspect of the right atrial appendage, possible surgical access point to the right atrium.  In the lungs the arteries are particularly large in the right upper lobe.  In old literature of radiology there are numerous descriptions of asymmetric distribution of pulmonary blood flow in the setting of ASD, with right pulmonary arteries larger than left pulmonary arteries.  Assessment/Plan:      Active Diagnoses:    Diagnosis Date Noted POA    PRINCIPAL PROBLEM:  Septic shock [A41.9, R65.21] 02/01/2020 Yes    Palliative care encounter [Z51.5] 02/11/2020 Not Applicable    Acute hypoxemic respiratory failure [J96.01] 02/11/2020 No    Head lice [B85.0] " 02/11/2020 Yes    Advance care planning [Z71.89]  Not Applicable    Goals of care, counseling/discussion [Z71.89]  Not Applicable    Aspiration pneumonia [J69.0] 02/09/2020 No    Hypovolemia [E86.1] 02/07/2020 Yes    Delirium [R41.0] 02/07/2020 No    Bilious vomiting [R11.14] 02/06/2020 No    Visual hallucinations [R44.1] 02/06/2020 No    Thrombocytopenia [D69.6] 02/06/2020 No    Hematemesis [K92.0] 02/06/2020 No    Anemia [D64.9] 02/04/2020 Yes    Liver lesion, right lobe [K76.9] 01/31/2020 Yes    Hypotension [I95.9] 01/30/2020 Yes    Diverticulitis [K57.92] 01/30/2020 Yes    Hypocalcemia [E83.51] 01/29/2020 Yes    Acute cystitis without hematuria [N30.00] 01/29/2020 Yes    GAVIN (acute kidney injury) [N17.9] 01/29/2020 Yes    Bifascicular block [I45.2] 01/02/2020 Yes    Hypomagnesemia [E83.42] 12/20/2019 Yes    COPD (chronic obstructive pulmonary disease) [J44.9] 12/19/2019 Yes    Chronic heart failure with preserved ejection fraction [I50.32] 12/19/2019 Yes    Paroxysmal atrial fibrillation [I48.0] 12/19/2019 Yes    Type 2 diabetes mellitus with hyperglycemia, without long-term current use of insulin [E11.65] 12/19/2019 Yes      Problems Resolved During this Admission:     Scheduled Meds:   albuterol-ipratropium  3 mL Nebulization Q12H    budesonide  0.5 mg Nebulization BID    calcium-vitamin D3  1 tablet Oral BID    digoxin  0.125 mg Oral Daily    DULoxetine  30 mg Oral Daily    furosemide  20 mg Oral Daily    magnesium oxide  400 mg Oral Daily    miconazole nitrate 2%   Topical (Top) BID    midodrine  10 mg Oral QHS    mirtazapine  15 mg Oral QHS    pantoprazole  40 mg Oral Daily    piperacillin-tazobactam (ZOSYN) IVPB  4.5 g Intravenous Q8H    QUEtiapine  25 mg Oral QHS    warfarin  1 mg Oral Daily     Continuous Infusions:   norepinephrine bitartrate-D5W Stopped (02/13/20 0710)     PRN Meds:.acetaminophen, albuterol-ipratropium, bisacodyL, Dextrose 10% Bolus, Dextrose 10%  Bolus, glucagon (human recombinant), glucose, glucose, insulin aspart U-100, iohexol, melatonin, ondansetron, phenyleph-min oil-petrolatum, polyethylene glycol, senna, sodium chloride 0.9%, sodium chloride 0.9%    PLAN:    Acute hypoxemic respiratory failure  Pneumonia   Pulmonary edema  Likely secondary to decompensated HF that has responded well with diureses.   Weaned Oxygen from comfort flow down to 2L via NC.   -CXR 02/121 with Multifocal patchy pulmonary parenchymal disease is present throughout both lungs, worse in the right lung than the left. Unchanged from prior.   -Continue with home dose of daily furosemide 20mg  - given IV lasix 20 mg IV x2 doses on 2/14  - Chest CT non contrast (2/13) did not show significant pulmonary edema but did show evidence of interstitial lung disease and pulmonary hypertension   - consult pulmonary tomorrow AM  -  Oxygen requirement increased from 2L to 4L. Sating at 94-98% on 4L   - day 6/7 of zosyn     Hypotension  Septic shock- resolved    UTI due to candida albicans   Patient with acute hypotension and worsening O2 requirements over the course of 2/9/2020. Started the day on 3L NC and by the end was on Comfort flow 100% FiO2 and 30L. Concern for PE. CTA negative for PE, CXR also with severe pleural edema. Patient had been on zosyn for possible diverticulitis +/- urosepsis.   Vancomycin discontinued; continue zosyn for a total of 7 day treatment.   Patient on minimal levophed for MAP < 65. Patient appears to have baseline low BP's, mentates well with a MAP > 60. Possible infectious etiology given worsening hypoxemia / shock, CT showing compressive atelectasis vs. Developing consolidation in the left lower lobe.  Patient required another short course of Levo 0.02 for MAP <65 overnight which has now improved this morning.   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   - Monitor daily electrolytes     Paroxysmal atrial fibrillation  Patient currently in RVR presumed 2/2 to acute respiratory failure  - PRN metoprolol as needed for rate control but would not aggressively treat in setting of severe hypoxemia  - INR subtherapeutic; Continue with heparin bridge w/ warfarin until INR is therapeutic.   - Metoprolol 50mg BID discontinued given nightly hypotensive episdoes requiring transient levophed, also received  digoxin 250mcg for rate control  - Maintain Mg >2, K>4.     Type 2 diabetes mellitus with hyperglycemia, without long-term current use of insulin  AIC 5.4  Glucose checks QACHS  Goal Glucose 140-180 mg/dL  Diabetic diet    Head lice  -S/p 1 permethrin lotion treatment.   - Active lice visualized today despite permethrin treatment and reported comb-niting.   - Actively considering other options to help eradicate head lice infestation, though patient will require aggressive comb-niting, Discussed with Charge nurse who will assist with investigating if this can be effectively performed.    -  lindane shampoo applied on 2/11, will need repeat treatment       Hypocalcemia  -Continue with home calcium-Vit D  Resolved     Hypomagnesemia  -Replete PRN  -Continue home magnesium oxide.     Liver lesion, right lobe  - possible concern of abscess vs malignancy per CT scan  - h/o weight loss and possible colonic lesion  - RUQ US showing focal fatty infiltration        Diverticulitis  - CT consistent with recurrent diverticulitis  - CRS consulted during this hospitalization- recommend management with antibiotics.   - Continue with zosyn for 7 days       Debiilty  - PT/OT recommend SNF  - followup with  CM/SW regarding palcement     Advance care planning  Consulted Palliative care to assist with goals of care discussion. As of this moment, patient expressed preference for full code, although patient may not fully understand her prognosis given her frequent hospitalizations. Palliative care to continue with advance care discussions with patient and family.         VTE Risk Mitigation (From admission, onward)         Ordered     warfarin (COUMADIN) tablet 1 mg  Daily      02/12/20 1139     IP VTE HIGH RISK PATIENT  Once      01/29/20 1916     Reason for No Pharmacological VTE Prophylaxis  Once     Question:  Reasons:  Answer:  Already adequately anticoagulated on oral Anticoagulants    01/29/20 1916              Time spent in care of patient: > 35 minutes     Mendoza Caldwell MD  Department of Hospital Medicine   [facility]   Pt received semi-supine in bed in NAD, +IVF-Heparin, +bilateral venodynes, +spouse bedside, VSS, agreeable to participate in therapy at this time

## 2022-08-25 NOTE — PLAN OF CARE
Patient free from falls and injuries throughout shift.  AAO and VSS.  Patient denies chest pain and SOB.  Blood glucose managed through meals and insulin; .  5L NC.  Patient continues on warfarin 1 mg daily; INR 2.0.  Lactic Acid 2.7.  PRN medication given for relief.  Airborne, contact, and droplet precautions in place.  No acute events overnight. Patient resting well at this time.  Plan of care discussed with patient.  Patient verbalizes understanding.  Will continue to monitor.      Acitretin Pregnancy And Lactation Text: This medication is Pregnancy Category X and should not be given to women who are pregnant or may become pregnant in the future. This medication is excreted in breast milk.